# Patient Record
Sex: MALE | Race: BLACK OR AFRICAN AMERICAN | Employment: FULL TIME | URBAN - METROPOLITAN AREA
[De-identification: names, ages, dates, MRNs, and addresses within clinical notes are randomized per-mention and may not be internally consistent; named-entity substitution may affect disease eponyms.]

---

## 2017-01-21 ENCOUNTER — HOSPITAL ENCOUNTER (EMERGENCY)
Age: 42
Discharge: HOME | End: 2017-01-21
Attending: EMERGENCY MEDICINE
Payer: MEDICAID

## 2017-01-21 VITALS
BODY MASS INDEX: 25.76 KG/M2 | HEIGHT: 62 IN | TEMPERATURE: 98.4 F | HEART RATE: 67 BPM | OXYGEN SATURATION: 97 % | WEIGHT: 140 LBS | DIASTOLIC BLOOD PRESSURE: 76 MMHG | RESPIRATION RATE: 18 BRPM | SYSTOLIC BLOOD PRESSURE: 137 MMHG

## 2017-01-21 DIAGNOSIS — R11.15 NON-INTRACTABLE CYCLICAL VOMITING WITH NAUSEA: Primary | ICD-10-CM

## 2017-01-21 DIAGNOSIS — E87.6 HYPOKALEMIA: ICD-10-CM

## 2017-01-21 DIAGNOSIS — R10.84 ABDOMINAL PAIN, GENERALIZED: ICD-10-CM

## 2017-01-21 LAB
ALBUMIN SERPL BCP-MCNC: 4.9 G/DL (ref 3.5–5)
ALBUMIN/GLOB SERPL: 1.1 {RATIO} (ref 1.2–3.5)
ALP SERPL-CCNC: 122 U/L (ref 50–136)
ALT SERPL-CCNC: 27 U/L (ref 12–65)
ANION GAP BLD CALC-SCNC: 16 MMOL/L (ref 7–16)
AST SERPL W P-5'-P-CCNC: 22 U/L (ref 15–37)
BASOPHILS # BLD AUTO: 0 K/UL (ref 0–0.2)
BASOPHILS # BLD: 0 % (ref 0–2)
BILIRUB SERPL-MCNC: 1 MG/DL (ref 0.2–1.1)
BUN SERPL-MCNC: 25 MG/DL (ref 6–23)
CALCIUM SERPL-MCNC: 9.9 MG/DL (ref 8.3–10.4)
CHLORIDE SERPL-SCNC: 105 MMOL/L (ref 98–107)
CO2 SERPL-SCNC: 19 MMOL/L (ref 21–32)
CREAT SERPL-MCNC: 1.61 MG/DL (ref 0.8–1.5)
DIFFERENTIAL METHOD BLD: ABNORMAL
EOSINOPHIL # BLD: 0 K/UL (ref 0–0.8)
EOSINOPHIL NFR BLD: 0 % (ref 0.5–7.8)
ERYTHROCYTE [DISTWIDTH] IN BLOOD BY AUTOMATED COUNT: 13.4 % (ref 11.9–14.6)
GLOBULIN SER CALC-MCNC: 4.3 G/DL (ref 2.3–3.5)
GLUCOSE SERPL-MCNC: 130 MG/DL (ref 65–100)
HCT VFR BLD AUTO: 45 % (ref 41.1–50.3)
HGB BLD-MCNC: 16.1 G/DL (ref 13.6–17.2)
IMM GRANULOCYTES # BLD: 0 K/UL (ref 0–0.5)
IMM GRANULOCYTES NFR BLD AUTO: 0.2 % (ref 0–5)
LIPASE SERPL-CCNC: 91 U/L (ref 73–393)
LYMPHOCYTES # BLD AUTO: 13 % (ref 13–44)
LYMPHOCYTES # BLD: 1.2 K/UL (ref 0.5–4.6)
MAGNESIUM SERPL-MCNC: 2.3 MG/DL (ref 1.8–2.4)
MCH RBC QN AUTO: 30.1 PG (ref 26.1–32.9)
MCHC RBC AUTO-ENTMCNC: 35.8 G/DL (ref 31.4–35)
MCV RBC AUTO: 84 FL (ref 79.6–97.8)
MONOCYTES # BLD: 0.9 K/UL (ref 0.1–1.3)
MONOCYTES NFR BLD AUTO: 10 % (ref 4–12)
NEUTS SEG # BLD: 6.7 K/UL (ref 1.7–8.2)
NEUTS SEG NFR BLD AUTO: 77 % (ref 43–78)
PHOSPHATE SERPL-MCNC: 1.5 MG/DL (ref 2.5–4.5)
PLATELET # BLD AUTO: 344 K/UL (ref 150–450)
PMV BLD AUTO: 9.3 FL (ref 10.8–14.1)
POTASSIUM SERPL-SCNC: 3.1 MMOL/L (ref 3.5–5.1)
PROT SERPL-MCNC: 9.2 G/DL (ref 6.3–8.2)
RBC # BLD AUTO: 5.38 M/UL (ref 4.23–5.67)
SODIUM SERPL-SCNC: 140 MMOL/L (ref 136–145)
WBC # BLD AUTO: 8.8 K/UL (ref 4.3–11.1)

## 2017-01-21 PROCEDURE — 96361 HYDRATE IV INFUSION ADD-ON: CPT | Performed by: EMERGENCY MEDICINE

## 2017-01-21 PROCEDURE — 96375 TX/PRO/DX INJ NEW DRUG ADDON: CPT | Performed by: EMERGENCY MEDICINE

## 2017-01-21 PROCEDURE — 96366 THER/PROPH/DIAG IV INF ADDON: CPT | Performed by: EMERGENCY MEDICINE

## 2017-01-21 PROCEDURE — 96374 THER/PROPH/DIAG INJ IV PUSH: CPT | Performed by: EMERGENCY MEDICINE

## 2017-01-21 PROCEDURE — 74011000250 HC RX REV CODE- 250: Performed by: EMERGENCY MEDICINE

## 2017-01-21 PROCEDURE — 99284 EMERGENCY DEPT VISIT MOD MDM: CPT | Performed by: EMERGENCY MEDICINE

## 2017-01-21 PROCEDURE — 96365 THER/PROPH/DIAG IV INF INIT: CPT | Performed by: EMERGENCY MEDICINE

## 2017-01-21 PROCEDURE — 74011250637 HC RX REV CODE- 250/637: Performed by: EMERGENCY MEDICINE

## 2017-01-21 PROCEDURE — 83690 ASSAY OF LIPASE: CPT | Performed by: EMERGENCY MEDICINE

## 2017-01-21 PROCEDURE — 80053 COMPREHEN METABOLIC PANEL: CPT | Performed by: EMERGENCY MEDICINE

## 2017-01-21 PROCEDURE — C9113 INJ PANTOPRAZOLE SODIUM, VIA: HCPCS | Performed by: EMERGENCY MEDICINE

## 2017-01-21 PROCEDURE — 74011250636 HC RX REV CODE- 250/636: Performed by: EMERGENCY MEDICINE

## 2017-01-21 PROCEDURE — 84100 ASSAY OF PHOSPHORUS: CPT | Performed by: EMERGENCY MEDICINE

## 2017-01-21 PROCEDURE — 81003 URINALYSIS AUTO W/O SCOPE: CPT | Performed by: EMERGENCY MEDICINE

## 2017-01-21 PROCEDURE — 85025 COMPLETE CBC W/AUTO DIFF WBC: CPT | Performed by: EMERGENCY MEDICINE

## 2017-01-21 PROCEDURE — 83735 ASSAY OF MAGNESIUM: CPT | Performed by: EMERGENCY MEDICINE

## 2017-01-21 RX ORDER — ONDANSETRON 4 MG/1
4 TABLET, ORALLY DISINTEGRATING ORAL
Qty: 20 TAB | Refills: 0 | Status: SHIPPED | OUTPATIENT
Start: 2017-01-21 | End: 2018-03-15

## 2017-01-21 RX ORDER — SODIUM,POTASSIUM PHOSPHATES 280-250MG
2 POWDER IN PACKET (EA) ORAL ONCE
Status: COMPLETED | OUTPATIENT
Start: 2017-01-21 | End: 2017-01-21

## 2017-01-21 RX ORDER — DICYCLOMINE HYDROCHLORIDE 20 MG/1
20 TABLET ORAL EVERY 6 HOURS
Qty: 30 TAB | Refills: 0 | Status: SHIPPED | OUTPATIENT
Start: 2017-01-21 | End: 2017-12-06

## 2017-01-21 RX ORDER — ONDANSETRON 2 MG/ML
4 INJECTION INTRAMUSCULAR; INTRAVENOUS
Status: COMPLETED | OUTPATIENT
Start: 2017-01-21 | End: 2017-01-21

## 2017-01-21 RX ORDER — POTASSIUM CHLORIDE 14.9 MG/ML
20 INJECTION INTRAVENOUS
Status: COMPLETED | OUTPATIENT
Start: 2017-01-21 | End: 2017-01-21

## 2017-01-21 RX ORDER — HYDROMORPHONE HYDROCHLORIDE 1 MG/ML
1 INJECTION, SOLUTION INTRAMUSCULAR; INTRAVENOUS; SUBCUTANEOUS
Status: COMPLETED | OUTPATIENT
Start: 2017-01-21 | End: 2017-01-21

## 2017-01-21 RX ORDER — METOCLOPRAMIDE HYDROCHLORIDE 5 MG/ML
10 INJECTION INTRAMUSCULAR; INTRAVENOUS
Status: COMPLETED | OUTPATIENT
Start: 2017-01-21 | End: 2017-01-21

## 2017-01-21 RX ORDER — DIPHENHYDRAMINE HYDROCHLORIDE 50 MG/ML
12.5 INJECTION, SOLUTION INTRAMUSCULAR; INTRAVENOUS
Status: COMPLETED | OUTPATIENT
Start: 2017-01-21 | End: 2017-01-21

## 2017-01-21 RX ADMIN — DIPHENHYDRAMINE HYDROCHLORIDE 12.5 MG: 50 INJECTION, SOLUTION INTRAMUSCULAR; INTRAVENOUS at 11:52

## 2017-01-21 RX ADMIN — SODIUM CHLORIDE 1000 ML: 900 INJECTION, SOLUTION INTRAVENOUS at 11:52

## 2017-01-21 RX ADMIN — SODIUM CHLORIDE 1000 ML: 900 INJECTION, SOLUTION INTRAVENOUS at 12:55

## 2017-01-21 RX ADMIN — METOCLOPRAMIDE 10 MG: 5 INJECTION, SOLUTION INTRAMUSCULAR; INTRAVENOUS at 11:51

## 2017-01-21 RX ADMIN — ONDANSETRON 4 MG: 2 INJECTION INTRAMUSCULAR; INTRAVENOUS at 11:51

## 2017-01-21 RX ADMIN — SODIUM CHLORIDE 40 MG: 9 INJECTION INTRAMUSCULAR; INTRAVENOUS; SUBCUTANEOUS at 11:51

## 2017-01-21 RX ADMIN — POTASSIUM CHLORIDE 20 MEQ: 14.9 INJECTION, SOLUTION INTRAVENOUS at 12:55

## 2017-01-21 RX ADMIN — HYDROMORPHONE HYDROCHLORIDE 1 MG: 1 INJECTION, SOLUTION INTRAMUSCULAR; INTRAVENOUS; SUBCUTANEOUS at 11:52

## 2017-01-21 RX ADMIN — POTASSIUM & SODIUM PHOSPHATES POWDER PACK 280-160-250 MG 2 PACKET: 280-160-250 PACK at 13:32

## 2017-01-21 NOTE — ED NOTES
I have reviewed discharge instructions with the patient. The patient verbalized understanding. Pt instructed on where prescriptions x2 were sent. Pt discharged with spouse.

## 2017-01-21 NOTE — DISCHARGE INSTRUCTIONS
Learning About Cyclic Vomiting Syndrome  What is it? An adult or child who has cyclic vomiting syndrome (CVS) has repeated bouts of severe vomiting and nausea. Between the vomiting episodes, the person's health is normal. The cause of CVS isn't known, but it may be related to migraine headaches. What happens when you have CVS?  CVS causes severe nausea, vomiting, and drooling. You may not be able to walk or talk during an episode. You may look pale. You may have a fever and feel very tired and thirsty. Some people with CVS have belly pain and diarrhea. Bouts of vomiting can last for a few hours or a few days. People with this condition tend to have a typical pattern of attacks. For example, one person may have bouts of CVS 4 times a year and always in the morning. Another person may have 8 bouts a year and always in the evening. Some people with CVS have triggers that set off the vomiting. People have reported an infection, such as a cold, as a trigger. Other triggers include stress, menstrual cycles, and certain foods like chocolate or cheese. Children tend to have more triggers than adults do. How is CVS treated? Treatment is centered around easing the nausea and vomiting. The doctor may prescribe medicine. During very bad bouts of vomiting, your doctor may want you to stay in the hospital for a while. You may get fluids through a vein (IV) to prevent dehydration. Dehydration can be serious. Your body needs fluids to make enough blood. Without a good supply of blood, vital organs such as the heart and brain can't work as well as they should. When should you call for help? Call your doctor now or seek immediate medical care if:  · You have symptoms of dehydration, such as:  ¨ Dry eyes and a dry mouth. ¨ Passing only a little urine. ¨ Feeling thirstier than usual.  Watch closely for changes in your health, and be sure to contact your doctor if:  · You do not get better as expected.   Follow-up care is a key part of your treatment and safety. Be sure to make and go to all appointments, and call your doctor if you are having problems. It's also a good idea to know your test results and keep a list of the medicines you take. Where can you learn more? Go to http://ora-itzel.info/. Enter B614 in the search box to learn more about \"Learning About Cyclic Vomiting Syndrome. \"  Current as of: August 9, 2016  Content Version: 11.1  © 4646-6498 [employer]. Care instructions adapted under license by [employer] (which disclaims liability or warranty for this information). If you have questions about a medical condition or this instruction, always ask your healthcare professional. [address] any warranty or liability for your use of this information.

## 2017-01-21 NOTE — ED PROVIDER NOTES
HPI Comments: 74-year-old male with a history of gastroparesis, cyclical vomiting syndrome versus hyperemesis cannabis syndrome, gastritis, and PUD with prior admission for hematemesis and BETTY, presents with intermittent episodes of vomiting for the past 4 days. He reports bilateral lower abdominal pain, worse on the left. Pain significantly worsened last night after eating short cake. He had a few episodes of vomiting blood, but this has since resolved. Last bowel movement was 4 days ago and was solid. No blood in stools. No fever. Patient is a 46 y.o. male presenting with vomiting and abdominal pain. The history is provided by the patient. Vomiting    Associated symptoms include abdominal pain. Pertinent negatives include no chills, no fever, no diarrhea, no headaches, no cough and no headaches. Abdominal Pain    Associated symptoms include vomiting and constipation. Pertinent negatives include no fever, no diarrhea, no nausea, no headaches, no chest pain and no back pain. Past Medical History:   Diagnosis Date    BETTY (acute kidney injury) ([facility] [address] 8/12/2014    Clostridium difficile colitis 3/20/2011    Gastroparesis 2005     emptying study normal -2006    GERD (gastroesophageal reflux disease)     HIATAL HERNIA SINCE 1999    PUD (peptic ulcer disease) ALL DX IN 2008     ESOPHAGITIS, + H PYLORI       Past Surgical History:   Procedure Laterality Date    Hx wisdom teeth extraction  2/10/11    Egd  4/08     H.  HERNIA, ULCER X2, H PYLORI,    Colonoscopy  4/08     ESOPHAGITIS, COLONIC ULCER X1    Egd  2011     h pylori -neg         Family History:   Problem Relation Age of Onset    Other Mother      L+W    Diabetes Maternal Grandmother     Sickle Cell Anemia Father     Heart Disease Paternal Grandfather     Other Sister      ALL L+W    Other Son      L+W       Social History     Social History    Marital status:      Spouse name: N/A    Number of children: N/A    Years of education: N/A     Occupational History    Not on file. Social History Main Topics    Smoking status: Former Smoker     Packs/day: 0.25     Years: 2.00     Types: Cigarettes    Smokeless tobacco: Never Used    Alcohol use No    Drug use: Yes     Special: Marijuana    Sexual activity: Yes     Partners: Female      Comment: S/O 15 WEEKS PREGNANT 3/17/11     Other Topics Concern    Not on file     Social History Narrative    3/17/11:  PATIENT LIVES WITH GIRLFRIEND, ALTON (CELL: [phone]), HER 3YEAR OLD DAUGHTER AND THEIR 3YEAR OLD SON. ALTON IS CURRENTLY 13 WEEKS PREGNANT. PATIENT'S JOB AT [employer] WAS DOWNSIZED ABOUT A YEAR AGO, AND HE HAS BEEN A STAY HOME DAD SINCE. ALTON WORKS IN HOUSEKEEPING POSITION. ALLERGIES: Amoxicillin; Aspirin; Hydrocodone; Ibuprofen; Pcn [penicillins]; and Phenergan [promethazine]    Review of Systems   Constitutional: Positive for diaphoresis. Negative for chills and fever. HENT: Negative for hearing loss. Eyes: Negative for visual disturbance. Respiratory: Negative for cough and shortness of breath. Cardiovascular: Negative for chest pain and palpitations. Gastrointestinal: Positive for abdominal pain, constipation and vomiting. Negative for blood in stool, diarrhea and nausea. Musculoskeletal: Negative for back pain. Skin: Negative for rash. Neurological: Negative for weakness and headaches. Psychiatric/Behavioral: Negative for confusion. Vitals:    01/21/17 1051 01/21/17 1115 01/21/17 1120 01/21/17 1131   BP: 143/82 151/90 151/90 134/84   Pulse: 93 76 (!) 126 84   Resp: 20 18     Temp: 97.8 °F (36.6 °C) 98.1 °F (36.7 °C)     SpO2: 96% 98% 95% 96%   Weight: 63.5 kg (140 lb)      Height: 5' 2\" (1.575 m)               Physical Exam   Constitutional: He appears well-developed and well-nourished. He appears distressed. HENT:   Head: Normocephalic and atraumatic.    Right Ear: External ear normal.   Left Ear: External ear normal.   Nose: Nose normal.   Mouth/Throat: Oropharynx is clear and moist.   Eyes: Conjunctivae are normal. Pupils are equal, round, and reactive to light. Neck: Normal range of motion. Neck supple. Cardiovascular: Regular rhythm, normal heart sounds and intact distal pulses. Pulmonary/Chest: Effort normal and breath sounds normal. No respiratory distress. He has no wheezes. Abdominal: Soft. Bowel sounds are normal. He exhibits no distension. There is generalized tenderness. There is guarding. There is no rigidity and no rebound. Musculoskeletal: Normal range of motion. He exhibits no edema. Neurological: He is alert. Skin: Skin is warm and dry. Psychiatric: Judgment normal.   Nursing note and vitals reviewed. MDM  Number of Diagnoses or Management Options  Diagnosis management comments: Parts of this document were created using dragon voice recognition software. The chart has been reviewed but errors may still be present. 1:04 PM  Patient feeling much better. We'll continue hydration and replace potassium and phosphorus. Patient mildly acidotic with renal sufficiency. I do not suspect he will require admission. I discussed the results of all labs, procedures, radiographs, and treatments with the patient and available family. Treatment plan is agreed upon and the patient is ready for discharge. Questions about treatment in the ED and differential diagnosis of presenting condition were answered. Patient was given verbal discharge instructions including, but not limited to, importance of returning to the emergency department for any concern of worsening or continued symptoms. Instructions were given to follow up with a primary care provider or specialist within 1-2 days.   Adverse effects of medications, if prescribed, were discussed and patient was advised to refrain from significant physical activity until followed up by primary care physician and to not drive or operate heavy machinery after taking any sedating substances.            Amount and/or Complexity of Data Reviewed  Clinical lab tests: ordered and reviewed (Results for orders placed or performed during the hospital encounter of 01/21/17  -CBC WITH AUTOMATED DIFF       Result                                            Value                         Ref Range                       WBC                                               8.8                           4.3 - 11.1 K/uL                 RBC                                               5.38                          4.23 - 5.67 M/uL                HGB                                               16.1                          13.6 - 17.2 g/dL                HCT                                               45.0                          41.1 - 50.3 %                   MCV                                               84.0                          79.6 - 97.8 FL                  MCH                                               30.1                          26.1 - 32.9 PG                  MCHC                                              35.8 (H)                      31.4 - 35.0 g/dL                RDW                                               13.4                          11.9 - 14.6 %                   PLATELET                                          344                           150 - 450 K/uL                  MPV                                               9.3 (L)                       10.8 - 14.1 FL                  DF                                                AUTOMATED                                                     NEUTROPHILS                                       77                            43 - 78 %                       LYMPHOCYTES                                       13                            13 - 44 %                       MONOCYTES                                         10                            4.0 - 12.0 % EOSINOPHILS                                       0 (L)                         0.5 - 7.8 %                     BASOPHILS                                         0                             0.0 - 2.0 %                     IMMATURE GRANULOCYTES                             0.2                           0.0 - 5.0 %                     ABS. NEUTROPHILS                                  6.7                           1.7 - 8.2 K/UL                  ABS. LYMPHOCYTES                                  1.2                           0.5 - 4.6 K/UL                  ABS. MONOCYTES                                    0.9                           0.1 - 1.3 K/UL                  ABS. EOSINOPHILS                                  0.0                           0.0 - 0.8 K/UL                  ABS. BASOPHILS                                    0.0                           0.0 - 0.2 K/UL                  ABS. IMM.  GRANS.                                  0.0                           0.0 - 0.5 K/UL             -METABOLIC PANEL, COMPREHENSIVE       Result                                            Value                         Ref Range                       Sodium                                            140                           136 - 145 mmol/L                Potassium                                         3.1 (L)                       3.5 - 5.1 mmol/L                Chloride                                          105                           98 - 107 mmol/L                 CO2                                               19 (L)                        21 - 32 mmol/L                  Anion gap                                         16                            7 - 16 mmol/L                   Glucose                                           130 (H)                       65 - 100 mg/dL                  BUN                                               25 (H)                        6 - 23 MG/DL                    Creatinine 1.61 (H)                      0.8 - 1.5 MG/DL                 GFR est AA                                        >60                           >60 ml/min/1.73m2               GFR est non-AA                                    51 (L)                        >60 ml/min/1.73m2               Calcium                                           9.9                           8.3 - 10.4 MG/DL                Bilirubin, total                                  1.0                           0.2 - 1.1 MG/DL                 ALT                                               27                            12 - 65 U/L                     AST                                               22                            15 - 37 U/L                     Alk. phosphatase                                  122                           50 - 136 U/L                    Protein, total                                    9.2 (H)                       6.3 - 8.2 g/dL                  Albumin                                           4.9                           3.5 - 5.0 g/dL                  Globulin                                          4.3 (H)                       2.3 - 3.5 g/dL                  A-G Ratio                                         1.1 (L)                       1.2 - 3.5                  -LIPASE       Result                                            Value                         Ref Range                       Lipase                                            91                            73 - 393 U/L               -MAGNESIUM       Result                                            Value                         Ref Range                       Magnesium                                         2.3                           1.8 - 2.4 mg/dL            -PHOSPHORUS       Result                                            Value                         Ref Range                       Phosphorus 1.5 (L)                       2.5 - 4.5 MG/DL            )  Tests in the medicine section of CPT®: ordered and reviewed      ED Course       Procedures

## 2017-03-28 ENCOUNTER — HOSPITAL ENCOUNTER (OUTPATIENT)
Age: 42
Setting detail: OBSERVATION
Discharge: HOME | End: 2017-03-31
Admitting: INTERNAL MEDICINE
Payer: MEDICAID

## 2017-03-28 DIAGNOSIS — G43.A0 CYCLIC VOMITING SYNDROME, INTRACTABILITY OF VOMITING NOT SPECIFIED, PRESENCE OF NAUSEA NOT SPECIFIED: Primary | ICD-10-CM

## 2017-03-28 LAB
ALBUMIN SERPL BCP-MCNC: 4.1 G/DL (ref 3.5–5)
ALBUMIN SERPL BCP-MCNC: 4.7 G/DL (ref 3.5–5)
ALBUMIN/GLOB SERPL: 1.2 {RATIO} (ref 1.2–3.5)
ALBUMIN/GLOB SERPL: 1.2 {RATIO} (ref 1.2–3.5)
ALP SERPL-CCNC: 111 U/L (ref 50–136)
ALP SERPL-CCNC: 97 U/L (ref 50–136)
ALT SERPL-CCNC: 20 U/L (ref 12–65)
ALT SERPL-CCNC: 21 U/L (ref 12–65)
AMPHET UR QL SCN: NEGATIVE
ANION GAP BLD CALC-SCNC: 14 MMOL/L (ref 7–16)
ANION GAP BLD CALC-SCNC: 20 MMOL/L (ref 7–16)
APPEARANCE UR: CLEAR
AST SERPL W P-5'-P-CCNC: 14 U/L (ref 15–37)
AST SERPL W P-5'-P-CCNC: 16 U/L (ref 15–37)
BACTERIA URNS QL MICRO: 0 /HPF
BARBITURATES UR QL SCN: NEGATIVE
BASOPHILS # BLD AUTO: 0 K/UL (ref 0–0.2)
BASOPHILS # BLD: 0 % (ref 0–2)
BENZODIAZ UR QL: POSITIVE
BILIRUB SERPL-MCNC: 0.6 MG/DL (ref 0.2–1.1)
BILIRUB SERPL-MCNC: 1.1 MG/DL (ref 0.2–1.1)
BILIRUB UR QL: NEGATIVE
BUN SERPL-MCNC: 22 MG/DL (ref 6–23)
BUN SERPL-MCNC: 25 MG/DL (ref 6–23)
CALCIUM SERPL-MCNC: 8.8 MG/DL (ref 8.3–10.4)
CALCIUM SERPL-MCNC: 9.5 MG/DL (ref 8.3–10.4)
CANNABINOIDS UR QL SCN: POSITIVE
CASTS URNS QL MICRO: ABNORMAL /LPF
CHLORIDE SERPL-SCNC: 103 MMOL/L (ref 98–107)
CHLORIDE SERPL-SCNC: 107 MMOL/L (ref 98–107)
CO2 SERPL-SCNC: 16 MMOL/L (ref 21–32)
CO2 SERPL-SCNC: 19 MMOL/L (ref 21–32)
COCAINE UR QL SCN: NEGATIVE
COLOR UR: YELLOW
CREAT SERPL-MCNC: 0.99 MG/DL (ref 0.8–1.5)
CREAT SERPL-MCNC: 1.57 MG/DL (ref 0.8–1.5)
DEPRECATED S PYO AG THROAT QL EIA: NEGATIVE
DIFFERENTIAL METHOD BLD: ABNORMAL
EOSINOPHIL # BLD: 0 K/UL (ref 0–0.8)
EOSINOPHIL NFR BLD: 0 % (ref 0.5–7.8)
EPI CELLS #/AREA URNS HPF: ABNORMAL /HPF
ERYTHROCYTE [DISTWIDTH] IN BLOOD BY AUTOMATED COUNT: 13.1 % (ref 11.9–14.6)
FLUAV AG NPH QL IA: NEGATIVE
FLUBV AG NPH QL IA: NEGATIVE
GLOBULIN SER CALC-MCNC: 3.4 G/DL (ref 2.3–3.5)
GLOBULIN SER CALC-MCNC: 4 G/DL (ref 2.3–3.5)
GLUCOSE SERPL-MCNC: 119 MG/DL (ref 65–100)
GLUCOSE SERPL-MCNC: 125 MG/DL (ref 65–100)
GLUCOSE UR STRIP.AUTO-MCNC: NEGATIVE MG/DL
HCT VFR BLD AUTO: 42.4 % (ref 41.1–50.3)
HGB BLD-MCNC: 16.5 G/DL (ref 13.6–17.2)
HGB UR QL STRIP: ABNORMAL
IMM GRANULOCYTES # BLD: 0 K/UL (ref 0–0.5)
IMM GRANULOCYTES NFR BLD AUTO: 0.3 % (ref 0–5)
KETONES UR QL STRIP.AUTO: >80 MG/DL
LEUKOCYTE ESTERASE UR QL STRIP.AUTO: NEGATIVE
LIPASE SERPL-CCNC: 103 U/L (ref 73–393)
LYMPHOCYTES # BLD AUTO: 27 % (ref 13–44)
LYMPHOCYTES # BLD: 2.7 K/UL (ref 0.5–4.6)
MAGNESIUM SERPL-MCNC: 2.3 MG/DL (ref 1.8–2.4)
MCH RBC QN AUTO: 30 PG (ref 26.1–32.9)
MCHC RBC AUTO-ENTMCNC: 38.9 G/DL (ref 31.4–35)
MCV RBC AUTO: 77.1 FL (ref 79.6–97.8)
METHADONE UR QL: NEGATIVE
MONOCYTES # BLD: 1.1 K/UL (ref 0.1–1.3)
MONOCYTES NFR BLD AUTO: 11 % (ref 4–12)
NEUTS SEG # BLD: 6 K/UL (ref 1.7–8.2)
NEUTS SEG NFR BLD AUTO: 62 % (ref 43–78)
NITRITE UR QL STRIP.AUTO: NEGATIVE
OPIATES UR QL: NEGATIVE
PCP UR QL: NEGATIVE
PH UR STRIP: 6 [PH] (ref 5–9)
PLATELET # BLD AUTO: 384 K/UL (ref 150–450)
PMV BLD AUTO: 9.3 FL (ref 10.8–14.1)
POTASSIUM SERPL-SCNC: 3.4 MMOL/L (ref 3.5–5.1)
POTASSIUM SERPL-SCNC: 3.9 MMOL/L (ref 3.5–5.1)
PROT SERPL-MCNC: 7.5 G/DL (ref 6.3–8.2)
PROT SERPL-MCNC: 8.7 G/DL (ref 6.3–8.2)
PROT UR STRIP-MCNC: ABNORMAL MG/DL
RBC # BLD AUTO: 5.5 M/UL (ref 4.23–5.67)
RBC #/AREA URNS HPF: ABNORMAL /HPF
SODIUM SERPL-SCNC: 139 MMOL/L (ref 136–145)
SODIUM SERPL-SCNC: 140 MMOL/L (ref 136–145)
SP GR UR REFRACTOMETRY: 1.03 (ref 1–1.02)
UROBILINOGEN UR QL STRIP.AUTO: 0.2 EU/DL (ref 0.2–1)
WBC # BLD AUTO: 9.9 K/UL (ref 4.3–11.1)
WBC URNS QL MICRO: ABNORMAL /HPF

## 2017-03-28 PROCEDURE — 96361 HYDRATE IV INFUSION ADD-ON: CPT

## 2017-03-28 PROCEDURE — 99218 HC RM OBSERVATION: CPT

## 2017-03-28 PROCEDURE — 96366 THER/PROPH/DIAG IV INF ADDON: CPT

## 2017-03-28 PROCEDURE — 87804 INFLUENZA ASSAY W/OPTIC: CPT | Performed by: INTERNAL MEDICINE

## 2017-03-28 PROCEDURE — 74011250636 HC RX REV CODE- 250/636: Performed by: INTERNAL MEDICINE

## 2017-03-28 PROCEDURE — 83690 ASSAY OF LIPASE: CPT

## 2017-03-28 PROCEDURE — 99285 EMERGENCY DEPT VISIT HI MDM: CPT

## 2017-03-28 PROCEDURE — 74011250637 HC RX REV CODE- 250/637: Performed by: INTERNAL MEDICINE

## 2017-03-28 PROCEDURE — 83735 ASSAY OF MAGNESIUM: CPT

## 2017-03-28 PROCEDURE — 96372 THER/PROPH/DIAG INJ SC/IM: CPT

## 2017-03-28 PROCEDURE — 96376 TX/PRO/DX INJ SAME DRUG ADON: CPT

## 2017-03-28 PROCEDURE — 87880 STREP A ASSAY W/OPTIC: CPT | Performed by: INTERNAL MEDICINE

## 2017-03-28 PROCEDURE — 80053 COMPREHEN METABOLIC PANEL: CPT

## 2017-03-28 PROCEDURE — 96375 TX/PRO/DX INJ NEW DRUG ADDON: CPT

## 2017-03-28 PROCEDURE — 74011250636 HC RX REV CODE- 250/636: Performed by: NURSE PRACTITIONER

## 2017-03-28 PROCEDURE — 96365 THER/PROPH/DIAG IV INF INIT: CPT

## 2017-03-28 PROCEDURE — 80053 COMPREHEN METABOLIC PANEL: CPT | Performed by: INTERNAL MEDICINE

## 2017-03-28 PROCEDURE — 85025 COMPLETE CBC W/AUTO DIFF WBC: CPT

## 2017-03-28 PROCEDURE — 80307 DRUG TEST PRSMV CHEM ANLYZR: CPT | Performed by: INTERNAL MEDICINE

## 2017-03-28 PROCEDURE — 81001 URINALYSIS AUTO W/SCOPE: CPT | Performed by: INTERNAL MEDICINE

## 2017-03-28 PROCEDURE — 74011250636 HC RX REV CODE- 250/636

## 2017-03-28 PROCEDURE — 87081 CULTURE SCREEN ONLY: CPT | Performed by: EMERGENCY MEDICINE

## 2017-03-28 RX ORDER — HYDROMORPHONE HYDROCHLORIDE 1 MG/ML
2 INJECTION, SOLUTION INTRAMUSCULAR; INTRAVENOUS; SUBCUTANEOUS
Status: DISCONTINUED | OUTPATIENT
Start: 2017-03-28 | End: 2017-03-31 | Stop reason: HOSPADM

## 2017-03-28 RX ORDER — DIPHENHYDRAMINE HYDROCHLORIDE 50 MG/ML
25 INJECTION, SOLUTION INTRAMUSCULAR; INTRAVENOUS
Status: COMPLETED | OUTPATIENT
Start: 2017-03-28 | End: 2017-03-28

## 2017-03-28 RX ORDER — HALOPERIDOL 5 MG/ML
10 INJECTION INTRAMUSCULAR ONCE
Status: COMPLETED | OUTPATIENT
Start: 2017-03-28 | End: 2017-03-28

## 2017-03-28 RX ORDER — HYDROMORPHONE HYDROCHLORIDE 1 MG/ML
1 INJECTION, SOLUTION INTRAMUSCULAR; INTRAVENOUS; SUBCUTANEOUS
Status: DISCONTINUED | OUTPATIENT
Start: 2017-03-28 | End: 2017-03-31 | Stop reason: HOSPADM

## 2017-03-28 RX ORDER — SODIUM CHLORIDE 0.9 % (FLUSH) 0.9 %
5-10 SYRINGE (ML) INJECTION EVERY 8 HOURS
Status: DISCONTINUED | OUTPATIENT
Start: 2017-03-28 | End: 2017-03-31 | Stop reason: HOSPADM

## 2017-03-28 RX ORDER — METOCLOPRAMIDE HYDROCHLORIDE 5 MG/ML
10 INJECTION INTRAMUSCULAR; INTRAVENOUS EVERY 6 HOURS
Status: DISCONTINUED | OUTPATIENT
Start: 2017-03-28 | End: 2017-03-31 | Stop reason: HOSPADM

## 2017-03-28 RX ORDER — DIAZEPAM 10 MG/2ML
5 INJECTION INTRAMUSCULAR
Status: COMPLETED | OUTPATIENT
Start: 2017-03-28 | End: 2017-03-28

## 2017-03-28 RX ORDER — ONDANSETRON 2 MG/ML
4 INJECTION INTRAMUSCULAR; INTRAVENOUS
Status: COMPLETED | OUTPATIENT
Start: 2017-03-28 | End: 2017-03-28

## 2017-03-28 RX ORDER — ONDANSETRON 2 MG/ML
8 INJECTION INTRAMUSCULAR; INTRAVENOUS
Status: DISCONTINUED | OUTPATIENT
Start: 2017-03-28 | End: 2017-03-28

## 2017-03-28 RX ORDER — METOCLOPRAMIDE HYDROCHLORIDE 5 MG/ML
10 INJECTION INTRAMUSCULAR; INTRAVENOUS
Status: COMPLETED | OUTPATIENT
Start: 2017-03-28 | End: 2017-03-28

## 2017-03-28 RX ORDER — METOCLOPRAMIDE 10 MG/1
10 TABLET ORAL
COMMUNITY
End: 2017-12-06

## 2017-03-28 RX ORDER — PANTOPRAZOLE SODIUM 20 MG/1
20 TABLET, DELAYED RELEASE ORAL DAILY
COMMUNITY
End: 2017-03-31

## 2017-03-28 RX ORDER — SODIUM CHLORIDE 0.9 % (FLUSH) 0.9 %
5-10 SYRINGE (ML) INJECTION AS NEEDED
Status: DISCONTINUED | OUTPATIENT
Start: 2017-03-28 | End: 2017-03-31 | Stop reason: HOSPADM

## 2017-03-28 RX ORDER — FACIAL-BODY WIPES
10 EACH TOPICAL
Status: COMPLETED | OUTPATIENT
Start: 2017-03-28 | End: 2017-03-28

## 2017-03-28 RX ORDER — ONDANSETRON 2 MG/ML
4 INJECTION INTRAMUSCULAR; INTRAVENOUS EVERY 4 HOURS
Status: DISCONTINUED | OUTPATIENT
Start: 2017-03-28 | End: 2017-03-31 | Stop reason: HOSPADM

## 2017-03-28 RX ORDER — ENOXAPARIN SODIUM 100 MG/ML
40 INJECTION SUBCUTANEOUS EVERY 24 HOURS
Status: DISCONTINUED | OUTPATIENT
Start: 2017-03-28 | End: 2017-03-31 | Stop reason: HOSPADM

## 2017-03-28 RX ORDER — SODIUM CHLORIDE 9 MG/ML
150 INJECTION, SOLUTION INTRAVENOUS CONTINUOUS
Status: DISCONTINUED | OUTPATIENT
Start: 2017-03-28 | End: 2017-03-29

## 2017-03-28 RX ORDER — POTASSIUM CHLORIDE 14.9 MG/ML
20 INJECTION INTRAVENOUS ONCE
Status: COMPLETED | OUTPATIENT
Start: 2017-03-28 | End: 2017-03-28

## 2017-03-28 RX ADMIN — DIAZEPAM 5 MG: 5 INJECTION, SOLUTION INTRAMUSCULAR; INTRAVENOUS at 04:12

## 2017-03-28 RX ADMIN — ENOXAPARIN SODIUM 40 MG: 40 INJECTION SUBCUTANEOUS at 22:01

## 2017-03-28 RX ADMIN — DIPHENHYDRAMINE HYDROCHLORIDE 25 MG: 50 INJECTION, SOLUTION INTRAMUSCULAR; INTRAVENOUS at 04:52

## 2017-03-28 RX ADMIN — ONDANSETRON HYDROCHLORIDE 4 MG: 2 INJECTION, SOLUTION INTRAMUSCULAR; INTRAVENOUS at 09:16

## 2017-03-28 RX ADMIN — METOCLOPRAMIDE 10 MG: 5 INJECTION, SOLUTION INTRAMUSCULAR; INTRAVENOUS at 18:21

## 2017-03-28 RX ADMIN — METOCLOPRAMIDE 10 MG: 5 INJECTION, SOLUTION INTRAMUSCULAR; INTRAVENOUS at 11:35

## 2017-03-28 RX ADMIN — Medication 10 ML: at 22:57

## 2017-03-28 RX ADMIN — ONDANSETRON 4 MG: 2 INJECTION, SOLUTION INTRAMUSCULAR; INTRAVENOUS at 04:46

## 2017-03-28 RX ADMIN — HALOPERIDOL LACTATE 10 MG: 5 INJECTION, SOLUTION INTRAMUSCULAR at 04:52

## 2017-03-28 RX ADMIN — HYDROMORPHONE HYDROCHLORIDE 1 MG: 1 INJECTION, SOLUTION INTRAMUSCULAR; INTRAVENOUS; SUBCUTANEOUS at 21:59

## 2017-03-28 RX ADMIN — SODIUM CHLORIDE 1000 ML: 900 INJECTION, SOLUTION INTRAVENOUS at 04:12

## 2017-03-28 RX ADMIN — SODIUM CHLORIDE 150 ML/HR: 900 INJECTION, SOLUTION INTRAVENOUS at 17:02

## 2017-03-28 RX ADMIN — SODIUM CHLORIDE 150 ML/HR: 900 INJECTION, SOLUTION INTRAVENOUS at 09:16

## 2017-03-28 RX ADMIN — POTASSIUM CHLORIDE 20 MEQ: 14.9 INJECTION, SOLUTION INTRAVENOUS at 12:14

## 2017-03-28 RX ADMIN — METOCLOPRAMIDE 10 MG: 5 INJECTION, SOLUTION INTRAMUSCULAR; INTRAVENOUS at 04:12

## 2017-03-28 RX ADMIN — ONDANSETRON HYDROCHLORIDE 4 MG: 2 INJECTION, SOLUTION INTRAMUSCULAR; INTRAVENOUS at 16:46

## 2017-03-28 RX ADMIN — DIPHENHYDRAMINE HYDROCHLORIDE 25 MG: 50 INJECTION, SOLUTION INTRAMUSCULAR; INTRAVENOUS at 04:12

## 2017-03-28 RX ADMIN — BISACODYL 10 MG: 10 SUPPOSITORY RECTAL at 09:16

## 2017-03-28 RX ADMIN — ONDANSETRON HYDROCHLORIDE 4 MG: 2 INJECTION, SOLUTION INTRAMUSCULAR; INTRAVENOUS at 12:14

## 2017-03-28 NOTE — PROGRESS NOTES
Complaint of abdominal discomfort most likely from \"wretching\". Refused prn Tylenol. Continue to monitor.

## 2017-03-28 NOTE — IP AVS SNAPSHOT
Summary of Care Report The Summary of Care report has been created to help improve care coordination. Users with access to [employer] or [address] (Web-based application) may access additional patient information including the Discharge Summary. If you are not currently a [address] user and need more information, please call the number listed below in the [address] section and ask to be connected with Medical Records. Facility Information Name Address Phone [address] [address] [address] [phone] Patient Information Patient Name Sex  Pao Reyes ([id number]) Male 1975 Discharge Information Admitting Provider Service Area Unit Vanessa Mora MD / Marlon Hardinmarlon  Med Surg / [phone] Discharge Provider Discharge Date/Time Discharge Disposition Destination (none) 3/31/2017 10:54 AM (Pending) AHR (none) Patient Language Language ENGLISH [13] Hospital Problems as of 3/31/2017  Reviewed: 2016 10:30 AM by Diana Nagy MD  
  
  
  
 Class Noted - Resolved Last Modified POA Active Problems Nausea and vomiting  3/5/2013 - Present 3/28/2017 by Jarrett Joseph NP Yes Entered by PRAVIN [place] Esophageal reflux (Chronic)  2014 - Present 3/28/2017 by Jarrett Joseph NP Yes Entered by Milla Morales BETTY (acute kidney injury) ([facility] [address]  2014 - Present 3/28/2017 by Jarrett Joseph NP Yes Entered by Vania Magaña MD  
  * (Principal)Intractable nausea and vomiting  2016 - Present 3/28/2017 by Mitch Bolivar NP Yes Entered by Tylor Tamayo PA Marijuana use (Chronic)  2016 - Present 3/28/2017 by Jarrett Joseph NP Yes   Entered by Tylor Tamayo, PA  
  
 Non-Hospital Problems as of 3/31/2017  Reviewed: 7/11/2016 10:30 AM by Danisha Verma MD  
  
  
  
 Class Noted - Resolved Last Modified Active Problems Gastroparesis (Chronic)  3/17/2011 - Present 9/17/2016 by Carine Carlos MD  
  Entered by Suzan Slade NP Gastroparesis (Chronic)  8/12/2012 - Present 11/28/2016 by Meera Snyder., PA Entered by PRAVIN Hernandez Epigastric pain  3/5/2013 - Present 8/14/2014 Entered by PRAVIN Santoro Sickle cell trait ([facility] [address] (Chronic)  3/5/2013 - Present 3/5/2013 by PRAVIN Santoro Entered by PRAVIN Santoro Hematemesis  11/28/2016 - Present 11/28/2016 by Meera Snyder., PA Entered by PRAVIN Santoro Hypokalemia  7/30/2013 - Present 8/14/2014 Entered by Robert Cody NP Acute upper GI bleed (Chronic)  4/30/2014 - Present 5/2/2014 Entered by Mone Marshall Anxiety (Chronic)  4/30/2014 - Present 5/2/2014 Entered by Mone Marshall Tobacco abuse (Chronic)  4/30/2014 - Present 5/2/2014 Entered by Mone Marshall H/O hiatal hernia (Chronic)  4/30/2014 - Present 11/28/2016 by Meera Snyder., PA Entered by Mone Marshall  
  PUD (peptic ulcer disease) (Chronic)  4/30/2014 - Present 11/28/2016 by Meera Snyder., PA Entered by Mone Marshall Acute blood loss anemia  5/1/2014 - Present 5/2/2014 Entered by Amanda Fields Marijuana abuse  8/12/2014 - Present 4/12/2016 by Sara Zee MD  
  Entered by Izzy Dorantes MD  
  Microcytosis  8/12/2014 - Present 8/14/2014 Entered by Izzy Dorantes MD  
  Hypomagnesemia  8/14/2014 - Present 8/14/2014   Entered by Izzy Dorantes MD  
  Polysubstance abuse  7/11/2016 - Present 7/11/2016 by Izzy Dorantes MD  
  Entered by Izzy Dorantes MD  
  Tetrahydrocannabinol [facility]) use disorder, moderate, dependence ([facility] 7/11/2016 - Present 7/11/2016 by Ольга Wong MD  
  Entered by Ольга Wong MD  
  Intractable cyclical vomiting with nausea  7/11/2016 - Present 9/17/2016 by Neda Sung MD  
  Entered by Ольга Wong MD  
  Cannabinoid hyperemesis syndrome ([facility] [address]  7/11/2016 - Present 7/11/2016 by Ольга Wong MD  
  Entered by Ольга Wong MD  
  Upper GI bleed  9/17/2016 - Present 9/18/2016 by Gabriel Loera DO Entered by Neda Sung MD  
  Lactic acidosis  11/28/2016 - Present 11/28/2016 by PRAVIN Baltazar Entered by Shannan Figueroa., PA You are allergic to the following Allergen Reactions Amoxicillin Nausea Only Aspirin Other (comments)  
 ulcers Hydrocodone Rash Ibuprofen Other (comments) Hx of ulcers Pcn (Penicillins) Rash Phenergan (Promethazine) Nausea and Vomiting Other (comments) Current Discharge Medication List  
  
START taking these medications Dose & Instructions Dispensing Information Comments  
 polyethylene glycol 17 gram packet Commonly known as:  Roxy Mckeon Dose:  17 g Take 1 Packet by mouth daily. Quantity:  30 Packet Refills:  6 CONTINUE these medications which have CHANGED Dose & Instructions Dispensing Information Comments * dicyclomine 20 mg tablet Commonly known as:  BENTYL What changed:  Another medication with the same name was added. Make sure you understand how and when to take each. Dose:  20 mg Take 1 Tab by mouth every six (6) hours. As needed for abdominal pain Quantity:  30 Tab Refills:  0  
   
 * dicyclomine 20 mg tablet Commonly known as:  BENTYL What changed: You were already taking a medication with the same name, and this prescription was added. Make sure you understand how and when to take each. Dose:  20 mg Take 1 Tab by mouth every six (6) hours as needed. Quantity:  90 Tab Refills:  6  
   
 pantoprazole 40 mg tablet Commonly known as:  PROTONIX What changed:   
- medication strength 
- how much to take Dose:  40 mg Take 1 Tab by mouth daily. Quantity:  30 Tab Refills:  6  
   
 * Notice: This list has 2 medication(s) that are the same as other medications prescribed for you. Read the directions carefully, and ask your doctor or other care provider to review them with you. CONTINUE these medications which have NOT CHANGED Dose & Instructions Dispensing Information Comments * ondansetron 4 mg disintegrating tablet Commonly known as:  ZOFRAN ODT Dose:  4 mg Take 1 Tab by mouth every eight (8) hours as needed for Nausea. Quantity:  20 Tab Refills:  0  
   
 * ondansetron 8 mg disintegrating tablet Commonly known as:  ZOFRAN ODT Dose:  4 mg Take 0.5 Tabs by mouth every eight (8) hours as needed. Quantity:  30 Tab Refills:  1 REGLAN 10 mg tablet Generic drug:  metoclopramide HCl Dose:  10 mg Take 10 mg by mouth Before breakfast, lunch, dinner and at bedtime. Refills:  0  
   
 * Notice: This list has 2 medication(s) that are the same as other medications prescribed for you. Read the directions carefully, and ask your doctor or other care provider to review them with you. Current Immunizations Name Date TDAP Vaccine 4/21/2012 Follow-up Information Follow up With Details Comments Contact Info Phys MD Sienna   Patient can only remember the practice name and not the physician [facility]  office will call patient with appointment date and time. [facility] [id number] 
[phone] Discharge Instructions DISCHARGE SUMMARY from Nurse The following personal items are in your possession at time of discharge: 
 
Dental Appliances: None Home Medications: None Jewelry: None Clothing: At bedside Other Valuables: Cell Phone PATIENT INSTRUCTIONS: 
 
 
F-face looks uneven A-arms unable to move or move unevenly S-speech slurred or non-existent T-time-call 911 as soon as signs and symptoms begin-DO NOT go Back to bed or wait to see if you get better-TIME IS BRAIN. Warning Signs of HEART ATTACK Call 911 if you have these symptoms: 
? Chest discomfort. Most heart attacks involve discomfort in the center of the chest that lasts more than a few minutes, or that goes away and comes back. It can feel like uncomfortable pressure, squeezing, fullness, or pain. ? Discomfort in other areas of the upper body. Symptoms can include pain or discomfort in one or both arms, the back, neck, jaw, or stomach. ? Shortness of breath with or without chest discomfort. ? Other signs may include breaking out in a cold sweat, nausea, or lightheadedness. Don't wait more than five minutes to call [address]! Fast action can save your life. Calling 911 is almost always the fastest way to get lifesaving treatment. Emergency Medical Services staff can begin treatment when they arrive  up to an hour sooner than if someone gets to the hospital by car. The discharge information has been reviewed with the patient. The patient verbalized understanding. Discharge medications reviewed with the patient and appropriate educational materials and side effects teaching were provided. Chart Review Routing History Recipient Method Report Sent By Checo Bautista MD  
Fax: [phone] Phone: [phone]  Fax IP Auto Routed [place] [employer]way[employer] [[id number]] 9/19/2012  7:59 AM 09/19/2012 Wing Rodriguez MD  
Fax: [phone] Phone: [phone] Fax IP Auto Routed Trans Joann Flores MD [[id number]] 10/21/2013  8:56 PM 10/21/2013 Mauricio Gan MD  
Fax: [phone] Phone: [phone] Fax IP Auto Routed Zora Saleh MD [[id number]] 9/7/2016 11:40 AM 09/07/2016 Wong Mckeon MD  
Fax: [phone] Phone: [phone] Fax IP Auto Routed Zora Saleh MD [[id number]] 9/7/2016 11:40 AM 09/07/2016

## 2017-03-28 NOTE — CONSULTS
[facility] Consult Note       Primary GI Physician: None    Referring Physician:  ER    Consult Date:  3/28/2017    Admit Date:  3/28/2017    Chief Complaint:  Cyclical Vomiting Syndrome    Subjective:     History of Present Illness:  Patient is a 38 y.o. male with PMH of chronic N/V who is seen in the ED for nausea and vomiting. 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 He reports last THC use about 3 weeks ago. Has had multiple EGDs for this: hx of esophagitis and Alexandra Lynch tear. Last EGD in Feb (esophagitis). In 2014, he required several clips for MWT. Current episode started on Friday and got worse yesterday. He reports constant nausea now and vomiting bilious material.  He reports an episode of coffee ground emesis this am that was small. He takes oral Reglan at home but has not been taking due to nausea. He ran out of Zofran and cannot take Phenergan. He has not tried Elavil. He has not been able to follow up with [facility] due to not having a mailing address. He last had a BM about 4 days ago. Has chronic constipation. Miralax works at home  But not taking consistently. His wife who is at bedside feels stress may contribute to symptoms. He has had benadryl, Ativan, Haldol, Reglan, and Zofran and continues to vomit. EGD 11/28/17 Dr Keaton Falcon: There is a long linear ulcer in the distal esophagus without active bleeding that is about 2cm long. Additionally there is distal esophagitis that is moderate with mild oozing which stopped the by end of the exam. No varices  STOMACH: Dark liquid green material present with old blood that was suctioned out and no ulcers or lesions noted in the stomach  DUODENUM: Normal  PLAN:  1.continue anti-emetics, ativan, and pain control - once n/v controlled can try clear liquids  2. CBC q 12  3. IV PPI twice daily  4. Highly recommend marijuana cessation.  Patient should be seen as outpatient and consider starting elavil once acute illness resolved for CVS  5. Rest of plan per Dr Dania Staples and inpatient GI team    PMH:  Past Medical History:   Diagnosis Date    BETTY (acute kidney injury) ([facility] [address] 8/12/2014    Clostridium difficile colitis 3/20/2011    Gastroparesis 2005    emptying study normal -2006    GERD (gastroesophageal reflux disease)     HIATAL HERNIA SINCE 1999    PUD (peptic ulcer disease) ALL DX IN 2008    ESOPHAGITIS, + H PYLORI       PSH:  Past Surgical History:   Procedure Laterality Date    COLONOSCOPY  4/08    ESOPHAGITIS, COLONIC ULCER X1    EGD  4/08    H. HERNIA, ULCER X2, H PYLORI,    EGD  2011    h pylori -neg    HX WISDOM TEETH EXTRACTION  2/10/11       Allergies: Allergies   Allergen Reactions    Amoxicillin Nausea Only    Aspirin Other (comments)     ulcers    Hydrocodone Rash    Ibuprofen Other (comments)     Hx of ulcers    Pcn [Penicillins] Rash    Phenergan [Promethazine] Nausea and Vomiting and Other (comments)       Home Medications:  Prior to Admission medications    Medication Sig Start Date End Date Taking? Authorizing Provider   pantoprazole (PROTONIX) 20 mg tablet Take 20 mg by mouth daily. Yes Marcial El MD   metoclopramide HCl (REGLAN) 10 mg tablet Take 10 mg by mouth Before breakfast, lunch, dinner and at bedtime. Yes Marcial El MD   dicyclomine (BENTYL) 20 mg tablet Take 1 Tab by mouth every six (6) hours. As needed for abdominal pain 1/21/17   Eusebio Fay MD   ondansetron (ZOFRAN ODT) 4 mg disintegrating tablet Take 1 Tab by mouth every eight (8) hours as needed for Nausea. 1/21/17   Eusebio Fay MD   ondansetron (ZOFRAN ODT) 8 mg disintegrating tablet Take 0.5 Tabs by mouth every eight (8) hours as needed. 12/2/16   Jose Phoenix MD       Hospital Medications:  No current facility-administered medications for this encounter.       Current Outpatient Prescriptions   Medication Sig    pantoprazole (PROTONIX) 20 mg tablet Take 20 mg by mouth daily.    metoclopramide HCl (REGLAN) 10 mg tablet Take 10 mg by mouth Before breakfast, lunch, dinner and at bedtime.  dicyclomine (BENTYL) 20 mg tablet Take 1 Tab by mouth every six (6) hours. As needed for abdominal pain    ondansetron (ZOFRAN ODT) 4 mg disintegrating tablet Take 1 Tab by mouth every eight (8) hours as needed for Nausea.  ondansetron (ZOFRAN ODT) 8 mg disintegrating tablet Take 0.5 Tabs by mouth every eight (8) hours as needed. Social History:  Social History   Substance Use Topics    Smoking status: Former Smoker     Packs/day: 0.25     Years: 2.00     Types: Cigarettes    Smokeless tobacco: Never Used    Alcohol use No       Pt denies any history of drug use, blood transfusions, or tattoos. Family History:  Family History   Problem Relation Age of Onset    Other Mother      L+W    Diabetes Maternal Grandmother     Sickle Cell Anemia Father     Heart Disease Paternal Grandfather     Other Sister      ALL L+W    Other Son      L+W       Review of Systems:  A detailed 10 system ROS is obtained, with pertinent positives as listed above. All others are negative. Diet:  NPO    Objective:     Physical Exam:  Vitals:  Visit Vitals    /83    Pulse 82    Temp 98.2 °F (36.8 °C)    Resp 18    Ht 5' 2\" (1.575 m)    Wt 63.5 kg (140 lb)    SpO2 94%    BMI 25.61 kg/m2     Gen:  Pt is alert, cooperative, no acute distress  Skin:  Extremities and face reveal no rashes. HEENT: Sclerae anicteric. Extra-occular muscles are intact. No oral ulcers. No abnormal pigmentation of the lips. The neck is supple. Cardiovascular: Regular rate and rhythm. No murmurs, gallops, or rubs. Respiratory:  Comfortable breathing with no accessory muscle use. Clear breath sounds anteriorly with no wheezes, rales, or rhonchi. GI:  Abdomen nondistended, soft, and nontender. Normal active bowel sounds. No enlargement of the liver or spleen. No masses palpable.   Rectal: Deferred  Musculoskeletal:  No pitting edema of the lower legs. Neurological:  Gross memory appears intact. Patient is alert and oriented. Psychiatric:  Mood appears appropriate with judgement intact. Lymphatic:  No cervical or supraclavicular adenopathy. Laboratory:    Recent Labs      03/28/17   0400   WBC  9.9   HGB  16.5   HCT  42.4   PLT  384   MCV  77.1*   NA  139   K  3.4*   CL  103   CO2  16*   BUN  25*   CREA  1.57*   CA  9.5   MG  2.3   GLU  119*   AP  111   SGOT  16   ALT  21   TBILI  1.1   ALB  4.7   TP  8.7*   LPSE  103          Assessment:     Active Problems:    Nausea and vomiting (3/5/2013)      Esophageal reflux (4/30/2014)      BETTY (acute kidney injury) ([facility] [address] (8/12/2014)      Intractable nausea and vomiting (11/28/2016)      Marijuana use (11/28/2016)      38 y.o. male with PMH of cyclical vomiting sydrome seen in the ED for nausea and vomiting. He reports last THC use about 3 weeks ago. Reports hot shower help nausea. Has had multiple EGDs for this: hx of esophagitis and Alexandra Lynch tear. Last EGD in Feb (esophagitis). In 2014, he required several clips for MWT. He reports an episode of coffee ground emesis this am that was small. He last had a BM about 4 days ago. Has chronic constipation. Miralax works at home  But not taking consistently. His wife who is at bedside feels stress may contribute to symptoms. He has had benedryl, Ativan, Haldol, Reglan, and Zofran and continues to vomit. Plan:     1) Doculax suppository to stimulate BM  2) Scheduled Zofran and Reglan - IV until he can take PO  3) Consider Elavil 25 mg at HS when able to take PO  4) Cr elevated - hydration and monitor  5) Patient reports improvement in N/V with urinating - will check UA  6) Will re evalaute if nausea and when controlled and taking PO can be discharged    Antionette Santos NP  Patient is seen and examined in collaboration with Dr. Clemente Hughes. Assessment and plan as per Dr. Manuel Gomez.

## 2017-03-28 NOTE — ED PROVIDER NOTES
Patient is a 38 y.o. male presenting with vomiting. The history is provided by the patient. Vomiting    This is a chronic problem. The current episode started more than 2 days ago. The problem occurs continuously. The problem has not changed since onset. The emesis has an appearance of stomach contents (dry heaves). There has been no fever. Associated symptoms include abdominal pain. Pertinent negatives include no chills, no fever and no sweats. Past medical history comments: gastric reflux. Past Medical History:   Diagnosis Date    BETTY (acute kidney injury) ([facility] [address] 8/12/2014    Clostridium difficile colitis 3/20/2011    Gastroparesis 2005    emptying study normal -2006    GERD (gastroesophageal reflux disease)     HIATAL HERNIA SINCE 1999    PUD (peptic ulcer disease) ALL DX IN 2008    ESOPHAGITIS, + H PYLORI       Past Surgical History:   Procedure Laterality Date    COLONOSCOPY  4/08    ESOPHAGITIS, COLONIC ULCER X1    EGD  4/08    H. HERNIA, ULCER X2, H PYLORI,    EGD  2011    h pylori -neg    HX WISDOM TEETH EXTRACTION  2/10/11         Family History:   Problem Relation Age of Onset    Other Mother      L+W    Diabetes Maternal Grandmother     Sickle Cell Anemia Father     Heart Disease Paternal Grandfather     Other Sister      ALL L+W    Other Son      L+W       Social History     Social History    Marital status:      Spouse name: N/A    Number of children: N/A    Years of education: N/A     Occupational History    Not on file.      Social History Main Topics    Smoking status: Former Smoker     Packs/day: 0.25     Years: 2.00     Types: Cigarettes    Smokeless tobacco: Never Used    Alcohol use No    Drug use: Yes     Special: Marijuana    Sexual activity: Yes     Partners: Female      Comment: S/O 15 WEEKS PREGNANT 3/17/11     Other Topics Concern    Not on file     Social History Narrative    3/17/11:  PATIENT LIVES WITH GIRLFRIENALTON WINCHESTER (CELL: [phone] OR [phone]), HER 3YEAR OLD DAUGHTER AND THEIR 3YEAR OLD SON. ALTON IS CURRENTLY 13 WEEKS PREGNANT. PATIENT'S JOB AT [employer] WAS DOWNSIZED ABOUT A YEAR AGO, AND HE HAS BEEN A STAY HOME DAD SINCE. ALTON WORKS IN HOUSEKEEPING POSITION. ALLERGIES: Amoxicillin; Aspirin; Hydrocodone; Ibuprofen; Pcn [penicillins]; and Phenergan [promethazine]    Review of Systems   Constitutional: Negative. Negative for activity change, chills and fever. HENT: Negative. Eyes: Negative. Respiratory: Negative. Cardiovascular: Negative. Gastrointestinal: Positive for abdominal pain and vomiting. Genitourinary: Negative. Musculoskeletal: Negative. Skin: Negative. Neurological: Negative. Psychiatric/Behavioral: Negative. All other systems reviewed and are negative. Vitals:    03/28/17 0352   BP: (!) 144/101   Pulse: (!) 115   Resp: 18   Temp: 98.2 °F (36.8 °C)   SpO2: 95%   Weight: 63.5 kg (140 lb)   Height: 5' 2\" (1.575 m)            Physical Exam   Constitutional: He is oriented to person, place, and time. He appears well-developed and well-nourished. No distress. HENT:   Head: Normocephalic and atraumatic. Right Ear: External ear normal.   Left Ear: External ear normal.   Nose: Nose normal.   Mouth/Throat: Oropharynx is clear and moist. No oropharyngeal exudate. Eyes: Conjunctivae and EOM are normal. Pupils are equal, round, and reactive to light. Right eye exhibits no discharge. Left eye exhibits no discharge. No scleral icterus. Neck: Normal range of motion. Neck supple. No JVD present. No tracheal deviation present. Cardiovascular: Normal rate, regular rhythm and intact distal pulses. Pulmonary/Chest: Effort normal and breath sounds normal. No stridor. No respiratory distress. He has no wheezes. He exhibits no tenderness. Abdominal: Soft. Bowel sounds are normal. He exhibits no distension and no mass. There is no tenderness.    Musculoskeletal: Normal range of motion. He exhibits no edema or tenderness. Neurological: He is alert and oriented to person, place, and time. No cranial nerve deficit. Skin: Skin is warm and dry. No rash noted. He is not diaphoretic. No erythema. No pallor. Psychiatric: He has a normal mood and affect. His behavior is normal. Thought content normal.   Nursing note and vitals reviewed. MDM  Number of Diagnoses or Management Options  Diagnosis management comments: Patient retching in the department took several doses of different medications to get him under control. Patient still feels nauseated. Patient has been followed by GI for some time has had multiple tests including endoscopies biopsies etc.  He's been diagnosed with cyclic vomiting syndrome and cannabis related vomiting. Wife states that she doesn't think this is accurate because he does not smoke recently patient did not volunteer this information. We'll ask GI to come see him. Amount and/or Complexity of Data Reviewed  Clinical lab tests: ordered and reviewed  Tests in the radiology section of CPT®: ordered and reviewed  Tests in the medicine section of CPT®: ordered and reviewed  Discuss the patient with other providers: yes    Risk of Complications, Morbidity, and/or Mortality  Presenting problems: high  Diagnostic procedures: high  Management options: high      ED Course   ===================================================================  I have received Jemal Keller from Dr. Ghada Antony    We discussed the patient's complaint, presentation, vitals and differential diagnosis. We discussed the patient's current status, and response to treatments  We reviewed critical lab values, allergies, and other factors. Issues or problems that are still being evaluated are: nausea    We rounded at the patient's bedside, the patient and family's questions and concerns were addressed.     EXAM- pt resting, slightly queasy  Much improved    Assessment/Plan- seen by GI  Try po challenge  Hopefully d/c home    Maria Luisa Camacho MD; 3/28/2017 @10:44 AM  ===================================================================            Procedures

## 2017-03-28 NOTE — ED TRIAGE NOTES
Pt arrives to ED for c/o n/v. Pt states vomiting started x4 days ago and has not been able to keep anything down. Pt also states no bowel movement x4 days.

## 2017-03-28 NOTE — PROGRESS NOTES
TRANSFER - IN REPORT:    Verbal report received from Tamera Dinh RN on [address]  being received from ED for routine progression of care      Report consisted of patients Situation, Background, Assessment and   Recommendations(SBAR). Information from the following report(s) SBAR, ED Summary, Intake/Output, MAR and Recent Results was reviewed with the receiving nurse. Opportunity for questions and clarification was provided. Assessment completed upon patients arrival to unit and care assumed.

## 2017-03-28 NOTE — ED NOTES
TRANSFER - OUT REPORT:    Verbal report given to Aleyda PORTILLO(name) on [employer]  being transferred to  60(unit) for routine progression of care       Report consisted of patients Situation, Background, Assessment and   Recommendations(SBAR). Information from the following report(s) ED Summary was reviewed with the receiving nurse. Lines:   Peripheral IV 03/28/17 Right Arm (Active)        Opportunity for questions and clarification was provided.       Patient transported with:   O2 @ 2 liters

## 2017-03-28 NOTE — ED NOTES
Patient resting comfortably. Respirations even and unlabored. No requests at this time. Family at bedside.

## 2017-03-28 NOTE — PROGRESS NOTES
Patient still having intractable nausea and vomiting despite reglan and zofran. Symptoms are likely related to his history of cyclic vomiting syndrome. UDS is positive for THC despite patient's report of no recent marijuana use. Strep negative, flu pending. Will admit to obs given intractable nausea and vomiting. Given hypokalemia and ongoing vomiting, will replace potassium IV. Will monitor labs in the morning including Mg and BMP.      Nai Mack NP  [facility]

## 2017-03-29 ENCOUNTER — APPOINTMENT (OUTPATIENT)
Dept: GENERAL RADIOLOGY | Age: 42
End: 2017-03-29
Attending: INTERNAL MEDICINE
Payer: MEDICAID

## 2017-03-29 LAB
ANION GAP BLD CALC-SCNC: 10 MMOL/L (ref 7–16)
BUN SERPL-MCNC: 17 MG/DL (ref 6–23)
CALCIUM SERPL-MCNC: 8.5 MG/DL (ref 8.3–10.4)
CHLORIDE SERPL-SCNC: 111 MMOL/L (ref 98–107)
CO2 SERPL-SCNC: 22 MMOL/L (ref 21–32)
CREAT SERPL-MCNC: 0.84 MG/DL (ref 0.8–1.5)
ERYTHROCYTE [DISTWIDTH] IN BLOOD BY AUTOMATED COUNT: 13 % (ref 11.9–14.6)
GLUCOSE SERPL-MCNC: 86 MG/DL (ref 65–100)
HCT VFR BLD AUTO: 35.6 % (ref 41.1–50.3)
HCT VFR BLD AUTO: 37.2 % (ref 41.1–50.3)
HGB BLD-MCNC: 13.2 G/DL (ref 13.6–17.2)
HGB BLD-MCNC: 14 G/DL (ref 13.6–17.2)
MAGNESIUM SERPL-MCNC: 2.2 MG/DL (ref 1.8–2.4)
MCH RBC QN AUTO: 29.3 PG (ref 26.1–32.9)
MCHC RBC AUTO-ENTMCNC: 37.1 G/DL (ref 31.4–35)
MCV RBC AUTO: 78.9 FL (ref 79.6–97.8)
PLATELET # BLD AUTO: 294 K/UL (ref 150–450)
PMV BLD AUTO: 9 FL (ref 10.8–14.1)
POTASSIUM SERPL-SCNC: 3.9 MMOL/L (ref 3.5–5.1)
RBC # BLD AUTO: 4.51 M/UL (ref 4.23–5.67)
SODIUM SERPL-SCNC: 143 MMOL/L (ref 136–145)
WBC # BLD AUTO: 12.6 K/UL (ref 4.3–11.1)

## 2017-03-29 PROCEDURE — 85027 COMPLETE CBC AUTOMATED: CPT | Performed by: INTERNAL MEDICINE

## 2017-03-29 PROCEDURE — 85018 HEMOGLOBIN: CPT | Performed by: INTERNAL MEDICINE

## 2017-03-29 PROCEDURE — 99218 HC RM OBSERVATION: CPT

## 2017-03-29 PROCEDURE — 96361 HYDRATE IV INFUSION ADD-ON: CPT

## 2017-03-29 PROCEDURE — 74011250636 HC RX REV CODE- 250/636: Performed by: INTERNAL MEDICINE

## 2017-03-29 PROCEDURE — 80048 BASIC METABOLIC PNL TOTAL CA: CPT | Performed by: INTERNAL MEDICINE

## 2017-03-29 PROCEDURE — 36415 COLL VENOUS BLD VENIPUNCTURE: CPT | Performed by: INTERNAL MEDICINE

## 2017-03-29 PROCEDURE — 96376 TX/PRO/DX INJ SAME DRUG ADON: CPT

## 2017-03-29 PROCEDURE — 74020 XR ABD (AP AND ERECT OR DECUB): CPT

## 2017-03-29 PROCEDURE — 74011250637 HC RX REV CODE- 250/637: Performed by: NURSE PRACTITIONER

## 2017-03-29 PROCEDURE — 83735 ASSAY OF MAGNESIUM: CPT | Performed by: INTERNAL MEDICINE

## 2017-03-29 RX ORDER — PANTOPRAZOLE SODIUM 40 MG/1
40 TABLET, DELAYED RELEASE ORAL
Status: DISCONTINUED | OUTPATIENT
Start: 2017-03-30 | End: 2017-03-31 | Stop reason: HOSPADM

## 2017-03-29 RX ORDER — PANTOPRAZOLE SODIUM 40 MG/1
40 TABLET, DELAYED RELEASE ORAL
Status: DISCONTINUED | OUTPATIENT
Start: 2017-03-29 | End: 2017-03-29

## 2017-03-29 RX ORDER — POLYETHYLENE GLYCOL 3350 17 G/17G
17 POWDER, FOR SOLUTION ORAL DAILY
Status: DISCONTINUED | OUTPATIENT
Start: 2017-03-29 | End: 2017-03-31 | Stop reason: HOSPADM

## 2017-03-29 RX ADMIN — METOCLOPRAMIDE 10 MG: 5 INJECTION, SOLUTION INTRAMUSCULAR; INTRAVENOUS at 23:36

## 2017-03-29 RX ADMIN — POLYETHYLENE GLYCOL 3350 17 G: 17 POWDER, FOR SOLUTION ORAL at 12:00

## 2017-03-29 RX ADMIN — Medication 10 ML: at 21:05

## 2017-03-29 RX ADMIN — ONDANSETRON HYDROCHLORIDE 4 MG: 2 INJECTION, SOLUTION INTRAMUSCULAR; INTRAVENOUS at 21:05

## 2017-03-29 RX ADMIN — METOCLOPRAMIDE 10 MG: 5 INJECTION, SOLUTION INTRAMUSCULAR; INTRAVENOUS at 12:00

## 2017-03-29 RX ADMIN — METOCLOPRAMIDE 10 MG: 5 INJECTION, SOLUTION INTRAMUSCULAR; INTRAVENOUS at 16:42

## 2017-03-29 RX ADMIN — SODIUM CHLORIDE 150 ML/HR: 900 INJECTION, SOLUTION INTRAVENOUS at 00:06

## 2017-03-29 RX ADMIN — METOCLOPRAMIDE 10 MG: 5 INJECTION, SOLUTION INTRAMUSCULAR; INTRAVENOUS at 00:02

## 2017-03-29 RX ADMIN — METOCLOPRAMIDE 10 MG: 5 INJECTION, SOLUTION INTRAMUSCULAR; INTRAVENOUS at 05:20

## 2017-03-29 RX ADMIN — Medication 10 ML: at 05:20

## 2017-03-29 RX ADMIN — ONDANSETRON HYDROCHLORIDE 4 MG: 2 INJECTION, SOLUTION INTRAMUSCULAR; INTRAVENOUS at 00:02

## 2017-03-29 RX ADMIN — HYDROMORPHONE HYDROCHLORIDE 1 MG: 1 INJECTION, SOLUTION INTRAMUSCULAR; INTRAVENOUS; SUBCUTANEOUS at 08:18

## 2017-03-29 RX ADMIN — ONDANSETRON HYDROCHLORIDE 4 MG: 2 INJECTION, SOLUTION INTRAMUSCULAR; INTRAVENOUS at 03:36

## 2017-03-29 RX ADMIN — ONDANSETRON HYDROCHLORIDE 4 MG: 2 INJECTION, SOLUTION INTRAMUSCULAR; INTRAVENOUS at 08:18

## 2017-03-29 RX ADMIN — ONDANSETRON HYDROCHLORIDE 4 MG: 2 INJECTION, SOLUTION INTRAMUSCULAR; INTRAVENOUS at 12:00

## 2017-03-29 RX ADMIN — HYDROMORPHONE HYDROCHLORIDE 1 MG: 1 INJECTION, SOLUTION INTRAMUSCULAR; INTRAVENOUS; SUBCUTANEOUS at 03:30

## 2017-03-29 RX ADMIN — HYDROMORPHONE HYDROCHLORIDE 1 MG: 1 INJECTION, SOLUTION INTRAMUSCULAR; INTRAVENOUS; SUBCUTANEOUS at 22:29

## 2017-03-29 RX ADMIN — ONDANSETRON HYDROCHLORIDE 4 MG: 2 INJECTION, SOLUTION INTRAMUSCULAR; INTRAVENOUS at 15:49

## 2017-03-29 RX ADMIN — HYDROMORPHONE HYDROCHLORIDE 1 MG: 1 INJECTION, SOLUTION INTRAMUSCULAR; INTRAVENOUS; SUBCUTANEOUS at 16:41

## 2017-03-29 RX ADMIN — SODIUM CHLORIDE 150 ML/HR: 900 INJECTION, SOLUTION INTRAVENOUS at 08:08

## 2017-03-29 RX ADMIN — PANTOPRAZOLE SODIUM 40 MG: 40 TABLET, DELAYED RELEASE ORAL at 12:00

## 2017-03-29 NOTE — PROGRESS NOTES
Pt with vomiting episode. vomited 200 cc of light brown emesis. Gave pt ginger ale and instructed to call if needing assistance.  Resting quietly in bed

## 2017-03-29 NOTE — PROGRESS NOTES
Care Management Interventions  PCP Verified by CM: No (Patient does not have a PCP and he has tried to get into the free clinic. Doesn;t have proof of address. only  [address] )  Transition of Care Consult (CM Consult): Discharge Planning  Physical Therapy Consult: No  Occupational Therapy Consult: No  Current Support Network: Lives with Spouse (Lives with his wife and three kids and in his in laws home. )  Confirm Follow Up Transport: Family  Plan discussed with Pt/Family/Caregiver: Yes  Freedom of Choice Offered: Yes  Discharge Location  Discharge Placement: Home     Met with patient and his wife at the bedside. Patient had very little to say and spouse spoke for him. Mr. lEiana Rome is alert and oriented times four. He does not have medical insurance. He does have a new job that demands heavy lifting. He works in a furniture store. He is over income for Medicaid. His three children have medicaid. Spouse is a stay at home wife and she aslo helps her parents. No work excuse required and patient reporting his boss is \"understanding\". Discussed the [facility] for primary care. Patient reports he has tried to get into the clinic but is unable to prove his residence. All bills are in his in laws name. Will discuss with [facility] liaison and will update patient. He tends to be pretty healthy and not go to the MD and then when he cycles through this nausea and vomiting., he uses the ED for primary care. Says he has not gone through the out patient work up recommended because of lack of insurance. Ambulating all around the unit. No DC needs except for primary care. Will refer to [facility] as there is an income. Hiro Miranda

## 2017-03-29 NOTE — PROGRESS NOTES
Patient c/o severe abdominal pain, states he will not be able to tollerate PO tylenol.  Notified MD. New orders received from Dr. Deon Marquez

## 2017-03-29 NOTE — PROGRESS NOTES
Pt states he has had 2 episodes of vomiting with maroon to red colored emesis. NP Sophia Tamez informed. States she will order hgb.

## 2017-03-29 NOTE — PROGRESS NOTES
GI DAILY PROGRESS NOTE    Admit Date:  3/28/2017    Today's Date:  6/63/6128    CC:  Cyclic Vomiting Syndrome    Subjective:     Patient sitting in a chair. Tolerated clear liquids this morning. No nausea/vomiting this morning. States that he is still having lower abdominal pain and dilaudid is helping. He has not had a BM in several days. He takes miralax at home intermittently. Medications:   Current Facility-Administered Medications   Medication Dose Route Frequency    pantoprazole (PROTONIX) tablet 40 mg  40 mg Oral ACB    polyethylene glycol (MIRALAX) packet 17 g  17 g Oral DAILY    ondansetron (ZOFRAN) injection 4 mg  4 mg IntraVENous Q4H    metoclopramide HCl (REGLAN) injection 10 mg  10 mg IntraVENous Q6H    0.9% sodium chloride infusion  150 mL/hr IntraVENous CONTINUOUS    sodium chloride (NS) flush 5-10 mL  5-10 mL IntraVENous Q8H    sodium chloride (NS) flush 5-10 mL  5-10 mL IntraVENous PRN    acetaminophen (TYLENOL) solution 650 mg  650 mg Oral Q4H PRN    enoxaparin (LOVENOX) injection 40 mg  40 mg SubCUTAneous Q24H    HYDROmorphone (PF) (DILAUDID) injection 2 mg  2 mg IntraVENous Q3H PRN    HYDROmorphone (PF) (DILAUDID) injection 1 mg  1 mg IntraVENous Q3H PRN       Review of Systems:  ROS was obtained, with pertinent positives as listed above. No chest pain or SOB. Diet:  clears    Objective:   Vitals:  Visit Vitals    /59    Pulse 79    Temp 98.9 °F (37.2 °C)    Resp 19    Ht 5' 2\" (1.575 m)    Wt 63.5 kg (140 lb)    SpO2 97%    BMI 25.61 kg/m2     Intake/Output:  03/29 0701 - 03/29 1900  In: 240 [P.O.:240]  Out: -      Exam:  General appearance: alert, cooperative, no distress  Lungs: clear to auscultation bilaterally anteriorly  Heart: regular rate and rhythm  Abdomen: soft, non-tender.  Bowel sounds normal. No masses, no organomegaly  Extremities: extremities normal, atraumatic, no cyanosis or edema  Neuro:  alert and oriented    Data Review (Labs):    Recent Labs 03/29/17   0851  03/29/17   0551  03/28/17   1130  03/28/17   0400   WBC  12.6*   --    --   9.9   HGB  13.2*   --    --   16.5   HCT  35.6*   --    --   42.4   PLT  294   --    --   384   MCV  78.9*   --    --   77.1*   NA   --   143  140  139   K   --   3.9  3.9  3.4*   CL   --   111*  107  103   CO2   --   22  19*  16*   BUN   --   17  22  25*   CREA   --   0.84  0.99  1.57*   CA   --   8.5  8.8  9.5   MG   --   2.2   --   2.3   GLU   --   86  125*  119*   AP   --    --   97  111   SGOT   --    --   14*  16   ALT   --    --   20  21   TBILI   --    --   0.6  1.1   ALB   --    --   4.1  4.7   TP   --    --   7.5  8.7*   LPSE   --    --    --   103       Assessment:     Principal Problem:    Intractable nausea and vomiting (11/28/2016)    Active Problems:    Nausea and vomiting (3/5/2013)      Esophageal reflux (4/30/2014)      BETTY (acute kidney injury) ([facility] [address] (8/12/2014)      Marijuana use (11/28/2016)      38 y.o. AA male with PMH of cyclical vomiting sydrome seen in the ED 3/28/17 for nausea and vomiting. He reported last THC use about 3 weeks ago. UDS was positive for THC. Reports hot shower help nausea. Has had multiple EGDs for this: hx of esophagitis and Alexandra Lynch tear. Last EGD in Feb (esophagitis). In 2014, he required several clips for MWT.  3/29/17: Nausea and Pain are currently controlled with Reglan, Zofran, and Dilaudid. No BM after dulcolax yesterday. Drop in HGB may be dilutional with IVF. Has been afebrile. Plan:     1. Add in Protonix daily  2. Miralax daily  3. Continue with Reglan and Zofran  4. Advance diet to full liquids  5. Discharge home soon    Enedelia Peabody, NP    Patient is seen and examined in collaboration with Dr. Hever De La O. Assessment and plan as per Dr. Hever De La O.

## 2017-03-29 NOTE — PROGRESS NOTES
Spoke with GI. informed that pt has spell of coffee ground emesis. hgb was 16.5 yesterday, today hgb 13.2. Informed that wbc 12.6 and currently not on any abx.  Will continue to monitor output

## 2017-03-29 NOTE — PROGRESS NOTES
Pt with LLQ pain and continuation of N/V. Spoke with Dr. Bárbara Isidro with GI. States to order xray ABD.  And monitor pain and output

## 2017-03-30 LAB
ANION GAP BLD CALC-SCNC: 12 MMOL/L (ref 7–16)
BACTERIA SPEC CULT: NORMAL
BUN SERPL-MCNC: 13 MG/DL (ref 6–23)
CALCIUM SERPL-MCNC: 9.3 MG/DL (ref 8.3–10.4)
CHLORIDE SERPL-SCNC: 105 MMOL/L (ref 98–107)
CO2 SERPL-SCNC: 23 MMOL/L (ref 21–32)
CREAT SERPL-MCNC: 0.9 MG/DL (ref 0.8–1.5)
ERYTHROCYTE [DISTWIDTH] IN BLOOD BY AUTOMATED COUNT: 13.1 % (ref 11.9–14.6)
GLUCOSE SERPL-MCNC: 75 MG/DL (ref 65–100)
HCT VFR BLD AUTO: 39.9 % (ref 41.1–50.3)
HGB BLD-MCNC: 14.8 G/DL (ref 13.6–17.2)
MCH RBC QN AUTO: 29.4 PG (ref 26.1–32.9)
MCHC RBC AUTO-ENTMCNC: 37.1 G/DL (ref 31.4–35)
MCV RBC AUTO: 79.3 FL (ref 79.6–97.8)
PLATELET # BLD AUTO: 276 K/UL (ref 150–450)
PMV BLD AUTO: 9.4 FL (ref 10.8–14.1)
POTASSIUM SERPL-SCNC: 4 MMOL/L (ref 3.5–5.1)
RBC # BLD AUTO: 5.03 M/UL (ref 4.23–5.67)
SERVICE CMNT-IMP: NORMAL
SODIUM SERPL-SCNC: 140 MMOL/L (ref 136–145)
WBC # BLD AUTO: 8.1 K/UL (ref 4.3–11.1)

## 2017-03-30 PROCEDURE — 74011250637 HC RX REV CODE- 250/637: Performed by: INTERNAL MEDICINE

## 2017-03-30 PROCEDURE — 96361 HYDRATE IV INFUSION ADD-ON: CPT

## 2017-03-30 PROCEDURE — 99218 HC RM OBSERVATION: CPT

## 2017-03-30 PROCEDURE — 96376 TX/PRO/DX INJ SAME DRUG ADON: CPT

## 2017-03-30 PROCEDURE — 36415 COLL VENOUS BLD VENIPUNCTURE: CPT | Performed by: INTERNAL MEDICINE

## 2017-03-30 PROCEDURE — 74011250636 HC RX REV CODE- 250/636: Performed by: INTERNAL MEDICINE

## 2017-03-30 PROCEDURE — 74011000258 HC RX REV CODE- 258: Performed by: NURSE PRACTITIONER

## 2017-03-30 PROCEDURE — 85027 COMPLETE CBC AUTOMATED: CPT | Performed by: INTERNAL MEDICINE

## 2017-03-30 PROCEDURE — 80048 BASIC METABOLIC PNL TOTAL CA: CPT | Performed by: INTERNAL MEDICINE

## 2017-03-30 PROCEDURE — 74011250637 HC RX REV CODE- 250/637: Performed by: NURSE PRACTITIONER

## 2017-03-30 PROCEDURE — 74011250636 HC RX REV CODE- 250/636: Performed by: NURSE PRACTITIONER

## 2017-03-30 RX ORDER — FACIAL-BODY WIPES
10 EACH TOPICAL DAILY PRN
Status: DISCONTINUED | OUTPATIENT
Start: 2017-03-30 | End: 2017-03-31 | Stop reason: HOSPADM

## 2017-03-30 RX ORDER — DICYCLOMINE HYDROCHLORIDE 20 MG/1
20 TABLET ORAL
Status: DISCONTINUED | OUTPATIENT
Start: 2017-03-30 | End: 2017-03-31 | Stop reason: HOSPADM

## 2017-03-30 RX ORDER — DEXTROSE MONOHYDRATE AND SODIUM CHLORIDE 5; .45 G/100ML; G/100ML
75 INJECTION, SOLUTION INTRAVENOUS CONTINUOUS
Status: DISCONTINUED | OUTPATIENT
Start: 2017-03-30 | End: 2017-03-31

## 2017-03-30 RX ADMIN — HYDROMORPHONE HYDROCHLORIDE 1 MG: 1 INJECTION, SOLUTION INTRAMUSCULAR; INTRAVENOUS; SUBCUTANEOUS at 08:52

## 2017-03-30 RX ADMIN — METOCLOPRAMIDE 10 MG: 5 INJECTION, SOLUTION INTRAMUSCULAR; INTRAVENOUS at 11:18

## 2017-03-30 RX ADMIN — METOCLOPRAMIDE 10 MG: 5 INJECTION, SOLUTION INTRAMUSCULAR; INTRAVENOUS at 22:56

## 2017-03-30 RX ADMIN — PANTOPRAZOLE SODIUM 40 MG: 40 TABLET, DELAYED RELEASE ORAL at 17:08

## 2017-03-30 RX ADMIN — POLYETHYLENE GLYCOL 3350 17 G: 17 POWDER, FOR SOLUTION ORAL at 08:33

## 2017-03-30 RX ADMIN — ONDANSETRON HYDROCHLORIDE 4 MG: 2 INJECTION, SOLUTION INTRAMUSCULAR; INTRAVENOUS at 20:18

## 2017-03-30 RX ADMIN — HYDROMORPHONE HYDROCHLORIDE 1 MG: 1 INJECTION, SOLUTION INTRAMUSCULAR; INTRAVENOUS; SUBCUTANEOUS at 22:56

## 2017-03-30 RX ADMIN — METOCLOPRAMIDE 10 MG: 5 INJECTION, SOLUTION INTRAMUSCULAR; INTRAVENOUS at 17:08

## 2017-03-30 RX ADMIN — ONDANSETRON HYDROCHLORIDE 4 MG: 2 INJECTION, SOLUTION INTRAMUSCULAR; INTRAVENOUS at 12:00

## 2017-03-30 RX ADMIN — Medication 10 ML: at 06:08

## 2017-03-30 RX ADMIN — ONDANSETRON HYDROCHLORIDE 4 MG: 2 INJECTION, SOLUTION INTRAMUSCULAR; INTRAVENOUS at 08:33

## 2017-03-30 RX ADMIN — BISACODYL 10 MG: 10 SUPPOSITORY RECTAL at 17:08

## 2017-03-30 RX ADMIN — PANTOPRAZOLE SODIUM 40 MG: 40 TABLET, DELAYED RELEASE ORAL at 06:08

## 2017-03-30 RX ADMIN — ONDANSETRON HYDROCHLORIDE 4 MG: 2 INJECTION, SOLUTION INTRAMUSCULAR; INTRAVENOUS at 01:13

## 2017-03-30 RX ADMIN — ONDANSETRON HYDROCHLORIDE 4 MG: 2 INJECTION, SOLUTION INTRAMUSCULAR; INTRAVENOUS at 17:08

## 2017-03-30 RX ADMIN — DEXTROSE MONOHYDRATE AND SODIUM CHLORIDE 75 ML/HR: 5; .45 INJECTION, SOLUTION INTRAVENOUS at 10:00

## 2017-03-30 RX ADMIN — ONDANSETRON HYDROCHLORIDE 4 MG: 2 INJECTION, SOLUTION INTRAMUSCULAR; INTRAVENOUS at 06:08

## 2017-03-30 RX ADMIN — METOCLOPRAMIDE 10 MG: 5 INJECTION, SOLUTION INTRAMUSCULAR; INTRAVENOUS at 06:11

## 2017-03-30 RX ADMIN — HYDROMORPHONE HYDROCHLORIDE 1 MG: 1 INJECTION, SOLUTION INTRAMUSCULAR; INTRAVENOUS; SUBCUTANEOUS at 12:00

## 2017-03-30 RX ADMIN — DICYCLOMINE HYDROCHLORIDE 20 MG: 20 TABLET ORAL at 20:18

## 2017-03-30 RX ADMIN — DICYCLOMINE HYDROCHLORIDE 20 MG: 20 TABLET ORAL at 17:08

## 2017-03-30 RX ADMIN — Medication 10 ML: at 20:19

## 2017-03-30 NOTE — PROGRESS NOTES
Patient complained of abdominal pain rated 8/10 at 2229, and was given Dilaudid 1mg IV, it was effective. He had one episode of vomiting around 2145, it was maroon/brown colored no bright red bleeding. Patient had no other episodes of vomiting during the night. He also had no other complaints of pain.

## 2017-03-30 NOTE — PROGRESS NOTES
Pt with 2 episodes of nausea/vomiting today. Pt states this afternoon nausea has gotten a little bit better. Continued scheduled zofran and reglan. Pt has not had bm today dulcolax supp given.  Will continue to monitor output and report to nightshift rn

## 2017-03-30 NOTE — PROGRESS NOTES
GI DAILY PROGRESS NOTE    Admit Date:  3/28/2017    Today's Date:  3/30/2017    CC:  Nausea, vomiting, constipation    Subjective:     Patient sleeping with wife at the bedside. She reports that he has not been able to keep PO intake down. He had been able to keep breakfast down yesterday but did not keep lunch or dinner down. He has not had a BM yet. He awakens for a few minutes and begins dry heaving. Medications:   Current Facility-Administered Medications   Medication Dose Route Frequency    bisacodyl (DULCOLAX) suppository 10 mg  10 mg Rectal DAILY PRN    polyethylene glycol (MIRALAX) packet 17 g  17 g Oral DAILY    pantoprazole (PROTONIX) tablet 40 mg  40 mg Oral ACB&D    ondansetron (ZOFRAN) injection 4 mg  4 mg IntraVENous Q4H    metoclopramide HCl (REGLAN) injection 10 mg  10 mg IntraVENous Q6H    sodium chloride (NS) flush 5-10 mL  5-10 mL IntraVENous Q8H    sodium chloride (NS) flush 5-10 mL  5-10 mL IntraVENous PRN    acetaminophen (TYLENOL) solution 650 mg  650 mg Oral Q4H PRN    enoxaparin (LOVENOX) injection 40 mg  40 mg SubCUTAneous Q24H    HYDROmorphone (PF) (DILAUDID) injection 2 mg  2 mg IntraVENous Q3H PRN    HYDROmorphone (PF) (DILAUDID) injection 1 mg  1 mg IntraVENous Q3H PRN       Review of Systems:  ROS was obtained, with pertinent positives as listed above. No chest pain or SOB. Diet:  Full liquid    Objective:   Vitals:  Visit Vitals    /75    Pulse 64    Temp 98.3 °F (36.8 °C)    Resp 15    Ht 5' 2\" (1.575 m)    Wt 63.5 kg (140 lb)    SpO2 97%    BMI 25.61 kg/m2     Intake/Output:  03/30 0701 - 03/30 1900  In: 120 [P.O.:120]  Out: 200   03/28 1901 - 03/30 0700  In: 9122 [P.O.:600;  I.V.:2849]  Out: -   Exam:  General appearance: Drowsy, cooperative, no distress  Lungs: clear to auscultation bilaterally anteriorly  Heart: regular rate and rhythm  Abdomen: soft, mildly TTP throughout, Bowel sounds normal. No masses, no organomegaly  Extremities: extremities normal, atraumatic, no cyanosis or edema  Neuro: Drowsy, oriented    Data Review (Labs):    Recent Labs      03/30/17   0527  03/29/17   1520  03/29/17   0851  03/29/17   0551  03/28/17   1130  03/28/17   0400   WBC  8.1   --   12.6*   --    --   9.9   HGB  14.8  14.0  13.2*   --    --   16.5   HCT  39.9*  37.2*  35.6*   --    --   42.4   PLT  276   --   294   --    --   384   MCV  79.3*   --   78.9*   --    --   77.1*   NA  140   --    --   143  140  139   K  4.0   --    --   3.9  3.9  3.4*   CL  105   --    --   111*  107  103   CO2  23   --    --   22  19*  16*   BUN  13   --    --   17  22  25*   CREA  0.90   --    --   0.84  0.99  1.57*   CA  9.3   --    --   8.5  8.8  9.5   MG   --    --    --   2.2   --   2.3   GLU  75   --    --   86  125*  119*   AP   --    --    --    --   97  111   SGOT   --    --    --    --   14*  16   ALT   --    --    --    --   20  21   TBILI   --    --    --    --   0.6  1.1   ALB   --    --    --    --   4.1  4.7   TP   --    --    --    --   7.5  8.7*   LPSE   --    --    --    --    --   103       Assessment:     Principal Problem:    Intractable nausea and vomiting (11/28/2016)    Active Problems:    Nausea and vomiting (3/5/2013)      Esophageal reflux (4/30/2014)      BETYT (acute kidney injury) ([facility] [address] (8/12/2014)      Marijuana use (11/28/2016)      38 y.o. AA male with PMH of cyclical vomiting sydrome seen in the ED 3/28/17 for nausea and vomiting. He reported last THC use about 3 weeks ago. UDS was positive for THC. Has had multiple EGDs for this: hx of esophagitis and Alexandra Lynch tear. Last EGD in Feb (esophagitis). In 2014, he required several clips for MWT.  3/29/17: Nausea and Pain are currently controlled with Reglan, Zofran, and Dilaudid. No BM after dulcolax yesterday. Drop in HGB may be dilutional with IVF. Has been afebrile. Did not tolerate regular diet. 3/30/17: Nausea and pain continue. KUB overnight was negative. No BM yet. HGB is stable. Plan:   1. Protonix BID  2. Miralax daily  3. Continue with Reglan and Zofran  4. Full liquid diet  5. Follow labs  6. Resume IV fluids as he has not kept much PO intake down  - Dulcolax supp daily since not taking much PO  - Add Bentyl of lower abdominal pain    Priscilla Glasgow NP    Patient is seen and examined in collaboration with Dr. Caryn Grajeda. Assessment and plan as per Dr. Caryn Grajeda.

## 2017-03-31 VITALS
HEART RATE: 49 BPM | BODY MASS INDEX: 25.76 KG/M2 | SYSTOLIC BLOOD PRESSURE: 108 MMHG | HEIGHT: 62 IN | DIASTOLIC BLOOD PRESSURE: 62 MMHG | WEIGHT: 140 LBS | RESPIRATION RATE: 15 BRPM | TEMPERATURE: 98.5 F | OXYGEN SATURATION: 94 %

## 2017-03-31 PROCEDURE — 96376 TX/PRO/DX INJ SAME DRUG ADON: CPT

## 2017-03-31 PROCEDURE — 74011250637 HC RX REV CODE- 250/637: Performed by: INTERNAL MEDICINE

## 2017-03-31 PROCEDURE — 74011250636 HC RX REV CODE- 250/636: Performed by: INTERNAL MEDICINE

## 2017-03-31 PROCEDURE — 99218 HC RM OBSERVATION: CPT

## 2017-03-31 RX ORDER — ONDANSETRON 8 MG/1
4 TABLET, ORALLY DISINTEGRATING ORAL
Qty: 30 TAB | Refills: 1 | Status: ON HOLD | OUTPATIENT
Start: 2017-03-31 | End: 2018-03-15

## 2017-03-31 RX ORDER — PANTOPRAZOLE SODIUM 40 MG/1
40 TABLET, DELAYED RELEASE ORAL DAILY
Qty: 30 TAB | Refills: 6 | Status: SHIPPED | OUTPATIENT
Start: 2017-03-31 | End: 2018-03-15

## 2017-03-31 RX ORDER — DICYCLOMINE HYDROCHLORIDE 20 MG/1
20 TABLET ORAL
Qty: 90 TAB | Refills: 6 | Status: SHIPPED | OUTPATIENT
Start: 2017-03-31 | End: 2017-12-06

## 2017-03-31 RX ORDER — POLYETHYLENE GLYCOL 3350 17 G/17G
17 POWDER, FOR SOLUTION ORAL DAILY
Qty: 30 PACKET | Refills: 6 | Status: SHIPPED | OUTPATIENT
Start: 2017-03-31 | End: 2017-12-06

## 2017-03-31 RX ADMIN — ONDANSETRON HYDROCHLORIDE 4 MG: 2 INJECTION, SOLUTION INTRAMUSCULAR; INTRAVENOUS at 08:47

## 2017-03-31 RX ADMIN — Medication 10 ML: at 05:37

## 2017-03-31 RX ADMIN — DICYCLOMINE HYDROCHLORIDE 20 MG: 20 TABLET ORAL at 05:37

## 2017-03-31 RX ADMIN — ONDANSETRON HYDROCHLORIDE 4 MG: 2 INJECTION, SOLUTION INTRAMUSCULAR; INTRAVENOUS at 00:59

## 2017-03-31 RX ADMIN — PANTOPRAZOLE SODIUM 40 MG: 40 TABLET, DELAYED RELEASE ORAL at 05:36

## 2017-03-31 RX ADMIN — ONDANSETRON HYDROCHLORIDE 4 MG: 2 INJECTION, SOLUTION INTRAMUSCULAR; INTRAVENOUS at 05:36

## 2017-03-31 RX ADMIN — METOCLOPRAMIDE 10 MG: 5 INJECTION, SOLUTION INTRAMUSCULAR; INTRAVENOUS at 05:36

## 2017-03-31 NOTE — PROGRESS NOTES
GI DAILY PROGRESS NOTE    Admit Date:  3/28/2017    Today's Date:  3/31/2017    CC:  n/v    Subjective:     Patient sitting in bed laughing with wife. Asking to go home. Denies any abdominal pain, nausea, or vomiting this morning. Tolerated breakfast. Been up walking around the unit. Medications:   Current Facility-Administered Medications   Medication Dose Route Frequency    bisacodyl (DULCOLAX) suppository 10 mg  10 mg Rectal DAILY PRN    dextrose 5 % - 0.45% NaCl infusion  75 mL/hr IntraVENous CONTINUOUS    dicyclomine (BENTYL) tablet 20 mg  20 mg Oral AC&HS    polyethylene glycol (MIRALAX) packet 17 g  17 g Oral DAILY    pantoprazole (PROTONIX) tablet 40 mg  40 mg Oral ACB&D    ondansetron (ZOFRAN) injection 4 mg  4 mg IntraVENous Q4H    metoclopramide HCl (REGLAN) injection 10 mg  10 mg IntraVENous Q6H    sodium chloride (NS) flush 5-10 mL  5-10 mL IntraVENous Q8H    sodium chloride (NS) flush 5-10 mL  5-10 mL IntraVENous PRN    acetaminophen (TYLENOL) solution 650 mg  650 mg Oral Q4H PRN    enoxaparin (LOVENOX) injection 40 mg  40 mg SubCUTAneous Q24H    HYDROmorphone (PF) (DILAUDID) injection 2 mg  2 mg IntraVENous Q3H PRN    HYDROmorphone (PF) (DILAUDID) injection 1 mg  1 mg IntraVENous Q3H PRN       Review of Systems:  ROS was obtained, with pertinent positives as listed above. No chest pain or SOB. Diet:  Full Liquid    Objective:   Vitals:  Visit Vitals    /62    Pulse (!) 49    Temp 98.5 °F (36.9 °C)    Resp 15    Ht 5' 2\" (1.575 m)    Wt 63.5 kg (140 lb)    SpO2 94%    BMI 25.61 kg/m2     Intake/Output:  03/31 0701 - 03/31 1900  In: 360 [P.O.:360]  Out: -   03/29 1901 - 03/31 0700  In: 120 [P.O.:120]  Out: 310   Exam:  General appearance: alert, cooperative, no distress  Lungs: clear to auscultation bilaterally anteriorly  Heart: regular rate and rhythm  Abdomen: soft, non-tender.  Bowel sounds normal. No masses, no organomegaly  Extremities: extremities normal, atraumatic, no cyanosis or edema  Neuro:  alert and oriented    Data Review (Labs):    Recent Labs      03/30/17   0527  03/29/17   1520  03/29/17   0851  03/29/17   0551  03/28/17   1130   WBC  8.1   --   12.6*   --    --    HGB  14.8  14.0  13.2*   --    --    HCT  39.9*  37.2*  35.6*   --    --    PLT  276   --   294   --    --    MCV  79.3*   --   78.9*   --    --    NA  140   --    --   143  140   K  4.0   --    --   3.9  3.9   CL  105   --    --   111*  107   CO2  23   --    --   22  19*   BUN  13   --    --   17  22   CREA  0.90   --    --   0.84  0.99   CA  9.3   --    --   8.5  8.8   MG   --    --    --   2.2   --    GLU  75   --    --   86  125*   AP   --    --    --    --   97   SGOT   --    --    --    --   14*   ALT   --    --    --    --   20   TBILI   --    --    --    --   0.6   ALB   --    --    --    --   4.1   TP   --    --    --    --   7.5       Assessment:     Principal Problem:    Intractable nausea and vomiting (11/28/2016)    Active Problems:    Nausea and vomiting (3/5/2013)      Esophageal reflux (4/30/2014)      BETTY (acute kidney injury) ([facility] [address] (8/12/2014)      Marijuana use (11/28/2016)      38 y.o. AA male with PMH of cyclical vomiting sydrome seen in the ED 3/28/17 for nausea and vomiting. He reported last THC use about 3 weeks ago. UDS was positive for THC. Has had multiple EGDs for this: hx of esophagitis and Alexandra Lynch tear. Last EGD in Feb (esophagitis). In 2014, he required several clips for MWT.  3/29/17: Nausea and Pain are currently controlled with Reglan, Zofran, and Dilaudid. No BM after dulcolax yesterday. Drop in HGB may be dilutional with IVF. Has been afebrile. Did not tolerate regular diet. 3/30/17: Nausea and pain continue. KUB overnight was negative. No BM yet. HGB is stable. 3/31/17: Patient feels remarkably better and is asking to be discharged home. Plan:   1. Protonix daily at discharge  2. Miralax daily  3. Continue with Reglan and Zofran  4.  Advance diet as tolerated  5. Bentyl at discharge  6. Follow-up in 4-6 weeks. 7. D/C home today    Monalisa Fields NP    Patient is seen and examined in collaboration with Dr. Kalyn Combs. Assessment and plan as per Dr. Kalyn Combs.

## 2017-03-31 NOTE — DISCHARGE INSTRUCTIONS
DISCHARGE SUMMARY from Nurse    The following personal items are in your possession at time of discharge:    Dental Appliances: None        Home Medications: None  Jewelry: None  Clothing: At bedside  Other Valuables: Cell Phone             PATIENT INSTRUCTIONS:    After general anesthesia or intravenous sedation, for 24 hours or while taking prescription Narcotics:  · Limit your activities  · Do not drive and operate hazardous machinery  · Do not make important personal or business decisions  · Do  not drink alcoholic beverages  · If you have not urinated within 8 hours after discharge, please contact your surgeon on call. Report the following to your surgeon:  · Excessive pain, swelling, redness or odor of or around the surgical area  · Temperature over 100.5  · Nausea and vomiting lasting longer than 4 hours or if unable to take medications  · Any signs of decreased circulation or nerve impairment to extremity: change in color, persistent  numbness, tingling, coldness or increase pain  · Any questions        What to do at Home:  Recommended activity: Activity as tolerated, diet as tolerated    If you experience any of the following symptoms increased nausea/vomiitng, abdominal pain, or fever, please follow up with [facility]. *  Please give a list of your current medications to your Primary Care Provider. *  Please update this list whenever your medications are discontinued, doses are      changed, or new medications (including over-the-counter products) are added. *  Please carry medication information at all times in case of emergency situations. These are general instructions for a healthy lifestyle:    No smoking/ No tobacco products/ Avoid exposure to second hand smoke    Surgeon General's Warning:  Quitting smoking now greatly reduces serious risk to your health.     Obesity, smoking, and sedentary lifestyle greatly increases your risk for illness    A healthy diet, regular physical exercise & weight monitoring are important for maintaining a healthy lifestyle    You may be retaining fluid if you have a history of heart failure or if you experience any of the following symptoms:  Weight gain of 3 pounds or more overnight or 5 pounds in a week, increased swelling in our hands or feet or shortness of breath while lying flat in bed. Please call your doctor as soon as you notice any of these symptoms; do not wait until your next office visit. Recognize signs and symptoms of STROKE:    F-face looks uneven    A-arms unable to move or move unevenly    S-speech slurred or non-existent    T-time-call 911 as soon as signs and symptoms begin-DO NOT go       Back to bed or wait to see if you get better-TIME IS BRAIN. Warning Signs of HEART ATTACK     Call 911 if you have these symptoms:   Chest discomfort. Most heart attacks involve discomfort in the center of the chest that lasts more than a few minutes, or that goes away and comes back. It can feel like uncomfortable pressure, squeezing, fullness, or pain.  Discomfort in other areas of the upper body. Symptoms can include pain or discomfort in one or both arms, the back, neck, jaw, or stomach.  Shortness of breath with or without chest discomfort.  Other signs may include breaking out in a cold sweat, nausea, or lightheadedness. Don't wait more than five minutes to call 911 - MINUTES MATTER! Fast action can save your life. Calling 911 is almost always the fastest way to get lifesaving treatment. Emergency Medical Services staff can begin treatment when they arrive -- up to an hour sooner than if someone gets to the hospital by car. The discharge information has been reviewed with the patient. The patient verbalized understanding. Discharge medications reviewed with the patient and appropriate educational materials and side effects teaching were provided.

## 2017-03-31 NOTE — DISCHARGE SUMMARY
[facility] Discharge Summary      Patient ID:  Hilario Wilkerson  [id number]  38 y.o.  1975    Admit date:  3/28/2017    Discharge date and time:  3/31/2017       Admitting Physician:  Svetlana Morgan MD     Discharge Physician: Dr. Caryn Grajeda     Admission Diagnoses:  Intractable nausea and vomiting    Discharge Diagnoses:  Principal Problem:    Intractable nausea and vomiting (11/28/2016)    Active Problems:    Nausea and vomiting (3/5/2013)      Esophageal reflux (4/30/2014)      BETTY (acute kidney injury) ([facility] [address] (8/12/2014)      Marijuana use (11/28/2016)        Consults:  None    Significant Diagnostic Studies:  Recent Labs      03/30/17   0527  03/29/17   1520  03/29/17   0851  03/29/17   0551  03/28/17   1130   WBC  8.1   --   12.6*   --    --    HGB  14.8  14.0  13.2*   --    --    HCT  39.9*  37.2*  35.6*   --    --    PLT  276   --   294   --    --    MCV  79.3*   --   78.9*   --    --    NA  140   --    --   143  140   K  4.0   --    --   3.9  3.9   CL  105   --    --   111*  107   CO2  23   --    --   22  19*   BUN  13   --    --   17  22   CREA  0.90   --    --   0.84  0.99   CA  9.3   --    --   8.5  8.8   MG   --    --    --   2.2   --    GLU  75   --    --   86  125*   AP   --    --    --    --   97   SGOT   --    --    --    --   14*   ALT   --    --    --    --   20   TBILI   --    --    --    --   0.6   ALB   --    --    --    --   4.1   TP   --    --    --    --   7.5        Urine drug screen 3/28/17: positive for THC and Benzos. KUB supine and upright views 3/29/17     History: LLQ pain, 42 years Male llq pain, nausea and vomiting, x3 days, pt  stated reoccurring ulcers, h/o gastritis and gastroparesis     Comparison: KUB September 17, 2016     Findings: No free air is evident. The bowel gas pattern is nonspecific and  there is no evidence of ileus or obstruction. No suspicious renal or ureteral  calculi are evident.  Visualized osseous structures unremarkable.     IMPRESSION  Impression: No acute pathology identified. Admitting HPI:   Patient is a 38 y.o. AA male with PMH of chronic N/V who is seen in the ED for nausea and vomiting. 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 He reports last THC use about 3 weeks ago. Has had multiple EGDs for this: hx of esophagitis and Alexandra Lynch tear. Last EGD in Feb (esophagitis). In 2014, he required several clips for MWT. Current episode started on Friday and got worse yesterday. He reports constant nausea now and vomiting bilious material. He reports an episode of coffee ground emesis this am that was small. He takes oral Reglan at home but has not been taking due to nausea. He ran out of Zofran and cannot take Phenergan. He has not tried Elavil. He has not been able to follow up with [facility] due to not having a mailing address. He last had a BM about 4 days ago. Has chronic constipation. Miralax works at home But not taking consistently. His wife who is at bedside feels stress may contribute to symptoms. He has had benadryl, Ativan, Haldol, Reglan, and Zofran and continues to vomit. Hospital Course:  Mr. Ben Soler was admitted for further evaluation and treatment of recurrent cyclic nausea and vomiting. Urine drug screen was notable for THC and Benzos. He had reported last THC use was 3 weeks prior to admission. He was treated with scheduled reglan and zofran. He was kept on IVF. He received dilaudid PRN for abdominal pain. Bentyl was also added in. He was constipated during hospitalization and received Miralax and dulcolax suppositories. KUB during admission was negative. He had nausea and vomiting which eventually resolved on 3/30/17. He had a BM on 3/30/17 as well. He felt better and ready for discharge. His vitals were stable during admission. He was hypokalemic on admission and his potassium was replaced IV and remained stable afterwards.        Objective:   Vitals:  Visit Vitals    /62    Pulse (!) 49    Temp 98.5 °F (36.9 °C)    Resp 15    Ht 5' 2\" (1.575 m)    Wt 63.5 kg (140 lb)    SpO2 94%    BMI 25.61 kg/m2     Intake/Output:  03/31 0701 - 03/31 1900  In: 1920 [P.O.:360; I.V.:1560]  Out: -   03/29 1901 - 03/31 0700  In: 120 [P.O.:120]  Out: 310   Exam:  General appearance: alert, cooperative, no distress  Lungs: clear to auscultation bilaterally anteriorly  Heart: regular rate and rhythm  Abdomen: soft, non-tender. Bowel sounds normal. No masses, no organomegaly  Extremities: extremities normal, atraumatic, no cyanosis or edema  Neuro:  alert and oriented    Disposition:  Stable    Diet:  As tolerated    Activity:  As tolerated    Patient Instructions:   Current Discharge Medication List      START taking these medications    Details   polyethylene glycol (MIRALAX) 17 gram packet Take 1 Packet by mouth daily. Qty: 30 Packet, Refills: 6      !! dicyclomine (BENTYL) 20 mg tablet Take 1 Tab by mouth every six (6) hours as needed. Qty: 90 Tab, Refills: 6       !! - Potential duplicate medications found. Please discuss with provider. CONTINUE these medications which have CHANGED    Details   pantoprazole (PROTONIX) 40 mg tablet Take 1 Tab by mouth daily. Qty: 30 Tab, Refills: 6      !! ondansetron (ZOFRAN ODT) 8 mg disintegrating tablet Take 0.5 Tabs by mouth every eight (8) hours as needed. Qty: 30 Tab, Refills: 1       !! - Potential duplicate medications found. Please discuss with provider. CONTINUE these medications which have NOT CHANGED    Details   metoclopramide HCl (REGLAN) 10 mg tablet Take 10 mg by mouth Before breakfast, lunch, dinner and at bedtime. !! dicyclomine (BENTYL) 20 mg tablet Take 1 Tab by mouth every six (6) hours. As needed for abdominal pain  Qty: 30 Tab, Refills: 0      !! ondansetron (ZOFRAN ODT) 4 mg disintegrating tablet Take 1 Tab by mouth every eight (8) hours as needed for Nausea.   Qty: 20 Tab, Refills: 0       !! - Potential duplicate medications found. Please discuss with provider. Follow up: Follow-up Appointments   Procedures    FOLLOW UP VISIT Appointment in: One Month Follow-up in our office in 4-6 weeks. Our office will contact patient to schedule appointment. Feel free to contact our office with any questions/concerns at [phone]. Diet: As tolerated     Follow-up in our office in 4-6 weeks. Our office will contact patient to schedule appointment. Feel free to contact our office with any questions/concerns at [phone]. Diet: As tolerated     Standing Status:   Standing     Number of Occurrences:   1     Order Specific Question:   Appointment in     Answer:   One Month       Total time discharging patient took greater than 30 minutes.     Signed:  Elliott Munoz NP  3/31/2017  11:01 AM

## 2017-03-31 NOTE — PROGRESS NOTES
Patient complained of abdominal pain once last night at 2223, given Dilaudid 1mg IV. It was effective, he said the pain was relieved and he slept well throughout the night. He has had no episodes of nausea or vomiting up to this point. He had a small bowel movement last night also.

## 2017-10-25 ENCOUNTER — HOSPITAL ENCOUNTER (EMERGENCY)
Age: 42
Discharge: HOME | End: 2017-10-25
Attending: EMERGENCY MEDICINE
Payer: MEDICAID

## 2017-10-25 VITALS
OXYGEN SATURATION: 98 % | DIASTOLIC BLOOD PRESSURE: 71 MMHG | HEIGHT: 61 IN | TEMPERATURE: 98.3 F | RESPIRATION RATE: 16 BRPM | SYSTOLIC BLOOD PRESSURE: 117 MMHG | HEART RATE: 80 BPM | WEIGHT: 135 LBS | BODY MASS INDEX: 25.49 KG/M2

## 2017-10-25 DIAGNOSIS — R10.9 ACUTE ABDOMINAL PAIN: Primary | ICD-10-CM

## 2017-10-25 DIAGNOSIS — R11.2 NON-INTRACTABLE VOMITING WITH NAUSEA, UNSPECIFIED VOMITING TYPE: ICD-10-CM

## 2017-10-25 DIAGNOSIS — R19.7 DIARRHEA, UNSPECIFIED TYPE: ICD-10-CM

## 2017-10-25 LAB
ALBUMIN SERPL-MCNC: 4.4 G/DL (ref 3.5–5)
ALBUMIN/GLOB SERPL: 1 {RATIO} (ref 1.2–3.5)
ALP SERPL-CCNC: 108 U/L (ref 50–136)
ALT SERPL-CCNC: 28 U/L (ref 12–65)
ANION GAP SERPL CALC-SCNC: 10 MMOL/L (ref 7–16)
AST SERPL-CCNC: 27 U/L (ref 15–37)
BACTERIA URNS QL MICRO: NORMAL /HPF
BASOPHILS # BLD: 0 K/UL (ref 0–0.2)
BASOPHILS NFR BLD: 0 % (ref 0–2)
BILIRUB SERPL-MCNC: 0.7 MG/DL (ref 0.2–1.1)
BUN SERPL-MCNC: 23 MG/DL (ref 6–23)
CALCIUM SERPL-MCNC: 6.7 MG/DL (ref 8.3–10.4)
CALCIUM SERPL-MCNC: 9.1 MG/DL (ref 8.3–10.4)
CASTS URNS QL MICRO: NORMAL /LPF
CHLORIDE SERPL-SCNC: 108 MMOL/L (ref 98–107)
CO2 SERPL-SCNC: 22 MMOL/L (ref 21–32)
CREAT SERPL-MCNC: 1 MG/DL (ref 0.8–1.5)
CRYSTALS URNS QL MICRO: 0 /LPF
DIFFERENTIAL METHOD BLD: ABNORMAL
EOSINOPHIL # BLD: 0.1 K/UL (ref 0–0.8)
EOSINOPHIL NFR BLD: 1 % (ref 0.5–7.8)
EPI CELLS #/AREA URNS HPF: NORMAL /HPF
ERYTHROCYTE [DISTWIDTH] IN BLOOD BY AUTOMATED COUNT: 13.7 % (ref 11.9–14.6)
GLOBULIN SER CALC-MCNC: 4.3 G/DL (ref 2.3–3.5)
GLUCOSE SERPL-MCNC: 83 MG/DL (ref 65–100)
HCT VFR BLD AUTO: 42.1 % (ref 41.1–50.3)
HGB BLD-MCNC: 16.4 G/DL (ref 13.6–17.2)
IMM GRANULOCYTES # BLD: 0 K/UL (ref 0–0.5)
IMM GRANULOCYTES NFR BLD: 0 % (ref 0–5)
LIPASE SERPL-CCNC: 69 U/L (ref 73–393)
LYMPHOCYTES # BLD: 1.8 K/UL (ref 0.5–4.6)
LYMPHOCYTES NFR BLD: 28 % (ref 13–44)
MCH RBC QN AUTO: 29.8 PG (ref 26.1–32.9)
MCHC RBC AUTO-ENTMCNC: 39 G/DL (ref 31.4–35)
MCV RBC AUTO: 76.5 FL (ref 79.6–97.8)
MONOCYTES # BLD: 0.8 K/UL (ref 0.1–1.3)
MONOCYTES NFR BLD: 12 % (ref 4–12)
MUCOUS THREADS URNS QL MICRO: 0 /LPF
NEUTS SEG # BLD: 3.8 K/UL (ref 1.7–8.2)
NEUTS SEG NFR BLD: 59 % (ref 43–78)
PLATELET # BLD AUTO: 304 K/UL (ref 150–450)
PMV BLD AUTO: 8.8 FL (ref 10.8–14.1)
POTASSIUM SERPL-SCNC: 3.4 MMOL/L (ref 3.5–5.1)
PROT SERPL-MCNC: 8.7 G/DL (ref 6.3–8.2)
RBC # BLD AUTO: 5.5 M/UL (ref 4.23–5.67)
RBC #/AREA URNS HPF: NORMAL /HPF
SODIUM SERPL-SCNC: 140 MMOL/L (ref 136–145)
WBC # BLD AUTO: 6.5 K/UL (ref 4.3–11.1)
WBC URNS QL MICRO: NORMAL /HPF

## 2017-10-25 PROCEDURE — 74011250636 HC RX REV CODE- 250/636: Performed by: EMERGENCY MEDICINE

## 2017-10-25 PROCEDURE — 96375 TX/PRO/DX INJ NEW DRUG ADDON: CPT | Performed by: EMERGENCY MEDICINE

## 2017-10-25 PROCEDURE — 85025 COMPLETE CBC W/AUTO DIFF WBC: CPT | Performed by: EMERGENCY MEDICINE

## 2017-10-25 PROCEDURE — 96361 HYDRATE IV INFUSION ADD-ON: CPT | Performed by: EMERGENCY MEDICINE

## 2017-10-25 PROCEDURE — 83690 ASSAY OF LIPASE: CPT | Performed by: EMERGENCY MEDICINE

## 2017-10-25 PROCEDURE — 81015 MICROSCOPIC EXAM OF URINE: CPT | Performed by: EMERGENCY MEDICINE

## 2017-10-25 PROCEDURE — 99284 EMERGENCY DEPT VISIT MOD MDM: CPT | Performed by: EMERGENCY MEDICINE

## 2017-10-25 PROCEDURE — 80053 COMPREHEN METABOLIC PANEL: CPT | Performed by: EMERGENCY MEDICINE

## 2017-10-25 PROCEDURE — 81003 URINALYSIS AUTO W/O SCOPE: CPT | Performed by: EMERGENCY MEDICINE

## 2017-10-25 PROCEDURE — 82310 ASSAY OF CALCIUM: CPT | Performed by: EMERGENCY MEDICINE

## 2017-10-25 PROCEDURE — 74011000250 HC RX REV CODE- 250: Performed by: EMERGENCY MEDICINE

## 2017-10-25 PROCEDURE — 96374 THER/PROPH/DIAG INJ IV PUSH: CPT | Performed by: EMERGENCY MEDICINE

## 2017-10-25 RX ORDER — ONDANSETRON 2 MG/ML
4 INJECTION INTRAMUSCULAR; INTRAVENOUS
Status: COMPLETED | OUTPATIENT
Start: 2017-10-25 | End: 2017-10-25

## 2017-10-25 RX ORDER — FAMOTIDINE 10 MG/ML
20 INJECTION INTRAVENOUS
Status: COMPLETED | OUTPATIENT
Start: 2017-10-25 | End: 2017-10-25

## 2017-10-25 RX ORDER — METOCLOPRAMIDE HYDROCHLORIDE 5 MG/ML
10 INJECTION INTRAMUSCULAR; INTRAVENOUS
Status: COMPLETED | OUTPATIENT
Start: 2017-10-25 | End: 2017-10-25

## 2017-10-25 RX ADMIN — METOCLOPRAMIDE 10 MG: 5 INJECTION, SOLUTION INTRAMUSCULAR; INTRAVENOUS at 20:09

## 2017-10-25 RX ADMIN — SODIUM CHLORIDE 1000 ML: 900 INJECTION, SOLUTION INTRAVENOUS at 19:39

## 2017-10-25 RX ADMIN — ONDANSETRON 4 MG: 2 INJECTION INTRAMUSCULAR; INTRAVENOUS at 19:40

## 2017-10-25 RX ADMIN — FAMOTIDINE 20 MG: 10 INJECTION, SOLUTION INTRAVENOUS at 19:40

## 2017-10-25 NOTE — LETTER
NOTIFICATION RETURN TO WORK / SCHOOL 
 
10/25/2017 9:27 PM 
 
Mr. Oc Valenzuela [address] [facility] [address] To Whom It May Concern: 
 
Oc Valenzuela is currently under the care of [facility] EMERGENCY DEPT. He will return to work/school on: 10/26/2017 If there are questions or concerns please have the patient contact our office. Sincerely, Eliud Acuna RN

## 2017-10-26 NOTE — ED NOTES
I have reviewed discharge instructions with the patient. The patient verbalized understanding. Patient left ED via Discharge Method: ambulatory to Home with family member. Opportunity for questions and clarification provided. Patient given 0 scripts.

## 2017-10-26 NOTE — DISCHARGE INSTRUCTIONS
Abdominal Pain: Care Instructions  Your Care Instructions    Abdominal pain has many possible causes. Some aren't serious and get better on their own in a few days. Others need more testing and treatment. If your pain continues or gets worse, you need to be rechecked and may need more tests to find out what is wrong. You may need surgery to correct the problem. Don't ignore new symptoms, such as fever, nausea and vomiting, urination problems, pain that gets worse, and dizziness. These may be signs of a more serious problem. Your doctor may have recommended a follow-up visit in the next 8 to 12 hours. If you are not getting better, you may need more tests or treatment. The doctor has checked you carefully, but problems can develop later. If you notice any problems or new symptoms, get medical treatment right away. Follow-up care is a key part of your treatment and safety. Be sure to make and go to all appointments, and call your doctor if you are having problems. It's also a good idea to know your test results and keep a list of the medicines you take. How can you care for yourself at home? · Rest until you feel better. · To prevent dehydration, drink plenty of fluids, enough so that your urine is light yellow or clear like water. Choose water and other caffeine-free clear liquids until you feel better. If you have kidney, heart, or liver disease and have to limit fluids, talk with your doctor before you increase the amount of fluids you drink. · If your stomach is upset, eat mild foods, such as rice, dry toast or crackers, bananas, and applesauce. Try eating several small meals instead of two or three large ones. · Wait until 48 hours after all symptoms have gone away before you have spicy foods, alcohol, and drinks that contain caffeine. · Do not eat foods that are high in fat. · Avoid anti-inflammatory medicines such as aspirin, ibuprofen (Advil, Motrin), and naproxen (Aleve).  These can cause stomach upset. Talk to your doctor if you take daily aspirin for another health problem. When should you call for help? Call 911 anytime you think you may need emergency care. For example, call if:  ? · You passed out (lost consciousness). ? · You pass maroon or very bloody stools. ? · You vomit blood or what looks like coffee grounds. ? · You have new, severe belly pain. ?Call your doctor now or seek immediate medical care if:  ? · Your pain gets worse, especially if it becomes focused in one area of your belly. ? · You have a new or higher fever. ? · Your stools are black and look like tar, or they have streaks of blood. ? · You have unexpected vaginal bleeding. ? · You have symptoms of a urinary tract infection. These may include:  ¨ Pain when you urinate. ¨ Urinating more often than usual.  ¨ Blood in your urine. ? · You are dizzy or lightheaded, or you feel like you may faint. ? Watch closely for changes in your health, and be sure to contact your doctor if:  ? · You are not getting better after 1 day (24 hours). Where can you learn more? Go to http://oraExecNoteitzel.info/. Enter F832 in the search box to learn more about \"Abdominal Pain: Care Instructions. \"  Current as of: March 20, 2017  Content Version: 11.4  © 5067-6770 [employer]. Care instructions adapted under license by [employer] (which disclaims liability or warranty for this information). If you have questions about a medical condition or this instruction, always ask your healthcare professional. [place] [address] any warranty or liability for your use of this information. Diarrhea: Care Instructions  Your Care Instructions    Diarrhea is loose, watery stools (bowel movements). The exact cause is often hard to find. Sometimes diarrhea is your body's way of getting rid of what caused an upset stomach.  Viruses, food poisoning, and many medicines can cause diarrhea. Some people get diarrhea in response to emotional stress, anxiety, or certain foods. Almost everyone has diarrhea now and then. It usually isn't serious, and your stools will return to normal soon. The important thing to do is replace the fluids you have lost, so you can prevent dehydration. The doctor has checked you carefully, but problems can develop later. If you notice any problems or new symptoms, get medical treatment right away. Follow-up care is a key part of your treatment and safety. Be sure to make and go to all appointments, and call your doctor if you are having problems. It's also a good idea to know your test results and keep a list of the medicines you take. How can you care for yourself at home? · Watch for signs of dehydration, which means your body has lost too much water. Dehydration is a serious condition and should be treated right away. Signs of dehydration are:  ¨ Increasing thirst and dry eyes and mouth. ¨ Feeling faint or lightheaded. ¨ Darker urine, and a smaller amount of urine than normal.  · To prevent dehydration, drink plenty of fluids, enough so that your urine is light yellow or clear like water. Choose water and other caffeine-free clear liquids until you feel better. If you have kidney, heart, or liver disease and have to limit fluids, talk with your doctor before you increase the amount of fluids you drink. · Begin eating small amounts of mild foods the next day, if you feel like it. ¨ Try yogurt that has live cultures of Lactobacillus. (Check the label.)  ¨ Avoid spicy foods, fruits, alcohol, and caffeine until 48 hours after all symptoms are gone. ¨ Avoid chewing gum that contains sorbitol. ¨ Avoid dairy products (except for yogurt with Lactobacillus) while you have diarrhea and for 3 days after symptoms are gone. · The doctor may recommend that you take over-the-counter medicine, such as loperamide (Imodium), if you still have diarrhea after 6 hours. Read and follow all instructions on the label. Do not use this medicine if you have bloody diarrhea, a high fever, or other signs of serious illness. Call your doctor if you think you are having a problem with your medicine. When should you call for help? Call 911 anytime you think you may need emergency care. For example, call if:  ? · You passed out (lost consciousness). ? · Your stools are maroon or very bloody. ?Call your doctor now or seek immediate medical care if:  ? · You are dizzy or lightheaded, or you feel like you may faint. ? · Your stools are black and look like tar, or they have streaks of blood. ? · You have new or worse belly pain. ? · You have symptoms of dehydration, such as:  ¨ Dry eyes and a dry mouth. ¨ Passing only a little dark urine. ¨ Feeling thirstier than usual.   ? · You have a new or higher fever. ? Watch closely for changes in your health, and be sure to contact your doctor if:  ? · Your diarrhea is getting worse. ? · You see pus in the diarrhea. ? · You are not getting better after 2 days (48 hours). Where can you learn more? Go to http://ora-itzel.info/. Enter Q734 in the search box to learn more about \"Diarrhea: Care Instructions. \"  Current as of: March 20, 2017  Content Version: 11.4  © 3044-1456 [employer]. Care instructions adapted under license by [employer] (which disclaims liability or warranty for this information). If you have questions about a medical condition or this instruction, always ask your healthcare professional. [place] [address] any warranty or liability for your use of this information. Nausea and Vomiting: Care Instructions  Your Care Instructions    When you are nauseated, you may feel weak and sweaty and notice a lot of saliva in your mouth. Nausea often leads to vomiting.  Most of the time you do not need to worry about nausea and vomiting, but they can be signs of other illnesses. Two common causes of nausea and vomiting are stomach flu and food poisoning. Nausea and vomiting from viral stomach flu will usually start to improve within 24 hours. Nausea and vomiting from food poisoning may last from 12 to 48 hours. The doctor has checked you carefully, but problems can develop later. If you notice any problems or new symptoms, get medical treatment right away. Follow-up care is a key part of your treatment and safety. Be sure to make and go to all appointments, and call your doctor if you are having problems. It's also a good idea to know your test results and keep a list of the medicines you take. How can you care for yourself at home? · To prevent dehydration, drink plenty of fluids, enough so that your urine is light yellow or clear like water. Choose water and other caffeine-free clear liquids until you feel better. If you have kidney, heart, or liver disease and have to limit fluids, talk with your doctor before you increase the amount of fluids you drink. · Rest in bed until you feel better. · When you are able to eat, try clear soups, mild foods, and liquids until all symptoms are gone for 12 to 48 hours. Other good choices include dry toast, crackers, cooked cereal, and gelatin dessert, such as Jell-O. When should you call for help? Call 911 anytime you think you may need emergency care. For example, call if:  ? · You passed out (lost consciousness). ?Call your doctor now or seek immediate medical care if:  ? · You have symptoms of dehydration, such as:  ¨ Dry eyes and a dry mouth. ¨ Passing only a little dark urine. ¨ Feeling thirstier than usual.   ? · You have new or worsening belly pain. ? · You have a new or higher fever. ? · You vomit blood or what looks like coffee grounds. ? Watch closely for changes in your health, and be sure to contact your doctor if:  ? · You have ongoing nausea and vomiting. ? · Your vomiting is getting worse.    ? · Your vomiting lasts longer than 2 days. ? · You are not getting better as expected. Where can you learn more? Go to http://ora-itzel.info/. Enter 25 [id number] in the search box to learn more about \"Nausea and Vomiting: Care Instructions. \"  Current as of: March 20, 2017  Content Version: 11.4  © 1738-1129 [employer]. Care instructions adapted under license by [employer] (which disclaims liability or warranty for this information). If you have questions about a medical condition or this instruction, always ask your healthcare professional. [place] [address] any warranty or liability for your use of this information.

## 2017-10-26 NOTE — ED PROVIDER NOTES
HPI Comments: Patient complains of epigastric abdominal pain with associated nausea vomiting diarrhea starting last night, coincidentally his daughter has the same symptoms again starting last night. Patient is a 38 y.o. male presenting with abdominal pain. The history is provided by the patient and the spouse. Abdominal Pain    This is a new problem. The current episode started yesterday. The problem occurs constantly. The problem has not changed since onset. The pain is associated with an unknown factor. The pain is located in the epigastric region. The quality of the pain is aching. The pain is at a severity of 5/10. Associated symptoms include anorexia, diarrhea, nausea and vomiting. Pertinent negatives include no fever, no belching, no flatus, no hematochezia, no melena, no constipation, no dysuria, no frequency, no hematuria, no headaches, no arthralgias, no myalgias, no trauma, no chest pain, no testicular pain and no back pain. The pain is worsened by eating and palpation. The pain is relieved by nothing. His past medical history is significant for PUD, GERD and irritable bowel syndrome (cyclical vomiting syndrome). His past medical history does not include gallstones, ulcerative colitis, Crohn's disease, cancer, UTI, pancreatitis, diverticulitis, atrial fibrillation, DM, kidney stones or small bowel obstruction. The patient's surgical history non-contributory. Past Medical History:   Diagnosis Date    BETTY [facility]acute kidney [facility]) ([facility] [address] 8/12/2014    Clostridium difficile colitis 3/20/2011    Gastroparesis 2005    emptying study normal -2006    GERD (gastroesophageal reflux disease)     HIATAL HERNIA SINCE 1999    PUD (peptic ulcer disease) ALL DX IN 2008    ESOPHAGITIS, + H PYLORI       Past Surgical History:   Procedure Laterality Date    COLONOSCOPY  4/08    ESOPHAGITIS, COLONIC ULCER X1    EGD  4/08    H.  HERNIA, ULCER X2, H PYLORI,    EGD  2011    h pylori -neg    HX WISDOM TEETH EXTRACTION  2/10/11         Family History:   Problem Relation Age of Onset    Other Mother      L+W    Diabetes Maternal Grandmother     Sickle Cell Anemia Father     Heart Disease Paternal Grandfather     Other Sister      ALL L+W    Other Son      L+W       Social History     Social History    Marital status:      Spouse name: N/A    Number of children: N/A    Years of education: N/A     Occupational History    Not on file. Social History Main Topics    Smoking status: Former Smoker     Packs/day: 0.25     Years: 2.00     Types: Cigarettes    Smokeless tobacco: Never Used    Alcohol use No    Drug use: Yes     Special: Marijuana    Sexual activity: Yes     Partners: Female      Comment: S/O 15 WEEKS PREGNANT 3/17/11     Other Topics Concern    Not on file     Social History Narrative    3/17/11:  PATIENT LIVES WITH GIRLFRIEND, ALTON (CELL: [phone]), HER 3YEAR OLD DAUGHTER AND THEIR 3YEAR OLD SON. ALTON IS CURRENTLY 13 WEEKS PREGNANT. PATIENT'S JOB AT [employer] WAS DOWNSIZED ABOUT A YEAR AGO, AND HE HAS BEEN A STAY HOME DAD SINCE. ALTON WORKS IN HOUSEKEEPING POSITION. ALLERGIES: Amoxicillin; Aspirin; Hydrocodone; Ibuprofen; Pcn [penicillins]; and Phenergan [promethazine]    Review of Systems   Constitutional: Positive for activity change and appetite change. Negative for chills and fever. Cardiovascular: Negative for chest pain, palpitations and leg swelling. Gastrointestinal: Positive for abdominal pain, anorexia, diarrhea, nausea and vomiting. Negative for blood in stool, constipation, flatus, hematochezia and melena. Genitourinary: Negative for dysuria, frequency, hematuria and testicular pain. Musculoskeletal: Negative for arthralgias, back pain and myalgias. Neurological: Negative for headaches. All other systems reviewed and are negative.       Vitals:    10/25/17 1706   BP: 121/83   Pulse: 87   Resp: 16   Temp: 98.6 °F (37 °C)   SpO2: 96%   Weight: 61.2 kg (135 lb)   Height: 5' 1\" (1.549 m)            Physical Exam   Constitutional: He is oriented to person, place, and time. He appears well-developed and well-nourished. He appears distressed. HENT:   Head: Normocephalic and atraumatic. Right Ear: Tympanic membrane and external ear normal.   Left Ear: Tympanic membrane and external ear normal.   Mouth/Throat: Oropharynx is clear and moist.   Eyes: Conjunctivae and EOM are normal. Pupils are equal, round, and reactive to light. Neck: Normal range of motion. Neck supple. No tracheal deviation present. Cardiovascular: Normal rate, regular rhythm, normal heart sounds and intact distal pulses. Exam reveals no gallop and no friction rub. No murmur heard. Pulmonary/Chest: Effort normal and breath sounds normal. No respiratory distress. He has no wheezes. Abdominal: Soft. Bowel sounds are normal. He exhibits no shifting dullness, no distension, no pulsatile liver, no fluid wave, no abdominal bruit, no pulsatile midline mass and no mass. There is no hepatosplenomegaly. There is tenderness in the epigastric area. There is no rigidity, no rebound, no guarding, no CVA tenderness, no tenderness at McBurney's point and negative Zamora's sign. No hernia. Musculoskeletal: Normal range of motion. He exhibits no edema. Lymphadenopathy:     He has no cervical adenopathy. Neurological: He is alert and oriented to person, place, and time. He has normal strength. He displays normal reflexes. No cranial nerve deficit or sensory deficit. Coordination normal.   Skin: Skin is warm and dry. No rash noted. He is not diaphoretic. No erythema. Psychiatric: He has a normal mood and affect. His speech is normal.   Nursing note and vitals reviewed.        MDM  Number of Diagnoses or Management Options     Amount and/or Complexity of Data Reviewed  Clinical lab tests: ordered and reviewed  Decide to obtain previous medical records or to obtain history from someone other than the patient: yes  Obtain history from someone other than the patient: yes  Review and summarize past medical records: yes    Risk of Complications, Morbidity, and/or Mortality  Presenting problems: high  Diagnostic procedures: minimal  Management options: moderate    Patient Progress  Patient progress: improved    ED Course       Procedures    The patient was observed in the ED. Patient feeling significantly improved at time of discharge. Results Reviewed:      Recent Results (from the past 24 hour(s))   LIPASE    Collection Time: 10/25/17  5:11 PM   Result Value Ref Range    Lipase 69 (L) 73 - 393 U/L   CBC WITH AUTOMATED DIFF    Collection Time: 10/25/17  5:11 PM   Result Value Ref Range    WBC 6.5 4.3 - 11.1 K/uL    RBC 5.50 4.23 - 5.67 M/uL    HGB 16.4 13.6 - 17.2 g/dL    HCT 42.1 41.1 - 50.3 %    MCV 76.5 (L) 79.6 - 97.8 FL    MCH 29.8 26.1 - 32.9 PG    MCHC 39.0 (H) 31.4 - 35.0 g/dL    RDW 13.7 11.9 - 14.6 %    PLATELET 425 937 - 510 K/uL    MPV 8.8 (L) 10.8 - 14.1 FL    DF AUTOMATED      NEUTROPHILS 59 43 - 78 %    LYMPHOCYTES 28 13 - 44 %    MONOCYTES 12 4.0 - 12.0 %    EOSINOPHILS 1 0.5 - 7.8 %    BASOPHILS 0 0.0 - 2.0 %    IMMATURE GRANULOCYTES 0 0.0 - 5.0 %    ABS. NEUTROPHILS 3.8 1.7 - 8.2 K/UL    ABS. LYMPHOCYTES 1.8 0.5 - 4.6 K/UL    ABS. MONOCYTES 0.8 0.1 - 1.3 K/UL    ABS. EOSINOPHILS 0.1 0.0 - 0.8 K/UL    ABS. BASOPHILS 0.0 0.0 - 0.2 K/UL    ABS. IMM.  GRANS. 0.0 0.0 - 0.5 K/UL   METABOLIC PANEL, COMPREHENSIVE    Collection Time: 10/25/17  5:11 PM   Result Value Ref Range    Sodium 140 136 - 145 mmol/L    Potassium 3.4 (L) 3.5 - 5.1 mmol/L    Chloride 108 (H) 98 - 107 mmol/L    CO2 22 21 - 32 mmol/L    Anion gap 10 7 - 16 mmol/L    Glucose 83 65 - 100 mg/dL    BUN 23 6 - 23 MG/DL    Creatinine 1.00 0.8 - 1.5 MG/DL    GFR est AA >60 >60 ml/min/1.73m2    GFR est non-AA >60 >60 ml/min/1.73m2    Calcium 6.7 (L) 8.3 - 10.4 MG/DL    Bilirubin, total 0.7 0.2 - 1.1 MG/DL ALT (SGPT) 28 12 - 65 U/L    AST (SGOT) 27 15 - 37 U/L    Alk. phosphatase 108 50 - 136 U/L    Protein, total 8.7 (H) 6.3 - 8.2 g/dL    Albumin 4.4 3.5 - 5.0 g/dL    Globulin 4.3 (H) 2.3 - 3.5 g/dL    A-G Ratio 1.0 (L) 1.2 - 3.5     CALCIUM    Collection Time: 10/25/17  7:39 PM   Result Value Ref Range    Calcium 9.1 8.3 - 10.4 MG/DL   URINE MICROSCOPIC    Collection Time: 10/25/17  7:50 PM   Result Value Ref Range    WBC 3-5 0 /hpf    RBC 3-5 0 /hpf    Epithelial cells 0-3 0 /hpf    Bacteria TRACE 0 /hpf    Casts 5-10 0 /lpf    Crystals, urine 0 0 /LPF    Mucus 0 0 /lpf       I discussed the results of all labs, procedures, radiographs, and treatments with the patient and available family. Treatment plan is agreed upon and the patient is ready for discharge. All voiced understanding of the discharge plan and medication instructions or changes as appropriate. Questions about treatment in the ED were answered. All were encouraged to return should symptoms worsen or new problems develop.

## 2017-12-06 ENCOUNTER — HOSPITAL ENCOUNTER (EMERGENCY)
Age: 42
Discharge: HOME | End: 2017-12-07
Attending: EMERGENCY MEDICINE
Payer: MEDICAID

## 2017-12-06 DIAGNOSIS — R10.9 ACUTE ABDOMINAL PAIN: ICD-10-CM

## 2017-12-06 DIAGNOSIS — R11.2 NON-INTRACTABLE VOMITING WITH NAUSEA, UNSPECIFIED VOMITING TYPE: Primary | ICD-10-CM

## 2017-12-06 DIAGNOSIS — E86.0 DEHYDRATION: ICD-10-CM

## 2017-12-06 LAB
ALBUMIN SERPL-MCNC: 4.7 G/DL (ref 3.5–5)
ALBUMIN/GLOB SERPL: 1.2 {RATIO} (ref 1.2–3.5)
ALP SERPL-CCNC: 115 U/L (ref 50–136)
ALT SERPL-CCNC: 31 U/L (ref 12–65)
ANION GAP SERPL CALC-SCNC: 19 MMOL/L (ref 7–16)
AST SERPL-CCNC: 26 U/L (ref 15–37)
BASOPHILS # BLD: 0 K/UL (ref 0–0.2)
BASOPHILS NFR BLD: 0 % (ref 0–2)
BILIRUB SERPL-MCNC: 1 MG/DL (ref 0.2–1.1)
BUN SERPL-MCNC: 19 MG/DL (ref 6–23)
CALCIUM SERPL-MCNC: 10.1 MG/DL (ref 8.3–10.4)
CHLORIDE SERPL-SCNC: 104 MMOL/L (ref 98–107)
CO2 SERPL-SCNC: 18 MMOL/L (ref 21–32)
CREAT SERPL-MCNC: 1.33 MG/DL (ref 0.8–1.5)
DIFFERENTIAL METHOD BLD: ABNORMAL
EOSINOPHIL # BLD: 0 K/UL (ref 0–0.8)
EOSINOPHIL NFR BLD: 0 % (ref 0.5–7.8)
ERYTHROCYTE [DISTWIDTH] IN BLOOD BY AUTOMATED COUNT: 13.6 % (ref 11.9–14.6)
GLOBULIN SER CALC-MCNC: 4 G/DL (ref 2.3–3.5)
GLUCOSE SERPL-MCNC: 116 MG/DL (ref 65–100)
HCT VFR BLD AUTO: 42.8 % (ref 41.1–50.3)
HGB BLD-MCNC: 16.5 G/DL (ref 13.6–17.2)
IMM GRANULOCYTES # BLD: 0 K/UL (ref 0–0.5)
IMM GRANULOCYTES NFR BLD AUTO: 0 % (ref 0–5)
LIPASE SERPL-CCNC: 90 U/L (ref 73–393)
LYMPHOCYTES # BLD: 2.1 K/UL (ref 0.5–4.6)
LYMPHOCYTES NFR BLD: 28 % (ref 13–44)
MCH RBC QN AUTO: 30.1 PG (ref 26.1–32.9)
MCHC RBC AUTO-ENTMCNC: 38.6 G/DL (ref 31.4–35)
MCV RBC AUTO: 78.1 FL (ref 79.6–97.8)
MONOCYTES # BLD: 0.6 K/UL (ref 0.1–1.3)
MONOCYTES NFR BLD: 9 % (ref 4–12)
NEUTS SEG # BLD: 4.6 K/UL (ref 1.7–8.2)
NEUTS SEG NFR BLD: 63 % (ref 43–78)
PLATELET # BLD AUTO: 362 K/UL (ref 150–450)
PMV BLD AUTO: 9.1 FL (ref 10.8–14.1)
POTASSIUM SERPL-SCNC: 3.5 MMOL/L (ref 3.5–5.1)
PROT SERPL-MCNC: 8.7 G/DL (ref 6.3–8.2)
RBC # BLD AUTO: 5.48 M/UL (ref 4.23–5.67)
SODIUM SERPL-SCNC: 141 MMOL/L (ref 136–145)
WBC # BLD AUTO: 7.3 K/UL (ref 4.3–11.1)

## 2017-12-06 PROCEDURE — 74011250636 HC RX REV CODE- 250/636: Performed by: EMERGENCY MEDICINE

## 2017-12-06 PROCEDURE — 96361 HYDRATE IV INFUSION ADD-ON: CPT | Performed by: EMERGENCY MEDICINE

## 2017-12-06 PROCEDURE — 99284 EMERGENCY DEPT VISIT MOD MDM: CPT | Performed by: EMERGENCY MEDICINE

## 2017-12-06 PROCEDURE — 96374 THER/PROPH/DIAG INJ IV PUSH: CPT | Performed by: EMERGENCY MEDICINE

## 2017-12-06 PROCEDURE — 83690 ASSAY OF LIPASE: CPT | Performed by: EMERGENCY MEDICINE

## 2017-12-06 PROCEDURE — 80053 COMPREHEN METABOLIC PANEL: CPT | Performed by: EMERGENCY MEDICINE

## 2017-12-06 PROCEDURE — 74011000250 HC RX REV CODE- 250: Performed by: EMERGENCY MEDICINE

## 2017-12-06 PROCEDURE — 85025 COMPLETE CBC W/AUTO DIFF WBC: CPT | Performed by: EMERGENCY MEDICINE

## 2017-12-06 PROCEDURE — 96375 TX/PRO/DX INJ NEW DRUG ADDON: CPT | Performed by: EMERGENCY MEDICINE

## 2017-12-06 RX ORDER — FAMOTIDINE 10 MG/ML
20 INJECTION INTRAVENOUS
Status: COMPLETED | OUTPATIENT
Start: 2017-12-06 | End: 2017-12-06

## 2017-12-06 RX ORDER — METOCLOPRAMIDE HYDROCHLORIDE 5 MG/ML
10 INJECTION INTRAMUSCULAR; INTRAVENOUS
Status: COMPLETED | OUTPATIENT
Start: 2017-12-06 | End: 2017-12-06

## 2017-12-06 RX ORDER — HYDROMORPHONE HCL IN 0.9% NACL 50 MG/50ML
0.5 PLASTIC BAG, INJECTION (ML) INJECTION
Status: COMPLETED | OUTPATIENT
Start: 2017-12-06 | End: 2017-12-06

## 2017-12-06 RX ORDER — METOCLOPRAMIDE 5 MG/1
5 TABLET ORAL
Qty: 40 TAB | Refills: 0 | Status: SHIPPED | OUTPATIENT
Start: 2017-12-06 | End: 2017-12-16

## 2017-12-06 RX ADMIN — FAMOTIDINE 20 MG: 10 INJECTION, SOLUTION INTRAVENOUS at 20:19

## 2017-12-06 RX ADMIN — SODIUM CHLORIDE 1000 ML: 900 INJECTION, SOLUTION INTRAVENOUS at 20:20

## 2017-12-06 RX ADMIN — METOCLOPRAMIDE 10 MG: 5 INJECTION, SOLUTION INTRAMUSCULAR; INTRAVENOUS at 20:19

## 2017-12-06 RX ADMIN — SODIUM CHLORIDE 1000 ML: 900 INJECTION, SOLUTION INTRAVENOUS at 21:54

## 2017-12-06 RX ADMIN — Medication 0.5 MG: at 20:19

## 2017-12-07 VITALS
DIASTOLIC BLOOD PRESSURE: 65 MMHG | SYSTOLIC BLOOD PRESSURE: 105 MMHG | OXYGEN SATURATION: 93 % | HEIGHT: 62 IN | BODY MASS INDEX: 25.76 KG/M2 | WEIGHT: 140 LBS | HEART RATE: 105 BPM | RESPIRATION RATE: 16 BRPM | TEMPERATURE: 98.4 F

## 2017-12-07 NOTE — ED NOTES
I have reviewed discharge instructions with the patient. The patient verbalized understanding. Patient left ED via ambulatory/POV  To home with friend. Opportunity for questions and clarification provided. Patient given three scripts. To continue your aftercare when you leave the hospital, you may receive an automated call from our care team to check in on how you are doing. This is a free service and part of our promise to provide the best care and service to meet your aftercare needs.  If you have questions, or wish to unsubscribe from this service please call [phone]. Thank you for Choosing our [facility] [place] [facility].

## 2017-12-07 NOTE — ED PROVIDER NOTES
HPI Comments: Patient with history of gastroparesis complains of nausea and vomiting for the last 2 days. Patient has been fairly noncompliant as per tonics because he read that it was related to kidney failure. Patient is a 38 y.o. male presenting with abdominal pain. The history is provided by the patient and the spouse. Abdominal Pain    This is a new problem. The current episode started more than 2 days ago. The problem occurs constantly. The problem has been gradually worsening. The pain is associated with an unknown factor. The pain is located in the epigastric region. The quality of the pain is cramping and aching. The pain is moderate. Associated symptoms include anorexia, nausea and vomiting. Pertinent negatives include no fever, no belching, no diarrhea, no flatus, no hematochezia, no melena, no constipation, no dysuria, no frequency, no hematuria, no headaches, no arthralgias, no myalgias, no trauma, no chest pain, no testicular pain and no back pain. The pain is worsened by eating, activity and palpation. The pain is relieved by nothing. His past medical history is significant for PUD and GERD. His past medical history does not include gallstones, ulcerative colitis, Crohn's disease, irritable bowel syndrome, cancer, UTI, pancreatitis, diverticulitis, DM, kidney stones or small bowel obstruction. The patient's surgical history non-contributory. Past Medical History:   Diagnosis Date    BETTY [facility]acute kidney injury) ([facility] [address] 8/12/2014    Clostridium difficile colitis 3/20/2011    Gastroparesis 2005    emptying study normal -2006    GERD (gastroesophageal reflux disease)     HIATAL HERNIA SINCE 1999    PUD (peptic ulcer disease) ALL DX IN 2008    ESOPHAGITIS, + H PYLORI       Past Surgical History:   Procedure Laterality Date    COLONOSCOPY  4/08    ESOPHAGITIS, COLONIC ULCER X1    EGD  4/08    H.  HERNIA, ULCER X2, H PYLORI,    EGD  2011    h pylori -neg    HX WISDOM TEETH EXTRACTION 2/10/11         Family History:   Problem Relation Age of Onset    Other Mother      L+W    Diabetes Maternal Grandmother     Sickle Cell Anemia Father     Heart Disease Paternal Grandfather     Other Sister      ALL L+W    Other Son      L+W       Social History     Social History    Marital status:      Spouse name: N/A    Number of children: N/A    Years of education: N/A     Occupational History    Not on file. Social History Main Topics    Smoking status: Former Smoker     Packs/day: 0.25     Years: 2.00     Types: Cigarettes    Smokeless tobacco: Never Used    Alcohol use No    Drug use: Yes     Special: Marijuana    Sexual activity: Yes     Partners: Female      Comment: S/O 15 WEEKS PREGNANT 3/17/11     Other Topics Concern    Not on file     Social History Narrative    3/17/11:  PATIENT LIVES WITH GIRLFRIEND, ALTON (CELL: [phone]), HER 3YEAR OLD DAUGHTER AND THEIR 3YEAR OLD SON. ALTON IS CURRENTLY 13 WEEKS PREGNANT. PATIENT'S JOB AT [employer] WAS DOWNSIZED ABOUT A YEAR AGO, AND HE HAS BEEN A STAY HOME DAD SINCE. ALTON WORKS IN HOUSEKEEPING POSITION. ALLERGIES: Amoxicillin; Aspirin; Hydrocodone; Ibuprofen; Pcn [penicillins]; and Phenergan [promethazine]    Review of Systems   Constitutional: Negative for chills and fever. Cardiovascular: Negative for chest pain. Gastrointestinal: Positive for abdominal pain, anorexia, nausea and vomiting. Negative for blood in stool, constipation, diarrhea, flatus, hematochezia and melena. Genitourinary: Negative for dysuria, frequency, hematuria and testicular pain. Musculoskeletal: Negative for arthralgias, back pain and myalgias. Neurological: Negative for headaches. All other systems reviewed and are negative.       Vitals:    12/06/17 2015 12/06/17 2018 12/06/17 2030 12/06/17 2045   BP:  (!) 133/95 128/89 126/87   Pulse:       Resp:       Temp:       SpO2: 93% 92% 93% 96%   Weight: Height:                Physical Exam   Constitutional: He is oriented to person, place, and time. He appears well-developed and well-nourished. He appears distressed. HENT:   Head: Normocephalic and atraumatic. Right Ear: Tympanic membrane and external ear normal.   Left Ear: Tympanic membrane and external ear normal.   Mouth/Throat: Oropharynx is clear and moist.   Eyes: Conjunctivae and EOM are normal. Pupils are equal, round, and reactive to light. Neck: Normal range of motion. Neck supple. No tracheal deviation present. Cardiovascular: Normal rate, regular rhythm, normal heart sounds and intact distal pulses. Exam reveals no gallop and no friction rub. No murmur heard. Pulmonary/Chest: Effort normal and breath sounds normal. No respiratory distress. He has no wheezes. Abdominal: Soft. Bowel sounds are normal. He exhibits no shifting dullness, no distension, no pulsatile liver, no fluid wave, no abdominal bruit, no pulsatile midline mass and no mass. There is no hepatosplenomegaly. There is tenderness in the epigastric area. There is no rigidity, no rebound, no guarding, no CVA tenderness, no tenderness at McBurney's point and negative Zamora's sign. No hernia. Musculoskeletal: Normal range of motion. He exhibits no edema. Lymphadenopathy:     He has no cervical adenopathy. Neurological: He is alert and oriented to person, place, and time. He displays normal reflexes. No cranial nerve deficit. Skin: Skin is warm and dry. No rash noted. He is not diaphoretic. No erythema. Psychiatric: He has a normal mood and affect. Nursing note and vitals reviewed.        MDM  Number of Diagnoses or Management Options  Acute abdominal pain: new and requires workup  Dehydration: new and requires workup  Non-intractable vomiting with nausea, unspecified vomiting type: new and requires workup     Amount and/or Complexity of Data Reviewed  Clinical lab tests: ordered and reviewed  Decide to obtain previous medical records or to obtain history from someone other than the patient: yes  Review and summarize past medical records: yes    Risk of Complications, Morbidity, and/or Mortality  Presenting problems: high  Diagnostic procedures: minimal  Management options: moderate    Patient Progress  Patient progress: improved    ED Course       Procedures    Results Reviewed:      Recent Results (from the past 24 hour(s))   CBC WITH AUTOMATED DIFF    Collection Time: 12/06/17  7:41 PM   Result Value Ref Range    WBC 7.3 4.3 - 11.1 K/uL    RBC 5.48 4.23 - 5.67 M/uL    HGB 16.5 13.6 - 17.2 g/dL    HCT 42.8 41.1 - 50.3 %    MCV 78.1 (L) 79.6 - 97.8 FL    MCH 30.1 26.1 - 32.9 PG    MCHC 38.6 (H) 31.4 - 35.0 g/dL    RDW 13.6 11.9 - 14.6 %    PLATELET 114 642 - 741 K/uL    MPV 9.1 (L) 10.8 - 14.1 FL    DF AUTOMATED      NEUTROPHILS 63 43 - 78 %    LYMPHOCYTES 28 13 - 44 %    MONOCYTES 9 4.0 - 12.0 %    EOSINOPHILS 0 (L) 0.5 - 7.8 %    BASOPHILS 0 0.0 - 2.0 %    IMMATURE GRANULOCYTES 0 0.0 - 5.0 %    ABS. NEUTROPHILS 4.6 1.7 - 8.2 K/UL    ABS. LYMPHOCYTES 2.1 0.5 - 4.6 K/UL    ABS. MONOCYTES 0.6 0.1 - 1.3 K/UL    ABS. EOSINOPHILS 0.0 0.0 - 0.8 K/UL    ABS. BASOPHILS 0.0 0.0 - 0.2 K/UL    ABS. IMM. GRANS. 0.0 0.0 - 0.5 K/UL   METABOLIC PANEL, COMPREHENSIVE    Collection Time: 12/06/17  7:41 PM   Result Value Ref Range    Sodium 141 136 - 145 mmol/L    Potassium 3.5 3.5 - 5.1 mmol/L    Chloride 104 98 - 107 mmol/L    CO2 18 (L) 21 - 32 mmol/L    Anion gap 19 (H) 7 - 16 mmol/L    Glucose 116 (H) 65 - 100 mg/dL    BUN 19 6 - 23 MG/DL    Creatinine 1.33 0.8 - 1.5 MG/DL    GFR est AA >60 >60 ml/min/1.73m2    GFR est non-AA >60 >60 ml/min/1.73m2    Calcium 10.1 8.3 - 10.4 MG/DL    Bilirubin, total 1.0 0.2 - 1.1 MG/DL    ALT (SGPT) 31 12 - 65 U/L    AST (SGOT) 26 15 - 37 U/L    Alk.  phosphatase 115 50 - 136 U/L    Protein, total 8.7 (H) 6.3 - 8.2 g/dL    Albumin 4.7 3.5 - 5.0 g/dL    Globulin 4.0 (H) 2.3 - 3.5 g/dL    A-G Ratio 1.2 1.2 - 3.5     LIPASE    Collection Time: 12/06/17  7:41 PM   Result Value Ref Range    Lipase 90 73 - 393 U/L       I discussed the results of all labs, procedures, radiographs, and treatments with the patient and available family. Treatment plan is agreed upon and the patient is ready for discharge. All voiced understanding of the discharge plan and medication instructions or changes as appropriate. Questions about treatment in the ED were answered. All were encouraged to return should symptoms worsen or new problems develop.

## 2017-12-07 NOTE — DISCHARGE INSTRUCTIONS
Abdominal Pain: Care Instructions  Your Care Instructions    Abdominal pain has many possible causes. Some aren't serious and get better on their own in a few days. Others need more testing and treatment. If your pain continues or gets worse, you need to be rechecked and may need more tests to find out what is wrong. You may need surgery to correct the problem. Don't ignore new symptoms, such as fever, nausea and vomiting, urination problems, pain that gets worse, and dizziness. These may be signs of a more serious problem. Your doctor may have recommended a follow-up visit in the next 8 to 12 hours. If you are not getting better, you may need more tests or treatment. The doctor has checked you carefully, but problems can develop later. If you notice any problems or new symptoms, get medical treatment right away. Follow-up care is a key part of your treatment and safety. Be sure to make and go to all appointments, and call your doctor if you are having problems. It's also a good idea to know your test results and keep a list of the medicines you take. How can you care for yourself at home? · Rest until you feel better. · To prevent dehydration, drink plenty of fluids, enough so that your urine is light yellow or clear like water. Choose water and other caffeine-free clear liquids until you feel better. If you have kidney, heart, or liver disease and have to limit fluids, talk with your doctor before you increase the amount of fluids you drink. · If your stomach is upset, eat mild foods, such as rice, dry toast or crackers, bananas, and applesauce. Try eating several small meals instead of two or three large ones. · Wait until 48 hours after all symptoms have gone away before you have spicy foods, alcohol, and drinks that contain caffeine. · Do not eat foods that are high in fat. · Avoid anti-inflammatory medicines such as aspirin, ibuprofen (Advil, Motrin), and naproxen (Aleve).  These can cause stomach upset. Talk to your doctor if you take daily aspirin for another health problem. When should you call for help? Call 911 anytime you think you may need emergency care. For example, call if:  ? · You passed out (lost consciousness). ? · You pass maroon or very bloody stools. ? · You vomit blood or what looks like coffee grounds. ? · You have new, severe belly pain. ?Call your doctor now or seek immediate medical care if:  ? · Your pain gets worse, especially if it becomes focused in one area of your belly. ? · You have a new or higher fever. ? · Your stools are black and look like tar, or they have streaks of blood. ? · You have unexpected vaginal bleeding. ? · You have symptoms of a urinary tract infection. These may include:  ¨ Pain when you urinate. ¨ Urinating more often than usual.  ¨ Blood in your urine. ? · You are dizzy or lightheaded, or you feel like you may faint. ? Watch closely for changes in your health, and be sure to contact your doctor if:  ? · You are not getting better after 1 day (24 hours). Where can you learn more? Go to http://ora-itzel.info/. Enter N999 in the search box to learn more about \"Abdominal Pain: Care Instructions. \"  Current as of: March 20, 2017  Content Version: 11.4  © 7655-1343 [employer]. Care instructions adapted under license by [employer] (which disclaims liability or warranty for this information). If you have questions about a medical condition or this instruction, always ask your healthcare professional. [place] [address] any warranty or liability for your use of this information. Dehydration: Care Instructions  Your Care Instructions  Dehydration happens when your body loses too much fluid. This might happen when you do not drink enough water or you lose large amounts of fluids from your body because of diarrhea, vomiting, or sweating.  Severe dehydration can be life-threatening. Water and minerals called electrolytes help put your body fluids back in balance. Learn the early signs of fluid loss, and drink more fluids to prevent dehydration. Follow-up care is a key part of your treatment and safety. Be sure to make and go to all appointments, and call your doctor if you are having problems. It's also a good idea to know your test results and keep a list of the medicines you take. How can you care for yourself at home? · To prevent dehydration, drink plenty of fluids, enough so that your urine is light yellow or clear like water. Choose water and other caffeine-free clear liquids until you feel better. If you have kidney, heart, or liver disease and have to limit fluids, talk with your doctor before you increase the amount of fluids you drink. · If you do not feel like eating or drinking, try taking small sips of water, sports drinks, or other rehydration drinks. · Get plenty of rest.  To prevent dehydration  · Add more fluids to your diet and daily routine, unless your doctor has told you not to. · During hot weather, drink more fluids. Drink even more fluids if you exercise a lot. Stay away from drinks with alcohol or caffeine. · Watch for the symptoms of dehydration. These include:  ¨ A dry, sticky mouth. ¨ Dark yellow urine, and not much of it. ¨ Dry and sunken eyes. ¨ Feeling very tired. · Learn what problems can lead to dehydration. These include:  ¨ Diarrhea, fever, and vomiting. ¨ Any illness with a fever, such as pneumonia or the flu. ¨ Activities that cause heavy sweating, such as endurance races and heavy outdoor work in hot or humid weather. ¨ Alcohol or drug abuse or withdrawal.  ¨ Certain medicines, such as cold and allergy pills (antihistamines), diet pills (diuretics), and laxatives. ¨ Certain diseases, such as diabetes, cancer, and heart or kidney disease. When should you call for help?   Call 911 anytime you think you may need emergency care. For example, call if:  ? · You passed out (lost consciousness). ?Call your doctor now or seek immediate medical care if:  ? · You are confused and cannot think clearly. ? · You are dizzy or lightheaded, or you feel like you may faint. ? · You have signs of needing more fluids. You have sunken eyes and a dry mouth, and you pass only a little dark urine. ? · You cannot keep fluids down. ? Watch closely for changes in your health, and be sure to contact your doctor if:  ? · You are not making tears. ? · Your skin is very dry and sags slowly back into place after you pinch it. ? · Your mouth and eyes are very dry. Where can you learn more? Go to http://ora-itzel.info/. Enter Z351 in the search box to learn more about \"Dehydration: Care Instructions. \"  Current as of: March 20, 2017  Content Version: 11.4  © 6555-2126 [employer]. Care instructions adapted under license by [employer] (which disclaims liability or warranty for this information). If you have questions about a medical condition or this instruction, always ask your healthcare professional. [place] [address] any warranty or liability for your use of this information. Nausea and Vomiting: Care Instructions  Your Care Instructions    When you are nauseated, you may feel weak and sweaty and notice a lot of saliva in your mouth. Nausea often leads to vomiting. Most of the time you do not need to worry about nausea and vomiting, but they can be signs of other illnesses. Two common causes of nausea and vomiting are stomach flu and food poisoning. Nausea and vomiting from viral stomach flu will usually start to improve within 24 hours. Nausea and vomiting from food poisoning may last from 12 to 48 hours. The doctor has checked you carefully, but problems can develop later. If you notice any problems or new symptoms, get medical treatment right away.   Follow-up care is a key part of your treatment and safety. Be sure to make and go to all appointments, and call your doctor if you are having problems. It's also a good idea to know your test results and keep a list of the medicines you take. How can you care for yourself at home? · To prevent dehydration, drink plenty of fluids, enough so that your urine is light yellow or clear like water. Choose water and other caffeine-free clear liquids until you feel better. If you have kidney, heart, or liver disease and have to limit fluids, talk with your doctor before you increase the amount of fluids you drink. · Rest in bed until you feel better. · When you are able to eat, try clear soups, mild foods, and liquids until all symptoms are gone for 12 to 48 hours. Other good choices include dry toast, crackers, cooked cereal, and gelatin dessert, such as Jell-O. When should you call for help? Call 911 anytime you think you may need emergency care. For example, call if:  ? · You passed out (lost consciousness). ?Call your doctor now or seek immediate medical care if:  ? · You have symptoms of dehydration, such as:  ¨ Dry eyes and a dry mouth. ¨ Passing only a little dark urine. ¨ Feeling thirstier than usual.   ? · You have new or worsening belly pain. ? · You have a new or higher fever. ? · You vomit blood or what looks like coffee grounds. ? Watch closely for changes in your health, and be sure to contact your doctor if:  ? · You have ongoing nausea and vomiting. ? · Your vomiting is getting worse. ? · Your vomiting lasts longer than 2 days. ? · You are not getting better as expected. Where can you learn more? Go to http://ora-itzel.info/. Enter 25 [id number] in the search box to learn more about \"Nausea and Vomiting: Care Instructions. \"  Current as of: March 20, 2017  Content Version: 11.4  © 1230-7287 [employer].  Care instructions adapted under license by [employer] (which disclaims liability or warranty for this information). If you have questions about a medical condition or this instruction, always ask your healthcare professional. [address] any warranty or liability for your use of this information.

## 2017-12-07 NOTE — ED TRIAGE NOTES
Skinny Diaz has been throwing up since Tuesday.   He doesn't take his Protonix like he is supposed to and he has a history of gastroparesis \"

## 2018-03-10 ENCOUNTER — HOSPITAL ENCOUNTER (EMERGENCY)
Age: 43
Discharge: HOME | End: 2018-03-10
Attending: EMERGENCY MEDICINE
Payer: MEDICAID

## 2018-03-10 ENCOUNTER — APPOINTMENT (OUTPATIENT)
Dept: GENERAL RADIOLOGY | Age: 43
End: 2018-03-10
Attending: EMERGENCY MEDICINE
Payer: MEDICAID

## 2018-03-10 VITALS
OXYGEN SATURATION: 98 % | HEART RATE: 59 BPM | HEIGHT: 62 IN | WEIGHT: 140 LBS | BODY MASS INDEX: 25.76 KG/M2 | SYSTOLIC BLOOD PRESSURE: 119 MMHG | RESPIRATION RATE: 18 BRPM | DIASTOLIC BLOOD PRESSURE: 74 MMHG | TEMPERATURE: 97 F

## 2018-03-10 DIAGNOSIS — R11.15 INTRACTABLE CYCLICAL VOMITING WITH NAUSEA: Primary | ICD-10-CM

## 2018-03-10 DIAGNOSIS — R39.11 URINARY HESITANCY: ICD-10-CM

## 2018-03-10 LAB
ALBUMIN SERPL-MCNC: 4.5 G/DL (ref 3.5–5)
ALBUMIN/GLOB SERPL: 1.2 {RATIO} (ref 1.2–3.5)
ALP SERPL-CCNC: 110 U/L (ref 50–136)
ALT SERPL-CCNC: 27 U/L (ref 12–65)
ANION GAP SERPL CALC-SCNC: 15 MMOL/L (ref 7–16)
AST SERPL-CCNC: 21 U/L (ref 15–37)
BASOPHILS # BLD: 0 K/UL (ref 0–0.2)
BASOPHILS NFR BLD: 0 % (ref 0–2)
BILIRUB SERPL-MCNC: 0.9 MG/DL (ref 0.2–1.1)
BUN SERPL-MCNC: 20 MG/DL (ref 6–23)
CALCIUM SERPL-MCNC: 9.9 MG/DL (ref 8.3–10.4)
CHLORIDE SERPL-SCNC: 106 MMOL/L (ref 98–107)
CO2 SERPL-SCNC: 19 MMOL/L (ref 21–32)
CREAT SERPL-MCNC: 1.27 MG/DL (ref 0.8–1.5)
DIFFERENTIAL METHOD BLD: ABNORMAL
EOSINOPHIL # BLD: 0 K/UL (ref 0–0.8)
EOSINOPHIL NFR BLD: 1 % (ref 0.5–7.8)
ERYTHROCYTE [DISTWIDTH] IN BLOOD BY AUTOMATED COUNT: 13.2 % (ref 11.9–14.6)
GLOBULIN SER CALC-MCNC: 3.9 G/DL (ref 2.3–3.5)
GLUCOSE SERPL-MCNC: 105 MG/DL (ref 65–100)
HCT VFR BLD AUTO: 42.2 % (ref 41.1–50.3)
HGB BLD-MCNC: 16.2 G/DL (ref 13.6–17.2)
IMM GRANULOCYTES # BLD: 0 K/UL (ref 0–0.5)
IMM GRANULOCYTES NFR BLD AUTO: 0 % (ref 0–5)
LIPASE SERPL-CCNC: 123 U/L (ref 73–393)
LYMPHOCYTES # BLD: 2.6 K/UL (ref 0.5–4.6)
LYMPHOCYTES NFR BLD: 36 % (ref 13–44)
MCH RBC QN AUTO: 29.8 PG (ref 26.1–32.9)
MCHC RBC AUTO-ENTMCNC: 38.4 G/DL (ref 31.4–35)
MCV RBC AUTO: 77.7 FL (ref 79.6–97.8)
MONOCYTES # BLD: 0.6 K/UL (ref 0.1–1.3)
MONOCYTES NFR BLD: 9 % (ref 4–12)
NEUTS SEG # BLD: 3.8 K/UL (ref 1.7–8.2)
NEUTS SEG NFR BLD: 54 % (ref 43–78)
PLATELET # BLD AUTO: 373 K/UL (ref 150–450)
PMV BLD AUTO: 8.9 FL (ref 10.8–14.1)
POTASSIUM SERPL-SCNC: 3.4 MMOL/L (ref 3.5–5.1)
PROT SERPL-MCNC: 8.4 G/DL (ref 6.3–8.2)
RBC # BLD AUTO: 5.43 M/UL (ref 4.23–5.67)
SODIUM SERPL-SCNC: 140 MMOL/L (ref 136–145)
WBC # BLD AUTO: 7.1 K/UL (ref 4.3–11.1)

## 2018-03-10 PROCEDURE — 74022 RADEX COMPL AQT ABD SERIES: CPT

## 2018-03-10 PROCEDURE — 96361 HYDRATE IV INFUSION ADD-ON: CPT | Performed by: EMERGENCY MEDICINE

## 2018-03-10 PROCEDURE — 80053 COMPREHEN METABOLIC PANEL: CPT | Performed by: EMERGENCY MEDICINE

## 2018-03-10 PROCEDURE — 74011250636 HC RX REV CODE- 250/636: Performed by: EMERGENCY MEDICINE

## 2018-03-10 PROCEDURE — 83690 ASSAY OF LIPASE: CPT | Performed by: EMERGENCY MEDICINE

## 2018-03-10 PROCEDURE — 96374 THER/PROPH/DIAG INJ IV PUSH: CPT | Performed by: EMERGENCY MEDICINE

## 2018-03-10 PROCEDURE — 99283 EMERGENCY DEPT VISIT LOW MDM: CPT | Performed by: EMERGENCY MEDICINE

## 2018-03-10 PROCEDURE — 96375 TX/PRO/DX INJ NEW DRUG ADDON: CPT | Performed by: EMERGENCY MEDICINE

## 2018-03-10 PROCEDURE — 74011000250 HC RX REV CODE- 250: Performed by: EMERGENCY MEDICINE

## 2018-03-10 PROCEDURE — 85025 COMPLETE CBC W/AUTO DIFF WBC: CPT | Performed by: EMERGENCY MEDICINE

## 2018-03-10 RX ORDER — FAMOTIDINE 10 MG/ML
20 INJECTION INTRAVENOUS
Status: COMPLETED | OUTPATIENT
Start: 2018-03-10 | End: 2018-03-10

## 2018-03-10 RX ORDER — PROCHLORPERAZINE EDISYLATE 5 MG/ML
10 INJECTION INTRAMUSCULAR; INTRAVENOUS
Status: COMPLETED | OUTPATIENT
Start: 2018-03-10 | End: 2018-03-10

## 2018-03-10 RX ORDER — BENZTROPINE MESYLATE 1 MG/ML
2 INJECTION INTRAMUSCULAR; INTRAVENOUS ONCE
Status: COMPLETED | OUTPATIENT
Start: 2018-03-10 | End: 2018-03-10

## 2018-03-10 RX ORDER — PROCHLORPERAZINE 25 MG
25 SUPPOSITORY, RECTAL RECTAL
Qty: 10 SUPPOSITORY | Refills: 1 | Status: SHIPPED | OUTPATIENT
Start: 2018-03-10 | End: 2018-06-23

## 2018-03-10 RX ORDER — PROCHLORPERAZINE MALEATE 10 MG
10 TABLET ORAL
Qty: 8 TAB | Refills: 1 | Status: SHIPPED | OUTPATIENT
Start: 2018-03-10 | End: 2018-06-23

## 2018-03-10 RX ORDER — LORAZEPAM 2 MG/ML
1 INJECTION INTRAMUSCULAR
Status: COMPLETED | OUTPATIENT
Start: 2018-03-10 | End: 2018-03-10

## 2018-03-10 RX ORDER — TAMSULOSIN HYDROCHLORIDE 0.4 MG/1
0.4 CAPSULE ORAL DAILY
Qty: 15 CAP | Refills: 0 | Status: SHIPPED | OUTPATIENT
Start: 2018-03-10 | End: 2018-03-25

## 2018-03-10 RX ADMIN — SODIUM CHLORIDE 1000 ML: 900 INJECTION, SOLUTION INTRAVENOUS at 19:20

## 2018-03-10 RX ADMIN — BENZTROPINE MESYLATE 2 MG: 1 INJECTION INTRAMUSCULAR; INTRAVENOUS at 19:20

## 2018-03-10 RX ADMIN — SODIUM CHLORIDE 1000 ML: 900 INJECTION, SOLUTION INTRAVENOUS at 18:16

## 2018-03-10 RX ADMIN — PROCHLORPERAZINE EDISYLATE 10 MG: 5 INJECTION INTRAMUSCULAR; INTRAVENOUS at 18:18

## 2018-03-10 RX ADMIN — FAMOTIDINE 20 MG: 10 INJECTION, SOLUTION INTRAVENOUS at 18:45

## 2018-03-10 RX ADMIN — LORAZEPAM 1 MG: 2 INJECTION INTRAMUSCULAR at 18:45

## 2018-03-11 ENCOUNTER — HOSPITAL ENCOUNTER (INPATIENT)
Age: 43
LOS: 3 days | Discharge: HOME | DRG: 054 | End: 2018-03-15
Attending: EMERGENCY MEDICINE | Admitting: HOSPITALIST
Payer: MEDICAID

## 2018-03-11 DIAGNOSIS — R10.13 EPIGASTRIC PAIN: Primary | ICD-10-CM

## 2018-03-11 DIAGNOSIS — R11.15 INTRACTABLE CYCLICAL VOMITING WITH NAUSEA: ICD-10-CM

## 2018-03-11 LAB
ALBUMIN SERPL-MCNC: 4.3 G/DL (ref 3.5–5)
ALBUMIN/GLOB SERPL: 1.1 {RATIO} (ref 1.2–3.5)
ALP SERPL-CCNC: 99 U/L (ref 50–136)
ALT SERPL-CCNC: 25 U/L (ref 12–65)
ANION GAP SERPL CALC-SCNC: 17 MMOL/L (ref 7–16)
AST SERPL-CCNC: 24 U/L (ref 15–37)
BACTERIA URNS QL MICRO: 0 /HPF
BASOPHILS # BLD: 0 K/UL (ref 0–0.2)
BASOPHILS NFR BLD: 0 % (ref 0–2)
BILIRUB SERPL-MCNC: 0.6 MG/DL (ref 0.2–1.1)
BUN SERPL-MCNC: 19 MG/DL (ref 6–23)
CALCIUM SERPL-MCNC: 9.6 MG/DL (ref 8.3–10.4)
CASTS URNS QL MICRO: NORMAL /LPF
CHLORIDE SERPL-SCNC: 106 MMOL/L (ref 98–107)
CO2 SERPL-SCNC: 20 MMOL/L (ref 21–32)
CREAT SERPL-MCNC: 0.95 MG/DL (ref 0.8–1.5)
DIFFERENTIAL METHOD BLD: ABNORMAL
EOSINOPHIL # BLD: 0 K/UL (ref 0–0.8)
EOSINOPHIL NFR BLD: 0 % (ref 0.5–7.8)
EPI CELLS #/AREA URNS HPF: NORMAL /HPF
ERYTHROCYTE [DISTWIDTH] IN BLOOD BY AUTOMATED COUNT: 13.4 % (ref 11.9–14.6)
GLOBULIN SER CALC-MCNC: 3.8 G/DL (ref 2.3–3.5)
GLUCOSE SERPL-MCNC: 104 MG/DL (ref 65–100)
HCT VFR BLD AUTO: 39.3 % (ref 41.1–50.3)
HGB BLD-MCNC: 15.5 G/DL (ref 13.6–17.2)
IMM GRANULOCYTES # BLD: 0 K/UL (ref 0–0.5)
IMM GRANULOCYTES NFR BLD AUTO: 0 % (ref 0–5)
LIPASE SERPL-CCNC: 113 U/L (ref 73–393)
LYMPHOCYTES # BLD: 1.1 K/UL (ref 0.5–4.6)
LYMPHOCYTES NFR BLD: 16 % (ref 13–44)
MAGNESIUM SERPL-MCNC: 2 MG/DL (ref 1.8–2.4)
MCH RBC QN AUTO: 30 PG (ref 26.1–32.9)
MCHC RBC AUTO-ENTMCNC: 38.2 G/DL (ref 31.4–35)
MCV RBC AUTO: 78.6 FL (ref 79.6–97.8)
MONOCYTES # BLD: 0.7 K/UL (ref 0.1–1.3)
MONOCYTES NFR BLD: 10 % (ref 4–12)
NEUTS SEG # BLD: 5 K/UL (ref 1.7–8.2)
NEUTS SEG NFR BLD: 74 % (ref 43–78)
PLATELET # BLD AUTO: 334 K/UL (ref 150–450)
PMV BLD AUTO: 8.5 FL (ref 10.8–14.1)
POTASSIUM SERPL-SCNC: 3.9 MMOL/L (ref 3.5–5.1)
PROT SERPL-MCNC: 8.1 G/DL (ref 6.3–8.2)
RBC # BLD AUTO: 5 M/UL (ref 4.23–5.67)
RBC #/AREA URNS HPF: NORMAL /HPF
SODIUM SERPL-SCNC: 143 MMOL/L (ref 136–145)
T4 FREE SERPL-MCNC: 1.1 NG/DL (ref 0.78–1.46)
TSH SERPL DL<=0.005 MIU/L-ACNC: 0.26 UIU/ML (ref 0.36–3.74)
WBC # BLD AUTO: 6.8 K/UL (ref 4.3–11.1)
WBC URNS QL MICRO: NORMAL /HPF

## 2018-03-11 PROCEDURE — 83735 ASSAY OF MAGNESIUM: CPT | Performed by: HOSPITALIST

## 2018-03-11 PROCEDURE — 84443 ASSAY THYROID STIM HORMONE: CPT | Performed by: HOSPITALIST

## 2018-03-11 PROCEDURE — 81015 MICROSCOPIC EXAM OF URINE: CPT | Performed by: EMERGENCY MEDICINE

## 2018-03-11 PROCEDURE — 80053 COMPREHEN METABOLIC PANEL: CPT | Performed by: EMERGENCY MEDICINE

## 2018-03-11 PROCEDURE — 99218 HC RM OBSERVATION: CPT

## 2018-03-11 PROCEDURE — 96376 TX/PRO/DX INJ SAME DRUG ADON: CPT | Performed by: EMERGENCY MEDICINE

## 2018-03-11 PROCEDURE — 74011250636 HC RX REV CODE- 250/636: Performed by: EMERGENCY MEDICINE

## 2018-03-11 PROCEDURE — 99284 EMERGENCY DEPT VISIT MOD MDM: CPT | Performed by: EMERGENCY MEDICINE

## 2018-03-11 PROCEDURE — 84480 ASSAY TRIIODOTHYRONINE (T3): CPT | Performed by: HOSPITALIST

## 2018-03-11 PROCEDURE — 96375 TX/PRO/DX INJ NEW DRUG ADDON: CPT | Performed by: EMERGENCY MEDICINE

## 2018-03-11 PROCEDURE — 74011000250 HC RX REV CODE- 250: Performed by: EMERGENCY MEDICINE

## 2018-03-11 PROCEDURE — 84439 ASSAY OF FREE THYROXINE: CPT | Performed by: HOSPITALIST

## 2018-03-11 PROCEDURE — 81003 URINALYSIS AUTO W/O SCOPE: CPT | Performed by: EMERGENCY MEDICINE

## 2018-03-11 PROCEDURE — 83690 ASSAY OF LIPASE: CPT | Performed by: EMERGENCY MEDICINE

## 2018-03-11 PROCEDURE — 85025 COMPLETE CBC W/AUTO DIFF WBC: CPT | Performed by: EMERGENCY MEDICINE

## 2018-03-11 PROCEDURE — 74011250637 HC RX REV CODE- 250/637: Performed by: EMERGENCY MEDICINE

## 2018-03-11 PROCEDURE — 74011000250 HC RX REV CODE- 250: Performed by: HOSPITALIST

## 2018-03-11 PROCEDURE — 74011000258 HC RX REV CODE- 258: Performed by: HOSPITALIST

## 2018-03-11 PROCEDURE — 96374 THER/PROPH/DIAG INJ IV PUSH: CPT | Performed by: EMERGENCY MEDICINE

## 2018-03-11 PROCEDURE — 74011250636 HC RX REV CODE- 250/636: Performed by: HOSPITALIST

## 2018-03-11 PROCEDURE — 96372 THER/PROPH/DIAG INJ SC/IM: CPT | Performed by: EMERGENCY MEDICINE

## 2018-03-11 PROCEDURE — 96361 HYDRATE IV INFUSION ADD-ON: CPT | Performed by: EMERGENCY MEDICINE

## 2018-03-11 PROCEDURE — 36415 COLL VENOUS BLD VENIPUNCTURE: CPT | Performed by: HOSPITALIST

## 2018-03-11 RX ORDER — PROCHLORPERAZINE MALEATE 10 MG
10 TABLET ORAL
Status: DISCONTINUED | OUTPATIENT
Start: 2018-03-11 | End: 2018-03-15 | Stop reason: HOSPADM

## 2018-03-11 RX ORDER — PROCHLORPERAZINE 25 MG
25 SUPPOSITORY, RECTAL RECTAL
Status: DISCONTINUED | OUTPATIENT
Start: 2018-03-11 | End: 2018-03-15 | Stop reason: HOSPADM

## 2018-03-11 RX ORDER — HYOSCYAMINE SULFATE 0.12 MG/1
0.25 TABLET SUBLINGUAL
Status: COMPLETED | OUTPATIENT
Start: 2018-03-11 | End: 2018-03-11

## 2018-03-11 RX ORDER — METOCLOPRAMIDE HYDROCHLORIDE 5 MG/ML
10 INJECTION INTRAMUSCULAR; INTRAVENOUS
Status: COMPLETED | OUTPATIENT
Start: 2018-03-11 | End: 2018-03-11

## 2018-03-11 RX ORDER — DIPHENHYDRAMINE HYDROCHLORIDE 50 MG/ML
25 INJECTION, SOLUTION INTRAMUSCULAR; INTRAVENOUS
Status: COMPLETED | OUTPATIENT
Start: 2018-03-11 | End: 2018-03-11

## 2018-03-11 RX ORDER — ONDANSETRON 2 MG/ML
4 INJECTION INTRAMUSCULAR; INTRAVENOUS
Status: DISCONTINUED | OUTPATIENT
Start: 2018-03-11 | End: 2018-03-13

## 2018-03-11 RX ORDER — HALOPERIDOL 5 MG/ML
5 INJECTION INTRAMUSCULAR
Status: COMPLETED | OUTPATIENT
Start: 2018-03-11 | End: 2018-03-11

## 2018-03-11 RX ORDER — TAMSULOSIN HYDROCHLORIDE 0.4 MG/1
0.4 CAPSULE ORAL DAILY
Status: DISCONTINUED | OUTPATIENT
Start: 2018-03-12 | End: 2018-03-15 | Stop reason: HOSPADM

## 2018-03-11 RX ORDER — FAMOTIDINE 10 MG/ML
20 INJECTION INTRAVENOUS
Status: COMPLETED | OUTPATIENT
Start: 2018-03-11 | End: 2018-03-11

## 2018-03-11 RX ORDER — SODIUM CHLORIDE 0.9 % (FLUSH) 0.9 %
5-10 SYRINGE (ML) INJECTION EVERY 8 HOURS
Status: DISCONTINUED | OUTPATIENT
Start: 2018-03-11 | End: 2018-03-15 | Stop reason: HOSPADM

## 2018-03-11 RX ORDER — ONDANSETRON 2 MG/ML
4 INJECTION INTRAMUSCULAR; INTRAVENOUS
Status: COMPLETED | OUTPATIENT
Start: 2018-03-11 | End: 2018-03-11

## 2018-03-11 RX ORDER — DEXTROSE MONOHYDRATE AND SODIUM CHLORIDE 5; .9 G/100ML; G/100ML
250 INJECTION, SOLUTION INTRAVENOUS CONTINUOUS
Status: DISCONTINUED | OUTPATIENT
Start: 2018-03-11 | End: 2018-03-13

## 2018-03-11 RX ORDER — SODIUM CHLORIDE 0.9 % (FLUSH) 0.9 %
5-10 SYRINGE (ML) INJECTION AS NEEDED
Status: DISCONTINUED | OUTPATIENT
Start: 2018-03-11 | End: 2018-03-15 | Stop reason: HOSPADM

## 2018-03-11 RX ORDER — DIPHENHYDRAMINE HYDROCHLORIDE 50 MG/ML
12.5 INJECTION, SOLUTION INTRAMUSCULAR; INTRAVENOUS
Status: DISCONTINUED | OUTPATIENT
Start: 2018-03-11 | End: 2018-03-13

## 2018-03-11 RX ORDER — LORAZEPAM 2 MG/ML
1 INJECTION INTRAMUSCULAR
Status: DISCONTINUED | OUTPATIENT
Start: 2018-03-11 | End: 2018-03-12

## 2018-03-11 RX ORDER — ONDANSETRON 8 MG/1
4 TABLET, ORALLY DISINTEGRATING ORAL
Status: DISCONTINUED | OUTPATIENT
Start: 2018-03-11 | End: 2018-03-13

## 2018-03-11 RX ORDER — ACETAMINOPHEN 325 MG/1
650 TABLET ORAL
Status: DISCONTINUED | OUTPATIENT
Start: 2018-03-11 | End: 2018-03-15 | Stop reason: HOSPADM

## 2018-03-11 RX ADMIN — ONDANSETRON 4 MG: 2 INJECTION INTRAMUSCULAR; INTRAVENOUS at 13:20

## 2018-03-11 RX ADMIN — SODIUM CHLORIDE 1000 ML: 900 INJECTION, SOLUTION INTRAVENOUS at 14:39

## 2018-03-11 RX ADMIN — DIPHENHYDRAMINE HYDROCHLORIDE 25 MG: 50 INJECTION, SOLUTION INTRAMUSCULAR; INTRAVENOUS at 13:26

## 2018-03-11 RX ADMIN — HALOPERIDOL LACTATE 5 MG: 5 INJECTION, SOLUTION INTRAMUSCULAR at 16:45

## 2018-03-11 RX ADMIN — HYOSCYAMINE SULFATE 0.25 MG: 0.12 TABLET ORAL; SUBLINGUAL at 14:53

## 2018-03-11 RX ADMIN — FAMOTIDINE 20 MG: 10 INJECTION, SOLUTION INTRAVENOUS at 16:07

## 2018-03-11 RX ADMIN — Medication 10 ML: at 22:46

## 2018-03-11 RX ADMIN — LORAZEPAM 1 MG: 2 INJECTION INTRAMUSCULAR; INTRAVENOUS at 23:25

## 2018-03-11 RX ADMIN — ONDANSETRON 4 MG: 2 INJECTION INTRAMUSCULAR; INTRAVENOUS at 22:49

## 2018-03-11 RX ADMIN — METOCLOPRAMIDE 10 MG: 5 INJECTION, SOLUTION INTRAMUSCULAR; INTRAVENOUS at 13:26

## 2018-03-11 RX ADMIN — DIPHENHYDRAMINE HYDROCHLORIDE 25 MG: 50 INJECTION, SOLUTION INTRAMUSCULAR; INTRAVENOUS at 16:45

## 2018-03-11 RX ADMIN — FAMOTIDINE 20 MG: 10 INJECTION, SOLUTION INTRAVENOUS at 22:52

## 2018-03-11 RX ADMIN — ONDANSETRON 4 MG: 2 INJECTION INTRAMUSCULAR; INTRAVENOUS at 17:16

## 2018-03-11 RX ADMIN — DEXTROSE MONOHYDRATE AND SODIUM CHLORIDE 125 ML/HR: 5; .9 INJECTION, SOLUTION INTRAVENOUS at 22:45

## 2018-03-11 RX ADMIN — SODIUM CHLORIDE 1000 ML: 900 INJECTION, SOLUTION INTRAVENOUS at 13:14

## 2018-03-11 NOTE — IP AVS SNAPSHOT
Summary of Care Report The Summary of Care report has been created to help improve care coordination. Users with access to [employer] or [address] (Web-based application) may access additional patient information including the Discharge Summary. If you are not currently a [facility] user and need more information, please call the number listed below in the [place] [address] section and ask to be connected with Medical Records. Facility Information Name Address Phone [address] [address] [address] [phone] Patient Information Patient Name Sex ROBBY Ann ([id number]) Male 1975 Discharge Information Admitting Provider Service Area Unit Mahogany Laird MD / [facility]  Med Surg / [phone] Discharge Provider Discharge Date/Time Discharge Disposition Destination (none) 3/15/2018 (Pending) AHR (none) Patient Language Language ENGLISH [13] Hospital Problems as of 3/15/2018  Reviewed: 2016 10:30 AM by Andrey Hull MD  
  
  
  
 Class Noted - Resolved Last Modified POA Active Problems Anxiety (Chronic)  2014 - Present 3/11/2018 by Vivi Saint, MD Yes Entered by Alvino Howe Tobacco abuse (Chronic)  2014 - Present 3/13/2018 by Milla Duffy MD Yes Entered by Alvino Howe H/O hiatal hernia (Chronic)  2014 - Present 3/13/2018 by Milla Duffy MD Yes Entered by Alvino Howe Esophageal reflux (Chronic)  2014 - Present 3/11/2018 by Vivi Saint, MD Yes Entered by Alvino Howe Tetrahydrocannabinol (THC) use disorder, moderate, dependence ([address] (Chronic)  2016 - Present 3/13/2018 by Milla Duffy MD Yes   Entered by Nona Lindquist MD  
 * (Principal)Erosive esophagitis  3/11/2018 - Present 3/13/2018 by Karson Aguirre MD Yes Entered by Shanique Jameson MD  
  Intractable cyclical vomiting  0/91/8116 - Present 3/13/2018 by Karson Aguirre MD Yes Entered by Eugenie Aparicio MD  
  Gastrointestinal hemorrhage with hematemesis  3/13/2018 - Present 3/13/2018 by Karson Aguirre MD Yes Entered by Karson Aguirre MD  
  
Non-Hospital Problems as of 3/15/2018  Reviewed: 7/11/2016 10:30 AM by Sirisha Greer MD  
  
  
  
 Class Noted - Resolved Last Modified Active Problems Gastroparesis (Chronic)  3/17/2011 - Present 9/17/2016 by Ce Lemon Entered by Sly Valentin NP Sickle cell trait ([facility] [address] (Chronic)  3/5/2013 - Present 3/5/2013 by PRAVIN Black Entered by PRAVIN Black  
  PUD (peptic ulcer disease) (Chronic)  4/30/2014 - Present 11/28/2016 by Kendra Scott. Entered by Kennedy Reaves Marijuana abuse (Chronic)  8/12/2014 - Present 3/13/2018 by Karson Aguirre MD  
  Entered by Ruben Cervantes MD  
  Microcytosis (Chronic)  8/12/2014 - Present 3/13/2018 by Karson Aguirre MD  
  Entered by Ruben Cervantes MD  
  Polysubstance abuse (Chronic)  7/11/2016 - Present 3/13/2018 by Karson Aguirre MD  
  Entered by Ruben Cervantes MD  
  
You are allergic to the following Allergen Reactions Amoxicillin Nausea Only Aspirin Other (comments)  
 ulcers Hydrocodone Rash Ibuprofen Other (comments) Hx of ulcers Pcn (Penicillins) Rash Phenergan (Promethazine) Nausea and Vomiting Other (comments) Current Discharge Medication List  
  
START taking these medications Dose & Instructions Dispensing Information Comments  
 metoclopramide HCl 10 mg tablet Commonly known as:  REGLAN Dose:  10 mg Take 1 Tab by mouth two (2) times daily as needed.  Should not be taken in conjunction with prochlorperazine (compazine). Indications: gastroesophageal reflux disease, nausea and vomiting Quantity:  60 Tab Refills:  1 CONTINUE these medications which have CHANGED Dose & Instructions Dispensing Information Comments  
 ondansetron 8 mg disintegrating tablet Commonly known as:  ZOFRAN ODT What changed:  Another medication with the same name was removed. Continue taking this medication, and follow the directions you see here. Dose:  4 mg Take 0.5 Tabs by mouth every eight (8) hours as needed. Quantity:  30 Tab Refills:  5  
   
 pantoprazole 40 mg tablet Commonly known as:  PROTONIX What changed:  additional instructions Dose:  40 mg Take 1 Tab by mouth daily. Take 1 tab twice daily for the first 2 weeks, then once daily  Indications: Erosive Esophagitis Quantity:  30 Tab Refills:  5 CONTINUE these medications which have NOT CHANGED Dose & Instructions Dispensing Information Comments  
 prochlorperazine 10 mg tablet Commonly known as:  COMPAZINE Dose:  10 mg Take 1 Tab by mouth every six (6) hours as needed for Other (as needed for nausea/vomiting/migraine). Quantity:  8 Tab Refills:  1  
   
 prochlorperazine 25 mg suppository Commonly known as:  prochlorperazine Dose:  25 mg Insert 1 Suppository into rectum every eight (8) hours as needed for Nausea. Quantity:  10 Suppository Refills:  1  
   
 tamsulosin 0.4 mg capsule Commonly known as:  FLOMAX Dose:  0.4 mg Take 1 Cap by mouth daily for 15 days. Quantity:  15 Cap Refills:  0 Current Immunizations Name Date TDAP Vaccine 4/21/2012 Surgery Information ID Date/Time Status Primary Surgeon All Procedures Location [id number] 3/13/2018 12:00 PM Unpostej carlos Mares MD ESOPHAGOGASTRODUODENOSCOPY (EGD) ESOPHAGOGASTRODUODENAL (EGD) BIOPSY [facility] ENDOSCOPY Follow-up Information Follow up With Details Comments Contact Info PCP or GI Call As needed [facility]  as needed [facility] [address] 
[phone] [address] [facility]  as needed [address] [facility] [address] 
[phone] Discharge Instructions DISCHARGE SUMMARY from Nurse PATIENT INSTRUCTIONS: 
 
 
F-face looks uneven A-arms unable to move or move unevenly S-speech slurred or non-existent T-time-call 911 as soon as signs and symptoms begin-DO NOT go Back to bed or wait to see if you get better-TIME IS BRAIN. Warning Signs of HEART ATTACK Call 911 if you have these symptoms: 
? Chest discomfort. Most heart attacks involve discomfort in the center of the chest that lasts more than a few minutes, or that goes away and comes back. It can feel like uncomfortable pressure, squeezing, fullness, or pain. ? Discomfort in other areas of the upper body. Symptoms can include pain or discomfort in one or both arms, the back, neck, jaw, or stomach. ? Shortness of breath with or without chest discomfort. ? Other signs may include breaking out in a cold sweat, nausea, or lightheadedness. Don't wait more than five minutes to call [address]! Fast action can save your life. Calling 911 is almost always the fastest way to get lifesaving treatment. Emergency Medical Services staff can begin treatment when they arrive  up to an hour sooner than if someone gets to the hospital by car. The discharge information has been reviewed with the {PATIENT PARENT GUARDIAN:70072}. The {PATIENT PARENT GUARDIAN:98503} verbalized understanding. Discharge medications reviewed with the {Dishcarge meds reviewed NQVN:08385} and appropriate educational materials and side effects teaching were provided. ___________________________________________________________________________________________________________________________________ Chart Review Routing History Recipient Method Report Sent By Washington Bender MD  
Fax: [phone] Phone: [phone] Fax IP Auto Routed Wood-Rodríguez Squibb [[id number]] 9/19/2012  7:59 AM 09/19/2012 Theresa Bell MD  
Fax: [phone] Phone: [phone] Fax IP Auto Routed Trans Nadine Draper MD [[id number]] 10/21/2013  8:56 PM 10/21/2013 Karlo Moore MD  
Fax: [phone] Phone: [phone] Fax IP Auto Routed Abdi Mccauley MD [[id number]] 9/7/2016 11:40 AM 09/07/2016 Aide Curran MD  
Fax: [phone] Phone: [phone] Fax IP Auto Routed Abdi Mccauley MD [[id number]] 9/7/2016 11:40 AM 09/07/2016

## 2018-03-11 NOTE — ED PROVIDER NOTES
HPI Comments: Patient with history of intractable/cyclical nausea vomiting syndrome for years. This flareup has been for about 2 weeks. Patient having vomiting at home today even despite Zofran. Another days the Zofran seems to help well. Was due to see GI sometime in the recent past but there were issues as to whether the referral came from a PCP or not, and at that time they did not take his particular type of Medicaid. Job situation has changed and his type of Medicaid has changed. Patient is a 41 y.o. male presenting with abdominal pain. The history is provided by the patient and the spouse. Abdominal Pain    This is a recurrent problem. Episode onset: Since November 28, for this spell, off and on, to 3 days of time and then 2 or 3 days off. The problem occurs every several days. The problem has not changed since onset. The pain is associated with vomiting. The pain is located in the RLQ, LLQ and suprapubic region. The quality of the pain is aching. The pain is mild. Associated symptoms include nausea, vomiting, constipation and dysuria. Pertinent negatives include no fever, no diarrhea, no headaches, no arthralgias, no chest pain and no back pain. The pain is worsened by eating. The pain is relieved by nothing. Past workup includes CT scan, esophagogastroduodenoscopy. His past medical history is significant for PUD and GERD. His past medical history does not include gallstones. Past medical history comments: Possible diagnoses include gastroparesis, cannabinoids syndrome, ulcers, GERD, hiatal hernia. . The patient's surgical history non-contributory.        Past Medical History:   Diagnosis Date    BETTY (acute kidney injury) ([facility] [address] 8/12/2014    Clostridium difficile colitis 3/20/2011    Gastroparesis 2005    emptying study normal -2006    GERD (gastroesophageal reflux disease)     HIATAL HERNIA SINCE 1999    PUD (peptic ulcer disease) ALL DX IN 2008    ESOPHAGITIS, + H PYLORI       Past Surgical History:   Procedure Laterality Date    COLONOSCOPY  4/08    ESOPHAGITIS, COLONIC ULCER X1    EGD  4/08    H. HERNIA, ULCER X2, H PYLORI,    EGD  2011    h pylori -neg    HX WISDOM TEETH EXTRACTION  2/10/11         Family History:   Problem Relation Age of Onset    Other Mother      L+W    Diabetes Maternal Grandmother     Sickle Cell Anemia Father     Heart Disease Paternal Grandfather     Other Sister      ALL L+W    Other Son      L+W       Social History     Social History    Marital status:      Spouse name: N/A    Number of children: N/A    Years of education: N/A     Occupational History    Not on file. Social History Main Topics    Smoking status: Former Smoker     Packs/day: 0.25     Years: 2.00     Types: Cigarettes    Smokeless tobacco: Never Used    Alcohol use No    Drug use: Yes     Special: Marijuana    Sexual activity: Yes     Partners: Female      Comment: S/O 15 WEEKS PREGNANT 3/17/11     Other Topics Concern    Not on file     Social History Narrative    3/17/11:  PATIENT LIVES WITH GIRLFRIEND, ALTON (CELL: [phone]), HER 3YEAR OLD DAUGHTER AND THEIR 3YEAR OLD SON. ALTON IS CURRENTLY 13 WEEKS PREGNANT. PATIENT'S JOB AT [employer] WAS DOWNSIZED ABOUT A YEAR AGO, AND HE HAS BEEN A STAY HOME DAD SINCE. ALTON WORKS IN HOUSEKEEPING POSITION. ALLERGIES: Amoxicillin; Aspirin; Hydrocodone; Ibuprofen; Pcn [penicillins]; and Phenergan [promethazine]    Review of Systems   Constitutional: Negative for chills and fever. HENT: Negative for rhinorrhea and sore throat. Eyes: Negative for discharge and redness. Respiratory: Negative for cough and shortness of breath. Cardiovascular: Negative for chest pain and palpitations. Gastrointestinal: Positive for abdominal pain, constipation, nausea and vomiting. Negative for diarrhea. Genitourinary: Positive for dysuria.         Hesitancy and nocturia     Musculoskeletal: Negative for arthralgias and back pain. Skin: Negative for rash. Neurological: Negative for dizziness and headaches. All other systems reviewed and are negative. Vitals:    03/10/18 1808   BP: 124/82   Pulse: 69   Resp: 19   Temp: 97 °F (36.1 °C)   SpO2: 95%   Weight: 63.5 kg (140 lb)   Height: 5' 2\" (1.575 m)            Physical Exam   Constitutional: He is oriented to person, place, and time. He appears well-developed and well-nourished. He appears distressed. Retching loudly, can be heard throughout all most the entire emergency department    Continued throwing up even after the IV Compazine and he received in triage. HENT:   Head: Normocephalic and atraumatic. Eyes: Conjunctivae are normal. Pupils are equal, round, and reactive to light. Right eye exhibits no discharge. Left eye exhibits no discharge. No scleral icterus. Neck: Normal range of motion. Neck supple. Cardiovascular: Normal rate, regular rhythm and normal heart sounds. Exam reveals no gallop. No murmur heard. Pulmonary/Chest: Effort normal and breath sounds normal. No respiratory distress. He has no wheezes. He has no rales. Abdominal: Soft. Bowel sounds are decreased. There is tenderness in the right lower quadrant, suprapubic area and left lower quadrant. There is no guarding. Musculoskeletal: Normal range of motion. He exhibits no edema. Neurological: He is alert and oriented to person, place, and time. He exhibits normal muscle tone. cni 2-12 grossly   Skin: Skin is warm and dry. He is not diaphoretic. Psychiatric: He has a normal mood and affect. His behavior is normal.   Nursing note and vitals reviewed. MDM  Number of Diagnoses or Management Options  Intractable cyclical vomiting with nausea:   Urinary hesitancy:   Diagnosis management comments: Medical decision making note:  Suspect cyclical vomiting syndrome  No better after Compazine, 1 mg IV Ativan administered and nausea seems to stopped for now.   We'll check labs, abdominal series, administer a second liter saline bolus. Needs a digital rectal exam for constipation to rule out impaction, and evaluate prostate for his complaint of nocturia/hesitancy. Case discussed with Dr. Musa Son from GI who will see him soon  This concludes the \"medical decision making note\" part of this emergency department visit note.           ED Course       Procedures

## 2018-03-11 NOTE — ED NOTES
I have reviewed discharge instructions with the patient and spouse. The patient and spouse verbalized understanding. Patient left ED via Discharge Method: ambulatory to Home with transport from wife. The patient is ambulatory upon exit and appears in no acute distress. The patient has been provided discharge instructions, follow up information, and prescriptions x2. The patient and wife do not have any questions at this time. Opportunity for questions and clarification provided. Patient given 2 scripts. To continue your aftercare when you leave the hospital, you may receive an automated call from our care team to check in on how you are doing. This is a free service and part of our promise to provide the best care and service to meet your aftercare needs.  If you have questions, or wish to unsubscribe from this service please call [phone]. Thank you for Choosing our [facility] Emergency Department.

## 2018-03-11 NOTE — IP AVS SNAPSHOT
[facility] 
 
 
 [address] 
[phone] Patient: Mira Mckeon MRN: [id number] :[id number] About your hospitalization You were admitted on:  2018 You last received care in the:  [facility] 6 MED SURG You were discharged on:  March 15, 2018 Why you were hospitalized Your primary diagnosis was:  Erosive Esophagitis Your diagnoses also included:  Anxiety, Esophageal Reflux, Intractable Cyclical Vomiting, Tobacco Abuse, H/O Hiatal Hernia, Tetrahydrocannabinol (Thc) Use Disorder, Moderate, Dependence (Hcc), Gastrointestinal Hemorrhage With Hematemesis Follow-up Information Follow up With Details Comments Contact Info PCP or GI Call As needed [facility]  as needed [facility] [id number] [phone] [address] [place] [address]  as needed [address] [facility] [address] 
[phone] Discharge Orders None A check karen indicates which time of day the medication should be taken. My Medications START taking these medications Instructions Each Dose to Equal  
 Morning Noon Evening Bedtime  
 metoclopramide HCl 10 mg tablet Commonly known as:  REGLAN Take 1 Tab by mouth two (2) times daily as needed. Should not be taken in conjunction with prochlorperazine (compazine). Indications: gastroesophageal reflux disease, nausea and vomiting 10 mg CHANGE how you take these medications Instructions Each Dose to Equal  
 Morning Noon Evening Bedtime  
 ondansetron 8 mg disintegrating tablet Commonly known as:  ZOFRAN ODT What changed:  Another medication with the same name was removed. Continue taking this medication, and follow the directions you see here. Take 0.5 Tabs by mouth every eight (8) hours as needed. 4 mg pantoprazole 40 mg tablet Commonly known as:  PROTONIX What changed:  additional instructions Your next dose is:  Tomorrow Morning Take 1 Tab by mouth daily. Take 1 tab twice daily for the first 2 weeks, then once daily  Indications: Erosive Esophagitis 40 mg CONTINUE taking these medications Instructions Each Dose to Equal  
 Morning Noon Evening Bedtime  
 prochlorperazine 10 mg tablet Commonly known as:  COMPAZINE Your next dose is: Take on as needed schedule Take 1 Tab by mouth every six (6) hours as needed for Other (as needed for nausea/vomiting/migraine). 10 mg  
    
   
   
   
  
 prochlorperazine 25 mg suppository Commonly known as:  prochlorperazine Your next dose is: Take on as needed schedule Insert 1 Suppository into rectum every eight (8) hours as needed for Nausea. 25 mg  
    
   
   
   
  
 tamsulosin 0.4 mg capsule Commonly known as:  FLOMAX Your next dose is:  Tomorrow Morning Take 1 Cap by mouth daily for 15 days. 0.4 mg Where to Get Your Medications These medications were sent to [facility], [address], [facility] [address] [id number] Phone:  [phone]  
  ondansetron 8 mg disintegrating tablet  
 pantoprazole 40 mg tablet Information on where to get these meds will be given to you by the nurse or doctor. ! Ask your nurse or doctor about these medications  
  metoclopramide HCl 10 mg tablet Discharge Instructions DISCHARGE SUMMARY from Nurse PATIENT INSTRUCTIONS: 
 
 
Recognize signs and symptoms of STROKE: 
 
 F-face looks uneven A-arms unable to move or move unevenly S-speech slurred or non-existent T-time-call 911 as soon as signs and symptoms begin-DO NOT go Back to bed or wait to see if you get better-TIME IS BRAIN. Warning Signs of HEART ATTACK Call 911 if you have these symptoms: 
? Chest discomfort. Most heart attacks involve discomfort in the center of the chest that lasts more than a few minutes, or that goes away and comes back. It can feel like uncomfortable pressure, squeezing, fullness, or pain. ? Discomfort in other areas of the upper body. Symptoms can include pain or discomfort in one or both arms, the back, neck, jaw, or stomach. ? Shortness of breath with or without chest discomfort. ? Other signs may include breaking out in a cold sweat, nausea, or lightheadedness. Don't wait more than five minutes to call [address]! Fast action can save your life. Calling 911 is almost always the fastest way to get lifesaving treatment. Emergency Medical Services staff can begin treatment when they arrive  up to an hour sooner than if someone gets to the hospital by car. The discharge information has been reviewed with the {PATIENT PARENT GUARDIAN:61469}. The {PATIENT PARENT GUARDIAN:89547} verbalized understanding. Discharge medications reviewed with the {Dishcarge meds reviewed [facility]:77259} and appropriate educational materials and side effects teaching were provided. ___________________________________________________________________________________________________________________________________ MyChart Announcement We are excited to announce that we are making your provider's discharge notes available to you in BIG Launcher[employer]. You will see these notes when they are completed and signed by the physician that discharged you from your recent hospital stay.   If you have any questions or concerns about any information you see in Telepath, please call the Health Information Department where you were seen or reach out to your Primary Care Provider for more information about your plan of care. Introducing [facility] SERVICES! Rahul Suero introduces Telepath patient portal. Now you can access parts of your medical record, email your doctor's office, and request medication refills online. 1. In your internet browser, go to [email or web address] 2. Click on the First Time User? Click Here link in the Sign In box. You will see the New Member Sign Up page. 3. Enter your Telepath Access Code exactly as it appears below. You will not need to use this code after youve completed the sign-up process. If you do not sign up before the expiration date, you must request a new code. · Telepath Access Code: [email or web address] Expires: 6/8/2018  6:49 PM 
 
4. Enter the last four digits of your Social Security Number (xxxx) and Date of Birth (mm/dd/yyyy) as indicated and click Submit. You will be taken to the next sign-up page. 5. Create a Telepath ID. This will be your Telepath login ID and cannot be changed, so think of one that is secure and easy to remember. 6. Create a Telepath password. You can change your password at any time. 7. Enter your Password Reset Question and Answer. This can be used at a later time if you forget your password. 8. Enter your e-mail address. You will receive e-mail notification when new information is available in [address] 9. Click Sign Up. You can now view and download portions of your medical record. 10. Click the Download Summary menu link to download a portable copy of your medical information. If you have questions, please visit the Frequently Asked Questions section of the Telepath website. Remember, Telepath is NOT to be used for urgent needs. For medical emergencies, dial 911. Now available from your iPhone and Android! Providers Seen During Your Hospitalization Provider Specialty Primary office phone Debby Brown MD Emergency Medicine [phone] Chel Escoto MD Emergency Medicine [phone] Pearl Hall MD Emergency Medicine [phone] Vangie Bailey MD Internal Medicine [phone] Your Primary Care Physician (PCP) Primary Care Physician Office Phone Office Fax OTHER, PHYS ** None ** ** None ** You are allergic to the following Allergen Reactions Amoxicillin Nausea Only Aspirin Other (comments)  
 ulcers Hydrocodone Rash Ibuprofen Other (comments) Hx of ulcers Pcn (Penicillins) Rash Phenergan (Promethazine) Nausea and Vomiting Other (comments) Recent Documentation Height Weight BMI Smoking Status 1.575 m 70.4 kg 28.37 kg/m2 Former Smoker Emergency Contacts Name Discharge Info Relation Home Work Mobile Marya Llanos  Spouse [3] [phone] Marshall Jorge  Parent [1] [phone] Patient Belongings The following personal items are in your possession at time of discharge: 
  Dental Appliances: None         Home Medications: None   Jewelry: None  Clothing: Footwear, Pants, Shirt, Undergarments    Other Valuables: Cell Phone Discharge Instructions Attachments/References ESOPHAGITIS (ENGLISH) Patient Handouts Esophagitis: Care Instructions Your Care Instructions Esophagitis (say \"ih-sof-uh-JY-tus\") is irritation of the esophagus, the tube that carries food from your throat to your stomach. Acid reflux is the most common cause of this condition. When you have reflux, stomach acid and juices flow upward. This can cause pain or a burning feeling in your chest. You may have a sore throat. It may be hard to swallow. Other causes of this condition include some medicines and supplements. Allergies or an infection can also cause it. Your doctor will ask about your symptoms and past health. He or she might do tests to find the cause of your symptoms. Treatment depends on what is causing the problem. Treatment might include changing your diet or taking medicine to relieve your symptoms. It might also include changing a medicine that is causing your symptoms. If you have reflux, medicine that reduces the stomach acid helps your body heal. It might take 1 to 3 weeks to heal. 
Follow-up care is a key part of your treatment and safety. Be sure to make and go to all appointments, and call your doctor if you are having problems. It's also a good idea to know your test results and keep a list of the medicines you take. How can you care for yourself at home? · If you have acid reflux, your doctor may recommend that you: 
¨ Eat several small meals instead of two or three large meals. After you eat, wait 2 to 3 hours before you lie down. ¨ Avoid chocolate, mint, alcohol, and spicy foods. ¨ Don't smoke or use smokeless tobacco. Smoking can make this condition worse. If you need help quitting, talk to your doctor about stop-smoking programs and medicines. These can increase your chances of quitting for good. ¨ Raise the head of your bed 6 to 8 inches if you have symptoms at night. ¨ Lose weight if you are overweight. ¨ Take an over-the-counter antacid, such as Maalox, Mylanta, or Tums. Be careful when you take over-the-counter antacid medicines. Many of these medicines have aspirin in them. Read the label to make sure that you are not taking more than the recommended dose. Too much aspirin can be harmful. ¨ Take stronger acid reducers. Examples are famotidine (such as Pepcid), omeprazole (such as Prilosec), and ranitidine (such as Zantac). · If your condition is caused by infection, allergy, or other problems, use the medicine or treatments that your doctor recommends. · Be safe with medicines. Take your medicines exactly as prescribed.  Call your doctor if you think you are having a problem with your medicine. When should you call for help? Call your doctor now or seek immediate medical care if: 
? · You have new or worse belly pain. ? · You are vomiting. ? Watch closely for changes in your health, and be sure to contact your doctor if: 
? · You have new or worse symptoms of reflux. ? · You have trouble or pain swallowing. ? · You are losing weight. ? · You do not get better as expected. Where can you learn more? Go to http://ora-itzel.info/. Enter L442 in the search box to learn more about \"Esophagitis: Care Instructions. \" Current as of: May 12, 2017 Content Version: 11.4 © 2006-2017 Healthwise, [employer]. Care instructions adapted under license by [employer] (which disclaims liability or warranty for this information). If you have questions about a medical condition or this instruction, always ask your healthcare professional. [address] any warranty or liability for your use of this information. Please provide this summary of care documentation to your next provider. Signatures-by signing, you are acknowledging that this After Visit Summary has been reviewed with you and you have received a copy. Patient Signature:  ____________________________________________________________ Date:  ____________________________________________________________  
  
Hodan Mealing Provider Signature:  ____________________________________________________________ Date:  ____________________________________________________________

## 2018-03-11 NOTE — ED TRIAGE NOTES
Pt arrived ambulatory via POV with spouse, pt was here yesterday for N/V since 2/28.  Pt continues to have abdominal cramping and n/v.

## 2018-03-11 NOTE — ED PROVIDER NOTES
HPI Comments: 1:13 PM Patient was seen in triage, a brief history and physical was done. Labs and/or other studies were ordered pending placement of patient in the back. Blaine Pretty MD    61-year-old gentleman presents with concerns about continued nausea and vomiting. He was seen here yesterday  And treated with a couple of different medicines for his recurrent nausea and vomiting. Says he was feeling better when he left but then last night after the ER medicine started to wear off he began to vomit again. He says that he tried the Compazine suppositories and the oral Zofran without any success. Patient says his emesis has been nonbloody nonbilious knee has had no blood in his bowel movements. He notes that up until 4 weeks ago he did smoke marijuana regularly. Denies any other drug use. atient says that he has a history of gastroparesis and that is what has caused these types of episodes in the past.    Elements of this note were created using speech recognition software. As such, errors of speech recognition may be present. Patient is a 41 y.o. male presenting with vomiting. The history is provided by the patient. Vomiting    Associated symptoms include abdominal pain. Pertinent negatives include no chills, no fever, no diarrhea, no headaches, no arthralgias, no myalgias, no cough and no headaches. Past Medical History:   Diagnosis Date    BETTY (acute kidney injury) ([facility] [address] 8/12/2014    Clostridium difficile colitis 3/20/2011    Gastroparesis 2005    emptying study normal -2006    GERD (gastroesophageal reflux disease)     HIATAL HERNIA SINCE 1999    PUD (peptic ulcer disease) ALL DX IN 2008    ESOPHAGITIS, + H PYLORI       Past Surgical History:   Procedure Laterality Date    COLONOSCOPY  4/08    ESOPHAGITIS, COLONIC ULCER X1    EGD  4/08    H.  HERNIA, ULCER X2, H PYLORI,    EGD  2011    h pylori -neg    HX WISDOM TEETH EXTRACTION  2/10/11         Family History:   Problem Relation Age of Onset    Other Mother      L+W    Diabetes Maternal Grandmother     Sickle Cell Anemia Father     Heart Disease Paternal Grandfather     Other Sister      ALL L+W    Other Son      L+W       Social History     Social History    Marital status:      Spouse name: N/A    Number of children: N/A    Years of education: N/A     Occupational History    Not on file. Social History Main Topics    Smoking status: Former Smoker     Packs/day: 0.25     Years: 2.00     Types: Cigarettes    Smokeless tobacco: Never Used    Alcohol use No    Drug use: Yes     Special: Marijuana    Sexual activity: Yes     Partners: Female      Comment: S/O 15 WEEKS PREGNANT 3/17/11     Other Topics Concern    Not on file     Social History Narrative    3/17/11:  PATIENT LIVES WITH GIRLFRIEND, ALTON (CELL: [phone]), HER 3YEAR OLD DAUGHTER AND THEIR 3YEAR OLD SON. ALTON IS CURRENTLY 13 WEEKS PREGNANT. PATIENT'S JOB AT [employer] WAS DOWNSIZED ABOUT A YEAR AGO, AND HE HAS BEEN A STAY HOME DAD SINCE. ALTON WORKS IN HOUSEKEEPING POSITION. ALLERGIES: Amoxicillin; Aspirin; Hydrocodone; Ibuprofen; Pcn [penicillins]; and Phenergan [promethazine]    Review of Systems   Constitutional: Negative for chills, diaphoresis and fever. HENT: Negative for congestion, rhinorrhea and sore throat. Eyes: Negative for redness and visual disturbance. Respiratory: Negative for cough, chest tightness, shortness of breath and wheezing. Cardiovascular: Negative for chest pain and palpitations. Gastrointestinal: Positive for abdominal pain, nausea and vomiting. Negative for blood in stool and diarrhea. Endocrine: Negative for polydipsia and polyuria. Genitourinary: Negative for dysuria and hematuria. Musculoskeletal: Negative for arthralgias, myalgias and neck stiffness. Skin: Negative for rash. Allergic/Immunologic: Negative for environmental allergies and food allergies. Neurological: Negative for dizziness, weakness and headaches. Hematological: Negative for adenopathy. Does not bruise/bleed easily. Psychiatric/Behavioral: Negative for confusion and sleep disturbance. The patient is not nervous/anxious. There were no vitals filed for this visit. Physical Exam   Constitutional: He is oriented to person, place, and time. He appears well-developed and well-nourished. HENT:   Head: Normocephalic and atraumatic. Eyes: Conjunctivae and EOM are normal. Pupils are equal, round, and reactive to light. Neck: Normal range of motion. Cardiovascular: Regular rhythm. Mild tachycardia   Pulmonary/Chest: Effort normal and breath sounds normal. No respiratory distress. He has no wheezes. He has no rales. He exhibits no tenderness. Abdominal: Soft. Bowel sounds are normal. There is no rebound and no guarding. Musculoskeletal: Normal range of motion. He exhibits no edema or tenderness. Lymphadenopathy:     He has no cervical adenopathy. Neurological: He is alert and oriented to person, place, and time. Skin: Skin is warm and dry. Psychiatric: He has a normal mood and affect. Nursing note and vitals reviewed. MDM  Number of Diagnoses or Management Options  Diagnosis management comments: I will repeat his basic blood work to see if there has been any significant change since yesterday. I will treat with some IV Benadryl and Reglan. Some IV Zofran through the triage process. I will also provide IV fluids. Patient's blood work is unremarkable. He said he is feeling a little bit better after the medicines and fluids. 2:32 PM  Patient vomited again and would like to try some additional medicine. ED Course   Cristiana Ray MD  To cover for care. Patient with history of recurrent cyclical vomiting and epigastric pain. Receive multiple different medication without improvement. He received 2 L of fluid. I gave him Haldol and Benadryl and Zofran. Patient was resting heart woke up and started having persistent vomiting again. Unable to tolerate water. At this time he has been in the emergency room for over 5 hours and we have tried multiple different medication without improvement in symptoms. I spoke with the hospitalist.  Recommend admission for intractable nausea and vomiting and abdominal pain. They agree with the plan. I do not suspect any acute intra-abdominal surgical pathology. Do not suspect obstruction.     Procedures

## 2018-03-12 PROBLEM — R11.15 INTRACTABLE CYCLICAL VOMITING: Status: ACTIVE | Noted: 2018-03-12

## 2018-03-12 LAB
ALBUMIN SERPL-MCNC: 3.9 G/DL (ref 3.5–5)
ALBUMIN/GLOB SERPL: 1.3 {RATIO} (ref 1.2–3.5)
ALP SERPL-CCNC: 91 U/L (ref 50–136)
ALT SERPL-CCNC: 24 U/L (ref 12–65)
ANION GAP SERPL CALC-SCNC: 10 MMOL/L (ref 7–16)
AST SERPL-CCNC: 25 U/L (ref 15–37)
BILIRUB SERPL-MCNC: 0.5 MG/DL (ref 0.2–1.1)
BUN SERPL-MCNC: 9 MG/DL (ref 6–23)
CALCIUM SERPL-MCNC: 8.5 MG/DL (ref 8.3–10.4)
CHLORIDE SERPL-SCNC: 108 MMOL/L (ref 98–107)
CO2 SERPL-SCNC: 23 MMOL/L (ref 21–32)
CREAT SERPL-MCNC: 0.83 MG/DL (ref 0.8–1.5)
ERYTHROCYTE [DISTWIDTH] IN BLOOD BY AUTOMATED COUNT: 13.2 % (ref 11.9–14.6)
GLOBULIN SER CALC-MCNC: 3.1 G/DL (ref 2.3–3.5)
GLUCOSE SERPL-MCNC: 116 MG/DL (ref 65–100)
HCT VFR BLD AUTO: 37.6 % (ref 41.1–50.3)
HGB BLD-MCNC: 13.9 G/DL (ref 13.6–17.2)
MCH RBC QN AUTO: 28.9 PG (ref 26.1–32.9)
MCHC RBC AUTO-ENTMCNC: 37 G/DL (ref 31.4–35)
MCV RBC AUTO: 78.2 FL (ref 79.6–97.8)
PLATELET # BLD AUTO: 327 K/UL (ref 150–450)
PMV BLD AUTO: 9 FL (ref 10.8–14.1)
POTASSIUM SERPL-SCNC: 3.6 MMOL/L (ref 3.5–5.1)
PROT SERPL-MCNC: 7 G/DL (ref 6.3–8.2)
RBC # BLD AUTO: 4.81 M/UL (ref 4.23–5.67)
SODIUM SERPL-SCNC: 141 MMOL/L (ref 136–145)
T3 SERPL-MCNC: 1 NG/ML (ref 0.6–1.81)
WBC # BLD AUTO: 9.8 K/UL (ref 4.3–11.1)

## 2018-03-12 PROCEDURE — 74011000250 HC RX REV CODE- 250: Performed by: HOSPITALIST

## 2018-03-12 PROCEDURE — 36415 COLL VENOUS BLD VENIPUNCTURE: CPT | Performed by: HOSPITALIST

## 2018-03-12 PROCEDURE — 85027 COMPLETE CBC AUTOMATED: CPT | Performed by: HOSPITALIST

## 2018-03-12 PROCEDURE — 80053 COMPREHEN METABOLIC PANEL: CPT | Performed by: HOSPITALIST

## 2018-03-12 PROCEDURE — 77030011256 HC DRSG MEPILEX <16IN NO BORD MOLN -A

## 2018-03-12 PROCEDURE — 74011250637 HC RX REV CODE- 250/637: Performed by: HOSPITALIST

## 2018-03-12 PROCEDURE — 99218 HC RM OBSERVATION: CPT

## 2018-03-12 PROCEDURE — C9113 INJ PANTOPRAZOLE SODIUM, VIA: HCPCS | Performed by: HOSPITALIST

## 2018-03-12 PROCEDURE — 74011250636 HC RX REV CODE- 250/636: Performed by: HOSPITALIST

## 2018-03-12 PROCEDURE — 74011000258 HC RX REV CODE- 258: Performed by: HOSPITALIST

## 2018-03-12 PROCEDURE — 77030027688 HC DRSG MEPILEX 16-48IN NO BORD MOLN -A

## 2018-03-12 PROCEDURE — 65270000029 HC RM PRIVATE

## 2018-03-12 RX ORDER — METOCLOPRAMIDE HYDROCHLORIDE 5 MG/ML
10 INJECTION INTRAMUSCULAR; INTRAVENOUS EVERY 6 HOURS
Status: DISCONTINUED | OUTPATIENT
Start: 2018-03-12 | End: 2018-03-15 | Stop reason: HOSPADM

## 2018-03-12 RX ORDER — HYDROMORPHONE HYDROCHLORIDE 2 MG/ML
1 INJECTION, SOLUTION INTRAMUSCULAR; INTRAVENOUS; SUBCUTANEOUS
Status: DISCONTINUED | OUTPATIENT
Start: 2018-03-12 | End: 2018-03-13

## 2018-03-12 RX ORDER — LORAZEPAM 2 MG/ML
2 INJECTION INTRAMUSCULAR
Status: DISCONTINUED | OUTPATIENT
Start: 2018-03-12 | End: 2018-03-13

## 2018-03-12 RX ADMIN — ONDANSETRON 4 MG: 2 INJECTION INTRAMUSCULAR; INTRAVENOUS at 22:03

## 2018-03-12 RX ADMIN — DEXTROSE MONOHYDRATE AND SODIUM CHLORIDE 250 ML/HR: 5; .9 INJECTION, SOLUTION INTRAVENOUS at 20:52

## 2018-03-12 RX ADMIN — FAMOTIDINE 20 MG: 10 INJECTION, SOLUTION INTRAVENOUS at 08:28

## 2018-03-12 RX ADMIN — METOCLOPRAMIDE 10 MG: 5 INJECTION, SOLUTION INTRAMUSCULAR; INTRAVENOUS at 17:45

## 2018-03-12 RX ADMIN — DIPHENHYDRAMINE HYDROCHLORIDE 12.5 MG: 50 INJECTION, SOLUTION INTRAMUSCULAR; INTRAVENOUS at 23:09

## 2018-03-12 RX ADMIN — Medication 30 ML: at 11:30

## 2018-03-12 RX ADMIN — LORAZEPAM 2 MG: 2 INJECTION, SOLUTION INTRAMUSCULAR; INTRAVENOUS at 17:09

## 2018-03-12 RX ADMIN — METOCLOPRAMIDE 10 MG: 5 INJECTION, SOLUTION INTRAMUSCULAR; INTRAVENOUS at 11:30

## 2018-03-12 RX ADMIN — ONDANSETRON 4 MG: 2 INJECTION INTRAMUSCULAR; INTRAVENOUS at 13:35

## 2018-03-12 RX ADMIN — LORAZEPAM 2 MG: 2 INJECTION, SOLUTION INTRAMUSCULAR; INTRAVENOUS at 23:09

## 2018-03-12 RX ADMIN — PANTOPRAZOLE SODIUM 40 MG: 40 INJECTION, POWDER, FOR SOLUTION INTRAVENOUS at 17:08

## 2018-03-12 RX ADMIN — PANTOPRAZOLE SODIUM 40 MG: 40 INJECTION, POWDER, FOR SOLUTION INTRAVENOUS at 10:00

## 2018-03-12 RX ADMIN — SODIUM CHLORIDE, SODIUM LACTATE, POTASSIUM CHLORIDE, AND CALCIUM CHLORIDE 1000 ML: 600; 310; 30; 20 INJECTION, SOLUTION INTRAVENOUS at 08:55

## 2018-03-12 RX ADMIN — TAMSULOSIN HYDROCHLORIDE 0.4 MG: 0.4 CAPSULE ORAL at 08:28

## 2018-03-12 RX ADMIN — DEXTROSE MONOHYDRATE AND SODIUM CHLORIDE 125 ML/HR: 5; .9 INJECTION, SOLUTION INTRAVENOUS at 08:33

## 2018-03-12 RX ADMIN — LORAZEPAM 2 MG: 2 INJECTION, SOLUTION INTRAMUSCULAR; INTRAVENOUS at 09:42

## 2018-03-12 RX ADMIN — Medication 10 ML: at 05:16

## 2018-03-12 RX ADMIN — Medication 10 ML: at 20:54

## 2018-03-12 RX ADMIN — METOCLOPRAMIDE 10 MG: 5 INJECTION, SOLUTION INTRAMUSCULAR; INTRAVENOUS at 08:53

## 2018-03-12 RX ADMIN — LORAZEPAM 2 MG: 2 INJECTION, SOLUTION INTRAMUSCULAR; INTRAVENOUS at 13:35

## 2018-03-12 RX ADMIN — ONDANSETRON 4 MG: 2 INJECTION INTRAMUSCULAR; INTRAVENOUS at 08:28

## 2018-03-12 RX ADMIN — DEXTROSE MONOHYDRATE AND SODIUM CHLORIDE 250 ML/HR: 5; .9 INJECTION, SOLUTION INTRAVENOUS at 13:40

## 2018-03-12 NOTE — PROGRESS NOTES
Problem: Nutrition Deficit  Goal: *Optimize nutritional status  Nutrition  Reason for assessment: Referral received from nursing admission Malnutrition Screening Tool for recently lost 2-13# without trying and eating poorly due to decreased appetite. Assessment:   Diet order(s): Clear liquid, Ensure Enlive tid  Food/Nutrition History:  Pt seen in company of wife who provides all the history. She reports pt typically eats a regular diet which he tolerates without problems, even though he eats fried and fatty foods. He was up to 152# in January. She says the pt caught the stomach bug from the kids and iIn the past 2 weeks he has maybe been peyton to eat a few meals without vomiting, but in the last 4 days he has been unable to keep anything down. He drank tea for lunch today and had more vomiting. Anthropometrics: Height: 5' 2\" (157.5 cm), Weight: 63.5 kg (140 lb), Weight Source: Patient stated, Body mass index is 25.61 kg/(m^2). BMI class of normal weight. Weight hx per EMR ( Based on [facility] functionality, RD cannot know if these weight are actual versus stated):   WT / BMI WEIGHT   3/11/2018 140 lb   3/10/2018 140 lb   12/6/2017 140 lb   10/25/2017 135 lb   3/28/2017 140 lb   1/21/2017 140 lb   This is inconsistent with weight loss. Macronutrient needs:   EER:  4158-7902 kcal /day (25-30 kcal/kg stated BW)   EPR:  54-64 grams protein/day (1-1.2 grams/kg IBW)  Intake/Comparative Standards: Current clear liquid and IVF do not meet kcal or protein needs     Nutrition Diagnosis: Inadequate oral intake r/t inability to consume sufficient oral intake as evidenced by intractable N/V, hx of gastroparesis, current diet limited to nutritionally inadequate clear liquid diet    Intervention:  Order placed for daily weights. Meals and snacks:  Await progression of p.o. diet as GI status allows.   Nutrition supplement therapy: Ensure clear tid with clear liquids, then Ensure Enlive tid once diet is progressed to at least full liquids. PN: If it is expected that patient will be unable to tolerate p.o. diet greater than 70% of full liquid diet or greater within 3-5 days, consider TPN. If TPN is pursued, please order nutrition consult for TPN management. Discharge nutrition plan: Too soon to determine.  Note pt has been instructed on gastroparesis diet during past admissions but pt has had limited comoplaince    Mayelin Schmidt, [address] [place] [address], [address] [place] [address], [address] [place] [address]

## 2018-03-12 NOTE — PROGRESS NOTES
TRANSFER - IN REPORT:    Verbal report received from Octavia Khan on [address]  being received from ED for routine progression of care      Report consisted of patients Situation, Background, Assessment and   Recommendations(SBAR). Information from the following report(s) SBAR was reviewed with the receiving nurse. Opportunity for questions and clarification was provided. Assessment completed upon patients arrival to unit and care assumed.

## 2018-03-12 NOTE — INTERDISCIPLINARY ROUNDS
Interdisciplinary team rounds were held 3/12/2018 with the following team members:Care Management, Nursing, Physical Therapy and Physician. Plan of care discussed. See clinical pathway and/or care plan for interventions and desired outcomes.

## 2018-03-12 NOTE — PHYSICIAN ADVISORY
Letter of Determination: Upgrade from Observation to Inpatient Status    This patient was originally hospitalized as Outpatient Status with Observation Services on 3/11/2018 for intractable vomiting. This patient now meets for Inpatient Admission in accordance with CMS regulation Section 43 .3. Specifically, patient's stay is now over Two Midnights and was medically necessary. The patient's stay was medically necessary based on continued vomiting despite 5 doses of intravenous ondansetron 4 mg in 24 hours, and failure of outpatient management with emergency room visit 2 days prior to hospitalization with same complaint. Consistent with CMS guidelines, patient meets for inpatient status. It is our recommendation that this patient's hospitalization status should be upgraded from OBSERVATION to INPATIENT status.      The final decision regarding the patient's hospitalization status depends on the attending physician's judgement.     Adela Leo MD, FLAVIO,   Physician Devon Rodriguez.

## 2018-03-12 NOTE — PROGRESS NOTES
Hospitalist Progress Note    3/12/2018  Admit Date: 3/11/2018  1:15 PM   NAME: Erika Valencia   :  1975   MRN:  [id number]   Attending: uJn Tran MD  PCP:  Marcial El MD      Admitted for:intracable vomiting    SUBJECTIVE:   Patient states has not smoked MJ in 30 days, has had intermittent vomiting since then. And for the past 4 days has not been able to keep anything down, with worsening pain and vomiting. Actively vomiting during exam, bilious non bloody      Review of Systems negative with exception of pertinent positives noted above  Past medical history unchanged from H&P    PHYSICAL EXAM     Visit Vitals    /76    Pulse 83    Temp 98.4 °F (36.9 °C)    Resp 18    Ht 5' 2\" (1.575 m)    Wt 63.5 kg (140 lb)    SpO2 98%    BMI 25.61 kg/m2      Temp (24hrs), Av.4 °F (36.9 °C), Min:98.2 °F (36.8 °C), Max:98.9 °F (37.2 °C)    Oxygen Therapy  O2 Sat (%): 98 % (18 0731)  O2 Device: Room air (18 1312)  No intake or output data in the 24 hours ending 18 0908     General: moderate distress, uncomfortable mucous membranes pink and moist acyanotic anicteric  Neck:  Supple full range of motion  Lungs:  Air entry equal bilaterally, no crepitations rales rhonchi   Heart:  Regular rate and rhythm,  No murmur, rub, or gallop  Abdomen: Soft, Non distended, Non tender, no rebound guarding Positive bowel sounds  Extremities: No cyanosis, clubbing or edema  Neurologic:  No focal deficits  Musculoskeletal: no   Joint swelling tenderness erythema  Skin:  No erythema, rashes noted          LAB  No results for input(s): GLUCPOC in the last 72 hours.     No lab exists for component: [place]   Recent Labs      18   0452   WBC  9.8   HGB  13.9   HCT  37.6*   PLT  327     Recent Labs      18   0452  18   2302   NA  141   --    K  3.6   --    CL  108*   --    CO2  23   --    GLU  116*   --    BUN  9   --    CREA  0.83   --    MG   --   2.0   CA  8.5   --    ALB 3.9   --    TBILI  0.5   --    ALT  24   --    SGOT  25   --        EKG and imaging reviewed personally by me  No results found. Results for orders placed or performed during the hospital encounter of 04/11/16   EKG, 12 LEAD, INITIAL   Result Value Ref Range    Systolic BP  mmHg    Diastolic BP  mmHg    Ventricular Rate 126 BPM    Atrial Rate 126 BPM    P-R Interval 146 ms    QRS Duration 128 ms    Q-T Interval 320 ms    QTC Calculation (Bezet) 463 ms    Calculated P Axis 59 degrees    Calculated R Axis 49 degrees    Calculated T Axis 14 degrees    Diagnosis       !! AGE AND GENDER SPECIFIC ECG ANALYSIS !! Sinus tachycardia  Left bundle branch block  Abnormal ECG  When compared with ECG of 11-APR-2016 19:05,  No significant change was found  Confirmed by ST DRU GRIMM MD (), HORTENSIA BOLAND ([id number]) on 4/12/2016 9:25:15 AM       XR Results (most recent):    Results from [place] encounter on 03/10/18   XR ABD ACUTE W 1 V CHEST   Narrative ABDOMINAL SERIES    HISTORY: Pain and vomiting. FINDINGS: There is no subdiaphragmatic free intraperitoneal air. Gas is  scattered throughout the bowel. There are no abnormal radiopaque calculi. Impression IMPRESSION: No acute abdominal findings. Active problems  Active Hospital Problems    Diagnosis Date Noted    Cyclical vomiting, intractable 03/11/2018    Anxiety 04/30/2014    Esophageal reflux 04/30/2014    Epigastric pain 03/05/2013       ASSESSMENT  AND PLAN      · Intractable vomiting -  Seen bilious vomitus, actively. has a diagnosis of gastroparesis, will change to reglan, see how he does, prn pain medications, very difficult situation, has had multiple EGD in past, will hold off consulting GI see how he responds to treatment. GI cocktail and protonix bid  · Dehydration  - patient clinically dehydrated.  From vomiting  · Change to inpatient    DVT Prophylaxis: ambulatory  dispo - pending resolution of symptoms    Signed By: Nichelle Varghese MD March 12, 2018

## 2018-03-13 ENCOUNTER — ANESTHESIA (OUTPATIENT)
Dept: ENDOSCOPY | Age: 43
DRG: 054 | End: 2018-03-13
Payer: MEDICAID

## 2018-03-13 ENCOUNTER — ANESTHESIA EVENT (OUTPATIENT)
Dept: ENDOSCOPY | Age: 43
DRG: 054 | End: 2018-03-13
Payer: MEDICAID

## 2018-03-13 PROBLEM — K22.10 EROSIVE ESOPHAGITIS: Status: ACTIVE | Noted: 2018-03-11

## 2018-03-13 PROBLEM — K92.0 GASTROINTESTINAL HEMORRHAGE WITH HEMATEMESIS: Status: ACTIVE | Noted: 2018-03-13

## 2018-03-13 LAB
ABO + RH BLD: NORMAL
BLOOD GROUP ANTIBODIES SERPL: NORMAL
ERYTHROCYTE [DISTWIDTH] IN BLOOD BY AUTOMATED COUNT: 13.3 % (ref 11.9–14.6)
HCT VFR BLD AUTO: 38.8 % (ref 41.1–50.3)
HGB BLD-MCNC: 14.7 G/DL (ref 13.6–17.2)
MCH RBC QN AUTO: 29.9 PG (ref 26.1–32.9)
MCHC RBC AUTO-ENTMCNC: 37.9 G/DL (ref 31.4–35)
MCV RBC AUTO: 78.9 FL (ref 79.6–97.8)
PLATELET # BLD AUTO: 286 K/UL (ref 150–450)
PMV BLD AUTO: 8.7 FL (ref 10.8–14.1)
RBC # BLD AUTO: 4.92 M/UL (ref 4.23–5.67)
SPECIMEN EXP DATE BLD: NORMAL
WBC # BLD AUTO: 7.4 K/UL (ref 4.3–11.1)

## 2018-03-13 PROCEDURE — 74011250636 HC RX REV CODE- 250/636: Performed by: ANESTHESIOLOGY

## 2018-03-13 PROCEDURE — 74011250636 HC RX REV CODE- 250/636

## 2018-03-13 PROCEDURE — 74011250636 HC RX REV CODE- 250/636: Performed by: HOSPITALIST

## 2018-03-13 PROCEDURE — 88312 SPECIAL STAINS GROUP 1: CPT | Performed by: INTERNAL MEDICINE

## 2018-03-13 PROCEDURE — 76040000026: Performed by: INTERNAL MEDICINE

## 2018-03-13 PROCEDURE — 86900 BLOOD TYPING SEROLOGIC ABO: CPT | Performed by: HOSPITALIST

## 2018-03-13 PROCEDURE — 88305 TISSUE EXAM BY PATHOLOGIST: CPT | Performed by: INTERNAL MEDICINE

## 2018-03-13 PROCEDURE — 77010033678 HC OXYGEN DAILY

## 2018-03-13 PROCEDURE — 36415 COLL VENOUS BLD VENIPUNCTURE: CPT | Performed by: HOSPITALIST

## 2018-03-13 PROCEDURE — 74011250636 HC RX REV CODE- 250/636: Performed by: INTERNAL MEDICINE

## 2018-03-13 PROCEDURE — 77030009426 HC FCPS BIOP ENDOSC BSC -B: Performed by: INTERNAL MEDICINE

## 2018-03-13 PROCEDURE — C9113 INJ PANTOPRAZOLE SODIUM, VIA: HCPCS | Performed by: HOSPITALIST

## 2018-03-13 PROCEDURE — 74011000250 HC RX REV CODE- 250: Performed by: HOSPITALIST

## 2018-03-13 PROCEDURE — 85027 COMPLETE CBC AUTOMATED: CPT | Performed by: HOSPITALIST

## 2018-03-13 PROCEDURE — 74011000258 HC RX REV CODE- 258: Performed by: HOSPITALIST

## 2018-03-13 PROCEDURE — 0DB68ZX EXCISION OF STOMACH, VIA NATURAL OR ARTIFICIAL OPENING ENDOSCOPIC, DIAGNOSTIC: ICD-10-PCS | Performed by: INTERNAL MEDICINE

## 2018-03-13 PROCEDURE — 74011000250 HC RX REV CODE- 250

## 2018-03-13 PROCEDURE — 65270000029 HC RM PRIVATE

## 2018-03-13 PROCEDURE — 94760 N-INVAS EAR/PLS OXIMETRY 1: CPT

## 2018-03-13 PROCEDURE — 76060000033 HC ANESTHESIA 1 TO 1.5 HR: Performed by: INTERNAL MEDICINE

## 2018-03-13 RX ORDER — DEXAMETHASONE SODIUM PHOSPHATE 4 MG/ML
INJECTION, SOLUTION INTRA-ARTICULAR; INTRALESIONAL; INTRAMUSCULAR; INTRAVENOUS; SOFT TISSUE AS NEEDED
Status: DISCONTINUED | OUTPATIENT
Start: 2018-03-13 | End: 2018-03-13 | Stop reason: HOSPADM

## 2018-03-13 RX ORDER — ONDANSETRON 2 MG/ML
4 INJECTION INTRAMUSCULAR; INTRAVENOUS EVERY 6 HOURS
Status: DISCONTINUED | OUTPATIENT
Start: 2018-03-13 | End: 2018-03-15 | Stop reason: HOSPADM

## 2018-03-13 RX ORDER — ROCURONIUM BROMIDE 10 MG/ML
INJECTION, SOLUTION INTRAVENOUS AS NEEDED
Status: DISCONTINUED | OUTPATIENT
Start: 2018-03-13 | End: 2018-03-13 | Stop reason: HOSPADM

## 2018-03-13 RX ORDER — SODIUM CHLORIDE 0.9 % (FLUSH) 0.9 %
5-10 SYRINGE (ML) INJECTION AS NEEDED
Status: CANCELLED | OUTPATIENT
Start: 2018-03-13

## 2018-03-13 RX ORDER — ONDANSETRON 2 MG/ML
INJECTION INTRAMUSCULAR; INTRAVENOUS AS NEEDED
Status: DISCONTINUED | OUTPATIENT
Start: 2018-03-13 | End: 2018-03-13 | Stop reason: HOSPADM

## 2018-03-13 RX ORDER — SUCCINYLCHOLINE CHLORIDE 20 MG/ML
INJECTION INTRAMUSCULAR; INTRAVENOUS AS NEEDED
Status: DISCONTINUED | OUTPATIENT
Start: 2018-03-13 | End: 2018-03-13 | Stop reason: HOSPADM

## 2018-03-13 RX ORDER — PROPOFOL 10 MG/ML
INJECTION, EMULSION INTRAVENOUS AS NEEDED
Status: DISCONTINUED | OUTPATIENT
Start: 2018-03-13 | End: 2018-03-13 | Stop reason: HOSPADM

## 2018-03-13 RX ORDER — MORPHINE SULFATE 2 MG/ML
2 INJECTION, SOLUTION INTRAMUSCULAR; INTRAVENOUS
Status: DISCONTINUED | OUTPATIENT
Start: 2018-03-13 | End: 2018-03-13

## 2018-03-13 RX ORDER — SODIUM CHLORIDE, SODIUM LACTATE, POTASSIUM CHLORIDE, CALCIUM CHLORIDE 600; 310; 30; 20 MG/100ML; MG/100ML; MG/100ML; MG/100ML
100 INJECTION, SOLUTION INTRAVENOUS CONTINUOUS
Status: DISCONTINUED | OUTPATIENT
Start: 2018-03-13 | End: 2018-03-15

## 2018-03-13 RX ORDER — SODIUM CHLORIDE, SODIUM LACTATE, POTASSIUM CHLORIDE, CALCIUM CHLORIDE 600; 310; 30; 20 MG/100ML; MG/100ML; MG/100ML; MG/100ML
100 INJECTION, SOLUTION INTRAVENOUS CONTINUOUS
Status: CANCELLED | OUTPATIENT
Start: 2018-03-13

## 2018-03-13 RX ORDER — FENTANYL CITRATE 50 UG/ML
INJECTION, SOLUTION INTRAMUSCULAR; INTRAVENOUS AS NEEDED
Status: DISCONTINUED | OUTPATIENT
Start: 2018-03-13 | End: 2018-03-13 | Stop reason: HOSPADM

## 2018-03-13 RX ORDER — PROCHLORPERAZINE EDISYLATE 5 MG/ML
10 INJECTION INTRAMUSCULAR; INTRAVENOUS
Status: DISCONTINUED | OUTPATIENT
Start: 2018-03-13 | End: 2018-03-15 | Stop reason: HOSPADM

## 2018-03-13 RX ORDER — HYDRALAZINE HYDROCHLORIDE 20 MG/ML
10 INJECTION INTRAMUSCULAR; INTRAVENOUS ONCE
Status: COMPLETED | OUTPATIENT
Start: 2018-03-13 | End: 2018-03-13

## 2018-03-13 RX ORDER — DIPHENHYDRAMINE HYDROCHLORIDE 50 MG/ML
50 INJECTION, SOLUTION INTRAMUSCULAR; INTRAVENOUS
Status: DISCONTINUED | OUTPATIENT
Start: 2018-03-13 | End: 2018-03-15 | Stop reason: HOSPADM

## 2018-03-13 RX ORDER — LIDOCAINE HYDROCHLORIDE 20 MG/ML
INJECTION, SOLUTION EPIDURAL; INFILTRATION; INTRACAUDAL; PERINEURAL AS NEEDED
Status: DISCONTINUED | OUTPATIENT
Start: 2018-03-13 | End: 2018-03-13 | Stop reason: HOSPADM

## 2018-03-13 RX ORDER — MORPHINE SULFATE 2 MG/ML
4 INJECTION, SOLUTION INTRAMUSCULAR; INTRAVENOUS
Status: DISCONTINUED | OUTPATIENT
Start: 2018-03-13 | End: 2018-03-13

## 2018-03-13 RX ORDER — DIPHENHYDRAMINE HYDROCHLORIDE 50 MG/ML
25 INJECTION, SOLUTION INTRAMUSCULAR; INTRAVENOUS ONCE
Status: COMPLETED | OUTPATIENT
Start: 2018-03-13 | End: 2018-03-13

## 2018-03-13 RX ORDER — METOPROLOL TARTRATE 5 MG/5ML
5 INJECTION INTRAVENOUS
Status: DISCONTINUED | OUTPATIENT
Start: 2018-03-13 | End: 2018-03-15 | Stop reason: HOSPADM

## 2018-03-13 RX ORDER — SODIUM CHLORIDE, SODIUM LACTATE, POTASSIUM CHLORIDE, CALCIUM CHLORIDE 600; 310; 30; 20 MG/100ML; MG/100ML; MG/100ML; MG/100ML
100 INJECTION, SOLUTION INTRAVENOUS CONTINUOUS
Status: DISCONTINUED | OUTPATIENT
Start: 2018-03-13 | End: 2018-03-13

## 2018-03-13 RX ADMIN — SODIUM CHLORIDE, SODIUM LACTATE, POTASSIUM CHLORIDE, AND CALCIUM CHLORIDE 100 ML/HR: 600; 310; 30; 20 INJECTION, SOLUTION INTRAVENOUS at 11:53

## 2018-03-13 RX ADMIN — Medication 10 ML: at 14:19

## 2018-03-13 RX ADMIN — DEXAMETHASONE SODIUM PHOSPHATE 4 MG: 4 INJECTION, SOLUTION INTRA-ARTICULAR; INTRALESIONAL; INTRAMUSCULAR; INTRAVENOUS; SOFT TISSUE at 12:35

## 2018-03-13 RX ADMIN — SUCCINYLCHOLINE CHLORIDE 160 MG: 20 INJECTION INTRAMUSCULAR; INTRAVENOUS at 12:29

## 2018-03-13 RX ADMIN — DIPHENHYDRAMINE HYDROCHLORIDE 50 MG: 50 INJECTION, SOLUTION INTRAMUSCULAR; INTRAVENOUS at 22:08

## 2018-03-13 RX ADMIN — METOCLOPRAMIDE 10 MG: 5 INJECTION, SOLUTION INTRAMUSCULAR; INTRAVENOUS at 01:15

## 2018-03-13 RX ADMIN — Medication 10 ML: at 06:54

## 2018-03-13 RX ADMIN — METOCLOPRAMIDE 10 MG: 5 INJECTION, SOLUTION INTRAMUSCULAR; INTRAVENOUS at 11:28

## 2018-03-13 RX ADMIN — Medication 10 ML: at 22:11

## 2018-03-13 RX ADMIN — DIPHENHYDRAMINE HYDROCHLORIDE 25 MG: 50 INJECTION, SOLUTION INTRAMUSCULAR; INTRAVENOUS at 04:13

## 2018-03-13 RX ADMIN — ONDANSETRON 4 MG: 2 INJECTION INTRAMUSCULAR; INTRAVENOUS at 07:54

## 2018-03-13 RX ADMIN — DEXTROSE MONOHYDRATE AND SODIUM CHLORIDE 250 ML/HR: 5; .9 INJECTION, SOLUTION INTRAVENOUS at 01:33

## 2018-03-13 RX ADMIN — LIDOCAINE HYDROCHLORIDE 70 MG: 20 INJECTION, SOLUTION EPIDURAL; INFILTRATION; INTRACAUDAL; PERINEURAL at 12:28

## 2018-03-13 RX ADMIN — ONDANSETRON 4 MG: 2 INJECTION INTRAMUSCULAR; INTRAVENOUS at 12:35

## 2018-03-13 RX ADMIN — FENTANYL CITRATE 50 MCG: 50 INJECTION, SOLUTION INTRAMUSCULAR; INTRAVENOUS at 12:27

## 2018-03-13 RX ADMIN — PROCHLORPERAZINE EDISYLATE 10 MG: 5 INJECTION INTRAMUSCULAR; INTRAVENOUS at 04:13

## 2018-03-13 RX ADMIN — METOCLOPRAMIDE 10 MG: 5 INJECTION, SOLUTION INTRAMUSCULAR; INTRAVENOUS at 23:34

## 2018-03-13 RX ADMIN — PROCHLORPERAZINE EDISYLATE 10 MG: 5 INJECTION INTRAMUSCULAR; INTRAVENOUS at 22:09

## 2018-03-13 RX ADMIN — HYDRALAZINE HYDROCHLORIDE 10 MG: 20 INJECTION INTRAMUSCULAR; INTRAVENOUS at 20:59

## 2018-03-13 RX ADMIN — METOCLOPRAMIDE 10 MG: 5 INJECTION, SOLUTION INTRAMUSCULAR; INTRAVENOUS at 17:05

## 2018-03-13 RX ADMIN — ROCURONIUM BROMIDE 5 MG: 10 INJECTION, SOLUTION INTRAVENOUS at 12:28

## 2018-03-13 RX ADMIN — SODIUM CHLORIDE, SODIUM LACTATE, POTASSIUM CHLORIDE, AND CALCIUM CHLORIDE 100 ML/HR: 600; 310; 30; 20 INJECTION, SOLUTION INTRAVENOUS at 14:28

## 2018-03-13 RX ADMIN — SODIUM CHLORIDE 40 MG: 9 INJECTION, SOLUTION INTRAMUSCULAR; INTRAVENOUS; SUBCUTANEOUS at 03:07

## 2018-03-13 RX ADMIN — ONDANSETRON 4 MG: 2 INJECTION INTRAMUSCULAR; INTRAVENOUS at 23:34

## 2018-03-13 RX ADMIN — METOCLOPRAMIDE 10 MG: 5 INJECTION, SOLUTION INTRAMUSCULAR; INTRAVENOUS at 06:54

## 2018-03-13 RX ADMIN — ONDANSETRON 4 MG: 2 INJECTION INTRAMUSCULAR; INTRAVENOUS at 17:05

## 2018-03-13 RX ADMIN — ONDANSETRON 4 MG: 2 INJECTION INTRAMUSCULAR; INTRAVENOUS at 03:18

## 2018-03-13 RX ADMIN — SODIUM CHLORIDE 40 MG: 9 INJECTION INTRAMUSCULAR; INTRAVENOUS; SUBCUTANEOUS at 14:15

## 2018-03-13 RX ADMIN — PROPOFOL 150 MG: 10 INJECTION, EMULSION INTRAVENOUS at 12:28

## 2018-03-13 RX ADMIN — ONDANSETRON 4 MG: 2 INJECTION INTRAMUSCULAR; INTRAVENOUS at 14:15

## 2018-03-13 NOTE — PROGRESS NOTES
TRANSFER - IN REPORT:    Verbal report received from [facility] (name) on [address]  being received from room 604 (unit) for ordered procedure. Report consisted of patients Situation, Background, Assessment and   Recommendations(SBAR). Information from the following report(s) SBAR was reviewed with the receiving nurse. Opportunity for questions and clarification was provided. Awaiting transport to bring pt to the GI Lab at this time.

## 2018-03-13 NOTE — PROGRESS NOTES
Called by nursing that patient vomiting blood. Blood is described as \"snot-like\" consistency. Patient seen. Actively hiccuping and dry heaving. I suspect that he now may have a Alexandra-Lynch tear. Cannot r/o ulcer, gastritis, esophagitis. Will keep NPO. Start protonix drip. Add additional benadryl and compazine. Stat CBC and type and cross. Consult GI in am for possible EGD.

## 2018-03-13 NOTE — PROGRESS NOTES
TRANSFER - OUT REPORT:    Verbal report given to BANDAR Mccall on [address]  being transferred to GI LAB for ordered procedure       Report consisted of patients Situation, Background, Assessment and   Recommendations(SBAR). Information from the following report(s) SBAR and Recent Results was reviewed with the receiving nurse. Lines:   Peripheral IV 03/13/18 Right Hand (Active)   Site Assessment Clean, dry, & intact 3/13/2018  8:03 AM   Phlebitis Assessment 0 3/13/2018  8:03 AM   Infiltration Assessment 0 3/13/2018  8:03 AM   Dressing Status Clean, dry, & intact 3/13/2018  8:03 AM   Dressing Type Tape;Transparent 3/13/2018  8:03 AM   Hub Color/Line Status Infusing 3/13/2018  8:03 AM        Opportunity for questions and clarification was provided.       Patient transported with:   [employer]

## 2018-03-13 NOTE — PROGRESS NOTES
Hourly round completed throughout the shift. Pt rested well. No complain of pain. Pt denies needs at this time. Call light on reach and will continue monitor. Report to oncoming day shift.

## 2018-03-13 NOTE — PROGRESS NOTES
Hospitalist Progress Note     Admit Date:  3/11/2018  1:15 PM   Name:  Alba Watt   Age:  41 y.o.  :  1975   MRN:  [id number]   PCP:  Sunshine Sherman MD  Treatment Team: Attending Provider: Charity Cliare MD    Subjective:   Pt is a 40 y/o M with cyclic vomiting syndrome who p/w exacerbation of same. Had an episode of hematemesis. GI did EGD and found severe erosive esophagitis. Started on PPI and anti-nausea meds. 3/13 - pt still nauseated, some dry heaving during interview. No pain. Hematemesis/coffee ground emesis resolved. Objective:   Patient Vitals for the past 24 hrs:   Temp Pulse Resp BP SpO2   18 1339 - 85 16 132/89 95 %   18 1335 - 88 16 116/79 92 %   18 1330 - 80 16 123/82 93 %   18 1326 97 °F (36.1 °C) 82 12 115/78 97 %   18 1325 - 76 - 115/78 98 %   18 1146 - 68 12 (!) 150/91 96 %   18 1021 99.1 °F (37.3 °C) 70 20 121/80 95 %   18 0746 99.2 °F (37.3 °C) 93 20 (!) 138/94 95 %   18 0600 98.3 °F (36.8 °C) (!) 101 18 (!) 144/93 95 %   18 2324 98.3 °F (36.8 °C) 100 18 (!) 143/95 97 %   18 98.8 °F (37.1 °C) 83 18 119/76 96 %   18 1454 97.4 °F (36.3 °C) 91 18 (!) 140/96 95 %     Oxygen Therapy  O2 Sat (%): 95 % (18 1339)  Pulse via Oximetry: 85 beats per minute (18 1339)  O2 Device: Nasal cannula (18 1326)    Intake/Output Summary (Last 24 hours) at 18 1431  Last data filed at 18 1236   Gross per 24 hour   Intake             3316 ml   Output              350 ml   Net             2966 ml         General:    Well nourished. Alert. CV:   RRR. No murmur, rub, or gallop. Lungs:   CTAB. No wheezing, rhonchi, or rales. Abdomen:   Soft, nontender, nondistended. Extremities: Warm and dry. No cyanosis or edema. Skin:     No rashes or jaundice. Data Review:  I have reviewed all labs, meds, telemetry events, and studies from the last 24 hours.     Recent Results (from the past 24 hour(s))   TYPE & SCREEN    Collection Time: 03/13/18  4:54 AM   Result Value Ref Range    Crossmatch Expiration 03/16/2018     ABO/Rh(D) [place] POSITIVE     Antibody screen NEG    CBC W/O DIFF    Collection Time: 03/13/18  4:54 AM   Result Value Ref Range    WBC 7.4 4.3 - 11.1 K/uL    RBC 4.92 4.23 - 5.67 M/uL    HGB 14.7 13.6 - 17.2 g/dL    HCT 38.8 (L) 41.1 - 50.3 %    MCV 78.9 (L) 79.6 - 97.8 FL    MCH 29.9 26.1 - 32.9 PG    MCHC 37.9 (H) 31.4 - 35.0 g/dL    RDW 13.3 11.9 - 14.6 %    PLATELET 184 600 - 903 K/uL    MPV 8.7 (L) 10.8 - 14.1 FL        All Micro Results     None          Current Meds:  Current Facility-Administered Medications   Medication Dose Route Frequency    prochlorperazine (COMPAZINE) injection 10 mg  10 mg IntraVENous Q6H PRN    pantoprazole (PROTONIX) 40 mg in sodium chloride 10 mL injection  40 mg IntraVENous BID    lactated Ringers infusion  100 mL/hr IntraVENous CONTINUOUS    ondansetron (ZOFRAN) injection 4 mg  4 mg IntraVENous Q6H    morphine injection 4 mg  4 mg IntraVENous Q4H PRN    morphine injection 2 mg  2 mg IntraVENous Q4H PRN    metoclopramide HCl (REGLAN) injection 10 mg  10 mg IntraVENous Q6H    GI Cocktail  30 mL Oral Q6H    prochlorperazine (COMPAZINE) tablet 10 mg  10 mg Oral Q6H PRN    prochlorperazine (COMPAZINE) suppository 25 mg  25 mg Rectal Q8H PRN    tamsulosin (FLOMAX) capsule 0.4 mg  0.4 mg Oral DAILY    sodium chloride (NS) flush 5-10 mL  5-10 mL IntraVENous Q8H    sodium chloride (NS) flush 5-10 mL  5-10 mL IntraVENous PRN    acetaminophen (TYLENOL) tablet 650 mg  650 mg Oral Q4H PRN       Other Studies (last 24 hours):  No results found. Assessment and Plan:     Hospital Problems as of 3/13/2018  Date Reviewed: 7/11/2016          Codes Class Noted - Resolved POA    Gastrointestinal hemorrhage with hematemesis ICD-10-CM: K92.0  ICD-9-CM: 578.0  3/13/2018 - Present Yes        Intractable cyclical vomiting [facility]-04-: G43. A1  ICD-9-CM: 536.2  3/12/2018 - Present Yes        * (Principal)Erosive esophagitis ICD-10-CM: K22.10  ICD-9-CM: 530.19  3/11/2018 - Present Yes        Tetrahydrocannabinol (THC) use disorder, moderate, dependence (HCC) (Chronic) ICD-10-CM: F12.20  ICD-9-CM: 304.30  7/11/2016 - Present Yes        Anxiety (Chronic) ICD-10-CM: F41.9  ICD-9-CM: 300.00  4/30/2014 - Present Yes        Tobacco abuse (Chronic) ICD-10-CM: Z72.0  ICD-9-CM: 305.1  4/30/2014 - Present Yes        H/O hiatal hernia (Chronic) ICD-10-CM: Z87.19  ICD-9-CM: V12.79  4/30/2014 - Present Yes        Esophageal reflux (Chronic) ICD-10-CM: K21.9  ICD-9-CM: 530.81  4/30/2014 - Present Yes              PLAN:    · Cont PPI, liquids as tolerated, GI cocktail, scheduled antiemetics. · Still vomiting when attempting PO intake. Cont LR IVF  · Avoid IV narcotics and other controlled substances as pt showing evidence of abuse, documented hx polysub abuse in chart.   says he wants them to \"sleep\", not for pain, etc    DC planning/Dispo:  Home when improved  DVT ppx:  SCDs    Signed:  Erika Powell MD

## 2018-03-13 NOTE — PROGRESS NOTES
Pt rounded on hourly this shift. C/o nausea and vomiting, medicated per MAR. All needs met at this time.

## 2018-03-13 NOTE — PROGRESS NOTES
TRANSFER - OUT REPORT:    Verbal report given to [address] on [address]  being transferred to [address] [place] [address] for routine progression of care       Report consisted of patients Situation, Background, Assessment and   Recommendations(SBAR). Information from the following report(s) Procedure Summary was reviewed with the receiving nurse. Lines:   Peripheral IV 03/13/18 Right Hand (Active)   Site Assessment Clean, dry, & intact 3/13/2018 11:47 AM   Phlebitis Assessment 0 3/13/2018 11:47 AM   Infiltration Assessment 0 3/13/2018 11:47 AM   Dressing Status Clean, dry, & intact 3/13/2018 11:47 AM   Dressing Type Transparent;Tape 3/13/2018 11:47 AM   Hub Color/Line Status Blue; Infusing;Flushed 3/13/2018 11:47 AM        Opportunity for questions and clarification was provided.       Patient transported with:

## 2018-03-13 NOTE — CONSULTS
[facility] Consult Note           Referring Physician:     Consult Date: 3/13/2018    Reason for Consult: Hematemesis    History of Present Illness:  Patient is a 41 y.o. male who is seen in consultation for hematemesis. Prior MJ use. Hgb is 14. Had some coffee ground emesis. Long standing n/v.  Had a Alexandra Lynch tear in 2014. Had esophagitis on EGD in 2016. History of gastroparesis. Past Medical History:   Diagnosis Date    BETTY (acute kidney injury) ([facility] [address] 8/12/2014    Clostridium difficile colitis 3/20/2011    Gastroparesis 2005    emptying study normal -2006    GERD (gastroesophageal reflux disease)     HIATAL HERNIA SINCE 1999    PUD (peptic ulcer disease) ALL DX IN 2008    ESOPHAGITIS, + H PYLORI      Past Surgical History:   Procedure Laterality Date    COLONOSCOPY  4/08    ESOPHAGITIS, COLONIC ULCER X1    EGD  4/08    H. HERNIA, ULCER X2, H PYLORI,    EGD  2011    h pylori -neg    HX WISDOM TEETH EXTRACTION  2/10/11      Family History   Problem Relation Age of Onset    Other Mother      L+W    Diabetes Maternal Grandmother     Sickle Cell Anemia Father     Heart Disease Paternal Grandfather     Other Sister      ALL L+W    Other Son      L+W     Social History     Occupational History    Not on file.      Social History Main Topics    Smoking status: Former Smoker     Packs/day: 0.25     Years: 2.00     Types: Cigarettes    Smokeless tobacco: Never Used    Alcohol use No    Drug use: Yes     Special: Marijuana    Sexual activity: Yes     Partners: Female      Comment: S/O [address] 3/17/11       Hospital Medications:  Current Facility-Administered Medications   Medication Dose Route Frequency    prochlorperazine (COMPAZINE) injection 10 mg  10 mg IntraVENous Q6H PRN    pantoprazole (PROTONIX) 40 mg in sodium chloride 10 mL injection  40 mg IntraVENous BID    metoclopramide HCl (REGLAN) injection 10 mg  10 mg IntraVENous Q6H    LORazepam (ATIVAN) injection 2 mg  2 mg IntraVENous Q4H PRN    HYDROmorphone (PF) (DILAUDID) injection 1 mg  1 mg IntraVENous Q4H PRN    GI Cocktail  30 mL Oral Q6H    prochlorperazine (COMPAZINE) tablet 10 mg  10 mg Oral Q6H PRN    prochlorperazine (COMPAZINE) suppository 25 mg  25 mg Rectal Q8H PRN    tamsulosin (FLOMAX) capsule 0.4 mg  0.4 mg Oral DAILY    ondansetron (ZOFRAN ODT) tablet 4 mg  4 mg Oral Q8H PRN    sodium chloride (NS) flush 5-10 mL  5-10 mL IntraVENous Q8H    sodium chloride (NS) flush 5-10 mL  5-10 mL IntraVENous PRN    acetaminophen (TYLENOL) tablet 650 mg  650 mg Oral Q4H PRN    dextrose 5% and 0.9% NaCl infusion  250 mL/hr IntraVENous CONTINUOUS    diphenhydrAMINE (BENADRYL) injection 12.5 mg  12.5 mg IntraVENous Q4H PRN    ondansetron (ZOFRAN) injection 4 mg  4 mg IntraVENous Q4H PRN       Allergies: Allergies   Allergen Reactions    Amoxicillin Nausea Only    Aspirin Other (comments)     ulcers    Hydrocodone Rash    Ibuprofen Other (comments)     Hx of ulcers    Pcn [Penicillins] Rash    Phenergan [Promethazine] Nausea and Vomiting and Other (comments)       Review of Systems:  A comprehensive review of systems was negative except for that written in the History of Present Illness. Objective:     Physical Exam:  Vitals:  Visit Vitals    BP (!) 138/94 (BP 1 Location: Left arm, BP Patient Position: At rest)    Pulse 93    Temp 99.2 °F (37.3 °C)    Resp 20    Ht 5' 2\" (1.575 m)    Wt 67.3 kg (148 lb 4.8 oz)    SpO2 95%    BMI 27.12 kg/m2       General: No acute distress. Skin:  Extremities and face reveal no rashes. No wood erythema. No telangiectasias on the chest wall. HEENT: Sclerae anicteric. No oral ulcers. No abnormal pigmentation of the lips. The neck is supple. Cardiovascular: Regular rate and rhythm. No murmurs, gallops, or rubs. Respiratory:  Comfortable breathing  With no accessory muscle use. Clear breath sounds with no wheezes, rales, or rhonchi.   GI:  Abdomen nondistended, soft, and nontender. Normal active bowel sounds. No enlargement of the liver or spleen. No masses palpable. Musculoskeletal:  No pitting edema of the lower legs. Extremities have good range of motion. Neurological:  Gross memory appears intact. Patient is alert and oriented. Psychiatric:  Mood appears appropriate with judgement intact. Lymphatic:  No cervical or supraclavicular adenopathy. Laboratory:    Recent Labs      03/13/18   0454   WBC  7.4   RBC  4.92   HGB  14.7   HCT  38.8*   PLT  286      Recent Labs      03/12/18   0452   GLU  116*   NA  141   K  3.6   CL  108*   CO2  23   BUN  9   CREA  0.83   CA  8.5     No results for input(s): PTP, INR, APTT in the last 72 hours.     No lab exists for component: INREXT  Recent Labs      03/12/18   0452  03/11/18   1315   SGOT  25  24   AP  91  99   ALB  3.9  4.3   TP  7.0  8.1   LPSE   --   113       Assessment:       A 41 y.o. male with PMH MJ use here with hematemesis in setting on ongoing n/v.      Plan:       EGD today  PPI    Signed By: Joellen Shaw MD     March 13, 2018

## 2018-03-13 NOTE — MED STUDENT NOTES
[facility] Consult Note           Referring Physician: Klaudia Lopez MD     Consult Date: 3/13/2018    Reason for Consult: hematemesis     History of Present Illness:  Patient is a 41 y.o. male who is seen in consultation for hematemesis. Patient has long standing hx of intractable nausea/vomiting and THC use. Patient has been seen in the ED x4 in the past 6 months for intractable nausea/vomiting and abdominal pain. Patient admitted 3/11 for uncontrolled nausea/vomiting despite zofran, compazine, benadryl, haldol. Has not been able to tolerate PO the past 5 days, has had nothing by mouth today. Significant other in room reports 5-6 episodes of emesis since this morning. Since last night, patient has noticed coffee ground emesis with mucus and bright red blood. Patient has a hx of jeremiah-woodward tear secondary to intractable nausea/vomiting with several clips placed in 2014. Other significant hx include gastroparesis, GERD, PUD and esophagitis. Last EGD was 2/4/16 for esophagitis. Also reports constipation with last BM 5 days ago. Denies melena, hematochezia or rectal bleed. KUB done on 3/11 was normal with no signs of obstruction. Past Medical History:   Diagnosis Date    BETTY (acute kidney injury) ([facility] [address] 8/12/2014    Clostridium difficile colitis 3/20/2011    Gastroparesis 2005    emptying study normal -2006    GERD (gastroesophageal reflux disease)     HIATAL HERNIA SINCE 1999    PUD (peptic ulcer disease) ALL DX IN 2008    ESOPHAGITIS, + H PYLORI      Past Surgical History:   Procedure Laterality Date    COLONOSCOPY  4/08    ESOPHAGITIS, COLONIC ULCER X1    EGD  4/08    H.  HERNIA, ULCER X2, H PYLORI,    EGD  2011    h pylori -neg    HX WISDOM TEETH EXTRACTION  2/10/11      Family History   Problem Relation Age of Onset    Other Mother      L+W    Diabetes Maternal Grandmother     Sickle Cell Anemia Father     Heart Disease Paternal Grandfather     Other Sister      ALL L+W  Other Son      L+W     Social History     Occupational History    Not on file. Social History Main Topics    Smoking status: Former Smoker     Packs/day: 0.25     Years: 2.00     Types: Cigarettes    Smokeless tobacco: Never Used    Alcohol use No    Drug use: Yes     Special: Marijuana    Sexual activity: Yes     Partners: Female      Comment: S/O [address] 3/17/11       Hospital Medications:  Current Facility-Administered Medications   Medication Dose Route Frequency    prochlorperazine (COMPAZINE) injection 10 mg  10 mg IntraVENous Q6H PRN    pantoprazole (PROTONIX) 40 mg in sodium chloride 10 mL injection  40 mg IntraVENous BID    metoclopramide HCl (REGLAN) injection 10 mg  10 mg IntraVENous Q6H    LORazepam (ATIVAN) injection 2 mg  2 mg IntraVENous Q4H PRN    HYDROmorphone (PF) (DILAUDID) injection 1 mg  1 mg IntraVENous Q4H PRN    GI Cocktail  30 mL Oral Q6H    prochlorperazine (COMPAZINE) tablet 10 mg  10 mg Oral Q6H PRN    prochlorperazine (COMPAZINE) suppository 25 mg  25 mg Rectal Q8H PRN    tamsulosin (FLOMAX) capsule 0.4 mg  0.4 mg Oral DAILY    ondansetron (ZOFRAN ODT) tablet 4 mg  4 mg Oral Q8H PRN    sodium chloride (NS) flush 5-10 mL  5-10 mL IntraVENous Q8H    sodium chloride (NS) flush 5-10 mL  5-10 mL IntraVENous PRN    acetaminophen (TYLENOL) tablet 650 mg  650 mg Oral Q4H PRN    dextrose 5% and 0.9% NaCl infusion  250 mL/hr IntraVENous CONTINUOUS    diphenhydrAMINE (BENADRYL) injection 12.5 mg  12.5 mg IntraVENous Q4H PRN    ondansetron (ZOFRAN) injection 4 mg  4 mg IntraVENous Q4H PRN       Allergies:   Allergies   Allergen Reactions    Amoxicillin Nausea Only    Aspirin Other (comments)     ulcers    Hydrocodone Rash    Ibuprofen Other (comments)     Hx of ulcers    Pcn [Penicillins] Rash    Phenergan [Promethazine] Nausea and Vomiting and Other (comments)       Review of Systems:  A comprehensive review of systems was negative except for that written in the History of Present Illness. Objective:     Physical Exam:  Vitals:  Visit Vitals    BP (!) 138/94 (BP 1 Location: Left arm, BP Patient Position: At rest)    Pulse 93    Temp 99.2 °F (37.3 °C)    Resp 20    Ht 5' 2\" (1.575 m)    Wt 67.3 kg (148 lb 4.8 oz)    SpO2 95%    BMI 27.12 kg/m2       General: No acute distress. Somnolent   HEENT: Sclerae anicteric. No oral ulcers. No abnormal pigmentation of the lips. The neck is supple. Cardiovascular: Regular rate and rhythm. No murmurs, gallops, or rubs. Respiratory:  Comfortable breathing  With no accessory muscle use. Clear breath sounds with no wheezes, rales, or rhonchi. GI:  Abdomen nondistended, soft. Epigastric/LLQ tenderness. Hypoactive bowel sounds. No enlargement of the liver or spleen. No masses palpable. Musculoskeletal:  No pitting edema of the lower legs. Extremities have good range of motion. Neurological:  Gross memory appears intact. Patient is alert and oriented. Psychiatric:  Mood appears appropriate with judgement intact. Laboratory:    Recent Labs      03/13/18   0454   WBC  7.4   RBC  4.92   HGB  14.7   HCT  38.8*   PLT  286      Recent Labs      03/12/18   0452   GLU  116*   NA  141   K  3.6   CL  108*   CO2  23   BUN  9   CREA  0.83   CA  8.5     No results for input(s): PTP, INR, APTT in the last 72 hours. No lab exists for component: INREXT  Recent Labs      03/12/18   0452  03/11/18   1315   SGOT  25  24   AP  91  99   ALB  3.9  4.3   TP  7.0  8.1   LPSE   --   113       Assessment:       A 41 y.o. male with a hx of jeremiah-woodward tear, intractable nausea/vomiting, and esophagitis who presents with hematemesis. Plan:       Prn meds for nausea and pain as ordered. Diet - NPO  Continue PPI   Continue IVF  Plan for EGD to assess for hematemesis; suspect MWT.      Signed By: Ariadna Roberts     March 13, 2018         *ATTENTION:  This note has been created by a medical student for educational purposes only.  Please do not refer to the content of this note for clinical decision-making, billing, or other purposes. Please see attending physicians note to obtain clinical information on this patient. *

## 2018-03-13 NOTE — PROGRESS NOTES
Pt drowsy upon AM assessment. Pt wife states he pulled out and IV last night and is talking out of his head. Ativan held this AM, zofran given per [facility]

## 2018-03-13 NOTE — INTERDISCIPLINARY ROUNDS
Interdisciplinary team rounds were held 3/13/2018 with the following team members:Care Management, Nursing, Physical Therapy and Physician. Plan of care discussed. See clinical pathway and/or care plan for interventions and desired outcomes. Anticipating discharge to home tomorrow pending EGD results.

## 2018-03-13 NOTE — PROGRESS NOTES
TRANSFER - IN REPORT:    Verbal report received from [place], RN on [address]  being received from GI LAB for routine progression of care      Report consisted of patients Situation, Background, Assessment and   Recommendations(SBAR). Information from the following report(s) SBAR, Kardex, Procedure Summary, MAR and Recent Results was reviewed with the receiving nurse. Opportunity for questions and clarification was provided. Assessment completed upon patients arrival to unit and care assumed.

## 2018-03-13 NOTE — PROGRESS NOTES
Pt vomiting 100 ml brown/black blood and 50 ml mucus with bright red blood. .Dr Lyndsay Andrews was notifty and order received for Protonix 40 mg IV, CBC, Type and screen, GI consult, and  Pt to be NPO. Will continue to monitor.

## 2018-03-13 NOTE — ANESTHESIA PREPROCEDURE EVALUATION
Anesthetic History     PONV          Review of Systems / Medical History  Patient summary reviewed, nursing notes reviewed and pertinent labs reviewed    Pulmonary          Smoker (quit 1 year ago)         Neuro/Psych   Within defined limits           Cardiovascular  Within defined limits                Exercise tolerance: >4 METS     GI/Hepatic/Renal     GERD: well controlled      PUD    Comments: N/V hx of jeremiah woodward tear Endo/Other  Within defined limits           Other Findings              Physical Exam    Airway  Mallampati: II  TM Distance: 4 - 6 cm  Neck ROM: normal range of motion   Mouth opening: Normal     Cardiovascular    Rhythm: regular  Rate: normal    Murmur: Grade 2, Mitral area     Dental  No notable dental hx       Pulmonary  Breath sounds clear to auscultation               Abdominal  GI exam deferred       Other Findings            Anesthetic Plan    ASA: 3, emergent  Anesthesia type: general          Induction: Intravenous and RSI  Anesthetic plan and risks discussed with: Patient

## 2018-03-13 NOTE — PROGRESS NOTES
Benadryl 25 mg IV and compazine 10 mg IV given per doctors orders for N/V. Will continue to monitor.

## 2018-03-13 NOTE — PROCEDURES
Endoscopic Gastroduodenoscopy Procedure Note    Indications: n/v, coffee ground emesis    Anesthesia/Sedation: MAC IV     Pre-Procedure Physical:    Current Facility-Administered Medications   Medication Dose Route Frequency    prochlorperazine (COMPAZINE) injection 10 mg  10 mg IntraVENous Q6H PRN    pantoprazole (PROTONIX) 40 mg in sodium chloride 10 mL injection  40 mg IntraVENous BID    lactated Ringers infusion  100 mL/hr IntraVENous CONTINUOUS    metoclopramide HCl (REGLAN) injection 10 mg  10 mg IntraVENous Q6H    LORazepam (ATIVAN) injection 2 mg  2 mg IntraVENous Q4H PRN    HYDROmorphone (PF) (DILAUDID) injection 1 mg  1 mg IntraVENous Q4H PRN    GI Cocktail  30 mL Oral Q6H    prochlorperazine (COMPAZINE) tablet 10 mg  10 mg Oral Q6H PRN    prochlorperazine (COMPAZINE) suppository 25 mg  25 mg Rectal Q8H PRN    tamsulosin (FLOMAX) capsule 0.4 mg  0.4 mg Oral DAILY    ondansetron (ZOFRAN ODT) tablet 4 mg  4 mg Oral Q8H PRN    sodium chloride (NS) flush 5-10 mL  5-10 mL IntraVENous Q8H    sodium chloride (NS) flush 5-10 mL  5-10 mL IntraVENous PRN    acetaminophen (TYLENOL) tablet 650 mg  650 mg Oral Q4H PRN    dextrose 5% and 0.9% NaCl infusion  250 mL/hr IntraVENous CONTINUOUS    diphenhydrAMINE (BENADRYL) injection 12.5 mg  12.5 mg IntraVENous Q4H PRN    ondansetron (ZOFRAN) injection 4 mg  4 mg IntraVENous Q4H PRN     Facility-Administered Medications Ordered in Other Encounters   Medication Dose Route Frequency    fentaNYL citrate (PF) injection   IntraVENous PRN    lidocaine (PF) (XYLOCAINE) 20 mg/mL (2 %) injection   IntraVENous PRN    rocuronium (ZEMURON) injection    PRN    propofol (DIPRIVAN) 10 mg/mL injection   IntraVENous PRN    succinylcholine (ANECTINE) injection    PRN    dexamethasone (DECADRON) 4 mg/mL injection    PRN    ondansetron (ZOFRAN) injection    PRN      Amoxicillin; Aspirin; Hydrocodone;  Ibuprofen; Pcn [penicillins]; and Phenergan [promethazine]    Patient Vitals for the past 8 hrs:   BP Temp Pulse Resp SpO2 Weight   03/13/18 1146 (!) 150/91 - 68 12 96 % -   03/13/18 1021 121/80 99.1 °F (37.3 °C) 70 20 95 % -   03/13/18 0746 (!) 138/94 99.2 °F (37.3 °C) 93 20 95 % -   03/13/18 0604 - - - - - 67.3 kg (148 lb 4.8 oz)   03/13/18 0600 (!) 144/93 98.3 °F (36.8 °C) (!) 101 18 95 % -       Exam      Airway: clear   Heart: normal S1and S2    Lungs: clear bilateral  Abdomen: soft, nontender, bowel sounds present and normal in all quads   Mental Status: awake, alert and oriented to person, place and time          Procedure Details     Informed consent was obtained for the procedure, including conscious sedation. Risks of pancreatitis, infection, perforation, hemorrhage, adverse drug reaction and aspiration were discussed. The patient was placed in the left lateral decubitus position. Based on the pre-procedure assessment, including review of the patient's medical history, medications, allergies, and review of systems, he had been deemed to be an appropriate candidate for conscious sedation; he was therefore sedated with the medications listed below. He was monitored continuously with ECG tracing, pulse oximetry, blood pressure monitoring, and direct observation. The EGD gastroscope was inserted into the mouth and advanced under direct vision to the second portion of the duodenum. A careful inspection was made as the gastroscope was withdrawn, including a retroflexed view of the proximal stomach; findings and interventions are described below. Appropriate photodocumentation was obtained. Findings:   Esophagus- Severe erosive esophagitis. Stomach- Normal.  Biopsied. Duodenum- Normal.    Therapies: None    Specimens: None    Estimated Blood Loss: 0 cc           Complications:   None; patient tolerated the procedure well. Attending Attestation:  I performed the procedure. Impression:    Severe erosive esophagitis.     Recommendations:  Await path results  Bid PPI  Antiemetics  Avoid marijuana

## 2018-03-13 NOTE — ANESTHESIA POSTPROCEDURE EVALUATION
Post-Anesthesia Evaluation and Assessment    Patient: Ximena Crawford MRN: [id number]  SSN: [id number]    YOB: 1975  Age: 41 y.o. Sex: male       Cardiovascular Function/Vital Signs  Visit Vitals    /82    Pulse 80    Temp 36.1 °C (97 °F)    Resp 12    Ht 5' 2\" (1.575 m)    Wt 67.3 kg (148 lb 4.8 oz)    SpO2 93%    BMI 27.12 kg/m2       Patient is status post general anesthesia for Procedure(s):  ESOPHAGOGASTRODUODENOSCOPY (EGD)  ESOPHAGOGASTRODUODENAL (EGD) BIOPSY. Nausea/Vomiting: None    Postoperative hydration reviewed and adequate. Pain:  Pain Scale 1: Visual (03/13/18 1326)  Pain Intensity 1: 0 (03/13/18 1326)   Managed    Neurological Status:   Neuro  Neurologic State: Confused;Drowsy (03/13/18 0800)  Orientation Level: Oriented to person (03/13/18 0800)   At baseline    Mental Status and Level of Consciousness: Alert and oriented     Pulmonary Status:   O2 Device: Nasal cannula (03/13/18 1326)   Adequate oxygenation and airway patent    Complications related to anesthesia: None    Post-anesthesia assessment completed.  No concerns    Signed By: Kahlil Easton MD     March 13, 2018

## 2018-03-14 PROCEDURE — 74011250636 HC RX REV CODE- 250/636: Performed by: HOSPITALIST

## 2018-03-14 PROCEDURE — 74011250637 HC RX REV CODE- 250/637: Performed by: HOSPITALIST

## 2018-03-14 PROCEDURE — C9113 INJ PANTOPRAZOLE SODIUM, VIA: HCPCS | Performed by: HOSPITALIST

## 2018-03-14 PROCEDURE — 74011000250 HC RX REV CODE- 250: Performed by: HOSPITALIST

## 2018-03-14 PROCEDURE — 65270000029 HC RM PRIVATE

## 2018-03-14 PROCEDURE — 74011250636 HC RX REV CODE- 250/636: Performed by: INTERNAL MEDICINE

## 2018-03-14 RX ADMIN — ONDANSETRON 4 MG: 2 INJECTION INTRAMUSCULAR; INTRAVENOUS at 23:17

## 2018-03-14 RX ADMIN — Medication 10 ML: at 05:49

## 2018-03-14 RX ADMIN — METOCLOPRAMIDE 10 MG: 5 INJECTION, SOLUTION INTRAMUSCULAR; INTRAVENOUS at 17:20

## 2018-03-14 RX ADMIN — Medication 10 ML: at 23:05

## 2018-03-14 RX ADMIN — DIPHENHYDRAMINE HYDROCHLORIDE 50 MG: 50 INJECTION, SOLUTION INTRAMUSCULAR; INTRAVENOUS at 23:21

## 2018-03-14 RX ADMIN — METOCLOPRAMIDE 10 MG: 5 INJECTION, SOLUTION INTRAMUSCULAR; INTRAVENOUS at 05:36

## 2018-03-14 RX ADMIN — SODIUM CHLORIDE 40 MG: 9 INJECTION INTRAMUSCULAR; INTRAVENOUS; SUBCUTANEOUS at 17:20

## 2018-03-14 RX ADMIN — ONDANSETRON 4 MG: 2 INJECTION INTRAMUSCULAR; INTRAVENOUS at 17:20

## 2018-03-14 RX ADMIN — PROCHLORPERAZINE EDISYLATE 10 MG: 5 INJECTION INTRAMUSCULAR; INTRAVENOUS at 09:08

## 2018-03-14 RX ADMIN — Medication 30 ML: at 17:21

## 2018-03-14 RX ADMIN — Medication 30 ML: at 11:08

## 2018-03-14 RX ADMIN — ONDANSETRON 4 MG: 2 INJECTION INTRAMUSCULAR; INTRAVENOUS at 11:08

## 2018-03-14 RX ADMIN — SODIUM CHLORIDE 40 MG: 9 INJECTION INTRAMUSCULAR; INTRAVENOUS; SUBCUTANEOUS at 02:26

## 2018-03-14 RX ADMIN — METOCLOPRAMIDE 10 MG: 5 INJECTION, SOLUTION INTRAMUSCULAR; INTRAVENOUS at 23:17

## 2018-03-14 RX ADMIN — SODIUM CHLORIDE, SODIUM LACTATE, POTASSIUM CHLORIDE, AND CALCIUM CHLORIDE 100 ML/HR: 600; 310; 30; 20 INJECTION, SOLUTION INTRAVENOUS at 23:05

## 2018-03-14 RX ADMIN — ONDANSETRON 4 MG: 2 INJECTION INTRAMUSCULAR; INTRAVENOUS at 05:36

## 2018-03-14 RX ADMIN — METOCLOPRAMIDE 10 MG: 5 INJECTION, SOLUTION INTRAMUSCULAR; INTRAVENOUS at 11:08

## 2018-03-14 RX ADMIN — SODIUM CHLORIDE, SODIUM LACTATE, POTASSIUM CHLORIDE, AND CALCIUM CHLORIDE 100 ML/HR: 600; 310; 30; 20 INJECTION, SOLUTION INTRAVENOUS at 11:17

## 2018-03-14 NOTE — PROGRESS NOTES
Pt able to tolerate regular diet for dinner. Pt states he is feeling much better. Ambulated in halls. All needs met at this time.

## 2018-03-14 NOTE — PROGRESS NOTES
Hospitalist Progress Note     Admit Date:  3/11/2018  1:15 PM   Name:  Sage Walker   Age:  41 y.o.  :  1975   MRN:  [id number]   PCP:  Christoph Lemon MD  Treatment Team: Attending Provider: Erik Ray MD    Subjective:   Pt is a 42 y/o M with cyclic vomiting syndrome who p/w exacerbation of same. Had an episode of hematemesis. GI did EGD and found severe erosive esophagitis. Started on PPI and anti-nausea meds. 3/13 - pt still nauseated, some dry heaving during interview. No pain. Hematemesis/coffee ground emesis resolved. 3/14 - pt feeling better today. Tolerating a little water but no food yet. Encouraged attempts today. No abd pain, vomiting today. Had BM today. Objective:     Patient Vitals for the past 24 hrs:   Temp Pulse Resp BP SpO2   18 0704 98.4 °F (36.9 °C) 83 16 107/57 95 %   18 0512 98.1 °F (36.7 °C) 100 15 115/69 95 %   186 - - - (!) 146/111 -   18 2016 99.4 °F (37.4 °C) 99 15 (!) 149/106 95 %   18 1647 - - - 125/87 -   18 1608 98.2 °F (36.8 °C) 99 15 (!) 169/106 96 %   18 1339 - 85 16 132/89 95 %   18 1335 - 88 16 116/79 92 %   18 1330 - 80 16 123/82 93 %   18 1326 97 °F (36.1 °C) 82 12 115/78 97 %   18 1325 - 76 - 115/78 98 %   18 1146 - 68 12 (!) 150/91 96 %   18 1021 99.1 °F (37.3 °C) 70 20 121/80 95 %     Oxygen Therapy  O2 Sat (%): 95 % (18 0704)  Pulse via Oximetry: 85 beats per minute (18 1339)  O2 Device: Nasal cannula (18 1326)    Intake/Output Summary (Last 24 hours) at 18 0890  Last data filed at 18 1236   Gross per 24 hour   Intake              200 ml   Output                0 ml   Net              200 ml         General:    Well nourished. Alert. CV:   RRR. No murmur, rub, or gallop. Lungs:   CTAB. No wheezing, rhonchi, or rales. Abdomen:   Soft, nontender, nondistended. Extremities: Warm and dry. No cyanosis or edema. Skin:     No rashes or jaundice. Data Review:  I have reviewed all labs, meds, telemetry events, and studies from the last 24 hours. No results found for this or any previous visit (from the past 24 hour(s)). All Micro Results     None          Current Meds:  Current Facility-Administered Medications   Medication Dose Route Frequency    prochlorperazine (COMPAZINE) injection 10 mg  10 mg IntraVENous Q6H PRN    pantoprazole (PROTONIX) 40 mg in sodium chloride 10 mL injection  40 mg IntraVENous BID    lactated Ringers infusion  100 mL/hr IntraVENous CONTINUOUS    ondansetron (ZOFRAN) injection 4 mg  4 mg IntraVENous Q6H    diphenhydrAMINE (BENADRYL) injection 50 mg  50 mg IntraVENous QHS PRN    metoprolol (LOPRESSOR) injection 5 mg  5 mg IntraVENous Q6H PRN    metoclopramide HCl (REGLAN) injection 10 mg  10 mg IntraVENous Q6H    GI Cocktail  30 mL Oral Q6H    prochlorperazine (COMPAZINE) tablet 10 mg  10 mg Oral Q6H PRN    prochlorperazine (COMPAZINE) suppository 25 mg  25 mg Rectal Q8H PRN    tamsulosin (FLOMAX) capsule 0.4 mg  0.4 mg Oral DAILY    sodium chloride (NS) flush 5-10 mL  5-10 mL IntraVENous Q8H    sodium chloride (NS) flush 5-10 mL  5-10 mL IntraVENous PRN    acetaminophen (TYLENOL) tablet 650 mg  650 mg Oral Q4H PRN       Other Studies (last 24 hours):  No results found. Assessment and Plan:     Hospital Problems as of 3/14/2018  Date Reviewed: 7/11/2016          Codes Class Noted - Resolved POA    Gastrointestinal hemorrhage with hematemesis ICD-10-CM: K92.0  ICD-9-CM: 578.0  3/13/2018 - Present Yes        Intractable cyclical vomiting CGZ-[id number]: G43. A1  ICD-9-CM: 536.2  3/12/2018 - Present Yes        * (Principal)Erosive esophagitis ICD-10-CM: K22.10  ICD-9-CM: 530.19  3/11/2018 - Present Yes        Tetrahydrocannabinol (THC) use disorder, moderate, dependence (HCC) (Chronic) ICD-10-CM: F12.20  ICD-9-CM: 304.30  7/11/2016 - Present Yes        Anxiety (Chronic) ICD-10-CM: F41.9  ICD-9-CM: 300.00  4/30/2014 - Present Yes        Tobacco abuse (Chronic) ICD-10-CM: Z72.0  ICD-9-CM: 305.1  4/30/2014 - Present Yes        H/O hiatal hernia (Chronic) ICD-10-CM: Z87.19  ICD-9-CM: V12.79  4/30/2014 - Present Yes        Esophageal reflux (Chronic) ICD-10-CM: K21.9  ICD-9-CM: 530.81  4/30/2014 - Present Yes              PLAN:    · Cont PPI, liquids as tolerated, GI cocktail, scheduled antiemetics. · Still vomiting when attempting PO intake. Cont LR IVF  · Avoid IV narcotics and other controlled substances as pt showed evidence of abuse and documented hx polysub abuse in chart.   said he wanted them to \"sleep\", not for pain, etc    DC planning/Dispo:  Home when improved  DVT ppx:  SCDs    Signed:  Dena Steele MD

## 2018-03-14 NOTE — INTERDISCIPLINARY ROUNDS
Interdisciplinary team rounds were held 3/14/2018 with the following team members:Care Management, Nursing, Physical Therapy and Physician. Plan of care discussed. See clinical pathway and/or care plan for interventions and desired outcomes. Anticipating discharge to home tomorrow.

## 2018-03-15 VITALS
DIASTOLIC BLOOD PRESSURE: 81 MMHG | TEMPERATURE: 98.3 F | HEART RATE: 81 BPM | OXYGEN SATURATION: 98 % | HEIGHT: 62 IN | WEIGHT: 155.1 LBS | SYSTOLIC BLOOD PRESSURE: 112 MMHG | RESPIRATION RATE: 16 BRPM | BODY MASS INDEX: 28.54 KG/M2

## 2018-03-15 PROCEDURE — C9113 INJ PANTOPRAZOLE SODIUM, VIA: HCPCS | Performed by: HOSPITALIST

## 2018-03-15 PROCEDURE — 74011000250 HC RX REV CODE- 250: Performed by: HOSPITALIST

## 2018-03-15 PROCEDURE — 74011250636 HC RX REV CODE- 250/636: Performed by: HOSPITALIST

## 2018-03-15 PROCEDURE — 74011250636 HC RX REV CODE- 250/636: Performed by: INTERNAL MEDICINE

## 2018-03-15 RX ORDER — PANTOPRAZOLE SODIUM 40 MG/1
40 TABLET, DELAYED RELEASE ORAL DAILY
Qty: 30 TAB | Refills: 5 | Status: SHIPPED | OUTPATIENT
Start: 2018-03-15 | End: 2018-12-09

## 2018-03-15 RX ORDER — ONDANSETRON 8 MG/1
4 TABLET, ORALLY DISINTEGRATING ORAL
Qty: 30 TAB | Refills: 5 | Status: SHIPPED | OUTPATIENT
Start: 2018-03-15 | End: 2018-06-23

## 2018-03-15 RX ORDER — METOCLOPRAMIDE 10 MG/1
10 TABLET ORAL
Qty: 60 TAB | Refills: 1 | Status: SHIPPED | OUTPATIENT
Start: 2018-03-15 | End: 2018-06-23

## 2018-03-15 RX ORDER — METOCLOPRAMIDE 10 MG/1
10 TABLET ORAL
Qty: 60 TAB | Refills: 1 | Status: SHIPPED | OUTPATIENT
Start: 2018-03-15 | End: 2018-03-15

## 2018-03-15 RX ADMIN — METOCLOPRAMIDE 10 MG: 5 INJECTION, SOLUTION INTRAMUSCULAR; INTRAVENOUS at 05:14

## 2018-03-15 RX ADMIN — SODIUM CHLORIDE 40 MG: 9 INJECTION INTRAMUSCULAR; INTRAVENOUS; SUBCUTANEOUS at 02:42

## 2018-03-15 RX ADMIN — Medication 10 ML: at 05:15

## 2018-03-15 RX ADMIN — ONDANSETRON 4 MG: 2 INJECTION INTRAMUSCULAR; INTRAVENOUS at 05:14

## 2018-03-15 NOTE — PROGRESS NOTES
Hourly round completed throughout the shift. No complain of pain and denies any needs at this time Bed in lower position and call light/ personal items within reach. Will continue to monitor and give bed side shift report to on coming day shift nurse.

## 2018-03-15 NOTE — DISCHARGE INSTRUCTIONS
DISCHARGE SUMMARY from Nurse    PATIENT INSTRUCTIONS:    After general anesthesia or intravenous sedation, for 24 hours or while taking prescription Narcotics:  · Limit your activities  · Do not drive and operate hazardous machinery  · Do not make important personal or business decisions  · Do  not drink alcoholic beverages  · If you have not urinated within 8 hours after discharge, please contact your surgeon on call. Report the following to your surgeon:  · Excessive pain, swelling, redness or odor of or around the surgical area  · Temperature over 100.5  · Nausea and vomiting lasting longer than 4 hours or if unable to take medications  · Any signs of decreased circulation or nerve impairment to extremity: change in color, persistent  numbness, tingling, coldness or increase pain  · Any questions    What to do at Home:  Recommended activity: Activity as tolerated,     If you experience any of the following symptoms fever, chills, new or unrelieved pain, persistent nausea or vomiting, any other worrisome symptoms, please follow up with PCP. *  Please give a list of your current medications to your Primary Care Provider. *  Please update this list whenever your medications are discontinued, doses are      changed, or new medications (including over-the-counter products) are added. *  Please carry medication information at all times in case of emergency situations. These are general instructions for a healthy lifestyle:    No smoking/ No tobacco products/ Avoid exposure to second hand smoke  Surgeon General's Warning:  Quitting smoking now greatly reduces serious risk to your health.     Obesity, smoking, and sedentary lifestyle greatly increases your risk for illness    A healthy diet, regular physical exercise & weight monitoring are important for maintaining a healthy lifestyle    You may be retaining fluid if you have a history of heart failure or if you experience any of the following symptoms: Weight gain of 3 pounds or more overnight or 5 pounds in a week, increased swelling in our hands or feet or shortness of breath while lying flat in bed. Please call your doctor as soon as you notice any of these symptoms; do not wait until your next office visit. Recognize signs and symptoms of STROKE:    F-face looks uneven    A-arms unable to move or move unevenly    S-speech slurred or non-existent    T-time-call 911 as soon as signs and symptoms begin-DO NOT go       Back to bed or wait to see if you get better-TIME IS BRAIN. Warning Signs of HEART ATTACK     Call 911 if you have these symptoms:   Chest discomfort. Most heart attacks involve discomfort in the center of the chest that lasts more than a few minutes, or that goes away and comes back. It can feel like uncomfortable pressure, squeezing, fullness, or pain.  Discomfort in other areas of the upper body. Symptoms can include pain or discomfort in one or both arms, the back, neck, jaw, or stomach.  Shortness of breath with or without chest discomfort.  Other signs may include breaking out in a cold sweat, nausea, or lightheadedness. Don't wait more than five minutes to call 911 - MINUTES MATTER! Fast action can save your life. Calling 911 is almost always the fastest way to get lifesaving treatment. Emergency Medical Services staff can begin treatment when they arrive -- up to an hour sooner than if someone gets to the hospital by car. The discharge information has been reviewed with the patient. The patient verbalized understanding. Discharge medications reviewed with the patient and appropriate educational materials and side effects teaching were provided.   ___________________________________________________________________________________________________________________________________

## 2018-03-15 NOTE — PROGRESS NOTES
Care Management Interventions  PCP Verified by CM: Yes  Transition of Care Consult (CM Consult): Discharge Planning  Physical Therapy Consult: No  Occupational Therapy Consult: No  Current Support Network: Own Home  Confirm Follow Up Transport: Family  Plan discussed with Pt/Family/Caregiver: Yes  Freedom of Choice Offered: Yes  Discharge Location  Discharge Placement: Home     Met with patient at the bedside. He had his friend in the room with him. Mr. Renata Ron is alert and oriented times four. He is independent with ADL's and is ambulatory. He uses [facility] in  for his primary care and he has access to all of his HIV meds. No needs. Looks and feels well. Evelin Lopez

## 2018-03-15 NOTE — DISCHARGE SUMMARY
Hospitalist Discharge Summary     Admit Date:  3/11/2018  1:15 PM   Name:  Jean Pierre Cast   Age:  41 y.o.  :  1975   MRN:  [id number]   PCP:  Hossein Fuentes MD  Treatment Team: Attending Provider: Jonas Vernon MD    Problem List for this Hospitalization:  Hospital Problems as of 3/15/2018  Date Reviewed: 2016          Codes Class Noted - Resolved POA    Gastrointestinal hemorrhage with hematemesis ICD-10-CM: K92.0  ICD-9-CM: 578.0  3/13/2018 - Present Yes        Intractable cyclical vomiting FAV-36-VG: G43. A1  ICD-9-CM: 536.2  3/12/2018 - Present Yes        * (Principal)Erosive esophagitis ICD-10-CM: K22.10  ICD-9-CM: 530.19  3/11/2018 - Present Yes        Tetrahydrocannabinol (THC) use disorder, moderate, dependence (HCC) (Chronic) ICD-10-CM: F12.20  ICD-9-CM: 304.30  2016 - Present Yes        Anxiety (Chronic) ICD-10-CM: F41.9  ICD-9-CM: 300.00  2014 - Present Yes        Tobacco abuse (Chronic) ICD-10-CM: Z72.0  ICD-9-CM: 305.1  2014 - Present Yes        H/O hiatal hernia (Chronic) ICD-10-CM: Z87.19  ICD-9-CM: V12.79  2014 - Present Yes        Esophageal reflux (Chronic) ICD-10-CM: K21.9  ICD-9-CM: 530.81  2014 - Present Yes                Admission HPI from 3/11/2018:    \" 42 y/o AA male with long-standing hx recurrent cyclic vomiting syndrome presents to the ED with non-specific abdominal pain and intractable nausea and vomiting. This is his second ED visit within the past 2 days. He has been given IV fluids, several different anti-emetics, benadryl and haldol with no relief. A KUB yesterday was unremarkable. Has a low CO2 and elevated anion gap likely related to vomiting. No hematemesis. Has had no fever or chills. Lipase and LFTs are normal. Previous episodes of his cyclic vomiting had been attributed to his use of cannabis but he stated he has not used in several weeks. Will admit as observation for further supportive care.  He has seen GI in the past. \"    Hospital Course:  Pt is a 42 y/o M with cyclic vomiting syndrome who p/w exacerbation of same. Had an episode of hematemesis. GI did EGD and found severe erosive esophagitis, despite being on protonix at home. Started on IV PPI and anti-nausea meds. He had good response to reglan and zofran. Pt and significant other requested prescription for reglan. I have explained to them this medication should not be taken on a daily basis long term, and should not be taken with compazine due to risk of tardive dyskinesia. I have printed out a patient education sheet from [employer] on this and provided it to them; told them to stop the medication if any symptoms and call their PCP. Pt says that whenever he goes to follow up he has to pay a significant amount and they don't make any changes so he would prefer to call if needed. Follow up instructions and discharge meds at bottom of this note. Plan was discussed with pt. All questions answered. Patient was stable at time of discharge. Diagnostic Imaging/Tests:   No results found. Echocardiogram results:  No results found for this visit on 03/11/18. All Micro Results     None          Labs: Results:       BMP, Mg, Phos No results for input(s): NA, K, CL, CO2, AGAP, BUN, CREA, CA, GLU, MG, PHOS in the last 72 hours. CBC Recent Labs      03/13/18   0454   WBC  7.4   RBC  4.92   HGB  14.7   HCT  38.8*   PLT  286      LFT No results for input(s): SGOT, ALT, TBIL, AP, TP, ALB, GLOB, AGRAT, GPT in the last 72 hours.    Cardiac Testing Lab Results   Component Value Date/Time     (H) 07/30/2013 04:00 PM    Troponin-I, Qt. <0.02 (L) 08/09/2014 11:20 PM      Coagulation Tests Lab Results   Component Value Date/Time    Prothrombin time 11.3 09/17/2016 06:57 AM    Prothrombin time 11.4 02/03/2016 09:30 PM    Prothrombin time 11.3 04/29/2014 03:06 PM    INR 1.1 09/17/2016 06:57 AM    INR 1.1 02/03/2016 09:30 PM    INR 1.1 04/29/2014 03:06 PM    aPTT 25.7 04/29/2014 03:06 PM      A1c Lab Results   Component Value Date/Time    Hemoglobin A1c 5.5 08/12/2014 06:00 AM      Lipid Panel No results found for: CHOL, CHOLPOCT, CHOLX, CHLST, CHOLV, 676189, HDL, LDL, LDLC, DLDLP, 280497, VLDLC, VLDL, TGLX, TRIGL, TRIGP, TGLPOCT, CHHD, CHHDX   Thyroid Panel Lab Results   Component Value Date/Time    TSH 0.263 (L) 03/11/2018 11:02 PM    TSH 0.280 (L) 08/12/2014 06:00 AM    T4, Free 1.1 03/11/2018 11:02 PM    T4, Free 1.1 07/31/2013 05:29 AM    T3, total 1.00 03/11/2018 11:02 PM    T3, total 2.11 (H) 07/31/2013 05:29 AM        Most Recent UA Lab Results   Component Value Date/Time    Color YELLOW 03/28/2017 09:50 AM    Appearance CLEAR 03/28/2017 09:50 AM    Specific gravity 1.029 (H) 03/28/2017 09:50 AM    pH (UA) 6.0 03/28/2017 09:50 AM    Protein TRACE (A) 03/28/2017 09:50 AM    Glucose NEGATIVE  03/28/2017 09:50 AM    Ketone >80 (A) 03/28/2017 09:50 AM    Bilirubin NEGATIVE  03/28/2017 09:50 AM    Blood TRACE (A) 03/28/2017 09:50 AM    Urobilinogen 0.2 03/28/2017 09:50 AM    Nitrites NEGATIVE  03/28/2017 09:50 AM    Leukocyte Esterase NEGATIVE  03/28/2017 09:50 AM        Allergies   Allergen Reactions    Amoxicillin Nausea Only    Aspirin Other (comments)     ulcers    Hydrocodone Rash    Ibuprofen Other (comments)     Hx of ulcers    Pcn [Penicillins] Rash    Phenergan [Promethazine] Nausea and Vomiting and Other (comments)     Immunization History   Administered Date(s) Administered    TDAP Vaccine 04/21/2012       All Labs from Last 24 Hrs:  No results found for this or any previous visit (from the past 24 hour(s)).     Discharge Exam:  Patient Vitals for the past 24 hrs:   Temp Pulse Resp BP SpO2   03/15/18 0732 98.3 °F (36.8 °C) 81 16 112/81 98 %   03/15/18 0438 98.1 °F (36.7 °C) 68 16 111/73 95 %   03/14/18 2323 98 °F (36.7 °C) 81 16 127/86 95 %   03/14/18 1956 98.3 °F (36.8 °C) 81 16 129/84 96 %   03/14/18 1110 98.2 °F (36.8 °C) 86 16 149/80 97 %     Oxygen Therapy  O2 Sat (%): 98 % (03/15/18 0732)  Pulse via Oximetry: 85 beats per minute (03/13/18 1339)  O2 Device: Nasal cannula (03/13/18 1326)    Intake/Output Summary (Last 24 hours) at 03/15/18 0810  Last data filed at 03/14/18 1842   Gross per 24 hour   Intake              360 ml   Output                0 ml   Net              360 ml       General:    Well nourished. Alert. No distress. Eyes:   Normal sclera. Extraocular movements intact. ENT:  Normocephalic, atraumatic. Moist mucous membranes  CV:   Regular rate and rhythm. No murmur, rub, or gallop. Lungs:  Clear to auscultation bilaterally. No wheezing, rhonchi, or rales. Abdomen: Soft, nontender, nondistended. Bowel sounds normal.   Extremities: Warm and dry. No cyanosis or edema. Neurologic: CN II-XII grossly intact. Sensation intact. Skin:     No rashes or jaundice. Psych:  Normal mood and affect. Discharge Info:   Current Discharge Medication List      START taking these medications    Details   metoclopramide HCl (REGLAN) 10 mg tablet Take 1 Tab by mouth two (2) times daily as needed. Should not be taken in conjunction with prochlorperazine (compazine). Indications: gastroesophageal reflux disease, nausea and vomiting  Qty: 60 Tab, Refills: 1         CONTINUE these medications which have CHANGED    Details   pantoprazole (PROTONIX) 40 mg tablet Take 1 Tab by mouth daily. Take 1 tab twice daily for the first 2 weeks, then once daily  Indications: Erosive Esophagitis  Qty: 30 Tab, Refills: 5      ondansetron (ZOFRAN ODT) 8 mg disintegrating tablet Take 0.5 Tabs by mouth every eight (8) hours as needed. Qty: 30 Tab, Refills: 5         CONTINUE these medications which have NOT CHANGED    Details   prochlorperazine (COMPAZINE) 10 mg tablet Take 1 Tab by mouth every six (6) hours as needed for Other (as needed for nausea/vomiting/migraine).   Qty: 8 Tab, Refills: 1      tamsulosin (FLOMAX) 0.4 mg capsule Take 1 Cap by mouth daily for 15 days.  Qty: 15 Cap, Refills: 0      prochlorperazine (PROCHLORPERAZINE) 25 mg suppository Insert 1 Suppository into rectum every eight (8) hours as needed for Nausea. Qty: 10 Suppository, Refills: 1               Disposition: home    Activity: Activity as tolerated  Diet: FOOD SVCS COMMENTS  DIET REGULAR    No orders of the defined types were placed in this encounter. Follow-up Information     Follow up With Details Comments Contact Info    PCP or GI Call As needed     [facility]  as needed [address] [place] [address] Dr Mya Gallo [id number]  [address] [place] [address]  as needed [facility]  [phone]          Time spent in patient discharge planning and coordination 35 minutes.     Signed:  Liu Moreno MD

## 2018-06-23 ENCOUNTER — HOSPITAL ENCOUNTER (EMERGENCY)
Age: 43
Discharge: HOME | End: 2018-06-23
Attending: EMERGENCY MEDICINE
Payer: SELF-PAY

## 2018-06-23 VITALS
RESPIRATION RATE: 16 BRPM | HEIGHT: 62 IN | DIASTOLIC BLOOD PRESSURE: 97 MMHG | HEART RATE: 93 BPM | BODY MASS INDEX: 28.52 KG/M2 | SYSTOLIC BLOOD PRESSURE: 156 MMHG | WEIGHT: 155 LBS | TEMPERATURE: 97.5 F | OXYGEN SATURATION: 94 %

## 2018-06-23 DIAGNOSIS — R11.15 NON-INTRACTABLE CYCLICAL VOMITING WITH NAUSEA: Primary | ICD-10-CM

## 2018-06-23 DIAGNOSIS — E86.0 DEHYDRATION: ICD-10-CM

## 2018-06-23 LAB
ALBUMIN SERPL-MCNC: 4.5 G/DL (ref 3.5–5)
ALBUMIN/GLOB SERPL: 1.1 {RATIO} (ref 1.2–3.5)
ALP SERPL-CCNC: 126 U/L (ref 50–136)
ALT SERPL-CCNC: 34 U/L (ref 12–65)
ANION GAP SERPL CALC-SCNC: 16 MMOL/L (ref 7–16)
AST SERPL-CCNC: 37 U/L (ref 15–37)
BILIRUB SERPL-MCNC: 0.9 MG/DL (ref 0.2–1.1)
BUN SERPL-MCNC: 22 MG/DL (ref 6–23)
CALCIUM SERPL-MCNC: 9.8 MG/DL (ref 8.3–10.4)
CHLORIDE SERPL-SCNC: 107 MMOL/L (ref 98–107)
CO2 SERPL-SCNC: 18 MMOL/L (ref 21–32)
CREAT SERPL-MCNC: 1.41 MG/DL (ref 0.8–1.5)
DIFFERENTIAL METHOD BLD: ABNORMAL
ERYTHROCYTE [DISTWIDTH] IN BLOOD BY AUTOMATED COUNT: 13.2 % (ref 11.9–14.6)
GLOBULIN SER CALC-MCNC: 4.2 G/DL (ref 2.3–3.5)
GLUCOSE SERPL-MCNC: 110 MG/DL (ref 65–100)
HCT VFR BLD AUTO: 41.9 % (ref 41.1–50.3)
HGB BLD-MCNC: 15.9 G/DL (ref 13.6–17.2)
LIPASE SERPL-CCNC: 89 U/L (ref 73–393)
LYMPHOCYTES # BLD: 2.2 K/UL (ref 0.5–4.6)
LYMPHOCYTES NFR BLD MANUAL: 29 % (ref 16–44)
MCH RBC QN AUTO: 29.1 PG (ref 26.1–32.9)
MCHC RBC AUTO-ENTMCNC: 37.9 G/DL (ref 31.4–35)
MCV RBC AUTO: 76.7 FL (ref 79.6–97.8)
MONOCYTES # BLD: 0.3 K/UL (ref 0.1–1.3)
MONOCYTES NFR BLD MANUAL: 4 % (ref 3–9)
NEUTS SEG # BLD: 5 K/UL (ref 1.7–8.2)
NEUTS SEG NFR BLD MANUAL: 67 % (ref 47–75)
PLATELET # BLD AUTO: 267 K/UL (ref 150–450)
PLATELET COMMENTS,PCOM: ADEQUATE
PMV BLD AUTO: 9.7 FL (ref 10.8–14.1)
POTASSIUM SERPL-SCNC: 3.7 MMOL/L (ref 3.5–5.1)
PROT SERPL-MCNC: 8.7 G/DL (ref 6.3–8.2)
RBC # BLD AUTO: 5.46 M/UL (ref 4.23–5.67)
RBC MORPH BLD: ABNORMAL
RBC MORPH BLD: ABNORMAL
SODIUM SERPL-SCNC: 141 MMOL/L (ref 136–145)
WBC # BLD AUTO: 7.5 K/UL (ref 4.3–11.1)

## 2018-06-23 PROCEDURE — 96361 HYDRATE IV INFUSION ADD-ON: CPT | Performed by: EMERGENCY MEDICINE

## 2018-06-23 PROCEDURE — 81003 URINALYSIS AUTO W/O SCOPE: CPT | Performed by: EMERGENCY MEDICINE

## 2018-06-23 PROCEDURE — 74011250636 HC RX REV CODE- 250/636: Performed by: EMERGENCY MEDICINE

## 2018-06-23 PROCEDURE — 83690 ASSAY OF LIPASE: CPT | Performed by: EMERGENCY MEDICINE

## 2018-06-23 PROCEDURE — 80053 COMPREHEN METABOLIC PANEL: CPT | Performed by: EMERGENCY MEDICINE

## 2018-06-23 PROCEDURE — 96374 THER/PROPH/DIAG INJ IV PUSH: CPT | Performed by: EMERGENCY MEDICINE

## 2018-06-23 PROCEDURE — 99284 EMERGENCY DEPT VISIT MOD MDM: CPT | Performed by: EMERGENCY MEDICINE

## 2018-06-23 PROCEDURE — 96375 TX/PRO/DX INJ NEW DRUG ADDON: CPT | Performed by: EMERGENCY MEDICINE

## 2018-06-23 PROCEDURE — 85025 COMPLETE CBC W/AUTO DIFF WBC: CPT | Performed by: EMERGENCY MEDICINE

## 2018-06-23 RX ORDER — LORAZEPAM 2 MG/ML
1 INJECTION INTRAMUSCULAR
Status: DISCONTINUED | OUTPATIENT
Start: 2018-06-23 | End: 2018-06-23 | Stop reason: HOSPADM

## 2018-06-23 RX ORDER — HALOPERIDOL 5 MG/ML
5 INJECTION INTRAMUSCULAR
Status: COMPLETED | OUTPATIENT
Start: 2018-06-23 | End: 2018-06-23

## 2018-06-23 RX ORDER — DIPHENHYDRAMINE HYDROCHLORIDE 50 MG/ML
25 INJECTION, SOLUTION INTRAMUSCULAR; INTRAVENOUS
Status: COMPLETED | OUTPATIENT
Start: 2018-06-23 | End: 2018-06-23

## 2018-06-23 RX ORDER — SODIUM CHLORIDE 9 MG/ML
1000 INJECTION, SOLUTION INTRAVENOUS ONCE
Status: COMPLETED | OUTPATIENT
Start: 2018-06-23 | End: 2018-06-23

## 2018-06-23 RX ORDER — METOCLOPRAMIDE 10 MG/1
TABLET ORAL
Qty: 15 TAB | Refills: 0 | Status: SHIPPED | OUTPATIENT
Start: 2018-06-23 | End: 2019-03-01

## 2018-06-23 RX ORDER — METOCLOPRAMIDE HYDROCHLORIDE 5 MG/ML
10 INJECTION INTRAMUSCULAR; INTRAVENOUS
Status: COMPLETED | OUTPATIENT
Start: 2018-06-23 | End: 2018-06-23

## 2018-06-23 RX ADMIN — HALOPERIDOL LACTATE 5 MG: 5 INJECTION, SOLUTION INTRAMUSCULAR at 10:18

## 2018-06-23 RX ADMIN — DIPHENHYDRAMINE HYDROCHLORIDE 25 MG: 50 INJECTION, SOLUTION INTRAMUSCULAR; INTRAVENOUS at 08:53

## 2018-06-23 RX ADMIN — SODIUM CHLORIDE 1000 ML: 900 INJECTION, SOLUTION INTRAVENOUS at 10:18

## 2018-06-23 RX ADMIN — METOCLOPRAMIDE 10 MG: 5 INJECTION, SOLUTION INTRAMUSCULAR; INTRAVENOUS at 08:53

## 2018-06-23 RX ADMIN — SODIUM CHLORIDE 1000 ML: 900 INJECTION, SOLUTION INTRAVENOUS at 08:51

## 2018-06-23 NOTE — ED TRIAGE NOTES
Patient complains of nausea, vomiting, and mid abdominal pain since yesterday around 8 pm. Patient states history of gastroparesis with similar symptoms.

## 2018-06-23 NOTE — ED NOTES
Pt resting on stretcher with lights dimmed for comfort. Pt wife at bedside. Medications have been administered with the exception of ativan. Pt and pt wife states pt becomes restless within 15 minutes of administration of ativan.

## 2018-06-23 NOTE — DISCHARGE INSTRUCTIONS
Oral Rehydration: Care Instructions  Your Care Instructions    Dehydration occurs when your body loses too much water. This can happen if you do not drink enough fluids or lose a lot of fluid due to diarrhea, vomiting, or sweating. Being dehydrated can cause health problems and can even be life-threatening. To replace lost fluids, you need to drink liquid that contains special chemicals called electrolytes. Electrolytes keep your body working well. Plain water does not have electrolytes. You also need to rest to prevent more fluid loss. Replacing water and electrolytes (oral rehydration) completely takes about 36 hours. But you should feel better within a few hours. Follow-up care is a key part of your treatment and safety. Be sure to make and go to all appointments, and call your doctor if you are having problems. It's also a good idea to know your test results and keep a list of the medicines you take. How can you care for yourself at home? · Take frequent sips of a drink such as Gatorade, Powerade, or other rehydration drinks that your doctor suggests. These replace both fluid and important chemicals (electrolytes) you need for balance in your blood. · Drink 2 quarts of cool liquid over 2 to 4 hours. You should have at least 10 glasses of liquid a day to replace lost fluid. If you have kidney, heart, or liver disease and have to limit fluids, talk with your doctor before you increase the amount of fluids you drink. · Make your own drink. Measure everything carefully. The drink may not work well or may even be harmful if the amounts are off. ¨ 1 quart water  ¨ ½ teaspoon salt  ¨ 6 teaspoons sugar  · Do not drink liquid with caffeine, such as coffee and jae. · Do not drink any alcohol. It can make you dehydrated. · Drink plenty of fluids, enough so that your urine is light yellow or clear like water.  If you have kidney, heart, or liver disease and have to limit fluids, talk with your doctor before you increase the amount of fluids you drink. When should you call for help? Call 911 anytime you think you may need emergency care. For example, call if:  ? · You have signs of severe dehydration, such as:  ¨ You are confused or unable to stay awake. ¨ You passed out (lost consciousness). ?Call your doctor now or seek immediate medical care if:  ? · You still have signs of dehydration. You have sunken eyes and a dry mouth, and you pass only a little dark urine. ? · You are dizzy or lightheaded, or you feel like you may faint. ? · You are not able to keep down fluids. ? Watch closely for changes in your health, and be sure to contact your doctor if:  ? · You do not get better as expected. Where can you learn more? Go to http://ora-itzel.info/. Enter I040 in the search box to learn more about \"Oral Rehydration: Care Instructions. \"  Current as of: March 20, 2017  Content Version: 11.4  © 3724-3850 [employer]. Care instructions adapted under license by [employer] (which disclaims liability or warranty for this information). If you have questions about a medical condition or this instruction, always ask your healthcare professional. [address] any warranty or liability for your use of this information. Learning About Cyclic Vomiting Syndrome  What is it? An adult or child who has cyclic vomiting syndrome (CVS) has repeated bouts of severe vomiting and nausea. Between the vomiting episodes, the person's health is normal. The cause of CVS isn't known, but it may be related to migraine headaches. What happens when you have CVS?  CVS causes severe nausea, vomiting, and drooling. You may not be able to walk or talk during an episode. You may look pale. You may have a fever and feel very tired and thirsty. Some people with CVS have belly pain and diarrhea. Bouts of vomiting can last for a few hours or a few days.  People with this condition tend to have a typical pattern of attacks. For example, one person may have bouts of CVS 4 times a year and always in the morning. Another person may have 8 bouts a year and always in the evening. Some people with CVS have triggers that set off the vomiting. People have reported an infection, such as a cold, as a trigger. Other triggers include stress, menstrual cycles, and certain foods like chocolate or cheese. Children tend to have more triggers than adults do. How is CVS treated? Treatment is centered around easing the nausea and vomiting. The doctor may prescribe medicine. During very bad bouts of vomiting, your doctor may want you to stay in the hospital for a while. You may get fluids through a vein (IV) to prevent dehydration. Dehydration can be serious. Your body needs fluids to make enough blood. Without a good supply of blood, vital organs such as the heart and brain can't work as well as they should. When should you call for help? Call your doctor now or seek immediate medical care if:  · You have new or worse belly pain. · You have a fever. · You are vomiting. · You cannot pass stools or gas. · You have symptoms of dehydration, such as:  ¨ Dry eyes and a dry mouth. ¨ Passing only a little urine. ¨ Feeling thirstier than usual.  Watch closely for changes in your health, and be sure to contact your doctor if you have any problems. Follow-up care is a key part of your treatment and safety. Be sure to make and go to all appointments, and call your doctor if you are having problems. It's also a good idea to know your test results and keep a list of the medicines you take. Where can you learn more? Go to http://ora-itzel.info/. Enter U471 in the search box to learn more about \"Learning About Cyclic Vomiting Syndrome. \"  Current as of: May 12, 2017  Content Version: 11.4  © 8556-5697 [employer].  Care instructions adapted under license by [employer] (which disclaims liability or warranty for this information). If you have questions about a medical condition or this instruction, always ask your healthcare professional. [address] any warranty or liability for your use of this information.

## 2018-06-23 NOTE — ED NOTES
Pt wife at bedside. Pt has intermittent abd cramping. Pt is now resting on stretcher with eyes closed and lights dimmed.

## 2018-06-23 NOTE — ED NOTES
Pt denies hx of pancreatitis or DM. Pt states he has a hiatal hernia. Pt denies any prior surgeries on hernia. Pt states he has been experiencing abd cramping with epigastric pain since yesterday about 20:00. Pt states he has hx of Cyclic Vomiting Syndrome. Pt takes Protonix and zofran at home for the issues. Last dose was 8mg of zofran at 0500 today. Pt caregiver states pt has persistent lower back and bilateral flank pain as well. Pt attempted to provide a urine sample, but has been unable at this time.

## 2018-06-23 NOTE — ED NOTES
I have reviewed discharge instructions with the patient. The patient verbalized understanding. Patient left ED via Discharge Method: ambulatory to Home with friend. Opportunity for questions and clarification provided. Patient given 1 scripts. To continue your aftercare when you leave the hospital, you may receive an automated call from our care team to check in on how you are doing. This is a free service and part of our promise to provide the best care and service to meet your aftercare needs.  If you have questions, or wish to unsubscribe from this service please call [phone]. Thank you for Choosing our [facility] Emergency Department.

## 2018-06-23 NOTE — ED PROVIDER NOTES
Patient is a 41 y.o. male presenting with vomiting. The history is provided by the patient. Vomiting    This is a recurrent problem. Episode onset: 8 pm. The problem occurs continuously. The problem has not changed since onset. There has been no fever. Associated symptoms include abdominal pain. Pertinent negatives include no chills, no fever, no diarrhea, no headaches, no myalgias, no cough and no headaches. The patient is not pregnant. Past Medical History:   Diagnosis Date    BETTY (acute kidney injury) ([facility] [address] 8/12/2014    Clostridium difficile colitis 3/20/2011    Gastroparesis 2005    emptying study normal -2006    GERD (gastroesophageal reflux disease)     HIATAL HERNIA SINCE 1999    PUD (peptic ulcer disease) ALL DX IN 2008    ESOPHAGITIS, + H PYLORI       Past Surgical History:   Procedure Laterality Date    COLONOSCOPY  4/08    ESOPHAGITIS, COLONIC ULCER X1    EGD  4/08    H. HERNIA, ULCER X2, H PYLORI,    EGD  2011    h pylori -neg    HX WISDOM TEETH EXTRACTION  2/10/11         Family History:   Problem Relation Age of Onset    Other Mother      L+W    Diabetes Maternal Grandmother     Sickle Cell Anemia Father     Heart Disease Paternal Grandfather     Other Sister      ALL L+W    Other Son      L+W       Social History     Social History    Marital status:      Spouse name: N/A    Number of children: N/A    Years of education: N/A     Occupational History    Not on file. Social History Main Topics    Smoking status: Former Smoker     Packs/day: 0.25     Years: 2.00     Types: Cigarettes    Smokeless tobacco: Never Used    Alcohol use No    Drug use: Yes     Special: Marijuana    Sexual activity: Yes     Partners: Female      Comment: S/O 15 WEEKS PREGNANT 3/17/11     Other Topics Concern    Not on file     Social History Narrative    3/17/11:  PATIENT LIVES WITH GIRLFRIENALTON WINCHESTER (CELL: [phone]), HER 3YEAR OLD DAUGHTER AND THEIR 3YEAR OLD SON. ALTON IS CURRENTLY 13 WEEKS PREGNANT. PATIENT'S JOB AT [employer] WAS DOWNSIZED ABOUT A YEAR AGO, AND HE HAS BEEN A STAY HOME DAD SINCE. ALTON WORKS IN HOUSEKEEPING POSITION. ALLERGIES: Amoxicillin; Aspirin; Hydrocodone; Ibuprofen; Pcn [penicillins]; and Phenergan [promethazine]    Review of Systems   Constitutional: Negative for chills and fever. HENT: Negative for congestion, rhinorrhea and sore throat. Eyes: Negative for photophobia and redness. Respiratory: Negative for cough and shortness of breath. Cardiovascular: Negative for chest pain and leg swelling. Gastrointestinal: Positive for abdominal pain and vomiting. Negative for blood in stool, diarrhea and nausea. Endocrine: Negative for polydipsia and polyuria. Genitourinary: Negative for dysuria. Musculoskeletal: Negative for back pain and myalgias. Neurological: Negative for weakness, numbness and headaches. Vitals:    06/23/18 0804   BP: (!) 149/104   Pulse: 94   Resp: 16   Temp: 97.5 °F (36.4 °C)   SpO2: 97%   Weight: 70.3 kg (155 lb)   Height: 5' 2\" (1.575 m)            Physical Exam   Constitutional: He appears well-developed and well-nourished. dramatic   HENT:   Mouth/Throat: Oropharynx is clear and moist.   Eyes: Conjunctivae are normal.   Cardiovascular: Normal rate, regular rhythm and normal heart sounds. Pulmonary/Chest: Effort normal and breath sounds normal.   Abdominal: Soft. He exhibits no distension. There is no tenderness. There is no rebound and no guarding. Musculoskeletal: Normal range of motion. Neurological: He is alert. Skin: Skin is warm and dry. Psychiatric: His mood appears anxious. Nursing note and vitals reviewed. MDM  Number of Diagnoses or Management Options  Diagnosis management comments: Patient gives a history of gastroparesis and review of old records reveal a history of cyclical vomiting syndrome.   Emesis bag full of water the patient just recently drank.  Currently retching without vomiting. Abdomen did not appear tender on exam exam difficult due to dramatic nature of patient's nausea and continuous movement. 10:07 AM  Patient up walking around the room retching and anxious. Complains of cramping. 11:56 AM  Patient improved.         Amount and/or Complexity of Data Reviewed  Clinical lab tests: ordered and reviewed          ED Course       Procedures

## 2018-06-24 ENCOUNTER — HOSPITAL ENCOUNTER (INPATIENT)
Age: 43
LOS: 1 days | Discharge: HOME | DRG: 103 | End: 2018-06-26
Attending: EMERGENCY MEDICINE | Admitting: INTERNAL MEDICINE
Payer: SELF-PAY

## 2018-06-24 DIAGNOSIS — K22.10 EROSIVE ESOPHAGITIS: ICD-10-CM

## 2018-06-24 DIAGNOSIS — R11.15 INTRACTABLE CYCLICAL VOMITING WITH NAUSEA: Primary | ICD-10-CM

## 2018-06-24 PROBLEM — R11.2 INTRACTABLE NAUSEA AND VOMITING: Status: ACTIVE | Noted: 2018-06-24

## 2018-06-24 PROBLEM — R03.0 ELEVATED BP WITHOUT DIAGNOSIS OF HYPERTENSION: Status: ACTIVE | Noted: 2018-06-24

## 2018-06-24 LAB
ALBUMIN SERPL-MCNC: 4.7 G/DL (ref 3.5–5)
ALBUMIN/GLOB SERPL: 1.1 {RATIO} (ref 1.2–3.5)
ALP SERPL-CCNC: 116 U/L (ref 50–136)
ALT SERPL-CCNC: 34 U/L (ref 12–65)
ANION GAP SERPL CALC-SCNC: 12 MMOL/L (ref 7–16)
AST SERPL-CCNC: 31 U/L (ref 15–37)
BACTERIA URNS QL MICRO: 0 /HPF
BASOPHILS # BLD: 0 K/UL (ref 0–0.2)
BASOPHILS NFR BLD: 0 % (ref 0–2)
BILIRUB SERPL-MCNC: 0.8 MG/DL (ref 0.2–1.1)
BUN SERPL-MCNC: 20 MG/DL (ref 6–23)
CALCIUM SERPL-MCNC: 9.6 MG/DL (ref 8.3–10.4)
CASTS URNS QL MICRO: NORMAL /LPF
CHLORIDE SERPL-SCNC: 105 MMOL/L (ref 98–107)
CO2 SERPL-SCNC: 22 MMOL/L (ref 21–32)
CREAT SERPL-MCNC: 1.06 MG/DL (ref 0.8–1.5)
DIFFERENTIAL METHOD BLD: ABNORMAL
EOSINOPHIL # BLD: 0 K/UL (ref 0–0.8)
EOSINOPHIL NFR BLD: 0 % (ref 0.5–7.8)
EPI CELLS #/AREA URNS HPF: NORMAL /HPF
ERYTHROCYTE [DISTWIDTH] IN BLOOD BY AUTOMATED COUNT: 13.3 % (ref 11.9–14.6)
ERYTHROCYTE [DISTWIDTH] IN BLOOD BY AUTOMATED COUNT: 13.4 % (ref 11.9–14.6)
GLOBULIN SER CALC-MCNC: 4.1 G/DL (ref 2.3–3.5)
GLUCOSE SERPL-MCNC: 105 MG/DL (ref 65–100)
HCT VFR BLD AUTO: 34.6 % (ref 41.1–50.3)
HCT VFR BLD AUTO: 37.1 % (ref 41.1–50.3)
HGB BLD-MCNC: 12.9 G/DL (ref 13.6–17.2)
HGB BLD-MCNC: 14.1 G/DL (ref 13.6–17.2)
HGB BLD-MCNC: 14.8 G/DL (ref 13.6–17.2)
IMM GRANULOCYTES # BLD: 0 K/UL (ref 0–0.5)
IMM GRANULOCYTES NFR BLD AUTO: 0 % (ref 0–5)
LYMPHOCYTES # BLD: 1.6 K/UL (ref 0.5–4.6)
LYMPHOCYTES NFR BLD: 19 % (ref 13–44)
MAGNESIUM SERPL-MCNC: 2.4 MG/DL (ref 1.8–2.4)
MCH RBC QN AUTO: 29.3 PG (ref 26.1–32.9)
MCH RBC QN AUTO: 29.4 PG (ref 26.1–32.9)
MCHC RBC AUTO-ENTMCNC: 37.3 G/DL (ref 31.4–35)
MCHC RBC AUTO-ENTMCNC: 38 G/DL (ref 31.4–35)
MCV RBC AUTO: 77.3 FL (ref 79.6–97.8)
MCV RBC AUTO: 78.6 FL (ref 79.6–97.8)
MONOCYTES # BLD: 0.7 K/UL (ref 0.1–1.3)
MONOCYTES NFR BLD: 8 % (ref 4–12)
NEUTS SEG # BLD: 6 K/UL (ref 1.7–8.2)
NEUTS SEG NFR BLD: 73 % (ref 43–78)
PLATELET # BLD AUTO: 212 K/UL (ref 150–450)
PLATELET # BLD AUTO: 224 K/UL (ref 150–450)
PLATELET COMMENTS,PCOM: ADEQUATE
PMV BLD AUTO: 9.4 FL (ref 10.8–14.1)
PMV BLD AUTO: 9.6 FL (ref 10.8–14.1)
POTASSIUM SERPL-SCNC: 3.6 MMOL/L (ref 3.5–5.1)
PROT SERPL-MCNC: 8.8 G/DL (ref 6.3–8.2)
RBC # BLD AUTO: 4.4 M/UL (ref 4.23–5.67)
RBC # BLD AUTO: 4.8 M/UL (ref 4.23–5.67)
RBC #/AREA URNS HPF: NORMAL /HPF
RBC MORPH BLD: ABNORMAL
RBC MORPH BLD: ABNORMAL
SODIUM SERPL-SCNC: 139 MMOL/L (ref 136–145)
WBC # BLD AUTO: 8.2 K/UL (ref 4.3–11.1)
WBC # BLD AUTO: 8.3 K/UL (ref 4.3–11.1)
WBC URNS QL MICRO: NORMAL /HPF

## 2018-06-24 PROCEDURE — 74011000250 HC RX REV CODE- 250: Performed by: FAMILY MEDICINE

## 2018-06-24 PROCEDURE — C9113 INJ PANTOPRAZOLE SODIUM, VIA: HCPCS | Performed by: FAMILY MEDICINE

## 2018-06-24 PROCEDURE — 81003 URINALYSIS AUTO W/O SCOPE: CPT | Performed by: EMERGENCY MEDICINE

## 2018-06-24 PROCEDURE — 74011250637 HC RX REV CODE- 250/637: Performed by: INTERNAL MEDICINE

## 2018-06-24 PROCEDURE — 85018 HEMOGLOBIN: CPT | Performed by: INTERNAL MEDICINE

## 2018-06-24 PROCEDURE — 81015 MICROSCOPIC EXAM OF URINE: CPT | Performed by: EMERGENCY MEDICINE

## 2018-06-24 PROCEDURE — 80053 COMPREHEN METABOLIC PANEL: CPT | Performed by: EMERGENCY MEDICINE

## 2018-06-24 PROCEDURE — 74011250636 HC RX REV CODE- 250/636: Performed by: FAMILY MEDICINE

## 2018-06-24 PROCEDURE — 96361 HYDRATE IV INFUSION ADD-ON: CPT | Performed by: EMERGENCY MEDICINE

## 2018-06-24 PROCEDURE — 99284 EMERGENCY DEPT VISIT MOD MDM: CPT | Performed by: EMERGENCY MEDICINE

## 2018-06-24 PROCEDURE — 96375 TX/PRO/DX INJ NEW DRUG ADDON: CPT | Performed by: EMERGENCY MEDICINE

## 2018-06-24 PROCEDURE — 85025 COMPLETE CBC W/AUTO DIFF WBC: CPT | Performed by: EMERGENCY MEDICINE

## 2018-06-24 PROCEDURE — 99218 HC RM OBSERVATION: CPT

## 2018-06-24 PROCEDURE — 77030020263 HC SOL INJ SOD CL0.9% LFCR 1000ML

## 2018-06-24 PROCEDURE — 74011250637 HC RX REV CODE- 250/637: Performed by: FAMILY MEDICINE

## 2018-06-24 PROCEDURE — 85027 COMPLETE CBC AUTOMATED: CPT | Performed by: FAMILY MEDICINE

## 2018-06-24 PROCEDURE — 74011250636 HC RX REV CODE- 250/636: Performed by: EMERGENCY MEDICINE

## 2018-06-24 PROCEDURE — 36415 COLL VENOUS BLD VENIPUNCTURE: CPT | Performed by: INTERNAL MEDICINE

## 2018-06-24 PROCEDURE — 83735 ASSAY OF MAGNESIUM: CPT | Performed by: EMERGENCY MEDICINE

## 2018-06-24 PROCEDURE — 96374 THER/PROPH/DIAG INJ IV PUSH: CPT | Performed by: EMERGENCY MEDICINE

## 2018-06-24 RX ORDER — SODIUM CHLORIDE 9 MG/ML
1000 INJECTION, SOLUTION INTRAVENOUS ONCE
Status: COMPLETED | OUTPATIENT
Start: 2018-06-24 | End: 2018-06-24

## 2018-06-24 RX ORDER — DIPHENHYDRAMINE HCL 25 MG
25 CAPSULE ORAL
Status: DISCONTINUED | OUTPATIENT
Start: 2018-06-24 | End: 2018-06-26 | Stop reason: HOSPADM

## 2018-06-24 RX ORDER — DIPHENHYDRAMINE HYDROCHLORIDE 50 MG/ML
25 INJECTION, SOLUTION INTRAMUSCULAR; INTRAVENOUS
Status: COMPLETED | OUTPATIENT
Start: 2018-06-24 | End: 2018-06-24

## 2018-06-24 RX ORDER — ONDANSETRON 2 MG/ML
4 INJECTION INTRAMUSCULAR; INTRAVENOUS
Status: DISCONTINUED | OUTPATIENT
Start: 2018-06-24 | End: 2018-06-26 | Stop reason: HOSPADM

## 2018-06-24 RX ORDER — OXYCODONE AND ACETAMINOPHEN 5; 325 MG/1; MG/1
1 TABLET ORAL
Status: DISCONTINUED | OUTPATIENT
Start: 2018-06-24 | End: 2018-06-26 | Stop reason: HOSPADM

## 2018-06-24 RX ORDER — SUCRALFATE 1 G/10ML
1 SUSPENSION ORAL
Status: DISCONTINUED | OUTPATIENT
Start: 2018-06-24 | End: 2018-06-26 | Stop reason: HOSPADM

## 2018-06-24 RX ORDER — METOCLOPRAMIDE HYDROCHLORIDE 5 MG/ML
10 INJECTION INTRAMUSCULAR; INTRAVENOUS
Status: COMPLETED | OUTPATIENT
Start: 2018-06-24 | End: 2018-06-24

## 2018-06-24 RX ORDER — SODIUM CHLORIDE 0.9 % (FLUSH) 0.9 %
5-10 SYRINGE (ML) INJECTION AS NEEDED
Status: DISCONTINUED | OUTPATIENT
Start: 2018-06-24 | End: 2018-06-26 | Stop reason: HOSPADM

## 2018-06-24 RX ORDER — ACETAMINOPHEN 325 MG/1
650 TABLET ORAL
Status: DISCONTINUED | OUTPATIENT
Start: 2018-06-24 | End: 2018-06-26 | Stop reason: HOSPADM

## 2018-06-24 RX ORDER — SODIUM CHLORIDE 9 MG/ML
125 INJECTION, SOLUTION INTRAVENOUS CONTINUOUS
Status: DISCONTINUED | OUTPATIENT
Start: 2018-06-24 | End: 2018-06-26 | Stop reason: HOSPADM

## 2018-06-24 RX ORDER — HALOPERIDOL 5 MG/ML
5 INJECTION INTRAMUSCULAR
Status: COMPLETED | OUTPATIENT
Start: 2018-06-24 | End: 2018-06-24

## 2018-06-24 RX ORDER — SODIUM CHLORIDE 0.9 % (FLUSH) 0.9 %
5-10 SYRINGE (ML) INJECTION EVERY 8 HOURS
Status: DISCONTINUED | OUTPATIENT
Start: 2018-06-24 | End: 2018-06-26 | Stop reason: HOSPADM

## 2018-06-24 RX ADMIN — DIPHENHYDRAMINE HYDROCHLORIDE 25 MG: 50 INJECTION, SOLUTION INTRAMUSCULAR; INTRAVENOUS at 10:15

## 2018-06-24 RX ADMIN — ONDANSETRON 4 MG: 2 INJECTION INTRAMUSCULAR; INTRAVENOUS at 21:34

## 2018-06-24 RX ADMIN — SODIUM CHLORIDE 125 ML/HR: 900 INJECTION, SOLUTION INTRAVENOUS at 16:10

## 2018-06-24 RX ADMIN — SODIUM CHLORIDE 1000 ML: 900 INJECTION, SOLUTION INTRAVENOUS at 11:20

## 2018-06-24 RX ADMIN — OXYCODONE HYDROCHLORIDE AND ACETAMINOPHEN 1 TABLET: 5; 325 TABLET ORAL at 20:14

## 2018-06-24 RX ADMIN — SODIUM CHLORIDE 125 ML/HR: 900 INJECTION, SOLUTION INTRAVENOUS at 21:39

## 2018-06-24 RX ADMIN — SODIUM CHLORIDE 1000 ML: 900 INJECTION, SOLUTION INTRAVENOUS at 10:15

## 2018-06-24 RX ADMIN — HALOPERIDOL LACTATE 5 MG: 5 INJECTION, SOLUTION INTRAMUSCULAR at 11:19

## 2018-06-24 RX ADMIN — Medication 10 ML: at 17:35

## 2018-06-24 RX ADMIN — METOCLOPRAMIDE 10 MG: 5 INJECTION, SOLUTION INTRAMUSCULAR; INTRAVENOUS at 10:15

## 2018-06-24 RX ADMIN — SODIUM CHLORIDE 40 MG: 9 INJECTION INTRAMUSCULAR; INTRAVENOUS; SUBCUTANEOUS at 17:26

## 2018-06-24 RX ADMIN — ACETAMINOPHEN 650 MG: 325 TABLET ORAL at 23:54

## 2018-06-24 RX ADMIN — DIPHENHYDRAMINE HYDROCHLORIDE 25 MG: 25 CAPSULE ORAL at 23:54

## 2018-06-24 RX ADMIN — SUCRALFATE 1 G: 1 SUSPENSION ORAL at 21:34

## 2018-06-24 NOTE — ED PROVIDER NOTES
HPI Comments: Saw this patient yesterday for the same. Patient is a 41 y.o. male presenting with vomiting. The history is provided by the patient. Vomiting    This is a recurrent problem. The current episode started 2 days ago. The problem occurs continuously. The problem has not changed since onset. The emesis has an appearance of stomach contents. There has been no fever. Associated symptoms include abdominal pain. Pertinent negatives include no chills, no fever, no diarrhea, no headaches, no myalgias, no cough and no headaches. Past Medical History:   Diagnosis Date    BETTY (acute kidney injury) ([facility] [address] 8/12/2014    Clostridium difficile colitis 3/20/2011    Gastroparesis 2005    emptying study normal -2006    GERD (gastroesophageal reflux disease)     HIATAL HERNIA SINCE 1999    PUD (peptic ulcer disease) ALL DX IN 2008    ESOPHAGITIS, + H PYLORI       Past Surgical History:   Procedure Laterality Date    COLONOSCOPY  4/08    ESOPHAGITIS, COLONIC ULCER X1    EGD  4/08    H. HERNIA, ULCER X2, H PYLORI,    EGD  2011    h pylori -neg    HX WISDOM TEETH EXTRACTION  2/10/11         Family History:   Problem Relation Age of Onset    Other Mother      L+W    Diabetes Maternal Grandmother     Sickle Cell Anemia Father     Heart Disease Paternal Grandfather     Other Sister      ALL L+W    Other Son      L+W       Social History     Social History    Marital status:      Spouse name: N/A    Number of children: N/A    Years of education: N/A     Occupational History    Not on file.      Social History Main Topics    Smoking status: Current Every Day Smoker     Packs/day: 0.00     Years: 0.00     Types: Cigarettes    Smokeless tobacco: Never Used    Alcohol use No    Drug use: Yes     Special: Marijuana    Sexual activity: Yes     Partners: Female     Other Topics Concern    Not on file     Social History Narrative    3/17/11:  PATIENT LIVES WITH GIRLFRIENALTON WINCHESTER (CELL: [phone]), HER 3YEAR OLD DAUGHTER AND THEIR 3YEAR OLD SON. ALTON IS CURRENTLY 13 WEEKS PREGNANT. PATIENT'S JOB AT [employer] WAS DOWNSIZED ABOUT A YEAR AGO, AND HE HAS BEEN A STAY HOME DAD SINCE. ALTON WORKS IN HOUSEKEEPING POSITION. ALLERGIES: Amoxicillin; Aspirin; Hydrocodone; Ibuprofen; Pcn [penicillins]; and Phenergan [promethazine]    Review of Systems   Constitutional: Negative for chills and fever. HENT: Negative for congestion, rhinorrhea and sore throat. Eyes: Negative for photophobia and redness. Respiratory: Negative for cough and shortness of breath. Cardiovascular: Negative for chest pain and leg swelling. Gastrointestinal: Positive for abdominal pain and vomiting. Negative for diarrhea and nausea. Endocrine: Negative for polydipsia and polyuria. Musculoskeletal: Negative for back pain and myalgias. Neurological: Negative for weakness, numbness and headaches. Vitals:    06/24/18 0939 06/24/18 1200 06/24/18 1259   BP: (!) 142/97 119/74 (!) 154/93   Pulse: 90 87 87   Resp: 20     Temp: 97.7 °F (36.5 °C)     SpO2: 97% 95% 95%   Weight: 67.1 kg (148 lb)              Physical Exam   Constitutional: He is oriented to person, place, and time. He appears well-developed and well-nourished. HENT:   Mouth/Throat: Oropharynx is clear and moist. No oropharyngeal exudate. Eyes: Conjunctivae are normal. Pupils are equal, round, and reactive to light. Neck: Neck supple. Cardiovascular: Normal rate, regular rhythm and normal heart sounds. Pulmonary/Chest: Effort normal and breath sounds normal.   Abdominal: Soft. Bowel sounds are normal. He exhibits no distension. There is no tenderness. There is no rebound and no guarding. Musculoskeletal: Normal range of motion. He exhibits no edema or tenderness. Lymphadenopathy:     He has no cervical adenopathy. Neurological: He is alert and oriented to person, place, and time. Skin: Skin is warm and dry. Nursing note and vitals reviewed. MDM  Number of Diagnoses or Management Options  Diagnosis management comments: Patient has been treated and he has  Failed by mouth challenge. Admit to hospitalist.  Gastroparesis versus cyclical vomiting syndrome with known distal esophagitis.        Amount and/or Complexity of Data Reviewed  Clinical lab tests: ordered and reviewed (Results for orders placed or performed during the hospital encounter of 06/24/18  -CBC WITH AUTOMATED DIFF       Result                                            Value                         Ref Range                       WBC                                               8.3                           4.3 - 11.1 K/uL                 RBC                                               4.80                          4.23 - 5.67 M/uL                HGB                                               14.1                          13.6 - 17.2 g/dL                HCT                                               37.1 (L)                      41.1 - 50.3 %                   MCV                                               77.3 (L)                      79.6 - 97.8 FL                  MCH                                               29.4                          26.1 - 32.9 PG                  MCHC                                              38.0 (H)                      31.4 - 35.0 g/dL                RDW                                               13.4                          11.9 - 14.6 %                   PLATELET                                          212                           150 - 450 K/uL                  MPV                                               9.4 (L)                       10.8 - 14.1 FL                  NEUTROPHILS                                       73                            43 - 78 %                       LYMPHOCYTES                                       19                            13 - 44 % MONOCYTES                                         8                             4.0 - 12.0 %                    EOSINOPHILS                                       0 (L)                         0.5 - 7.8 %                     BASOPHILS                                         0                             0.0 - 2.0 %                     IMMATURE GRANULOCYTES                             0                             0.0 - 5.0 %                     ABS. NEUTROPHILS                                  6.0                           1.7 - 8.2 K/UL                  ABS. LYMPHOCYTES                                  1.6                           0.5 - 4.6 K/UL                  ABS. MONOCYTES                                    0.7                           0.1 - 1.3 K/UL                  ABS. EOSINOPHILS                                  0.0                           0.0 - 0.8 K/UL                  ABS. BASOPHILS                                    0.0                           0.0 - 0.2 K/UL                  ABS. IMM.  GRANS.                                  0.0                           0.0 - 0.5 K/UL                  RBC COMMENTS                                                                                                SLIGHT   ANISOCYTOSIS + POIKILOCYTOSIS          RBC COMMENTS                                      MICROCYTOSIS                                                  PLATELET COMMENTS                                 ADEQUATE                                                      DF                                                AUTOMATED                                                -METABOLIC PANEL, COMPREHENSIVE       Result                                            Value                         Ref Range                       Sodium                                            139                           136 - 145 mmol/L                Potassium                                         3.6 3.5 - 5.1 mmol/L                Chloride                                          105                           98 - 107 mmol/L                 CO2                                               22                            21 - 32 mmol/L                  Anion gap                                         12                            7 - 16 mmol/L                   Glucose                                           105 (H)                       65 - 100 mg/dL                  BUN                                               20                            6 - 23 MG/DL                    Creatinine                                        1.06                          0.8 - 1.5 MG/DL                 GFR est AA                                        >60                           >60 ml/min/1.73m2               GFR est non-AA                                    >60                           >60 ml/min/1.73m2               Calcium                                           9.6                           8.3 - 10.4 MG/DL                Bilirubin, total                                  0.8                           0.2 - 1.1 MG/DL                 ALT (SGPT)                                        34                            12 - 65 U/L                     AST (SGOT)                                        31                            15 - 37 U/L                     Alk. phosphatase                                  116                           50 - 136 U/L                    Protein, total                                    8.8 (H)                       6.3 - 8.2 g/dL                  Albumin                                           4.7                           3.5 - 5.0 g/dL                  Globulin                                          4.1 (H)                       2.3 - 3.5 g/dL                  A-G Ratio                                         1.1 (L)                       1.2 - 3.5                  -MAGNESIUM Result                                            Value                         Ref Range                       Magnesium                                         2.4                           1.8 - 2.4 mg/dL            -URINE MICROSCOPIC       Result                                            Value                         Ref Range                       WBC                                               0-3                           0 /hpf                          RBC                                               5-10                          0 /hpf                          Epithelial cells                                  0-3                           0 /hpf                          Bacteria                                          0                             0 /hpf                          Casts                                             10-20                         0 /lpf                     )          ED Course       Procedures

## 2018-06-24 NOTE — IP AVS SNAPSHOT
[address] 
[phone] Patient: Gui Hansen MRN: [id number] :1975 A check karen indicates which time of day the medication should be taken. My Medications START taking these medications Instructions Each Dose to Equal  
 Morning Noon Evening Bedtime  
 sucralfate 100 mg/mL suspension Commonly known as:  Illa Hoe Your last dose was: Your next dose is: Take 10 mL by mouth four (4) times daily. 1 g CHANGE how you take these medications Instructions Each Dose to Equal  
 Morning Noon Evening Bedtime * pantoprazole 40 mg tablet Commonly known as:  PROTONIX What changed:  Another medication with the same name was added. Make sure you understand how and when to take each. Your last dose was: Your next dose is: Take 1 Tab by mouth daily. Take 1 tab twice daily for the first 2 weeks, then once daily  Indications: Erosive Esophagitis 40 mg  
    
   
   
   
  
 * pantoprazole 40 mg tablet Commonly known as:  PROTONIX What changed: You were already taking a medication with the same name, and this prescription was added. Make sure you understand how and when to take each. Your last dose was: Your next dose is: Take 1 Tab by mouth two (2) times a day. 40 mg  
    
   
   
   
  
 * Notice: This list has 2 medication(s) that are the same as other medications prescribed for you. Read the directions carefully, and ask your doctor or other care provider to review them with you. CONTINUE taking these medications Instructions Each Dose to Equal  
 Morning Noon Evening Bedtime  
 metoclopramide HCl 10 mg tablet Commonly known as:  REGLAN Your last dose was: Your next dose is: Take one tab every 6 hours as needed for nausea/vomiting ondansetron hcl 8 mg tablet Commonly known as:  Michael Maldonado Your last dose was: Your next dose is: Take 1 Tab by mouth every eight (8) hours as needed for Nausea. 8 mg Where to Get Your Medications These medications were sent to [facility]/pharmacy #[id number]- [facility], [place] - [address]  [address], [address] [place] [address] [facility] [address] Phone:  [phone]  
  ondansetron hcl 8 mg tablet  
 pantoprazole 40 mg tablet  
 sucralfate 100 mg/mL suspension

## 2018-06-24 NOTE — CONSULTS
[facility] Consult Note    Referring Physician:  Dr Che Edmondson Date:  6/24/2018    Admit Date:  6/24/2018    Chief Complaint:  Hematemesis     Subjective:     History of Present Illness:  Patient is a 41 y.o. 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 Ana Kwok for [facility]. He was working outside all day 2 days ago and then become sick. Since then, he's had ongoing N/V with discomfort in his lower chest but no abdominal pain. He has a hx of severe erosive esophagitis and has undergone multiple EGDs, last one being in March. GI was consulted STAT for 250cc of maroon hematemesis. He's currently not vomiting. PMH:  Past Medical History:   Diagnosis Date    BETTY (acute kidney injury) ([facility] [address] 8/12/2014    Clostridium difficile colitis 3/20/2011    Gastroparesis 2005    emptying study normal -2006    GERD (gastroesophageal reflux disease)     HIATAL HERNIA SINCE 1999    PUD (peptic ulcer disease) ALL DX IN 2008    ESOPHAGITIS, + H PYLORI       PSH:  Past Surgical History:   Procedure Laterality Date    COLONOSCOPY  4/08    ESOPHAGITIS, COLONIC ULCER X1    EGD  4/08    H. HERNIA, ULCER X2, H PYLORI,    EGD  2011    h pylori -neg    HX WISDOM TEETH EXTRACTION  2/10/11       Allergies: Allergies   Allergen Reactions    Amoxicillin Nausea Only    Aspirin Other (comments)     ulcers    Hydrocodone Rash    Ibuprofen Other (comments)     Hx of ulcers    Pcn [Penicillins] Rash    Phenergan [Promethazine] Nausea and Vomiting and Other (comments)       Home Medications:  Prior to Admission medications    Medication Sig Start Date End Date Taking? Authorizing Provider   metoclopramide HCl (REGLAN) 10 mg tablet Take one tab every 6 hours as needed for nausea/vomiting 6/23/18   Sukhi Crump MD   pantoprazole (PROTONIX) 40 mg tablet Take 1 Tab by mouth daily.  Take 1 tab twice daily for the first 2 weeks, then once daily  Indications: Erosive Esophagitis 3/15/18   Stephanie Fofana MD       [facility] Medications:  Current Facility-Administered Medications   Medication Dose Route Frequency    sodium chloride (NS) flush 5-10 mL  5-10 mL IntraVENous Q8H    sodium chloride (NS) flush 5-10 mL  5-10 mL IntraVENous PRN    acetaminophen (TYLENOL) tablet 650 mg  650 mg Oral Q4H PRN    ondansetron (ZOFRAN) injection 4 mg  4 mg IntraVENous Q4H PRN    0.9% sodium chloride infusion  125 mL/hr IntraVENous CONTINUOUS    oxyCODONE-acetaminophen (PERCOCET) 5-325 mg per tablet 1 Tab  1 Tab Oral Q4H PRN    pantoprazole (PROTONIX) 40 mg in sodium chloride 0.9% 10 mL injection  40 mg IntraVENous Q12H    diphenhydrAMINE (BENADRYL) capsule 25 mg  25 mg Oral Q6H PRN    sucralfate (CARAFATE) 100 mg/mL oral suspension 1 g  1 g Oral AC&HS       Social History:  Social History   Substance Use Topics    Smoking status: Current Every Day Smoker     Packs/day: 0.00     Years: 0.00     Types: Cigarettes    Smokeless tobacco: Never Used    Alcohol use No         Family History:  Family History   Problem Relation Age of Onset    Other Mother      L+W    Diabetes Maternal Grandmother     Sickle Cell Anemia Father     Heart Disease Paternal Grandfather     Other Sister      ALL L+W    Other Son      L+W       Review of Systems:  A detailed 10 system ROS is obtained, with pertinent positives as listed above. All others are negative. Diet:  NPO    Objective:     Physical Exam:  Vitals:  Visit Vitals    BP (!) 159/91    Pulse 88    Temp 97.9 °F (36.6 °C)    Resp 18    Wt 67.1 kg (148 lb)    SpO2 96%    BMI 27.07 kg/m2     Gen:  Pt is alert, cooperative, no acute distress  Skin:  Extremities and face reveal no rashes. No wood erythema. No telangiectasias on the chest wall. HEENT: Sclerae anicteric. No oral ulcers. No abnormal pigmentation of the lips. The neck is supple.   Cardiovascular: Regular rate and rhythm. No murmurs, gallops, or rubs. Respiratory:  Comfortable breathing with no accessory muscle use. Clear breath sounds anteriorly with no wheezes, rales, or rhonchi. GI:  Abdomen nondistended, soft, and nontender. Normal active bowel sounds. No enlargement of the liver or spleen. No masses palpable. Rectal:  Deferred  Musculoskeletal:  No pitting edema of the lower legs. Extremities have good range of motion. No costovertebral tenderness. Neurological:  Gross memory appears intact. Patient is alert and oriented. Laboratory:    Recent Labs      06/24/18   0949  06/23/18   0812   WBC  8.3  7.5   HGB  14.1  15.9   HCT  37.1*  41.9   PLT  212  267   MCV  77.3*  76.7*   NA  139  141   K  3.6  3.7   CL  105  107   CO2  22  18*   BUN  20  22   CREA  1.06  1.41   CA  9.6  9.8   GLU  105*  110*   AP  116  126   SGOT  31  37   ALT  34  34   TBILI  0.8  0.9   LPSE   --   89       Assessment:       Principal Problem:    Intractable nausea and vomiting (6/24/2018)    Active Problems:    Anxiety (4/30/2014)      Tobacco abuse (4/30/2014)      Marijuana abuse (8/12/2014)      Erosive esophagitis (3/11/2018)      Elevated BP without diagnosis of hypertension (6/24/2018)        Plan:        44yo AAM with hx of erosive esophagitis and cyclic 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 Given his hx and presentation, this is likely from his known esophagitis (which can't be treated endoscopically). He also could have developed a Alexandra Lynch tear. Currently, he's stable. - Check Hgb now  - PPI IV BID  - Add Carafate liquid  - Clears  - Will leave NPO after midnight in case the bleeding continues or his Hgb drops.  If he has no further episodes and his Hgb remains stable, then would manage conservatively  - Will follow    Francisco Pritchett MD

## 2018-06-24 NOTE — PROGRESS NOTES
Chaplains have followed patient since 2014  Non Moravian sudheer  Spouse - zoe  Lives in [place]  Anxiety 2014    Melissa Aguila, staff Mauro herrera [address], [address] [facility]  /   [email or web address]

## 2018-06-24 NOTE — IP AVS SNAPSHOT
[address] 
[phone] Patient: Gisell Choi MRN: [id number] :1975 About your hospitalization You were admitted on:  2018 You last received care in the:  [facility] 2 SURGICAL You were discharged on:  2018 Why you were hospitalized Your primary diagnosis was: Intractable Nausea And Vomiting Your diagnoses also included:  Erosive Esophagitis, Marijuana Abuse, Tobacco Abuse, Elevated Bp Without Diagnosis Of Hypertension, Anxiety, Hematemesis, Acute Gastroenteritis, Uncontrolled Hypertension Follow-up Information Follow up With Details Comments Contact Info Marcial El MD   Patient can only remember the practice name and not the physician Junior Brett MD Schedule an appointment as soon as possible for a visit in 1 week  [address] [place] [address] [facility], [place] [address] [phone] Discharge Orders None A check karen indicates which time of day the medication should be taken. My Medications START taking these medications Instructions Each Dose to Equal  
 Morning Noon Evening Bedtime  
 sucralfate 100 mg/mL suspension Commonly known as:  Matibautista Neale Your last dose was: Your next dose is: Take 10 mL by mouth four (4) times daily. 1 g CHANGE how you take these medications Instructions Each Dose to Equal  
 Morning Noon Evening Bedtime * pantoprazole 40 mg tablet Commonly known as:  PROTONIX What changed:  Another medication with the same name was added. Make sure you understand how and when to take each. Your last dose was: Your next dose is: Take 1 Tab by mouth daily. Take 1 tab twice daily for the first 2 weeks, then once daily  Indications: Erosive Esophagitis  40 mg  
    
   
   
   
  
 * pantoprazole 40 mg tablet Commonly known as:  PROTONIX What changed: You were already taking a medication with the same name, and this prescription was added. Make sure you understand how and when to take each. Your last dose was: Your next dose is: Take 1 Tab by mouth two (2) times a day. 40 mg  
    
   
   
   
  
 * Notice: This list has 2 medication(s) that are the same as other medications prescribed for you. Read the directions carefully, and ask your doctor or other care provider to review them with you. CONTINUE taking these medications Instructions Each Dose to Equal  
 Morning Noon Evening Bedtime  
 metoclopramide HCl 10 mg tablet Commonly known as:  REGLAN Your last dose was: Your next dose is: Take one tab every 6 hours as needed for nausea/vomiting  
     
   
   
   
  
 ondansetron hcl 8 mg tablet Commonly known as:  Fiona Toledo Your last dose was: Your next dose is: Take 1 Tab by mouth every eight (8) hours as needed for Nausea. 8 mg Where to Get Your Medications These medications were sent to [facility] #[id number]- [facility], [address]  [address], [address] [place] [address] [facility] [address] Phone:  [phone]  
  ondansetron hcl 8 mg tablet  
 pantoprazole 40 mg tablet  
 sucralfate 100 mg/mL suspension Discharge Instructions Nausea and Vomiting: Care Instructions Your Care Instructions When you are nauseated, you may feel weak and sweaty and notice a lot of saliva in your mouth. Nausea often leads to vomiting. Most of the time you do not need to worry about nausea and vomiting, but they can be signs of other illnesses. Two common causes of nausea and vomiting are stomach flu and food poisoning.  Nausea and vomiting from viral stomach flu will usually start to improve within 24 hours. Nausea and vomiting from food poisoning may last from 12 to 48 hours. The doctor has checked you carefully, but problems can develop later. If you notice any problems or new symptoms, get medical treatment right away. Follow-up care is a key part of your treatment and safety. Be sure to make and go to all appointments, and call your doctor if you are having problems. It's also a good idea to know your test results and keep a list of the medicines you take. How can you care for yourself at home? · To prevent dehydration, drink plenty of fluids, enough so that your urine is light yellow or clear like water. Choose water and other caffeine-free clear liquids until you feel better. If you have kidney, heart, or liver disease and have to limit fluids, talk with your doctor before you increase the amount of fluids you drink. · Rest in bed until you feel better. · When you are able to eat, try clear soups, mild foods, and liquids until all symptoms are gone for 12 to 48 hours. Other good choices include dry toast, crackers, cooked cereal, and gelatin dessert, such as Jell-O. When should you call for help? Call 911 anytime you think you may need emergency care. For example, call if: 
? · You passed out (lost consciousness). ?Call your doctor now or seek immediate medical care if: 
? · You have symptoms of dehydration, such as: ¨ Dry eyes and a dry mouth. ¨ Passing only a little dark urine. ¨ Feeling thirstier than usual.  
? · You have new or worsening belly pain. ? · You have a new or higher fever. ? · You vomit blood or what looks like coffee grounds. ? Watch closely for changes in your health, and be sure to contact your doctor if: 
? · You have ongoing nausea and vomiting. ? · Your vomiting is getting worse. ? · Your vomiting lasts longer than 2 days. ? · You are not getting better as expected. Where can you learn more? Go to http://ora-itzel.info/. Enter 25 [id number] in the search box to learn more about \"Nausea and Vomiting: Care Instructions. \" Current as of: March 20, 2017 Content Version: 11.4 © 5189-7066 [employer]. Care instructions adapted under license by [employer] (which disclaims liability or warranty for this information). If you have questions about a medical condition or this instruction, always ask your healthcare professional. [facility] [address] any warranty or liability for your use of this information. Learning About Cyclic Vomiting Syndrome What is it? An adult or child who has cyclic vomiting syndrome (CVS) has repeated bouts of severe vomiting and nausea. Between the vomiting episodes, the person's health is normal. The cause of CVS isn't known, but it may be related to migraine headaches. What happens when you have CVS? 
CVS causes severe nausea, vomiting, and drooling. You may not be able to walk or talk during an episode. You may look pale. You may have a fever and feel very tired and thirsty. Some people with CVS have belly pain and diarrhea. Bouts of vomiting can last for a few hours or a few days. People with this condition tend to have a typical pattern of attacks. For example, one person may have bouts of CVS 4 times a year and always in the morning. Another person may have 8 bouts a year and always in the evening. Some people with CVS have triggers that set off the vomiting. People have reported an infection, such as a cold, as a trigger. Other triggers include stress, menstrual cycles, and certain foods like chocolate or cheese. Children tend to have more triggers than adults do. How is CVS treated? Treatment is centered around easing the nausea and vomiting. The doctor may prescribe medicine.  
During very bad bouts of vomiting, your doctor may want you to stay in the hospital for a while. You may get fluids through a vein (IV) to prevent dehydration. Dehydration can be serious. Your body needs fluids to make enough blood. Without a good supply of blood, vital organs such as the heart and brain can't work as well as they should. When should you call for help? Call your doctor now or seek immediate medical care if: 
· You have new or worse belly pain. · You have a fever. · You are vomiting. · You cannot pass stools or gas. · You have symptoms of dehydration, such as: ¨ Dry eyes and a dry mouth. ¨ Passing only a little urine. ¨ Feeling thirstier than usual. 
Watch closely for changes in your health, and be sure to contact your doctor if you have any problems. Follow-up care is a key part of your treatment and safety. Be sure to make and go to all appointments, and call your doctor if you are having problems. It's also a good idea to know your test results and keep a list of the medicines you take. Where can you learn more? Go to http://ora-itzel.info/. Enter W668 in the search box to learn more about \"Learning About Cyclic Vomiting Syndrome. \" Current as of: May 12, 2017 Content Version: 11.4 © 6201-0113 [employer]. Care instructions adapted under license by [employer] (which disclaims liability or warranty for this information). If you have questions about a medical condition or this instruction, always ask your healthcare professional. [place] [address] any warranty or liability for your use of this information. Learning About Cyclic Vomiting Syndrome What is it? An adult or child who has cyclic vomiting syndrome (CVS) has repeated bouts of severe vomiting and nausea. Between the vomiting episodes, the person's health is normal. The cause of CVS isn't known, but it may be related to migraine headaches.  
What happens when you have CVS? 
 CVS causes severe nausea, vomiting, and drooling. You may not be able to walk or talk during an episode. You may look pale. You may have a fever and feel very tired and thirsty. Some people with CVS have belly pain and diarrhea. Bouts of vomiting can last for a few hours or a few days. People with this condition tend to have a typical pattern of attacks. For example, one person may have bouts of CVS 4 times a year and always in the morning. Another person may have 8 bouts a year and always in the evening. Some people with CVS have triggers that set off the vomiting. People have reported an infection, such as a cold, as a trigger. Other triggers include stress, menstrual cycles, and certain foods like chocolate or cheese. Children tend to have more triggers than adults do. How is CVS treated? Treatment is centered around easing the nausea and vomiting. The doctor may prescribe medicine. During very bad bouts of vomiting, your doctor may want you to stay in the hospital for a while. You may get fluids through a vein (IV) to prevent dehydration. Dehydration can be serious. Your body needs fluids to make enough blood. Without a good supply of blood, vital organs such as the heart and brain can't work as well as they should. When should you call for help? Call your doctor now or seek immediate medical care if: 
· You have new or worse belly pain. · You have a fever. · You are vomiting. · You cannot pass stools or gas. · You have symptoms of dehydration, such as: ¨ Dry eyes and a dry mouth. ¨ Passing only a little urine. ¨ Feeling thirstier than usual. 
Watch closely for changes in your health, and be sure to contact your doctor if you have any problems. Follow-up care is a key part of your treatment and safety. Be sure to make and go to all appointments, and call your doctor if you are having problems. It's also a good idea to know your test results and keep a list of the medicines you take. Where can you learn more? Go to http://ora-itzel.info/. Enter P967 in the search box to learn more about \"Learning About Cyclic Vomiting Syndrome. \" Current as of: May 12, 2017 Content Version: 11.4 © 0236-4103 [employer] / [employer]. Care instructions adapted under license by [employer] (which disclaims liability or warranty for this information). If you have questions about a medical condition or this instruction, always ask your healthcare professional. [address] any warranty or liability for your use of this information. DISCHARGE SUMMARY from Nurse PATIENT INSTRUCTIONS: 
 
After general anesthesia or intravenous sedation, for 24 hours or while taking prescription Narcotics: · Limit your activities · Do not drive and operate hazardous machinery · Do not make important personal or business decisions · Do  not drink alcoholic beverages · If you have not urinated within 8 hours after discharge, please contact your surgeon on call. Report the following to your surgeon: 
· Excessive pain, swelling, redness or odor of or around the surgical area · Temperature over 100.5 · Nausea and vomiting lasting longer than 4 hours or if unable to take medications · Any signs of decreased circulation or nerve impairment to extremity: change in color, persistent  numbness, tingling, coldness or increase pain · Any questions What to do at Home: 
 
Recommended activity: Activity as tolerated If you experience any of the following symptoms, please call your doctor: Temperature of 100.5 or greater, persistent nausea and vomiting, increased pain, any signs of abnormal bleeding, or any other questions or concerns *  Please give a list of your current medications to your Primary Care Provider.  
 
*  Please update this list whenever your medications are discontinued, doses are 
 changed, or new medications (including over-the-counter products) are added. *  Please carry medication information at all times in case of emergency situations. These are general instructions for a healthy lifestyle: No smoking/ No tobacco products/ Avoid exposure to second hand smoke Surgeon General's Warning:  Quitting smoking now greatly reduces serious risk to your health. Obesity, smoking, and sedentary lifestyle greatly increases your risk for illness A healthy diet, regular physical exercise & weight monitoring are important for maintaining a healthy lifestyle You may be retaining fluid if you have a history of heart failure or if you experience any of the following symptoms:  Weight gain of 3 pounds or more overnight or 5 pounds in a week, increased swelling in our hands or feet or shortness of breath while lying flat in bed. Please call your doctor as soon as you notice any of these symptoms; do not wait until your next office visit. Recognize signs and symptoms of STROKE: 
 
F-face looks uneven A-arms unable to move or move unevenly S-speech slurred or non-existent T-time-call 911 as soon as signs and symptoms begin-DO NOT go Back to bed or wait to see if you get better-TIME IS BRAIN. Warning Signs of HEART ATTACK Call 911 if you have these symptoms: 
? Chest discomfort. Most heart attacks involve discomfort in the center of the chest that lasts more than a few minutes, or that goes away and comes back. It can feel like uncomfortable pressure, squeezing, fullness, or pain. ? Discomfort in other areas of the upper body. Symptoms can include pain or discomfort in one or both arms, the back, neck, jaw, or stomach. ? Shortness of breath with or without chest discomfort. ? Other signs may include breaking out in a cold sweat, nausea, or lightheadedness. Don't wait more than five minutes to call [address]!  Fast action can save your life. Calling 911 is almost always the fastest way to get lifesaving treatment. Emergency Medical Services staff can begin treatment when they arrive  up to an hour sooner than if someone gets to the hospital by car. The discharge information has been reviewed with the patient and spouse. The patient and spouse verbalized understanding. Discharge medications reviewed with the patient and spouse and appropriate educational materials and side effects teaching were provided. ___________________________________________________________________________________________________________________________________ u.sithart Announcement We are excited to announce that we are making your provider's discharge notes available to you in [employer]. You will see these notes when they are completed and signed by the physician that discharged you from your recent hospital stay. If you have any questions or concerns about any information you see in [employer], please call the Health Information Department where you were seen or reach out to your Primary Care Provider for more information about your plan of care. Introducing [facility]! Ronnieadean Saint introduces [employer] patient portal. Now you can access parts of your medical record, email your doctor's office, and request medication refills online. 1. In your internet browser, go to [email or web address] 2. Click on the First Time User? Click Here link in the Sign In box. You will see the New Member Sign Up page. 3. Enter your [employer] Access Code exactly as it appears below. You will not need to use this code after youve completed the sign-up process. If you do not sign up before the expiration date, you must request a new code. · [employer] Access Code: [email or web address] Expires: 9/21/2018  8:06 AM 
 
4.  Enter the last four digits of your Social Security Number (xxxx) and Date of Birth (mm/dd/yyyy) as indicated and click Submit. You will be taken to the next sign-up page. 5. Create a Shutlt ID. This will be your [employer] login ID and cannot be changed, so think of one that is secure and easy to remember. 6. Create a Shutlt password. You can change your password at any time. 7. Enter your Password Reset Question and Answer. This can be used at a later time if you forget your password. 8. Enter your e-mail address. You will receive e-mail notification when new information is available in [address] 9. Click Sign Up. You can now view and download portions of your medical record. 10. Click the Download Summary menu link to download a portable copy of your medical information. If you have questions, please visit the Frequently Asked Questions section of the [employer] website. Remember, [employer] is NOT to be used for urgent needs. For medical emergencies, dial 911. Now available from your iPhone and Android! Introducing Carter Spencer As a Rojulio césar Manzano patient, I wanted to make you aware of our electronic visit tool called Carter Andrejaidenvalentina. Ro Manzano 24/7 allows you to connect within minutes with a medical provider 24 hours a day, seven days a week via a mobile device or tablet or logging into a secure website from your computer. You can access Carter Spencer from anywhere in the [place]. A virtual visit might be right for you when you have a simple condition and feel like you just dont want to get out of bed, or cant get away from work for an appointment, when your regular Ro Jose Juan provider is not available (evenings, weekends or holidays), or when youre out of town and need minor care. Electronic visits cost only $49 and if the Or Jose Juan 24/7 provider determines a prescription is needed to treat your condition, one can be electronically transmitted to a nearby pharmacy*. Please take a moment to enroll today if you have not already done so. The enrollment process is free and takes just a few minutes. To enroll, please download the [employer] 24/7 neri to your tablet or phone, or visit [email or web address] to enroll on your computer. And, as an [address] [place] [address] patient with a [employer] account, the results of your visits will be scanned into your electronic medical record and your primary care provider will be able to view the scanned results. We urge you to continue to see your regular Pippa Arevalo provider for your ongoing medical care. And while your primary care provider may not be the one available when you seek a Carter Straker Translationsjaidenfin virtual visit, the peace of mind you get from getting a real diagnosis real time can be priceless. For more information on Carter Straker Translationsjaidenfin, view our Frequently Asked Questions (FAQs) at [email or web address]. Sincerely, 
 
Anabela Davis MD 
Chief Medical Officer [facility] Stephenie Olivares *:  certain medications cannot be prescribed via [employer] Unresulted tests-please follow up with your PCP on these results Procedure/Test Authorizing Provider CBC W/O DIFF Marixa Barton MD  
 CBC W/O DIFF Mary Yoon MD  
 CBC W/O DIFF Mary Yoon MD  
 CBC WITH AUTOMATED DIFF Wendy Sierra MD  
 CBC WITH AUTOMATED DIFF Radha Vargas MD  
 DRUG SCREEN, Berto Gibson MD  
 HEMOGLOBIN MD Jackie Schaefer MD  
 METABOLIC PANEL, Gopal Barnes MD  
 METABOLIC PANEL, COMPREHENSIVE Radha Vargas MD  
 URINE MICROSCOPIC Radha Vargas MD  
  
Providers Seen During Your Hospitalization Provider Specialty Primary office phone Radha Vargas MD Emergency Medicine [phone] Wendy Sierra MD Family Practice [phone] Your Primary Care Physician (PCP) Primary Care Physician Office Phone Office Fax OTHER, PHYS ** None ** ** None ** You are allergic to the following Allergen Reactions Amoxicillin Nausea Only Aspirin Other (comments)  
 ulcers Hydrocodone Rash Ibuprofen Other (comments) Hx of ulcers Pcn (Penicillins) Rash Phenergan (Promethazine) Nausea and Vomiting Other (comments) Recent Documentation Weight BMI Smoking Status 67.1 kg 27.07 kg/m2 Current Every Day Smoker Emergency Contacts Name Discharge Info Relation Home Work Mobile Marya Llanos  Spouse [3] [phone] Kaela Gusman  Parent [1] [phone] Patient Belongings The following personal items are in your possession at time of discharge: 
  Dental Appliances: None         Home Medications: None   Jewelry: None  Clothing: None    Other Valuables: Cell Phone Discharge Instructions Attachments/References GASTROENTERITIS (ENGLISH) Patient Handouts Gastroenteritis: Care Instructions Your Care Instructions Gastroenteritis is an illness that may cause nausea, vomiting, and diarrhea. It is sometimes called \"stomach flu. \" It can be caused by bacteria or a virus. You will probably begin to feel better in 1 to 2 days. In the meantime, get plenty of rest and make sure you do not become dehydrated. Dehydration occurs when your body loses too much fluid. Follow-up care is a key part of your treatment and safety. Be sure to make and go to all appointments, and call your doctor if you are having problems. It's also a good idea to know your test results and keep a list of the medicines you take. How can you care for yourself at home? · If your doctor prescribed antibiotics, take them as directed. Do not stop taking them just because you feel better. You need to take the full course of antibiotics.  
· Drink plenty of fluids to prevent dehydration, enough so that your urine is light yellow or clear like water. Choose water and other caffeine-free clear liquids until you feel better. If you have kidney, heart, or liver disease and have to limit fluids, talk with your doctor before you increase your fluid intake. · Drink fluids slowly, in frequent, small amounts, because drinking too much too fast can cause vomiting. · Begin eating mild foods, such as dry toast, yogurt, applesauce, bananas, and rice. Avoid spicy, hot, or high-fat foods, and do not drink alcohol or caffeine for a day or two. Do not drink milk or eat ice cream until you are feeling better. How to prevent gastroenteritis · Keep hot foods hot and cold foods cold. · Do not eat meats, dressings, salads, or other foods that have been kept at room temperature for more than 2 hours. · Use a thermometer to check your refrigerator. It should be between 34°F and 40°F. 
· Defrost meats in the refrigerator or microwave, not on the kitchen counter. · Keep your hands and your kitchen clean. Wash your hands, cutting boards, and countertops with hot soapy water frequently. · Cook meat until it is well done. · Do not eat raw eggs or uncooked sauces made with raw eggs. · Do not take chances. If food looks or tastes spoiled, throw it out. When should you call for help? Call 911 anytime you think you may need emergency care. For example, call if: 
? · You vomit blood or what looks like coffee grounds. ? · You passed out (lost consciousness). ? · You pass maroon or very bloody stools. ?Call your doctor now or seek immediate medical care if: 
? · You have severe belly pain. ? · You have signs of needing more fluids. You have sunken eyes, a dry mouth, and pass only a little dark urine. ? · You feel like you are going to faint. ? · You have increased belly pain that does not go away in 1 to 2 days. ? · You have new or increased nausea, or you are vomiting. ? · You have a new or higher fever. ? · Your stools are black and tarlike or have streaks of blood. ? Watch closely for changes in your health, and be sure to contact your doctor if: 
? · You are dizzy or lightheaded. ? · You urinate less than usual, or your urine is dark yellow or brown. ? · You do not feel better with each day that goes by. Where can you learn more? Go to http://ora-itzel.info/. Enter N142 in the search box to learn more about \"Gastroenteritis: Care Instructions. \" Current as of: March 3, 2017 Content Version: 11.4 © 1185-0964 CSR. Care instructions adapted under license by [employer] (which disclaims liability or warranty for this information). If you have questions about a medical condition or this instruction, always ask your healthcare professional. [address] any warranty or liability for your use of this information. Please provide this summary of care documentation to your next provider. Signatures-by signing, you are acknowledging that this After Visit Summary has been reviewed with you and you have received a copy. Patient Signature:  ____________________________________________________________ Date:  ____________________________________________________________  
  
[facility] Provider Signature:  ____________________________________________________________ Date:  ____________________________________________________________

## 2018-06-24 NOTE — H&P
HOSPITALIST H&P/CONSULT  NAME:  Elba Mora   Age:  41 y.o.  :   1975   MRN:   [id number]  PCP: Tana Montero MD  Consulting MD:  Treatment Team: Attending Provider: Eliel Maldonado MD  HPI:   Patient is a 38AWM with h/o erosive esophagitis (last EGD in 3/2018 by Dr. Any Singh) as well as h/o cyclical vomiting syndrome (with chronic MJ use) who present to the ER twice in the last 2 days for intractable nausea and vomiting. Patient does not give much history, as patient's wife answers all of my questions, even when specifically directed toward the patient. Nausea, vomiting, and abdominal pain started on Friday. He apparently ate well on Friday without difficulty. Pt worked outside [facility] [employer] the heat for 14 hours\" on Friday as well. Wife expresses concern that he has \"heat exhaustion,\" \"picked up a virus,\" or \"ate something wrong. \"  Reportedly has not kept anything down including liquids since Friday. He also denies BM since Friday, with last observed BM being \"bright orange\" in color. Wife reports that the patient has a \"burning feeling\" at the bottom of his chest when he tries to drink or eat anything. Reported returned to the ER today when he had bright red blood in his vomit this morning. Patient has only been taking Protonix once a day, but ran out Soosalu little while ago. \"  Reports difficulty with f/u with MDs as he does not have the [employer]. \"    When asked about marijuana usage, his wife reports that he last used marijuana 3 weeks ago. He was counseled about cannabinoid use associated cyclical vomiting. Pt and wife acknowledge this, but wife blames inability to establish with a PCP to start medications for his \"anxiety\" as cause for his MJ usage. When asked about anxiety specifically, he tells me that \"I don't know, but I need it for sleep. \"  He requests \"something to help me sleep\" and \"something for pain\" on admission.     Complete ROS difficult to obtain 2/2 wife answering, but is as stated in HPI or otherwise negative  Past Medical History:   Diagnosis Date    BETTY (acute kidney injury) ([facility] [address] 2014    Clostridium difficile colitis 3/20/2011    Gastroparesis 2005    emptying study normal -2006    GERD (gastroesophageal reflux disease)     HIATAL HERNIA SINCE     PUD (peptic ulcer disease) ALL DX IN     ESOPHAGITIS, + H PYLORI      Past Surgical History:   Procedure Laterality Date    COLONOSCOPY      ESOPHAGITIS, COLONIC ULCER X1    EGD      H. HERNIA, ULCER X2, H PYLORI,    EGD      h pylori -neg    HX WISDOM TEETH EXTRACTION  2/10/11      Prior to Admission Medications   Prescriptions Last Dose Informant Patient Reported? Taking?   metoclopramide HCl (REGLAN) 10 mg tablet   No No   Sig: Take one tab every 6 hours as needed for nausea/vomiting   pantoprazole (PROTONIX) 40 mg tablet   No No   Sig: Take 1 Tab by mouth daily.  Take 1 tab twice daily for the first 2 weeks, then once daily  Indications: Erosive Esophagitis      Facility-Administered Medications: None     Allergies   Allergen Reactions    Amoxicillin Nausea Only    Aspirin Other (comments)     ulcers    Hydrocodone Rash    Ibuprofen Other (comments)     Hx of ulcers    Pcn [Penicillins] Rash    Phenergan [Promethazine] Nausea and Vomiting and Other (comments)      Social History   Substance Use Topics    Smoking status: Current Every Day Smoker     Packs/day: 0.00     Years: 0.00     Types: Cigarettes    Smokeless tobacco: Never Used    Alcohol use No      Family History   Problem Relation Age of Onset    Other Mother      L+W    Diabetes Maternal Grandmother     Sickle Cell Anemia Father     Heart Disease Paternal Grandfather     Other Sister      ALL L+W    Other Son      L+W      Objective:     Visit Vitals    BP (!) 165/97    Pulse 95    Temp 97.7 °F (36.5 °C)    Resp 20    Wt 67.1 kg (148 lb)    SpO2 95%    BMI 27.07 kg/m2      Temp (24hrs), Av.7 °F (36.5 °C), Min:97.7 °F (36.5 °C), Max:97.7 °F (36.5 °C)    Oxygen Therapy  O2 Sat (%): 95 % (06/24/18 1430)  Pulse via Oximetry: 95 beats per minute (06/24/18 1430)  O2 Device: Room air (06/24/18 0923)  Physical Exam:  General:    Resting comfortably in ER bed. Eyes closed for most of interview/exam.  No acute distress. Appears younger than stated age. Head:   Normocephalic, without obvious abnormality, atraumatic. Nose:  Nares normal. No drainage or sinus tenderness. Lungs:   Clear to auscultation bilaterally. No Wheezing or Rhonchi. No rales. Heart:   Regular rate and rhythm,  no murmur, rub or gallop. Abdomen:   Soft. Non-tender. Not distended. Bowel sounds normal.  No guarding or rebound tenderness. Extremities: No cyanosis. No edema. No clubbing  Skin:     Texture, turgor normal. No rashes or lesions. Not Jaundiced  Neurologic: Oriented x 3, no focal deficits   Psych:  Flat affect, does not meet eye contact. Answers briefly. Does not appear anxious. Data Review:   Recent Results (from the past 24 hour(s))   CBC WITH AUTOMATED DIFF    Collection Time: 06/24/18  9:49 AM   Result Value Ref Range    WBC 8.3 4.3 - 11.1 K/uL    RBC 4.80 4.23 - 5.67 M/uL    HGB 14.1 13.6 - 17.2 g/dL    HCT 37.1 (L) 41.1 - 50.3 %    MCV 77.3 (L) 79.6 - 97.8 FL    MCH 29.4 26.1 - 32.9 PG    MCHC 38.0 (H) 31.4 - 35.0 g/dL    RDW 13.4 11.9 - 14.6 %    PLATELET 286 550 - 356 K/uL    MPV 9.4 (L) 10.8 - 14.1 FL    NEUTROPHILS 73 43 - 78 %    LYMPHOCYTES 19 13 - 44 %    MONOCYTES 8 4.0 - 12.0 %    EOSINOPHILS 0 (L) 0.5 - 7.8 %    BASOPHILS 0 0.0 - 2.0 %    IMMATURE GRANULOCYTES 0 0.0 - 5.0 %    ABS. NEUTROPHILS 6.0 1.7 - 8.2 K/UL    ABS. LYMPHOCYTES 1.6 0.5 - 4.6 K/UL    ABS. MONOCYTES 0.7 0.1 - 1.3 K/UL    ABS. EOSINOPHILS 0.0 0.0 - 0.8 K/UL    ABS. BASOPHILS 0.0 0.0 - 0.2 K/UL    ABS. IMM.  GRANS. 0.0 0.0 - 0.5 K/UL    RBC COMMENTS SLIGHT  ANISOCYTOSIS + POIKILOCYTOSIS        RBC COMMENTS MICROCYTOSIS      PLATELET COMMENTS ADEQUATE      DF AUTOMATED     METABOLIC PANEL, COMPREHENSIVE    Collection Time: 06/24/18  9:49 AM   Result Value Ref Range    Sodium 139 136 - 145 mmol/L    Potassium 3.6 3.5 - 5.1 mmol/L    Chloride 105 98 - 107 mmol/L    CO2 22 21 - 32 mmol/L    Anion gap 12 7 - 16 mmol/L    Glucose 105 (H) 65 - 100 mg/dL    BUN 20 6 - 23 MG/DL    Creatinine 1.06 0.8 - 1.5 MG/DL    GFR est AA >60 >60 ml/min/1.73m2    GFR est non-AA >60 >60 ml/min/1.73m2    Calcium 9.6 8.3 - 10.4 MG/DL    Bilirubin, total 0.8 0.2 - 1.1 MG/DL    ALT (SGPT) 34 12 - 65 U/L    AST (SGOT) 31 15 - 37 U/L    Alk. phosphatase 116 50 - 136 U/L    Protein, total 8.8 (H) 6.3 - 8.2 g/dL    Albumin 4.7 3.5 - 5.0 g/dL    Globulin 4.1 (H) 2.3 - 3.5 g/dL    A-G Ratio 1.1 (L) 1.2 - 3.5     MAGNESIUM    Collection Time: 06/24/18  9:49 AM   Result Value Ref Range    Magnesium 2.4 1.8 - 2.4 mg/dL   URINE MICROSCOPIC    Collection Time: 06/24/18 11:23 AM   Result Value Ref Range    WBC 0-3 0 /hpf    RBC 5-10 0 /hpf    Epithelial cells 0-3 0 /hpf    Bacteria 0 0 /hpf    Casts 10-20 0 /lpf     Imaging /Procedures /Studies     Assessment and Plan:      Active Hospital Problems    Diagnosis Date Noted    Intractable nausea and vomiting 06/24/2018    Elevated BP without diagnosis of hypertension 06/24/2018    Erosive esophagitis 03/11/2018    Marijuana abuse 08/12/2014    Tobacco abuse 04/30/2014    Anxiety 04/30/2014       PLAN  Intractable nausea and vomiting  - Likely related to cyclical vomiting syndrome 2/2 marijuana use  - History of gastroparesis in charge, but normal gastric emptying study noted as well  - Could be viral gastroenteritis, but no BM since Friday  - Recent EGD 3 months ago showed erosive esophagitis, has been taking Protonix daily, but \"ran out\" recently  - Will start IV Protonix BID, per wife report of blood in emesis this morning  - Will start Zofran  - IVFs  - Clear liquid diet  - Trend BMP, but no current evidence of BETTY    Erosive Esophagitis  - Admittedly non-compliant  - Restart Protonix  - EGD in March 2018 showed \"severe erosive esophagitis\"  - Will hold off on GI consult pending observed occurrence of hematemesis or drop in hgb    Elevated BP without diagnosis of HTN  - ?  Related to pain or illicit substance use (UDS pending)  - Denies chest pain, SOB, HA    Marijuana Use  - Counseled again on cessation  - UDS pending, wife reports most recent usage was 3 weeks ago  - Likely source of cyclical vomiting syndrome  - Requests something to help him sleep as this is what he uses at home to help    Cigarette Use  - Cessation counseling given    Anxiety  - Pt does not actually complain of this, wife reports this on his behalf  - When specifically asked, he reports that he \"doesn't know\"  - Recommended to try to establish with a PCP  - May need SW help to find PCP    Insomnia  - Requests something for sleep  - Ambien \"doesn't work\"  - Ativan has \"opposite effect\"  - Try Benadryl prn        Anticipated discharge: tomorrow    Signed By: Piyush Arroyo MD     June 24, 2018

## 2018-06-24 NOTE — ED TRIAGE NOTES
Pt complains of vomiting now with blood in it. Pt seen here yesterday for vomiting and sent home with Reglan.

## 2018-06-25 LAB
ANION GAP SERPL CALC-SCNC: 12 MMOL/L (ref 7–16)
BASOPHILS # BLD: 0 K/UL (ref 0–0.2)
BASOPHILS NFR BLD: 0 % (ref 0–2)
BUN SERPL-MCNC: 13 MG/DL (ref 6–23)
CALCIUM SERPL-MCNC: 8.4 MG/DL (ref 8.3–10.4)
CHLORIDE SERPL-SCNC: 106 MMOL/L (ref 98–107)
CO2 SERPL-SCNC: 22 MMOL/L (ref 21–32)
CREAT SERPL-MCNC: 0.77 MG/DL (ref 0.8–1.5)
DIFFERENTIAL METHOD BLD: ABNORMAL
EOSINOPHIL # BLD: 0 K/UL (ref 0–0.8)
EOSINOPHIL NFR BLD: 0 % (ref 0.5–7.8)
ERYTHROCYTE [DISTWIDTH] IN BLOOD BY AUTOMATED COUNT: 13.2 % (ref 11.9–14.6)
ERYTHROCYTE [DISTWIDTH] IN BLOOD BY AUTOMATED COUNT: 13.2 % (ref 11.9–14.6)
GLUCOSE SERPL-MCNC: 88 MG/DL (ref 65–100)
HCT VFR BLD AUTO: 37.7 % (ref 41.1–50.3)
HCT VFR BLD AUTO: 37.8 % (ref 41.1–50.3)
HGB BLD-MCNC: 14 G/DL (ref 13.6–17.2)
HGB BLD-MCNC: 14.2 G/DL (ref 13.6–17.2)
IMM GRANULOCYTES # BLD: 0 K/UL (ref 0–0.5)
IMM GRANULOCYTES NFR BLD AUTO: 0 % (ref 0–5)
LYMPHOCYTES # BLD: 1.8 K/UL (ref 0.5–4.6)
LYMPHOCYTES NFR BLD: 21 % (ref 13–44)
MCH RBC QN AUTO: 29.4 PG (ref 26.1–32.9)
MCH RBC QN AUTO: 29.6 PG (ref 26.1–32.9)
MCHC RBC AUTO-ENTMCNC: 37.1 G/DL (ref 31.4–35)
MCHC RBC AUTO-ENTMCNC: 37.6 G/DL (ref 31.4–35)
MCV RBC AUTO: 78.9 FL (ref 79.6–97.8)
MCV RBC AUTO: 79.2 FL (ref 79.6–97.8)
MONOCYTES # BLD: 1 K/UL (ref 0.1–1.3)
MONOCYTES NFR BLD: 12 % (ref 4–12)
NEUTS SEG # BLD: 5.7 K/UL (ref 1.7–8.2)
NEUTS SEG NFR BLD: 67 % (ref 43–78)
PLATELET # BLD AUTO: 287 K/UL (ref 150–450)
PLATELET # BLD AUTO: 305 K/UL (ref 150–450)
PMV BLD AUTO: 9 FL (ref 10.8–14.1)
PMV BLD AUTO: 9.2 FL (ref 10.8–14.1)
POTASSIUM SERPL-SCNC: 3.3 MMOL/L (ref 3.5–5.1)
RBC # BLD AUTO: 4.76 M/UL (ref 4.23–5.67)
RBC # BLD AUTO: 4.79 M/UL (ref 4.23–5.67)
SODIUM SERPL-SCNC: 140 MMOL/L (ref 136–145)
WBC # BLD AUTO: 8.3 K/UL (ref 4.3–11.1)
WBC # BLD AUTO: 8.5 K/UL (ref 4.3–11.1)

## 2018-06-25 PROCEDURE — 74011250636 HC RX REV CODE- 250/636: Performed by: FAMILY MEDICINE

## 2018-06-25 PROCEDURE — 80048 BASIC METABOLIC PNL TOTAL CA: CPT | Performed by: FAMILY MEDICINE

## 2018-06-25 PROCEDURE — 74011250637 HC RX REV CODE- 250/637: Performed by: INTERNAL MEDICINE

## 2018-06-25 PROCEDURE — 74011250637 HC RX REV CODE- 250/637: Performed by: FAMILY MEDICINE

## 2018-06-25 PROCEDURE — 99218 HC RM OBSERVATION: CPT

## 2018-06-25 PROCEDURE — 85025 COMPLETE CBC W/AUTO DIFF WBC: CPT | Performed by: FAMILY MEDICINE

## 2018-06-25 PROCEDURE — 74011000250 HC RX REV CODE- 250: Performed by: FAMILY MEDICINE

## 2018-06-25 PROCEDURE — 85027 COMPLETE CBC AUTOMATED: CPT | Performed by: FAMILY MEDICINE

## 2018-06-25 PROCEDURE — 77030020255 HC SOL INJ LR 1000ML BG

## 2018-06-25 PROCEDURE — 77030020263 HC SOL INJ SOD CL0.9% LFCR 1000ML

## 2018-06-25 PROCEDURE — 36415 COLL VENOUS BLD VENIPUNCTURE: CPT | Performed by: FAMILY MEDICINE

## 2018-06-25 PROCEDURE — C9113 INJ PANTOPRAZOLE SODIUM, VIA: HCPCS | Performed by: FAMILY MEDICINE

## 2018-06-25 RX ORDER — ONDANSETRON HYDROCHLORIDE 8 MG/1
8 TABLET, FILM COATED ORAL
Status: ON HOLD | COMMUNITY
End: 2018-06-26

## 2018-06-25 RX ORDER — POTASSIUM CHLORIDE 20 MEQ/1
40 TABLET, EXTENDED RELEASE ORAL
Status: COMPLETED | OUTPATIENT
Start: 2018-06-25 | End: 2018-06-25

## 2018-06-25 RX ADMIN — SUCRALFATE 1 G: 1 SUSPENSION ORAL at 06:21

## 2018-06-25 RX ADMIN — ONDANSETRON 4 MG: 2 INJECTION INTRAMUSCULAR; INTRAVENOUS at 14:13

## 2018-06-25 RX ADMIN — POTASSIUM CHLORIDE 40 MEQ: 1500 TABLET, EXTENDED RELEASE ORAL at 09:49

## 2018-06-25 RX ADMIN — SODIUM CHLORIDE 125 ML/HR: 900 INJECTION, SOLUTION INTRAVENOUS at 06:23

## 2018-06-25 RX ADMIN — SODIUM CHLORIDE 40 MG: 9 INJECTION INTRAMUSCULAR; INTRAVENOUS; SUBCUTANEOUS at 06:21

## 2018-06-25 RX ADMIN — OXYCODONE HYDROCHLORIDE AND ACETAMINOPHEN 1 TABLET: 5; 325 TABLET ORAL at 04:06

## 2018-06-25 RX ADMIN — SUCRALFATE 1 G: 1 SUSPENSION ORAL at 10:53

## 2018-06-25 RX ADMIN — SODIUM CHLORIDE 40 MG: 9 INJECTION INTRAMUSCULAR; INTRAVENOUS; SUBCUTANEOUS at 17:16

## 2018-06-25 RX ADMIN — SODIUM CHLORIDE 125 ML/HR: 900 INJECTION, SOLUTION INTRAVENOUS at 15:15

## 2018-06-25 RX ADMIN — ONDANSETRON 4 MG: 2 INJECTION INTRAMUSCULAR; INTRAVENOUS at 09:49

## 2018-06-25 RX ADMIN — SODIUM CHLORIDE 125 ML/HR: 900 INJECTION, SOLUTION INTRAVENOUS at 23:42

## 2018-06-25 RX ADMIN — SUCRALFATE 1 G: 1 SUSPENSION ORAL at 22:23

## 2018-06-25 RX ADMIN — SUCRALFATE 1 G: 1 SUSPENSION ORAL at 16:33

## 2018-06-25 NOTE — PROGRESS NOTES
END OF SHIFT NOTE:    INTAKE/OUTPUT  06/24 0701 - 06/25 0700  In: -   Out: 400 [Urine:400]  Voiding: YES  Catheter: NO  Drain:              Flatus: Patient does have flatus present. Stool:  0 occurrences. Characteristics:       Emesis: 0 occurrences. Characteristics:        VITAL SIGNS  Patient Vitals for the past 12 hrs:   Temp Pulse Resp BP SpO2   06/24/18 2249 99 °F (37.2 °C) (!) 102 18 129/76 95 %   06/24/18 1918 98.3 °F (36.8 °C) (!) 104 18 (!) 161/102 93 %       Pain Assessment  Pain Intensity 1: 3 (06/25/18 0406)  Pain Location 1: Abdomen          Ambulating  Not oob tjhis shift. Nausea better. Able to sleep intermittently. States \"not really in pain, just discomfort. \"    Shift report given to oncoming nurse at the bedside.     Sonido Tejada RN

## 2018-06-25 NOTE — PROGRESS NOTES
Hospitalist Progress Note     Admit Date:  2018  9:42 AM   Name:  Chelita Wong   Age:  41 y.o.  :  1975   MRN:  [id number]   PCP:  Cherrie Sears MD  Treatment Team: Attending Provider: Mary Bellamy MD; Consulting Provider: Maine Irvin MD; Utilization Review: Jing Castro RN    Subjective:   From H&P \" Patient is a 61JRC with h/o erosive esophagitis (last EGD in 3/2018 by Dr. Santi Gaitan) as well as h/o cyclical vomiting syndrome (with chronic MJ use) who present to the ER twice in the last 2 days for intractable nausea and vomiting. Patient does not give much history, as patient's wife answers all of my questions, even when specifically directed toward the patient. Nausea, vomiting, and abdominal pain started on Friday. He apparently ate well on Friday without difficulty. Pt worked outside [facility] [employer] the heat for 14 hours\" on Friday as well. Wife expresses concern that he has \"heat exhaustion,\" \"picked up a virus,\" or \"ate something wrong. \"  Reportedly has not kept anything down including liquids since Friday. He also denies BM since Friday, with last observed BM being \"bright orange\" in color. Wife reports that the patient has a \"burning feeling\" at the bottom of his chest when he tries to drink or eat anything. Reported returned to the ER today when he had bright red blood in his vomit this morning.   Patient has only been taking Protonix once a day, but ran out \"a little while ago. \"  Reports difficulty with f/u with MDs as he does not have the [employer]. \"   When asked about marijuana usage, his wife reports that he last used marijuana 3 weeks ago. He was counseled about cannabinoid use associated cyclical vomiting. Pt and wife acknowledge this, but wife blames inability to establish with a PCP to start medications for his \"anxiety\" as cause for his MJ usage. When asked about anxiety specifically, he tells me that \"I don't know, but I need it for sleep. \"  He requests \"something to help me sleep\" and \"something for pain\" on admission. \"    Pt seen and examined. He was lying in bed. Reports feeling better in general. Nausea and vomiting improved. No fever, chills, CP, or urinary symptoms. Objective:   Patient Vitals for the past 24 hrs:   Temp Pulse Resp BP SpO2   06/25/18 1523 98.3 °F (36.8 °C) 92 16 126/85 97 %   06/25/18 1128 98.2 °F (36.8 °C) 84 17 (!) 150/94 95 %   06/25/18 0720 98.3 °F (36.8 °C) 73 18 (!) 150/92 95 %   06/25/18 0452 98.6 °F (37 °C) 79 18 158/90 95 %   06/24/18 2249 99 °F (37.2 °C) (!) 102 18 129/76 95 %   06/24/18 1918 98.3 °F (36.8 °C) (!) 104 18 (!) 161/102 93 %     Oxygen Therapy  O2 Sat (%): 97 % (06/25/18 1523)  Pulse via Oximetry: 95 beats per minute (06/24/18 1430)  O2 Device: Room air (06/25/18 0815)    Intake/Output Summary (Last 24 hours) at 06/25/18 1648  Last data filed at 06/25/18 1523   Gross per 24 hour   Intake             2799 ml   Output             1725 ml   Net             1074 ml       Physical Exam:  General:                    Resting comfortably in ER bed. Eyes closed for most of interview/exam.  No acute distress. Appears younger than stated age. Head:                                   Normocephalic, without obvious abnormality, atraumatic. Nose:                                   Nares normal. No drainage or sinus tenderness. Lungs:                       Clear to auscultation bilaterally. No Wheezing or Rhonchi. No rales. Heart:                                  Regular rate and rhythm,  no murmur, rub or gallop. Abdomen:                  Soft. Non-tender. Not distended. Bowel sounds normal.  No guarding or rebound tenderness. Extremities:               No cyanosis. No edema. No clubbing  Skin:                                    Texture, turgor normal. No rashes or lesions.   Not Jaundiced  Neurologic:                Oriented x 3, no focal deficits   Psych:                                 Flat affect, does not meet eye contact. Answers briefly. Does not appear anxious. Data Review:  I have reviewed all labs, meds, telemetry events, and studies from the last 24 hours. Recent Results (from the past 24 hour(s))   HEMOGLOBIN    Collection Time: 06/24/18  6:21 PM   Result Value Ref Range    HGB 14.8 13.6 - 17.2 g/dL   CBC W/O DIFF    Collection Time: 06/24/18  8:33 PM   Result Value Ref Range    WBC 8.2 4.3 - 11.1 K/uL    RBC 4.40 4.23 - 5.67 M/uL    HGB 12.9 (L) 13.6 - 17.2 g/dL    HCT 34.6 (L) 41.1 - 50.3 %    MCV 78.6 (L) 79.6 - 97.8 FL    MCH 29.3 26.1 - 32.9 PG    MCHC 37.3 (H) 31.4 - 35.0 g/dL    RDW 13.3 11.9 - 14.6 %    PLATELET 963 421 - 211 K/uL    MPV 9.6 (L) 10.8 - 73.4 FL   METABOLIC PANEL, BASIC    Collection Time: 06/25/18  5:15 AM   Result Value Ref Range    Sodium 140 136 - 145 mmol/L    Potassium 3.3 (L) 3.5 - 5.1 mmol/L    Chloride 106 98 - 107 mmol/L    CO2 22 21 - 32 mmol/L    Anion gap 12 7 - 16 mmol/L    Glucose 88 65 - 100 mg/dL    BUN 13 6 - 23 MG/DL    Creatinine 0.77 (L) 0.8 - 1.5 MG/DL    GFR est AA >60 >60 ml/min/1.73m2    GFR est non-AA >60 >60 ml/min/1.73m2    Calcium 8.4 8.3 - 10.4 MG/DL   CBC WITH AUTOMATED DIFF    Collection Time: 06/25/18  5:15 AM   Result Value Ref Range    WBC 8.5 4.3 - 11.1 K/uL    RBC 4.76 4.23 - 5.67 M/uL    HGB 14.0 13.6 - 17.2 g/dL    HCT 37.7 (L) 41.1 - 50.3 %    MCV 79.2 (L) 79.6 - 97.8 FL    MCH 29.4 26.1 - 32.9 PG    MCHC 37.1 (H) 31.4 - 35.0 g/dL    RDW 13.2 11.9 - 14.6 %    PLATELET 370 626 - 001 K/uL    MPV 9.2 (L) 10.8 - 14.1 FL    DF AUTOMATED      NEUTROPHILS 67 43 - 78 %    LYMPHOCYTES 21 13 - 44 %    MONOCYTES 12 4.0 - 12.0 %    EOSINOPHILS 0 (L) 0.5 - 7.8 %    BASOPHILS 0 0.0 - 2.0 %    IMMATURE GRANULOCYTES 0 0.0 - 5.0 %    ABS. NEUTROPHILS 5.7 1.7 - 8.2 K/UL    ABS. LYMPHOCYTES 1.8 0.5 - 4.6 K/UL    ABS. MONOCYTES 1.0 0.1 - 1.3 K/UL    ABS. EOSINOPHILS 0.0 0.0 - 0.8 K/UL    ABS. BASOPHILS 0.0 0.0 - 0.2 K/UL    ABS. IMM.  GRANS. 0.0 0.0 - 0.5 K/UL   CBC W/O DIFF    Collection Time: 06/25/18 12:03 PM   Result Value Ref Range    WBC 8.3 4.3 - 11.1 K/uL    RBC 4.79 4.23 - 5.67 M/uL    HGB 14.2 13.6 - 17.2 g/dL    HCT 37.8 (L) 41.1 - 50.3 %    MCV 78.9 (L) 79.6 - 97.8 FL    MCH 29.6 26.1 - 32.9 PG    MCHC 37.6 (H) 31.4 - 35.0 g/dL    RDW 13.2 11.9 - 14.6 %    PLATELET 023 332 - 464 K/uL    MPV 9.0 (L) 10.8 - 14.1 FL        All Micro Results     None          Current Meds:  Current Facility-Administered Medications   Medication Dose Route Frequency    sodium chloride (NS) flush 5-10 mL  5-10 mL IntraVENous Q8H    sodium chloride (NS) flush 5-10 mL  5-10 mL IntraVENous PRN    acetaminophen (TYLENOL) tablet 650 mg  650 mg Oral Q4H PRN    ondansetron (ZOFRAN) injection 4 mg  4 mg IntraVENous Q4H PRN    0.9% sodium chloride infusion  125 mL/hr IntraVENous CONTINUOUS    oxyCODONE-acetaminophen (PERCOCET) 5-325 mg per tablet 1 Tab  1 Tab Oral Q4H PRN    pantoprazole (PROTONIX) 40 mg in sodium chloride 0.9% 10 mL injection  40 mg IntraVENous Q12H    diphenhydrAMINE (BENADRYL) capsule 25 mg  25 mg Oral Q6H PRN    sucralfate (CARAFATE) 100 mg/mL oral suspension 1 g  1 g Oral AC&HS       Other Studies (last 24 hours):  No results found.         Assessment and Plan:     Hospital Problems as of 6/25/2018  Date Reviewed: 7/11/2016          Codes Class Noted - Resolved POA    * (Principal)Intractable nausea and vomiting ICD-10-CM: R11.2  ICD-9-CM: 536.2  6/24/2018 - Present Unknown        Elevated BP without diagnosis of hypertension ICD-10-CM: R03.0  ICD-9-CM: 796.2  6/24/2018 - Present Unknown        Erosive esophagitis ICD-10-CM: K22.10  ICD-9-CM: 530.19  3/11/2018 - Present Yes        Marijuana abuse (Chronic) ICD-10-CM: F12.10  ICD-9-CM: 305.20  8/12/2014 - Present Yes        Anxiety (Chronic) ICD-10-CM: F41.9  ICD-9-CM: 300.00  4/30/2014 - Present Yes        Tobacco abuse (Chronic) ICD-10-CM: Z72.0  ICD-9-CM: 305.1  4/30/2014 - Present Yes            PLAN  Intractable nausea and vomiting  - Likely related to cyclical vomiting syndrome 2/2 marijuana use  - History of gastroparesis in charge, but normal gastric emptying study noted as well  - Could be viral gastroenteritis, but no BM since Friday  - Recent EGD 3 months ago showed erosive esophagitis, has been taking Protonix daily, but \"ran out\" recently  - Will start IV Protonix BID, per wife report of blood in emesis this morning  - Will start Zofran  - IVFs  - Clear liquid diet  - Trend BMP, but no current evidence of BETTY  - GI service following.      Erosive Esophagitis  - Admittedly non-compliant  - Restart Protonix  - EGD in March 2018 showed \"severe erosive esophagitis\"  - GI following.      Elevated BP without diagnosis of HTN  - ? Related to pain or illicit substance use (UDS pending)  - Denies chest pain, SOB, HA     Marijuana Use  - Counseled again on cessation  - UDS pending, wife reports most recent usage was 3 weeks ago  - Likely source of cyclical vomiting syndrome  - Requests something to help him sleep as this is what he uses at home to help     Cigarette Use  - Cessation counseling given     Anxiety  - Pt does not actually complain of this, wife reports this on his behalf  - When specifically asked, he reports that he \"doesn't know\"  - Recommended to try to establish with a PCP  - May need SW help to find PCP     Insomnia  - Requests something for sleep  - Ambien \"doesn't work\"  - Ativan has \"opposite effect\"  - Try Benadryl prn     Signed:   Bree Lopez MD

## 2018-06-25 NOTE — PROGRESS NOTES
GI DAILY PROGRESS NOTE    Admit Date:  6/24/2018    Today's Date:  6/25/2018    CC:  N/V, hematemesis    Subjective:     Patient is awake and appears comfortable. He denies any abdominal pain, nausea, or vomiting this morning. Reports decreased appetite. Last BM was 2 days ago. Denies any recent melena/hematochezia. Had the 1 episode with maroon emesis yesterday. Medications:   Current Facility-Administered Medications   Medication Dose Route Frequency    sodium chloride (NS) flush 5-10 mL  5-10 mL IntraVENous Q8H    sodium chloride (NS) flush 5-10 mL  5-10 mL IntraVENous PRN    acetaminophen (TYLENOL) tablet 650 mg  650 mg Oral Q4H PRN    ondansetron (ZOFRAN) injection 4 mg  4 mg IntraVENous Q4H PRN    0.9% sodium chloride infusion  125 mL/hr IntraVENous CONTINUOUS    oxyCODONE-acetaminophen (PERCOCET) 5-325 mg per tablet 1 Tab  1 Tab Oral Q4H PRN    pantoprazole (PROTONIX) 40 mg in sodium chloride 0.9% 10 mL injection  40 mg IntraVENous Q12H    diphenhydrAMINE (BENADRYL) capsule 25 mg  25 mg Oral Q6H PRN    sucralfate (CARAFATE) 100 mg/mL oral suspension 1 g  1 g Oral AC&HS       Review of Systems:  ROS was obtained, with pertinent positives as listed above. No chest pain or SOB. Diet:  NPO    Objective:   Vitals:  Visit Vitals    BP (!) 150/92    Pulse 73    Temp 98.3 °F (36.8 °C)    Resp 18    Wt 67.1 kg (148 lb)    SpO2 95%    BMI 27.07 kg/m2     Intake/Output:  06/25 0701 - 06/25 1900  In: 191 [I.V.:191]  Out: 275 [Urine:275]  06/23 1901 - 06/25 0700  In: 4057 [I.V.:1720]  Out: 400 [Urine:400]  Exam:  General appearance: alert, cooperative, no distress  Lungs: clear to auscultation bilaterally anteriorly  Heart: regular rate and rhythm  Abdomen: soft, non-tender.  Bowel sounds normal. No masses, no organomegaly  Extremities: extremities normal, atraumatic, no cyanosis or edema  Neuro:  alert and oriented    Data Review (Labs):    Recent Labs      06/25/18   0515  06/24/18   2038 06/24/18   1821  06/24/18   0949  06/23/18   0812   WBC  8.5  8.2   --   8.3  7.5   HGB  14.0  12.9*  14.8  14.1  15.9   HCT  37.7*  34.6*   --   37.1*  41.9   PLT  305  224   --   212  267   MCV  79.2*  78.6*   --   77.3*  76.7*   NA  140   --    --   139  141   K  3.3*   --    --   3.6  3.7   CL  106   --    --   105  107   CO2  22   --    --   22  18*   BUN  13   --    --   20  22   CREA  0.77*   --    --   1.06  1.41   CA  8.4   --    --   9.6  9.8   MG   --    --    --   2.4   --    GLU  88   --    --   105*  110*   AP   --    --    --   116  126   SGOT   --    --    --   31  37   ALT   --    --    --   34  34   TBILI   --    --    --   0.8  0.9   ALB   --    --    --   4.7  4.5   TP   --    --    --   8.8*  8.7*   LPSE   --    --    --    --   89       Assessment:     Principal Problem:    Intractable nausea and vomiting (6/24/2018)    Active Problems:    Anxiety (4/30/2014)      Tobacco abuse (4/30/2014)      Marijuana abuse (8/12/2014)      Erosive esophagitis (3/11/2018)      Elevated BP without diagnosis of hypertension (6/24/2018)             44yo AAM with hx of erosive esophagitis and marijuana induced cyclic 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 Given his hx and presentation, this is likely from his known esophagitis (which can't be treated endoscopically). He has a hx of severe erosive esophagitis and has undergone multiple EGDs, last one being in March. He also could have developed a Alexandra Lynch tear. 6/24/18: HGB 12.9  6/25/18: HGB 14.0  Plan:     1. PPI IV BID  2. Follow HGB  3. Continue with Carafate liquid  4. Clear liquid diet, nothing red. Advance as tolerated. Clydie Shiloh, NP    Patient is seen and examined in collaboration with Dr. Alexa Vigil. Assessment and plan as per Dr. Alexa Vigil. GI--Patient seen and examined. Agree with above. .  Hb stable, no overt bleeding and tolerating clear liquids. No endoscopy planned for now.   Thanks Ethan Owusu Paresh Oscar MD

## 2018-06-26 VITALS
WEIGHT: 148 LBS | SYSTOLIC BLOOD PRESSURE: 134 MMHG | DIASTOLIC BLOOD PRESSURE: 90 MMHG | RESPIRATION RATE: 16 BRPM | TEMPERATURE: 99.7 F | BODY MASS INDEX: 27.07 KG/M2 | OXYGEN SATURATION: 97 % | HEART RATE: 56 BPM

## 2018-06-26 PROBLEM — K92.0 HEMATEMESIS: Status: ACTIVE | Noted: 2018-06-26

## 2018-06-26 PROBLEM — I10 UNCONTROLLED HYPERTENSION: Status: ACTIVE | Noted: 2018-06-26

## 2018-06-26 PROBLEM — K52.9 ACUTE GASTROENTERITIS: Status: ACTIVE | Noted: 2018-06-26

## 2018-06-26 LAB
ERYTHROCYTE [DISTWIDTH] IN BLOOD BY AUTOMATED COUNT: 13.2 % (ref 11.9–14.6)
HCT VFR BLD AUTO: 40.6 % (ref 41.1–50.3)
HGB BLD-MCNC: 15.3 G/DL (ref 13.6–17.2)
MCH RBC QN AUTO: 29.7 PG (ref 26.1–32.9)
MCHC RBC AUTO-ENTMCNC: 37.7 G/DL (ref 31.4–35)
MCV RBC AUTO: 78.8 FL (ref 79.6–97.8)
PLATELET # BLD AUTO: 303 K/UL (ref 150–450)
PMV BLD AUTO: 9.1 FL (ref 10.8–14.1)
RBC # BLD AUTO: 5.15 M/UL (ref 4.23–5.67)
WBC # BLD AUTO: 6.7 K/UL (ref 4.3–11.1)

## 2018-06-26 PROCEDURE — 74011250637 HC RX REV CODE- 250/637: Performed by: INTERNAL MEDICINE

## 2018-06-26 PROCEDURE — C9113 INJ PANTOPRAZOLE SODIUM, VIA: HCPCS | Performed by: FAMILY MEDICINE

## 2018-06-26 PROCEDURE — 77030020263 HC SOL INJ SOD CL0.9% LFCR 1000ML

## 2018-06-26 PROCEDURE — 74011000250 HC RX REV CODE- 250: Performed by: FAMILY MEDICINE

## 2018-06-26 PROCEDURE — 74011250636 HC RX REV CODE- 250/636: Performed by: FAMILY MEDICINE

## 2018-06-26 PROCEDURE — 85027 COMPLETE CBC AUTOMATED: CPT | Performed by: INTERNAL MEDICINE

## 2018-06-26 PROCEDURE — 36415 COLL VENOUS BLD VENIPUNCTURE: CPT | Performed by: INTERNAL MEDICINE

## 2018-06-26 PROCEDURE — 99218 HC RM OBSERVATION: CPT

## 2018-06-26 PROCEDURE — 65270000029 HC RM PRIVATE

## 2018-06-26 RX ORDER — PANTOPRAZOLE SODIUM 40 MG/1
40 TABLET, DELAYED RELEASE ORAL 2 TIMES DAILY
Status: DISCONTINUED | OUTPATIENT
Start: 2018-06-26 | End: 2018-06-26 | Stop reason: HOSPADM

## 2018-06-26 RX ORDER — PANTOPRAZOLE SODIUM 40 MG/1
40 TABLET, DELAYED RELEASE ORAL 2 TIMES DAILY
Qty: 60 TAB | Refills: 1 | Status: SHIPPED | OUTPATIENT
Start: 2018-06-26 | End: 2018-12-09

## 2018-06-26 RX ORDER — ONDANSETRON HYDROCHLORIDE 8 MG/1
8 TABLET, FILM COATED ORAL
Qty: 20 TAB | Refills: 0 | Status: SHIPPED | OUTPATIENT
Start: 2018-06-26 | End: 2018-12-14

## 2018-06-26 RX ORDER — SUCRALFATE 1 G/10ML
1 SUSPENSION ORAL 4 TIMES DAILY
Qty: 1200 ML | Refills: 0 | Status: SHIPPED | OUTPATIENT
Start: 2018-06-26 | End: 2019-03-01

## 2018-06-26 RX ADMIN — SUCRALFATE 1 G: 1 SUSPENSION ORAL at 17:22

## 2018-06-26 RX ADMIN — SUCRALFATE 1 G: 1 SUSPENSION ORAL at 07:53

## 2018-06-26 RX ADMIN — SODIUM CHLORIDE 40 MG: 9 INJECTION INTRAMUSCULAR; INTRAVENOUS; SUBCUTANEOUS at 05:49

## 2018-06-26 RX ADMIN — SUCRALFATE 1 G: 1 SUSPENSION ORAL at 11:32

## 2018-06-26 RX ADMIN — Medication 10 ML: at 05:48

## 2018-06-26 RX ADMIN — ONDANSETRON 4 MG: 2 INJECTION INTRAMUSCULAR; INTRAVENOUS at 03:19

## 2018-06-26 RX ADMIN — PANTOPRAZOLE SODIUM 40 MG: 40 TABLET, DELAYED RELEASE ORAL at 17:22

## 2018-06-26 NOTE — PROGRESS NOTES
Patient with no c/o nausea or vomiting. Requesting to go home today. Dr Lamar Coffman paged to notify of patient's request. MD okay with discharge with patient tolerates GI soft diet at dinner. Patient informed and agreed with plan.

## 2018-06-26 NOTE — DISCHARGE SUMMARY
Hospitalist Discharge Summary     Admit Date:  2018  9:42 AM   Name:  Fred Rogers   Age:  41 y.o.  :  1975   MRN:  [id number]   PCP:  Yashira Swartz MD  Treatment Team: Attending Provider: Jw Herrera MD; Utilization Review: Heather Mathur RN    Problem List for this Hospitalization:  Hospital Problems as of 2018  Date Reviewed: 2016          Codes Class Noted - Resolved POA    Hematemesis ICD-10-CM: K92.0  ICD-9-CM: 578.0  2018 - Present Unknown        Acute gastroenteritis ICD-10-CM: K52.9  ICD-9-CM: 558.9  2018 - Present Unknown        Uncontrolled hypertension ICD-10-CM: I10  ICD-9-CM: 401.9  2018 - Present Unknown        * (Principal)Intractable nausea and vomiting ICD-10-CM: R11.2  ICD-9-CM: 536.2  2018 - Present Unknown        Elevated BP without diagnosis of hypertension ICD-10-CM: R03.0  ICD-9-CM: 796.2  2018 - Present Unknown        Erosive esophagitis ICD-10-CM: K22.10  ICD-9-CM: 530.19  3/11/2018 - Present Yes        Marijuana abuse (Chronic) ICD-10-CM: F12.10  ICD-9-CM: 305.20  2014 - Present Yes        Anxiety (Chronic) ICD-10-CM: F41.9  ICD-9-CM: 300.00  2014 - Present Yes        Tobacco abuse (Chronic) ICD-10-CM: Z72.0  ICD-9-CM: 305.1  2014 - Present Yes                Admission HPI from 2018:    From H&P \" Patient is a 42yoM with h/o erosive esophagitis (last EGD in 3/2018 by Dr. Randolph Mckeon) as well as h/o cyclical vomiting syndrome (with chronic MJ use) who present to the ER twice in the last 2 days for intractable nausea and vomiting.  Patient does not give much history, as patient's wife answers all of my questions, even when specifically directed toward the patient.  Nausea, vomiting, and abdominal pain started on Friday. Aziza Leader apparently ate well on Friday without difficulty.  Pt worked outside \[employer] [facility] [place] [employer] the heat for 14 hours\" on Friday as well.  Wife expresses concern that he has \"heat exhaustion,\" \"picked up a virus,\" or \"ate something wrong. \"  Reportedly has not kept anything down including liquids since Friday. Abigail Bateman also denies BM since Friday, with last observed BM being \"bright orange\" in color.  Wife reports that the patient has a \"burning feeling\" at the bottom of his chest when he tries to drink or eat anything.  Reported returned to the ER today when he had bright red blood in his vomit this morning.   Patient has only been taking Protonix once a day, but ran out \"a little while ago. \"  Reports difficulty with f/u with MDs as he does not have the [employer]. \"   When asked about marijuana usage, his wife reports that he last used marijuana 3 weeks ago. Abigail Bateman was counseled about cannabinoid use associated cyclical vomiting.  Pt and wife acknowledge this, but wife blames inability to establish with a PCP to start medications for his \"anxiety\" as cause for his MJ usage.  When asked about anxiety specifically, he tells me that \"I don't know, but I need it for sleep. \"  He requests \"something to help me sleep\" and \"something for pain\" on admission. \"    Hospital Course:    Intractable nausea and vomiting  - Likely related to cyclical vomiting syndrome 2/2 marijuana use  - History of gastroparesis in charge, but normal gastric emptying study noted as well  - Could be viral gastroenteritis, but no BM since Friday.  - Recent EGD 3 months ago showed erosive esophagitis, has been taking Protonix daily, but \"ran out\" recently. - was started on IV Protonix BID, and supportive management. - Was seen by GI. Pt improved clinically. Asking to go home. Was discharged on PPI and Sucralfate. Tolerated advancing oral diet.       Erosive Esophagitis  - Admittedly non-compliant  - Restart Protonix  - EGD in March 2018 showed \"severe erosive esophagitis\"  - GI following.       Elevated BP without diagnosis of HTN  - ?  Related to pain or illicit substance use (UDS pending)  - Denies chest pain, SOB, HA      Marijuana Use  - Counseled again on cessation  - UDS pending, wife reports most recent usage was 3 weeks ago  - Likely source of cyclical vomiting syndrome  - Requests something to help him sleep as this is what he uses at home to help      Cigarette Use  - Cessation counseling given      Anxiety  - Pt does not actually complain of this, wife reports this on his behalf  - When specifically asked, he reports that he \"doesn't know\"  - Recommended to try to establish with a PCP  - May need SW help to find PCP      Insomnia  - Requests something for sleep  - Ambien \"doesn't work\"  - Ativan has \"opposite effect\"  - Try Benadryl prn    Follow up instructions below. Plan was discussed with patient. All questions answered. Patient was stable at time of discharge and was instructed to call or return if there are any concerns or recurrence of symptoms. Diagnostic Imaging/Tests:   No results found. Echocardiogram results:  No results found for this visit on 06/24/18.       All Micro Results     None          Labs: Results:       BMP, Mg, Phos Recent Labs      06/25/18   0515  06/24/18   0949   NA  140  139   K  3.3*  3.6   CL  106  105   CO2  22  22   AGAP  12  12   BUN  13  20   CREA  0.77*  1.06   CA  8.4  9.6   GLU  88  105*   MG   --   2.4      CBC Recent Labs      06/26/18   0517  06/25/18   1203  06/25/18   0515   06/24/18   0949   WBC  6.7  8.3  8.5   < >  8.3   RBC  5.15  4.79  4.76   < >  4.80   HGB  15.3  14.2  14.0   < >  14.1   HCT  40.6*  37.8*  37.7*   < >  37.1*   PLT  303  287  305   < >  212   GRANS   --    --   67   --   73   LYMPH   --    --   21   --   19   EOS   --    --   0*   --   0*   MONOS   --    --   12   --   8   BASOS   --    --   0   --   0   IG   --    --   0   --   0   ANEU   --    --   5.7   --   6.0   ABL   --    --   1.8   --   1.6   SOCORRO   --    --   0.0   --   0.0   ABM   --    --   1.0   --   0.7   ABB   --    --   0.0   --   0.0   AIG   --    --   0.0   --   0.0    < > = values in this interval not displayed.       LFT Recent Labs      06/24/18   0949   SGOT  31   ALT  34   AP  116   TP  8.8*   ALB  4.7   GLOB  4.1*   AGRAT  1.1*      Cardiac Testing Lab Results   Component Value Date/Time     (H) 07/30/2013 04:00 PM    Troponin-I, Qt. <0.02 (L) 08/09/2014 11:20 PM      Coagulation Tests Lab Results   Component Value Date/Time    Prothrombin time 11.3 09/17/2016 06:57 AM    Prothrombin time 11.4 02/03/2016 09:30 PM    Prothrombin time 11.3 04/29/2014 03:06 PM    INR 1.1 09/17/2016 06:57 AM    INR 1.1 02/03/2016 09:30 PM    INR 1.1 04/29/2014 03:06 PM    aPTT 25.7 04/29/2014 03:06 PM      A1c Lab Results   Component Value Date/Time    Hemoglobin A1c 5.5 08/12/2014 06:00 AM      Lipid Panel No results found for: CHOL, CHOLPOCT, CHOLX, CHLST, CHOLV, 430085, HDL, LDL, LDLC, DLDLP, 108110, VLDLC, VLDL, TGLX, TRIGL, TRIGP, TGLPOCT, CHHD, CHHDX   Thyroid Panel Lab Results   Component Value Date/Time    TSH 0.263 (L) 03/11/2018 11:02 PM    TSH 0.280 (L) 08/12/2014 06:00 AM        Most Recent UA Lab Results   Component Value Date/Time    Color YELLOW 03/28/2017 09:50 AM    Appearance CLEAR 03/28/2017 09:50 AM    Specific gravity 1.029 (H) 03/28/2017 09:50 AM    pH (UA) 6.0 03/28/2017 09:50 AM    Protein TRACE (A) 03/28/2017 09:50 AM    Glucose NEGATIVE  03/28/2017 09:50 AM    Ketone >80 (A) 03/28/2017 09:50 AM    Bilirubin NEGATIVE  03/28/2017 09:50 AM    Blood TRACE (A) 03/28/2017 09:50 AM    Urobilinogen 0.2 03/28/2017 09:50 AM    Nitrites NEGATIVE  03/28/2017 09:50 AM    Leukocyte Esterase NEGATIVE  03/28/2017 09:50 AM        Allergies   Allergen Reactions    Amoxicillin Nausea Only    Aspirin Other (comments)     ulcers    Hydrocodone Rash    Ibuprofen Other (comments)     Hx of ulcers    Pcn [Penicillins] Rash    Phenergan [Promethazine] Nausea and Vomiting and Other (comments)     Immunization History   Administered Date(s) Administered    TDAP Vaccine 04/21/2012       All Labs from Last 24 Hrs:  Recent Results (from the past 24 hour(s))   CBC W/O DIFF    Collection Time: 06/26/18  5:17 AM   Result Value Ref Range    WBC 6.7 4.3 - 11.1 K/uL    RBC 5.15 4.23 - 5.67 M/uL    HGB 15.3 13.6 - 17.2 g/dL    HCT 40.6 (L) 41.1 - 50.3 %    MCV 78.8 (L) 79.6 - 97.8 FL    MCH 29.7 26.1 - 32.9 PG    MCHC 37.7 (H) 31.4 - 35.0 g/dL    RDW 13.2 11.9 - 14.6 %    PLATELET 806 691 - 623 K/uL    MPV 9.1 (L) 10.8 - 14.1 FL       Discharge Exam:  Patient Vitals for the past 24 hrs:   Temp Pulse Resp BP SpO2   06/26/18 1557 99.7 °F (37.6 °C) (!) 56 16 134/90 97 %   06/26/18 1149 99.7 °F (37.6 °C) 84 16 (!) 135/92 96 %   06/26/18 0740 99 °F (37.2 °C) 73 - 127/72 98 %   06/26/18 0319 99.1 °F (37.3 °C) 86 19 (!) 156/107 94 %   06/25/18 2322 99 °F (37.2 °C) 62 18 135/87 95 %   06/25/18 1924 99.1 °F (37.3 °C) 62 19 131/88 95 %     Oxygen Therapy  O2 Sat (%): 97 % (06/26/18 1557)  Pulse via Oximetry: 95 beats per minute (06/24/18 1430)  O2 Device: Room air (06/25/18 1940)    Intake/Output Summary (Last 24 hours) at 06/26/18 1711  Last data filed at 06/26/18 5021   Gross per 24 hour   Intake             1878 ml   Output             1870 ml   Net                8 ml       Physical Exam:  General:                    Resting comfortably in ER bed.  Eyes closed for most of interview/exam.  No acute distress.  Appears younger than stated age. Taylor Tom, without obvious abnormality, atraumatic. Nose:                                   Nares normal. No drainage or sinus tenderness. Lungs:                       Clear to auscultation bilaterally.  No Wheezing or Rhonchi. No rales. Heart:                                  Regular rate and rhythm,  no murmur, rub or gallop. Abdomen:                  Soft.  Non-tender.  Not distended.  Bowel sounds normal.  No guarding or rebound tenderness. Extremities:               No cyanosis.  No edema.  No clubbing  Skin:                                    Texture, turgor normal. No rashes or lesions.  Not Jaundiced  Neurologic:                Oriented x 3, no focal deficits   Psych:                                 Flat affect, does not meet eye contact.  Answers briefly.  Does not appear anxious.       Discharge Info:   Current Discharge Medication List      START taking these medications    Details   sucralfate (CARAFATE) 100 mg/mL suspension Take 10 mL by mouth four (4) times daily. Qty: 1200 mL, Refills: 0      !! pantoprazole (PROTONIX) 40 mg tablet Take 1 Tab by mouth two (2) times a day. Qty: 60 Tab, Refills: 1       !! - Potential duplicate medications found. Please discuss with provider. CONTINUE these medications which have CHANGED    Details   ondansetron hcl (ZOFRAN) 8 mg tablet Take 1 Tab by mouth every eight (8) hours as needed for Nausea. Qty: 20 Tab, Refills: 0         CONTINUE these medications which have NOT CHANGED    Details   metoclopramide HCl (REGLAN) 10 mg tablet Take one tab every 6 hours as needed for nausea/vomiting  Qty: 15 Tab, Refills: 0      !! pantoprazole (PROTONIX) 40 mg tablet Take 1 Tab by mouth daily. Take 1 tab twice daily for the first 2 weeks, then once daily  Indications: Erosive Esophagitis  Qty: 30 Tab, Refills: 5       !! - Potential duplicate medications found. Please discuss with provider. Disposition: home    Activity: Activity as tolerated  Diet: DIET GI SOFT No options chosen    Follow-up Information     Follow up With Details Comments Contact Info    Marcial El MD   Patient can only remember the practice name and not the physician      Coleen Yates MD Schedule an appointment as soon as possible for a visit in 1 week  . [address] Rodriguez [address]  [facility] [address]  [phone]              Time spent in patient discharge planning and coordination 35 minutes. Signed:   Sommer Hung MD

## 2018-06-26 NOTE — DISCHARGE INSTRUCTIONS
Nausea and Vomiting: Care Instructions  Your Care Instructions    When you are nauseated, you may feel weak and sweaty and notice a lot of saliva in your mouth. Nausea often leads to vomiting. Most of the time you do not need to worry about nausea and vomiting, but they can be signs of other illnesses. Two common causes of nausea and vomiting are stomach flu and food poisoning. Nausea and vomiting from viral stomach flu will usually start to improve within 24 hours. Nausea and vomiting from food poisoning may last from 12 to 48 hours. The doctor has checked you carefully, but problems can develop later. If you notice any problems or new symptoms, get medical treatment right away. Follow-up care is a key part of your treatment and safety. Be sure to make and go to all appointments, and call your doctor if you are having problems. It's also a good idea to know your test results and keep a list of the medicines you take. How can you care for yourself at home? · To prevent dehydration, drink plenty of fluids, enough so that your urine is light yellow or clear like water. Choose water and other caffeine-free clear liquids until you feel better. If you have kidney, heart, or liver disease and have to limit fluids, talk with your doctor before you increase the amount of fluids you drink. · Rest in bed until you feel better. · When you are able to eat, try clear soups, mild foods, and liquids until all symptoms are gone for 12 to 48 hours. Other good choices include dry toast, crackers, cooked cereal, and gelatin dessert, such as Jell-O. When should you call for help? Call 911 anytime you think you may need emergency care. For example, call if:  ? · You passed out (lost consciousness). ?Call your doctor now or seek immediate medical care if:  ? · You have symptoms of dehydration, such as:  ¨ Dry eyes and a dry mouth. ¨ Passing only a little dark urine.   ¨ Feeling thirstier than usual.   ? · You have new or worsening belly pain. ? · You have a new or higher fever. ? · You vomit blood or what looks like coffee grounds. ? Watch closely for changes in your health, and be sure to contact your doctor if:  ? · You have ongoing nausea and vomiting. ? · Your vomiting is getting worse. ? · Your vomiting lasts longer than 2 days. ? · You are not getting better as expected. Where can you learn more? Go to http://ora-itzel.info/. Enter 25 [id number] in the search box to learn more about \"Nausea and Vomiting: Care Instructions. \"  Current as of: March 20, 2017  Content Version: 11.4  © 6705-5376 [employer]. Care instructions adapted under license by [employer] (which disclaims liability or warranty for this information). If you have questions about a medical condition or this instruction, always ask your healthcare professional. [place] [address] any warranty or liability for your use of this information. Learning About Cyclic Vomiting Syndrome  What is it? An adult or child who has cyclic vomiting syndrome (CVS) has repeated bouts of severe vomiting and nausea. Between the vomiting episodes, the person's health is normal. The cause of CVS isn't known, but it may be related to migraine headaches. What happens when you have CVS?  CVS causes severe nausea, vomiting, and drooling. You may not be able to walk or talk during an episode. You may look pale. You may have a fever and feel very tired and thirsty. Some people with CVS have belly pain and diarrhea. Bouts of vomiting can last for a few hours or a few days. People with this condition tend to have a typical pattern of attacks. For example, one person may have bouts of CVS 4 times a year and always in the morning. Another person may have 8 bouts a year and always in the evening. Some people with CVS have triggers that set off the vomiting.  People have reported an infection, such as a cold, as a trigger. Other triggers include stress, menstrual cycles, and certain foods like chocolate or cheese. Children tend to have more triggers than adults do. How is CVS treated? Treatment is centered around easing the nausea and vomiting. The doctor may prescribe medicine. During very bad bouts of vomiting, your doctor may want you to stay in the hospital for a while. You may get fluids through a vein (IV) to prevent dehydration. Dehydration can be serious. Your body needs fluids to make enough blood. Without a good supply of blood, vital organs such as the heart and brain can't work as well as they should. When should you call for help? Call your doctor now or seek immediate medical care if:  · You have new or worse belly pain. · You have a fever. · You are vomiting. · You cannot pass stools or gas. · You have symptoms of dehydration, such as:  ¨ Dry eyes and a dry mouth. ¨ Passing only a little urine. ¨ Feeling thirstier than usual.  Watch closely for changes in your health, and be sure to contact your doctor if you have any problems. Follow-up care is a key part of your treatment and safety. Be sure to make and go to all appointments, and call your doctor if you are having problems. It's also a good idea to know your test results and keep a list of the medicines you take. Where can you learn more? Go to http://ora-itzel.info/. Enter M874 in the search box to learn more about \"Learning About Cyclic Vomiting Syndrome. \"  Current as of: May 12, 2017  Content Version: 11.4  © 6113-4147 [employer]. Care instructions adapted under license by [employer] (which disclaims liability or warranty for this information). If you have questions about a medical condition or this instruction, always ask your healthcare professional. [place] [address] any warranty or liability for your use of this information.     Learning About Cyclic Vomiting Syndrome  What is it? An adult or child who has cyclic vomiting syndrome (CVS) has repeated bouts of severe vomiting and nausea. Between the vomiting episodes, the person's health is normal. The cause of CVS isn't known, but it may be related to migraine headaches. What happens when you have CVS?  CVS causes severe nausea, vomiting, and drooling. You may not be able to walk or talk during an episode. You may look pale. You may have a fever and feel very tired and thirsty. Some people with CVS have belly pain and diarrhea. Bouts of vomiting can last for a few hours or a few days. People with this condition tend to have a typical pattern of attacks. For example, one person may have bouts of CVS 4 times a year and always in the morning. Another person may have 8 bouts a year and always in the evening. Some people with CVS have triggers that set off the vomiting. People have reported an infection, such as a cold, as a trigger. Other triggers include stress, menstrual cycles, and certain foods like chocolate or cheese. Children tend to have more triggers than adults do. How is CVS treated? Treatment is centered around easing the nausea and vomiting. The doctor may prescribe medicine. During very bad bouts of vomiting, your doctor may want you to stay in the hospital for a while. You may get fluids through a vein (IV) to prevent dehydration. Dehydration can be serious. Your body needs fluids to make enough blood. Without a good supply of blood, vital organs such as the heart and brain can't work as well as they should. When should you call for help? Call your doctor now or seek immediate medical care if:  · You have new or worse belly pain. · You have a fever. · You are vomiting. · You cannot pass stools or gas. · You have symptoms of dehydration, such as:  ¨ Dry eyes and a dry mouth. ¨ Passing only a little urine.   ¨ Feeling thirstier than usual.  Watch closely for changes in your health, and be sure to contact your doctor if you have any problems. Follow-up care is a key part of your treatment and safety. Be sure to make and go to all appointments, and call your doctor if you are having problems. It's also a good idea to know your test results and keep a list of the medicines you take. Where can you learn more? Go to http://ora-itzel.info/. Enter W064 in the search box to learn more about \"Learning About Cyclic Vomiting Syndrome. \"  Current as of: May 12, 2017  Content Version: 11.4  © 3352-0178 [employer]. Care instructions adapted under license by [employer] (which disclaims liability or warranty for this information). If you have questions about a medical condition or this instruction, always ask your healthcare professional. [address] any warranty or liability for your use of this information. DISCHARGE SUMMARY from Nurse    PATIENT INSTRUCTIONS:    After general anesthesia or intravenous sedation, for 24 hours or while taking prescription Narcotics:  · Limit your activities  · Do not drive and operate hazardous machinery  · Do not make important personal or business decisions  · Do  not drink alcoholic beverages  · If you have not urinated within 8 hours after discharge, please contact your surgeon on call.     Report the following to your surgeon:  · Excessive pain, swelling, redness or odor of or around the surgical area  · Temperature over 100.5  · Nausea and vomiting lasting longer than 4 hours or if unable to take medications  · Any signs of decreased circulation or nerve impairment to extremity: change in color, persistent  numbness, tingling, coldness or increase pain  · Any questions    What to do at Home:    Recommended activity: Activity as tolerated    If you experience any of the following symptoms, please call your doctor: Temperature of 100.5 or greater, persistent nausea and vomiting, increased pain, any signs of abnormal bleeding, or any other questions or concerns    *  Please give a list of your current medications to your Primary Care Provider. *  Please update this list whenever your medications are discontinued, doses are      changed, or new medications (including over-the-counter products) are added. *  Please carry medication information at all times in case of emergency situations. These are general instructions for a healthy lifestyle:    No smoking/ No tobacco products/ Avoid exposure to second hand smoke  Surgeon General's Warning:  Quitting smoking now greatly reduces serious risk to your health. Obesity, smoking, and sedentary lifestyle greatly increases your risk for illness    A healthy diet, regular physical exercise & weight monitoring are important for maintaining a healthy lifestyle    You may be retaining fluid if you have a history of heart failure or if you experience any of the following symptoms:  Weight gain of 3 pounds or more overnight or 5 pounds in a week, increased swelling in our hands or feet or shortness of breath while lying flat in bed. Please call your doctor as soon as you notice any of these symptoms; do not wait until your next office visit. Recognize signs and symptoms of STROKE:    F-face looks uneven    A-arms unable to move or move unevenly    S-speech slurred or non-existent    T-time-call 911 as soon as signs and symptoms begin-DO NOT go       Back to bed or wait to see if you get better-TIME IS BRAIN. Warning Signs of HEART ATTACK     Call 911 if you have these symptoms:   Chest discomfort. Most heart attacks involve discomfort in the center of the chest that lasts more than a few minutes, or that goes away and comes back. It can feel like uncomfortable pressure, squeezing, fullness, or pain.  Discomfort in other areas of the upper body. Symptoms can include pain or discomfort in one or both arms, the back, neck, jaw, or stomach.    Shortness of breath with or without chest discomfort.  Other signs may include breaking out in a cold sweat, nausea, or lightheadedness. Don't wait more than five minutes to call 911 - MINUTES MATTER! Fast action can save your life. Calling 911 is almost always the fastest way to get lifesaving treatment. Emergency Medical Services staff can begin treatment when they arrive -- up to an hour sooner than if someone gets to the hospital by car. The discharge information has been reviewed with the patient and spouse. The patient and spouse verbalized understanding. Discharge medications reviewed with the patient and spouse and appropriate educational materials and side effects teaching were provided.   ___________________________________________________________________________________________________________________________________

## 2018-06-26 NOTE — PROGRESS NOTES
D/C from unit with belongings and RX. Accompanied by family and hospital personnel. No distress at time of D/C.   Ambulated downstairs

## 2018-06-26 NOTE — PROGRESS NOTES
Patient up in chair eating lunch. Patient has tolerated about 25-50% of meals today. No nausea or vomiting experienced. Denies any pain or needs.

## 2018-06-26 NOTE — PROGRESS NOTES
GI DAILY PROGRESS NOTE    Admit Date:  6/24/2018    Today's Date:  3/81/3733    CC:  Cyclic vomiting cyndrome    Subjective:     Patient reports taking in \"a lot\" of liquids this morning. This was followed by nausea which was relieved by taking a warm shower. He reports that this is what he does at home for the nausea \"just take a warm shower\". This helps the nausea when antiemetics do not help the nausea. He denies any vomiting this morning. No BM in several days. Medications:   Current Facility-Administered Medications   Medication Dose Route Frequency    sodium chloride (NS) flush 5-10 mL  5-10 mL IntraVENous Q8H    sodium chloride (NS) flush 5-10 mL  5-10 mL IntraVENous PRN    acetaminophen (TYLENOL) tablet 650 mg  650 mg Oral Q4H PRN    ondansetron (ZOFRAN) injection 4 mg  4 mg IntraVENous Q4H PRN    0.9% sodium chloride infusion  125 mL/hr IntraVENous CONTINUOUS    oxyCODONE-acetaminophen (PERCOCET) 5-325 mg per tablet 1 Tab  1 Tab Oral Q4H PRN    pantoprazole (PROTONIX) 40 mg in sodium chloride 0.9% 10 mL injection  40 mg IntraVENous Q12H    diphenhydrAMINE (BENADRYL) capsule 25 mg  25 mg Oral Q6H PRN    sucralfate (CARAFATE) 100 mg/mL oral suspension 1 g  1 g Oral AC&HS       Review of Systems:  ROS was obtained, with pertinent positives as listed above. No chest pain or SOB. Diet:  Clear liquids    Objective:   Vitals:  Visit Vitals    /72    Pulse 73    Temp 99 °F (37.2 °C)    Resp 19    Wt 67.1 kg (148 lb)    SpO2 98%    BMI 27.07 kg/m2     Intake/Output:  06/26 0701 - 06/26 1900  In: 341 [I.V.:341]  Out: 600 [Urine:600]  06/24 1901 - 06/26 0700  In: 3498 [P.O.:180; I.V.:4276]  Out: 7085 [Urine:2845]  Exam:  General appearance: alert, cooperative, no distress  Lungs: clear to auscultation bilaterally anteriorly  Heart: regular rate and rhythm  Abdomen: soft, non-tender.  Bowel sounds normal. No masses, no organomegaly  Extremities: extremities normal, atraumatic, no cyanosis or edema  Neuro:  alert and oriented    Data Review (Labs):    Recent Labs      06/26/18   0517  06/25/18   1203  06/25/18   0515  06/24/18   2033  06/24/18   1821  06/24/18   0949   WBC  6.7  8.3  8.5  8.2   --   8.3   HGB  15.3  14.2  14.0  12.9*  14.8  14.1   HCT  40.6*  37.8*  37.7*  34.6*   --   37.1*   PLT  303  287  305  224   --   212   MCV  78.8*  78.9*  79.2*  78.6*   --   77.3*   NA   --    --   140   --    --   139   K   --    --   3.3*   --    --   3.6   CL   --    --   106   --    --   105   CO2   --    --   22   --    --   22   BUN   --    --   13   --    --   20   CREA   --    --   0.77*   --    --   1.06   CA   --    --   8.4   --    --   9.6   MG   --    --    --    --    --   2.4   GLU   --    --   88   --    --   105*   AP   --    --    --    --    --   116   SGOT   --    --    --    --    --   31   ALT   --    --    --    --    --   34   TBILI   --    --    --    --    --   0.8   ALB   --    --    --    --    --   4.7   TP   --    --    --    --    --   8.8*       Assessment:     Principal Problem:    Intractable nausea and vomiting (6/24/2018)    Active Problems:    Anxiety (4/30/2014)      Tobacco abuse (4/30/2014)      Marijuana abuse (8/12/2014)      Erosive esophagitis (3/11/2018)      Elevated BP without diagnosis of hypertension (6/24/2018)      42yo AAM with hx of erosive esophagitis and marijuana induced cyclic 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 Given his hx and presentation, this is likely from his known esophagitis (which can't be treated endoscopically). He has a hx of severe erosive esophagitis and has undergone multiple EGDs, last one being in March. He also could have developed a Alexandra Lynch tear.      6/24/18: HGB 12.9  6/25/18: HGB 14.0  6/26/18: HGB 15. Nausea improved with warm shower. Plan:     1. Continue with PPI IV BID and Carafate. 2. Needs to avoid Marijuana as this is the likely cause of his cyclic vomiting syndrome  3. Advance to full liquid today    Zeynep Pete NP    Patient is seen and examined in collaboration with Dr. Ben Landaverde. Assessment and plan as per Dr. Ben Landaverde. GI---Patient seen and examined. Agree with above. Patient feels well. Will sign off. Please call if needed.   Thanks Bryan Alegre MD

## 2018-06-26 NOTE — PROGRESS NOTES
Pt's D/C instructions completed. Verbalized understanding of all instructions including diet, activity, s/sx to alert MD, medications, and f/u appointment. Family at [facility].

## 2018-06-26 NOTE — PHYSICIAN ADVISORY
Letter of Determination: Upgrade from Observation to Inpatient Status    This patient was originally hospitalized as Outpatient Status with Observation Services on 6/24/2018 for nausea and vomiting. This patient now meets for Inpatient Admission based on medical necessity. The patient's stay was medically necessary based on concern for hematemisis, and vital signs significant for blood pressure of 165/97 mmHg. It is our recommendation that this patient's hospitalization status should be upgraded from OBSERVATION to INPATIENT status.      The final decision regarding the patient's hospitalization status depends on the attending physician's judgement.     Rudy Randhawa MD, FLAVIO,   Physician [address] [facility]

## 2018-12-09 ENCOUNTER — HOSPITAL ENCOUNTER (EMERGENCY)
Age: 43
Discharge: HOME | End: 2018-12-09
Attending: EMERGENCY MEDICINE
Payer: SELF-PAY

## 2018-12-09 VITALS
BODY MASS INDEX: 27.6 KG/M2 | RESPIRATION RATE: 18 BRPM | TEMPERATURE: 97.8 F | DIASTOLIC BLOOD PRESSURE: 85 MMHG | HEART RATE: 108 BPM | WEIGHT: 150 LBS | HEIGHT: 62 IN | OXYGEN SATURATION: 95 % | SYSTOLIC BLOOD PRESSURE: 143 MMHG

## 2018-12-09 DIAGNOSIS — K29.90 GASTRITIS AND DUODENITIS: ICD-10-CM

## 2018-12-09 DIAGNOSIS — R10.13 ABDOMINAL PAIN, EPIGASTRIC: Primary | ICD-10-CM

## 2018-12-09 LAB
ALBUMIN SERPL-MCNC: 4.3 G/DL (ref 3.5–5)
ALBUMIN/GLOB SERPL: 1.1 {RATIO} (ref 1.2–3.5)
ALP SERPL-CCNC: 102 U/L (ref 50–136)
ALT SERPL-CCNC: 26 U/L (ref 12–65)
ANION GAP SERPL CALC-SCNC: 15 MMOL/L (ref 7–16)
AST SERPL-CCNC: 16 U/L (ref 15–37)
BASOPHILS # BLD: 0 K/UL (ref 0–0.2)
BASOPHILS NFR BLD: 1 % (ref 0–2)
BILIRUB SERPL-MCNC: 0.6 MG/DL (ref 0.2–1.1)
BUN SERPL-MCNC: 19 MG/DL (ref 6–23)
CALCIUM SERPL-MCNC: 9.2 MG/DL (ref 8.3–10.4)
CHLORIDE SERPL-SCNC: 111 MMOL/L (ref 98–107)
CO2 SERPL-SCNC: 15 MMOL/L (ref 21–32)
CREAT SERPL-MCNC: 1.21 MG/DL (ref 0.8–1.5)
DIFFERENTIAL METHOD BLD: ABNORMAL
EOSINOPHIL # BLD: 0.1 K/UL (ref 0–0.8)
EOSINOPHIL NFR BLD: 1 % (ref 0.5–7.8)
ERYTHROCYTE [DISTWIDTH] IN BLOOD BY AUTOMATED COUNT: 12.6 % (ref 11.9–14.6)
GLOBULIN SER CALC-MCNC: 3.9 G/DL (ref 2.3–3.5)
GLUCOSE SERPL-MCNC: 125 MG/DL (ref 65–100)
HCT VFR BLD AUTO: 41.5 % (ref 41.1–50.3)
HGB BLD-MCNC: 15.3 G/DL (ref 13.6–17.2)
IMM GRANULOCYTES # BLD: 0 K/UL (ref 0–0.5)
IMM GRANULOCYTES NFR BLD AUTO: 0 % (ref 0–5)
LIPASE SERPL-CCNC: 170 U/L (ref 73–393)
LYMPHOCYTES # BLD: 1.7 K/UL (ref 0.5–4.6)
LYMPHOCYTES NFR BLD: 24 % (ref 13–44)
MCH RBC QN AUTO: 28.7 PG (ref 26.1–32.9)
MCHC RBC AUTO-ENTMCNC: 36.9 G/DL (ref 31.4–35)
MCV RBC AUTO: 77.7 FL (ref 79.6–97.8)
MONOCYTES # BLD: 0.6 K/UL (ref 0.1–1.3)
MONOCYTES NFR BLD: 8 % (ref 4–12)
NEUTS SEG # BLD: 5 K/UL (ref 1.7–8.2)
NEUTS SEG NFR BLD: 67 % (ref 43–78)
NRBC # BLD: 0 K/UL (ref 0–0.2)
PLATELET # BLD AUTO: 375 K/UL (ref 150–450)
PMV BLD AUTO: 8.8 FL (ref 9.4–12.3)
POTASSIUM SERPL-SCNC: 3.2 MMOL/L (ref 3.5–5.1)
PROT SERPL-MCNC: 8.2 G/DL (ref 6.3–8.2)
RBC # BLD AUTO: 5.34 M/UL (ref 4.23–5.6)
SODIUM SERPL-SCNC: 141 MMOL/L (ref 136–145)
WBC # BLD AUTO: 7.4 K/UL (ref 4.3–11.1)

## 2018-12-09 PROCEDURE — 74011000250 HC RX REV CODE- 250: Performed by: EMERGENCY MEDICINE

## 2018-12-09 PROCEDURE — 96374 THER/PROPH/DIAG INJ IV PUSH: CPT | Performed by: EMERGENCY MEDICINE

## 2018-12-09 PROCEDURE — 74011250636 HC RX REV CODE- 250/636: Performed by: EMERGENCY MEDICINE

## 2018-12-09 PROCEDURE — 85025 COMPLETE CBC W/AUTO DIFF WBC: CPT

## 2018-12-09 PROCEDURE — 80053 COMPREHEN METABOLIC PANEL: CPT

## 2018-12-09 PROCEDURE — 96361 HYDRATE IV INFUSION ADD-ON: CPT | Performed by: EMERGENCY MEDICINE

## 2018-12-09 PROCEDURE — C9113 INJ PANTOPRAZOLE SODIUM, VIA: HCPCS | Performed by: EMERGENCY MEDICINE

## 2018-12-09 PROCEDURE — 96375 TX/PRO/DX INJ NEW DRUG ADDON: CPT | Performed by: EMERGENCY MEDICINE

## 2018-12-09 PROCEDURE — 83690 ASSAY OF LIPASE: CPT

## 2018-12-09 PROCEDURE — 99282 EMERGENCY DEPT VISIT SF MDM: CPT | Performed by: EMERGENCY MEDICINE

## 2018-12-09 RX ORDER — MORPHINE SULFATE 10 MG/ML
8 INJECTION, SOLUTION INTRAMUSCULAR; INTRAVENOUS
Status: COMPLETED | OUTPATIENT
Start: 2018-12-09 | End: 2018-12-09

## 2018-12-09 RX ORDER — ONDANSETRON 4 MG/1
4 TABLET, ORALLY DISINTEGRATING ORAL
Qty: 20 TAB | Refills: 0 | Status: SHIPPED | OUTPATIENT
Start: 2018-12-09 | End: 2019-03-01

## 2018-12-09 RX ORDER — PANTOPRAZOLE SODIUM 40 MG/1
40 TABLET, DELAYED RELEASE ORAL DAILY
Qty: 20 TAB | Refills: 0 | Status: SHIPPED | OUTPATIENT
Start: 2018-12-09 | End: 2018-12-29

## 2018-12-09 RX ORDER — ONDANSETRON 2 MG/ML
8 INJECTION INTRAMUSCULAR; INTRAVENOUS
Status: COMPLETED | OUTPATIENT
Start: 2018-12-09 | End: 2018-12-09

## 2018-12-09 RX ADMIN — MORPHINE SULFATE 8 MG: 10 INJECTION INTRAVENOUS at 21:22

## 2018-12-09 RX ADMIN — SODIUM CHLORIDE 1000 ML: 900 INJECTION, SOLUTION INTRAVENOUS at 21:23

## 2018-12-09 RX ADMIN — SODIUM CHLORIDE 1000 ML: 900 INJECTION, SOLUTION INTRAVENOUS at 21:22

## 2018-12-09 RX ADMIN — ONDANSETRON 8 MG: 2 INJECTION INTRAMUSCULAR; INTRAVENOUS at 21:22

## 2018-12-09 RX ADMIN — SODIUM CHLORIDE 40 MG: 9 INJECTION, SOLUTION INTRAMUSCULAR; INTRAVENOUS; SUBCUTANEOUS at 21:22

## 2018-12-10 ENCOUNTER — HOSPITAL ENCOUNTER (EMERGENCY)
Age: 43
Discharge: HOME | End: 2018-12-10
Attending: EMERGENCY MEDICINE
Payer: SELF-PAY

## 2018-12-10 VITALS
TEMPERATURE: 98.4 F | SYSTOLIC BLOOD PRESSURE: 136 MMHG | RESPIRATION RATE: 20 BRPM | DIASTOLIC BLOOD PRESSURE: 87 MMHG | HEART RATE: 89 BPM | OXYGEN SATURATION: 99 %

## 2018-12-10 DIAGNOSIS — R11.15 NON-INTRACTABLE CYCLICAL VOMITING WITH NAUSEA: Primary | ICD-10-CM

## 2018-12-10 LAB
BASOPHILS # BLD: 0 K/UL (ref 0–0.2)
BASOPHILS NFR BLD: 1 % (ref 0–2)
DIFFERENTIAL METHOD BLD: ABNORMAL
EOSINOPHIL # BLD: 0.1 K/UL (ref 0–0.8)
EOSINOPHIL NFR BLD: 1 % (ref 0.5–7.8)
ERYTHROCYTE [DISTWIDTH] IN BLOOD BY AUTOMATED COUNT: 13 % (ref 11.9–14.6)
HCT VFR BLD AUTO: 39.8 % (ref 41.1–50.3)
HGB BLD-MCNC: 14.7 G/DL (ref 13.6–17.2)
IMM GRANULOCYTES # BLD: 0 K/UL (ref 0–0.5)
IMM GRANULOCYTES NFR BLD AUTO: 0 % (ref 0–5)
LYMPHOCYTES # BLD: 2.6 K/UL (ref 0.5–4.6)
LYMPHOCYTES NFR BLD: 31 % (ref 13–44)
MCH RBC QN AUTO: 29.6 PG (ref 26.1–32.9)
MCHC RBC AUTO-ENTMCNC: 36.9 G/DL (ref 31.4–35)
MCV RBC AUTO: 80.2 FL (ref 79.6–97.8)
MONOCYTES # BLD: 0.8 K/UL (ref 0.1–1.3)
MONOCYTES NFR BLD: 9 % (ref 4–12)
NEUTS SEG # BLD: 4.9 K/UL (ref 1.7–8.2)
NEUTS SEG NFR BLD: 58 % (ref 43–78)
NRBC # BLD: 0 K/UL (ref 0–0.2)
PLATELET # BLD AUTO: 378 K/UL (ref 150–450)
PMV BLD AUTO: 9 FL (ref 9.4–12.3)
RBC # BLD AUTO: 4.96 M/UL (ref 4.23–5.6)
WBC # BLD AUTO: 8.4 K/UL (ref 4.3–11.1)

## 2018-12-10 PROCEDURE — 85025 COMPLETE CBC W/AUTO DIFF WBC: CPT

## 2018-12-10 PROCEDURE — C9113 INJ PANTOPRAZOLE SODIUM, VIA: HCPCS | Performed by: EMERGENCY MEDICINE

## 2018-12-10 PROCEDURE — 96361 HYDRATE IV INFUSION ADD-ON: CPT | Performed by: EMERGENCY MEDICINE

## 2018-12-10 PROCEDURE — 74011250636 HC RX REV CODE- 250/636: Performed by: EMERGENCY MEDICINE

## 2018-12-10 PROCEDURE — 96372 THER/PROPH/DIAG INJ SC/IM: CPT | Performed by: EMERGENCY MEDICINE

## 2018-12-10 PROCEDURE — 74011250637 HC RX REV CODE- 250/637: Performed by: EMERGENCY MEDICINE

## 2018-12-10 PROCEDURE — 74011000250 HC RX REV CODE- 250: Performed by: EMERGENCY MEDICINE

## 2018-12-10 PROCEDURE — 96374 THER/PROPH/DIAG INJ IV PUSH: CPT | Performed by: EMERGENCY MEDICINE

## 2018-12-10 PROCEDURE — 96375 TX/PRO/DX INJ NEW DRUG ADDON: CPT | Performed by: EMERGENCY MEDICINE

## 2018-12-10 PROCEDURE — 99283 EMERGENCY DEPT VISIT LOW MDM: CPT | Performed by: EMERGENCY MEDICINE

## 2018-12-10 RX ORDER — ONDANSETRON 2 MG/ML
4 INJECTION INTRAMUSCULAR; INTRAVENOUS
Status: COMPLETED | OUTPATIENT
Start: 2018-12-10 | End: 2018-12-10

## 2018-12-10 RX ORDER — SUCRALFATE 1 G/10ML
1 SUSPENSION ORAL
Status: COMPLETED | OUTPATIENT
Start: 2018-12-10 | End: 2018-12-10

## 2018-12-10 RX ORDER — METOCLOPRAMIDE 10 MG/1
10 TABLET ORAL
Qty: 12 TAB | Refills: 0 | Status: SHIPPED | OUTPATIENT
Start: 2018-12-10 | End: 2018-12-14

## 2018-12-10 RX ORDER — HALOPERIDOL 5 MG/ML
5 INJECTION INTRAMUSCULAR
Status: COMPLETED | OUTPATIENT
Start: 2018-12-10 | End: 2018-12-10

## 2018-12-10 RX ORDER — DIPHENHYDRAMINE HCL 25 MG
25 CAPSULE ORAL
Status: COMPLETED | OUTPATIENT
Start: 2018-12-10 | End: 2018-12-10

## 2018-12-10 RX ORDER — SUCRALFATE 1 G/1
1 TABLET ORAL 4 TIMES DAILY
Qty: 60 TAB | Refills: 0 | Status: SHIPPED | OUTPATIENT
Start: 2018-12-10 | End: 2018-12-14

## 2018-12-10 RX ORDER — HALOPERIDOL 5 MG/ML
5 INJECTION INTRAMUSCULAR
Status: DISCONTINUED | OUTPATIENT
Start: 2018-12-10 | End: 2018-12-10

## 2018-12-10 RX ORDER — PANTOPRAZOLE SODIUM 20 MG/1
20 TABLET, DELAYED RELEASE ORAL DAILY
Qty: 30 TAB | Refills: 0 | Status: SHIPPED | OUTPATIENT
Start: 2018-12-10 | End: 2018-12-14

## 2018-12-10 RX ADMIN — DIPHENHYDRAMINE HYDROCHLORIDE 25 MG: 25 CAPSULE ORAL at 13:25

## 2018-12-10 RX ADMIN — ONDANSETRON 4 MG: 2 INJECTION INTRAMUSCULAR; INTRAVENOUS at 12:08

## 2018-12-10 RX ADMIN — SUCRALFATE 1 G: 1 SUSPENSION ORAL at 13:25

## 2018-12-10 RX ADMIN — SODIUM CHLORIDE 40 MG: 9 INJECTION, SOLUTION INTRAMUSCULAR; INTRAVENOUS; SUBCUTANEOUS at 13:26

## 2018-12-10 RX ADMIN — SODIUM CHLORIDE 1000 ML: 900 INJECTION, SOLUTION INTRAVENOUS at 13:06

## 2018-12-10 RX ADMIN — HALOPERIDOL LACTATE 5 MG: 5 INJECTION, SOLUTION INTRAMUSCULAR at 13:25

## 2018-12-10 NOTE — ED PROVIDER NOTES
Patient with history gastroparesis thought likely due to marijuana use. Has recently been out of his Protonix  And started vomiting again Friday getting worse throughout the weekend so came in tonight. Also has epigastric pain. No diarrhea or constipation. No dysuria. The history is provided by the patient. No  was used. Vomiting This is a new problem. The current episode started 2 days ago. The problem occurs continuously. The problem has been gradually worsening. The emesis has an appearance of stomach contents. There has been no fever. Associated symptoms include abdominal pain. Pertinent negatives include no chills, no fever, no diarrhea, no headaches, no myalgias, no cough, no URI and no headaches. Past Medical History:  
Diagnosis Date  BETTY (acute kidney injury) ([facility] [address] 8/12/2014  Clostridium difficile colitis 3/20/2011  Gastroparesis 2005  
 emptying study normal -2006  GERD (gastroesophageal reflux disease) Mitch  PUD (peptic ulcer disease) ALL DX IN 2008 ESOPHAGITIS, + H PYLORI  
 Uncontrolled hypertension 6/26/2018 Past Surgical History:  
Procedure Laterality Date  COLONOSCOPY  4/08 ESOPHAGITIS, COLONIC ULCER X1  
 EGD  4/08 H. HERNIA, ULCER X2, H PYLORI,  
 EGD  2011  
 h pylori -neg  HX WISDOM TEETH EXTRACTION  2/10/11 Family History:  
Problem Relation Age of Onset Ase.Sylvester Other Mother L+W  Diabetes Maternal Grandmother  Sickle Cell Anemia Father  Heart Disease Paternal Grandfather  Other Sister ALL L+W  Other Son   
     L+W Social History Socioeconomic History  Marital status:  Spouse name: Not on file  Number of children: Not on file  Years of education: Not on file  Highest education level: Not on file Social Needs  Financial resource strain: Not on file  Food insecurity - worry: Not on file  Food insecurity - inability: Not on file  Transportation needs - medical: Not on file  Transportation needs - non-medical: Not on file Occupational History  Not on file Tobacco Use  Smoking status: Current Every Day Smoker Packs/day: 0.00 Years: 0.00 Pack years: 0.00 Types: Cigarettes  Smokeless tobacco: Never Used Substance and Sexual Activity  Alcohol use: No  
 Drug use: Yes Types: Marijuana  Sexual activity: Yes  
  Partners: Female Other Topics Concern  Not on file Social History Narrative 3/17/11:  PATIENT LIVES WITH GIRLFRIEND, ALTON (CELL: [phone]), HER 3YEAR OLD DAUGHTER AND THEIR 3YEAR OLD SON. ALTON IS CURRENTLY 13 WEEKS PREGNANT. PATIENT'S JOB AT [employer] WAS DOWNSIZED ABOUT A YEAR AGO, AND HE HAS BEEN A STAY HOME DAD SINCE. ALTON WORKS IN HOUSEKEEPING POSITION. ALLERGIES: Amoxicillin; Aspirin; Hydrocodone; Ibuprofen; Pcn [penicillins]; and Phenergan [promethazine] Review of Systems Constitutional: Negative for chills and fever. HENT: Negative for rhinorrhea and sore throat. Eyes: Negative for pain and redness. Respiratory: Negative for cough, chest tightness, shortness of breath and wheezing. Cardiovascular: Negative for chest pain and leg swelling. Gastrointestinal: Positive for abdominal pain, nausea and vomiting. Negative for diarrhea. Genitourinary: Negative for dysuria and hematuria. Musculoskeletal: Negative for back pain, gait problem, myalgias, neck pain and neck stiffness. Skin: Negative for color change and rash. Neurological: Negative for weakness, numbness and headaches. Vitals:  
 12/09/18 2016 BP: 143/85 Pulse: (!) 108 Resp: 18 Temp: 97.8 °F (36.6 °C) SpO2: 95% Weight: 68 kg (150 lb) Height: 5' 2\" (1.575 m) Physical Exam  
Constitutional: He is oriented to person, place, and time. He appears well-developed and well-nourished. HENT:  
Head: Normocephalic and atraumatic. Neck: Normal range of motion. Neck supple. Cardiovascular: Regular rhythm. Tachycardia present. Pulmonary/Chest: Effort normal and breath sounds normal.  
Abdominal: Soft. Bowel sounds are normal. There is tenderness (epigastric). There is no rebound and no guarding. Musculoskeletal: Normal range of motion. He exhibits no edema. Neurological: He is alert and oriented to person, place, and time. Skin: Skin is warm and dry. MDM Number of Diagnoses or Management Options Diagnosis management comments: Patient feels better here with medication. We will discharge after rehydration and restart Protonix with GI follow-up. Amount and/or Complexity of Data Reviewed Clinical lab tests: ordered and reviewed Tests in the medicine section of CPT®: ordered and reviewed Patient Progress Patient progress: stable Procedures Results Include: 
 
Recent Results (from the past 24 hour(s)) CBC WITH AUTOMATED DIFF Collection Time: 12/09/18  8:19 PM  
Result Value Ref Range WBC 7.4 4.3 - 11.1 K/uL  
 RBC 5.34 4.23 - 5.6 M/uL  
 HGB 15.3 13.6 - 17.2 g/dL HCT 41.5 41.1 - 50.3 % MCV 77.7 (L) 79.6 - 97.8 FL  
 MCH 28.7 26.1 - 32.9 PG  
 MCHC 36.9 (H) 31.4 - 35.0 g/dL  
 RDW 12.6 11.9 - 14.6 % PLATELET 054 943 - 082 K/uL MPV 8.8 (L) 9.4 - 12.3 FL ABSOLUTE NRBC 0.00 0.0 - 0.2 K/uL  
 DF AUTOMATED NEUTROPHILS 67 43 - 78 % LYMPHOCYTES 24 13 - 44 % MONOCYTES 8 4.0 - 12.0 % EOSINOPHILS 1 0.5 - 7.8 % BASOPHILS 1 0.0 - 2.0 % IMMATURE GRANULOCYTES 0 0.0 - 5.0 %  
 ABS. NEUTROPHILS 5.0 1.7 - 8.2 K/UL  
 ABS. LYMPHOCYTES 1.7 0.5 - 4.6 K/UL  
 ABS. MONOCYTES 0.6 0.1 - 1.3 K/UL  
 ABS. EOSINOPHILS 0.1 0.0 - 0.8 K/UL  
 ABS. BASOPHILS 0.0 0.0 - 0.2 K/UL  
 ABS. IMM. GRANS. 0.0 0.0 - 0.5 K/UL METABOLIC PANEL, COMPREHENSIVE Collection Time: 12/09/18  8:19 PM  
Result Value Ref Range  Sodium 141 136 - 145 mmol/L  
 Potassium 3.2 (L) 3.5 - 5.1 mmol/L Chloride 111 (H) 98 - 107 mmol/L  
 CO2 15 (L) 21 - 32 mmol/L Anion gap 15 7 - 16 mmol/L Glucose 125 (H) 65 - 100 mg/dL BUN 19 6 - 23 MG/DL Creatinine 1.21 0.8 - 1.5 MG/DL  
 GFR est AA >60 >60 ml/min/1.73m2 GFR est non-AA >60 >60 ml/min/1.73m2 Calcium 9.2 8.3 - 10.4 MG/DL Bilirubin, total 0.6 0.2 - 1.1 MG/DL  
 ALT (SGPT) 26 12 - 65 U/L  
 AST (SGOT) 16 15 - 37 U/L Alk. phosphatase 102 50 - 136 U/L Protein, total 8.2 6.3 - 8.2 g/dL Albumin 4.3 3.5 - 5.0 g/dL Globulin 3.9 (H) 2.3 - 3.5 g/dL A-G Ratio 1.1 (L) 1.2 - 3.5 LIPASE Collection Time: 12/09/18  8:19 PM  
Result Value Ref Range  Lipase 170 73 - 393 U/L

## 2018-12-10 NOTE — ED TRIAGE NOTES
Pt states he has been having nausea and vomiting since Friday. States he has gastroparesis and hiatal hernia. Denies fever. States he has not been able to keep anything down. States they come in spells. Pt feels dehydrated. States when he urinates he also feels like he has to throw up

## 2018-12-10 NOTE — ED PROVIDER NOTES
81-year-old male with a history of cyclic vomiting secondary to marijuana abuse presenting for resumption of symptoms. He was seen yesterday for same. Denies recent marijuana use this time states he hasn't used since Thanksgiving. He developed vomiting yesterday. He's been vomiting multiple times per hour. Unable to keep anything down. He started his home meds without relief. He was sitting here last night where he was given fluids, Zofran, small dose of morphine his symptoms got better. He was discharged home but about 4 AM they resumed. He does have a history of esophagitis and hiatal hernia. The history is provided by the patient and the spouse. Vomiting This is a recurrent problem. The current episode started yesterday. The problem occurs more than 10 times per day. The problem has not changed since onset. The emesis has an appearance of stomach contents. There has been no fever. The fever has been present for less than 1 day. Associated symptoms include abdominal pain. Pertinent negatives include no chills, no fever, no sweats, no diarrhea, no headaches, no arthralgias, no myalgias, no cough, no URI and no headaches. The patient is not pregnant. Past Medical History:  
Diagnosis Date  BETTY (acute kidney injury) ([facility] [address] 8/12/2014  Clostridium difficile colitis 3/20/2011  Gastroparesis 2005  
 emptying study normal -2006  GERD (gastroesophageal reflux disease) [place]  PUD (peptic ulcer disease) ALL DX IN 2008 ESOPHAGITIS, + H PYLORI  
 Uncontrolled hypertension 6/26/2018 Past Surgical History:  
Procedure Laterality Date  COLONOSCOPY  4/08 ESOPHAGITIS, COLONIC ULCER X1  
 EGD  4/08 H. HERNIA, ULCER X2, H PYLORI,  
 EGD  2011  
 h pylori -neg  HX WISDOM TEETH EXTRACTION  2/10/11 Family History:  
Problem Relation Age of Onset Elio.Schwab Other Mother L+W  Diabetes Maternal Grandmother  Sickle Cell Anemia Father  Heart Disease Paternal Grandfather  Other Sister ALL L+W  Other Son   
     L+W Social History Socioeconomic History  Marital status:  Spouse name: Not on file  Number of children: Not on file  Years of education: Not on file  Highest education level: Not on file Social Needs  Financial resource strain: Not on file  Food insecurity - worry: Not on file  Food insecurity - inability: Not on file  Transportation needs - medical: Not on file  Transportation needs - non-medical: Not on file Occupational History  Not on file Tobacco Use  Smoking status: Current Every Day Smoker Packs/day: 0.00 Years: 0.00 Pack years: 0.00 Types: Cigarettes  Smokeless tobacco: Never Used Substance and Sexual Activity  Alcohol use: No  
 Drug use: Yes Types: Marijuana  Sexual activity: Yes  
  Partners: Female Other Topics Concern  Not on file Social History Narrative 3/17/11:  PATIENT LIVES WITH GIRLFRIEND, ALTON (CELL: [phone]), HER 3YEAR OLD DAUGHTER AND THEIR 3YEAR OLD SON. ALTON IS CURRENTLY 13 WEEKS PREGNANT. PATIENT'S JOB AT [employer] WAS DOWNSIZED ABOUT A YEAR AGO, AND HE HAS BEEN A STAY HOME DAD SINCE. ALTON WORKS IN HOUSEKEEPING POSITION. ALLERGIES: Amoxicillin; Aspirin; Hydrocodone; Ibuprofen; Pcn [penicillins]; and Phenergan [promethazine] Review of Systems Constitutional: Negative for chills and fever. Respiratory: Negative for cough. Gastrointestinal: Positive for abdominal pain and vomiting. Negative for diarrhea. Musculoskeletal: Negative for arthralgias and myalgias. Neurological: Negative for headaches. All other systems reviewed and are negative. There were no vitals filed for this visit. Physical Exam  
Constitutional: He is oriented to person, place, and time. He appears well-developed and well-nourished. He appears distressed. Actively retching HENT:  
Head: Normocephalic and atraumatic. Eyes: Conjunctivae and EOM are normal. Pupils are equal, round, and reactive to light. Neck: Normal range of motion. Neck supple. Cardiovascular: Normal rate, regular rhythm, normal heart sounds and intact distal pulses. Pulmonary/Chest: Effort normal and breath sounds normal.  
Abdominal: Soft. Bowel sounds are normal. He exhibits no distension and no mass. There is tenderness. There is no rebound and no guarding. No hernia. Musculoskeletal: Normal range of motion. He exhibits no deformity. Neurological: He is alert and oriented to person, place, and time. No cranial nerve deficit. Skin: Skin is warm and dry. Psychiatric: He has a normal mood and affect. His behavior is normal.  
Nursing note and vitals reviewed. MDM Number of Diagnoses or Management Options Non-intractable cyclical vomiting with nausea:  
Diagnosis management comments: 44-year-old male presenting for intractable nausea and vomiting. He was given his medication yesterday which eventually got his symptoms under control but seemed to resume this morning. Amount and/or Complexity of Data Reviewed Clinical lab tests: ordered and reviewed (Labs are unremarkable.) Risk of Complications, Morbidity, and/or Mortality Presenting problems: moderate Diagnostic procedures: moderate Management options: moderate General comments: Patient's symptoms well controlled with intramuscular Haldol. No vomiting for over 2 hours. Patient was able to drink a cup of water without throwing it back up. Discharging home with Reglan, sucralfate, pantoprazole. Patient Progress Patient progress: improved ED Course as of Dec 10 1530 Mon Dec 10, 2018 [phone] Patient has now been one hour with vomiting. It appears that the Haldol is working. [JS] ED Course User Index [JS] Doris Gillette MD  
 
 
Procedures

## 2018-12-10 NOTE — DISCHARGE INSTRUCTIONS
Abdominal Pain: Care Instructions  Your Care Instructions    Abdominal pain has many possible causes. Some aren't serious and get better on their own in a few days. Others need more testing and treatment. If your pain continues or gets worse, you need to be rechecked and may need more tests to find out what is wrong. You may need surgery to correct the problem. Don't ignore new symptoms, such as fever, nausea and vomiting, urination problems, pain that gets worse, and dizziness. These may be signs of a more serious problem. Your doctor may have recommended a follow-up visit in the next 8 to 12 hours. If you are not getting better, you may need more tests or treatment. The doctor has checked you carefully, but problems can develop later. If you notice any problems or new symptoms, get medical treatment right away. Follow-up care is a key part of your treatment and safety. Be sure to make and go to all appointments, and call your doctor if you are having problems. It's also a good idea to know your test results and keep a list of the medicines you take. How can you care for yourself at home? · Rest until you feel better. · To prevent dehydration, drink plenty of fluids, enough so that your urine is light yellow or clear like water. Choose water and other caffeine-free clear liquids until you feel better. If you have kidney, heart, or liver disease and have to limit fluids, talk with your doctor before you increase the amount of fluids you drink. · If your stomach is upset, eat mild foods, such as rice, dry toast or crackers, bananas, and applesauce. Try eating several small meals instead of two or three large ones. · Wait until 48 hours after all symptoms have gone away before you have spicy foods, alcohol, and drinks that contain caffeine. · Do not eat foods that are high in fat. · Avoid anti-inflammatory medicines such as aspirin, ibuprofen (Advil, Motrin), and naproxen (Aleve).  These can cause stomach upset. Talk to your doctor if you take daily aspirin for another health problem. When should you call for help? Call 911 anytime you think you may need emergency care. For example, call if:    · You passed out (lost consciousness).     · You pass maroon or very bloody stools.     · You vomit blood or what looks like coffee grounds.     · You have new, severe belly pain.    Call your doctor now or seek immediate medical care if:    · Your pain gets worse, especially if it becomes focused in one area of your belly.     · You have a new or higher fever.     · Your stools are black and look like tar, or they have streaks of blood.     · You have unexpected vaginal bleeding.     · You have symptoms of a urinary tract infection. These may include:  ? Pain when you urinate. ? Urinating more often than usual.  ? Blood in your urine.     · You are dizzy or lightheaded, or you feel like you may faint.    Watch closely for changes in your health, and be sure to contact your doctor if:    · You are not getting better after 1 day (24 hours). Where can you learn more? Go to http://ora-itzel.info/. Enter O717 in the search box to learn more about \"Abdominal Pain: Care Instructions. \"  Current as of: November 20, 2017  Content Version: 11.8  © 7935-4088 [employer]. Care instructions adapted under license by [employer] (which disclaims liability or warranty for this information). If you have questions about a medical condition or this instruction, always ask your healthcare professional. [place] [address] any warranty or liability for your use of this information. Gastritis: Care Instructions  Your Care Instructions    Gastritis is a sore and upset stomach. It happens when something irritates the stomach lining. Many things can cause it. These include an infection such as the flu or something you ate or drank.  Medicines or a sore on the lining of the stomach (ulcer) also can cause it. Your belly may bloat and ache. You may belch, vomit, and feel sick to your stomach. You should be able to relieve the problem by taking medicine. And it may help to change your diet. If gastritis lasts, your doctor may prescribe medicine. Follow-up care is a key part of your treatment and safety. Be sure to make and go to all appointments, and call your doctor if you are having problems. It's also a good idea to know your test results and keep a list of the medicines you take. How can you care for yourself at home? · If your doctor prescribed antibiotics, take them as directed. Do not stop taking them just because you feel better. You need to take the full course of antibiotics. · Be safe with medicines. If your doctor prescribed medicine to decrease stomach acid, take it as directed. Call your doctor if you think you are having a problem with your medicine. · Do not take any other medicine, including over-the-counter pain relievers, without talking to your doctor first.  · If your doctor recommends over-the-counter medicine to reduce stomach acid, such as Pepcid AC, Prilosec, Tagamet HB, or Zantac 75, follow the directions on the label. · Drink plenty of fluids (enough so that your urine is light yellow or clear like water) to prevent dehydration. Choose water and other caffeine-free clear liquids. If you have kidney, heart, or liver disease and have to limit fluids, talk with your doctor before you increase the amount of fluids you drink. · Limit how much alcohol you drink. · Avoid coffee, tea, cola drinks, chocolate, and other foods with caffeine. They increase stomach acid. When should you call for help? Call 911 anytime you think you may need emergency care.  For example, call if:    · You vomit blood or what looks like coffee grounds.     · You pass maroon or very bloody stools.    Call your doctor now or seek immediate medical care if:    · You start breathing fast and have not produced urine in the last 8 hours.     · You cannot keep fluids down.    Watch closely for changes in your health, and be sure to contact your doctor if:    · You do not get better as expected. Where can you learn more? Go to http://ora-itzel.info/. Enter [id number] in the search box to learn more about \"Gastritis: Care Instructions. \"  Current as of: March 28, 2018  Content Version: 11.8  © 4545-2856 [employer]. Care instructions adapted under license by [employer] (which disclaims liability or warranty for this information). If you have questions about a medical condition or this instruction, always ask your healthcare professional. [address] any warranty or liability for your use of this information.

## 2018-12-10 NOTE — ED TRIAGE NOTES
Pt reports nausea and vomiting since Friday. Seen here yesterday for same complaint. Actively vomiting in triage

## 2018-12-10 NOTE — ED NOTES
I have reviewed discharge instructions with the patient. The patient verbalized understanding. Patient left ED via Discharge Method: ambulatory to Home with (family). Opportunity for questions and clarification provided. Patient given 3 scripts. To continue your aftercare when you leave the hospital, you may receive an automated call from our care team to check in on how you are doing. This is a free service and part of our promise to provide the best care and service to meet your aftercare needs.  If you have questions, or wish to unsubscribe from this service please call [phone]. Thank you for Choosing our [facility] Emergency Department.

## 2018-12-11 ENCOUNTER — HOSPITAL ENCOUNTER (INPATIENT)
Age: 43
LOS: 1 days | Discharge: HOME | DRG: 918 | End: 2018-12-14
Attending: EMERGENCY MEDICINE | Admitting: INTERNAL MEDICINE
Payer: SELF-PAY

## 2018-12-11 DIAGNOSIS — R11.2 INTRACTABLE VOMITING WITH NAUSEA, UNSPECIFIED VOMITING TYPE: Primary | ICD-10-CM

## 2018-12-11 DIAGNOSIS — E86.0 DEHYDRATION: ICD-10-CM

## 2018-12-11 LAB
ALBUMIN SERPL-MCNC: 4.2 G/DL (ref 3.5–5)
ALBUMIN/GLOB SERPL: 1.1 {RATIO} (ref 1.2–3.5)
ALP SERPL-CCNC: 97 U/L (ref 50–136)
ALT SERPL-CCNC: 25 U/L (ref 12–65)
AMPHET UR QL SCN: NEGATIVE
ANION GAP SERPL CALC-SCNC: 11 MMOL/L (ref 7–16)
APPEARANCE UR: CLEAR
AST SERPL-CCNC: 27 U/L (ref 15–37)
BACTERIA URNS QL MICRO: 0 /HPF
BARBITURATES UR QL SCN: NEGATIVE
BASOPHILS # BLD: 0 K/UL (ref 0–0.2)
BASOPHILS NFR BLD: 0 % (ref 0–2)
BENZODIAZ UR QL: NEGATIVE
BILIRUB SERPL-MCNC: 0.5 MG/DL (ref 0.2–1.1)
BILIRUB UR QL: NEGATIVE
BUN SERPL-MCNC: 16 MG/DL (ref 6–23)
CALCIUM SERPL-MCNC: 9.3 MG/DL (ref 8.3–10.4)
CANNABINOIDS UR QL SCN: POSITIVE
CASTS URNS QL MICRO: ABNORMAL /LPF
CHLORIDE SERPL-SCNC: 108 MMOL/L (ref 98–107)
CO2 SERPL-SCNC: 21 MMOL/L (ref 21–32)
COCAINE UR QL SCN: NEGATIVE
COLOR UR: YELLOW
CREAT SERPL-MCNC: 0.83 MG/DL (ref 0.8–1.5)
DIFFERENTIAL METHOD BLD: ABNORMAL
EOSINOPHIL # BLD: 0 K/UL (ref 0–0.8)
EOSINOPHIL NFR BLD: 0 % (ref 0.5–7.8)
EPI CELLS #/AREA URNS HPF: ABNORMAL /HPF
ERYTHROCYTE [DISTWIDTH] IN BLOOD BY AUTOMATED COUNT: 13 % (ref 11.9–14.6)
GLOBULIN SER CALC-MCNC: 4 G/DL (ref 2.3–3.5)
GLUCOSE SERPL-MCNC: 119 MG/DL (ref 65–100)
GLUCOSE UR STRIP.AUTO-MCNC: NEGATIVE MG/DL
HCT VFR BLD AUTO: 39.6 % (ref 41.1–50.3)
HGB BLD-MCNC: 14.6 G/DL (ref 13.6–17.2)
HGB UR QL STRIP: ABNORMAL
IMM GRANULOCYTES # BLD: 0.1 K/UL (ref 0–0.5)
IMM GRANULOCYTES NFR BLD AUTO: 1 % (ref 0–5)
KETONES UR QL STRIP.AUTO: >80 MG/DL
LEUKOCYTE ESTERASE UR QL STRIP.AUTO: NEGATIVE
LYMPHOCYTES # BLD: 0.8 K/UL (ref 0.5–4.6)
LYMPHOCYTES NFR BLD: 11 % (ref 13–44)
MCH RBC QN AUTO: 29.3 PG (ref 26.1–32.9)
MCHC RBC AUTO-ENTMCNC: 36.9 G/DL (ref 31.4–35)
MCV RBC AUTO: 79.5 FL (ref 79.6–97.8)
METHADONE UR QL: NEGATIVE
MONOCYTES # BLD: 0.3 K/UL (ref 0.1–1.3)
MONOCYTES NFR BLD: 4 % (ref 4–12)
NEUTS SEG # BLD: 6.4 K/UL (ref 1.7–8.2)
NEUTS SEG NFR BLD: 85 % (ref 43–78)
NITRITE UR QL STRIP.AUTO: NEGATIVE
NRBC # BLD: 0 K/UL (ref 0–0.2)
OPIATES UR QL: POSITIVE
PCP UR QL: NEGATIVE
PH UR STRIP: 5.5 [PH] (ref 5–9)
PLATELET # BLD AUTO: 344 K/UL (ref 150–450)
PMV BLD AUTO: 8.8 FL (ref 9.4–12.3)
POTASSIUM SERPL-SCNC: 3.9 MMOL/L (ref 3.5–5.1)
PROT SERPL-MCNC: 8.2 G/DL (ref 6.3–8.2)
PROT UR STRIP-MCNC: ABNORMAL MG/DL
RBC # BLD AUTO: 4.98 M/UL (ref 4.23–5.6)
RBC #/AREA URNS HPF: ABNORMAL /HPF
SODIUM SERPL-SCNC: 140 MMOL/L (ref 136–145)
SP GR UR REFRACTOMETRY: 1.03 (ref 1–1.02)
UROBILINOGEN UR QL STRIP.AUTO: 0.2 EU/DL (ref 0.2–1)
WBC # BLD AUTO: 7.5 K/UL (ref 4.3–11.1)
WBC URNS QL MICRO: ABNORMAL /HPF

## 2018-12-11 PROCEDURE — 80053 COMPREHEN METABOLIC PANEL: CPT

## 2018-12-11 PROCEDURE — 74011000250 HC RX REV CODE- 250: Performed by: HOSPITALIST

## 2018-12-11 PROCEDURE — 77030027138 HC INCENT SPIROMETER -A

## 2018-12-11 PROCEDURE — C9113 INJ PANTOPRAZOLE SODIUM, VIA: HCPCS | Performed by: HOSPITALIST

## 2018-12-11 PROCEDURE — 74011000258 HC RX REV CODE- 258: Performed by: HOSPITALIST

## 2018-12-11 PROCEDURE — 99284 EMERGENCY DEPT VISIT MOD MDM: CPT | Performed by: EMERGENCY MEDICINE

## 2018-12-11 PROCEDURE — 99218 HC RM OBSERVATION: CPT

## 2018-12-11 PROCEDURE — 85025 COMPLETE CBC W/AUTO DIFF WBC: CPT

## 2018-12-11 PROCEDURE — 74011250637 HC RX REV CODE- 250/637: Performed by: HOSPITALIST

## 2018-12-11 PROCEDURE — 74011250636 HC RX REV CODE- 250/636: Performed by: EMERGENCY MEDICINE

## 2018-12-11 PROCEDURE — 80307 DRUG TEST PRSMV CHEM ANLYZR: CPT

## 2018-12-11 PROCEDURE — 96374 THER/PROPH/DIAG INJ IV PUSH: CPT | Performed by: EMERGENCY MEDICINE

## 2018-12-11 PROCEDURE — 74011250636 HC RX REV CODE- 250/636: Performed by: HOSPITALIST

## 2018-12-11 PROCEDURE — 77030020245 HC SOL INJ 5% D/0.9%NACL

## 2018-12-11 PROCEDURE — 81003 URINALYSIS AUTO W/O SCOPE: CPT | Performed by: EMERGENCY MEDICINE

## 2018-12-11 PROCEDURE — 81001 URINALYSIS AUTO W/SCOPE: CPT

## 2018-12-11 RX ORDER — ENOXAPARIN SODIUM 100 MG/ML
40 INJECTION SUBCUTANEOUS EVERY 24 HOURS
Status: DISCONTINUED | OUTPATIENT
Start: 2018-12-11 | End: 2018-12-14 | Stop reason: HOSPADM

## 2018-12-11 RX ORDER — LABETALOL HYDROCHLORIDE 5 MG/ML
20 INJECTION, SOLUTION INTRAVENOUS
Status: DISCONTINUED | OUTPATIENT
Start: 2018-12-11 | End: 2018-12-11

## 2018-12-11 RX ORDER — ACETAMINOPHEN 325 MG/1
650 TABLET ORAL
Status: DISCONTINUED | OUTPATIENT
Start: 2018-12-11 | End: 2018-12-14 | Stop reason: HOSPADM

## 2018-12-11 RX ORDER — SODIUM CHLORIDE 0.9 % (FLUSH) 0.9 %
5-10 SYRINGE (ML) INJECTION EVERY 8 HOURS
Status: DISCONTINUED | OUTPATIENT
Start: 2018-12-11 | End: 2018-12-14 | Stop reason: HOSPADM

## 2018-12-11 RX ORDER — ONDANSETRON 2 MG/ML
4 INJECTION INTRAMUSCULAR; INTRAVENOUS
Status: DISCONTINUED | OUTPATIENT
Start: 2018-12-11 | End: 2018-12-14 | Stop reason: HOSPADM

## 2018-12-11 RX ORDER — HYDRALAZINE HYDROCHLORIDE 20 MG/ML
10 INJECTION INTRAMUSCULAR; INTRAVENOUS
Status: DISCONTINUED | OUTPATIENT
Start: 2018-12-11 | End: 2018-12-14 | Stop reason: HOSPADM

## 2018-12-11 RX ORDER — MORPHINE SULFATE 2 MG/ML
2 INJECTION, SOLUTION INTRAMUSCULAR; INTRAVENOUS
Status: DISCONTINUED | OUTPATIENT
Start: 2018-12-11 | End: 2018-12-14 | Stop reason: HOSPADM

## 2018-12-11 RX ORDER — BISACODYL 5 MG
5 TABLET, DELAYED RELEASE (ENTERIC COATED) ORAL DAILY PRN
Status: DISCONTINUED | OUTPATIENT
Start: 2018-12-11 | End: 2018-12-14 | Stop reason: HOSPADM

## 2018-12-11 RX ORDER — NALOXONE HYDROCHLORIDE 0.4 MG/ML
0.4 INJECTION, SOLUTION INTRAMUSCULAR; INTRAVENOUS; SUBCUTANEOUS AS NEEDED
Status: DISCONTINUED | OUTPATIENT
Start: 2018-12-11 | End: 2018-12-14 | Stop reason: HOSPADM

## 2018-12-11 RX ORDER — HALOPERIDOL 5 MG/ML
5 INJECTION INTRAMUSCULAR
Status: COMPLETED | OUTPATIENT
Start: 2018-12-11 | End: 2018-12-11

## 2018-12-11 RX ORDER — CLONIDINE HYDROCHLORIDE 0.1 MG/1
0.1 TABLET ORAL
Status: COMPLETED | OUTPATIENT
Start: 2018-12-11 | End: 2018-12-11

## 2018-12-11 RX ORDER — SODIUM CHLORIDE 0.9 % (FLUSH) 0.9 %
5-10 SYRINGE (ML) INJECTION AS NEEDED
Status: DISCONTINUED | OUTPATIENT
Start: 2018-12-11 | End: 2018-12-14 | Stop reason: HOSPADM

## 2018-12-11 RX ORDER — DEXTROSE MONOHYDRATE AND SODIUM CHLORIDE 5; .9 G/100ML; G/100ML
125 INJECTION, SOLUTION INTRAVENOUS CONTINUOUS
Status: DISCONTINUED | OUTPATIENT
Start: 2018-12-11 | End: 2018-12-13

## 2018-12-11 RX ADMIN — MORPHINE SULFATE 2 MG: 2 INJECTION, SOLUTION INTRAMUSCULAR; INTRAVENOUS at 21:28

## 2018-12-11 RX ADMIN — DEXTROSE MONOHYDRATE AND SODIUM CHLORIDE 125 ML/HR: 5; .9 INJECTION, SOLUTION INTRAVENOUS at 15:20

## 2018-12-11 RX ADMIN — HALOPERIDOL LACTATE 5 MG: 5 INJECTION, SOLUTION INTRAMUSCULAR at 09:29

## 2018-12-11 RX ADMIN — ENOXAPARIN SODIUM 40 MG: 40 INJECTION SUBCUTANEOUS at 13:03

## 2018-12-11 RX ADMIN — CLONIDINE HYDROCHLORIDE 0.1 MG: 0.1 TABLET ORAL at 13:51

## 2018-12-11 RX ADMIN — Medication 10 ML: at 13:04

## 2018-12-11 RX ADMIN — DEXTROSE MONOHYDRATE AND SODIUM CHLORIDE 125 ML/HR: 5; .9 INJECTION, SOLUTION INTRAVENOUS at 21:28

## 2018-12-11 RX ADMIN — SODIUM CHLORIDE 40 MG: 9 INJECTION, SOLUTION INTRAMUSCULAR; INTRAVENOUS; SUBCUTANEOUS at 15:20

## 2018-12-11 RX ADMIN — SODIUM CHLORIDE 40 MG: 9 INJECTION, SOLUTION INTRAMUSCULAR; INTRAVENOUS; SUBCUTANEOUS at 20:23

## 2018-12-11 RX ADMIN — SODIUM CHLORIDE 1000 ML: 900 INJECTION, SOLUTION INTRAVENOUS at 09:17

## 2018-12-11 RX ADMIN — HYDRALAZINE HYDROCHLORIDE 10 MG: 20 INJECTION INTRAMUSCULAR; INTRAVENOUS at 19:48

## 2018-12-11 NOTE — ED TRIAGE NOTES
Pt reports abd pain with vomiting for 3 days. Pt has been seen 3 times for same complaint. Dr. Noé Knight to triage. Pt states he has a past hx of this.

## 2018-12-11 NOTE — H&P
H&P      Patient: Ashley Nunez               Sex: male             MRN: [id number]      YOB: 1975      Age:  41 y.o. Chief Complaint:  Deirdre Bynum and Vomiting    HPI     This is a 62-year-old male with a past medical history of marijuana abuse comes into the hospital with nausea and vomiting and abdominal pain which has been going on for last 3 days now. Patient has been coming to the emergency room every day for last 3 days as he does not able to tolerate p.o. intake. Patient does have a history of a heavy marijuana abuse and he claimed he has taken marijuana 2 weeks ago. He denies other drug use. His abd pain is mostly in the episgatric area, with 8/10 intensity,vomting makes it worse. In the ER evaluation patient continued to have persistent nausea and vomiting, slightly hypertensive and labs were unremarkable except a urine sp gravity is 1027. LFTs were normal and chemistry was normal.  Urine toxicology screen was positive for cannabinoids, opiates currently patient is being admitted for symptomatic treatment      Review of Systems  Comprehensive 10 point ROS is done, and pertinent positives & negatives per HPI, rest of them are negative. Past Medical History:   Diagnosis Date    BETTY (acute kidney injury) ([facility] [address] 8/12/2014    Clostridium difficile colitis 3/20/2011    Gastroparesis 2005    emptying study normal -2006    GERD (gastroesophageal reflux disease)     HIATAL HERNIA SINCE 1999    PUD (peptic ulcer disease) ALL DX IN 2008    ESOPHAGITIS, + H PYLORI    Uncontrolled hypertension 6/26/2018       Past Surgical History:   Procedure Laterality Date    COLONOSCOPY  4/08    ESOPHAGITIS, COLONIC ULCER X1    EGD  4/08    H.  HERNIA, ULCER X2, H PYLORI,    EGD  2011    h pylori -neg    HX WISDOM TEETH EXTRACTION  2/10/11       Family History   Problem Relation Age of Onset    Other Mother         L+W    Diabetes Maternal Grandmother     Sickle Cell Anemia Father  Heart Disease Paternal Grandfather     Other Sister         ALL L+W    Other Son         L+W       Social History     Socioeconomic History    Marital status:      Spouse name: Not on file    Number of children: Not on file    Years of education: Not on file    Highest education level: Not on file   Tobacco Use    Smoking status: Current Every Day Smoker     Packs/day: 0.00     Years: 0.00     Pack years: 0.00     Types: Cigarettes    Smokeless tobacco: Never Used   Substance and Sexual Activity    Alcohol use: No    Drug use: Yes     Types: Marijuana    Sexual activity: Yes     Partners: Female   Social History Narrative    3/17/11:  PATIENT LIVES WITH GIRLFRIEND, ALTON (CELL: [phone]), HER 3YEAR OLD DAUGHTER AND THEIR 3YEAR OLD SON. ALTON IS CURRENTLY 13 WEEKS PREGNANT. PATIENT'S JOB AT [employer] WAS DOWNSIZED ABOUT A YEAR AGO, AND HE HAS BEEN A STAY HOME DAD SINCE. ALTON WORKS IN HOUSEKEEPING POSITION. Allergies   Allergen Reactions    Amoxicillin Nausea Only    Aspirin Other (comments)     ulcers    Hydrocodone Rash    Ibuprofen Other (comments)     Hx of ulcers    Pcn [Penicillins] Rash    Phenergan [Promethazine] Nausea and Vomiting and Other (comments)       Prior to Admission medications    Medication Sig Start Date End Date Taking? Authorizing Provider   metoclopramide HCl (REGLAN) 10 mg tablet Take 1 Tab by mouth every six (6) hours as needed for Nausea for up to 10 days. 12/10/18 12/20/18  Gume Pope MD   pantoprazole (PROTONIX) 20 mg tablet Take 1 Tab by mouth daily. 12/10/18   Gume Pope MD   sucralfate (CARAFATE) 1 gram tablet Take 1 Tab by mouth four (4) times daily. 12/10/18   Gume Pope MD   pantoprazole (PROTONIX) 40 mg tablet Take 1 Tab by mouth daily for 20 days.  12/9/18 12/29/18  Dial, Torrence Goldmann III, MD   ondansetron (ZOFRAN ODT) 4 mg disintegrating tablet Take 1 Tab by mouth every eight (8) hours as needed for Nausea. 18   Geovani Navas MD   ondansetron hcl (ZOFRAN) 8 mg tablet Take 1 Tab by mouth every eight (8) hours as needed for Nausea. 18   Jenny Leavitt MD   sucralfate (CARAFATE) 100 mg/mL suspension Take 10 mL by mouth four (4) times daily. 18   Hever Leavitt MD   metoclopramide HCl (REGLAN) 10 mg tablet Take one tab every 6 hours as needed for nausea/vomiting 18   Glenda Sahu MD         Physical Exam     Visit Vitals  BP (!) 176/108   Pulse (!) 107   Temp 98.7 °F (37.1 °C)   Resp 18   SpO2 97%      Temp (24hrs), Av.3 °F (36.8 °C), Min:97.9 °F (36.6 °C), Max:98.7 °F (37.1 °C)    Oxygen Therapy  O2 Sat (%): 97 % (18 1251)  O2 Device: Room air (18 1220)  No intake or output data in the 24 hours ending 18 1357       General:- Conscious, No acute distress   Neck:- Supple, No JVD  Lungs- CTA Bilaterally, No significant wheezing  Heart:- S1 S2 regular  Abdomen:- Soft, Positive bowel sounds, Mild epigastric tenderness,  No guarding/rigidity/rebound tend. Extremities:-No pedal edema  Neurologic: - AOX3, No acute FND,  Skin: - No acute rashes  Musculoskeletal: No Acute findings  Psych: - Appropriate mood    LAB  Recent Results (from the past 24 hour(s))   CBC WITH AUTOMATED DIFF    Collection Time: 18  9:15 AM   Result Value Ref Range    WBC 7.5 4.3 - 11.1 K/uL    RBC 4.98 4.23 - 5.6 M/uL    HGB 14.6 13.6 - 17.2 g/dL    HCT 39.6 (L) 41.1 - 50.3 %    MCV 79.5 (L) 79.6 - 97.8 FL    MCH 29.3 26.1 - 32.9 PG    MCHC 36.9 (H) 31.4 - 35.0 g/dL    RDW 13.0 11.9 - 14.6 %    PLATELET 901 629 - 239 K/uL    MPV 8.8 (L) 9.4 - 12.3 FL    ABSOLUTE NRBC 0.00 0.0 - 0.2 K/uL    DF AUTOMATED      NEUTROPHILS 85 (H) 43 - 78 %    LYMPHOCYTES 11 (L) 13 - 44 %    MONOCYTES 4 4.0 - 12.0 %    EOSINOPHILS 0 (L) 0.5 - 7.8 %    BASOPHILS 0 0.0 - 2.0 %    IMMATURE GRANULOCYTES 1 0.0 - 5.0 %    ABS. NEUTROPHILS 6.4 1.7 - 8.2 K/UL    ABS.  LYMPHOCYTES 0.8 0.5 - 4.6 K/UL ABS. MONOCYTES 0.3 0.1 - 1.3 K/UL    ABS. EOSINOPHILS 0.0 0.0 - 0.8 K/UL    ABS. BASOPHILS 0.0 0.0 - 0.2 K/UL    ABS. IMM. GRANS. 0.1 0.0 - 0.5 K/UL   METABOLIC PANEL, COMPREHENSIVE    Collection Time: 12/11/18  9:15 AM   Result Value Ref Range    Sodium 140 136 - 145 mmol/L    Potassium 3.9 3.5 - 5.1 mmol/L    Chloride 108 (H) 98 - 107 mmol/L    CO2 21 21 - 32 mmol/L    Anion gap 11 7 - 16 mmol/L    Glucose 119 (H) 65 - 100 mg/dL    BUN 16 6 - 23 MG/DL    Creatinine 0.83 0.8 - 1.5 MG/DL    GFR est AA >60 >60 ml/min/1.73m2    GFR est non-AA >60 >60 ml/min/1.73m2    Calcium 9.3 8.3 - 10.4 MG/DL    Bilirubin, total 0.5 0.2 - 1.1 MG/DL    ALT (SGPT) 25 12 - 65 U/L    AST (SGOT) 27 15 - 37 U/L    Alk. phosphatase 97 50 - 136 U/L    Protein, total 8.2 6.3 - 8.2 g/dL    Albumin 4.2 3.5 - 5.0 g/dL    Globulin 4.0 (H) 2.3 - 3.5 g/dL    A-G Ratio 1.1 (L) 1.2 - 3.5     DRUG SCREEN, URINE    Collection Time: 12/11/18  9:38 AM   Result Value Ref Range    PCP(PHENCYCLIDINE) NEGATIVE       BENZODIAZEPINES NEGATIVE       COCAINE NEGATIVE       AMPHETAMINES NEGATIVE       METHADONE NEGATIVE       THC (TH-CANNABINOL) POSITIVE      OPIATES POSITIVE      BARBITURATES NEGATIVE      URINALYSIS W/ RFLX MICROSCOPIC    Collection Time: 12/11/18  9:38 AM   Result Value Ref Range    Color YELLOW      Appearance CLEAR      Specific gravity 1.027 (H) 1.001 - 1.023      pH (UA) 5.5 5.0 - 9.0      Protein TRACE (A) NEG mg/dL    Glucose NEGATIVE  mg/dL    Ketone >80 (A) NEG mg/dL    Bilirubin NEGATIVE  NEG      Blood MODERATE (A) NEG      Urobilinogen 0.2 0.2 - 1.0 EU/dL    Nitrites NEGATIVE  NEG      Leukocyte Esterase NEGATIVE  NEG      WBC 0-3 0 /hpf    RBC 5-10 0 /hpf    Epithelial cells 0-3 0 /hpf    Bacteria 0 0 /hpf    Casts 0-3 0 /lpf       IMAGING:     No results found.     All Micro Results     None              Assessment/Plan     Active Problems:    Intractable nausea and vomiting (6/24/2018)        Intractable nausea and vomiting  - Likely related to cyclical vomiting syndrome 2/2 marijuana use  - History of gastroparesis in chart, but normal gastric emptying study noted as well  - Previous EGD showed erosive esophagitis, Will give IV Protonix BID,   - Will start Zofran  - IVFs  - Clear liquid diet    Elevated BP without diagnosis of HTN  - ?  Related to pain or illicit substance use (UDS pending)  - Denies chest pain, SOB, HA, Ordered Oral clonidine, prn hydralazine     Marijuana Use  - Counseled again on cessation          Yobany Lowe MD  December 11, 2018

## 2018-12-11 NOTE — PROGRESS NOTES
made initial visit. Pt was alert and verbal appearing comfortable with no pain level expressed or observed. Pt's wife was present.  welcomed them to DT and share information about  services.  provided spiritual care through presence, pastoral conversation, and assurance of prayer.

## 2018-12-11 NOTE — ED PROVIDER NOTES
The patient presents with nausea and vomiting and some abdominal pain for the past 3 days. This is his third visit here for the same thing over the past 3 days. His wife states that he generally has not been able to tolerate anything by mouth. He has a history of heavy marijuana use, though he has not had any in the past couple weeks. She states he was here last couple days, was given IV fluids, and Haldol which he did well with. She states once he got home, his symptoms recurred. She has been giving him Reglan without any improvement. Elements of this note were made using speech recognition software. As such, errors of speech recognition may occur. Past Medical History:  
Diagnosis Date  BETTY (acute kidney injury) ([facility] [address] 8/12/2014  Clostridium difficile colitis 3/20/2011  Gastroparesis 2005  
 emptying study normal -2006  GERD (gastroesophageal reflux disease) [place]  PUD (peptic ulcer disease) ALL DX IN 2008 ESOPHAGITIS, + H PYLORI  
 Uncontrolled hypertension 6/26/2018 Past Surgical History:  
Procedure Laterality Date  COLONOSCOPY  4/08 ESOPHAGITIS, COLONIC ULCER X1  
 EGD  4/08 H. HERNIA, ULCER X2, H PYLORI,  
 EGD  2011  
 h pylori -neg  HX WISDOM TEETH EXTRACTION  2/10/11 Family History:  
Problem Relation Age of Onset [employer] Other Mother L+W  Diabetes Maternal Grandmother  Sickle Cell Anemia Father  Heart Disease Paternal Grandfather  Other Sister ALL L+W  Other Son   
     L+W Social History Socioeconomic History  Marital status:  Spouse name: Not on file  Number of children: Not on file  Years of education: Not on file  Highest education level: Not on file Social Needs  Financial resource strain: Not on file  Food insecurity - worry: Not on file  Food insecurity - inability: Not on file  Transportation needs - medical: Not on file  Transportation needs - non-medical: Not on file Occupational History  Not on file Tobacco Use  Smoking status: Current Every Day Smoker Packs/day: 0.00 Years: 0.00 Pack years: 0.00 Types: Cigarettes  Smokeless tobacco: Never Used Substance and Sexual Activity  Alcohol use: No  
 Drug use: Yes Types: Marijuana  Sexual activity: Yes  
  Partners: Female Other Topics Concern  Not on file Social History Narrative 3/17/11:  PATIENT LIVES WITH GIRLFRIEND, ALTON (CELL: [phone]), HER 3YEAR OLD DAUGHTER AND THEIR 3YEAR OLD SON. ALTON IS CURRENTLY 13 WEEKS PREGNANT. PATIENT'S JOB AT [employer] WAS DOWNSIZED ABOUT A YEAR AGO, AND HE HAS BEEN A STAY HOME DAD SINCE. ALTON WORKS IN HOUSEKEEPING POSITION. ALLERGIES: Amoxicillin; Aspirin; Hydrocodone; Ibuprofen; Pcn [penicillins]; and Phenergan [promethazine] Review of Systems Constitutional: Negative for chills and fever. Gastrointestinal: Positive for abdominal pain, nausea and vomiting. All other systems reviewed and are negative. Vitals:  
 12/11/18 9419 BP: (!) 163/102 Pulse: 98 Resp: 18 Temp: 97.9 °F (36.6 °C) SpO2: 97% Physical Exam  
Constitutional: He is oriented to person, place, and time. He appears well-developed and well-nourished. HENT:  
Head: Normocephalic and atraumatic. Eyes: Conjunctivae are normal. Pupils are equal, round, and reactive to light. Neck: Normal range of motion. Neck supple. Abdominal: Soft. Bowel sounds are normal. He exhibits no distension. There is tenderness. There is no guarding. Mild tenderness to palpation upper abdomen as indicated Musculoskeletal: Normal range of motion. He exhibits no edema. Neurological: He is alert and oriented to person, place, and time. Skin: Skin is warm and dry. Nursing note and vitals reviewed. MDM Number of Diagnoses or Management Options Dehydration: new and requires workup Intractable vomiting with nausea, unspecified vomiting type: new and requires workup Diagnosis management comments: 11:55 AM I went in to speak with the patient. He has received his Haldol and about 500 cc of normal saline. He states he was feeling better, but then he sat up and vomited a large amount. Given this is his third visit, I spoke with the hospitalist, who will see the patient for continued observation and antibiotics and IV fluids Amount and/or Complexity of Data Reviewed Clinical lab tests: ordered and reviewed Decide to obtain previous medical records or to obtain history from someone other than the patient: yes Review and summarize past medical records: yes Risk of Complications, Morbidity, and/or Mortality Presenting problems: moderate Diagnostic procedures: moderate Management options: moderate Patient Progress Patient progress: improved Procedures

## 2018-12-12 ENCOUNTER — ANESTHESIA EVENT (OUTPATIENT)
Dept: ENDOSCOPY | Age: 43
DRG: 918 | End: 2018-12-12
Payer: SELF-PAY

## 2018-12-12 LAB
ALBUMIN SERPL-MCNC: 4 G/DL (ref 3.5–5)
ALBUMIN/GLOB SERPL: 1.1 {RATIO} (ref 1.2–3.5)
ALP SERPL-CCNC: 93 U/L (ref 50–136)
ALT SERPL-CCNC: 23 U/L (ref 12–65)
ANION GAP SERPL CALC-SCNC: 10 MMOL/L (ref 7–16)
AST SERPL-CCNC: 18 U/L (ref 15–37)
BASOPHILS # BLD: 0 K/UL (ref 0–0.2)
BASOPHILS NFR BLD: 0 % (ref 0–2)
BILIRUB SERPL-MCNC: 0.5 MG/DL (ref 0.2–1.1)
BUN SERPL-MCNC: 9 MG/DL (ref 6–23)
CALCIUM SERPL-MCNC: 9.1 MG/DL (ref 8.3–10.4)
CHLORIDE SERPL-SCNC: 104 MMOL/L (ref 98–107)
CO2 SERPL-SCNC: 23 MMOL/L (ref 21–32)
CREAT SERPL-MCNC: 0.7 MG/DL (ref 0.8–1.5)
DIFFERENTIAL METHOD BLD: ABNORMAL
EOSINOPHIL # BLD: 0 K/UL (ref 0–0.8)
EOSINOPHIL NFR BLD: 0 % (ref 0.5–7.8)
ERYTHROCYTE [DISTWIDTH] IN BLOOD BY AUTOMATED COUNT: 12.8 % (ref 11.9–14.6)
GLOBULIN SER CALC-MCNC: 3.6 G/DL (ref 2.3–3.5)
GLUCOSE SERPL-MCNC: 146 MG/DL (ref 65–100)
HCT VFR BLD AUTO: 39.6 % (ref 41.1–50.3)
HGB BLD-MCNC: 14.6 G/DL (ref 13.6–17.2)
IMM GRANULOCYTES # BLD: 0.1 K/UL (ref 0–0.5)
IMM GRANULOCYTES NFR BLD AUTO: 1 % (ref 0–5)
LYMPHOCYTES # BLD: 1.4 K/UL (ref 0.5–4.6)
LYMPHOCYTES NFR BLD: 12 % (ref 13–44)
MAGNESIUM SERPL-MCNC: 2.1 MG/DL (ref 1.8–2.4)
MCH RBC QN AUTO: 29.1 PG (ref 26.1–32.9)
MCHC RBC AUTO-ENTMCNC: 36.9 G/DL (ref 31.4–35)
MCV RBC AUTO: 78.9 FL (ref 79.6–97.8)
MONOCYTES # BLD: 1.2 K/UL (ref 0.1–1.3)
MONOCYTES NFR BLD: 11 % (ref 4–12)
NEUTS SEG # BLD: 9 K/UL (ref 1.7–8.2)
NEUTS SEG NFR BLD: 77 % (ref 43–78)
NRBC # BLD: 0 K/UL (ref 0–0.2)
PLATELET # BLD AUTO: 365 K/UL (ref 150–450)
PMV BLD AUTO: 9.4 FL (ref 9.4–12.3)
POTASSIUM SERPL-SCNC: 2.7 MMOL/L (ref 3.5–5.1)
PROT SERPL-MCNC: 7.6 G/DL (ref 6.3–8.2)
RBC # BLD AUTO: 5.02 M/UL (ref 4.23–5.6)
SODIUM SERPL-SCNC: 137 MMOL/L (ref 136–145)
WBC # BLD AUTO: 11.7 K/UL (ref 4.3–11.1)

## 2018-12-12 PROCEDURE — 74011250636 HC RX REV CODE- 250/636: Performed by: NURSE PRACTITIONER

## 2018-12-12 PROCEDURE — 74011250637 HC RX REV CODE- 250/637: Performed by: NURSE PRACTITIONER

## 2018-12-12 PROCEDURE — C9113 INJ PANTOPRAZOLE SODIUM, VIA: HCPCS | Performed by: HOSPITALIST

## 2018-12-12 PROCEDURE — 74011250636 HC RX REV CODE- 250/636: Performed by: HOSPITALIST

## 2018-12-12 PROCEDURE — 85025 COMPLETE CBC W/AUTO DIFF WBC: CPT

## 2018-12-12 PROCEDURE — 77030020245 HC SOL INJ 5% D/0.9%NACL

## 2018-12-12 PROCEDURE — 99218 HC RM OBSERVATION: CPT

## 2018-12-12 PROCEDURE — 36415 COLL VENOUS BLD VENIPUNCTURE: CPT

## 2018-12-12 PROCEDURE — 80053 COMPREHEN METABOLIC PANEL: CPT

## 2018-12-12 PROCEDURE — 83735 ASSAY OF MAGNESIUM: CPT

## 2018-12-12 PROCEDURE — 77030037759

## 2018-12-12 PROCEDURE — 74011000250 HC RX REV CODE- 250: Performed by: NURSE PRACTITIONER

## 2018-12-12 PROCEDURE — 76937 US GUIDE VASCULAR ACCESS: CPT

## 2018-12-12 PROCEDURE — 74011000250 HC RX REV CODE- 250: Performed by: HOSPITALIST

## 2018-12-12 PROCEDURE — 74011000258 HC RX REV CODE- 258: Performed by: HOSPITALIST

## 2018-12-12 PROCEDURE — 74011250637 HC RX REV CODE- 250/637: Performed by: PHYSICIAN ASSISTANT

## 2018-12-12 RX ORDER — POTASSIUM CHLORIDE 20 MEQ/1
20 TABLET, EXTENDED RELEASE ORAL 2 TIMES DAILY
Status: DISCONTINUED | OUTPATIENT
Start: 2018-12-12 | End: 2018-12-14

## 2018-12-12 RX ORDER — PROCHLORPERAZINE EDISYLATE 5 MG/ML
10 INJECTION INTRAMUSCULAR; INTRAVENOUS
Status: DISCONTINUED | OUTPATIENT
Start: 2018-12-12 | End: 2018-12-12 | Stop reason: SDUPTHER

## 2018-12-12 RX ORDER — SODIUM CHLORIDE 0.9 % (FLUSH) 0.9 %
10 SYRINGE (ML) INJECTION AS NEEDED
Status: DISCONTINUED | OUTPATIENT
Start: 2018-12-12 | End: 2018-12-14 | Stop reason: HOSPADM

## 2018-12-12 RX ORDER — POTASSIUM CHLORIDE 14.9 MG/ML
20 INJECTION INTRAVENOUS
Status: COMPLETED | OUTPATIENT
Start: 2018-12-12 | End: 2018-12-12

## 2018-12-12 RX ORDER — SUCRALFATE 1 G/10ML
1 SUSPENSION ORAL
Status: DISCONTINUED | OUTPATIENT
Start: 2018-12-12 | End: 2018-12-14 | Stop reason: HOSPADM

## 2018-12-12 RX ORDER — SODIUM CHLORIDE 0.9 % (FLUSH) 0.9 %
10 SYRINGE (ML) INJECTION EVERY 8 HOURS
Status: DISCONTINUED | OUTPATIENT
Start: 2018-12-12 | End: 2018-12-14 | Stop reason: HOSPADM

## 2018-12-12 RX ADMIN — MORPHINE SULFATE 2 MG: 2 INJECTION, SOLUTION INTRAMUSCULAR; INTRAVENOUS at 22:08

## 2018-12-12 RX ADMIN — DEXTROSE MONOHYDRATE AND SODIUM CHLORIDE 125 ML/HR: 5; .9 INJECTION, SOLUTION INTRAVENOUS at 06:04

## 2018-12-12 RX ADMIN — ONDANSETRON 4 MG: 2 INJECTION INTRAMUSCULAR; INTRAVENOUS at 15:54

## 2018-12-12 RX ADMIN — Medication 10 ML: at 06:05

## 2018-12-12 RX ADMIN — POTASSIUM CHLORIDE 20 MEQ: 20 TABLET, EXTENDED RELEASE ORAL at 07:53

## 2018-12-12 RX ADMIN — ONDANSETRON 4 MG: 2 INJECTION INTRAMUSCULAR; INTRAVENOUS at 11:51

## 2018-12-12 RX ADMIN — SUCRALFATE 1 G: 1 SUSPENSION ORAL at 10:49

## 2018-12-12 RX ADMIN — PROCHLORPERAZINE EDISYLATE 10 MG: 5 INJECTION INTRAMUSCULAR; INTRAVENOUS at 17:07

## 2018-12-12 RX ADMIN — POTASSIUM CHLORIDE 20 MEQ: 14.9 INJECTION, SOLUTION INTRAVENOUS at 07:53

## 2018-12-12 RX ADMIN — PROCHLORPERAZINE EDISYLATE 10 MG: 5 INJECTION INTRAMUSCULAR; INTRAVENOUS at 22:08

## 2018-12-12 RX ADMIN — POTASSIUM CHLORIDE 20 MEQ: 14.9 INJECTION, SOLUTION INTRAVENOUS at 10:49

## 2018-12-12 RX ADMIN — HYDRALAZINE HYDROCHLORIDE 10 MG: 20 INJECTION INTRAMUSCULAR; INTRAVENOUS at 04:02

## 2018-12-12 RX ADMIN — SODIUM CHLORIDE 40 MG: 9 INJECTION, SOLUTION INTRAMUSCULAR; INTRAVENOUS; SUBCUTANEOUS at 10:49

## 2018-12-12 RX ADMIN — SUCRALFATE 1 G: 1 SUSPENSION ORAL at 15:54

## 2018-12-12 RX ADMIN — ENOXAPARIN SODIUM 40 MG: 40 INJECTION SUBCUTANEOUS at 14:56

## 2018-12-12 RX ADMIN — SUCRALFATE 1 G: 1 SUSPENSION ORAL at 20:25

## 2018-12-12 RX ADMIN — SODIUM CHLORIDE 40 MG: 9 INJECTION, SOLUTION INTRAMUSCULAR; INTRAVENOUS; SUBCUTANEOUS at 20:24

## 2018-12-12 RX ADMIN — DEXTROSE MONOHYDRATE AND SODIUM CHLORIDE 125 ML/HR: 5; .9 INJECTION, SOLUTION INTRAVENOUS at 15:54

## 2018-12-12 RX ADMIN — POTASSIUM CHLORIDE 20 MEQ: 20 TABLET, EXTENDED RELEASE ORAL at 17:07

## 2018-12-12 RX ADMIN — ONDANSETRON 4 MG: 2 INJECTION INTRAMUSCULAR; INTRAVENOUS at 07:22

## 2018-12-12 NOTE — PROGRESS NOTES
Problem: Falls - Risk of  Goal: *Absence of Falls  Document Alok Fall Risk and appropriate interventions in the flowsheet.   Outcome: Progressing Towards Goal  Fall Risk Interventions:            Medication Interventions: Patient to call before getting OOB, Teach patient to arise slowly

## 2018-12-12 NOTE — H&P (VIEW-ONLY)
[facility] Consult Note       Consulting GI Physician: Dr. Luu Me    Referring Provider:  Ulices Gurrola NP    Consult Date:  12/12/2018    Admit Date:  12/11/2018    Chief Complaint:  Hx erosive esophagitis in pt admitted with intractable N/V, vomiting old blood    Subjective:     History of Present Illness:  Patient is a 41 y.o. 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 He was doing find until this past Friday- since then he has experienced nausea with vomiting at least 10x daily of mostly brown bilious liquid. He has been seen at least 3 separate times since Friday. Possibly after return home Saturday following an ER visit he did vomit bright red blood though since emesis has consisted of brown, bilious liquid. Since presentation to ER this morning he has experienced some nausea though no recurrent vomiting. He did experience one episode of LUQ aching pain that radiated into his left upper back on Saturday, none since. He is having regular BMs. His last BM was this past Saturday and this was normal without black/tarry stool and no BRBPR. He denies any NSAID use, ASA/blood thinners. He denies ETOH use. He has smoked Alvah Punter regularly in the past though reports no use of marijuana since June 2018 until Thanksgiving- then he ate some edible marijuana at least and did \"other fun stuff that they had at Thanksgiving. \"      On admission blood work revealed stable Hgb of 15.3, unchanged from Hgb values in 6/2018 and since Hgb values did drop slightly though have remained stable at Hgb 14.6.  BUN/Cr were WNL. LFTs, Lipase were WNL. He was noted to have low K 2.7 and this is being replaced. UDS was positive for opiates and THC.   He has been noted to be hypertensive - hospitalist notes suspect possible ? Related to pain or illicit substance use. He has a hx of severe erosive esophagitis and has undergone multiple EGDs, last one being in March- see procedure details below. He was last seen by GI during [facility] admission 6/2018- then seen for intractable N/V and episode of hematemesis- felt likely from his known esophagitis, possible MWT- then he was treated conservatively with PPI, Carafate. PMH:  Past Medical History:   Diagnosis Date    BETTY (acute kidney injury) ([facility] [address] 8/12/2014    Clostridium difficile colitis 3/20/2011    Gastroparesis 2005    emptying study normal -2006    GERD (gastroesophageal reflux disease)     HIATAL HERNIA SINCE 1999    PUD (peptic ulcer disease) ALL DX IN 2008    ESOPHAGITIS, + H PYLORI    Uncontrolled hypertension 6/26/2018       PSH:  Past Surgical History:   Procedure Laterality Date    COLONOSCOPY  4/08    ESOPHAGITIS, COLONIC ULCER X1    EGD  4/08    H. HERNIA, ULCER X2, H PYLORI,    EGD  2011    h pylori -neg    HX WISDOM TEETH EXTRACTION  2/10/11       Allergies: Allergies   Allergen Reactions    Amoxicillin Nausea Only    Aspirin Other (comments)     ulcers    Hydrocodone Rash     Tolerates hydromorphone, morphine, tramadol    Ibuprofen Other (comments)     Hx of ulcers    Pcn [Penicillins] Rash    Phenergan [Promethazine] Nausea and Vomiting and Other (comments)       Home Medications:  Prior to Admission medications    Medication Sig Start Date End Date Taking? Authorizing Provider   metoclopramide HCl (REGLAN) 10 mg tablet Take 1 Tab by mouth every six (6) hours as needed for Nausea for up to 10 days. 12/10/18 12/20/18  Yarely Leon MD   pantoprazole (PROTONIX) 20 mg tablet Take 1 Tab by mouth daily. 12/10/18   Yarely Leon MD   sucralfate (CARAFATE) 1 gram tablet Take 1 Tab by mouth four (4) times daily. 12/10/18   Yarely Leon MD   pantoprazole (PROTONIX) 40 mg tablet Take 1 Tab by mouth daily for 20 days. 12/9/18 12/29/18  Shun Navas III, MD   ondansetron (ZOFRAN ODT) 4 mg disintegrating tablet Take 1 Tab by mouth every eight (8) hours as needed for Nausea. 12/9/18   Richard Navas MD   ondansetron hcl (ZOFRAN) 8 mg tablet Take 1 Tab by mouth every eight (8) hours as needed for Nausea. 6/26/18   Jenny Leavitt MD   sucralfate (CARAFATE) 100 mg/mL suspension Take 10 mL by mouth four (4) times daily.  6/26/18   Dane Leavitt MD   metoclopramide HCl (REGLAN) 10 mg tablet Take one tab every 6 hours as needed for nausea/vomiting 6/23/18   Arlene Salazar MD       Hospital Medications:  Current Facility-Administered Medications   Medication Dose Route Frequency    potassium chloride 20 mEq in 100 ml IVPB  20 mEq IntraVENous Q3H    potassium chloride (K-DUR, KLOR-CON) SR tablet 20 mEq  20 mEq Oral BID    sodium chloride (NS) flush 5-10 mL  5-10 mL IntraVENous Q8H    sodium chloride (NS) flush 5-10 mL  5-10 mL IntraVENous PRN    acetaminophen (TYLENOL) tablet 650 mg  650 mg Oral Q4H PRN    naloxone (NARCAN) injection 0.4 mg  0.4 mg IntraVENous PRN    ondansetron (ZOFRAN) injection 4 mg  4 mg IntraVENous Q4H PRN    bisacodyl (DULCOLAX) tablet 5 mg  5 mg Oral DAILY PRN    enoxaparin (LOVENOX) injection 40 mg  40 mg SubCUTAneous Q24H    dextrose 5% and 0.9% NaCl infusion  125 mL/hr IntraVENous CONTINUOUS    hydrALAZINE (APRESOLINE) 20 mg/mL injection 10 mg  10 mg IntraVENous Q4H PRN    pantoprazole (PROTONIX) 40 mg in sodium chloride 0.9% 10 mL injection  40 mg IntraVENous Q12H    morphine injection 2 mg  2 mg IntraVENous Q4H PRN       Social History:  Social History     Tobacco Use    Smoking status: Current Every Day Smoker     Packs/day: 0.00     Years: 0.00     Pack years: 0.00     Types: Cigarettes    Smokeless tobacco: Never Used   Substance Use Topics    Alcohol use: No     Family History:  Family History   Problem Relation Age of Onset    Other Mother         L+W    Diabetes Maternal Grandmother     Sickle Cell Anemia Father     Heart Disease Paternal Grandfather     Other Sister         ALL L+W    Other Son         L+W       Review of Systems:  A detailed 10 system ROS is obtained, with pertinent positives as listed above. All others are negative. Diet:  Clear liquids    Objective:     Physical Exam:  Vitals:  Visit Vitals  BP (!) 147/94 Comment: notified RN    Pulse (!) 103 Comment: notified RN    Temp 98.4 °F (36.9 °C)   Resp 17   SpO2 95%     Gen:  Pt is alert, cooperative, no acute distress  Skin:  Face reveal no rashes. HEENT: Sclerae anicteric. Cardiovascular: Regular rate and rhythm. No murmurs, gallops, or rubs. Respiratory:  Comfortable breathing with no accessory muscle use. Clear breath sounds anteriorly with no wheezes, rales, or rhonchi. GI:  Abdomen nondistended, soft, and nontender. Normal active bowel sounds. No enlargement of the liver or spleen. No masses palpable. Rectal:  Deferred  Musculoskeletal:  No pitting edema of the lower legs. Neurological:  Gross memory appears intact. Patient is alert and oriented. Psychiatric:  Mood appears appropriate with judgement intact. Lymphatic:  No cervical or supraclavicular adenopathy. Laboratory:    Recent Labs     12/12/18  0454 12/11/18  0915 12/10/18  1208 12/09/18  2019   WBC 11.7* 7.5 8.4 7.4   HGB 14.6 14.6 14.7 15.3   HCT 39.6* 39.6* 39.8* 41.5    344 378 375   MCV 78.9* 79.5* 80.2 77.7*    140  --  141   K 2.7* 3.9  --  3.2*    108*  --  111*   CO2 23 21  --  15*   BUN 9 16  --  19   CREA 0.70* 0.83  --  1.21   CA 9.1 9.3  --  9.2   MG 2.1  --   --   --    * 119*  --  125*   AP 93 97  --  102   SGOT 18 27  --  16   ALT 23 25  --  26   TBILI 0.5 0.5  --  0.6   ALB 4.0 4.2  --  4.3   TP 7.6 8.2  --  8.2   LPSE  --   --   --  170      EGD 3/13/18  Findings:   Esophagus- Severe erosive esophagitis. Stomach- Normal.  Biopsied.   Duodenum- Normal.  Therapies: None  Specimens: None  Estimated Blood Loss: 0 cc  Impression:    Severe erosive esophagitis. Recommendations:  Await path results  Bid PPI  Antiemetics  Avoid marijuana    EGD 11/29/16  EGD 2/4/16  EGD 8/13/14  EGD 4/30/14    GES 4/2014  IMPRESSION:  1. It although the half-emptying time of the stomach is within the normal   range, there is a greater than expected amount of residual food within the   stomach at 4 hours suggesting mildly delayed gastric emptying. Assessment:     Principal Problem:    Intractable nausea and vomiting (6/24/2018)    Active Problems:    Polysubstance abuse ([address] (7/11/2016)      Erosive esophagitis (3/11/2018)      Uncontrolled hypertension (6/26/2018)      41 y.o. 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 No NSAID use, ASA/blood thinners. No ETOH use. He has smoked Chiqui Dev regularly in the past though reports no use of marijuana since June 2018 until Thanksgiving- then he ate some edible marijuana at least and did \"other fun stuff that they had at Thanksgiving. \"  blood work revealed stable Hgb of 15.3, unchanged from Hgb values in 6/2018 and since Hgb values did drop slightly though have remained stable at Hgb 14.6.  BUN/Cr were WNL. LFTs, Lipase were WNL. He was noted to have low K 2.7 and this is being replaced. UDS was positive for opiates and THC. He has a hx of severe erosive esophagitis and has undergone multiple EGDs, last one being in March- see procedure details above. GES 4/2012 was within normal limits. Current presentation likely from known esophagitis, possible MWT, hx marijuana use.    Plan:     - PPI IV BID  - Add Carafate liquid  - Supportive care with IVF, antie-emetics prn  - Avoid marijuana use as this is likely contributing to his cyclic N/V.  - Monitor for electrolyte abnormalities and replace as needed. - Clear liquid diet  - Follow H/H. Monitor for overt GI bleeding  - With complaints of recent hematemesis- will plan on f/u EGD with esophageal bx tomorrow with Dr. Amrit Garland. Note recent elevated BP readings- if ongoing/worsening may need to postpone. Will f/u in AM.  Further recommendations will be based upon pt clinical course and findings on EGD.     Kelly Gaspar PA-C

## 2018-12-12 NOTE — PROGRESS NOTES
Left telephone message for PICC team that patient needed mid-line iv for potassium replacement and ivf.

## 2018-12-12 NOTE — CONSULTS
[facility] Consult Note       Consulting GI Physician: Dr. Khoa King    Referring Provider:  Homer Baker NP    Consult Date:  12/12/2018    Admit Date:  12/11/2018    Chief Complaint:  Hx erosive esophagitis in pt admitted with intractable N/V, vomiting old blood    Subjective:     History of Present Illness:  Patient is a 41 y.o. 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 He was doing find until this past Friday- since then he has experienced nausea with vomiting at least 10x daily of mostly brown bilious liquid. He has been seen at least 3 separate times since Friday. Possibly after return home Saturday following an ER visit he did vomit bright red blood though since emesis has consisted of brown, bilious liquid. Since presentation to ER this morning he has experienced some nausea though no recurrent vomiting. He did experience one episode of LUQ aching pain that radiated into his left upper back on Saturday, none since. He is having regular BMs. His last BM was this past Saturday and this was normal without black/tarry stool and no BRBPR. He denies any NSAID use, ASA/blood thinners. He denies ETOH use. He has smoked [place] regularly in the past though reports no use of marijuana since June 2018 until Thanksgiving- then he ate some edible marijuana at least and did \"other fun stuff that they had at Thanksgiving. \"      On admission blood work revealed stable Hgb of 15.3, unchanged from Hgb values in 6/2018 and since Hgb values did drop slightly though have remained stable at Hgb 14.6.  BUN/Cr were WNL. LFTs, Lipase were WNL. He was noted to have low K 2.7 and this is being replaced. UDS was positive for opiates and THC.   He has been noted to be hypertensive - hospitalist notes suspect possible ? Related to pain or illicit substance use. He has a hx of severe erosive esophagitis and has undergone multiple EGDs, last one being in March- see procedure details below. He was last seen by GI during [facility] admission 6/2018- then seen for intractable N/V and episode of hematemesis- felt likely from his known esophagitis, possible MWT- then he was treated conservatively with PPI, Carafate. PMH:  Past Medical History:   Diagnosis Date    BETTY (acute kidney injury) ([facility] [address] 8/12/2014    Clostridium difficile colitis 3/20/2011    Gastroparesis 2005    emptying study normal -2006    GERD (gastroesophageal reflux disease)     HIATAL HERNIA SINCE 1999    PUD (peptic ulcer disease) ALL DX IN 2008    ESOPHAGITIS, + H PYLORI    Uncontrolled hypertension 6/26/2018       PSH:  Past Surgical History:   Procedure Laterality Date    COLONOSCOPY  4/08    ESOPHAGITIS, COLONIC ULCER X1    EGD  4/08    H. HERNIA, ULCER X2, H PYLORI,    EGD  2011    h pylori -neg    HX WISDOM TEETH EXTRACTION  2/10/11       Allergies: Allergies   Allergen Reactions    Amoxicillin Nausea Only    Aspirin Other (comments)     ulcers    Hydrocodone Rash     Tolerates hydromorphone, morphine, tramadol    Ibuprofen Other (comments)     Hx of ulcers    Pcn [Penicillins] Rash    Phenergan [Promethazine] Nausea and Vomiting and Other (comments)       Home Medications:  Prior to Admission medications    Medication Sig Start Date End Date Taking? Authorizing Provider   metoclopramide HCl (REGLAN) 10 mg tablet Take 1 Tab by mouth every six (6) hours as needed for Nausea for up to 10 days. 12/10/18 12/20/18  Colt Zamora MD   pantoprazole (PROTONIX) 20 mg tablet Take 1 Tab by mouth daily. 12/10/18   Colt Zamora MD   sucralfate (CARAFATE) 1 gram tablet Take 1 Tab by mouth four (4) times daily. 12/10/18   Colt Zamora MD   pantoprazole (PROTONIX) 40 mg tablet Take 1 Tab by mouth daily for 20 days. 12/9/18 12/29/18  Paula Navas III, MD   ondansetron (ZOFRAN ODT) 4 mg disintegrating tablet Take 1 Tab by mouth every eight (8) hours as needed for Nausea. 12/9/18   Ivis Navas MD   ondansetron hcl (ZOFRAN) 8 mg tablet Take 1 Tab by mouth every eight (8) hours as needed for Nausea. 6/26/18   Jenny Leavitt MD   sucralfate (CARAFATE) 100 mg/mL suspension Take 10 mL by mouth four (4) times daily.  6/26/18   Arturo Leavitt MD   metoclopramide HCl (REGLAN) 10 mg tablet Take one tab every 6 hours as needed for nausea/vomiting 6/23/18   Lisette Jamil MD       Hospital Medications:  Current Facility-Administered Medications   Medication Dose Route Frequency    potassium chloride 20 mEq in 100 ml IVPB  20 mEq IntraVENous Q3H    potassium chloride (K-DUR, KLOR-CON) SR tablet 20 mEq  20 mEq Oral BID    sodium chloride (NS) flush 5-10 mL  5-10 mL IntraVENous Q8H    sodium chloride (NS) flush 5-10 mL  5-10 mL IntraVENous PRN    acetaminophen (TYLENOL) tablet 650 mg  650 mg Oral Q4H PRN    naloxone (NARCAN) injection 0.4 mg  0.4 mg IntraVENous PRN    ondansetron (ZOFRAN) injection 4 mg  4 mg IntraVENous Q4H PRN    bisacodyl (DULCOLAX) tablet 5 mg  5 mg Oral DAILY PRN    enoxaparin (LOVENOX) injection 40 mg  40 mg SubCUTAneous Q24H    dextrose 5% and 0.9% NaCl infusion  125 mL/hr IntraVENous CONTINUOUS    hydrALAZINE (APRESOLINE) 20 mg/mL injection 10 mg  10 mg IntraVENous Q4H PRN    pantoprazole (PROTONIX) 40 mg in sodium chloride 0.9% 10 mL injection  40 mg IntraVENous Q12H    morphine injection 2 mg  2 mg IntraVENous Q4H PRN       Social History:  Social History     Tobacco Use    Smoking status: Current Every Day Smoker     Packs/day: 0.00     Years: 0.00     Pack years: 0.00     Types: Cigarettes    Smokeless tobacco: Never Used   Substance Use Topics    Alcohol use: No     Family History:  Family History   Problem Relation Age of Onset    Other Mother         L+W    Diabetes Maternal Grandmother     Sickle Cell Anemia Father     Heart Disease Paternal Grandfather     Other Sister         ALL L+W    Other Son         L+W       Review of Systems:  A detailed 10 system ROS is obtained, with pertinent positives as listed above. All others are negative. Diet:  Clear liquids    Objective:     Physical Exam:  Vitals:  Visit Vitals  BP (!) 147/94 Comment: notified RN    Pulse (!) 103 Comment: notified RN    Temp 98.4 °F (36.9 °C)   Resp 17   SpO2 95%     Gen:  Pt is alert, cooperative, no acute distress  Skin:  Face reveal no rashes. HEENT: Sclerae anicteric. Cardiovascular: Regular rate and rhythm. No murmurs, gallops, or rubs. Respiratory:  Comfortable breathing with no accessory muscle use. Clear breath sounds anteriorly with no wheezes, rales, or rhonchi. GI:  Abdomen nondistended, soft, and nontender. Normal active bowel sounds. No enlargement of the liver or spleen. No masses palpable. Rectal:  Deferred  Musculoskeletal:  No pitting edema of the lower legs. Neurological:  Gross memory appears intact. Patient is alert and oriented. Psychiatric:  Mood appears appropriate with judgement intact. Lymphatic:  No cervical or supraclavicular adenopathy. Laboratory:    Recent Labs     12/12/18  0454 12/11/18  0915 12/10/18  1208 12/09/18  2019   WBC 11.7* 7.5 8.4 7.4   HGB 14.6 14.6 14.7 15.3   HCT 39.6* 39.6* 39.8* 41.5    344 378 375   MCV 78.9* 79.5* 80.2 77.7*    140  --  141   K 2.7* 3.9  --  3.2*    108*  --  111*   CO2 23 21  --  15*   BUN 9 16  --  19   CREA 0.70* 0.83  --  1.21   CA 9.1 9.3  --  9.2   MG 2.1  --   --   --    * 119*  --  125*   AP 93 97  --  102   SGOT 18 27  --  16   ALT 23 25  --  26   TBILI 0.5 0.5  --  0.6   ALB 4.0 4.2  --  4.3   TP 7.6 8.2  --  8.2   LPSE  --   --   --  170      EGD 3/13/18  Findings:   Esophagus- Severe erosive esophagitis. Stomach- Normal.  Biopsied.   Duodenum- Normal.  Therapies: None  Specimens: None  Estimated Blood Loss: 0 cc  Impression:    Severe erosive esophagitis. Recommendations:  Await path results  Bid PPI  Antiemetics  Avoid marijuana    EGD 11/29/16  EGD 2/4/16  EGD 8/13/14  EGD 4/30/14    GES 4/2014  IMPRESSION:  1. It although the half-emptying time of the stomach is within the normal   range, there is a greater than expected amount of residual food within the   stomach at 4 hours suggesting mildly delayed gastric emptying. Assessment:     Principal Problem:    Intractable nausea and vomiting (6/24/2018)    Active Problems:    Polysubstance abuse ([address] (7/11/2016)      Erosive esophagitis (3/11/2018)      Uncontrolled hypertension (6/26/2018)      41 y.o. 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 No NSAID use, ASA/blood thinners. No ETOH use. He has smoked [place] regularly in the past though reports no use of marijuana since June 2018 until Thanksgiving- then he ate some edible marijuana at least and did \"other fun stuff that they had at Thanksgiving. \"  blood work revealed stable Hgb of 15.3, unchanged from Hgb values in 6/2018 and since Hgb values did drop slightly though have remained stable at Hgb 14.6.  BUN/Cr were WNL. LFTs, Lipase were WNL. He was noted to have low K 2.7 and this is being replaced. UDS was positive for opiates and THC. He has a hx of severe erosive esophagitis and has undergone multiple EGDs, last one being in March- see procedure details above. GES 4/2012 was within normal limits. Current presentation likely from known esophagitis, possible MWT, hx marijuana use.    Plan:     - PPI IV BID  - Add Carafate liquid  - Supportive care with IVF, antie-emetics prn  - Avoid marijuana use as this is likely contributing to his cyclic N/V.  - Monitor for electrolyte abnormalities and replace as needed. - Clear liquid diet  - Follow H/H. Monitor for overt GI bleeding  - With complaints of recent hematemesis- will plan on f/u EGD with esophageal bx tomorrow with Dr. Juan C Cast. Note recent elevated BP readings- if ongoing/worsening may need to postpone. Will f/u in AM.  Further recommendations will be based upon pt clinical course and findings on EGD.     Antonina Campos PA-C

## 2018-12-12 NOTE — PROGRESS NOTES
MANUEL Brown NP informed that potassium level 2.7, has vomited 200ml of old blood, new orders received. s/p Heimlich maneuver after choking on a clam, c/o hoarseness of voice and some soreness

## 2018-12-12 NOTE — PROGRESS NOTES
Hospitalist Progress Note     Admit Date:  2018  9:09 AM   Name:  Morena Dillon   Age:  41 y.o.  :  1975   MRN:  [id number]   PCP:  Bhumi Rivers MD  Treatment Team: Attending Provider: Carolynn Serra MD; Utilization Review: Silvestre Mendez RN; Consulting Provider: Sheldon Jewell MD; Care Manager: Juan Manuel Perez RN    Subjective:   CC: N&V with abdominal pain    PT is a 40 yo male with PMH of marijuana abuse. PT presented to the ED with 3 day history of abd pain with N&V. Pt has hx of heavy pot use and was seen in the ED for 3 consecutive days for treatment. Pt has not been able to toelrate PO intake. Pt reports last marijuana use was 2 wks ago. Pt denies any other substance abuse. .  On exam today pt resting in bed, denies any recent emesis. Pt difficult IV stick, PICC team inserted a midline cath. Objective:     Patient Vitals for the past 24 hrs:   Temp Pulse Resp BP SpO2   18 0654 98.4 °F (36.9 °C) (!) 103 17 (!) 147/94 95 %   18 0402  (!) 120  (!) 165/109    18 0300 98.5 °F (36.9 °C) (!) 120 16 (!) 161/105 94 %   18 2300 98.5 °F (36.9 °C) (!) 106 16 154/88 98 %   18 1930 99 °F (37.2 °C) (!) 109 16 (!) 165/100 95 %   18 1649    (!) 150/94    18 1251 98.7 °F (37.1 °C) (!) 107 18 (!) 176/108 97 %   18 1220  90  (!) 159/100 98 %     Oxygen Therapy  O2 Sat (%): 95 % (18 0654)  O2 Device: Room air (18 1245)    Intake/Output Summary (Last 24 hours) at 2018 1115  Last data filed at 2018 0608  Gross per 24 hour   Intake 1713 ml   Output 1125 ml   Net 588 ml         Physical Examination:  General:    Well nourished. Awake and alert. Head:  Normocephalic, atraumatic  CV:   RRR. No  Murmurs, clicks, or gallops  Lungs:   Unlabored, no cyanosis  Abdomen:   Soft, nondistended, epigastric tenderness. Extremities: Warm and dry. No cyanosis or edema. Skin:     No rashes or jaundice. Neuro:  No gross focal deficits  Psych:  Flat affect    Data Review:  I have reviewed all labs, meds, telemetry events, and studies from the last 24 hours. Recent Results (from the past 24 hour(s))   METABOLIC PANEL, COMPREHENSIVE    Collection Time: 12/12/18  4:54 AM   Result Value Ref Range    Sodium 137 136 - 145 mmol/L    Potassium 2.7 (LL) 3.5 - 5.1 mmol/L    Chloride 104 98 - 107 mmol/L    CO2 23 21 - 32 mmol/L    Anion gap 10 7 - 16 mmol/L    Glucose 146 (H) 65 - 100 mg/dL    BUN 9 6 - 23 MG/DL    Creatinine 0.70 (L) 0.8 - 1.5 MG/DL    GFR est AA >60 >60 ml/min/1.73m2    GFR est non-AA >60 >60 ml/min/1.73m2    Calcium 9.1 8.3 - 10.4 MG/DL    Bilirubin, total 0.5 0.2 - 1.1 MG/DL    ALT (SGPT) 23 12 - 65 U/L    AST (SGOT) 18 15 - 37 U/L    Alk. phosphatase 93 50 - 136 U/L    Protein, total 7.6 6.3 - 8.2 g/dL    Albumin 4.0 3.5 - 5.0 g/dL    Globulin 3.6 (H) 2.3 - 3.5 g/dL    A-G Ratio 1.1 (L) 1.2 - 3.5     CBC WITH AUTOMATED DIFF    Collection Time: 12/12/18  4:54 AM   Result Value Ref Range    WBC 11.7 (H) 4.3 - 11.1 K/uL    RBC 5.02 4.23 - 5.6 M/uL    HGB 14.6 13.6 - 17.2 g/dL    HCT 39.6 (L) 41.1 - 50.3 %    MCV 78.9 (L) 79.6 - 97.8 FL    MCH 29.1 26.1 - 32.9 PG    MCHC 36.9 (H) 31.4 - 35.0 g/dL    RDW 12.8 11.9 - 14.6 %    PLATELET 509 852 - 704 K/uL    MPV 9.4 9.4 - 12.3 FL    ABSOLUTE NRBC 0.00 0.0 - 0.2 K/uL    DF AUTOMATED      NEUTROPHILS 77 43 - 78 %    LYMPHOCYTES 12 (L) 13 - 44 %    MONOCYTES 11 4.0 - 12.0 %    EOSINOPHILS 0 (L) 0.5 - 7.8 %    BASOPHILS 0 0.0 - 2.0 %    IMMATURE GRANULOCYTES 1 0.0 - 5.0 %    ABS. NEUTROPHILS 9.0 (H) 1.7 - 8.2 K/UL    ABS. LYMPHOCYTES 1.4 0.5 - 4.6 K/UL    ABS. MONOCYTES 1.2 0.1 - 1.3 K/UL    ABS. EOSINOPHILS 0.0 0.0 - 0.8 K/UL    ABS. BASOPHILS 0.0 0.0 - 0.2 K/UL    ABS. IMM.  GRANS. 0.1 0.0 - 0.5 K/UL   MAGNESIUM    Collection Time: 12/12/18  4:54 AM   Result Value Ref Range    Magnesium 2.1 1.8 - 2.4 mg/dL        All Micro Results     None          Current Meds:  Current Facility-Administered Medications   Medication Dose Route Frequency    potassium chloride 20 mEq in 100 ml IVPB  20 mEq IntraVENous Q3H    potassium chloride (K-DUR, KLOR-CON) SR tablet 20 mEq  20 mEq Oral BID    sucralfate (CARAFATE) 100 mg/mL oral suspension 1 g  1 g Oral AC&HS    sodium chloride (NS) flush 5-10 mL  5-10 mL IntraVENous Q8H    sodium chloride (NS) flush 5-10 mL  5-10 mL IntraVENous PRN    acetaminophen (TYLENOL) tablet 650 mg  650 mg Oral Q4H PRN    naloxone (NARCAN) injection 0.4 mg  0.4 mg IntraVENous PRN    ondansetron (ZOFRAN) injection 4 mg  4 mg IntraVENous Q4H PRN    bisacodyl (DULCOLAX) tablet 5 mg  5 mg Oral DAILY PRN    enoxaparin (LOVENOX) injection 40 mg  40 mg SubCUTAneous Q24H    dextrose 5% and 0.9% NaCl infusion  125 mL/hr IntraVENous CONTINUOUS    hydrALAZINE (APRESOLINE) 20 mg/mL injection 10 mg  10 mg IntraVENous Q4H PRN    pantoprazole (PROTONIX) 40 mg in sodium chloride 0.9% 10 mL injection  40 mg IntraVENous Q12H    morphine injection 2 mg  2 mg IntraVENous Q4H PRN       Diet:  DIET CLEAR LIQUID    Other Studies (last 24 hours):  No results found. Assessment and Plan:     Hospital Problems as of 12/12/2018 Date Reviewed: 7/11/2016          Codes Class Noted - Resolved POA    Uncontrolled hypertension ICD-10-CM: I10  ICD-9-CM: 401.9  6/26/2018 - Present Yes        * (Principal) Intractable nausea and vomiting ICD-10-CM: R11.2  ICD-9-CM: 536.2  6/24/2018 - Present Yes        Erosive esophagitis ICD-10-CM: K22.10  ICD-9-CM: 530.19  3/11/2018 - Present Yes        Polysubstance abuse ([address] (Chronic) ICD-10-CM: F19.10  ICD-9-CM: 305.90  7/11/2016 - Present Yes              A/P:    1. Intractable nausea and vomiting   - Likely related to cyclical vomiting syndrome 2/2 marijuana use  - History of gastroparesis in chart, but normal gastric emptying study noted as well  - Previous EGD showed erosive esophagitis, Will give IV Protonix BID,   - Will start Zofran  - IVFs  - Clear liquid diet     2.Elevated BP without diagnosis of HTN  - Possibly related to pain or illicit substance use (UDS + for opioids and cannabis)  - Denies chest pain, SOB, HA, Ordered Oral clonidine, prn hydralazine     3. Marijuana Use  - Counseled again on cessation    4. Erosive esophagitis  -Pt with bloody emesis and history of erosive esophagitis  -GI consult  -Manage conservatively for now    DC planning/Dispo:  home  DVT ppx:  N/A    Code status: Full, no document on file  Medical decision maker: None designated, spouse and mother are emergency contacts. Case reviewed with supervising physician - CONCHITA Don MD    Signed:  MAULIK Hernandez

## 2018-12-12 NOTE — PROGRESS NOTES
Uneventful shift. Hourly rounds completed throughout shift. Patient denies needs at this time. Continue to monitor BP. Patient alert and oriented. Will continue to monitor and give bedside report to oncoming day shift nurse.

## 2018-12-12 NOTE — PROGRESS NOTES
Spoke to Mr. Malhotra in room 209 about Case Management and discharge planning. He lives in [place], [place] with his wife and in-laws. Mr. Brian Marshall is independent with ADLs, and works for a [employer]. However, he does not have health insurance (is \"self pay\"). Not recently, but he says he has been a client at [facility] in [place], [place] in the past.      Probably no additional discharge needs, but consider purchasing health insurance if budget allows. Care Management Interventions  Plan discussed with Pt/Family/Caregiver:  Yes

## 2018-12-12 NOTE — PROGRESS NOTES
MIDLINE Placement Note    PRE-PROCEDURE VERIFICATION  PROCEDURE DETAIL  Time out completed with Rachna Garcia rn and everyone in agreement with procedure. A single lumen Midline was started for vascular access.  The following documentation is in addition to the Midline properties in the lines/airways flowsheet :  Lot #: [id number]  Xylocaine used: yes   Mid-Arm Circumference: 28 (cm)  Internal Catheter Length: 28 (cm)  Internal Catheter Total Length: 8 (cm)  Vein Selection for Midline:right brachial        Line is okay to use:

## 2018-12-13 ENCOUNTER — ANESTHESIA (OUTPATIENT)
Dept: ENDOSCOPY | Age: 43
DRG: 918 | End: 2018-12-13
Payer: SELF-PAY

## 2018-12-13 PROBLEM — R11.2 NAUSEA AND VOMITING: Status: ACTIVE | Noted: 2018-12-13

## 2018-12-13 LAB
ANION GAP SERPL CALC-SCNC: 10 MMOL/L (ref 7–16)
ATRIAL RATE: 141 BPM
BASOPHILS # BLD: 0 K/UL (ref 0–0.2)
BASOPHILS NFR BLD: 0 % (ref 0–2)
BUN SERPL-MCNC: 6 MG/DL (ref 6–23)
CALCIUM SERPL-MCNC: 8.9 MG/DL (ref 8.3–10.4)
CALCULATED P AXIS, ECG09: 66 DEGREES
CALCULATED R AXIS, ECG10: 64 DEGREES
CALCULATED T AXIS, ECG11: 42 DEGREES
CHLORIDE SERPL-SCNC: 105 MMOL/L (ref 98–107)
CO2 SERPL-SCNC: 25 MMOL/L (ref 21–32)
CREAT SERPL-MCNC: 0.81 MG/DL (ref 0.8–1.5)
DIAGNOSIS, 93000: NORMAL
DIFFERENTIAL METHOD BLD: ABNORMAL
EOSINOPHIL # BLD: 0 K/UL (ref 0–0.8)
EOSINOPHIL NFR BLD: 0 % (ref 0.5–7.8)
ERYTHROCYTE [DISTWIDTH] IN BLOOD BY AUTOMATED COUNT: 13 % (ref 11.9–14.6)
GLUCOSE SERPL-MCNC: 121 MG/DL (ref 65–100)
HCT VFR BLD AUTO: 41 % (ref 41.1–50.3)
HGB BLD-MCNC: 14.8 G/DL (ref 13.6–17.2)
IMM GRANULOCYTES # BLD: 0 K/UL (ref 0–0.5)
IMM GRANULOCYTES NFR BLD AUTO: 0 % (ref 0–5)
LYMPHOCYTES # BLD: 1.5 K/UL (ref 0.5–4.6)
LYMPHOCYTES NFR BLD: 19 % (ref 13–44)
MAGNESIUM SERPL-MCNC: 2.1 MG/DL (ref 1.8–2.4)
MCH RBC QN AUTO: 29.2 PG (ref 26.1–32.9)
MCHC RBC AUTO-ENTMCNC: 36.1 G/DL (ref 31.4–35)
MCV RBC AUTO: 80.9 FL (ref 79.6–97.8)
MONOCYTES # BLD: 1.1 K/UL (ref 0.1–1.3)
MONOCYTES NFR BLD: 13 % (ref 4–12)
NEUTS SEG # BLD: 5.6 K/UL (ref 1.7–8.2)
NEUTS SEG NFR BLD: 68 % (ref 43–78)
NRBC # BLD: 0 K/UL (ref 0–0.2)
P-R INTERVAL, ECG05: 138 MS
PLATELET # BLD AUTO: 347 K/UL (ref 150–450)
PMV BLD AUTO: 9.1 FL (ref 9.4–12.3)
POTASSIUM SERPL-SCNC: 3.1 MMOL/L (ref 3.5–5.1)
Q-T INTERVAL, ECG07: 300 MS
QRS DURATION, ECG06: 64 MS
QTC CALCULATION (BEZET), ECG08: 459 MS
RBC # BLD AUTO: 5.07 M/UL (ref 4.23–5.6)
SODIUM SERPL-SCNC: 140 MMOL/L (ref 136–145)
VENTRICULAR RATE, ECG03: 141 BPM
WBC # BLD AUTO: 8.2 K/UL (ref 4.3–11.1)

## 2018-12-13 PROCEDURE — 76060000031 HC ANESTHESIA FIRST 0.5 HR: Performed by: INTERNAL MEDICINE

## 2018-12-13 PROCEDURE — 74011250637 HC RX REV CODE- 250/637: Performed by: NURSE PRACTITIONER

## 2018-12-13 PROCEDURE — 77030009426 HC FCPS BIOP ENDOSC BSC -B: Performed by: INTERNAL MEDICINE

## 2018-12-13 PROCEDURE — 74011250636 HC RX REV CODE- 250/636: Performed by: INTERNAL MEDICINE

## 2018-12-13 PROCEDURE — 88305 TISSUE EXAM BY PATHOLOGIST: CPT

## 2018-12-13 PROCEDURE — 74011250636 HC RX REV CODE- 250/636: Performed by: NURSE PRACTITIONER

## 2018-12-13 PROCEDURE — 83735 ASSAY OF MAGNESIUM: CPT

## 2018-12-13 PROCEDURE — 74011250636 HC RX REV CODE- 250/636

## 2018-12-13 PROCEDURE — 74011000250 HC RX REV CODE- 250: Performed by: HOSPITALIST

## 2018-12-13 PROCEDURE — 74011250637 HC RX REV CODE- 250/637: Performed by: PHYSICIAN ASSISTANT

## 2018-12-13 PROCEDURE — 74011250636 HC RX REV CODE- 250/636: Performed by: HOSPITALIST

## 2018-12-13 PROCEDURE — 74011000250 HC RX REV CODE- 250: Performed by: INTERNAL MEDICINE

## 2018-12-13 PROCEDURE — 65660000000 HC RM CCU STEPDOWN

## 2018-12-13 PROCEDURE — 80048 BASIC METABOLIC PNL TOTAL CA: CPT

## 2018-12-13 PROCEDURE — 74011000250 HC RX REV CODE- 250: Performed by: NURSE PRACTITIONER

## 2018-12-13 PROCEDURE — 36415 COLL VENOUS BLD VENIPUNCTURE: CPT

## 2018-12-13 PROCEDURE — 93005 ELECTROCARDIOGRAM TRACING: CPT | Performed by: NURSE PRACTITIONER

## 2018-12-13 PROCEDURE — 77030020255 HC SOL INJ LR 1000ML BG

## 2018-12-13 PROCEDURE — 76040000025: Performed by: INTERNAL MEDICINE

## 2018-12-13 PROCEDURE — 99218 HC RM OBSERVATION: CPT

## 2018-12-13 PROCEDURE — C9113 INJ PANTOPRAZOLE SODIUM, VIA: HCPCS | Performed by: HOSPITALIST

## 2018-12-13 PROCEDURE — 77030020245 HC SOL INJ 5% D/0.9%NACL

## 2018-12-13 PROCEDURE — 85025 COMPLETE CBC W/AUTO DIFF WBC: CPT

## 2018-12-13 PROCEDURE — 0DB58ZX EXCISION OF ESOPHAGUS, VIA NATURAL OR ARTIFICIAL OPENING ENDOSCOPIC, DIAGNOSTIC: ICD-10-PCS | Performed by: INTERNAL MEDICINE

## 2018-12-13 RX ORDER — LORAZEPAM 1 MG/1
1 TABLET ORAL
Status: DISCONTINUED | OUTPATIENT
Start: 2018-12-13 | End: 2018-12-14 | Stop reason: HOSPADM

## 2018-12-13 RX ORDER — METOPROLOL TARTRATE 5 MG/5ML
2.5 INJECTION INTRAVENOUS ONCE
Status: COMPLETED | OUTPATIENT
Start: 2018-12-13 | End: 2018-12-13

## 2018-12-13 RX ORDER — CARVEDILOL 6.25 MG/1
6.25 TABLET ORAL 2 TIMES DAILY WITH MEALS
Status: DISCONTINUED | OUTPATIENT
Start: 2018-12-13 | End: 2018-12-14 | Stop reason: HOSPADM

## 2018-12-13 RX ORDER — SODIUM CHLORIDE, SODIUM LACTATE, POTASSIUM CHLORIDE, CALCIUM CHLORIDE 600; 310; 30; 20 MG/100ML; MG/100ML; MG/100ML; MG/100ML
1000 INJECTION, SOLUTION INTRAVENOUS CONTINUOUS
Status: DISCONTINUED | OUTPATIENT
Start: 2018-12-13 | End: 2018-12-13 | Stop reason: HOSPADM

## 2018-12-13 RX ORDER — PROPOFOL 10 MG/ML
INJECTION, EMULSION INTRAVENOUS AS NEEDED
Status: DISCONTINUED | OUTPATIENT
Start: 2018-12-13 | End: 2018-12-13 | Stop reason: HOSPADM

## 2018-12-13 RX ADMIN — PROPOFOL 40 MG: 10 INJECTION, EMULSION INTRAVENOUS at 11:01

## 2018-12-13 RX ADMIN — ONDANSETRON 4 MG: 2 INJECTION INTRAMUSCULAR; INTRAVENOUS at 11:57

## 2018-12-13 RX ADMIN — HYDRALAZINE HYDROCHLORIDE 10 MG: 20 INJECTION INTRAMUSCULAR; INTRAVENOUS at 00:51

## 2018-12-13 RX ADMIN — PROCHLORPERAZINE EDISYLATE 10 MG: 5 INJECTION INTRAMUSCULAR; INTRAVENOUS at 13:48

## 2018-12-13 RX ADMIN — PROPOFOL 100 MG: 10 INJECTION, EMULSION INTRAVENOUS at 10:59

## 2018-12-13 RX ADMIN — SODIUM CHLORIDE, SODIUM LACTATE, POTASSIUM CHLORIDE, AND CALCIUM CHLORIDE 1000 ML: 600; 310; 30; 20 INJECTION, SOLUTION INTRAVENOUS at 19:58

## 2018-12-13 RX ADMIN — LORAZEPAM 1 MG: 1 TABLET ORAL at 16:02

## 2018-12-13 RX ADMIN — SODIUM CHLORIDE, SODIUM LACTATE, POTASSIUM CHLORIDE, AND CALCIUM CHLORIDE 1000 ML: 600; 310; 30; 20 INJECTION, SOLUTION INTRAVENOUS at 10:09

## 2018-12-13 RX ADMIN — LORAZEPAM 1 MG: 1 TABLET ORAL at 21:49

## 2018-12-13 RX ADMIN — PROPOFOL 30 MG: 10 INJECTION, EMULSION INTRAVENOUS at 11:03

## 2018-12-13 RX ADMIN — Medication 10 ML: at 12:02

## 2018-12-13 RX ADMIN — Medication 10 ML: at 12:03

## 2018-12-13 RX ADMIN — SUCRALFATE 1 G: 1 SUSPENSION ORAL at 12:55

## 2018-12-13 RX ADMIN — SODIUM CHLORIDE 40 MG: 9 INJECTION, SOLUTION INTRAMUSCULAR; INTRAVENOUS; SUBCUTANEOUS at 21:49

## 2018-12-13 RX ADMIN — Medication 10 ML: at 21:54

## 2018-12-13 RX ADMIN — Medication 10 ML: at 05:41

## 2018-12-13 RX ADMIN — SUCRALFATE 1 G: 1 SUSPENSION ORAL at 21:49

## 2018-12-13 RX ADMIN — SUCRALFATE 1 G: 1 SUSPENSION ORAL at 05:41

## 2018-12-13 RX ADMIN — HYDRALAZINE HYDROCHLORIDE 10 MG: 20 INJECTION INTRAMUSCULAR; INTRAVENOUS at 13:02

## 2018-12-13 RX ADMIN — PROPOFOL 30 MG: 10 INJECTION, EMULSION INTRAVENOUS at 11:05

## 2018-12-13 RX ADMIN — METOPROLOL TARTRATE 2.5 MG: 5 INJECTION, SOLUTION INTRAVENOUS at 19:54

## 2018-12-13 NOTE — ANESTHESIA POSTPROCEDURE EVALUATION
Procedure(s):  ESOPHAGOGASTRODUODENOSCOPY (EGD)/ 27/ 209  ESOPHAGOGASTRODUODENAL (EGD) BIOPSY.     Anesthesia Post Evaluation      Multimodal analgesia: multimodal analgesia not used between 6 hours prior to anesthesia start to PACU discharge  Patient location during evaluation: bedside  Patient participation: complete - patient participated  Level of consciousness: awake and alert  Pain score: 3  Pain management: adequate  Airway patency: patent  Anesthetic complications: no  Cardiovascular status: acceptable and hemodynamically stable  Respiratory status: acceptable  Hydration status: acceptable        Visit Vitals  /74   Pulse 91   Temp 36.9 °C (98.4 °F)   Resp 18   SpO2 97%

## 2018-12-13 NOTE — PROGRESS NOTES
TRANSFER - IN REPORT:    Verbal report received from Laurent(name) on [employer]  being received from [employer]) for ordered procedure      Report consisted of patients Situation, Background, Assessment and   Recommendations(SBAR). Information from the following report(s) SBAR was reviewed with the receiving nurse. Opportunity for questions and clarification was provided. Assessment completed upon patients arrival to unit and care assumed.

## 2018-12-13 NOTE — PROGRESS NOTES
TRANSFER - OUT REPORT:    Verbal report given to Karyn(name) on [employer]  being transferred to GI lab(unit) for ordered procedure       Report consisted of patients Situation, Background, Assessment and   Recommendations(SBAR). Information from the following report(s) SBAR was reviewed with the receiving nurse. Lines:       Opportunity for questions and clarification was provided.       Patient transported with:   [employer]

## 2018-12-13 NOTE — PHYSICIAN ADVISORY
Letter of Determination: Upgrade from Observation to Inpatient Status    This patient was originally hospitalized as Outpatient Status with Observation Services on 12/11/2018 for intractable nausea and vomiting. This patient now meets for Inpatient Admission based on medical necessity. The patient's stay was medically necessary based on laboratory studies significant for potassium of 2.7 mmol/L, and hematemesis during observation care. It is our recommendation that this patient's hospitalization status should be upgraded from OBSERVATION to INPATIENT status.      The final decision regarding the patient's hospitalization status depends on the attending physician's judgement.     Hayley Henry MD, FLAVIO,   Physician [place].

## 2018-12-13 NOTE — PROGRESS NOTES
END OF SHIFT NOTE:    INTAKE/OUTPUT  12/12 0701 - 12/13 0700  In: 7614 [I.V.:3836]  Out: 400 [Urine:400]  Voiding: YES  Catheter: NO  Drain:              Flatus: Patient does have flatus present. Stool:  0 occurrences. Characteristics:  Stool Assessment  Stool Appearance: (have not observed)    Emesis: 4 occurrences. Characteristics:        VITAL SIGNS  Patient Vitals for the past 12 hrs:   Temp Pulse Resp BP SpO2   12/13/18 1527  (!) 126      12/13/18 1526  (!) 132  138/89    12/13/18 1513 98.6 °F (37 °C) (!) 132 18 138/89 97 %   12/13/18 1400  (!) 126  138/89    12/13/18 1357    148/69    12/13/18 1302    (!) 160/104    12/13/18 1148  (!) 103 18 (!) 146/104 98 %   12/13/18 1138  100 18 (!) 163/102 98 %   12/13/18 1127  97 18 (!) 140/94 96 %   12/13/18 1123  98 18 (!) 142/105 99 %   12/13/18 1117  96 18 (!) 161/113 98 %   12/13/18 1116 98.4 °F (36.9 °C) 91 18 117/74 97 %   12/13/18 1115 98.4 °F (36.9 °C) 91 18 117/74 97 %   12/13/18 1001  79 18 127/84 97 %   12/13/18 0719 99.3 °F (37.4 °C) (!) 107 18 125/87 97 %       Pain Assessment  Pain Intensity 1: 0 (12/13/18 1148)        Patient Stated Pain Goal: 0    Ambulating  Yes    Shift report given to oncoming nurse at the bedside.     Tariq Buchanan, RN

## 2018-12-13 NOTE — INTERVAL H&P NOTE
H&P Update:  Morena Dillon was seen and examined. Patient identified by surgeon; surgical site was confirmed by patient and surgeon.     Signed By: Rosalinda Verma MD     December 13, 2018 10:14 AM

## 2018-12-13 NOTE — ANESTHESIA PREPROCEDURE EVALUATION
Anesthetic History   No history of anesthetic complications            Review of Systems / Medical History  Patient summary reviewed and pertinent labs reviewed    Pulmonary  Within defined limits                 Neuro/Psych   Within defined limits           Cardiovascular    Hypertension: well controlled              Exercise tolerance: >4 METS  Comments: EKG--ST    hgb 14   GI/Hepatic/Renal     GERD: well controlled      PUD     Endo/Other             Other Findings   Comments: Has not had bx of esophagus since 2013, needs bx for dx and r/o malignancy     Anesthetic History     PONV          Review of Systems / Medical History  Patient summary reviewed, nursing notes reviewed and pertinent labs reviewed    Pulmonary          Smoker (quit 1 year ago)         Neuro/Psych   Within defined limits           Cardiovascular  Within defined limits                Exercise tolerance: >4 METS     GI/Hepatic/Renal     GERD: well controlled      PUD    Comments: N/V hx of jeremiah woodward tear Endo/Other  Within defined limits           Other Findings              Physical Exam    Airway  Mallampati: II  TM Distance: 4 - 6 cm  Neck ROM: normal range of motion   Mouth opening: Normal     Cardiovascular    Rhythm: regular  Rate: normal    Murmur: Grade 2, Mitral area     Dental  No notable dental hx       Pulmonary  Breath sounds clear to auscultation               Abdominal  GI exam deferred       Other Findings            Anesthetic Plan    ASA: 3, emergent  Anesthesia type: general          Induction: Intravenous and RSI  Anesthetic plan and risks discussed with: Patient                Physical Exam    Airway  Mallampati: II  TM Distance: 4 - 6 cm  Neck ROM: normal range of motion   Mouth opening: Normal     Cardiovascular  Regular rate and rhythm,  S1 and S2 normal,  no murmur, click, rub, or gallop             Dental         Pulmonary  Breath sounds clear to auscultation               Abdominal         Other Findings Anesthetic Plan    ASA: 2  Anesthesia type: total IV anesthesia          Induction: Intravenous  Anesthetic plan and risks discussed with: Patient and Spouse

## 2018-12-13 NOTE — PROGRESS NOTES
Pt /89  with monitor and 126 manually. Pt states he feels anxious, abdominal pressure and nauseous T/C to NP [facility]. Orders received.  Will continue to monitor

## 2018-12-13 NOTE — ROUTINE PROCESS
TRANSFER - OUT REPORT:    Verbal report given to Ina E [address]   on [facility]  being transferred to  for routine progression of care       Report consisted of patients Situation, Background, Assessment and   Recommendations(SBAR). Information from the following report(s) Procedure Summary and MAR was reviewed with the receiving nurse. Lines:       Opportunity for questions and clarification was provided.       Patient transported with:

## 2018-12-13 NOTE — PROGRESS NOTES
TRANSFER - IN REPORT:    Verbal report received from Keila(name) on [facility]  being received from [facility](unit) for routine progression of care      Report consisted of patients Situation, Background, Assessment and   Recommendations(SBAR). Information from the following report(s) SBAR and Kardex was reviewed with the receiving nurse. Opportunity for questions and clarification was provided. Assessment completed upon patients arrival to unit and care assumed.

## 2018-12-13 NOTE — OP NOTES
Endoscopic Gastroduodenoscopy Procedure Note    Indications: Hematemesis    Anesthesia/Sedation: MAC IV          Procedure Details     Informed consent was obtained for the procedure, including conscious sedation. Risks of infection, perforation, hemorrhage, adverse drug reaction and aspiration were discussed. The patient was placed in the left lateral decubitus position. He was monitored continuously with ECG tracing, pulse oximetry, blood pressure monitoring, and direct observation. The [id number] gastroscope was inserted into the mouth and advanced under direct vision to the second portion of the duodenum. A careful inspection was made as the gastroscope was withdrawn, including a retroflexed view of the proximal stomach; findings and interventions are described below. Appropriate photodocumentation was obtained. Findings:       ESOPHAGUS: Mild, LA Grade A reflux esophagitis present. The mucosa appeared hyperplastic, biopsies were taken. STOMACH: mild retained debris, otherwise normal. DUODENUM: The bulb and second portions are normal.      Specimens: esophageal     Estimated Blood Loss: Less than 75OD           Complications:   None; patient tolerated the procedure well. Attending Attestation:  I performed the procedure. Impression:    1. Mild esophagitis in the setting of nausea and vomiting from cannabis hyperemesis syndrome     Recommendations:  1. Follow up inpatient team   2. PPI, antiemetics, supportive care.    3. GI will sign off for now, please call with any further questions or concerns

## 2018-12-13 NOTE — PROGRESS NOTES
Hospitalist Progress Note     Admit Date:  2018  9:09 AM   Name:  Constance Chavarria   Age:  41 y.o.  :  1975   MRN:  [id number]   PCP:  Christina Chacon MD  Treatment Team: Attending Provider: Rosalio Aguilera MD; Utilization Review: Pelon Frederick RN; Consulting Provider: Anne Moran MD; Care Manager: Rolando Leblanc RN    Subjective:   CC: N&V with abdominal pain    Aaron is a 42 yo male with PMH of marijuana abuse who presented to the ED with complaints of N/V and abd pain x 3 days. Patient has hx of marijuana use and was seen in the ED for 3 consecutive days for treatment. UDS positive for opiates and THC. GI consulted. Patient had EGD today. Found mild LA grade A reflux esophagitis. The mucosa appeared hyperplastic with bx taken. GI signed off. Patient with K+ 3.1. Patient denied n/v/d this am. Mild epigastric tenderness. Will restart CLD.          Objective:     Patient Vitals for the past 24 hrs:   Temp Pulse Resp BP SpO2   18 1302    (!) 160/104    18 1148  (!) 103 18 (!) 146/104 98 %   18 1138  100 18 (!) 163/102 98 %   18 1127  97 18 (!) 140/94 96 %   18 1123  98 18 (!) 142/105 99 %   18 1117  96 18 (!) 161/113 98 %   18 1116 98.4 °F (36.9 °C) 91 18 117/74 97 %   18 1115 98.4 °F (36.9 °C) 91 18 117/74 97 %   18 1001  79 18 127/84 97 %   18 0719 99.3 °F (37.4 °C) (!) 107 18 125/87 97 %   18 0412 98.7 °F (37.1 °C) 89 18 124/77 98 %   18 2348 98.7 °F (37.1 °C) (!) 101 18 (!) 148/101 91 %   18 1922 98.3 °F (36.8 °C) (!) 111 18 (!) 154/104 95 %   18 1521 98.1 °F (36.7 °C) 96 18 (!) 159/93 94 %     Oxygen Therapy  O2 Sat (%): 98 % (18 1148)  Pulse via Oximetry: 103 beats per minute (18 1148)  O2 Device: Room air (18 1148)  O2 Flow Rate (L/min): 3 l/min (18 1117)    Intake/Output Summary (Last 24 hours) at 2018 1305  Last data filed at 2018 1108  Gross per 24 hour   Intake 4086 ml   Output 400 ml   Net 3686 ml         Physical Examination:  General:    Well nourished. Awake and alert. Head:  Normocephalic, atraumatic  CV:   RRR. No  Murmurs, clicks, or gallops  Lungs:   Unlabored, no cyanosis  Abdomen:   Soft, nondistended, epigastric tenderness. Extremities: Warm and dry. No cyanosis or edema. Skin:     No rashes or jaundice. Neuro:  No gross focal deficits  Psych:  Flat affect    Data Review:  I have reviewed all labs, meds, telemetry events, and studies from the last 24 hours. Recent Results (from the past 24 hour(s))   CBC WITH AUTOMATED DIFF    Collection Time: 12/13/18  4:08 AM   Result Value Ref Range    WBC 8.2 4.3 - 11.1 K/uL    RBC 5.07 4.23 - 5.6 M/uL    HGB 14.8 13.6 - 17.2 g/dL    HCT 41.0 (L) 41.1 - 50.3 %    MCV 80.9 79.6 - 97.8 FL    MCH 29.2 26.1 - 32.9 PG    MCHC 36.1 (H) 31.4 - 35.0 g/dL    RDW 13.0 11.9 - 14.6 %    PLATELET 983 206 - 248 K/uL    MPV 9.1 (L) 9.4 - 12.3 FL    ABSOLUTE NRBC 0.00 0.0 - 0.2 K/uL    DF AUTOMATED      NEUTROPHILS 68 43 - 78 %    LYMPHOCYTES 19 13 - 44 %    MONOCYTES 13 (H) 4.0 - 12.0 %    EOSINOPHILS 0 (L) 0.5 - 7.8 %    BASOPHILS 0 0.0 - 2.0 %    IMMATURE GRANULOCYTES 0 0.0 - 5.0 %    ABS. NEUTROPHILS 5.6 1.7 - 8.2 K/UL    ABS. LYMPHOCYTES 1.5 0.5 - 4.6 K/UL    ABS. MONOCYTES 1.1 0.1 - 1.3 K/UL    ABS. EOSINOPHILS 0.0 0.0 - 0.8 K/UL    ABS. BASOPHILS 0.0 0.0 - 0.2 K/UL    ABS. IMM.  GRANS. 0.0 0.0 - 0.5 K/UL   MAGNESIUM    Collection Time: 12/13/18  4:08 AM   Result Value Ref Range    Magnesium 2.1 1.8 - 2.4 mg/dL   METABOLIC PANEL, BASIC    Collection Time: 12/13/18  4:08 AM   Result Value Ref Range    Sodium 140 136 - 145 mmol/L    Potassium 3.1 (L) 3.5 - 5.1 mmol/L    Chloride 105 98 - 107 mmol/L    CO2 25 21 - 32 mmol/L    Anion gap 10 7 - 16 mmol/L    Glucose 121 (H) 65 - 100 mg/dL    BUN 6 6 - 23 MG/DL    Creatinine 0.81 0.8 - 1.5 MG/DL    GFR est AA >60 >60 ml/min/1.73m2    GFR est non-AA >60 >60 ml/min/1.73m2    Calcium 8.9 8.3 - 10.4 MG/DL        All Micro Results     None          Current Meds:  Current Facility-Administered Medications   Medication Dose Route Frequency    potassium chloride (K-DUR, KLOR-CON) SR tablet 20 mEq  20 mEq Oral BID    sucralfate (CARAFATE) 100 mg/mL oral suspension 1 g  1 g Oral AC&HS    sodium chloride (NS) flush 10 mL  10 mL InterCATHeter Q8H    sodium chloride (NS) flush 10 mL  10 mL InterCATHeter PRN    prochlorperazine (COMPAZINE) with saline injection 10 mg  10 mg IntraVENous Q4H PRN    sodium chloride (NS) flush 5-10 mL  5-10 mL IntraVENous Q8H    sodium chloride (NS) flush 5-10 mL  5-10 mL IntraVENous PRN    acetaminophen (TYLENOL) tablet 650 mg  650 mg Oral Q4H PRN    naloxone (NARCAN) injection 0.4 mg  0.4 mg IntraVENous PRN    ondansetron (ZOFRAN) injection 4 mg  4 mg IntraVENous Q4H PRN    bisacodyl (DULCOLAX) tablet 5 mg  5 mg Oral DAILY PRN    enoxaparin (LOVENOX) injection 40 mg  40 mg SubCUTAneous Q24H    dextrose 5% and 0.9% NaCl infusion  125 mL/hr IntraVENous CONTINUOUS    hydrALAZINE (APRESOLINE) 20 mg/mL injection 10 mg  10 mg IntraVENous Q4H PRN    pantoprazole (PROTONIX) 40 mg in sodium chloride 0.9% 10 mL injection  40 mg IntraVENous Q12H    morphine injection 2 mg  2 mg IntraVENous Q4H PRN       Diet:  DIET CLEAR LIQUID    Other Studies (last 24 hours):  No results found.     Assessment and Plan:     Hospital Problems as of 12/13/2018 Date Reviewed: 7/11/2016          Codes Class Noted - Resolved POA    Nausea and vomiting ICD-10-CM: R11.2  ICD-9-CM: 787.01  12/13/2018 - Present Unknown        Uncontrolled hypertension ICD-10-CM: I10  ICD-9-CM: 401.9  6/26/2018 - Present Yes        * (Principal) Intractable nausea and vomiting ICD-10-CM: R11.2  ICD-9-CM: 536.2  6/24/2018 - Present Yes        Erosive esophagitis ICD-10-CM: K22.10  ICD-9-CM: 530.19  3/11/2018 - Present Yes        Polysubstance abuse ([facility] [address] (Chronic) ICD-10-CM: F19.10  ICD-9-CM: 305.90  7/11/2016 - Present Yes              A/P:    Intractable nausea and vomiting - Improved  - GI consulted  - EGD today with mild esophagitis from cannabis hyperemesis syndrome - bx sent  - Continue PPI and Zofran  - Clear liquid diet     Elevated BP without diagnosis of HTN  - Possibly related to pain or illicit substance use (UDS + for opioids and cannabis)  - Start coreg, prn hydralazine  -DC IVF    Marijuana Use  - Counseled again on cessation    DC planning/Dispo:  home  DVT ppx:  N/A    Code status: Full, no document on file  Medical decision maker: None designated, spouse and mother are emergency contacts.     Case reviewed with supervising physician - Dr. Maurilio Cedillo    Signed:  MAULIK Lindquist

## 2018-12-14 VITALS
SYSTOLIC BLOOD PRESSURE: 121 MMHG | OXYGEN SATURATION: 96 % | DIASTOLIC BLOOD PRESSURE: 80 MMHG | TEMPERATURE: 98.4 F | RESPIRATION RATE: 19 BRPM | HEART RATE: 76 BPM

## 2018-12-14 LAB
ANION GAP SERPL CALC-SCNC: 9 MMOL/L (ref 7–16)
BASOPHILS # BLD: 0 K/UL (ref 0–0.2)
BASOPHILS NFR BLD: 1 % (ref 0–2)
BUN SERPL-MCNC: 11 MG/DL (ref 6–23)
CALCIUM SERPL-MCNC: 9 MG/DL (ref 8.3–10.4)
CHLORIDE SERPL-SCNC: 105 MMOL/L (ref 98–107)
CO2 SERPL-SCNC: 26 MMOL/L (ref 21–32)
CREAT SERPL-MCNC: 0.84 MG/DL (ref 0.8–1.5)
DIFFERENTIAL METHOD BLD: ABNORMAL
EOSINOPHIL # BLD: 0.1 K/UL (ref 0–0.8)
EOSINOPHIL NFR BLD: 2 % (ref 0.5–7.8)
ERYTHROCYTE [DISTWIDTH] IN BLOOD BY AUTOMATED COUNT: 12.9 % (ref 11.9–14.6)
GLUCOSE SERPL-MCNC: 92 MG/DL (ref 65–100)
HCT VFR BLD AUTO: 39 % (ref 41.1–50.3)
HGB BLD-MCNC: 14.3 G/DL (ref 13.6–17.2)
IMM GRANULOCYTES # BLD: 0 K/UL (ref 0–0.5)
IMM GRANULOCYTES NFR BLD AUTO: 0 % (ref 0–5)
LYMPHOCYTES # BLD: 2.6 K/UL (ref 0.5–4.6)
LYMPHOCYTES NFR BLD: 36 % (ref 13–44)
MAGNESIUM SERPL-MCNC: 1.9 MG/DL (ref 1.8–2.4)
MCH RBC QN AUTO: 29.2 PG (ref 26.1–32.9)
MCHC RBC AUTO-ENTMCNC: 36.7 G/DL (ref 31.4–35)
MCV RBC AUTO: 79.8 FL (ref 79.6–97.8)
MONOCYTES # BLD: 0.9 K/UL (ref 0.1–1.3)
MONOCYTES NFR BLD: 12 % (ref 4–12)
NEUTS SEG # BLD: 3.6 K/UL (ref 1.7–8.2)
NEUTS SEG NFR BLD: 50 % (ref 43–78)
NRBC # BLD: 0 K/UL (ref 0–0.2)
PLATELET # BLD AUTO: 351 K/UL (ref 150–450)
PMV BLD AUTO: 9.1 FL (ref 9.4–12.3)
POTASSIUM SERPL-SCNC: 3.1 MMOL/L (ref 3.5–5.1)
RBC # BLD AUTO: 4.89 M/UL (ref 4.23–5.6)
SODIUM SERPL-SCNC: 140 MMOL/L (ref 136–145)
WBC # BLD AUTO: 7.2 K/UL (ref 4.3–11.1)

## 2018-12-14 PROCEDURE — 80048 BASIC METABOLIC PNL TOTAL CA: CPT

## 2018-12-14 PROCEDURE — 74011250636 HC RX REV CODE- 250/636: Performed by: NURSE PRACTITIONER

## 2018-12-14 PROCEDURE — 74011250637 HC RX REV CODE- 250/637: Performed by: PHYSICIAN ASSISTANT

## 2018-12-14 PROCEDURE — C9113 INJ PANTOPRAZOLE SODIUM, VIA: HCPCS | Performed by: HOSPITALIST

## 2018-12-14 PROCEDURE — 74011250637 HC RX REV CODE- 250/637: Performed by: NURSE PRACTITIONER

## 2018-12-14 PROCEDURE — 83735 ASSAY OF MAGNESIUM: CPT

## 2018-12-14 PROCEDURE — 74011000250 HC RX REV CODE- 250: Performed by: HOSPITALIST

## 2018-12-14 PROCEDURE — 36415 COLL VENOUS BLD VENIPUNCTURE: CPT

## 2018-12-14 PROCEDURE — 85025 COMPLETE CBC W/AUTO DIFF WBC: CPT

## 2018-12-14 PROCEDURE — 74011250636 HC RX REV CODE- 250/636: Performed by: HOSPITALIST

## 2018-12-14 RX ORDER — CARVEDILOL 6.25 MG/1
6.25 TABLET ORAL 2 TIMES DAILY WITH MEALS
Qty: 30 TAB | Refills: 0 | Status: SHIPPED | OUTPATIENT
Start: 2018-12-14 | End: 2018-12-29

## 2018-12-14 RX ORDER — POTASSIUM CHLORIDE 20 MEQ/1
40 TABLET, EXTENDED RELEASE ORAL
Status: COMPLETED | OUTPATIENT
Start: 2018-12-14 | End: 2018-12-14

## 2018-12-14 RX ORDER — POTASSIUM CHLORIDE 20 MEQ/1
20 TABLET, EXTENDED RELEASE ORAL DAILY
Qty: 7 TAB | Refills: 0 | Status: SHIPPED | OUTPATIENT
Start: 2018-12-14 | End: 2019-03-01

## 2018-12-14 RX ORDER — POTASSIUM CHLORIDE 14.9 MG/ML
20 INJECTION INTRAVENOUS
Status: COMPLETED | OUTPATIENT
Start: 2018-12-14 | End: 2018-12-14

## 2018-12-14 RX ORDER — PANTOPRAZOLE SODIUM 40 MG/1
40 TABLET, DELAYED RELEASE ORAL
Status: DISCONTINUED | OUTPATIENT
Start: 2018-12-15 | End: 2018-12-14 | Stop reason: HOSPADM

## 2018-12-14 RX ADMIN — POTASSIUM CHLORIDE 40 MEQ: 20 TABLET, EXTENDED RELEASE ORAL at 09:01

## 2018-12-14 RX ADMIN — SODIUM CHLORIDE 40 MG: 9 INJECTION, SOLUTION INTRAMUSCULAR; INTRAVENOUS; SUBCUTANEOUS at 09:01

## 2018-12-14 RX ADMIN — POTASSIUM CHLORIDE 20 MEQ: 200 INJECTION, SOLUTION INTRAVENOUS at 09:03

## 2018-12-14 RX ADMIN — CARVEDILOL 6.25 MG: 6.25 TABLET, FILM COATED ORAL at 09:02

## 2018-12-14 RX ADMIN — POTASSIUM CHLORIDE 20 MEQ: 200 INJECTION, SOLUTION INTRAVENOUS at 11:05

## 2018-12-14 RX ADMIN — SUCRALFATE 1 G: 1 SUSPENSION ORAL at 07:30

## 2018-12-14 NOTE — DISCHARGE SUMMARY
Hospitalist Discharge Summary     Admit Date:  2018  9:09 AM   Name:  Anali Peraza   Age:  41 y.o.  :  1975   MRN:  [id number]   PCP:  Fany Daigle MD  Treatment Team: Attending Provider: Jeri Reina MD; Utilization Review: Jazlyn Sanchez RN; Consulting Provider: Henrietta Mayen MD; Care Manager: David Cuevas RN    Problem List for this Hospitalization:  Hospital Problems as of 2018 Date Reviewed: 2016          Codes Class Noted - Resolved POA    Nausea and vomiting ICD-10-CM: R11.2  ICD-9-CM: 787.01  2018 - Present Unknown        Uncontrolled hypertension ICD-10-CM: I10  ICD-9-CM: 401.9  2018 - Present Yes        * (Principal) Intractable nausea and vomiting ICD-10-CM: R11.2  ICD-9-CM: 536.2  2018 - Present Yes        Erosive esophagitis ICD-10-CM: K22.10  ICD-9-CM: 530.19  3/11/2018 - Present Yes        Polysubstance abuse ([facility] [address] (Chronic) ICD-10-CM: F19.10  ICD-9-CM: 305.90  2016 - Present Yes                Admission HPI from 2018:    \"Review H&P for details of admission  \"    Hospital Course:     Jennifer Zuniga is a 40 yo male with PMH of marijuana abuse who presented to the ED with complaints of N/V and abd pain x 3 days. Patient has hx of marijuana use and was seen in the ED for 3 consecutive days for treatment. UDS positive for opiates and THC. GI consulted. Patient had EGD. Found mild LA grade A reflux esophagitis. The mucosa appeared hyperplastic with bx taken. Patient to follow up as OP for bx results. Patient with tachycardia and elevated BP during admission. Patient started on Coreg. Patient to follow up with primary care provider for BP recheck and evaluate for continued therapy, and for BMP for hypokalemia during admission. Follow up instructions and discharge meds at bottom of this note. Plan was discussed with patient. All questions answered. Patient was stable at time of discharge.     Diagnostic Imaging/Tests:   No results found. Echocardiogram results:  No results found for this visit on 12/11/18.       All Micro Results     None          Labs: Results:       BMP, Mg, Phos Recent Labs     12/14/18  0420 12/13/18  0408 12/12/18  0454    140 137   K 3.1* 3.1* 2.7*    105 104   CO2 26 25 23   AGAP 9 10 10   BUN 11 6 9   CREA 0.84 0.81 0.70*   CA 9.0 8.9 9.1   GLU 92 121* 146*   MG 1.9 2.1 2.1      CBC Recent Labs     12/14/18  0420 12/13/18  0408 12/12/18  0454   WBC 7.2 8.2 11.7*   RBC 4.89 5.07 5.02   HGB 14.3 14.8 14.6   HCT 39.0* 41.0* 39.6*    347 365   GRANS 50 68 77   LYMPH 36 19 12*   EOS 2 0* 0*   MONOS 12 13* 11   BASOS 1 0 0   IG 0 0 1   ANEU 3.6 5.6 9.0*   ABL 2.6 1.5 1.4   SOCORRO 0.1 0.0 0.0   ABM 0.9 1.1 1.2   ABB 0.0 0.0 0.0   AIG 0.0 0.0 0.1      LFT Recent Labs     12/12/18  0454   SGOT 18   ALT 23   AP 93   TP 7.6   ALB 4.0   GLOB 3.6*   AGRAT 1.1*      Cardiac Testing Lab Results   Component Value Date/Time     (H) 07/30/2013 04:00 PM    Troponin-I, Qt. <0.02 (L) 08/09/2014 11:20 PM      Coagulation Tests Lab Results   Component Value Date/Time    Prothrombin time 11.3 09/17/2016 06:57 AM    Prothrombin time 11.4 02/03/2016 09:30 PM    Prothrombin time 11.3 04/29/2014 03:06 PM    INR 1.1 09/17/2016 06:57 AM    INR 1.1 02/03/2016 09:30 PM    INR 1.1 04/29/2014 03:06 PM    aPTT 25.7 04/29/2014 03:06 PM      A1c Lab Results   Component Value Date/Time    Hemoglobin A1c 5.5 08/12/2014 06:00 AM      Lipid Panel No results found for: CHOL, CHOLPOCT, CHOLX, CHLST, CHOLV, 602577, HDL, LDL, LDLC, DLDLP, 031880, VLDLC, VLDL, TGLX, TRIGL, TRIGP, TGLPOCT, CHHD, CHHDX   Thyroid Panel Lab Results   Component Value Date/Time    TSH 0.263 (L) 03/11/2018 11:02 PM    TSH 0.280 (L) 08/12/2014 06:00 AM    T4, Free 1.1 03/11/2018 11:02 PM    T4, Free 1.1 07/31/2013 05:29 AM    T3, total 1.00 03/11/2018 11:02 PM    T3, total 2.11 (H) 07/31/2013 05:29 AM        Most Recent UA Lab Results   Component Value Date/Time    Color YELLOW 12/11/2018 09:38 AM    Appearance CLEAR 12/11/2018 09:38 AM    Specific gravity 1.027 (H) 12/11/2018 09:38 AM    pH (UA) 5.5 12/11/2018 09:38 AM    Protein TRACE (A) 12/11/2018 09:38 AM    Glucose NEGATIVE  12/11/2018 09:38 AM    Ketone >80 (A) 12/11/2018 09:38 AM    Bilirubin NEGATIVE  12/11/2018 09:38 AM    Blood MODERATE (A) 12/11/2018 09:38 AM    Urobilinogen 0.2 12/11/2018 09:38 AM    Nitrites NEGATIVE  12/11/2018 09:38 AM    Leukocyte Esterase NEGATIVE  12/11/2018 09:38 AM        Allergies   Allergen Reactions    Amoxicillin Nausea Only    Aspirin Other (comments)     ulcers    Hydrocodone Rash     Tolerates hydromorphone, morphine, tramadol    Ibuprofen Other (comments)     Hx of ulcers    Pcn [Penicillins] Rash    Phenergan [Promethazine] Nausea and Vomiting and Other (comments)     Immunization History   Administered Date(s) Administered    TDAP Vaccine 04/21/2012       All Labs from Last 24 Hrs:  Recent Results (from the past 24 hour(s))   EKG, 12 LEAD, INITIAL    Collection Time: 12/13/18  3:52 PM   Result Value Ref Range    Ventricular Rate 141 BPM    Atrial Rate 141 BPM    P-R Interval 138 ms    QRS Duration 64 ms    Q-T Interval 300 ms    QTC Calculation (Bezet) 459 ms    Calculated P Axis 66 degrees    Calculated R Axis 64 degrees    Calculated T Axis 42 degrees    Diagnosis       Sinus tachycardia  Minimal voltage criteria for LVH, may be normal variant  Nonspecific ST abnormality  Abnormal ECG  When compared with ECG of 11-APR-2016 22:33,  Left bundle branch block is no longer Present  Confirmed by Sandra English ([id number]) on 12/13/2018 4:10:15 PM     MAGNESIUM    Collection Time: 12/14/18  4:20 AM   Result Value Ref Range    Magnesium 1.9 1.8 - 2.4 mg/dL   METABOLIC PANEL, BASIC    Collection Time: 12/14/18  4:20 AM   Result Value Ref Range    Sodium 140 136 - 145 mmol/L    Potassium 3.1 (L) 3.5 - 5.1 mmol/L    Chloride 105 98 - 107 mmol/L    CO2 26 21 - 32 mmol/L Anion gap 9 7 - 16 mmol/L    Glucose 92 65 - 100 mg/dL    BUN 11 6 - 23 MG/DL    Creatinine 0.84 0.8 - 1.5 MG/DL    GFR est AA >60 >60 ml/min/1.73m2    GFR est non-AA >60 >60 ml/min/1.73m2    Calcium 9.0 8.3 - 10.4 MG/DL   CBC WITH AUTOMATED DIFF    Collection Time: 12/14/18  4:20 AM   Result Value Ref Range    WBC 7.2 4.3 - 11.1 K/uL    RBC 4.89 4.23 - 5.6 M/uL    HGB 14.3 13.6 - 17.2 g/dL    HCT 39.0 (L) 41.1 - 50.3 %    MCV 79.8 79.6 - 97.8 FL    MCH 29.2 26.1 - 32.9 PG    MCHC 36.7 (H) 31.4 - 35.0 g/dL    RDW 12.9 11.9 - 14.6 %    PLATELET 544 392 - 303 K/uL    MPV 9.1 (L) 9.4 - 12.3 FL    ABSOLUTE NRBC 0.00 0.0 - 0.2 K/uL    DF AUTOMATED      NEUTROPHILS 50 43 - 78 %    LYMPHOCYTES 36 13 - 44 %    MONOCYTES 12 4.0 - 12.0 %    EOSINOPHILS 2 0.5 - 7.8 %    BASOPHILS 1 0.0 - 2.0 %    IMMATURE GRANULOCYTES 0 0.0 - 5.0 %    ABS. NEUTROPHILS 3.6 1.7 - 8.2 K/UL    ABS. LYMPHOCYTES 2.6 0.5 - 4.6 K/UL    ABS. MONOCYTES 0.9 0.1 - 1.3 K/UL    ABS. EOSINOPHILS 0.1 0.0 - 0.8 K/UL    ABS. BASOPHILS 0.0 0.0 - 0.2 K/UL    ABS. IMM.  GRANS. 0.0 0.0 - 0.5 K/UL       Discharge Exam:  Patient Vitals for the past 24 hrs:   Temp Pulse Resp BP SpO2   12/14/18 1202 98.4 °F (36.9 °C) 76 19 121/80 96 %   12/14/18 0642 98.6 °F (37 °C) (!) 106 18 126/82 96 %   12/14/18 0515  (!) 105      12/14/18 0435  82      12/14/18 0306  92      12/14/18 0259 98.9 °F (37.2 °C) (!) 128 18 118/76 95 %   12/13/18 2349 98.5 °F (36.9 °C) (!) 132 18 119/77 94 %   12/13/18 1954  (!) 137      12/13/18 1904 99.4 °F (37.4 °C) (!) 146 18 (!) 147/94 95 %   12/13/18 1527  (!) 126      12/13/18 1526  (!) 132  138/89    12/13/18 1513 98.6 °F (37 °C) (!) 132 18 138/89 97 %   12/13/18 1400  (!) 126  138/89    12/13/18 1357    148/69      Oxygen Therapy  O2 Sat (%): 96 % (12/14/18 1202)  Pulse via Oximetry: 103 beats per minute (12/13/18 1148)  O2 Device: Room air (12/13/18 2000)  O2 Flow Rate (L/min): 3 l/min (12/13/18 1117)    Intake/Output Summary (Last 24 hours) at 12/14/2018 1323  Last data filed at 12/13/2018 1904  Gross per 24 hour   Intake    Output 600 ml   Net -600 ml       General:    Well nourished. Alert. No distress. Eyes:   Normal sclera. Extraocular movements intact. ENT:  Normocephalic, atraumatic. Moist mucous membranes  CV:   Regular rate and rhythm. No murmur, rub, or gallop. Lungs:  Clear to auscultation bilaterally. No wheezing, rhonchi, or rales. Abdomen: Soft, nontender, nondistended. Bowel sounds normal.   Extremities: Warm and dry. No cyanosis or edema. Neurologic: CN II-XII grossly intact. Sensation intact. Skin:     No rashes or jaundice. Psych:  Normal mood and affect. Discharge Info:   Current Discharge Medication List      START taking these medications    Details   carvedilol (COREG) 6.25 mg tablet Take 1 Tab by mouth two (2) times daily (with meals) for 15 days. Qty: 30 Tab, Refills: 0         CONTINUE these medications which have NOT CHANGED    Details   pantoprazole (PROTONIX) 40 mg tablet Take 1 Tab by mouth daily for 20 days. Qty: 20 Tab, Refills: 0      ondansetron (ZOFRAN ODT) 4 mg disintegrating tablet Take 1 Tab by mouth every eight (8) hours as needed for Nausea. Qty: 20 Tab, Refills: 0      sucralfate (CARAFATE) 100 mg/mL suspension Take 10 mL by mouth four (4) times daily.   Qty: 1200 mL, Refills: 0      metoclopramide HCl (REGLAN) 10 mg tablet Take one tab every 6 hours as needed for nausea/vomiting  Qty: 15 Tab, Refills: 0         STOP taking these medications       sucralfate (CARAFATE) 1 gram tablet Comments:   Reason for Stopping:         ondansetron hcl (ZOFRAN) 8 mg tablet Comments:   Reason for Stopping:                 Disposition: home    Activity: Activity as tolerated  Diet: DIET GI SOFT No options chosen    Follow-up Appointments   Procedures    FOLLOW UP VISIT Appointment in: 3 - 5 Days     Standing Status:   Standing     Number of Occurrences:   1 Order Specific Question:   Appointment in     Answer:   3 - 5 Days         Follow-up Information     Follow up With Specialties Details Why Thomas Fitzgerald MD Gastroenterology In 2 weeks post hospitalization f/u [address] [place] [address] Dr. Lieutenant Miguel Telles 106  [phone]      Other, MD Marcial  In 3 days post hospitalization follow up Patient can only remember the practice name and not the physician            Time spent in patient discharge planning and coordination 35 minutes.   Discharge plan discussed with Dr. Fabi Quesada,    Signed:  Antonio Mariee NP

## 2018-12-14 NOTE — PROGRESS NOTES
Patient's  when VS were taken. Notified hospitalist. No new orders received. Will continue to monitor.

## 2018-12-14 NOTE — PROGRESS NOTES
Problem: Falls - Risk of  Goal: *Absence of Falls  Document Alok Fall Risk and appropriate interventions in the flowsheet.   Outcome: Progressing Towards Goal  Fall Risk Interventions:            Medication Interventions: Patient to call before getting OOB

## 2018-12-14 NOTE — PROGRESS NOTES
T/C from monitor room, monitor shows ST sustained in the 150's. T/C to MD Joshi [id number] Shala Garcia.  Orders received. Call back to monitor, showing .   Will continue to monitor

## 2018-12-14 NOTE — DISCHARGE INSTRUCTIONS
DISCHARGE SUMMARY from Nurse    PATIENT INSTRUCTIONS:    After general anesthesia or intravenous sedation, for 24 hours or while taking prescription Narcotics:  · Limit your activities  · Do not drive and operate hazardous machinery  · Do not make important personal or business decisions  · Do  not drink alcoholic beverages  · If you have not urinated within 8 hours after discharge, please contact your surgeon on call. Report the following to your surgeon:  · Excessive pain, swelling, redness or odor of or around the surgical area  · Temperature over 100.5  · Nausea and vomiting lasting longer than 4 hours or if unable to take medications  · Any signs of decreased circulation or nerve impairment to extremity: change in color, persistent  numbness, tingling, coldness or increase pain  · Any questions    What to do at Home:  Recommended activity: Activity as tolerated, ***    If you experience any of the following symptoms increased nausea and vomiting, increased or \"pounding\" heart rate, please follow up your MD or go to the emergency department. *  Please give a list of your current medications to your Primary Care Provider. *  Please update this list whenever your medications are discontinued, doses are      changed, or new medications (including over-the-counter products) are added. *  Please carry medication information at all times in case of emergency situations. These are general instructions for a healthy lifestyle:    No smoking/ No tobacco products/ Avoid exposure to second hand smoke  Surgeon General's Warning:  Quitting smoking now greatly reduces serious risk to your health.     Obesity, smoking, and sedentary lifestyle greatly increases your risk for illness    A healthy diet, regular physical exercise & weight monitoring are important for maintaining a healthy lifestyle    You may be retaining fluid if you have a history of heart failure or if you experience any of the following symptoms:  Weight gain of 3 pounds or more overnight or 5 pounds in a week, increased swelling in our hands or feet or shortness of breath while lying flat in bed. Please call your doctor as soon as you notice any of these symptoms; do not wait until your next office visit. Recognize signs and symptoms of STROKE:    F-face looks uneven    A-arms unable to move or move unevenly    S-speech slurred or non-existent    T-time-call 911 as soon as signs and symptoms begin-DO NOT go       Back to bed or wait to see if you get better-TIME IS BRAIN. Warning Signs of HEART ATTACK     Call 911 if you have these symptoms:   Chest discomfort. Most heart attacks involve discomfort in the center of the chest that lasts more than a few minutes, or that goes away and comes back. It can feel like uncomfortable pressure, squeezing, fullness, or pain.  Discomfort in other areas of the upper body. Symptoms can include pain or discomfort in one or both arms, the back, neck, jaw, or stomach.  Shortness of breath with or without chest discomfort.  Other signs may include breaking out in a cold sweat, nausea, or lightheadedness. Don't wait more than five minutes to call 911 - MINUTES MATTER! Fast action can save your life. Calling 911 is almost always the fastest way to get lifesaving treatment. Emergency Medical Services staff can begin treatment when they arrive -- up to an hour sooner than if someone gets to the hospital by car. The discharge information has been reviewed with the patient and spouse. The patient and spouse verbalized understanding. Discharge medications reviewed with the patient and spouse and appropriate educational materials and side effects teaching were provided. ___________________________________________________________________________________________________________________________________Follow up with primary care provider and GI and discuss the events of this admission.     Take medication as prescribed    Stay hydrated. Stop smoking, taking recreational drugs, alcohol use.

## 2018-12-14 NOTE — PROGRESS NOTES
All discharge instructions discussed with patient and wife, verbalized understanding of all. D/C from unit with belongings and RX. Accompanied by family and hospital personnel. No distress at time of D/C. Transported via w/c.

## 2018-12-14 NOTE — PROGRESS NOTES
Received call from telemetry. Patient's HR went up to 140. Checked on patient and he had just came back from bathroom. Rechecked HR with monitor and HR was 105.

## 2019-03-01 PROBLEM — R11.2 NAUSEA AND VOMITING: Status: RESOLVED | Noted: 2018-12-13 | Resolved: 2019-03-01

## 2019-03-01 PROBLEM — R03.0 ELEVATED BP WITHOUT DIAGNOSIS OF HYPERTENSION: Status: RESOLVED | Noted: 2018-06-24 | Resolved: 2019-03-01

## 2019-03-01 PROBLEM — I10 UNCONTROLLED HYPERTENSION: Status: RESOLVED | Noted: 2018-06-26 | Resolved: 2019-03-01

## 2019-04-01 ENCOUNTER — HOSPITAL ENCOUNTER (EMERGENCY)
Age: 44
Discharge: HOME | End: 2019-04-02
Attending: EMERGENCY MEDICINE
Payer: COMMERCIAL

## 2019-04-01 DIAGNOSIS — K31.84 GASTROPARESIS: Chronic | ICD-10-CM

## 2019-04-01 DIAGNOSIS — R11.2 NON-INTRACTABLE VOMITING WITH NAUSEA, UNSPECIFIED VOMITING TYPE: Primary | ICD-10-CM

## 2019-04-01 LAB
ALBUMIN SERPL-MCNC: 4.3 G/DL (ref 3.5–5)
ALBUMIN/GLOB SERPL: 1.1 {RATIO} (ref 1.2–3.5)
ALP SERPL-CCNC: 127 U/L (ref 50–136)
ALT SERPL-CCNC: 28 U/L (ref 12–65)
ANION GAP SERPL CALC-SCNC: 14 MMOL/L (ref 7–16)
AST SERPL-CCNC: 27 U/L (ref 15–37)
BASOPHILS # BLD: 0 K/UL (ref 0–0.2)
BASOPHILS NFR BLD: 0 % (ref 0–2)
BILIRUB SERPL-MCNC: 0.6 MG/DL (ref 0.2–1.1)
BUN SERPL-MCNC: 16 MG/DL (ref 6–23)
CALCIUM SERPL-MCNC: 9.6 MG/DL (ref 8.3–10.4)
CHLORIDE SERPL-SCNC: 109 MMOL/L (ref 98–107)
CO2 SERPL-SCNC: 17 MMOL/L (ref 21–32)
CREAT SERPL-MCNC: 1.05 MG/DL (ref 0.8–1.5)
DIFFERENTIAL METHOD BLD: ABNORMAL
EOSINOPHIL # BLD: 0 K/UL (ref 0–0.8)
EOSINOPHIL NFR BLD: 0 % (ref 0.5–7.8)
ERYTHROCYTE [DISTWIDTH] IN BLOOD BY AUTOMATED COUNT: 12.7 % (ref 11.9–14.6)
GLOBULIN SER CALC-MCNC: 4 G/DL (ref 2.3–3.5)
GLUCOSE SERPL-MCNC: 99 MG/DL (ref 65–100)
HCT VFR BLD AUTO: 42.2 % (ref 41.1–50.3)
HGB BLD-MCNC: 15.6 G/DL (ref 13.6–17.2)
IMM GRANULOCYTES # BLD AUTO: 0.1 K/UL (ref 0–0.5)
IMM GRANULOCYTES NFR BLD AUTO: 0 % (ref 0–5)
LIPASE SERPL-CCNC: 86 U/L (ref 73–393)
LYMPHOCYTES # BLD: 2.4 K/UL (ref 0.5–4.6)
LYMPHOCYTES NFR BLD: 21 % (ref 13–44)
MCH RBC QN AUTO: 29.1 PG (ref 26.1–32.9)
MCHC RBC AUTO-ENTMCNC: 37 G/DL (ref 31.4–35)
MCV RBC AUTO: 78.7 FL (ref 79.6–97.8)
MONOCYTES # BLD: 0.5 K/UL (ref 0.1–1.3)
MONOCYTES NFR BLD: 5 % (ref 4–12)
NEUTS SEG # BLD: 8.3 K/UL (ref 1.7–8.2)
NEUTS SEG NFR BLD: 73 % (ref 43–78)
NRBC # BLD: 0 K/UL (ref 0–0.2)
PLATELET # BLD AUTO: 396 K/UL (ref 150–450)
PMV BLD AUTO: 9.2 FL (ref 9.4–12.3)
POTASSIUM SERPL-SCNC: 3.9 MMOL/L (ref 3.5–5.1)
PROT SERPL-MCNC: 8.3 G/DL (ref 6.3–8.2)
RBC # BLD AUTO: 5.36 M/UL (ref 4.23–5.6)
SODIUM SERPL-SCNC: 140 MMOL/L (ref 136–145)
WBC # BLD AUTO: 11.4 K/UL (ref 4.3–11.1)

## 2019-04-01 PROCEDURE — 99284 EMERGENCY DEPT VISIT MOD MDM: CPT | Performed by: EMERGENCY MEDICINE

## 2019-04-01 PROCEDURE — 85025 COMPLETE CBC W/AUTO DIFF WBC: CPT

## 2019-04-01 PROCEDURE — 96375 TX/PRO/DX INJ NEW DRUG ADDON: CPT | Performed by: EMERGENCY MEDICINE

## 2019-04-01 PROCEDURE — 80053 COMPREHEN METABOLIC PANEL: CPT

## 2019-04-01 PROCEDURE — 96374 THER/PROPH/DIAG INJ IV PUSH: CPT | Performed by: EMERGENCY MEDICINE

## 2019-04-01 PROCEDURE — 83690 ASSAY OF LIPASE: CPT

## 2019-04-01 PROCEDURE — 74011250636 HC RX REV CODE- 250/636: Performed by: EMERGENCY MEDICINE

## 2019-04-01 RX ORDER — FAMOTIDINE 10 MG/ML
20 INJECTION INTRAVENOUS
Status: COMPLETED | OUTPATIENT
Start: 2019-04-01 | End: 2019-04-01

## 2019-04-01 RX ORDER — HALOPERIDOL 5 MG/ML
INJECTION INTRAMUSCULAR
Status: DISCONTINUED
Start: 2019-04-01 | End: 2019-04-02 | Stop reason: HOSPADM

## 2019-04-01 RX ORDER — ONDANSETRON 4 MG/1
4 TABLET, ORALLY DISINTEGRATING ORAL
Qty: 10 TAB | Refills: 0 | Status: ON HOLD | OUTPATIENT
Start: 2019-04-01 | End: 2019-07-21 | Stop reason: SDUPTHER

## 2019-04-01 RX ORDER — HYDROMORPHONE HYDROCHLORIDE 1 MG/ML
0.5 INJECTION, SOLUTION INTRAMUSCULAR; INTRAVENOUS; SUBCUTANEOUS
Status: COMPLETED | OUTPATIENT
Start: 2019-04-01 | End: 2019-04-01

## 2019-04-01 RX ORDER — HALOPERIDOL 5 MG/ML
5 INJECTION INTRAMUSCULAR ONCE
Status: COMPLETED | OUTPATIENT
Start: 2019-04-01 | End: 2019-04-01

## 2019-04-01 RX ORDER — ONDANSETRON 2 MG/ML
4 INJECTION INTRAMUSCULAR; INTRAVENOUS
Status: COMPLETED | OUTPATIENT
Start: 2019-04-01 | End: 2019-04-01

## 2019-04-01 RX ORDER — FAMOTIDINE 10 MG/ML
INJECTION INTRAVENOUS
Status: DISCONTINUED
Start: 2019-04-01 | End: 2019-04-02 | Stop reason: HOSPADM

## 2019-04-01 RX ORDER — HYOSCYAMINE SULFATE 0.12 MG/1
0.12 TABLET SUBLINGUAL
Qty: 15 TAB | Refills: 0 | Status: SHIPPED | OUTPATIENT
Start: 2019-04-01 | End: 2019-07-21

## 2019-04-01 RX ORDER — HALOPERIDOL 5 MG/1
5 TABLET ORAL
Qty: 10 TAB | Refills: 0 | Status: SHIPPED | OUTPATIENT
Start: 2019-04-01 | End: 2019-07-21

## 2019-04-01 RX ADMIN — SODIUM CHLORIDE 1000 ML: 900 INJECTION, SOLUTION INTRAVENOUS at 21:35

## 2019-04-01 RX ADMIN — FAMOTIDINE 20 MG: 10 INJECTION, SOLUTION INTRAVENOUS at 20:36

## 2019-04-01 RX ADMIN — HYDROMORPHONE HYDROCHLORIDE 0.5 MG: 1 INJECTION, SOLUTION INTRAMUSCULAR; INTRAVENOUS; SUBCUTANEOUS at 21:34

## 2019-04-01 RX ADMIN — ONDANSETRON 4 MG: 2 INJECTION INTRAMUSCULAR; INTRAVENOUS at 20:06

## 2019-04-01 RX ADMIN — HALOPERIDOL LACTATE 5 MG: 5 INJECTION INTRAMUSCULAR at 20:36

## 2019-04-02 ENCOUNTER — HOSPITAL ENCOUNTER (EMERGENCY)
Age: 44
Discharge: HOME | End: 2019-04-03
Attending: EMERGENCY MEDICINE
Payer: COMMERCIAL

## 2019-04-02 VITALS
DIASTOLIC BLOOD PRESSURE: 111 MMHG | BODY MASS INDEX: 27.6 KG/M2 | WEIGHT: 150 LBS | HEART RATE: 79 BPM | HEIGHT: 62 IN | OXYGEN SATURATION: 95 % | SYSTOLIC BLOOD PRESSURE: 141 MMHG | TEMPERATURE: 97.9 F | RESPIRATION RATE: 18 BRPM

## 2019-04-02 DIAGNOSIS — K22.6 MALLORY-WEISS SYNDROME: ICD-10-CM

## 2019-04-02 DIAGNOSIS — R11.2 NON-INTRACTABLE VOMITING WITH NAUSEA, UNSPECIFIED VOMITING TYPE: Primary | ICD-10-CM

## 2019-04-02 DIAGNOSIS — E86.0 DEHYDRATION: ICD-10-CM

## 2019-04-02 LAB
ALBUMIN SERPL-MCNC: 4.6 G/DL (ref 3.5–5)
ALBUMIN/GLOB SERPL: 1 {RATIO} (ref 1.2–3.5)
ALP SERPL-CCNC: 135 U/L (ref 50–136)
ALT SERPL-CCNC: 29 U/L (ref 12–65)
ANION GAP SERPL CALC-SCNC: 10 MMOL/L (ref 7–16)
AST SERPL-CCNC: 19 U/L (ref 15–37)
BASOPHILS # BLD: 0 K/UL (ref 0–0.2)
BASOPHILS NFR BLD: 0 % (ref 0–2)
BILIRUB SERPL-MCNC: 0.6 MG/DL (ref 0.2–1.1)
BUN SERPL-MCNC: 20 MG/DL (ref 6–23)
CALCIUM SERPL-MCNC: 9.7 MG/DL (ref 8.3–10.4)
CHLORIDE SERPL-SCNC: 105 MMOL/L (ref 98–107)
CO2 SERPL-SCNC: 24 MMOL/L (ref 21–32)
CREAT SERPL-MCNC: 1.01 MG/DL (ref 0.8–1.5)
DIFFERENTIAL METHOD BLD: ABNORMAL
EOSINOPHIL # BLD: 0 K/UL (ref 0–0.8)
EOSINOPHIL NFR BLD: 0 % (ref 0.5–7.8)
ERYTHROCYTE [DISTWIDTH] IN BLOOD BY AUTOMATED COUNT: 12.9 % (ref 11.9–14.6)
GLOBULIN SER CALC-MCNC: 4.5 G/DL (ref 2.3–3.5)
GLUCOSE SERPL-MCNC: 116 MG/DL (ref 65–100)
HCT VFR BLD AUTO: 45.1 % (ref 41.1–50.3)
HGB BLD-MCNC: 16.4 G/DL (ref 13.6–17.2)
IMM GRANULOCYTES # BLD AUTO: 0.1 K/UL (ref 0–0.5)
IMM GRANULOCYTES NFR BLD AUTO: 0 % (ref 0–5)
LIPASE SERPL-CCNC: 306 U/L (ref 73–393)
LYMPHOCYTES # BLD: 1.6 K/UL (ref 0.5–4.6)
LYMPHOCYTES NFR BLD: 12 % (ref 13–44)
MCH RBC QN AUTO: 28.6 PG (ref 26.1–32.9)
MCHC RBC AUTO-ENTMCNC: 36.4 G/DL (ref 31.4–35)
MCV RBC AUTO: 78.6 FL (ref 79.6–97.8)
MONOCYTES # BLD: 1.2 K/UL (ref 0.1–1.3)
MONOCYTES NFR BLD: 9 % (ref 4–12)
NEUTS SEG # BLD: 10 K/UL (ref 1.7–8.2)
NEUTS SEG NFR BLD: 78 % (ref 43–78)
NRBC # BLD: 0 K/UL (ref 0–0.2)
PLATELET # BLD AUTO: 401 K/UL (ref 150–450)
PMV BLD AUTO: 9.2 FL (ref 9.4–12.3)
POTASSIUM SERPL-SCNC: 3.2 MMOL/L (ref 3.5–5.1)
PROT SERPL-MCNC: 9.1 G/DL (ref 6.3–8.2)
RBC # BLD AUTO: 5.74 M/UL (ref 4.23–5.6)
SODIUM SERPL-SCNC: 139 MMOL/L (ref 136–145)
TROPONIN I BLD-MCNC: 0.01 NG/ML (ref 0.02–0.05)
WBC # BLD AUTO: 12.7 K/UL (ref 4.3–11.1)

## 2019-04-02 PROCEDURE — 96375 TX/PRO/DX INJ NEW DRUG ADDON: CPT | Performed by: EMERGENCY MEDICINE

## 2019-04-02 PROCEDURE — 74011000250 HC RX REV CODE- 250: Performed by: EMERGENCY MEDICINE

## 2019-04-02 PROCEDURE — 84484 ASSAY OF TROPONIN QUANT: CPT

## 2019-04-02 PROCEDURE — 96361 HYDRATE IV INFUSION ADD-ON: CPT | Performed by: EMERGENCY MEDICINE

## 2019-04-02 PROCEDURE — 74011250637 HC RX REV CODE- 250/637: Performed by: EMERGENCY MEDICINE

## 2019-04-02 PROCEDURE — 80053 COMPREHEN METABOLIC PANEL: CPT

## 2019-04-02 PROCEDURE — 96374 THER/PROPH/DIAG INJ IV PUSH: CPT | Performed by: EMERGENCY MEDICINE

## 2019-04-02 PROCEDURE — C9113 INJ PANTOPRAZOLE SODIUM, VIA: HCPCS | Performed by: EMERGENCY MEDICINE

## 2019-04-02 PROCEDURE — 74011250636 HC RX REV CODE- 250/636: Performed by: EMERGENCY MEDICINE

## 2019-04-02 PROCEDURE — 99285 EMERGENCY DEPT VISIT HI MDM: CPT | Performed by: EMERGENCY MEDICINE

## 2019-04-02 PROCEDURE — 93005 ELECTROCARDIOGRAM TRACING: CPT | Performed by: EMERGENCY MEDICINE

## 2019-04-02 PROCEDURE — 85025 COMPLETE CBC W/AUTO DIFF WBC: CPT

## 2019-04-02 PROCEDURE — 83690 ASSAY OF LIPASE: CPT

## 2019-04-02 RX ORDER — SUCRALFATE 1 G/1
1 TABLET ORAL ONCE
Status: COMPLETED | OUTPATIENT
Start: 2019-04-02 | End: 2019-04-02

## 2019-04-02 RX ORDER — HALOPERIDOL 5 MG/ML
5 INJECTION INTRAMUSCULAR ONCE
Status: COMPLETED | OUTPATIENT
Start: 2019-04-02 | End: 2019-04-02

## 2019-04-02 RX ORDER — SUCRALFATE 1 G/1
1 TABLET ORAL
Status: DISCONTINUED | OUTPATIENT
Start: 2019-04-03 | End: 2019-04-02

## 2019-04-02 RX ADMIN — SODIUM CHLORIDE 40 MG: 9 INJECTION, SOLUTION INTRAMUSCULAR; INTRAVENOUS; SUBCUTANEOUS at 23:13

## 2019-04-02 RX ADMIN — SODIUM CHLORIDE 1000 ML: 900 INJECTION, SOLUTION INTRAVENOUS at 23:10

## 2019-04-02 RX ADMIN — SUCRALFATE 1 G: 1 TABLET ORAL at 23:13

## 2019-04-02 RX ADMIN — HALOPERIDOL LACTATE 5 MG: 5 INJECTION INTRAMUSCULAR at 23:10

## 2019-04-02 NOTE — ED NOTES
I have reviewed discharge instructions with the patient. The patient and spouse verbalized understanding. Patient left ED via Discharge Method: ambulatory to Home with self with spouse. Opportunity for questions and clarification provided. Patient given 3 scripts. To continue your aftercare when you leave the hospital, you may receive an automated call from our care team to check in on how you are doing. This is a free service and part of our promise to provide the best care and service to meet your aftercare needs.  If you have questions, or wish to unsubscribe from this service please call [phone]. Thank you for Choosing our [facility] Emergency Department.

## 2019-04-02 NOTE — ED PROVIDER NOTES
The history is provided by the patient and a significant other. Vomiting This is a recurrent problem. The current episode started 6 to 12 hours ago. The problem occurs more than 10 times per day. The problem has not changed since onset. The emesis has an appearance of stomach contents and clear. There has been no fever. Associated symptoms include abdominal pain. Pertinent negatives include no chills, no fever, no sweats, no diarrhea, no headaches, no arthralgias, no myalgias, no cough, no URI and no headaches. Risk factors: vaping CBD oil; patient was told that he could no longer have THC secondary to hyperemesis Cannabis. His past medical history is significant for irritable bowel syndrome. His pertinent negatives include no inflammatory bowel disease, no short gut syndrome, no bowel resection, no recent abdominal surgery, no malabsorption, no gastric bypass and no DM. Past Medical History:  
Diagnosis Date  BETTY (acute kidney injury) ([facility] [address] 8/12/2014  Clostridium difficile colitis 3/20/2011  Gastroparesis 2005  
 emptying study normal -2006  [facility]es[facility]  PUD (peptic ulcer disease) ALL DX IN 2008 ESOPHAGITIS, + H PYLORI  
 Uncontrolled hypertension 6/26/2018 Past Surgical History:  
Procedure Laterality Date  COLONOSCOPY  4/08 ESOPHAGITIS, COLONIC ULCER X1  
 EGD  4/08 H. HERNIA, ULCER X2, H PYLORI,  
 EGD  2011  
 h pylori -neg  HX WISDOM TEETH EXTRACTION  2/10/11 Family History:  
Problem Relation Age of Onset Jennifer Cosme Other Mother L+W  Diabetes Maternal Grandmother  Sickle Cell Anemia Father  Heart Disease Paternal Grandfather  Other Sister ALL L+W  Other Son   
     L+W Social History Socioeconomic History  Marital status:  Spouse name: Not on file  Number of children: Not on file  Years of education: Not on file  Highest education level: Not on file Occupational History  Not on file Social Needs  Financial resource strain: Not on file  Food insecurity:  
  Worry: Not on file Inability: Not on file  Transportation needs:  
  Medical: Not on file Non-medical: Not on file Tobacco Use  Smoking status: Current Every Day Smoker Packs/day: 0.25 Years: 2.00 Pack years: 0.50 Types: Cigarettes  Smokeless tobacco: Never Used Substance and Sexual Activity  Alcohol use: No  
 Drug use: Yes Types: Marijuana  Sexual activity: Yes  
  Partners: Female Lifestyle  Physical activity:  
  Days per week: Not on file Minutes per session: Not on file  Stress: Not on file Relationships  Social connections:  
  Talks on phone: Not on file Gets together: Not on file Attends [employer] service: Not on file Active member of club or organization: Not on file Attends meetings of clubs or organizations: Not on file Relationship status: Not on file  Intimate partner violence:  
  Fear of current or ex partner: Not on file Emotionally abused: Not on file Physically abused: Not on file Forced sexual activity: Not on file Other Topics Concern  Not on file Social History Narrative 3/17/11:  PATIENT LIVES WITH GIRLFRIEND, ALTON (CELL: [phone]), HER 3YEAR OLD DAUGHTER AND THEIR 3YEAR OLD SON. ALTON IS CURRENTLY 13 WEEKS PREGNANT. PATIENT'S JOB AT [employer] WAS DOWNSIZED ABOUT A YEAR AGO, AND HE HAS BEEN A STAY HOME DAD SINCE. ALTON WORKS IN HOUSEKEEPING POSITION. ALLERGIES: Amoxicillin; Aspirin; Hydrocodone; Ibuprofen; Pcn [penicillins]; and Phenergan [promethazine] Review of Systems Constitutional: Negative for chills and fever. Respiratory: Negative for cough. Gastrointestinal: Positive for abdominal pain, nausea and vomiting. Negative for blood in stool, constipation and diarrhea. Musculoskeletal: Negative for arthralgias and myalgias. Neurological: Negative for headaches. All other systems reviewed and are negative. Vitals:  
 04/01/19 1813 04/01/19 2045 04/01/19 2052 BP: (!) 128/96 (!) 146/93 Pulse: 91    
Resp: 16 24 Temp: 98.4 °F (36.9 °C) SpO2: 97% 95% 95% Weight: 68 kg (150 lb) Height: 5' 2\" (1.575 m) Physical Exam  
Constitutional: He is oriented to person, place, and time. He appears well-developed and well-nourished. He appears distressed. HENT:  
Head: Normocephalic and atraumatic. Right Ear: External ear normal.  
Left Ear: External ear normal.  
Mouth/Throat: Oropharynx is clear and moist.  
Eyes: Pupils are equal, round, and reactive to light. Conjunctivae and EOM are normal.  
Neck: Normal range of motion. Neck supple. Cardiovascular: Normal rate, regular rhythm, normal heart sounds and intact distal pulses. Pulmonary/Chest: Effort normal and breath sounds normal.  
Abdominal: Soft. Bowel sounds are normal. He exhibits no shifting dullness, no distension, no pulsatile liver, no fluid wave, no abdominal bruit, no ascites, no pulsatile midline mass and no mass. There is no hepatosplenomegaly. There is tenderness in the right upper quadrant and epigastric area. There is no rigidity, no rebound, no guarding, no CVA tenderness, no tenderness at McBurney's point and negative Zamora's sign. No hernia. Musculoskeletal: Normal range of motion. He exhibits no edema. Neurological: He is alert and oriented to person, place, and time. Skin: Skin is warm and dry. Capillary refill takes less than 2 seconds. He is not diaphoretic. Psychiatric: He has a normal mood and affect. His speech is normal. He is agitated. Cognition and memory are normal.  
Nursing note and vitals reviewed. MDM Number of Diagnoses or Management Options Gastroparesis: new and requires workup Non-intractable vomiting with nausea, unspecified vomiting type: new and requires workup Amount and/or Complexity of Data Reviewed Clinical lab tests: ordered and reviewed Review and summarize past medical records: yes Risk of Complications, Morbidity, and/or Mortality Presenting problems: high Diagnostic procedures: minimal 
Management options: moderate Patient Progress Patient progress: improved Procedures The patient was observed in the ED for several hours. Significant other was concerned about taking the patient 45 minutes home, as in the past.  He's had episodes of having to turn around happily they're due to current vomiting. Results Reviewed: 
 
 
Recent Results (from the past 24 hour(s)) CBC WITH AUTOMATED DIFF Collection Time: 04/01/19  6:14 PM  
Result Value Ref Range WBC 11.4 (H) 4.3 - 11.1 K/uL  
 RBC 5.36 4.23 - 5.6 M/uL  
 HGB 15.6 13.6 - 17.2 g/dL HCT 42.2 41.1 - 50.3 % MCV 78.7 (L) 79.6 - 97.8 FL  
 MCH 29.1 26.1 - 32.9 PG  
 MCHC 37.0 (H) 31.4 - 35.0 g/dL  
 RDW 12.7 11.9 - 14.6 % PLATELET 994 021 - 904 K/uL MPV 9.2 (L) 9.4 - 12.3 FL ABSOLUTE NRBC 0.00 0.0 - 0.2 K/uL  
 DF AUTOMATED NEUTROPHILS 73 43 - 78 % LYMPHOCYTES 21 13 - 44 % MONOCYTES 5 4.0 - 12.0 % EOSINOPHILS 0 (L) 0.5 - 7.8 % BASOPHILS 0 0.0 - 2.0 % IMMATURE GRANULOCYTES 0 0.0 - 5.0 %  
 ABS. NEUTROPHILS 8.3 (H) 1.7 - 8.2 K/UL  
 ABS. LYMPHOCYTES 2.4 0.5 - 4.6 K/UL  
 ABS. MONOCYTES 0.5 0.1 - 1.3 K/UL  
 ABS. EOSINOPHILS 0.0 0.0 - 0.8 K/UL  
 ABS. BASOPHILS 0.0 0.0 - 0.2 K/UL  
 ABS. IMM. GRANS. 0.1 0.0 - 0.5 K/UL METABOLIC PANEL, COMPREHENSIVE Collection Time: 04/01/19  6:14 PM  
Result Value Ref Range Sodium 140 136 - 145 mmol/L Potassium 3.9 3.5 - 5.1 mmol/L Chloride 109 (H) 98 - 107 mmol/L  
 CO2 17 (L) 21 - 32 mmol/L Anion gap 14 7 - 16 mmol/L Glucose 99 65 - 100 mg/dL BUN 16 6 - 23 MG/DL  Creatinine 1.05 0.8 - 1.5 MG/DL  
 GFR est AA >60 >60 ml/min/1.73m2 GFR est non-AA >60 >60 ml/min/1.73m2 Calcium 9.6 8.3 - 10.4 MG/DL Bilirubin, total 0.6 0.2 - 1.1 MG/DL  
 ALT (SGPT) 28 12 - 65 U/L  
 AST (SGOT) 27 15 - 37 U/L Alk. phosphatase 127 50 - 136 U/L Protein, total 8.3 (H) 6.3 - 8.2 g/dL Albumin 4.3 3.5 - 5.0 g/dL Globulin 4.0 (H) 2.3 - 3.5 g/dL A-G Ratio 1.1 (L) 1.2 - 3.5 LIPASE Collection Time: 04/01/19  6:14 PM  
Result Value Ref Range Lipase 86 73 - 393 U/L I discussed the results of all labs, procedures, radiographs, and treatments with the patient and available family. Treatment plan is agreed upon and the patient is ready for discharge. All voiced understanding of the discharge plan and medication instructions or changes as appropriate. Questions about treatment in the ED were answered. All were encouraged to return should symptoms worsen or new problems develop.

## 2019-04-02 NOTE — ED NOTES
Patient ambulatory to bathroom and back without assistance. Patient appears to be sleepy but denies any further episodes of emesis. Patient sitting on edge of bed after returning from bathroom and continues to be in no acute distress.

## 2019-04-02 NOTE — DISCHARGE INSTRUCTIONS
Patient Education     Stop all CBD use; it is on regulated and often contaminated with marijuana/THC     Nausea and Vomiting: Care Instructions  Your Care Instructions    When you are nauseated, you may feel weak and sweaty and notice a lot of saliva in your mouth. Nausea often leads to vomiting. Most of the time you do not need to worry about nausea and vomiting, but they can be signs of other illnesses. Two common causes of nausea and vomiting are stomach flu and food poisoning. Nausea and vomiting from viral stomach flu will usually start to improve within 24 hours. Nausea and vomiting from food poisoning may last from 12 to 48 hours. The doctor has checked you carefully, but problems can develop later. If you notice any problems or new symptoms, get medical treatment right away. Follow-up care is a key part of your treatment and safety. Be sure to make and go to all appointments, and call your doctor if you are having problems. It's also a good idea to know your test results and keep a list of the medicines you take. How can you care for yourself at home? · To prevent dehydration, drink plenty of fluids, enough so that your urine is light yellow or clear like water. Choose water and other caffeine-free clear liquids until you feel better. If you have kidney, heart, or liver disease and have to limit fluids, talk with your doctor before you increase the amount of fluids you drink. · Rest in bed until you feel better. · When you are able to eat, try clear soups, mild foods, and liquids until all symptoms are gone for 12 to 48 hours. Other good choices include dry toast, crackers, cooked cereal, and gelatin dessert, such as Jell-O. When should you call for help? Call 911 anytime you think you may need emergency care.  For example, call if:    · You passed out (lost consciousness).    Call your doctor now or seek immediate medical care if:    · You have symptoms of dehydration, such as:  ? Dry eyes and a dry mouth.  ? Passing only a little dark urine. ? Feeling thirstier than usual.     · You have new or worsening belly pain.     · You have a new or higher fever.     · You vomit blood or what looks like coffee grounds.    Watch closely for changes in your health, and be sure to contact your doctor if:    · You have ongoing nausea and vomiting.     · Your vomiting is getting worse.     · Your vomiting lasts longer than 2 days.     · You are not getting better as expected. Where can you learn more? Go to http://ora-tizel.info/. Enter 25 [id number] in the search box to learn more about \"Nausea and Vomiting: Care Instructions. \"  Current as of: September 23, 2018  Content Version: 11.9  © 3896-1242 [email or web address]. Care instructions adapted under license by [employer] (which disclaims liability or warranty for this information). If you have questions about a medical condition or this instruction, always ask your healthcare professional. [place] [address] any warranty or liability for your use of this information.

## 2019-04-03 VITALS
WEIGHT: 145 LBS | HEIGHT: 62 IN | TEMPERATURE: 98.5 F | RESPIRATION RATE: 19 BRPM | OXYGEN SATURATION: 97 % | DIASTOLIC BLOOD PRESSURE: 108 MMHG | BODY MASS INDEX: 26.68 KG/M2 | HEART RATE: 120 BPM | SYSTOLIC BLOOD PRESSURE: 165 MMHG

## 2019-04-03 LAB
ATRIAL RATE: 133 BPM
CALCULATED P AXIS, ECG09: 74 DEGREES
CALCULATED R AXIS, ECG10: 78 DEGREES
CALCULATED T AXIS, ECG11: 64 DEGREES
DIAGNOSIS, 93000: NORMAL
P-R INTERVAL, ECG05: 158 MS
Q-T INTERVAL, ECG07: 286 MS
QRS DURATION, ECG06: 66 MS
QTC CALCULATION (BEZET), ECG08: 425 MS
VENTRICULAR RATE, ECG03: 133 BPM

## 2019-04-03 PROCEDURE — 74011250636 HC RX REV CODE- 250/636: Performed by: EMERGENCY MEDICINE

## 2019-04-03 PROCEDURE — 96361 HYDRATE IV INFUSION ADD-ON: CPT | Performed by: EMERGENCY MEDICINE

## 2019-04-03 RX ORDER — PANTOPRAZOLE SODIUM 20 MG/1
20 TABLET, DELAYED RELEASE ORAL DAILY
Qty: 30 TAB | Refills: 0 | Status: SHIPPED | OUTPATIENT
Start: 2019-04-03 | End: 2019-07-16

## 2019-04-03 RX ADMIN — SODIUM CHLORIDE 1000 ML: 900 INJECTION, SOLUTION INTRAVENOUS at 01:07

## 2019-04-03 NOTE — ED NOTES
MD aware of patient heart rate. States to order 2 liters of fluid for patient. States no second troponin needed

## 2019-04-03 NOTE — ED PROVIDER NOTES
43-year-old male seen multiple times here in the emergency departmentfor persistent nausea and vomiting. Presenting once again for the same symptoms. He was seen last night for the same. Most of the story is given by his significant other at the bedside. She reports that he was seen in the emergency department, symptoms pretty much resolved and was able to go home. He spent most the day resting but had no episodes of vomiting until about 6 PM.  He got up to about the room and immediately felt nauseated and to the bathroom. He threw up multiple times throughout probably the Gatorade that he had drank an hour before. He then began retching but had nothing in his stomach so is mostly dry heaving. He did vomit a Small clot of blood. He's had no change in the color of his stools. he's had no fevers or chest pain other than when he is throwing up. This is very similar to prior episodes. The patient reports to me that he is not been smoking marijuana for 3 weeks but he has switched over to CBD with a pain. The history is provided by the patient and the spouse. Vomiting Blood This is a recurrent problem. The current episode started 3 to 5 hours ago. The problem occurs 2 to 4 times per day. The problem has been gradually improving. The emesis has an appearance of stomach contents and bright red blood. There has been no fever. Associated symptoms include abdominal pain and cough. Pertinent negatives include no chills, no fever, no sweats, no diarrhea, no headaches, no arthralgias, no myalgias, no URI and no headaches. The patient is not pregnant. Risk factors: chronic marijuana abuse. Past Medical History:  
Diagnosis Date  BETTY (acute kidney injury) ([facility] [address] 8/12/2014  Clostridium difficile colitis 3/20/2011  Gastroparesis 2005  
 emptying study normal -2006  GERD (gastroesophageal reflux disease) Mitch  PUD (peptic ulcer disease) ALL DX IN 2008 ESOPHAGITIS, + H PYLORI  
 Uncontrolled hypertension 6/26/2018 Past Surgical History:  
Procedure Laterality Date  COLONOSCOPY  4/08 ESOPHAGITIS, COLONIC ULCER X1  
 EGD  4/08 H. HERNIA, ULCER X2, H PYLORI,  
 EGD  2011  
 h pylori -neg  HX WISDOM TEETH EXTRACTION  2/10/11 Family History:  
Problem Relation Age of Onset [place] Other Mother L+W  Diabetes Maternal Grandmother  Sickle Cell Anemia Father  Heart Disease Paternal Grandfather  Other Sister ALL L+W  Other Son   
     L+W Social History Socioeconomic History  Marital status:  Spouse name: Not on file  Number of children: Not on file  Years of education: Not on file  Highest education level: Not on file Occupational History  Not on file Social Needs  Financial resource strain: Not on file  Food insecurity:  
  Worry: Not on file Inability: Not on file  Transportation needs:  
  Medical: Not on file Non-medical: Not on file Tobacco Use  Smoking status: Current Every Day Smoker Packs/day: 0.25 Years: 2.00 Pack years: 0.50 Types: Cigarettes  Smokeless tobacco: Never Used Substance and Sexual Activity  Alcohol use: No  
 Drug use: Yes Types: Marijuana  Sexual activity: Yes  
  Partners: Female Lifestyle  Physical activity:  
  Days per week: Not on file Minutes per session: Not on file  Stress: Not on file Relationships  Social connections:  
  Talks on phone: Not on file Gets together: Not on file Attends [employer] service: Not on file Active member of club or organization: Not on file Attends meetings of clubs or organizations: Not on file Relationship status: Not on file  Intimate partner violence:  
  Fear of current or ex partner: Not on file Emotionally abused: Not on file Physically abused: Not on file Forced sexual activity: Not on file Other Topics Concern  Not on file Social History Narrative 3/17/11:  PATIENT LIVES WITH GIRLFRIEND, ALTON (CELL: [phone]), HER 3YEAR OLD DAUGHTER AND THEIR 3YEAR OLD SON. ALTON IS CURRENTLY 13 WEEKS PREGNANT. PATIENT'S JOB AT [email or web address] WAS DOWNSIZED ABOUT A YEAR AGO, AND HE HAS BEEN A STAY HOME DAD SINCE. ALTON WORKS IN HOUSEKEEPING POSITION. ALLERGIES: Amoxicillin; Aspirin; Hydrocodone; Ibuprofen; Pcn [penicillins]; and Phenergan [promethazine] Review of Systems Constitutional: Negative for chills and fever. Respiratory: Positive for cough. Gastrointestinal: Positive for abdominal pain, nausea and vomiting. Negative for diarrhea. Musculoskeletal: Negative for arthralgias and myalgias. Neurological: Negative for headaches. All other systems reviewed and are negative. Vitals:  
 04/02/19 2121 BP: 123/77 Pulse: (!) 143 Resp: 18 Temp: 99 °F (37.2 °C) SpO2: 94% Weight: 65.8 kg (145 lb) Height: 5' 2\" (1.575 m) Physical Exam  
Constitutional: He is oriented to person, place, and time. He appears well-developed and well-nourished. HENT:  
Head: Normocephalic and atraumatic. Voice is hoarse Eyes: Pupils are equal, round, and reactive to light. Conjunctivae and EOM are normal.  
Neck: Normal range of motion. Neck supple. Cardiovascular: Regular rhythm, normal heart sounds and intact distal pulses. Tachycardic in the 120s Pulmonary/Chest: Effort normal and breath sounds normal.  
Abdominal: Soft. Bowel sounds are normal.  
Musculoskeletal: Normal range of motion. He exhibits no deformity. Neurological: He is alert and oriented to person, place, and time. No cranial nerve deficit. Skin: Skin is warm and dry. Psychiatric: He has a normal mood and affect. His behavior is normal.  
Nursing note and vitals reviewed. MDM Number of Diagnoses or Management Options Dehydration:  
Alexandra-Lynch syndrome: Non-intractable vomiting with nausea, unspecified vomiting type:  
Diagnosis management comments: 42-year-old male presenting for likely cyclic vomiting syndrome secondary to cannabis abuse versus hiatal hernia. Patient was recently admitted 3 months ago for the same. This is his third visit in several days. We will repeat labs, fluids for the patient's tachycardia likely secondary to dehydration, Haldol, and other antiemetics as necessary. Give the patient Protonix as he likely has some Alexandra-Lynch tears or gastritis. He had an endoscopy last month which was reassuring. Amount and/or Complexity of Data Reviewed Clinical lab tests: ordered and reviewed (Mild to moderate hemoconcentration otherwise unremarkable labs.) Tests in the radiology section of CPT®: ordered and reviewed Tests in the medicine section of CPT®: ordered and reviewed Decide to obtain previous medical records or to obtain history from someone other than the patient: yes (Medical record reveals multiple ER visits for same) Obtain history from someone other than the patient: yes (ignificant amount of the history was given by the wife without prompting) Risk of Complications, Morbidity, and/or Mortality Presenting problems: moderate Diagnostic procedures: moderate Management options: moderate General comments: I personally reviewed the patient's vital signs, laboratory tests, and/or radiological findings. I discussed these findings with the patient and their significance. I answered all questions and gave the patient clear return precautions. The patient was discharged from the emergency department in stable condition Patient Progress Patient progress: improved ED Course as of Apr 03 0228 Wed Apr 03, 2019  
0045 Patient's heart rate will drop into the 90s while he sleeping and it will immediately elevate into the 140s when he wakes up.   He is likely very dehydrated so I will give the patient 2 more liters of fluid and reevaluate. Family member at the bedside is quite concerned about this that he otherwise appears clinically fine and has had no vomiting for many hours. [JS]  
0127 Patient passes by mouth challenge. [JS] ED Course User Index [JS] Matt Bosworth, MD  
 
 
Procedures

## 2019-04-03 NOTE — ED NOTES
I have reviewed discharge instructions with the patient. The patient verbalized understanding. Patient left ED via Discharge Method: ambulatory to Home with girlfriend. Opportunity for questions and clarification provided. Patient given 1 scripts. To continue your aftercare when you leave the hospital, you may receive an automated call from our care team to check in on how you are doing. This is a free service and part of our promise to provide the best care and service to meet your aftercare needs.  If you have questions, or wish to unsubscribe from this service please call [phone]. Thank you for Choosing our [facility] Emergency Department.

## 2019-04-03 NOTE — ED TRIAGE NOTES
Pt c/o vomitting x3 days. Per family member, pt vomitting bright red blood with clots today. pts family member stated Isamar Abo and haldol that was prescribed cannot hold it down.

## 2019-04-03 NOTE — DISCHARGE INSTRUCTIONS
Patient's lab work is reassuring. He's been given 3 L of fluid. He is clinically much improved. At this time I have no clear indication for admission to the hospital.  Please resume using her home medications to prevent resumption of symptoms. I'm going to add a medication for acid reducing as I think this will help with some of her gastric irritation.

## 2019-07-16 ENCOUNTER — HOSPITAL ENCOUNTER (EMERGENCY)
Age: 44
Discharge: HOME | DRG: 369 | End: 2019-07-16
Attending: EMERGENCY MEDICINE
Payer: COMMERCIAL

## 2019-07-16 VITALS
DIASTOLIC BLOOD PRESSURE: 98 MMHG | OXYGEN SATURATION: 96 % | SYSTOLIC BLOOD PRESSURE: 148 MMHG | BODY MASS INDEX: 26.43 KG/M2 | HEIGHT: 61 IN | WEIGHT: 140 LBS | TEMPERATURE: 97.9 F | RESPIRATION RATE: 18 BRPM | HEART RATE: 89 BPM

## 2019-07-16 DIAGNOSIS — K92.0 HEMATEMESIS WITH NAUSEA: Primary | ICD-10-CM

## 2019-07-16 LAB
ALBUMIN SERPL-MCNC: 4.8 G/DL (ref 3.5–5)
ALBUMIN/GLOB SERPL: 1.2 {RATIO} (ref 1.2–3.5)
ALP SERPL-CCNC: 126 U/L (ref 50–136)
ALT SERPL-CCNC: 28 U/L (ref 12–65)
AMORPH CRY URNS QL MICRO: ABNORMAL
ANION GAP SERPL CALC-SCNC: 13 MMOL/L (ref 7–16)
AST SERPL-CCNC: 24 U/L (ref 15–37)
BACTERIA URNS QL MICRO: 0 /HPF
BASOPHILS # BLD: 0.1 K/UL (ref 0–0.2)
BASOPHILS NFR BLD: 1 % (ref 0–2)
BILIRUB SERPL-MCNC: 1.2 MG/DL (ref 0.2–1.1)
BUN SERPL-MCNC: 23 MG/DL (ref 6–23)
CALCIUM SERPL-MCNC: 10.3 MG/DL (ref 8.3–10.4)
CASTS URNS QL MICRO: 0 /LPF
CHLORIDE SERPL-SCNC: 108 MMOL/L (ref 98–107)
CO2 SERPL-SCNC: 17 MMOL/L (ref 21–32)
CREAT SERPL-MCNC: 1.34 MG/DL (ref 0.8–1.5)
CRYSTALS URNS QL MICRO: 0 /LPF
DIFFERENTIAL METHOD BLD: ABNORMAL
EOSINOPHIL # BLD: 0.1 K/UL (ref 0–0.8)
EOSINOPHIL NFR BLD: 1 % (ref 0.5–7.8)
EPI CELLS #/AREA URNS HPF: 0 /HPF
ERYTHROCYTE [DISTWIDTH] IN BLOOD BY AUTOMATED COUNT: 12.6 % (ref 11.9–14.6)
GLOBULIN SER CALC-MCNC: 3.9 G/DL (ref 2.3–3.5)
GLUCOSE SERPL-MCNC: 92 MG/DL (ref 65–100)
HCT VFR BLD AUTO: 44.5 % (ref 41.1–50.3)
HGB BLD-MCNC: 16.8 G/DL (ref 13.6–17.2)
IMM GRANULOCYTES # BLD AUTO: 0 K/UL (ref 0–0.5)
IMM GRANULOCYTES NFR BLD AUTO: 0 % (ref 0–5)
INR PPP: 1.2
LIPASE SERPL-CCNC: 114 U/L (ref 73–393)
LYMPHOCYTES # BLD: 3 K/UL (ref 0.5–4.6)
LYMPHOCYTES NFR BLD: 40 % (ref 13–44)
MCH RBC QN AUTO: 29.2 PG (ref 26.1–32.9)
MCHC RBC AUTO-ENTMCNC: 37.8 G/DL (ref 31.4–35)
MCV RBC AUTO: 77.4 FL (ref 79.6–97.8)
MONOCYTES # BLD: 1 K/UL (ref 0.1–1.3)
MONOCYTES NFR BLD: 13 % (ref 4–12)
MUCOUS THREADS URNS QL MICRO: ABNORMAL /LPF
NEUTS SEG # BLD: 3.4 K/UL (ref 1.7–8.2)
NEUTS SEG NFR BLD: 46 % (ref 43–78)
NRBC # BLD: 0 K/UL (ref 0–0.2)
OTHER OBSERVATIONS,UCOM: ABNORMAL
PLATELET # BLD AUTO: 423 K/UL (ref 150–450)
PMV BLD AUTO: 9 FL (ref 9.4–12.3)
POTASSIUM SERPL-SCNC: 3.2 MMOL/L (ref 3.5–5.1)
PROT SERPL-MCNC: 8.7 G/DL (ref 6.3–8.2)
PROTHROMBIN TIME: 14.7 SEC (ref 11.7–14.5)
RBC # BLD AUTO: 5.75 M/UL (ref 4.23–5.6)
RBC #/AREA URNS HPF: 0 /HPF
SODIUM SERPL-SCNC: 138 MMOL/L (ref 136–145)
WBC # BLD AUTO: 7.5 K/UL (ref 4.3–11.1)
WBC URNS QL MICRO: ABNORMAL /HPF

## 2019-07-16 PROCEDURE — 83690 ASSAY OF LIPASE: CPT

## 2019-07-16 PROCEDURE — 81003 URINALYSIS AUTO W/O SCOPE: CPT | Performed by: EMERGENCY MEDICINE

## 2019-07-16 PROCEDURE — 85025 COMPLETE CBC W/AUTO DIFF WBC: CPT

## 2019-07-16 PROCEDURE — 99283 EMERGENCY DEPT VISIT LOW MDM: CPT | Performed by: EMERGENCY MEDICINE

## 2019-07-16 PROCEDURE — 96374 THER/PROPH/DIAG INJ IV PUSH: CPT | Performed by: EMERGENCY MEDICINE

## 2019-07-16 PROCEDURE — 85610 PROTHROMBIN TIME: CPT

## 2019-07-16 PROCEDURE — 81015 MICROSCOPIC EXAM OF URINE: CPT

## 2019-07-16 PROCEDURE — 80053 COMPREHEN METABOLIC PANEL: CPT

## 2019-07-16 PROCEDURE — 96375 TX/PRO/DX INJ NEW DRUG ADDON: CPT | Performed by: EMERGENCY MEDICINE

## 2019-07-16 PROCEDURE — 74011250636 HC RX REV CODE- 250/636: Performed by: EMERGENCY MEDICINE

## 2019-07-16 RX ORDER — FAMOTIDINE 10 MG/ML
20 INJECTION INTRAVENOUS
Status: COMPLETED | OUTPATIENT
Start: 2019-07-16 | End: 2019-07-16

## 2019-07-16 RX ORDER — HALOPERIDOL 5 MG/ML
7.5 INJECTION INTRAMUSCULAR
Status: COMPLETED | OUTPATIENT
Start: 2019-07-16 | End: 2019-07-16

## 2019-07-16 RX ORDER — ONDANSETRON 8 MG/1
8 TABLET, ORALLY DISINTEGRATING ORAL
Qty: 12 TAB | Refills: 1 | Status: SHIPPED | OUTPATIENT
Start: 2019-07-16 | End: 2019-07-21

## 2019-07-16 RX ORDER — METOCLOPRAMIDE 10 MG/1
10 TABLET ORAL
Qty: 12 TAB | Refills: 0 | Status: ON HOLD | OUTPATIENT
Start: 2019-07-16 | End: 2019-07-21 | Stop reason: SDUPTHER

## 2019-07-16 RX ORDER — ONDANSETRON 2 MG/ML
8 INJECTION INTRAMUSCULAR; INTRAVENOUS
Status: COMPLETED | OUTPATIENT
Start: 2019-07-16 | End: 2019-07-16

## 2019-07-16 RX ORDER — DIPHENHYDRAMINE HYDROCHLORIDE 50 MG/ML
25 INJECTION, SOLUTION INTRAMUSCULAR; INTRAVENOUS
Status: COMPLETED | OUTPATIENT
Start: 2019-07-16 | End: 2019-07-16

## 2019-07-16 RX ORDER — PANTOPRAZOLE SODIUM 20 MG/1
20 TABLET, DELAYED RELEASE ORAL DAILY
Qty: 30 TAB | Refills: 1 | Status: SHIPPED | OUTPATIENT
Start: 2019-07-16 | End: 2019-07-21

## 2019-07-16 RX ADMIN — HALOPERIDOL LACTATE 7.5 MG: 5 INJECTION INTRAMUSCULAR at 20:26

## 2019-07-16 RX ADMIN — SODIUM CHLORIDE 1000 ML: 900 INJECTION, SOLUTION INTRAVENOUS at 19:33

## 2019-07-16 RX ADMIN — FAMOTIDINE 20 MG: 10 INJECTION, SOLUTION INTRAVENOUS at 19:32

## 2019-07-16 RX ADMIN — ONDANSETRON 8 MG: 2 INJECTION INTRAMUSCULAR; INTRAVENOUS at 19:33

## 2019-07-16 RX ADMIN — DIPHENHYDRAMINE HYDROCHLORIDE 25 MG: 50 INJECTION, SOLUTION INTRAMUSCULAR; INTRAVENOUS at 20:26

## 2019-07-16 NOTE — ED TRIAGE NOTES
Patient reports vomiting and nausea. States was around someone with the stomach bug on Saturday but has a history of stomach issues.

## 2019-07-16 NOTE — ED PROVIDER NOTES
Chief complaint : Vomiting    HISTORY OF PRESENT ILLNESS :  Location : Gastric,    Quality : Uncontrollable, Now starting to turn bloody    Quantity : Over 20    Timing : Last night    Severity : Severe    Alleviating / exacerbating factors : Patient rode in a car 2 nights ago with someone who would have stomach good  Patient also Partakes of a, yet to be determined, amount of marijuana    Associated Symptoms : Vomiting but no diarrhea    vomiting vociferously, unable to participate in history at the initial 2 attempts      7:59 PM  Chart review reflects indeed history of cyclical vomiting patient has had 17 admissions to the [facility] system since 2011 for the same  ER visits :[facility]           Past Medical History:   Diagnosis Date    BETTY (acute kidney injury) ([facility] [address] 8/12/2014    Clostridium difficile colitis 3/20/2011    Gastroparesis 2005    emptying study normal -2006    GERD (gastroesophageal reflux disease)     HIATAL HERNIA SINCE 1999    PUD (peptic ulcer disease) ALL DX IN 2008    ESOPHAGITIS, + H PYLORI    Uncontrolled hypertension 6/26/2018       Past Surgical History:   Procedure Laterality Date    COLONOSCOPY  4/08    ESOPHAGITIS, COLONIC ULCER X1    EGD  4/08    H.  HERNIA, ULCER X2, H PYLORI,    EGD  2011    h pylori -neg    HX WISDOM TEETH EXTRACTION  2/10/11         Family History:   Problem Relation Age of Onset    Other Mother         L+W    Diabetes Maternal Grandmother     Sickle Cell Anemia Father     Heart Disease Paternal Grandfather     Other Sister         ALL L+W    Other Son         L+W       Social History     Socioeconomic History    Marital status:      Spouse name: Not on file    Number of children: Not on file    Years of education: Not on file    Highest education level: Not on file   Occupational History    Not on file   Social Needs    Financial resource strain: Not on file    Food insecurity:     Worry: Not on file     Inability: Not on file   [facility] Transportation needs:     Medical: Not on file     Non-medical: Not on file   Tobacco Use    Smoking status: Current Every Day Smoker     Packs/day: 0.25     Years: 2.00     Pack years: 0.50     Types: Cigarettes    Smokeless tobacco: Never Used   Substance and Sexual Activity    Alcohol use: No    Drug use: Yes     Types: Marijuana    Sexual activity: Yes     Partners: Female   Lifestyle    Physical activity:     Days per week: Not on file     Minutes per session: Not on file    Stress: Not on file   Relationships    Social connections:     Talks on phone: Not on file     Gets together: Not on file     Attends Restorationist service: Not on file     Active member of club or organization: Not on file     Attends meetings of clubs or organizations: Not on file     Relationship status: Not on file    Intimate partner violence:     Fear of current or ex partner: Not on file     Emotionally abused: Not on file     Physically abused: Not on file     Forced sexual activity: Not on file   Other Topics Concern    Not on file   Social History Narrative    3/17/11:  PATIENT LIVES WITH GIRLFRIEND, Ervin [address] (CELL: [phone]), HER 3YEAR OLD DAUGHTER AND THEIR 3YEAR OLD SON. ALTON IS CURRENTLY 13 WEEKS PREGNANT. PATIENT'S JOB AT [employer] WAS DOWNSIZED ABOUT A YEAR AGO, AND HE HAS BEEN A STAY HOME DAD SINCE. ALTON WORKS IN HOUSEKEEPING POSITION. ALLERGIES: Amoxicillin; Aspirin; Hydrocodone; Ibuprofen; Pcn [penicillins]; and Phenergan [promethazine]    Review of Systems   Unable to perform ROS: Acuity of condition   Gastrointestinal: Positive for nausea and vomiting. All other systems reviewed and are negative. Vitals:    07/16/19 1628   BP: 113/78   Pulse: 98   Resp: 18   Temp: 97.9 °F (36.6 °C)   SpO2: 95%   Weight: 63.5 kg (140 lb)   Height: 5' 1\" (1.549 m)            Physical Exam   Constitutional: He is oriented to person, place, and time.  He appears well-developed and well-nourished. He appears distressed. vomiting vociferously, audible throughout the entire ER  On initial view, in the bathroom very small amount of dark maroon blood is present. Later, will still vomiting, awaiting his initial Zofran, patient vomiting more and appears to be more pure blood, albeit still maroon in color   HENT:   Head: Normocephalic and atraumatic. Eyes: Pupils are equal, round, and reactive to light. Conjunctivae are normal. Right eye exhibits no discharge. Left eye exhibits no discharge. No scleral icterus. Neck: Normal range of motion. Neck supple. Cardiovascular: Normal rate, regular rhythm and normal heart sounds. Exam reveals no gallop. No murmur heard. Pulmonary/Chest: Effort normal and breath sounds normal. No respiratory distress. He has no wheezes. He has no rales. Abdominal: Soft. Bowel sounds are normal. There is no tenderness. There is no guarding. Musculoskeletal: Normal range of motion. He exhibits no edema. Neurological: He is alert and oriented to person, place, and time. He exhibits normal muscle tone. cni 2-12 grossly   Skin: Skin is warm and dry. He is not diaphoretic. Psychiatric: He has a normal mood and affect. His behavior is normal.   Curiously, patient had quieted down and quit healing while lying in bed awaiting the Zofran, But before the Zofran     When I then approached and attempted a second time to engage him in the history, the vociferous vomiting returned      Nursing note and vitals reviewed. MDM  Number of Diagnoses or Management Options  Diagnosis management comments: Medical decision making note:  Nausea and vomiting. Patient with fairly forceful retching  Now turning bloody, suspect Alexandra-Lynch tear  Check labs, we will administer some Haldol and Benadryl as a second antiemetic in light of his marijuana use, in case this represents marijuana hyperemesis syndrome/cyclical vomiting.   Administer Pepcid and reevaluate  This concludes the \"medical decision making note\" part of this emergency department visit note.            Procedures

## 2019-07-17 ENCOUNTER — APPOINTMENT (OUTPATIENT)
Dept: GENERAL RADIOLOGY | Age: 44
DRG: 369 | End: 2019-07-17
Attending: EMERGENCY MEDICINE
Payer: COMMERCIAL

## 2019-07-17 ENCOUNTER — HOSPITAL ENCOUNTER (INPATIENT)
Age: 44
LOS: 2 days | Discharge: HOME | DRG: 369 | End: 2019-07-21
Attending: EMERGENCY MEDICINE | Admitting: INTERNAL MEDICINE
Payer: COMMERCIAL

## 2019-07-17 DIAGNOSIS — K92.0 HEMATEMESIS WITH NAUSEA: Primary | ICD-10-CM

## 2019-07-17 DIAGNOSIS — R11.2 INTRACTABLE VOMITING WITH NAUSEA, UNSPECIFIED VOMITING TYPE: ICD-10-CM

## 2019-07-17 PROBLEM — K52.9 ACUTE GASTROENTERITIS: Status: RESOLVED | Noted: 2018-06-26 | Resolved: 2019-07-17

## 2019-07-17 LAB
ALBUMIN SERPL-MCNC: 4.6 G/DL (ref 3.5–5)
ALBUMIN/GLOB SERPL: 1.1 {RATIO} (ref 1.2–3.5)
ALP SERPL-CCNC: 117 U/L (ref 50–136)
ALT SERPL-CCNC: 25 U/L (ref 12–65)
AMPHET UR QL SCN: NEGATIVE
ANION GAP SERPL CALC-SCNC: 11 MMOL/L (ref 7–16)
AST SERPL-CCNC: 19 U/L (ref 15–37)
ATRIAL RATE: 110 BPM
BARBITURATES UR QL SCN: NEGATIVE
BASOPHILS # BLD: 0 K/UL (ref 0–0.2)
BASOPHILS # BLD: 0 K/UL (ref 0–0.2)
BASOPHILS NFR BLD: 0 % (ref 0–2)
BASOPHILS NFR BLD: 0 % (ref 0–2)
BENZODIAZ UR QL: NEGATIVE
BILIRUB SERPL-MCNC: 0.6 MG/DL (ref 0.2–1.1)
BUN SERPL-MCNC: 25 MG/DL (ref 6–23)
CALCIUM SERPL-MCNC: 9.6 MG/DL (ref 8.3–10.4)
CALCULATED P AXIS, ECG09: 79 DEGREES
CALCULATED R AXIS, ECG10: 82 DEGREES
CALCULATED T AXIS, ECG11: 71 DEGREES
CANNABINOIDS UR QL SCN: POSITIVE
CHLORIDE SERPL-SCNC: 107 MMOL/L (ref 98–107)
CO2 SERPL-SCNC: 23 MMOL/L (ref 21–32)
COCAINE UR QL SCN: NEGATIVE
CREAT SERPL-MCNC: 1.1 MG/DL (ref 0.8–1.5)
DIAGNOSIS, 93000: NORMAL
DIFFERENTIAL METHOD BLD: ABNORMAL
DIFFERENTIAL METHOD BLD: ABNORMAL
EOSINOPHIL # BLD: 0 K/UL (ref 0–0.8)
EOSINOPHIL # BLD: 0 K/UL (ref 0–0.8)
EOSINOPHIL NFR BLD: 0 % (ref 0.5–7.8)
EOSINOPHIL NFR BLD: 0 % (ref 0.5–7.8)
ERYTHROCYTE [DISTWIDTH] IN BLOOD BY AUTOMATED COUNT: 12.8 % (ref 11.9–14.6)
ERYTHROCYTE [DISTWIDTH] IN BLOOD BY AUTOMATED COUNT: 12.9 % (ref 11.9–14.6)
GLOBULIN SER CALC-MCNC: 4.1 G/DL (ref 2.3–3.5)
GLUCOSE SERPL-MCNC: 125 MG/DL (ref 65–100)
HCT VFR BLD AUTO: 40.3 % (ref 41.1–50.3)
HCT VFR BLD AUTO: 42.4 % (ref 41.1–50.3)
HGB BLD-MCNC: 14.6 G/DL (ref 13.6–17.2)
HGB BLD-MCNC: 15.8 G/DL (ref 13.6–17.2)
IMM GRANULOCYTES # BLD AUTO: 0 K/UL (ref 0–0.5)
IMM GRANULOCYTES # BLD AUTO: 0.1 K/UL (ref 0–0.5)
IMM GRANULOCYTES NFR BLD AUTO: 0 % (ref 0–5)
IMM GRANULOCYTES NFR BLD AUTO: 0 % (ref 0–5)
LIPASE SERPL-CCNC: 137 U/L (ref 73–393)
LYMPHOCYTES # BLD: 1.1 K/UL (ref 0.5–4.6)
LYMPHOCYTES # BLD: 1.6 K/UL (ref 0.5–4.6)
LYMPHOCYTES NFR BLD: 10 % (ref 13–44)
LYMPHOCYTES NFR BLD: 12 % (ref 13–44)
MCH RBC QN AUTO: 29.5 PG (ref 26.1–32.9)
MCH RBC QN AUTO: 29.6 PG (ref 26.1–32.9)
MCHC RBC AUTO-ENTMCNC: 36.2 G/DL (ref 31.4–35)
MCHC RBC AUTO-ENTMCNC: 37.3 G/DL (ref 31.4–35)
MCV RBC AUTO: 79.1 FL (ref 79.6–97.8)
MCV RBC AUTO: 81.7 FL (ref 79.6–97.8)
METHADONE UR QL: NEGATIVE
MONOCYTES # BLD: 1.1 K/UL (ref 0.1–1.3)
MONOCYTES # BLD: 1.4 K/UL (ref 0.1–1.3)
MONOCYTES NFR BLD: 10 % (ref 4–12)
MONOCYTES NFR BLD: 11 % (ref 4–12)
NEUTS SEG # BLD: 10.4 K/UL (ref 1.7–8.2)
NEUTS SEG # BLD: 8.6 K/UL (ref 1.7–8.2)
NEUTS SEG NFR BLD: 77 % (ref 43–78)
NEUTS SEG NFR BLD: 79 % (ref 43–78)
NRBC # BLD: 0 K/UL (ref 0–0.2)
NRBC # BLD: 0 K/UL (ref 0–0.2)
OPIATES UR QL: NEGATIVE
P-R INTERVAL, ECG05: 162 MS
PCP UR QL: NEGATIVE
PLATELET # BLD AUTO: 351 K/UL (ref 150–450)
PLATELET # BLD AUTO: 422 K/UL (ref 150–450)
PMV BLD AUTO: 9 FL (ref 9.4–12.3)
PMV BLD AUTO: 9.2 FL (ref 9.4–12.3)
POTASSIUM SERPL-SCNC: 3.5 MMOL/L (ref 3.5–5.1)
PROT SERPL-MCNC: 8.7 G/DL (ref 6.3–8.2)
Q-T INTERVAL, ECG07: 324 MS
QRS DURATION, ECG06: 66 MS
QTC CALCULATION (BEZET), ECG08: 438 MS
RBC # BLD AUTO: 4.93 M/UL (ref 4.23–5.6)
RBC # BLD AUTO: 5.36 M/UL (ref 4.23–5.6)
SODIUM SERPL-SCNC: 141 MMOL/L (ref 136–145)
VENTRICULAR RATE, ECG03: 110 BPM
WBC # BLD AUTO: 10.9 K/UL (ref 4.3–11.1)
WBC # BLD AUTO: 13.4 K/UL (ref 4.3–11.1)

## 2019-07-17 PROCEDURE — 74011000250 HC RX REV CODE- 250: Performed by: EMERGENCY MEDICINE

## 2019-07-17 PROCEDURE — 74022 RADEX COMPL AQT ABD SERIES: CPT

## 2019-07-17 PROCEDURE — 74011250636 HC RX REV CODE- 250/636: Performed by: EMERGENCY MEDICINE

## 2019-07-17 PROCEDURE — 99218 HC RM OBSERVATION: CPT

## 2019-07-17 PROCEDURE — C9113 INJ PANTOPRAZOLE SODIUM, VIA: HCPCS | Performed by: EMERGENCY MEDICINE

## 2019-07-17 PROCEDURE — 80307 DRUG TEST PRSMV CHEM ANLYZR: CPT

## 2019-07-17 PROCEDURE — 99285 EMERGENCY DEPT VISIT HI MDM: CPT | Performed by: EMERGENCY MEDICINE

## 2019-07-17 PROCEDURE — 80053 COMPREHEN METABOLIC PANEL: CPT

## 2019-07-17 PROCEDURE — 83690 ASSAY OF LIPASE: CPT

## 2019-07-17 PROCEDURE — 74011250636 HC RX REV CODE- 250/636: Performed by: FAMILY MEDICINE

## 2019-07-17 PROCEDURE — C9113 INJ PANTOPRAZOLE SODIUM, VIA: HCPCS | Performed by: FAMILY MEDICINE

## 2019-07-17 PROCEDURE — 36415 COLL VENOUS BLD VENIPUNCTURE: CPT

## 2019-07-17 PROCEDURE — 85025 COMPLETE CBC W/AUTO DIFF WBC: CPT

## 2019-07-17 PROCEDURE — 74011000250 HC RX REV CODE- 250: Performed by: FAMILY MEDICINE

## 2019-07-17 PROCEDURE — 96375 TX/PRO/DX INJ NEW DRUG ADDON: CPT | Performed by: EMERGENCY MEDICINE

## 2019-07-17 PROCEDURE — 96374 THER/PROPH/DIAG INJ IV PUSH: CPT | Performed by: EMERGENCY MEDICINE

## 2019-07-17 PROCEDURE — 93005 ELECTROCARDIOGRAM TRACING: CPT | Performed by: EMERGENCY MEDICINE

## 2019-07-17 PROCEDURE — 96361 HYDRATE IV INFUSION ADD-ON: CPT | Performed by: EMERGENCY MEDICINE

## 2019-07-17 RX ORDER — SODIUM CHLORIDE 9 MG/ML
1000 INJECTION, SOLUTION INTRAVENOUS CONTINUOUS
Status: DISCONTINUED | OUTPATIENT
Start: 2019-07-17 | End: 2019-07-17

## 2019-07-17 RX ORDER — HALOPERIDOL 5 MG/ML
5 INJECTION INTRAMUSCULAR ONCE
Status: COMPLETED | OUTPATIENT
Start: 2019-07-17 | End: 2019-07-17

## 2019-07-17 RX ORDER — METOCLOPRAMIDE 10 MG/1
10 TABLET ORAL
Status: DISCONTINUED | OUTPATIENT
Start: 2019-07-17 | End: 2019-07-21 | Stop reason: HOSPADM

## 2019-07-17 RX ORDER — SODIUM CHLORIDE 0.9 % (FLUSH) 0.9 %
5-40 SYRINGE (ML) INJECTION AS NEEDED
Status: DISCONTINUED | OUTPATIENT
Start: 2019-07-17 | End: 2019-07-21 | Stop reason: HOSPADM

## 2019-07-17 RX ORDER — ONDANSETRON 2 MG/ML
4 INJECTION INTRAMUSCULAR; INTRAVENOUS
Status: COMPLETED | OUTPATIENT
Start: 2019-07-17 | End: 2019-07-17

## 2019-07-17 RX ORDER — ONDANSETRON 2 MG/ML
4 INJECTION INTRAMUSCULAR; INTRAVENOUS
Status: DISCONTINUED | OUTPATIENT
Start: 2019-07-17 | End: 2019-07-21 | Stop reason: HOSPADM

## 2019-07-17 RX ORDER — PANTOPRAZOLE SODIUM 40 MG/1
40 TABLET, DELAYED RELEASE ORAL DAILY
Status: DISCONTINUED | OUTPATIENT
Start: 2019-07-18 | End: 2019-07-17 | Stop reason: SDUPTHER

## 2019-07-17 RX ORDER — SODIUM CHLORIDE 0.9 % (FLUSH) 0.9 %
5-40 SYRINGE (ML) INJECTION EVERY 8 HOURS
Status: DISCONTINUED | OUTPATIENT
Start: 2019-07-17 | End: 2019-07-21 | Stop reason: HOSPADM

## 2019-07-17 RX ORDER — SODIUM CHLORIDE 9 MG/ML
1000 INJECTION, SOLUTION INTRAVENOUS ONCE
Status: COMPLETED | OUTPATIENT
Start: 2019-07-17 | End: 2019-07-17

## 2019-07-17 RX ORDER — SODIUM CHLORIDE 9 MG/ML
125 INJECTION, SOLUTION INTRAVENOUS CONTINUOUS
Status: DISCONTINUED | OUTPATIENT
Start: 2019-07-18 | End: 2019-07-21 | Stop reason: HOSPADM

## 2019-07-17 RX ADMIN — PANTOPRAZOLE SODIUM 40 MG: 40 INJECTION, POWDER, FOR SOLUTION INTRAVENOUS at 22:34

## 2019-07-17 RX ADMIN — SODIUM CHLORIDE 1000 ML: 900 INJECTION, SOLUTION INTRAVENOUS at 16:51

## 2019-07-17 RX ADMIN — SODIUM CHLORIDE 40 MG: 9 INJECTION, SOLUTION INTRAMUSCULAR; INTRAVENOUS; SUBCUTANEOUS at 19:13

## 2019-07-17 RX ADMIN — Medication 10 ML: at 22:35

## 2019-07-17 RX ADMIN — ONDANSETRON 4 MG: 2 INJECTION INTRAMUSCULAR; INTRAVENOUS at 18:07

## 2019-07-17 RX ADMIN — HALOPERIDOL LACTATE 5 MG: 5 INJECTION INTRAMUSCULAR at 19:37

## 2019-07-17 RX ADMIN — SODIUM CHLORIDE 1000 ML: 900 INJECTION, SOLUTION INTRAVENOUS at 17:49

## 2019-07-17 NOTE — ED NOTES
I have reviewed discharge instructions with the patient and spouse. The patient and spouse verbalized understanding. Patient left ED via Discharge Method: ambulatory to Home with family. Opportunity for questions and clarification provided. Patient given 2 scripts.

## 2019-07-17 NOTE — DISCHARGE INSTRUCTIONS
Patient Education        Upper Gastrointestinal Bleeding: Care Instructions  Your Care Instructions    The digestive or gastrointestinal tract goes from the mouth to the anus. It is often called the GI tract. Bleeding in the upper GI tract can happen anywhere from the esophagus to the first part of the small intestine. Sometimes it's caused by an ulcer in your stomach. Or it may be caused by blood vessels in your esophagus. Your esophagus is the tube that carries food from your throat to your stomach. Light bleeding may not cause any symptoms at first. But if you continue to bleed for a while, you may feel very weak or tired. Sudden, heavy bleeding means you need to see a doctor right away. This kind of bleeding can be very dangerous. But it can usually be cured or controlled. The doctor may do some tests to find the cause of your bleeding. Follow-up care is a key part of your treatment and safety. Be sure to make and go to all appointments, and call your doctor if you are having problems. It's also a good idea to know your test results and keep a list of the medicines you take. How can you care for yourself at home? · Be safe with medicines. Take your medicines exactly as prescribed. Call your doctor if you think you are having a problem with your medicine. You will get more details on the specific medicines your doctor prescribes. · Do not take aspirin or other anti-inflammatory medicines, such as naproxen (Aleve) or ibuprofen (Advil, Motrin), without talking to your doctor first. Ask your doctor if it is okay to use acetaminophen (Tylenol). · Do not drink alcohol. · The bleeding may make you lose iron. So it's important to eat foods that have a lot of iron. These include red meat, shellfish, poultry, and eggs. They also include beans, raisins, whole-grain breads, and leafy green vegetables. If you want help planning meals, you can meet with a dietitian. When should you call for help?   Call 911 anytime you think you may need emergency care. For example, call if:    · You have sudden, severe belly pain.     · You vomit blood or what looks like coffee grounds.     · You passed out (lost consciousness).     · Your stools are maroon or very bloody.    Call your doctor now or seek immediate medical care if:    · You are dizzy or lightheaded, or you feel like you may faint.     · Your stools are black and look like tar.     · You have belly pain.     · You vomit or have nausea.     · You have trouble swallowing, or it hurts when you swallow.    Watch closely for changes in your health, and be sure to contact your doctor if you do not get better as expected. Where can you learn more? Go to http://ora-itzel.info/. Enter I184 in the search box to learn more about \"Upper Gastrointestinal Bleeding: Care Instructions. \"  Current as of: September 23, 2018  Content Version: 11.9  © 4628-3549 [employer]. Care instructions adapted under license by [employer] (which disclaims liability or warranty for this information). If you have questions about a medical condition or this instruction, always ask your healthcare professional. [address] any warranty or liability for your use of this information.

## 2019-07-17 NOTE — MED STUDENT NOTES
*ATTENTION:  This note has been created by a medical student for educational purposes only. Please do not refer to the content of this note for clinical decision-making, billing, or other purposes. Please see attending physicians note to obtain clinical information on this patient. *            History and Physical    Patient: Chin Kirk MRN: [id number]  SSN: [id number]    YOB: 1975  Age: 45 y.o. Sex: male      Subjective:      Chin Kirk is a 45 y.o. male who presents with a chief complaint of intractable vomiting that now is discolored by bright red blood. Past medical history is especially significant for a year long history of intractable nausea and vomiting, routine recreational marijuana use, and hx of esophagitis with hiatal hernia and hx of H. pylori infection. This episode of increased nausea began Sunday and patient attributes this to a co-worker with stomach flu. Denies current alcohol use and says last smoked marijuana two weeks ago. For the last three weeks he has been unable to refill his protonix and has been using OTC zantac instead with an increase in heartburn symptoms during this period. ED course: Labs show Hgb 15.8, X-ray of Abd show no acute thoracic, abdominal, or pelvic changes. EKG Tachy but not other changes from previous studies. History provided by the patient and by his wife at bedside due to patient somnolence s/p haldol administration. Past Medical History:   Diagnosis Date    BETTY (acute kidney injury) ([facility] [address] 8/12/2014    Clostridium difficile colitis 3/20/2011    Gastroparesis 2005    emptying study normal -2006    GERD (gastroesophageal reflux disease)     HIATAL HERNIA SINCE 1999    PUD (peptic ulcer disease) ALL DX IN 2008    ESOPHAGITIS, + H PYLORI    Uncontrolled hypertension 6/26/2018     Past Surgical History:   Procedure Laterality Date    COLONOSCOPY  4/08    ESOPHAGITIS, COLONIC ULCER X1    EGD  4/08    H. HERNIA, ULCER X2, H PYLORI,    EGD  2011    h pylori -neg    HX WISDOM TEETH EXTRACTION  2/10/11      Family History   Problem Relation Age of Onset    Other Mother         L+W    Diabetes Maternal Grandmother     Sickle Cell Anemia Father     Heart Disease Paternal Grandfather     Other Sister         ALL L+W    Other Son         L+W     Social History     Tobacco Use    Smoking status: Current Every Day Smoker     Packs/day: 0.25     Years: 2.00     Pack years: 0.50     Types: Cigarettes    Smokeless tobacco: Never Used   Substance Use Topics    Alcohol use: No      Prior to Admission medications    Medication Sig Start Date End Date Taking? Authorizing Provider   pantoprazole (PROTONIX) 20 mg tablet Take 1 Tab by mouth daily. 7/16/19   Markus Simpson MD   ondansetron (ZOFRAN ODT) 8 mg disintegrating tablet Take 1 Tab by mouth every eight (8) hours as needed for Nausea. 7/16/19   Markus Simpson MD   metoclopramide HCl (REGLAN) 10 mg tablet Take 1 Tab by mouth every six (6) hours as needed for Nausea for up to 10 days. 7/16/19 7/26/19  Markus Simpson MD   ondansetron (ZOFRAN ODT) 4 mg disintegrating tablet Take 1 Tab by mouth every eight (8) hours as needed for Nausea. 4/1/19   Miim Lockwood MD   hyoscyamine SL (LEVSIN/SL) 0.125 mg SL tablet 1 Tab by SubLINGual route every four (4) hours as needed for Cramping. 4/1/19   Mimi Lockwood MD   haloperidol (HALDOL) 5 mg tablet Take 1 Tab by mouth every eight (8) hours as needed for Nausea. 4/1/19   Mimi Lockwood MD   pantoprazole (PROTONIX) 40 mg tablet Take 1 Tab by mouth daily.  3/1/19   Anjum Loera MD        Allergies   Allergen Reactions    Amoxicillin Nausea Only    Aspirin Other (comments)     ulcers    Hydrocodone Rash     Tolerates hydromorphone, morphine, tramadol    Ibuprofen Other (comments)     Hx of ulcers    Pcn [Penicillins] Rash    Phenergan [Promethazine] Nausea and Vomiting and Other (comments)       Review of Systems:  Constitutional: negative for fevers, chills, sweats and weight loss  Respiratory: negative for cough, sputum or hemoptysis  Cardiovascular: negative for chest pain, lower extremity edema  Gastrointestinal: positive for reflux symptoms, nausea and vomiting, negative for melena and diarrhea  Genitourinary:negative for frequency and dysuria  Musculoskeletal:negative for myalgias and muscle weakness  Neurological: negative for headaches and dizziness    Objective:     Vitals:    07/17/19 1625 07/17/19 1757 07/17/19 1812 07/17/19 1859   BP: (!) 88/62 (!) 163/100 (!) 171/101 140/86   Pulse: (!) 124  (!) 104 (!) 107   Resp: 18  18    Temp: 98 °F (36.7 °C)      SpO2: 94%  95% 96%   Weight: 63.5 kg (140 lb)      Height: 5' 1\" (1.549 m)           Physical Exam:  GENERAL: fatigued, cooperative, no distress  LUNG: clear to auscultation bilaterally  HEART: S1, S2 normal, tachycardia  ABDOMEN: soft,minimally tender in the epigastric area. Bowel sounds normal.  EXTREMITIES:  extremities normal, atraumatic, no cyanosis or edema  SKIN: no rash or abnormalities  NEUROLOGIC: AOx3.      Assessment:     Hospital Problems  Date Reviewed: 3/1/2019          Codes Class Noted POA    * (Principal) Hematemesis ICD-10-CM: K92.0  ICD-9-CM: 578.0  6/26/2018 Yes        Polysubstance abuse ([address] (Chronic) ICD-10-CM: F19.10  ICD-9-CM: 305.90  7/11/2016 Yes        Tetrahydrocannabinol (THC) use disorder, moderate, dependence (HCC) (Chronic) ICD-10-CM: F12.20  ICD-9-CM: 304.30  7/11/2016 Yes        Anxiety (Chronic) ICD-10-CM: F41.9  ICD-9-CM: 300.00  4/30/2014 Yes        H/O hiatal hernia (Chronic) ICD-10-CM: Z87.19  ICD-9-CM: V12.79  4/30/2014 Yes        Esophageal reflux (Chronic) ICD-10-CM: K21.9  ICD-9-CM: 530.81  4/30/2014 Yes        PUD (peptic ulcer disease) (Chronic) ICD-10-CM: K27.9  ICD-9-CM: 533.90  4/30/2014 Yes        Sickle cell trait (HCC) (Chronic) ICD-10-CM: D57.3  ICD-9-CM: 282.5  3/5/2013 Yes        Gastroparesis (Chronic) ICD-10-CM: K31.84  ICD-9-CM: 536.3  3/17/2011 Yes              Plan:     Intractable vomiting w/ hematemesis  - Consult GI  - IV protonix  - NPO, consider EGD in AM.    Polysubstance abuse  -  provided that current drug use may contribute to continued nausea.      DVT ppx  - Heparin    Signed By: Lucila SANCHES, VCOM-CC    July 17, 2019

## 2019-07-17 NOTE — ED TRIAGE NOTES
Pt walks into triage with no assistance. Pt has been vomiting since monday. Pt vomiting bright red blood. Pt feels lethargic. No bowel movement in three days. Epigastric pain. Pt was seen here last night. zofran has not been staying down, continues to vomit.

## 2019-07-17 NOTE — ED NOTES
PT WAS GIVEN LEANA MIST  FOR PO CHALLENGE PT DRUNK ABOUT 4-6 OZS  AND STOPPED .  PT SAID HE STARTED FEELING QUEASY

## 2019-07-17 NOTE — ED PROVIDER NOTES
[facility] Emergency Department  Arrival Date/Time: No admission date for patient encounter. Eric Zaidi  MRN: [id number]    YOB: 1975   45 y.o. male    [facility] EMERGENCY DEPT Room/bed info not found  Seen on 7/17/2019 @ 4:23 PM      TRIAGE Provider NOTE: c/o nausea, vomiting, BRB in vomit. No BM for 3 days. C/o epigastric pain. Started vomiting Monday. Seen yesterday. Can't keep down meds. Denies ETOH. Has had this before. No meds because clinic shut down. No meds currently but was on protonix. States started after getting a stomach bug from coworker. Leanna Lyman MD; 7/17/2019 @4:23 PM============================     Jemal Rubio See is a 45 y.o. male seen on 7/17/2019 at 4:23 PM in the [facility] EMERGENCY DEPT     HPI: 43-year-old male presents with complaint of nausea, vomiting over the past several days. Patient complains of mild cramping epigastric pain. Patient was evaluated in the ER on yesterday. Patient reportedly unable to keep medications down. Patient denies alcohol use. Patient reports bright red blood in emesis. The history is provided by the patient. No  was used. Vomiting    This is a recurrent problem. The current episode started more than 2 days ago. The problem occurs 2 to 4 times per day. The problem has not changed since onset. The emesis has an appearance of stomach contents. There has been no fever. Pertinent negatives include no chills, no fever, no sweats, no abdominal pain, no diarrhea, no headaches, no arthralgias, no myalgias, no cough, no URI and no headaches. Review of Systems: Review of Systems   Constitutional: Negative for chills and fever. HENT: Negative for nosebleeds, sore throat and trouble swallowing. Respiratory: Negative for cough and shortness of breath. Cardiovascular: Negative for chest pain. Gastrointestinal: Positive for vomiting.  Negative for abdominal pain, diarrhea and rectal pain. Genitourinary: Negative for dysuria and flank pain. Musculoskeletal: Negative for arthralgias, myalgias, neck pain and neck stiffness. Skin: Negative for rash and wound. Neurological: Negative for dizziness, weakness and headaches. Psychiatric/Behavioral: Negative for confusion. PAST MEDICAL HISTORY:  Primary Care Doctor: Caleb Holland MD None  Past Medical History:   Diagnosis Date    BETTY (acute kidney injury) ([facility] [address] 8/12/2014    Clostridium difficile colitis 3/20/2011    Gastroparesis 2005    emptying study normal -2006    GERD (gastroesophageal reflux disease)     HIATAL HERNIA SINCE 1999    PUD (peptic ulcer disease) ALL DX IN 2008    ESOPHAGITIS, + H PYLORI    Uncontrolled hypertension 6/26/2018     Past Surgical History:   Procedure Laterality Date    COLONOSCOPY  4/08    ESOPHAGITIS, COLONIC ULCER X1    EGD  4/08    H. HERNIA, ULCER X2, H PYLORI,    EGD  2011    h pylori -neg    HX WISDOM TEETH EXTRACTION  2/10/11     Social History     Socioeconomic History    Marital status:      Spouse name: Not on file    Number of children: Not on file    Years of education: Not on file    Highest education level: Not on file   Tobacco Use    Smoking status: Current Every Day Smoker     Packs/day: 0.25     Years: 2.00     Pack years: 0.50     Types: Cigarettes    Smokeless tobacco: Never Used   Substance and Sexual Activity    Alcohol use: No    Drug use: Yes     Types: Marijuana    Sexual activity: Yes     Partners: Female   Social History Narrative    3/17/11:  PATIENT LIVES WITH GIRLFRIEND, ALTON (CELL: [phone]), HER 3YEAR OLD DAUGHTER AND THEIR 3YEAR OLD SON. ALTON IS CURRENTLY 13 WEEKS PREGNANT. PATIENT'S JOB AT [email or web address] WAS DOWNSIZED ABOUT A YEAR AGO, AND HE HAS BEEN A STAY HOME DAD SINCE. ALTON WORKS IN HOUSEKEEPING POSITION.        Cannot display prior to admission medications because the patient has not been admitted in this contact. Allergies   Allergen Reactions    Amoxicillin Nausea Only    Aspirin Other (comments)     ulcers    Hydrocodone Rash     Tolerates hydromorphone, morphine, tramadol    Ibuprofen Other (comments)     Hx of ulcers    Pcn [Penicillins] Rash    Phenergan [Promethazine] Nausea and Vomiting and Other (comments)         Physical Exam:  Nursing documentation reviewed. There were no vitals filed for this visit. Vital signs were reviewed. Physical Exam   Constitutional: He is oriented to person, place, and time. He appears well-developed. Patient well-appearing and in no acute distress. HENT:   Head: Normocephalic. Mouth/Throat: Oropharynx is clear and moist.   MMM. No tonsillar erythema or exudate noted. Uvula midline. Eyes: Pupils are equal, round, and reactive to light. Conjunctivae and EOM are normal.   Neck: Neck supple. No JVD present. Cardiovascular: Regular rhythm, normal heart sounds and intact distal pulses. Tachycardia present. Radial pulses 2+ and equal bilaterally. Pulmonary/Chest: Effort normal.   CTAB. No wheezes, rhonchi, or rales. No chest wall TTP. No crepitus. Abdominal: Soft. Bowel sounds are normal. He exhibits no distension. There is no tenderness. Soft, NTND. No CVAT. No rebound or guarding. Musculoskeletal: Normal range of motion. He exhibits no edema. No LE edema or calf TTP. Neurological: He is alert and oriented to person, place, and time. No cranial nerve deficit or sensory deficit. Strength 5/5 throughout. Normal sensory exam.   Skin: Skin is warm and dry. No rash. Psychiatric: He has a normal mood and affect. Nursing note and vitals reviewed. Medical Decision Making  MDM  Number of Diagnoses or Management Options  Hematemesis with nausea: new, needed workup  Intractable vomiting with nausea, unspecified vomiting type: new, needed workup  Diagnosis management comments: Patient given IV fluid hydration, Zofran.   Patient with persistent vomiting. Patient given haldol 5mg iv. Labs with no acute abnormalities present. Patient unable to tolerate po. Hospitalist consulted for admission. Amount and/or Complexity of Data Reviewed  Clinical lab tests: ordered and reviewed  Tests in the radiology section of CPT®: ordered and reviewed  Tests in the medicine section of CPT®: ordered and reviewed  Review and summarize past medical records: yes    Risk of Complications, Morbidity, and/or Mortality  Presenting problems: moderate  Diagnostic procedures: moderate  Management options: moderate    Patient Progress  Patient progress: stable        Procedures     ED Evaluation:  LABS:   Recent Results (from the past 24 hour(s))   CBC WITH AUTOMATED DIFF    Collection Time: 07/16/19  4:31 PM   Result Value Ref Range    WBC 7.5 4.3 - 11.1 K/uL    RBC 5.75 (H) 4.23 - 5.6 M/uL    HGB 16.8 13.6 - 17.2 g/dL    HCT 44.5 41.1 - 50.3 %    MCV 77.4 (L) 79.6 - 97.8 FL    MCH 29.2 26.1 - 32.9 PG    MCHC 37.8 (H) 31.4 - 35.0 g/dL    RDW 12.6 11.9 - 14.6 %    PLATELET 684 970 - 926 K/uL    MPV 9.0 (L) 9.4 - 12.3 FL    ABSOLUTE NRBC 0.00 0.0 - 0.2 K/uL    DF AUTOMATED      NEUTROPHILS 46 43 - 78 %    LYMPHOCYTES 40 13 - 44 %    MONOCYTES 13 (H) 4.0 - 12.0 %    EOSINOPHILS 1 0.5 - 7.8 %    BASOPHILS 1 0.0 - 2.0 %    IMMATURE GRANULOCYTES 0 0.0 - 5.0 %    ABS. NEUTROPHILS 3.4 1.7 - 8.2 K/UL    ABS. LYMPHOCYTES 3.0 0.5 - 4.6 K/UL    ABS. MONOCYTES 1.0 0.1 - 1.3 K/UL    ABS. EOSINOPHILS 0.1 0.0 - 0.8 K/UL    ABS. BASOPHILS 0.1 0.0 - 0.2 K/UL    ABS. IMM.  GRANS. 0.0 0.0 - 0.5 K/UL   METABOLIC PANEL, COMPREHENSIVE    Collection Time: 07/16/19  4:31 PM   Result Value Ref Range    Sodium 138 136 - 145 mmol/L    Potassium 3.2 (L) 3.5 - 5.1 mmol/L    Chloride 108 (H) 98 - 107 mmol/L    CO2 17 (L) 21 - 32 mmol/L    Anion gap 13 7 - 16 mmol/L    Glucose 92 65 - 100 mg/dL    BUN 23 6 - 23 MG/DL    Creatinine 1.34 0.8 - 1.5 MG/DL    GFR est AA >60 >60 ml/min/1.73m2    GFR est non-AA >60 >60 ml/min/1.73m2    Calcium 10.3 8.3 - 10.4 MG/DL    Bilirubin, total 1.2 (H) 0.2 - 1.1 MG/DL    ALT (SGPT) 28 12 - 65 U/L    AST (SGOT) 24 15 - 37 U/L    Alk. phosphatase 126 50 - 136 U/L    Protein, total 8.7 (H) 6.3 - 8.2 g/dL    Albumin 4.8 3.5 - 5.0 g/dL    Globulin 3.9 (H) 2.3 - 3.5 g/dL    A-G Ratio 1.2 1.2 - 3.5     LIPASE    Collection Time: 07/16/19  4:31 PM   Result Value Ref Range    Lipase 114 73 - 393 U/L   URINE MICROSCOPIC    Collection Time: 07/16/19  7:49 PM   Result Value Ref Range    WBC 0-3 0 /hpf    RBC 0 0 /hpf    Epithelial cells 0 0 /hpf    Bacteria 0 0 /hpf    Casts 0 0 /lpf    Crystals, urine 0 0 /LPF    Amorphous Crystals 1+ (H) 0    Mucus 3+ (H) 0 /lpf    Other observations RESULTS VERIFIED MANUALLY     PROTHROMBIN TIME + INR    Collection Time: 07/16/19  8:54 PM   Result Value Ref Range    Prothrombin time 14.7 (H) 11.7 - 14.5 sec    INR 1.2          RADIOLOGY:   No orders to display     _____________________________________________________________________      Ryan Lozano MD; 7/17/2019 @7:39 PM Voice dictation software was used during the making of this note. This software is not perfect and grammatical and other typographical errors may be present.   This note has not been proofread for errors.  ===================================================================

## 2019-07-18 ENCOUNTER — ANESTHESIA (OUTPATIENT)
Dept: ENDOSCOPY | Age: 44
DRG: 369 | End: 2019-07-18
Payer: COMMERCIAL

## 2019-07-18 ENCOUNTER — ANESTHESIA EVENT (OUTPATIENT)
Dept: ENDOSCOPY | Age: 44
DRG: 369 | End: 2019-07-18
Payer: COMMERCIAL

## 2019-07-18 LAB
ANION GAP SERPL CALC-SCNC: 7 MMOL/L (ref 7–16)
BASOPHILS # BLD: 0 K/UL (ref 0–0.2)
BASOPHILS # BLD: 0 K/UL (ref 0–0.2)
BASOPHILS NFR BLD: 0 % (ref 0–2)
BASOPHILS NFR BLD: 0 % (ref 0–2)
BUN SERPL-MCNC: 17 MG/DL (ref 6–23)
CALCIUM SERPL-MCNC: 8.8 MG/DL (ref 8.3–10.4)
CHLORIDE SERPL-SCNC: 109 MMOL/L (ref 98–107)
CO2 SERPL-SCNC: 24 MMOL/L (ref 21–32)
CREAT SERPL-MCNC: 0.93 MG/DL (ref 0.8–1.5)
DIFFERENTIAL METHOD BLD: ABNORMAL
DIFFERENTIAL METHOD BLD: ABNORMAL
EOSINOPHIL # BLD: 0 K/UL (ref 0–0.8)
EOSINOPHIL # BLD: 0.2 K/UL (ref 0–0.8)
EOSINOPHIL NFR BLD: 0 % (ref 0.5–7.8)
EOSINOPHIL NFR BLD: 2 % (ref 0.5–7.8)
ERYTHROCYTE [DISTWIDTH] IN BLOOD BY AUTOMATED COUNT: 12.7 % (ref 11.9–14.6)
ERYTHROCYTE [DISTWIDTH] IN BLOOD BY AUTOMATED COUNT: 12.8 % (ref 11.9–14.6)
GLUCOSE SERPL-MCNC: 90 MG/DL (ref 65–100)
HCT VFR BLD AUTO: 38.7 % (ref 41.1–50.3)
HCT VFR BLD AUTO: 38.7 % (ref 41.1–50.3)
HGB BLD-MCNC: 14 G/DL (ref 13.6–17.2)
HGB BLD-MCNC: 14.2 G/DL (ref 13.6–17.2)
IMM GRANULOCYTES # BLD AUTO: 0.1 K/UL (ref 0–0.5)
IMM GRANULOCYTES # BLD AUTO: 0.1 K/UL (ref 0–0.5)
IMM GRANULOCYTES NFR BLD AUTO: 0 % (ref 0–5)
IMM GRANULOCYTES NFR BLD AUTO: 1 % (ref 0–5)
LYMPHOCYTES # BLD: 1.5 K/UL (ref 0.5–4.6)
LYMPHOCYTES # BLD: 1.6 K/UL (ref 0.5–4.6)
LYMPHOCYTES NFR BLD: 12 % (ref 13–44)
LYMPHOCYTES NFR BLD: 13 % (ref 13–44)
MCH RBC QN AUTO: 29.5 PG (ref 26.1–32.9)
MCH RBC QN AUTO: 29.6 PG (ref 26.1–32.9)
MCHC RBC AUTO-ENTMCNC: 36.2 G/DL (ref 31.4–35)
MCHC RBC AUTO-ENTMCNC: 36.7 G/DL (ref 31.4–35)
MCV RBC AUTO: 80.6 FL (ref 79.6–97.8)
MCV RBC AUTO: 81.6 FL (ref 79.6–97.8)
MONOCYTES # BLD: 0.9 K/UL (ref 0.1–1.3)
MONOCYTES # BLD: 1.4 K/UL (ref 0.1–1.3)
MONOCYTES NFR BLD: 10 % (ref 4–12)
MONOCYTES NFR BLD: 8 % (ref 4–12)
NEUTS SEG # BLD: 10.3 K/UL (ref 1.7–8.2)
NEUTS SEG # BLD: 9 K/UL (ref 1.7–8.2)
NEUTS SEG NFR BLD: 76 % (ref 43–78)
NEUTS SEG NFR BLD: 79 % (ref 43–78)
NRBC # BLD: 0 K/UL (ref 0–0.2)
NRBC # BLD: 0 K/UL (ref 0–0.2)
PLATELET # BLD AUTO: 336 K/UL (ref 150–450)
PLATELET # BLD AUTO: 340 K/UL (ref 150–450)
PMV BLD AUTO: 9.1 FL (ref 9.4–12.3)
PMV BLD AUTO: 9.1 FL (ref 9.4–12.3)
POTASSIUM SERPL-SCNC: 3.7 MMOL/L (ref 3.5–5.1)
RBC # BLD AUTO: 4.74 M/UL (ref 4.23–5.6)
RBC # BLD AUTO: 4.8 M/UL (ref 4.23–5.6)
SODIUM SERPL-SCNC: 140 MMOL/L (ref 136–145)
WBC # BLD AUTO: 11.4 K/UL (ref 4.3–11.1)
WBC # BLD AUTO: 13.5 K/UL (ref 4.3–11.1)

## 2019-07-18 PROCEDURE — 76060000031 HC ANESTHESIA FIRST 0.5 HR: Performed by: INTERNAL MEDICINE

## 2019-07-18 PROCEDURE — 74011250636 HC RX REV CODE- 250/636: Performed by: FAMILY MEDICINE

## 2019-07-18 PROCEDURE — 77030020263 HC SOL INJ SOD CL0.9% LFCR 1000ML

## 2019-07-18 PROCEDURE — 99218 HC RM OBSERVATION: CPT

## 2019-07-18 PROCEDURE — 85025 COMPLETE CBC W/AUTO DIFF WBC: CPT

## 2019-07-18 PROCEDURE — 74011250636 HC RX REV CODE- 250/636

## 2019-07-18 PROCEDURE — 74011250636 HC RX REV CODE- 250/636: Performed by: INTERNAL MEDICINE

## 2019-07-18 PROCEDURE — 74011250637 HC RX REV CODE- 250/637: Performed by: FAMILY MEDICINE

## 2019-07-18 PROCEDURE — 0DJ08ZZ INSPECTION OF UPPER INTESTINAL TRACT, VIA NATURAL OR ARTIFICIAL OPENING ENDOSCOPIC: ICD-10-PCS | Performed by: INTERNAL MEDICINE

## 2019-07-18 PROCEDURE — C9113 INJ PANTOPRAZOLE SODIUM, VIA: HCPCS | Performed by: FAMILY MEDICINE

## 2019-07-18 PROCEDURE — 74011000250 HC RX REV CODE- 250: Performed by: FAMILY MEDICINE

## 2019-07-18 PROCEDURE — 80048 BASIC METABOLIC PNL TOTAL CA: CPT

## 2019-07-18 PROCEDURE — 76040000025: Performed by: INTERNAL MEDICINE

## 2019-07-18 PROCEDURE — 36415 COLL VENOUS BLD VENIPUNCTURE: CPT

## 2019-07-18 PROCEDURE — 74011250637 HC RX REV CODE- 250/637: Performed by: INTERNAL MEDICINE

## 2019-07-18 RX ORDER — LIDOCAINE HYDROCHLORIDE 20 MG/ML
INJECTION, SOLUTION EPIDURAL; INFILTRATION; INTRACAUDAL; PERINEURAL AS NEEDED
Status: DISCONTINUED | OUTPATIENT
Start: 2019-07-18 | End: 2019-07-18 | Stop reason: HOSPADM

## 2019-07-18 RX ORDER — PROPOFOL 10 MG/ML
INJECTION, EMULSION INTRAVENOUS AS NEEDED
Status: DISCONTINUED | OUTPATIENT
Start: 2019-07-18 | End: 2019-07-18 | Stop reason: HOSPADM

## 2019-07-18 RX ORDER — METOPROLOL TARTRATE 5 MG/5ML
2.5 INJECTION INTRAVENOUS
Status: DISCONTINUED | OUTPATIENT
Start: 2019-07-18 | End: 2019-07-21 | Stop reason: HOSPADM

## 2019-07-18 RX ORDER — MAG HYDROX/ALUMINUM HYD/SIMETH 200-200-20
30 SUSPENSION, ORAL (FINAL DOSE FORM) ORAL
Status: DISCONTINUED | OUTPATIENT
Start: 2019-07-18 | End: 2019-07-19

## 2019-07-18 RX ORDER — SODIUM CHLORIDE, SODIUM LACTATE, POTASSIUM CHLORIDE, CALCIUM CHLORIDE 600; 310; 30; 20 MG/100ML; MG/100ML; MG/100ML; MG/100ML
1000 INJECTION, SOLUTION INTRAVENOUS CONTINUOUS
Status: DISCONTINUED | OUTPATIENT
Start: 2019-07-18 | End: 2019-07-18 | Stop reason: HOSPADM

## 2019-07-18 RX ORDER — DIPHENHYDRAMINE HYDROCHLORIDE 50 MG/ML
12.5 INJECTION, SOLUTION INTRAMUSCULAR; INTRAVENOUS
Status: COMPLETED | OUTPATIENT
Start: 2019-07-18 | End: 2019-07-19

## 2019-07-18 RX ORDER — METOCLOPRAMIDE HYDROCHLORIDE 5 MG/ML
10 INJECTION INTRAMUSCULAR; INTRAVENOUS
Status: DISCONTINUED | OUTPATIENT
Start: 2019-07-18 | End: 2019-07-21 | Stop reason: HOSPADM

## 2019-07-18 RX ADMIN — Medication 10 ML: at 06:07

## 2019-07-18 RX ADMIN — PROPOFOL 60 MG: 10 INJECTION, EMULSION INTRAVENOUS at 12:16

## 2019-07-18 RX ADMIN — PROPOFOL 70 MG: 10 INJECTION, EMULSION INTRAVENOUS at 12:14

## 2019-07-18 RX ADMIN — Medication 10 ML: at 20:56

## 2019-07-18 RX ADMIN — Medication 1 AMPULE: at 20:55

## 2019-07-18 RX ADMIN — LIDOCAINE HYDROCHLORIDE 30 MG: 20 INJECTION, SOLUTION EPIDURAL; INFILTRATION; INTRACAUDAL; PERINEURAL at 12:14

## 2019-07-18 RX ADMIN — SODIUM CHLORIDE, SODIUM LACTATE, POTASSIUM CHLORIDE, AND CALCIUM CHLORIDE 1000 ML: 600; 310; 30; 20 INJECTION, SOLUTION INTRAVENOUS at 11:17

## 2019-07-18 RX ADMIN — ONDANSETRON 4 MG: 2 INJECTION INTRAMUSCULAR; INTRAVENOUS at 19:16

## 2019-07-18 RX ADMIN — PANTOPRAZOLE SODIUM 40 MG: 40 INJECTION, POWDER, FOR SOLUTION INTRAVENOUS at 09:08

## 2019-07-18 RX ADMIN — Medication 10 ML: at 13:21

## 2019-07-18 RX ADMIN — SODIUM CHLORIDE 150 ML/HR: 900 INJECTION, SOLUTION INTRAVENOUS at 06:07

## 2019-07-18 RX ADMIN — ONDANSETRON 4 MG: 2 INJECTION INTRAMUSCULAR; INTRAVENOUS at 13:21

## 2019-07-18 RX ADMIN — METOCLOPRAMIDE 10 MG: 5 INJECTION, SOLUTION INTRAMUSCULAR; INTRAVENOUS at 21:59

## 2019-07-18 RX ADMIN — METOCLOPRAMIDE HYDROCHLORIDE 10 MG: 10 TABLET ORAL at 06:18

## 2019-07-18 RX ADMIN — SODIUM CHLORIDE 150 ML/HR: 900 INJECTION, SOLUTION INTRAVENOUS at 20:56

## 2019-07-18 RX ADMIN — ALUMINUM HYDROXIDE, MAGNESIUM HYDROXIDE, AND SIMETHICONE 30 ML: 200; 200; 20 SUSPENSION ORAL at 16:31

## 2019-07-18 RX ADMIN — SODIUM CHLORIDE 150 ML/HR: 900 INJECTION, SOLUTION INTRAVENOUS at 00:26

## 2019-07-18 RX ADMIN — PROPOFOL 60 MG: 10 INJECTION, EMULSION INTRAVENOUS at 12:17

## 2019-07-18 RX ADMIN — ALUMINUM HYDROXIDE, MAGNESIUM HYDROXIDE, AND SIMETHICONE 30 ML: 200; 200; 20 SUSPENSION ORAL at 20:55

## 2019-07-18 RX ADMIN — PANTOPRAZOLE SODIUM 40 MG: 40 INJECTION, POWDER, FOR SOLUTION INTRAVENOUS at 20:53

## 2019-07-18 NOTE — PROGRESS NOTES
Hourly rounds completed during shift. Pt has cont to vomit mult times this shift. Nausea addressed. EGD completed. Pt tolerating cl liquid diet. All needs met, bed locked in low position and call light within reach. Will give report to oncoming RN.

## 2019-07-18 NOTE — ROUTINE PROCESS
Patient transported back to ROOM 602 via stretcher on room air. VSS. No c/o pain and tolerating PO fluids. Report called to yazmin Thomas RN.

## 2019-07-18 NOTE — PROGRESS NOTES
Per staff, patient needs a new pcp. Arpit Tavera Referral sent to [employer]. Awaiting response.

## 2019-07-18 NOTE — PROGRESS NOTES
Hourly rounds completed. All needs met. Pt NPO. Pt vomited once during the shift. Pt only vomited 100 ml of brown emesis. Pt is lying in bed in a low, locked position wit the side rails up, call light within reach. Wife is at the bedside. Will give report to the oncoming day shift nurse.

## 2019-07-18 NOTE — ANESTHESIA POSTPROCEDURE EVALUATION
Procedure(s):  ESOPHAGOGASTRODUODENOSCOPY (EGD). total IV anesthesia    Anesthesia Post Evaluation      Multimodal analgesia: multimodal analgesia not used between 6 hours prior to anesthesia start to PACU discharge  Patient location during evaluation: PACU  Patient participation: complete - patient participated  Level of consciousness: awake and alert  Pain management: adequate  Airway patency: patent  Anesthetic complications: no  Cardiovascular status: acceptable  Respiratory status: acceptable  Hydration status: acceptable  Post anesthesia nausea and vomiting:  none      Vitals Value Taken Time   /67 7/18/2019 12:45 PM   Temp 36.7 °C (98 °F) 7/18/2019 12:26 PM   Pulse 82 7/18/2019 12:45 PM   Resp 16 7/18/2019 12:26 PM   SpO2 94 % 7/18/2019 12:45 PM   Vitals shown include unvalidated device data.

## 2019-07-18 NOTE — CONSULTS
[facility] Consult Note       Consulting GI Physician: Dr. Glenard Libman    Referring Provider:  Dr. Paty Feng Date:  7/18/2019    Admit Date:  7/17/2019    Chief Complaint:  UGIB    Subjective:     History of Present Illness:  Patient is a 45 y.o. male with PMH of HTN, GERD with hx hiatal hernia, esophagitis, gastroparesis, Marijuana dependency, who is seen in consultation at the request of Dr. Aidan Cosme for UGI bleed. He was admitted 7/17 after presented with c/o N/V, hematemesis. Nausea began Sunday morning after riding in a [place] with coworker that had a stomach bug. Nausea persisted into Monday along with a general malaise and after eating lunch he vomited up food particles. N/V persisted into Wednesday, during which time he reports vomiting at least 3x daily. Yesterday 7/17 he vomited large volume of red blood and this again occurred this morning per pt. Nursing note this morning documents vomiting of 100cc brown emesis overnight. He has had some reflux. He is on Pantoprazole 40mg daily though had run out of this medication over the past few weeks. Chart notes document that instead he was using OTC Zantac. Now he says he was taking OTC Prilosec. He denies regular NSAID use. He has not smoked cigarettes/marijuana for 3wks. He denies ETOH. He denies dysphagia, odynophagia, abdominal pain,loss of appetite or unexplained weight loss. He reports regular bowel patterns without melena or hematochezia. He has a hx of severe erosive esophagitis and has undergone multiple EGDs, last one being in 12/31/18-- see procedure details below. He reports feeling well since then until now.     PMH:  Past Medical History:   Diagnosis Date    BETTY (acute kidney injury) ([facility] [address] 8/12/2014    Clostridium difficile colitis 3/20/2011    Gastroparesis 2005    emptying study normal -2006    GERD (gastroesophageal reflux disease)     HIATAL HERNIA SINCE 1999    PUD (peptic ulcer disease) ALL DX IN 2008    ESOPHAGITIS, + H PYLORI    Uncontrolled hypertension 6/26/2018       PSH:  Past Surgical History:   Procedure Laterality Date    COLONOSCOPY  4/08    ESOPHAGITIS, COLONIC ULCER X1    EGD  4/08    H. HERNIA, ULCER X2, H PYLORI,    EGD  2011    h pylori -neg    HX WISDOM TEETH EXTRACTION  2/10/11       Allergies: Allergies   Allergen Reactions    Amoxicillin Nausea Only    Aspirin Other (comments)     ulcers    Hydrocodone Rash     Tolerates hydromorphone, morphine, tramadol    Ibuprofen Other (comments)     Hx of ulcers    Pcn [Penicillins] Rash    Phenergan [Promethazine] Nausea and Vomiting and Other (comments)       Home Medications:  Prior to Admission medications    Medication Sig Start Date End Date Taking? Authorizing Provider   pantoprazole (PROTONIX) 20 mg tablet Take 1 Tab by mouth daily. 7/16/19  Yes Nai Sharp MD   ondansetron (ZOFRAN ODT) 8 mg disintegrating tablet Take 1 Tab by mouth every eight (8) hours as needed for Nausea. 7/16/19   Nai Sharp MD   metoclopramide HCl (REGLAN) 10 mg tablet Take 1 Tab by mouth every six (6) hours as needed for Nausea for up to 10 days. 7/16/19 7/26/19  Nai Sharp MD   ondansetron (ZOFRAN ODT) 4 mg disintegrating tablet Take 1 Tab by mouth every eight (8) hours as needed for Nausea. 4/1/19   Buffy Gagnon MD   hyoscyamine SL (LEVSIN/SL) 0.125 mg SL tablet 1 Tab by SubLINGual route every four (4) hours as needed for Cramping. 4/1/19   Buffy Gagnon MD   haloperidol (HALDOL) 5 mg tablet Take 1 Tab by mouth every eight (8) hours as needed for Nausea. 4/1/19   Buffy Gagnon MD   pantoprazole (PROTONIX) 40 mg tablet Take 1 Tab by mouth daily.  3/1/19   Roberto King MD       Hospital Medications:  Current Facility-Administered Medications   Medication Dose Route Frequency    metoclopramide HCl (REGLAN) tablet 10 mg  10 mg Oral Q6H PRN    sodium chloride (NS) flush 5-40 mL  5-40 mL IntraVENous Q8H    sodium chloride (NS) flush 5-40 mL  5-40 mL IntraVENous PRN    ondansetron (ZOFRAN) injection 4 mg  4 mg IntraVENous Q4H PRN    pantoprazole (PROTONIX) 40 mg in sodium chloride 0.9% 10 mL injection  40 mg IntraVENous Q12H    0.9% sodium chloride infusion  150 mL/hr IntraVENous CONTINUOUS       Social History:  Social History     Tobacco Use    Smoking status: Current Every Day Smoker     Packs/day: 0.25     Years: 2.00     Pack years: 0.50     Types: Cigarettes    Smokeless tobacco: Never Used   Substance Use Topics    Alcohol use: No         Family History:  Family History   Problem Relation Age of Onset    Other Mother         L+W    Diabetes Maternal Grandmother     Sickle Cell Anemia Father     Heart Disease Paternal Grandfather     Other Sister         ALL L+W    Other Son         L+W       Review of Systems:  A detailed 10 system ROS is obtained, with pertinent positives as listed above. All others are negative. Diet:  NPO    Objective:     Physical Exam:  Vitals:  Visit Vitals  BP (!) 152/93 (BP 1 Location: Right arm, BP Patient Position: Sitting)   Pulse (!) 106   Temp 98.8 °F (37.1 °C)   Resp 18   Ht 5' 1\" (1.549 m)   Wt 63.5 kg (140 lb)   SpO2 91%   BMI 26.45 kg/m²     Gen:  Pt is alert, cooperative, no acute distress  Skin:  Face reveal no rashes. HEENT: Sclerae anicteric. Cardiovascular: Mildly tachycardic  Respiratory:  Comfortable breathing with no accessory muscle use. Clear breath sounds anteriorly with no wheezes, rales, or rhonchi. GI:  Abdomen nondistended, soft, and Nontender. Normal active bowel sounds. Rectal:  Deferred  Musculoskeletal:  No pitting edema of the lower legs. Neurological:  Gross memory appears intact. Patient is alert and oriented. Psychiatric:  Mood appears appropriate with judgement intact.     Laboratory:    Recent Labs     07/18/19  0224 07/17/19  2255 07/17/19  1632 07/16/19  2054 07/16/19  1631   WBC 13.5* 13.4* 10.9  --  7.5   HGB 14.2 14.6 15.8  --  16.8   HCT 38.7* 40.3* 42.4  --  44.5    351 422  --  423   MCV 80.6 81.7 79.1*  --  77.4*     --  141  --  138   K 3.7  --  3.5  --  3.2*   *  --  107  --  108*   CO2 24  --  23  --  17*   BUN 17  --  25*  --  23   CREA 0.93  --  1.10  --  1.34   CA 8.8  --  9.6  --  10.3   GLU 90  --  125*  --  92   AP  --   --  117  --  126   SGOT  --   --  19  --  24   ALT  --   --  25  --  28   TBILI  --   --  0.6  --  1.2*   ALB  --   --  4.6  --  4.8   TP  --   --  8.7*  --  8.7*   LPSE  --   --  137  --  114   PTP  --   --   --  14.7*  --    INR  --   --   --  1.2  --       EGD 12/31/18 for hematemesis: mild reflux esophagitis, mild retained debris in stomach. DIAGNOSIS   ESOPHAGEAL BIOPSIES: GLANDULAR EPITHELIUM WITHOUT SIGNIFICANT GOBLET CELL TYPE INTESTINAL METAPLASIA OR DYSPLASIA IN ASSOCIATION WITH MINIMALLY HYPERPLASTIC SQUAMOUS EPITHELIUM FOCALLY HAVING CHANGES OF REPAIR. EGD 3/13/18  Findings: Esophagus- Severe erosive esophagitis. Stomach, Duodenum- Normal.Therapies: NoneSpecimens: NoneEstimated Blood Loss: 0 cc  Impression:   Severe erosive esophagitis. DIAGNOSIS GASTRIC BIOPSIES: FRAGMENTS OF GASTRIC MUCOSA HAVING CHANGES OF REPAIR.      EGD 11/29/16  EGD 2/4/16  EGD 8/13/14  EGD 4/30/14     GES 4/2014  IMPRESSION:  1. It although the half-emptying time of the stomach is within the normal   range, there is a greater than expected amount of residual food within the   stomach at 4 hours suggesting mildly delayed gastric emptying.   Assessment:     Principal Problem:    Hematemesis (6/26/2018)    Active Problems:    Gastroparesis (3/17/2011)      Sickle cell trait ([facility] [address] (3/5/2013)      Anxiety (4/30/2014)      H/O hiatal hernia (4/30/2014)      Esophageal reflux (4/30/2014)      PUD (peptic ulcer disease) (4/30/2014)      Polysubstance abuse ([facility] [address] (7/11/2016)      Tetrahydrocannabinol (THC) use disorder, moderate, dependence ([facility] (7/11/2016)      45 y.o. male with PMH of HTN, GERD with hx hiatal hernia, esophagitis, gastroparesis, Marijuana dependency, who is seen in consultation at the request of Dr. Elston Paget for UGI bleed. He was admitted 7/17 after presented with c/o N/V, hematemesis. Nausea began Sunday morning after riding in a [employer] [place] with coworker that had a stomach bug. Nausea persisted into Monday along with a general malaise and after eating lunch he vomited up food particles. N/V persisted into Wednesday, during which time he reports vomiting at least 3x daily. Yesterday 7/17 he vomited large volume of red blood and this again occurred this morning per pt. Nursing note this morning documents vomiting of 100cc brown emesis overnight. He has had some reflux. He is on Pantoprazole 40mg daily though had run out of this medication over the past few weeks. Chart notes document that instead he was using OTC Zantac. Now he says he was taking OTC Prilosec. He denies regular NSAID use. He has not smoked cigarettes/marijuana for 3wks. He denies ETOH. He denies dysphagia, odynophagia, abdominal pain,loss of appetite or unexplained weight loss. He reports regular bowel patterns without melena or hematochezia. He has a hx of severe erosive esophagitis and has undergone multiple EGDs, last one being in 12/31/18-- see procedure details below. He reports feeling well since then until now. In ER WBC is up at 13. Hgb is normal at 14 with normal BUN/Cr, LFTs, Lipase, INR. Plan:     -Continue IV PPI b.i.d for now. Will need to continue PPI therapy upon dishcarge.   -Avoid NSAIDs and other gastric irritants  -Avoid marijuana use  -Supportive care with IVF hydration, anti-emetics p.r.n.  -Keep NPO for EGD today to evaluate for evidence of Alexandra woodward tear, esophagitis, gastritis, PUD, AVMs, etc.    Further recommendations will be based upon findings on EGD.      Barbi Coburn PA-C  [facility]

## 2019-07-18 NOTE — INTERVAL H&P NOTE
H&P Update:  Dayan Ray was seen and examined. History and physical has been reviewed. The patient has been examined.  There have been no significant clinical changes since the completion of the originally dated History and Physical.

## 2019-07-18 NOTE — H&P
HOSPITALIST INITIAL HISTORY AND PHYSICAL    NAME:  Jett Emery   Age:  45 y.o.  :   1975   MRN:   [id number]  PCP: Blaise Hogan MD  Consulting MD:      CHIEF COMPLAINT: nausea, vomiting, hematemesis    HISTORY OF PRESENT ILLNESS:   Jett Emery is a 45 y.o. male with a past medical history of marijuana dependence, gastroparesis, HTN, GERD who presents to the ER with complaint of worsening nausea and vomiting for hte past 3 days. He reports being exposed to a coworker with \"stomach flu\" recently. Also admits to running out of his protonix for the past 2 weeks and trying to use OTC zantac instead. However, he continues to vomit with streaks of bright red blood over the past 24 hours or so. He denies any fevers, chills. Denies any BM over past 3 days, \"not even a fart. \"     REVIEW OF SYSTEMS: Comprehensive ROS performed. Denies fevers, chills, diarrhea constipation, otherwise negative. Past Medical History:   Diagnosis Date    BETTY (acute kidney injury) ([facility] [address] 2014    Clostridium difficile colitis 3/20/2011    Gastroparesis 2005    emptying study normal -2006    GERD (gastroesophageal reflux disease)     HIATAL HERNIA SINCE     PUD (peptic ulcer disease) ALL DX IN     ESOPHAGITIS, + H PYLORI    Uncontrolled hypertension 2018        Past Surgical History:   Procedure Laterality Date    COLONOSCOPY      ESOPHAGITIS, COLONIC ULCER X1    EGD      H. HERNIA, ULCER X2, H PYLORI,    EGD      h pylori -neg    HX WISDOM TEETH EXTRACTION  2/10/11       Prior to Admission Medications   Prescriptions Last Dose Informant Patient Reported? Taking?   haloperidol (HALDOL) 5 mg tablet   No No   Sig: Take 1 Tab by mouth every eight (8) hours as needed for Nausea.    hyoscyamine SL (LEVSIN/SL) 0.125 mg SL tablet   No No   Si Tab by SubLINGual route every four (4) hours as needed for Cramping.   metoclopramide HCl (REGLAN) 10 mg tablet   No No   Sig: Take 1 Tab by mouth every six (6) hours as needed for Nausea for up to 10 days. ondansetron (ZOFRAN ODT) 4 mg disintegrating tablet   No No   Sig: Take 1 Tab by mouth every eight (8) hours as needed for Nausea. ondansetron (ZOFRAN ODT) 8 mg disintegrating tablet   No No   Sig: Take 1 Tab by mouth every eight (8) hours as needed for Nausea. pantoprazole (PROTONIX) 20 mg tablet   No No   Sig: Take 1 Tab by mouth daily. pantoprazole (PROTONIX) 40 mg tablet   No No   Sig: Take 1 Tab by mouth daily. Facility-Administered Medications: None       Allergies   Allergen Reactions    Amoxicillin Nausea Only    Aspirin Other (comments)     ulcers    Hydrocodone Rash     Tolerates hydromorphone, morphine, tramadol    Ibuprofen Other (comments)     Hx of ulcers    Pcn [Penicillins] Rash    Phenergan [Promethazine] Nausea and Vomiting and Other (comments)       FAMILY HISTORY: Reviewed. Negative except   Family History   Problem Relation Age of Onset    Other Mother         L+W    Diabetes Maternal Grandmother     Sickle Cell Anemia Father     Heart Disease Paternal Grandfather     Other Sister         ALL L+W    Other Son         L+W       Social History     Tobacco Use    Smoking status: Current Every Day Smoker     Packs/day: 0.25     Years: 2.00     Pack years: 0.50     Types: Cigarettes    Smokeless tobacco: Never Used   Substance Use Topics    Alcohol use: No         Objective:     Visit Vitals  BP (!) 149/91   Pulse (!) 107   Temp 98 °F (36.7 °C)   Resp 18   Ht 5' 1\" (1.549 m)   Wt 63.5 kg (140 lb)   SpO2 96%   BMI 26.45 kg/m²      Temp (24hrs), Av °F (36.7 °C), Min:98 °F (36.7 °C), Max:98 °F (36.7 °C)    Oxygen Therapy  O2 Sat (%): 96 % (19)  Pulse via Oximetry: 131 beats per minute (19)  O2 Device: Room air (19)  Physical Exam:  General:    The patient is a middle aged male in no acute distress. Head:   Normocephalic/atraumatic.    Eyes:  No palpebral pallor or scleral icterus. ENT:  External auricular and nasal exam within normal limits. Mucous membranes are moist.  Neck:  Supple, non-tender, no JVD. Lungs:   Clear to auscultation bilaterally without wheezes or crackles. No respiratory distress or accessory muscle use. Heart:   Regular rate and rhythm, without murmurs, rubs, or gallops. Abdomen:   Soft, non-tender, non-distended with normoactive bowel sounds. Genitourinary: No tenderness over the bladder or bilateral CVAs. Extremities: Without clubbing, cyanosis, or edema. Skin:     Normal color, texture, and turgor. No rashes, lesions, or jaundice. Pulses: Radial and dorsalis pedis pulses present 2+ bilaterally. Capillary refill <2s. Neurologic: CN II-XII grossly intact and symmetrical.     Moving all four extremities well with no focal deficits.   Psychiatric: Minimally interactive, only replies \"yes or no\", but answers appropriately    Data Review:   Recent Results (from the past 24 hour(s))   PROTHROMBIN TIME + INR    Collection Time: 07/16/19  8:54 PM   Result Value Ref Range    Prothrombin time 14.7 (H) 11.7 - 14.5 sec    INR 1.2     EKG, 12 LEAD, INITIAL    Collection Time: 07/17/19  4:31 PM   Result Value Ref Range    Ventricular Rate 110 BPM    Atrial Rate 110 BPM    P-R Interval 162 ms    QRS Duration 66 ms    Q-T Interval 324 ms    QTC Calculation (Bezet) 438 ms    Calculated P Axis 79 degrees    Calculated R Axis 82 degrees    Calculated T Axis 71 degrees    Diagnosis       !!! Poor data quality, interpretation may be adversely affected  Sinus tachycardia  Right atrial enlargement  Minimal voltage criteria for LVH, may be normal variant  Borderline ECG  When compared with ECG of 02-APR-2019 21:21,  No significant change was found     CBC WITH AUTOMATED DIFF    Collection Time: 07/17/19  4:32 PM   Result Value Ref Range    WBC 10.9 4.3 - 11.1 K/uL    RBC 5.36 4.23 - 5.6 M/uL    HGB 15.8 13.6 - 17.2 g/dL    HCT 42.4 41.1 - 50.3 %    MCV 79.1 (L) 79.6 - 97.8 FL    MCH 29.5 26.1 - 32.9 PG    MCHC 37.3 (H) 31.4 - 35.0 g/dL    RDW 12.8 11.9 - 14.6 %    PLATELET 243 349 - 502 K/uL    MPV 9.2 (L) 9.4 - 12.3 FL    ABSOLUTE NRBC 0.00 0.0 - 0.2 K/uL    DF AUTOMATED      NEUTROPHILS 79 (H) 43 - 78 %    LYMPHOCYTES 10 (L) 13 - 44 %    MONOCYTES 10 4.0 - 12.0 %    EOSINOPHILS 0 (L) 0.5 - 7.8 %    BASOPHILS 0 0.0 - 2.0 %    IMMATURE GRANULOCYTES 0 0.0 - 5.0 %    ABS. NEUTROPHILS 8.6 (H) 1.7 - 8.2 K/UL    ABS. LYMPHOCYTES 1.1 0.5 - 4.6 K/UL    ABS. MONOCYTES 1.1 0.1 - 1.3 K/UL    ABS. EOSINOPHILS 0.0 0.0 - 0.8 K/UL    ABS. BASOPHILS 0.0 0.0 - 0.2 K/UL    ABS. IMM. GRANS. 0.0 0.0 - 0.5 K/UL   METABOLIC PANEL, COMPREHENSIVE    Collection Time: 07/17/19  4:32 PM   Result Value Ref Range    Sodium 141 136 - 145 mmol/L    Potassium 3.5 3.5 - 5.1 mmol/L    Chloride 107 98 - 107 mmol/L    CO2 23 21 - 32 mmol/L    Anion gap 11 7 - 16 mmol/L    Glucose 125 (H) 65 - 100 mg/dL    BUN 25 (H) 6 - 23 MG/DL    Creatinine 1.10 0.8 - 1.5 MG/DL    GFR est AA >60 >60 ml/min/1.73m2    GFR est non-AA >60 >60 ml/min/1.73m2    Calcium 9.6 8.3 - 10.4 MG/DL    Bilirubin, total 0.6 0.2 - 1.1 MG/DL    ALT (SGPT) 25 12 - 65 U/L    AST (SGOT) 19 15 - 37 U/L    Alk. phosphatase 117 50 - 136 U/L    Protein, total 8.7 (H) 6.3 - 8.2 g/dL    Albumin 4.6 3.5 - 5.0 g/dL    Globulin 4.1 (H) 2.3 - 3.5 g/dL    A-G Ratio 1.1 (L) 1.2 - 3.5     LIPASE    Collection Time: 07/17/19  4:32 PM   Result Value Ref Range    Lipase 137 73 - 393 U/L   DRUG SCREEN, URINE    Collection Time: 07/17/19  7:48 PM   Result Value Ref Range    PCP(PHENCYCLIDINE) NEGATIVE       BENZODIAZEPINES NEGATIVE       COCAINE NEGATIVE       AMPHETAMINES NEGATIVE       METHADONE NEGATIVE       THC (TH-CANNABINOL) POSITIVE      OPIATES NEGATIVE       BARBITURATES NEGATIVE          Imaging Cephus Rain /Studies:  Xr Abd Acute W 1 V Chest    Result Date: 7/17/2019  IMPRESSION: No acute thoracic, abdominal, or pelvic abnormality. Assessment and Plan:     Principal Problem:    Hematemesis (6/26/2018)    Likely 2/2 known esophagitis related to probably cannabinoid hyperemesis. IV phenergan/Zofran. NPO. IVF. IV Protonix. Consult GI re: repeat EGD (Last EGD was 3/2019)    Active Problems:    Gastroparesis (3/17/2011)    Continue home meds      H/O hiatal hernia (4/30/2014)    Stable. Continue home meds. Esophageal reflux (4/30/2014)    Stable. Continue home meds. PUD (peptic ulcer disease) (4/30/2014)    Stable. Continue home meds. Sickle cell trait ([address] (3/5/2013)    Stable      Anxiety (4/30/2014)    Stable. Continue home meds. Polysubstance abuse ([address] (7/11/2016)    UDS pending      Tetrahydrocannabinol (THC) use disorder, moderate, dependence ([address] (7/11/2016)    UDS pending        DVT Prophylaxis: SCDs      Code Status: FULL CODE      Disposition: Observe on med/surg for evaluation and treatment as per above.       Anticipated discharge: < 2 midnights     Signed By: Xiomara Kumar MD     July 17, 2019

## 2019-07-18 NOTE — PROGRESS NOTES
Progress Note    Patient: Francoise Light MRN: [id number]  SSN: [id number]    YOB: 1975  Age: 45 y.o. Sex: male      Admit Date: 7/17/2019    LOS: 0 days     Subjective:     Patient with a previous history of cyclic vomiting syndrome related to THC. Admitted with N/V and upper GI bleed. Had EGD today. Alexandra - Lynch tear reported. Clinically stable. Continue IV fluids and IV PPIs. Started on diet. Objective:     Vitals:    07/18/19 1226 07/18/19 1245 07/18/19 1255 07/18/19 1305   BP: 90/55 107/67 105/69 122/84   Pulse: (!) 104 82 89 100   Resp: 16 15 14 15   Temp: 98 °F (36.7 °C)      SpO2: 95% 94% 95% 95%   Weight:       Height:            Intake and Output:  Current Shift: 07/18 0701 - 07/18 1900  In: 450 [I.V.:450]  Out: 0   Last three shifts: 07/16 1901 - 07/18 0700  In: -   Out: 100     Physical Exam:   GENERAL: alert, appears stated age  EYE: conjunctivae/corneas clear. LYMPHATIC: Cervical, supraclavicular, and axillary nodes normal.   THROAT & NECK: normal and no erythema or exudates noted. LUNG: clear to auscultation bilaterally  HEART: regular rate and rhythm, S1, S2 normal, no murmur, click, rub or gallop  ABDOMEN: epigastric tenderness   EXTREMITIES:  extremities normal, atraumatic, no cyanosis or edema  SKIN: Normal.  NEUROLOGIC: No focal deficits  PSYCHIATRIC: non focal    Lab/Data Review: All lab results for the last 24 hours reviewed.          Assessment:     Principal Problem:    Hematemesis (6/26/2018)    Active Problems:    Gastroparesis (3/17/2011)      Sickle cell trait ([address] (3/5/2013)      Anxiety (4/30/2014)      H/O hiatal hernia (4/30/2014)      Esophageal reflux (4/30/2014)      PUD (peptic ulcer disease) (4/30/2014)      Polysubstance abuse ([address] (7/11/2016)      Tetrahydrocannabinol (THC) use disorder, moderate, dependence ([address] (7/11/2016)        Plan:     -IV fluids   -IV protonix   -IV zofran and reglan prn   -EGD showed a Alexandra Lynch tear Signed By: Dinora Logan MD     July 18, 2019

## 2019-07-18 NOTE — PROGRESS NOTES
Nutrition  Reason for assessment: Referral received from nursing admission Malnutrition Screening Tool   Recently Lost Weight Without Trying: Yes  If Yes, How Much Weight Loss: 14 - 23 lbs  Eating Poorly Due to Decreased Appetite: Yes  Assessment:   Diet: DIET CLEAR LIQUID    Food/Nutrition Patient History: Pt admitted with n/v, s/p EGD revealing gastritis and Alexandra Lynch tear. + THC upon admission. Pt states that his last meal that he \"kept down\" was on Saturday. He denies any weight loss prior to this past week. H/O gastroparesis. Pt has been seen during previous admissions and has been instructed on a gastroparesis diet guidelines. Anthropometrics:Height: 5' 1\" (154.9 cm),  Weight: 63.5 kg (140 lb), Weight Source: Estimated, Body mass index is 26.45 kg/m². BMI class of overweight for age <65 years. WT / BMI 7/17/2019 7/16/2019 4/2/2019 4/1/2019 3/1/2019 12/9/2018   WEIGHT 140 lb 140 lb 145 lb 150 lb 149 lb 150 lb     WT / BMI 6/24/2018 6/23/2018   WEIGHT 148 lb 155 lb   Per weights listed in EMR, potential for a 15 pound, 9.6% weight loss, clinically insignificant, within one year. Macronutrient needs:  EER:  1928-3999 kcal /day (23-28 kcal/kg listed BW)  EPR:  51-64 grams protein/day (0.8-1 grams/kg listed BW)  Intake/Comparative Standards: CLQ diet is inadequate in kcal/PRO. Nutrition Diagnosis: Inadequate oral intake r/t altered GI function as evidenced by pt with a Alexandra Lynch tear and on a nutritionally inadequate CLQ diet. Intervention:  Meals and snacks: Continue current diet. Nutrition Supplement Therapy: add ensure clear with CLQ. Discharge nutrition plan: Too soon to determine.   Coordination of Nutrition Care: Dr. Brooks Givens, [address], [address], [address] [place] [address], [facility] [address]

## 2019-07-18 NOTE — PROGRESS NOTES
TRANSFER - OUT REPORT:    Verbal report given to [facility] RN on [address]  being transferred to [facility](unit) for routine progression of care       Report consisted of patients Situation, Background, Assessment and   Recommendations(SBAR). Information from the following report(s) SBAR, Procedure Summary, Intake/Output, MAR and Recent Results was reviewed with the receiving nurse. Lines:   Peripheral IV 07/17/19 Left Hand (Active)   Site Assessment Clean, dry, & intact 7/18/2019 11:01 AM   Phlebitis Assessment 0 7/18/2019 11:01 AM   Infiltration Assessment 0 7/18/2019 11:01 AM   Dressing Status Clean, dry, & intact 7/18/2019 11:01 AM   Dressing Type Transparent;Tape 7/18/2019 11:01 AM   Hub Color/Line Status Pink;Flushed;Patent; Infusing 7/18/2019 11:01 AM   Action Taken Blood drawn 7/17/2019  4:27 PM   Alcohol Cap Used No 7/18/2019  4:16 AM        Opportunity for questions and clarification was provided.       Patient transported with:   [employer]

## 2019-07-18 NOTE — PROCEDURES
DATE OF PROCEDURE: 7/18/19    REQUESTING PHYSICIAN: Dr Bryce Mckeon    PROCEDURE: Esophagogastroduodenoscopy. ENDOSCOPIST: Jenae Fields M.D. PREOPERATIVE DIAGNOSIS: N/V, Hematemesis     POSTOPERATIVE DIAGNOSIS: Esophagitis, Alexandra-Lynch tear    INSTRUMENTS: GIF H190    SEDATION: MAC    DESCRIPTION: After informed consent was obtained, the patient was taken to the endoscopy suite and placed in the left lateral decubitus position. Intravenous sedation was administered as above and posterior pharynx was anesthetized with local anesthetic spray. After adequate sedation had been achieved the endoscope was inserted over the tongue and through the posterior pharynx under direct visualization down the esophagus to the stomach and into the second portion of the duodenum. The endoscopic was withdrawn from that point, performing a careful survey of the mucosa. Retroflexion was performed in the gastric fundus. FINDINGS:  Esophagus: Normal appearing proximal mucosa. There was LA grade C esophagitis at the distal esophagus with a mild nonbleeding Alexandra-Lynch tear. Stomach: Normal appearing gastric mucosa. Duodenum: Normal duodenal mucosa. Estimated blood loss:  0 cc-minimal    IMPRESSION:   Esophagitis  Alexandra-Lnych tear    PLAN:  - PPI BID (will need to go home on this)  - Start clears, advancing diet as tolerated   - Antiemetics to avoid retching  - GI will sign off.  Please call with questions    TALON Garcia MD

## 2019-07-18 NOTE — H&P (VIEW-ONLY)
[facility] Consult Note       Consulting GI Physician: Dr. Delfino Martinez    Referring Provider:  Dr. Flakito Mcdonald Date:  7/18/2019    Admit Date:  7/17/2019    Chief Complaint:  UGIB    Subjective:     History of Present Illness:  Patient is a 45 y.o. male with PMH of HTN, GERD with hx hiatal hernia, esophagitis, gastroparesis, Marijuana dependency, who is seen in consultation at the request of Dr. Chuck Thurman for UGI bleed. He was admitted 7/17 after presented with c/o N/V, hematemesis. Nausea began Sunday morning after riding in a [employer] with coworker that had a stomach bug. Nausea persisted into Monday along with a general malaise and after eating lunch he vomited up food particles. N/V persisted into Wednesday, during which time he reports vomiting at least 3x daily. Yesterday 7/17 he vomited large volume of red blood and this again occurred this morning per pt. Nursing note this morning documents vomiting of 100cc brown emesis overnight. He has had some reflux. He is on Pantoprazole 40mg daily though had run out of this medication over the past few weeks. Chart notes document that instead he was using OTC Zantac. Now he says he was taking OTC Prilosec. He denies regular NSAID use. He has not smoked cigarettes/marijuana for 3wks. He denies ETOH. He denies dysphagia, odynophagia, abdominal pain,loss of appetite or unexplained weight loss. He reports regular bowel patterns without melena or hematochezia. He has a hx of severe erosive esophagitis and has undergone multiple EGDs, last one being in 12/31/18-- see procedure details below. He reports feeling well since then until now.     PMH:  Past Medical History:   Diagnosis Date    BETTY (acute kidney injury) ([facility] [address] 8/12/2014    Clostridium difficile colitis 3/20/2011    Gastroparesis 2005    emptying study normal -2006    GERD (gastroesophageal reflux disease)     HIATAL HERNIA SINCE 1999    PUD (peptic ulcer disease) ALL DX IN 2008    ESOPHAGITIS, + H PYLORI    Uncontrolled hypertension 6/26/2018       PSH:  Past Surgical History:   Procedure Laterality Date    COLONOSCOPY  4/08    ESOPHAGITIS, COLONIC ULCER X1    EGD  4/08    H. HERNIA, ULCER X2, H PYLORI,    EGD  2011    h pylori -neg    HX WISDOM TEETH EXTRACTION  2/10/11       Allergies: Allergies   Allergen Reactions    Amoxicillin Nausea Only    Aspirin Other (comments)     ulcers    Hydrocodone Rash     Tolerates hydromorphone, morphine, tramadol    Ibuprofen Other (comments)     Hx of ulcers    Pcn [Penicillins] Rash    Phenergan [Promethazine] Nausea and Vomiting and Other (comments)       Home Medications:  Prior to Admission medications    Medication Sig Start Date End Date Taking? Authorizing Provider   pantoprazole (PROTONIX) 20 mg tablet Take 1 Tab by mouth daily. 7/16/19  Yes Foreign Sierra MD   ondansetron (ZOFRAN ODT) 8 mg disintegrating tablet Take 1 Tab by mouth every eight (8) hours as needed for Nausea. 7/16/19   Foreign Sierra MD   metoclopramide HCl (REGLAN) 10 mg tablet Take 1 Tab by mouth every six (6) hours as needed for Nausea for up to 10 days. 7/16/19 7/26/19  Foreign Sierra MD   ondansetron (ZOFRAN ODT) 4 mg disintegrating tablet Take 1 Tab by mouth every eight (8) hours as needed for Nausea. 4/1/19   Bea Wallace MD   hyoscyamine SL (LEVSIN/SL) 0.125 mg SL tablet 1 Tab by SubLINGual route every four (4) hours as needed for Cramping. 4/1/19   Bea Wallace MD   haloperidol (HALDOL) 5 mg tablet Take 1 Tab by mouth every eight (8) hours as needed for Nausea. 4/1/19   Bea Wallace MD   pantoprazole (PROTONIX) 40 mg tablet Take 1 Tab by mouth daily.  3/1/19   Ira Sexton MD       Hospital Medications:  Current Facility-Administered Medications   Medication Dose Route Frequency    metoclopramide HCl (REGLAN) tablet 10 mg  10 mg Oral Q6H PRN    sodium chloride (NS) flush 5-40 mL  5-40 mL IntraVENous Q8H    sodium chloride (NS) flush 5-40 mL  5-40 mL IntraVENous PRN    ondansetron (ZOFRAN) injection 4 mg  4 mg IntraVENous Q4H PRN    pantoprazole (PROTONIX) 40 mg in sodium chloride 0.9% 10 mL injection  40 mg IntraVENous Q12H    0.9% sodium chloride infusion  150 mL/hr IntraVENous CONTINUOUS       Social History:  Social History     Tobacco Use    Smoking status: Current Every Day Smoker     Packs/day: 0.25     Years: 2.00     Pack years: 0.50     Types: Cigarettes    Smokeless tobacco: Never Used   Substance Use Topics    Alcohol use: No         Family History:  Family History   Problem Relation Age of Onset    Other Mother         L+W    Diabetes Maternal Grandmother     Sickle Cell Anemia Father     Heart Disease Paternal Grandfather     Other Sister         ALL L+W    Other Son         L+W       Review of Systems:  A detailed 10 system ROS is obtained, with pertinent positives as listed above. All others are negative. Diet:  NPO    Objective:     Physical Exam:  Vitals:  Visit Vitals  BP (!) 152/93 (BP 1 Location: Right arm, BP Patient Position: Sitting)   Pulse (!) 106   Temp 98.8 °F (37.1 °C)   Resp 18   Ht 5' 1\" (1.549 m)   Wt 63.5 kg (140 lb)   SpO2 91%   BMI 26.45 kg/m²     Gen:  Pt is alert, cooperative, no acute distress  Skin:  Face reveal no rashes. HEENT: Sclerae anicteric. Cardiovascular: Mildly tachycardic  Respiratory:  Comfortable breathing with no accessory muscle use. Clear breath sounds anteriorly with no wheezes, rales, or rhonchi. GI:  Abdomen nondistended, soft, and Nontender. Normal active bowel sounds. Rectal:  Deferred  Musculoskeletal:  No pitting edema of the lower legs. Neurological:  Gross memory appears intact. Patient is alert and oriented. Psychiatric:  Mood appears appropriate with judgement intact.     Laboratory:    Recent Labs     07/18/19  0224 07/17/19  2255 07/17/19  1632 07/16/19  2054 07/16/19  1631   WBC 13.5* 13.4* 10.9  --  7.5   HGB 14.2 14.6 15.8  --  16.8   HCT 38.7* 40.3* 42.4  --  44.5    351 422  --  423   MCV 80.6 81.7 79.1*  --  77.4*     --  141  --  138   K 3.7  --  3.5  --  3.2*   *  --  107  --  108*   CO2 24  --  23  --  17*   BUN 17  --  25*  --  23   CREA 0.93  --  1.10  --  1.34   CA 8.8  --  9.6  --  10.3   GLU 90  --  125*  --  92   AP  --   --  117  --  126   SGOT  --   --  19  --  24   ALT  --   --  25  --  28   TBILI  --   --  0.6  --  1.2*   ALB  --   --  4.6  --  4.8   TP  --   --  8.7*  --  8.7*   LPSE  --   --  137  --  114   PTP  --   --   --  14.7*  --    INR  --   --   --  1.2  --       EGD 12/31/18 for hematemesis: mild reflux esophagitis, mild retained debris in stomach. DIAGNOSIS   ESOPHAGEAL BIOPSIES: GLANDULAR EPITHELIUM WITHOUT SIGNIFICANT GOBLET CELL TYPE INTESTINAL METAPLASIA OR DYSPLASIA IN ASSOCIATION WITH MINIMALLY HYPERPLASTIC SQUAMOUS EPITHELIUM FOCALLY HAVING CHANGES OF REPAIR. EGD 3/13/18  Findings: Esophagus- Severe erosive esophagitis. Stomach, Duodenum- Normal.Therapies: NoneSpecimens: NoneEstimated Blood Loss: 0 cc  Impression:   Severe erosive esophagitis. DIAGNOSIS GASTRIC BIOPSIES: FRAGMENTS OF GASTRIC MUCOSA HAVING CHANGES OF REPAIR.      EGD 11/29/16  EGD 2/4/16  EGD 8/13/14  EGD 4/30/14     GES 4/2014  IMPRESSION:  1. It although the half-emptying time of the stomach is within the normal   range, there is a greater than expected amount of residual food within the   stomach at 4 hours suggesting mildly delayed gastric emptying.   Assessment:     Principal Problem:    Hematemesis (6/26/2018)    Active Problems:    Gastroparesis (3/17/2011)      Sickle cell trait ([facility] [address] (3/5/2013)      Anxiety (4/30/2014)      H/O hiatal hernia (4/30/2014)      Esophageal reflux (4/30/2014)      PUD (peptic ulcer disease) (4/30/2014)      Polysubstance abuse ([facility] [address] (7/11/2016)      Tetrahydrocannabinol (THC) use disorder, moderate, dependence ([facility] (7/11/2016)      45 y.o. male with PMH of HTN, GERD with hx hiatal hernia, esophagitis, gastroparesis, Marijuana dependency, who is seen in consultation at the request of Dr. Ailyn Glover for UGI bleed. He was admitted 7/17 after presented with c/o N/V, hematemesis. Nausea began Sunday morning after riding in a [employer] with coworker that had a stomach bug. Nausea persisted into Monday along with a general malaise and after eating lunch he vomited up food particles. N/V persisted into Wednesday, during which time he reports vomiting at least 3x daily. Yesterday 7/17 he vomited large volume of red blood and this again occurred this morning per pt. Nursing note this morning documents vomiting of 100cc brown emesis overnight. He has had some reflux. He is on Pantoprazole 40mg daily though had run out of this medication over the past few weeks. Chart notes document that instead he was using OTC Zantac. Now he says he was taking OTC Prilosec. He denies regular NSAID use. He has not smoked cigarettes/marijuana for 3wks. He denies ETOH. He denies dysphagia, odynophagia, abdominal pain,loss of appetite or unexplained weight loss. He reports regular bowel patterns without melena or hematochezia. He has a hx of severe erosive esophagitis and has undergone multiple EGDs, last one being in 12/31/18-- see procedure details below. He reports feeling well since then until now. In ER WBC is up at 13. Hgb is normal at 14 with normal BUN/Cr, LFTs, Lipase, INR. Plan:     -Continue IV PPI b.i.d for now. Will need to continue PPI therapy upon dishcarge.   -Avoid NSAIDs and other gastric irritants  -Avoid marijuana use  -Supportive care with IVF hydration, anti-emetics p.r.n.  -Keep NPO for EGD today to evaluate for evidence of Alexandra woodward tear, esophagitis, gastritis, PUD, AVMs, etc.    Further recommendations will be based upon findings on EGD.      Nabor Lopez PA-C  [facility]

## 2019-07-18 NOTE — ED NOTES
TRANSFER - OUT REPORT:    Verbal report given to [employer] (name) on [employer]  being transferred to [facility](unit) for routine progression of care       Report consisted of patients Situation, Background, Assessment and   Recommendations(SBAR). Information from the following report(s) SBAR was reviewed with the receiving nurse. Lines:   Peripheral IV 07/17/19 Left Hand (Active)   Site Assessment Clean, dry, & intact 7/17/2019  4:27 PM   Phlebitis Assessment 0 7/17/2019  4:27 PM   Infiltration Assessment 0 7/17/2019  4:27 PM   Dressing Status Clean, dry, & intact 7/17/2019  4:27 PM   Hub Color/Line Status Pink 7/17/2019  4:27 PM   Action Taken Blood drawn 7/17/2019  4:27 PM   Alcohol Cap Used No 7/17/2019  4:27 PM        Opportunity for questions and clarification was provided.       Patient transported with:   [place]

## 2019-07-18 NOTE — PROGRESS NOTES
Problem: Nausea/Vomiting (Adult)  Goal: *Absence of nausea/vomiting  Outcome: Not Progressing Towards Goal  Variance Other (add note)  Note:   Pt vomited once during the shift.

## 2019-07-18 NOTE — PROGRESS NOTES
07/17/19 2042   Dual Skin Pressure Injury Assessment   Dual Skin Pressure Injury Assessment WDL   Second Care Provider (Based on [address] [place] [address]) helen rangel    Skin Integumentary   Skin Integumentary (WDL) WDL   Completed dual skin assessment with helen rangel. No skin breakdown note.

## 2019-07-18 NOTE — ANESTHESIA PREPROCEDURE EVALUATION
Anesthetic History     PONV          Review of Systems / Medical History  Patient summary reviewed and pertinent labs reviewed    Pulmonary  Within defined limits                 Neuro/Psych   Within defined limits           Cardiovascular    Hypertension: well controlled              Exercise tolerance: >4 METS  Comments: EKG--ST    hgb 14   GI/Hepatic/Renal     GERD: well controlled      Hiatal hernia and PUD    Comments: PUD, H.pylori Endo/Other             Other Findings          Anesthetic History     PONV          Review of Systems / Medical History  Patient summary reviewed, nursing notes reviewed and pertinent labs reviewed    Pulmonary          Smoker (quit 1 year ago)         Neuro/Psych   Within defined limits           Cardiovascular  Within defined limits                Exercise tolerance: >4 METS     GI/Hepatic/Renal     GERD: well controlled      PUD    Comments: N/V hx of jeremiah woodward tear Endo/Other  Within defined limits           Other Findings              Physical Exam    Airway  Mallampati: II  TM Distance: 4 - 6 cm  Neck ROM: normal range of motion   Mouth opening: Normal     Cardiovascular    Rhythm: regular  Rate: normal    Murmur: Grade 2, Mitral area     Dental  No notable dental hx       Pulmonary  Breath sounds clear to auscultation               Abdominal  GI exam deferred       Other Findings            Anesthetic Plan    ASA: 3, emergent  Anesthesia type: general          Induction: Intravenous and RSI  Anesthetic plan and risks discussed with: Patient                Physical Exam    Airway  Mallampati: II  TM Distance: 4 - 6 cm  Neck ROM: normal range of motion   Mouth opening: Normal     Cardiovascular  Regular rate and rhythm,  S1 and S2 normal,  no murmur, click, rub, or gallop             Dental         Pulmonary  Breath sounds clear to auscultation               Abdominal         Other Findings            Anesthetic Plan    ASA: 2  Anesthesia type: total IV anesthesia          Induction: Intravenous  Anesthetic plan and risks discussed with: Patient and Spouse

## 2019-07-19 LAB
ANION GAP SERPL CALC-SCNC: 12 MMOL/L (ref 7–16)
BUN SERPL-MCNC: 12 MG/DL (ref 6–23)
CALCIUM SERPL-MCNC: 9 MG/DL (ref 8.3–10.4)
CHLORIDE SERPL-SCNC: 107 MMOL/L (ref 98–107)
CO2 SERPL-SCNC: 21 MMOL/L (ref 21–32)
CREAT SERPL-MCNC: 0.76 MG/DL (ref 0.8–1.5)
ERYTHROCYTE [DISTWIDTH] IN BLOOD BY AUTOMATED COUNT: 12.6 % (ref 11.9–14.6)
GLUCOSE SERPL-MCNC: 102 MG/DL (ref 65–100)
HCT VFR BLD AUTO: 41.7 % (ref 41.1–50.3)
HGB BLD-MCNC: 15.2 G/DL (ref 13.6–17.2)
MCH RBC QN AUTO: 29.5 PG (ref 26.1–32.9)
MCHC RBC AUTO-ENTMCNC: 36.5 G/DL (ref 31.4–35)
MCV RBC AUTO: 80.8 FL (ref 79.6–97.8)
NRBC # BLD: 0 K/UL (ref 0–0.2)
PLATELET # BLD AUTO: 348 K/UL (ref 150–450)
PMV BLD AUTO: 9 FL (ref 9.4–12.3)
POTASSIUM SERPL-SCNC: 3.3 MMOL/L (ref 3.5–5.1)
RBC # BLD AUTO: 5.16 M/UL (ref 4.23–5.6)
SODIUM SERPL-SCNC: 140 MMOL/L (ref 136–145)
WBC # BLD AUTO: 11.1 K/UL (ref 4.3–11.1)

## 2019-07-19 PROCEDURE — 36415 COLL VENOUS BLD VENIPUNCTURE: CPT

## 2019-07-19 PROCEDURE — 65270000029 HC RM PRIVATE

## 2019-07-19 PROCEDURE — 77030020263 HC SOL INJ SOD CL0.9% LFCR 1000ML

## 2019-07-19 PROCEDURE — 74011250636 HC RX REV CODE- 250/636: Performed by: FAMILY MEDICINE

## 2019-07-19 PROCEDURE — 85027 COMPLETE CBC AUTOMATED: CPT

## 2019-07-19 PROCEDURE — 74011250636 HC RX REV CODE- 250/636: Performed by: INTERNAL MEDICINE

## 2019-07-19 PROCEDURE — 99218 HC RM OBSERVATION: CPT

## 2019-07-19 PROCEDURE — 74011000250 HC RX REV CODE- 250: Performed by: FAMILY MEDICINE

## 2019-07-19 PROCEDURE — 80048 BASIC METABOLIC PNL TOTAL CA: CPT

## 2019-07-19 PROCEDURE — 74011250637 HC RX REV CODE- 250/637: Performed by: INTERNAL MEDICINE

## 2019-07-19 PROCEDURE — C9113 INJ PANTOPRAZOLE SODIUM, VIA: HCPCS | Performed by: FAMILY MEDICINE

## 2019-07-19 RX ORDER — POTASSIUM CHLORIDE 14.9 MG/ML
20 INJECTION INTRAVENOUS ONCE
Status: COMPLETED | OUTPATIENT
Start: 2019-07-19 | End: 2019-07-19

## 2019-07-19 RX ORDER — LORAZEPAM 2 MG/ML
.5-1 INJECTION INTRAMUSCULAR
Status: DISCONTINUED | OUTPATIENT
Start: 2019-07-19 | End: 2019-07-21 | Stop reason: HOSPADM

## 2019-07-19 RX ORDER — MAG HYDROX/ALUMINUM HYD/SIMETH 200-200-20
30 SUSPENSION, ORAL (FINAL DOSE FORM) ORAL
Status: DISCONTINUED | OUTPATIENT
Start: 2019-07-19 | End: 2019-07-21 | Stop reason: HOSPADM

## 2019-07-19 RX ORDER — POTASSIUM CHLORIDE 1.5 G/1.77G
20 POWDER, FOR SOLUTION ORAL DAILY
Status: DISCONTINUED | OUTPATIENT
Start: 2019-07-19 | End: 2019-07-19

## 2019-07-19 RX ORDER — LORAZEPAM 2 MG/ML
1 INJECTION INTRAMUSCULAR
Status: DISCONTINUED | OUTPATIENT
Start: 2019-07-19 | End: 2019-07-19

## 2019-07-19 RX ORDER — LORAZEPAM 2 MG/ML
1 INJECTION INTRAMUSCULAR ONCE
Status: COMPLETED | OUTPATIENT
Start: 2019-07-19 | End: 2019-07-19

## 2019-07-19 RX ORDER — ALPRAZOLAM 0.5 MG/1
0.25 TABLET ORAL
Status: DISCONTINUED | OUTPATIENT
Start: 2019-07-19 | End: 2019-07-21 | Stop reason: HOSPADM

## 2019-07-19 RX ADMIN — PANTOPRAZOLE SODIUM 40 MG: 40 INJECTION, POWDER, FOR SOLUTION INTRAVENOUS at 09:59

## 2019-07-19 RX ADMIN — LORAZEPAM 1 MG: 2 INJECTION INTRAMUSCULAR; INTRAVENOUS at 12:37

## 2019-07-19 RX ADMIN — SODIUM CHLORIDE 125 ML/HR: 900 INJECTION, SOLUTION INTRAVENOUS at 19:39

## 2019-07-19 RX ADMIN — Medication 1 AMPULE: at 09:59

## 2019-07-19 RX ADMIN — LORAZEPAM 0.5 MG: 2 INJECTION INTRAMUSCULAR; INTRAVENOUS at 20:35

## 2019-07-19 RX ADMIN — POTASSIUM CHLORIDE 20 MEQ: 200 INJECTION, SOLUTION INTRAVENOUS at 12:45

## 2019-07-19 RX ADMIN — ONDANSETRON 4 MG: 2 INJECTION INTRAMUSCULAR; INTRAVENOUS at 19:39

## 2019-07-19 RX ADMIN — METOPROLOL TARTRATE 2.5 MG: 5 INJECTION INTRAVENOUS at 06:02

## 2019-07-19 RX ADMIN — Medication 10 ML: at 19:41

## 2019-07-19 RX ADMIN — SODIUM CHLORIDE 150 ML/HR: 900 INJECTION, SOLUTION INTRAVENOUS at 01:00

## 2019-07-19 RX ADMIN — Medication 10 ML: at 05:55

## 2019-07-19 RX ADMIN — METOPROLOL TARTRATE 2.5 MG: 5 INJECTION INTRAVENOUS at 15:14

## 2019-07-19 RX ADMIN — Medication 1 AMPULE: at 19:39

## 2019-07-19 RX ADMIN — DIPHENHYDRAMINE HYDROCHLORIDE 12.5 MG: 50 INJECTION INTRAMUSCULAR; INTRAVENOUS at 00:36

## 2019-07-19 RX ADMIN — Medication 10 ML: at 15:22

## 2019-07-19 NOTE — PROGRESS NOTES
Progress Note    Patient: Angela De La Rosa MRN: [id number]  SSN: [id number]    YOB: 1975  Age: 45 y.o. Sex: male      Admit Date: 7/17/2019    LOS: 0 days     Subjective:     Patient with a previous history of cyclic vomiting syndrome related to THC. Admitted with N/V and upper GI bleed. Had EGD on 7/18. Alexandra - Lynch tear reported. Clinically stable. Continue IV fluids and IV PPIs. Still very symptomatic. IV ativan prn ordered today. Reactive hypertension due to symptoms    Objective:     Vitals:    07/19/19 1338 07/19/19 1508 07/19/19 1514 07/19/19 1542   BP: (!) 162/113 (!) 162/100 (!) 162/100 130/84   Pulse: (!) 125 (!) 114 (!) 114 84   Resp: 18      Temp: 98.6 °F (37 °C)      SpO2: 90%      Weight:       Height:            Intake and Output:  Current Shift: 07/19 0701 - 07/19 1900  In: 240 [P.O.:240]  Out: -   Last three shifts: 07/17 1901 - 07/19 0700  In: 450 [I.V.:450]  Out: 303 [Urine:3]    Physical Exam:   GENERAL: alert, appears stated age  EYE: conjunctivae/corneas clear. LYMPHATIC: Cervical, supraclavicular, and axillary nodes normal.   THROAT & NECK: normal and no erythema or exudates noted. LUNG: clear to auscultation bilaterally  HEART: regular rate and rhythm, S1, S2 normal, no murmur, click, rub or gallop  ABDOMEN: epigastric tenderness   EXTREMITIES:  extremities normal, atraumatic, no cyanosis or edema  SKIN: Normal.  NEUROLOGIC: No focal deficits  PSYCHIATRIC: non focal    Lab/Data Review: All lab results for the last 24 hours reviewed.          Assessment:     Principal Problem:    Hematemesis (6/26/2018)    Active Problems:    Gastroparesis (3/17/2011)      Sickle cell trait ([facility] [address] (3/5/2013)      Anxiety (4/30/2014)      H/O hiatal hernia (4/30/2014)      Esophageal reflux (4/30/2014)      PUD (peptic ulcer disease) (4/30/2014)      Polysubstance abuse ([facility] (7/11/2016)      Tetrahydrocannabinol (THC) use disorder, moderate, dependence ([facility] (7/11/2016)        Plan:     -IV fluids   -IV protonix   -IV zofran and reglan prn   -IV ativan prn for refractory symptoms   -EGD showed a Alexandra Lynch tear     Signed By: Yas Ridley MD     July 19, 2019

## 2019-07-19 NOTE — PHYSICIAN ADVISORY
Letter of Determination: Upgrade from Observation to Inpatient Status    This patient was originally hospitalized as Outpatient Status with Observation Services on 7/16/2019 for hematemesis. This patient now meets for Inpatient Admission based on medical necessity. The patient's stay was medically necessary based on endoscopy demonstrating jeremiah woodward tear, and continued status of nothing by mouth. It is our recommendation that this patient's hospitalization status should be upgraded from OBSERVATION to INPATIENT status.      The final decision regarding the patient's hospitalization status depends on the attending physician's judgement.     Darvin Cochran MD, FLAVIO,   Physician Devon Rodriguez.

## 2019-07-19 NOTE — PROGRESS NOTES
07/18/19 2308   Vital Signs   Temp 98.6 °F (37 °C)   Temp Source Oral   Pulse (Heart Rate) (!) 110  (wilder notified)   Heart Rate Source Monitor   Resp Rate 16   O2 Sat (%) 93 %   Level of Consciousness Alert   BP (!) 154/101  (wilder notified)   MAP (Calculated) 119   BP 1 Method Automatic   BP 1 Location Left arm   BP Patient Position At rest   MEWS Score 2     MD notified and orders placed.

## 2019-07-19 NOTE — PROGRESS NOTES
Problem: Nausea/Vomiting (Adult)  Goal: *Absence of nausea/vomiting  Outcome: Not Progressing Towards Goal  Note:   Patient having multiple episodes of Nausea and vomiting. MD paged and notified of issue. Orders were adjusted. Please see MAR.

## 2019-07-19 NOTE — PROGRESS NOTES
Interdisciplinary Rounds completed 07/19/19. Nursing, Case Management, Physician and PT present. Plan of care reviewed and updated. Probably stay in the hospital throughout weekend.

## 2019-07-20 LAB
ANION GAP SERPL CALC-SCNC: 11 MMOL/L (ref 7–16)
BUN SERPL-MCNC: 8 MG/DL (ref 6–23)
CALCIUM SERPL-MCNC: 8.7 MG/DL (ref 8.3–10.4)
CHLORIDE SERPL-SCNC: 104 MMOL/L (ref 98–107)
CO2 SERPL-SCNC: 22 MMOL/L (ref 21–32)
CREAT SERPL-MCNC: 0.73 MG/DL (ref 0.8–1.5)
ERYTHROCYTE [DISTWIDTH] IN BLOOD BY AUTOMATED COUNT: 12.5 % (ref 11.9–14.6)
GLUCOSE SERPL-MCNC: 95 MG/DL (ref 65–100)
HCT VFR BLD AUTO: 41.2 % (ref 41.1–50.3)
HGB BLD-MCNC: 15 G/DL (ref 13.6–17.2)
MCH RBC QN AUTO: 29 PG (ref 26.1–32.9)
MCHC RBC AUTO-ENTMCNC: 36.4 G/DL (ref 31.4–35)
MCV RBC AUTO: 79.7 FL (ref 79.6–97.8)
NRBC # BLD: 0 K/UL (ref 0–0.2)
PLATELET # BLD AUTO: 344 K/UL (ref 150–450)
PMV BLD AUTO: 9.2 FL (ref 9.4–12.3)
POTASSIUM SERPL-SCNC: 3.1 MMOL/L (ref 3.5–5.1)
RBC # BLD AUTO: 5.17 M/UL (ref 4.23–5.6)
SODIUM SERPL-SCNC: 137 MMOL/L (ref 136–145)
WBC # BLD AUTO: 8.8 K/UL (ref 4.3–11.1)

## 2019-07-20 PROCEDURE — 74011250636 HC RX REV CODE- 250/636: Performed by: FAMILY MEDICINE

## 2019-07-20 PROCEDURE — 36415 COLL VENOUS BLD VENIPUNCTURE: CPT

## 2019-07-20 PROCEDURE — 65270000029 HC RM PRIVATE

## 2019-07-20 PROCEDURE — 74011000250 HC RX REV CODE- 250: Performed by: INTERNAL MEDICINE

## 2019-07-20 PROCEDURE — 85027 COMPLETE CBC AUTOMATED: CPT

## 2019-07-20 PROCEDURE — 74011250636 HC RX REV CODE- 250/636: Performed by: INTERNAL MEDICINE

## 2019-07-20 PROCEDURE — C9113 INJ PANTOPRAZOLE SODIUM, VIA: HCPCS | Performed by: INTERNAL MEDICINE

## 2019-07-20 PROCEDURE — 80048 BASIC METABOLIC PNL TOTAL CA: CPT

## 2019-07-20 PROCEDURE — 74011250637 HC RX REV CODE- 250/637: Performed by: INTERNAL MEDICINE

## 2019-07-20 PROCEDURE — 77030020263 HC SOL INJ SOD CL0.9% LFCR 1000ML

## 2019-07-20 RX ORDER — POTASSIUM CHLORIDE 14.9 MG/ML
20 INJECTION INTRAVENOUS ONCE
Status: COMPLETED | OUTPATIENT
Start: 2019-07-20 | End: 2019-07-20

## 2019-07-20 RX ADMIN — POTASSIUM CHLORIDE 20 MEQ: 200 INJECTION, SOLUTION INTRAVENOUS at 08:58

## 2019-07-20 RX ADMIN — Medication 10 ML: at 06:00

## 2019-07-20 RX ADMIN — Medication 10 ML: at 21:26

## 2019-07-20 RX ADMIN — Medication 1 AMPULE: at 21:26

## 2019-07-20 RX ADMIN — SODIUM CHLORIDE 125 ML/HR: 900 INJECTION, SOLUTION INTRAVENOUS at 06:01

## 2019-07-20 RX ADMIN — Medication 1 AMPULE: at 08:53

## 2019-07-20 RX ADMIN — PANTOPRAZOLE SODIUM 40 MG: 40 INJECTION, POWDER, FOR SOLUTION INTRAVENOUS at 08:53

## 2019-07-20 RX ADMIN — SODIUM CHLORIDE 125 ML/HR: 900 INJECTION, SOLUTION INTRAVENOUS at 14:52

## 2019-07-20 RX ADMIN — ONDANSETRON 4 MG: 2 INJECTION INTRAMUSCULAR; INTRAVENOUS at 02:30

## 2019-07-20 RX ADMIN — LORAZEPAM 0.5 MG: 2 INJECTION INTRAMUSCULAR; INTRAVENOUS at 02:31

## 2019-07-20 RX ADMIN — Medication 10 ML: at 14:30

## 2019-07-20 RX ADMIN — SODIUM CHLORIDE 125 ML/HR: 900 INJECTION, SOLUTION INTRAVENOUS at 21:28

## 2019-07-20 RX ADMIN — ALPRAZOLAM 0.25 MG: 0.5 TABLET ORAL at 00:04

## 2019-07-20 NOTE — PROGRESS NOTES
Hourly rounds completed throughout this shift. Pt's B/P still elevated. PRN I.V metoprolol given x 1 this shift per MAR. Pt resting in bed; denies needs at this time. Will continue to monitor and report to oncoming night shift nurse.

## 2019-07-20 NOTE — PROGRESS NOTES
Progress Note    Patient: Nadeen Simon MRN: [id number]  SSN: [id number]    YOB: 1975  Age: 45 y.o. Sex: male      Admit Date: 7/17/2019    LOS: 1 day     Subjective:     Patient with a previous history of cyclic vomiting syndrome related to THC. Admitted with N/V and upper GI bleed. Had EGD on 7/18. Alexandra - Lynch tear reported. Clinically stable. Continue IV fluids and IV PPIs. Feels better today. Requested a GI soft diet. Objective:     Vitals:    07/20/19 0640 07/20/19 0806 07/20/19 1135 07/20/19 1535   BP: (!) 144/106 (!) 137/91 123/89 125/84   Pulse: 88 (!) 113 76 69   Resp: 16 18 18 17   Temp:  98.8 °F (37.1 °C) 98.2 °F (36.8 °C) 98.3 °F (36.8 °C)   SpO2: 93% 92% 96% 96%   Weight:       Height:            Intake and Output:  Current Shift: No intake/output data recorded. Last three shifts: 07/18 1901 - 07/20 0700  In: 240 [P.O.:240]  Out: 3 [Urine:3]    Physical Exam:   GENERAL: alert, appears stated age  EYE: conjunctivae/corneas clear. LYMPHATIC: Cervical, supraclavicular, and axillary nodes normal.   THROAT & NECK: normal and no erythema or exudates noted. LUNG: clear to auscultation bilaterally  HEART: regular rate and rhythm, S1, S2 normal, no murmur, click, rub or gallop  ABDOMEN: NO epigastric tenderness   EXTREMITIES:  extremities normal, atraumatic, no cyanosis or edema  SKIN: Normal.  NEUROLOGIC: No focal deficits  PSYCHIATRIC: non focal    Lab/Data Review: All lab results for the last 24 hours reviewed.          Assessment:     Principal Problem:    Hematemesis (6/26/2018)    Active Problems:    Gastroparesis (3/17/2011)      Sickle cell trait ([facility] [address] (3/5/2013)      Anxiety (4/30/2014)      H/O hiatal hernia (4/30/2014)      Esophageal reflux (4/30/2014)      PUD (peptic ulcer disease) (4/30/2014)      Polysubstance abuse ([facility] (7/11/2016)      Tetrahydrocannabinol (THC) use disorder, moderate, dependence ([facility] (7/11/2016)        Plan:     -IV fluids   -IV protonix   -IV zofran and reglan prn   -IV ativan prn for refractory symptoms   -EGD showed a Alexandra Lynch tear   -feeling better today  -GI soft diet     Signed By: Joi Shelley MD     July 20, 2019

## 2019-07-20 NOTE — PROGRESS NOTES
Problem: Nausea/Vomiting (Adult)  Goal: *Absence of nausea/vomiting  Outcome: Progressing Towards Goal     Problem: Falls - Risk of  Goal: *Absence of Falls  Description  Document Tino Yoo Fall Risk and appropriate interventions in the flowsheet.   Outcome: Progressing Towards Goal  Note:   Fall Risk Interventions:            Medication Interventions: Patient to call before getting OOB         History of Falls Interventions: Door open when patient unattended         Problem: Patient Education: Go to Patient Education Activity  Goal: Patient/Family Education  Outcome: Progressing Towards Goal

## 2019-07-20 NOTE — PROGRESS NOTES
Problem: Nausea/Vomiting (Adult)  Goal: *Absence of nausea/vomiting  Outcome: Progressing Towards Goal     Problem: Falls - Risk of  Goal: *Absence of Falls  Description  Document Travis Boeck Fall Risk and appropriate interventions in the flowsheet.   Outcome: Progressing Towards Goal  Note:   Fall Risk Interventions:            Medication Interventions: Patient to call before getting OOB         History of Falls Interventions: Door open when patient unattended

## 2019-07-21 VITALS
OXYGEN SATURATION: 97 % | HEART RATE: 72 BPM | RESPIRATION RATE: 18 BRPM | DIASTOLIC BLOOD PRESSURE: 77 MMHG | TEMPERATURE: 98.5 F | BODY MASS INDEX: 26.43 KG/M2 | HEIGHT: 61 IN | WEIGHT: 140 LBS | SYSTOLIC BLOOD PRESSURE: 126 MMHG

## 2019-07-21 LAB
ANION GAP SERPL CALC-SCNC: 9 MMOL/L (ref 7–16)
BUN SERPL-MCNC: 11 MG/DL (ref 6–23)
CALCIUM SERPL-MCNC: 8.5 MG/DL (ref 8.3–10.4)
CHLORIDE SERPL-SCNC: 105 MMOL/L (ref 98–107)
CO2 SERPL-SCNC: 26 MMOL/L (ref 21–32)
CREAT SERPL-MCNC: 0.79 MG/DL (ref 0.8–1.5)
ERYTHROCYTE [DISTWIDTH] IN BLOOD BY AUTOMATED COUNT: 12.3 % (ref 11.9–14.6)
GLUCOSE SERPL-MCNC: 98 MG/DL (ref 65–100)
HCT VFR BLD AUTO: 36 % (ref 41.1–50.3)
HGB BLD-MCNC: 13.2 G/DL (ref 13.6–17.2)
MCH RBC QN AUTO: 29.5 PG (ref 26.1–32.9)
MCHC RBC AUTO-ENTMCNC: 36.7 G/DL (ref 31.4–35)
MCV RBC AUTO: 80.4 FL (ref 79.6–97.8)
NRBC # BLD: 0 K/UL (ref 0–0.2)
PLATELET # BLD AUTO: 291 K/UL (ref 150–450)
PMV BLD AUTO: 9.1 FL (ref 9.4–12.3)
POTASSIUM SERPL-SCNC: 3 MMOL/L (ref 3.5–5.1)
RBC # BLD AUTO: 4.48 M/UL (ref 4.23–5.6)
SODIUM SERPL-SCNC: 140 MMOL/L (ref 136–145)
WBC # BLD AUTO: 6.7 K/UL (ref 4.3–11.1)

## 2019-07-21 PROCEDURE — 74011250637 HC RX REV CODE- 250/637: Performed by: INTERNAL MEDICINE

## 2019-07-21 PROCEDURE — 36415 COLL VENOUS BLD VENIPUNCTURE: CPT

## 2019-07-21 PROCEDURE — 85027 COMPLETE CBC AUTOMATED: CPT

## 2019-07-21 PROCEDURE — C9113 INJ PANTOPRAZOLE SODIUM, VIA: HCPCS | Performed by: INTERNAL MEDICINE

## 2019-07-21 PROCEDURE — 74011250636 HC RX REV CODE- 250/636: Performed by: INTERNAL MEDICINE

## 2019-07-21 PROCEDURE — 74011000250 HC RX REV CODE- 250: Performed by: INTERNAL MEDICINE

## 2019-07-21 PROCEDURE — 80048 BASIC METABOLIC PNL TOTAL CA: CPT

## 2019-07-21 RX ORDER — POTASSIUM CHLORIDE 1.5 G/1.77G
20 POWDER, FOR SOLUTION ORAL 2 TIMES DAILY WITH MEALS
Qty: 14 PACKET | Refills: 0 | Status: SHIPPED | OUTPATIENT
Start: 2019-07-21 | End: 2019-07-28

## 2019-07-21 RX ORDER — POTASSIUM CHLORIDE 1.5 G/1.77G
20 POWDER, FOR SOLUTION ORAL 2 TIMES DAILY WITH MEALS
Status: DISCONTINUED | OUTPATIENT
Start: 2019-07-21 | End: 2019-07-21 | Stop reason: HOSPADM

## 2019-07-21 RX ORDER — PANTOPRAZOLE SODIUM 40 MG/1
40 TABLET, DELAYED RELEASE ORAL DAILY
Qty: 30 TAB | Refills: 3 | Status: SHIPPED | OUTPATIENT
Start: 2019-07-21 | End: 2020-08-03

## 2019-07-21 RX ORDER — METOCLOPRAMIDE 10 MG/1
10 TABLET ORAL
Qty: 30 TAB | Refills: 0 | Status: SHIPPED | OUTPATIENT
Start: 2019-07-21 | End: 2019-07-31

## 2019-07-21 RX ORDER — ONDANSETRON 4 MG/1
4 TABLET, ORALLY DISINTEGRATING ORAL
Qty: 30 TAB | Refills: 1 | OUTPATIENT
Start: 2019-07-21 | End: 2020-01-29

## 2019-07-21 RX ADMIN — SODIUM CHLORIDE 125 ML/HR: 900 INJECTION, SOLUTION INTRAVENOUS at 07:39

## 2019-07-21 RX ADMIN — LORAZEPAM 0.5 MG: 2 INJECTION INTRAMUSCULAR; INTRAVENOUS at 00:13

## 2019-07-21 RX ADMIN — PANTOPRAZOLE SODIUM 40 MG: 40 INJECTION, POWDER, FOR SOLUTION INTRAVENOUS at 07:37

## 2019-07-21 RX ADMIN — Medication 10 ML: at 06:16

## 2019-07-21 RX ADMIN — Medication 1 AMPULE: at 07:37

## 2019-07-21 RX ADMIN — POTASSIUM CHLORIDE 20 MEQ: 1.5 POWDER, FOR SOLUTION ORAL at 12:35

## 2019-07-21 NOTE — DISCHARGE SUMMARY
Discharge Summary     Patient: Farrukh Rice MRN: [id number]  SSN: [id number]    YOB: 1975  Age: 45 y.o.   Sex: male       Admit Date: 7/17/2019    Discharge Date: 7/21/2019      Admission Diagnoses: Hematemesis [K92.0]  Hematemesis [K92.0]    Discharge Diagnoses:   Problem List as of 7/21/2019 Date Reviewed: 3/1/2019          Codes Class Noted - Resolved    RESOLVED: Nausea and vomiting ICD-10-CM: R11.2  ICD-9-CM: 787.01  3/5/2013 - 3/13/2018        * (Principal) Hematemesis ICD-10-CM: K92.0  ICD-9-CM: 578.0  6/26/2018 - Present        Erosive esophagitis ICD-10-CM: K22.10  ICD-9-CM: 530.19  3/11/2018 - Present        Polysubstance abuse ([facility] [address] (Chronic) ICD-10-CM: F19.10  ICD-9-CM: 305.90  7/11/2016 - Present        Tetrahydrocannabinol (THC) use disorder, moderate, dependence (HCC) (Chronic) ICD-10-CM: F12.20  ICD-9-CM: 304.30  7/11/2016 - Present        Microcytosis (Chronic) ICD-10-CM: R71.8  ICD-9-CM: 790.09  8/12/2014 - Present        Anxiety (Chronic) ICD-10-CM: F41.9  ICD-9-CM: 300.00  4/30/2014 - Present        Tobacco abuse (Chronic) ICD-10-CM: Z72.0  ICD-9-CM: 305.1  4/30/2014 - Present        H/O hiatal hernia (Chronic) ICD-10-CM: Z87.19  ICD-9-CM: V12.79  4/30/2014 - Present        Esophageal reflux (Chronic) ICD-10-CM: K21.9  ICD-9-CM: 530.81  4/30/2014 - Present        PUD (peptic ulcer disease) (Chronic) ICD-10-CM: K27.9  ICD-9-CM: 533.90  4/30/2014 - Present        Sickle cell trait (HCC) (Chronic) ICD-10-CM: D57.3  ICD-9-CM: 282.5  3/5/2013 - Present        Gastroparesis (Chronic) ICD-10-CM: K31.84  ICD-9-CM: 536.3  3/17/2011 - Present        RESOLVED: Nausea and vomiting ICD-10-CM: R11.2  ICD-9-CM: 787.01  12/13/2018 - 3/1/2019        RESOLVED: Acute gastroenteritis ICD-10-CM: K52.9  ICD-9-CM: 558.9  6/26/2018 - 7/17/2019        RESOLVED: Uncontrolled hypertension ICD-10-CM: I10  ICD-9-CM: 401.9  6/26/2018 - 3/1/2019        RESOLVED: Intractable nausea and vomiting ICD-10-CM: R11.2  ICD-9-CM: 536.2  6/24/2018 - 7/17/2019        RESOLVED: Elevated BP without diagnosis of hypertension ICD-10-CM: R03.0  ICD-9-CM: 796.2  6/24/2018 - 3/1/2019        RESOLVED: Gastrointestinal hemorrhage with hematemesis ICD-10-CM: K92.0  ICD-9-CM: 578.0  3/13/2018 - 7/17/2019        RESOLVED: Hematemesis ICD-10-CM: K92.0  ICD-9-CM: 578.0  11/28/2016 - 3/13/2018        RESOLVED: Intractable nausea and vomiting ICD-10-CM: R11.2  ICD-9-CM: 536.2  11/28/2016 - 3/13/2018        RESOLVED: Lactic acidosis ICD-10-CM: T20.4  ICD-9-CM: 276.2  11/28/2016 - 3/13/2018        RESOLVED: Upper GI bleed ICD-10-CM: I90.5  ICD-9-CM: 578.9  9/17/2016 - 3/13/2018        RESOLVED: Intractable cyclical vomiting with nausea ICD-10-CM: G43. A1  ICD-9-CM: 536.2  7/11/2016 - 3/13/2018        RESOLVED: Cannabinoid hyperemesis syndrome ([facility] ICD-10-CM: C81.558  ICD-9-CM: 536.2, 305.20  7/11/2016 - 3/13/2018        RESOLVED: Hypomagnesemia ICD-10-CM: E83.42  ICD-9-CM: 275.2  8/14/2014 - 3/13/2018        RESOLVED: Marijuana abuse (Chronic) ICD-10-CM: F12.10  ICD-9-CM: 305.20  8/12/2014 - 7/17/2019        RESOLVED: BETTY (acute kidney injury) ([facility] ICD-10-CM: N17.9  ICD-9-CM: 584.9  8/12/2014 - 3/13/2018        RESOLVED: Acute blood loss anemia ICD-10-CM: D62  ICD-9-CM: 285.1  5/1/2014 - 3/13/2018        RESOLVED: Acute upper GI bleed (Chronic) ICD-10-CM: K92.2  ICD-9-CM: 578.9  4/30/2014 - 3/13/2018        RESOLVED: Hypokalemia ICD-10-CM: E87.6  ICD-9-CM: 276.8  7/30/2013 - 3/13/2018        RESOLVED: N&V (nausea and vomiting) ICD-10-CM: R11.2  ICD-9-CM: 787.01  8/12/2012 - 8/15/2012        RESOLVED: Abdominal pain ICD-10-CM: R10.9  ICD-9-CM: 789.00  8/12/2012 - 8/15/2012        RESOLVED: Gastroparesis (Chronic) ICD-10-CM: K31.84  ICD-9-CM: 536.3  8/12/2012 - 3/13/2018        RESOLVED: Nausea ICD-10-CM: R11.0  ICD-9-CM: 787.02  4/12/2012 - 8/15/2012        RESOLVED: Clostridium difficile colitis ICD-10-CM: V52.39  ICD-9-CM: 008. 39 3/20/2011 - 8/15/2012        RESOLVED: Epigastric pain ICD-10-CM: R10.13  ICD-9-CM: 789.06  3/18/2011 - 8/15/2012        RESOLVED: Intractable vomiting ICD-10-CM: R11.10  ICD-9-CM: 536.2  3/17/2011 - 8/15/2012               Discharge Condition: Good    Hospital Course: This is a patient with a previous history of cyclic vomiting syndrome related to [facility] use. Admitted on 7/17  with N/V and hematemesis. Admitted to the medical floor and kept on IV fluids, IV PPIs and NPO. Seen by GI. Had EGD on 7/18. Alexandra - Lynch tear reported with NO evidence of active bleeding. He remained very symptomatic with persistent nausea, hiccups, HTN and tachycardia for 48 hours after the EGD but received symptomatic treatment and eventually improved. His diet was advanced from clear liquids to GI soft diet which was tolerated. He had hypokalemia during his admission which was replaced with IV and oral potassium. He was discharged with plan to follow up with GI and a PCP. PE:    GENERAL: alert, appears stated age  EYE: conjunctivae/corneas clear. LYMPHATIC: Cervical, supraclavicular, and axillary nodes normal.   THROAT & NECK: normal and no erythema or exudates noted. LUNG: clear to auscultation bilaterally  HEART: regular rate and rhythm, S1, S2 normal, no murmur, click, rub or gallop  ABDOMEN: NO epigastric tenderness   EXTREMITIES:  extremities normal, atraumatic, no cyanosis or edema  SKIN: Normal.  NEUROLOGIC: No focal deficits  PSYCHIATRIC: non focal    Consults: None    Significant Diagnostic Studies: see hospital course     Disposition: home    Discharge Medications:   Discharge Medication List as of 7/21/2019 12:41 PM      START taking these medications    Details   potassium chloride (KLOR-CON) 20 mEq pack Take 1 Packet by mouth two (2) times daily (with meals) for 7 days. , Print, Disp-14 Packet, R-0         CONTINUE these medications which have CHANGED    Details   ondansetron (ZOFRAN ODT) 4 mg disintegrating tablet Take 1 Tab by mouth every eight (8) hours as needed for Nausea. , Print, Disp-30 Tab, R-1      pantoprazole (PROTONIX) 40 mg tablet Take 1 Tab by mouth daily. , Print, Disp-30 Tab, R-3      metoclopramide HCl (REGLAN) 10 mg tablet Take 1 Tab by mouth three (3) times daily as needed for Nausea (USE IT FOR NAUSEA / VOMITING NOT RESPONDING TO Kris Limb) for up to 10 days. , Print, Disp-30 Tab, R-0         STOP taking these medications       hyoscyamine SL (LEVSIN/SL) 0.125 mg SL tablet Comments:   Reason for Stopping:         haloperidol (HALDOL) 5 mg tablet Comments:   Reason for Stopping:                   Activity: Activity as tolerated  Diet: Low fat, Low cholesterol  Wound Care: None needed    Follow-up Appointments   Procedures    FOLLOW UP VISIT Appointment in: Two Weeks gi     gi     Standing Status:   Standing     Number of Occurrences:   1     Order Specific Question:   Appointment in     Answer: Two Weeks    FOLLOW UP VISIT Appointment in: One Week PCP     PCP     Standing Status:   Standing     Number of Occurrences:   1     Standing Expiration Date:   7/22/2019     Order Specific Question:   Appointment in     Answer:    One Week       Signed By: Lawson Rothman MD     July 21, 2019

## 2019-07-21 NOTE — PROGRESS NOTES
Another dark firm bowel movement this morning. Patient denies pain or nausea/vomiting. States he hopes to discharge home today. Tolerating diet well.

## 2019-07-21 NOTE — PROGRESS NOTES
Hourly rounds performed this shift, needs met at this time. Bed in low/locked position and call light within reach. Will continue to monitor and give bedside report to oncoming nurse. Patient had two BM this shift and no episodes of nausea or vomiting and not complaints of pain.

## 2019-07-21 NOTE — DISCHARGE INSTRUCTIONS
DISCHARGE SUMMARY from Nurse    PATIENT INSTRUCTIONS:    After general anesthesia or intravenous sedation, for 24 hours or while taking prescription Narcotics:  · Limit your activities  · Do not drive and operate hazardous machinery  · Do not make important personal or business decisions  · Do  not drink alcoholic beverages  · If you have not urinated within 8 hours after discharge, please contact your surgeon on call. Report the following to your surgeon:  · Excessive pain, swelling, redness or odor of or around the surgical area  · Temperature over 100.5  · Nausea and vomiting lasting longer than 4 hours or if unable to take medications  · Any signs of decreased circulation or nerve impairment to extremity: change in color, persistent  numbness, tingling, coldness or increase pain  · Any questions    What to do at Home:  Recommended activity: Activity as tolerated, per MD instructions    If you experience any of the following symptoms fever > 100.5, nausea, vomiting, abdominal pain to MD rectal bleeding, chest pain and/or shortness of breath to ER please follow up with MD.    *  Please give a list of your current medications to your Primary Care Provider. *  Please update this list whenever your medications are discontinued, doses are      changed, or new medications (including over-the-counter products) are added. *  Please carry medication information at all times in case of emergency situations. These are general instructions for a healthy lifestyle:    No smoking/ No tobacco products/ Avoid exposure to second hand smoke  Surgeon General's Warning:  Quitting smoking now greatly reduces serious risk to your health.     Obesity, smoking, and sedentary lifestyle greatly increases your risk for illness    A healthy diet, regular physical exercise & weight monitoring are important for maintaining a healthy lifestyle    You may be retaining fluid if you have a history of heart failure or if you experience any of the following symptoms:  Weight gain of 3 pounds or more overnight or 5 pounds in a week, increased swelling in our hands or feet or shortness of breath while lying flat in bed. Please call your doctor as soon as you notice any of these symptoms; do not wait until your next office visit. The discharge information has been reviewed with the patient. The patient verbalized understanding. Discharge medications reviewed with the patient and appropriate educational materials and side effects teaching were provided. ___________________________________________________________________________________________________________________________________        Patient Education        Upper Gastrointestinal Bleeding: Care Instructions  Your Care Instructions    The digestive or gastrointestinal tract goes from the mouth to the anus. It is often called the GI tract. Bleeding in the upper GI tract can happen anywhere from the esophagus to the first part of the small intestine. Sometimes it's caused by an ulcer in your stomach. Or it may be caused by blood vessels in your esophagus. Your esophagus is the tube that carries food from your throat to your stomach. Light bleeding may not cause any symptoms at first. But if you continue to bleed for a while, you may feel very weak or tired. Sudden, heavy bleeding means you need to see a doctor right away. This kind of bleeding can be very dangerous. But it can usually be cured or controlled. The doctor may do some tests to find the cause of your bleeding. Follow-up care is a key part of your treatment and safety. Be sure to make and go to all appointments, and call your doctor if you are having problems. It's also a good idea to know your test results and keep a list of the medicines you take. How can you care for yourself at home? · Be safe with medicines. Take your medicines exactly as prescribed.  Call your doctor if you think you are having a problem with your medicine. You will get more details on the specific medicines your doctor prescribes. · Do not take aspirin or other anti-inflammatory medicines, such as naproxen (Aleve) or ibuprofen (Advil, Motrin), without talking to your doctor first. Ask your doctor if it is okay to use acetaminophen (Tylenol). · Do not drink alcohol. · The bleeding may make you lose iron. So it's important to eat foods that have a lot of iron. These include red meat, shellfish, poultry, and eggs. They also include beans, raisins, whole-grain breads, and leafy green vegetables. If you want help planning meals, you can meet with a dietitian. When should you call for help? Call 911 anytime you think you may need emergency care. For example, call if:    · You have sudden, severe belly pain.     · You vomit blood or what looks like coffee grounds.     · You passed out (lost consciousness).     · Your stools are maroon or very bloody.    Call your doctor now or seek immediate medical care if:    · You are dizzy or lightheaded, or you feel like you may faint.     · Your stools are black and look like tar.     · You have belly pain.     · You vomit or have nausea.     · You have trouble swallowing, or it hurts when you swallow.    Watch closely for changes in your health, and be sure to contact your doctor if you do not get better as expected. Where can you learn more? Go to http://ora-itzel.info/. Enter B892 in the search box to learn more about \"Upper Gastrointestinal Bleeding: Care Instructions. \"  Current as of: September 23, 2018  Content Version: 12.1  © 5875-5931 [employer]. Care instructions adapted under license by [employer] (which disclaims liability or warranty for this information).  If you have questions about a medical condition or this instruction, always ask your healthcare professional. [address] any warranty or liability for your use of this information.

## 2019-07-21 NOTE — PROGRESS NOTES
Pt was discharged home today. Referral requested for [facility] PCP for follow up care. Emailed request.  Pt will be contacted by phone next week in follow up about an appt. Care Management Interventions  PCP Verified by CM: Yes  Mode of Transport at Discharge:  Other (see comment)  Transition of Care Consult (CM Consult): Discharge Planning  Discharge Durable Medical Equipment: No  Physical Therapy Consult: No  Occupational Therapy Consult: No  Current Support Network: Own Home  Confirm Follow Up Transport: Family  Plan discussed with Pt/Family/Caregiver: Yes  Freedom of Choice Offered: Yes  Discharge Location  Discharge Placement: Home

## 2019-10-11 ENCOUNTER — HOSPITAL ENCOUNTER (EMERGENCY)
Age: 44
Discharge: HOME | End: 2019-10-11
Attending: EMERGENCY MEDICINE
Payer: COMMERCIAL

## 2019-10-11 VITALS
RESPIRATION RATE: 25 BRPM | BODY MASS INDEX: 26.68 KG/M2 | HEIGHT: 62 IN | OXYGEN SATURATION: 94 % | WEIGHT: 145 LBS | DIASTOLIC BLOOD PRESSURE: 99 MMHG | TEMPERATURE: 98.2 F | SYSTOLIC BLOOD PRESSURE: 167 MMHG | HEART RATE: 114 BPM

## 2019-10-11 DIAGNOSIS — R11.15 CYCLICAL VOMITING WITH NAUSEA: Primary | ICD-10-CM

## 2019-10-11 LAB
ALBUMIN SERPL-MCNC: 4.9 G/DL (ref 3.5–5)
ALBUMIN/GLOB SERPL: 1.3 {RATIO} (ref 1.2–3.5)
ALP SERPL-CCNC: 121 U/L (ref 50–136)
ALT SERPL-CCNC: 30 U/L (ref 12–65)
AMPHET UR QL SCN: NEGATIVE
ANION GAP SERPL CALC-SCNC: 13 MMOL/L (ref 7–16)
AST SERPL-CCNC: 25 U/L (ref 15–37)
ATRIAL RATE: 116 BPM
BACTERIA URNS QL MICRO: 0 /HPF
BARBITURATES UR QL SCN: NEGATIVE
BASOPHILS # BLD: 0 K/UL (ref 0–0.2)
BASOPHILS NFR BLD: 0 % (ref 0–2)
BENZODIAZ UR QL: NEGATIVE
BILIRUB SERPL-MCNC: 0.7 MG/DL (ref 0.2–1.1)
BUN SERPL-MCNC: 19 MG/DL (ref 6–23)
CALCIUM SERPL-MCNC: 10.3 MG/DL (ref 8.3–10.4)
CALCULATED P AXIS, ECG09: 77 DEGREES
CALCULATED R AXIS, ECG10: 76 DEGREES
CALCULATED T AXIS, ECG11: 50 DEGREES
CANNABINOIDS UR QL SCN: POSITIVE
CASTS URNS QL MICRO: 0 /LPF
CHLORIDE SERPL-SCNC: 110 MMOL/L (ref 98–107)
CO2 SERPL-SCNC: 17 MMOL/L (ref 21–32)
COCAINE UR QL SCN: NEGATIVE
CREAT SERPL-MCNC: 1.28 MG/DL (ref 0.8–1.5)
CRYSTALS URNS QL MICRO: 0 /LPF
DIAGNOSIS, 93000: NORMAL
DIFFERENTIAL METHOD BLD: ABNORMAL
EOSINOPHIL # BLD: 0 K/UL (ref 0–0.8)
EOSINOPHIL NFR BLD: 0 % (ref 0.5–7.8)
EPI CELLS #/AREA URNS HPF: 0 /HPF
ERYTHROCYTE [DISTWIDTH] IN BLOOD BY AUTOMATED COUNT: 12.9 % (ref 11.9–14.6)
GLOBULIN SER CALC-MCNC: 3.8 G/DL (ref 2.3–3.5)
GLUCOSE SERPL-MCNC: 151 MG/DL (ref 65–100)
HCT VFR BLD AUTO: 42 % (ref 41.1–50.3)
HGB BLD-MCNC: 15.6 G/DL (ref 13.6–17.2)
IMM GRANULOCYTES # BLD AUTO: 0.1 K/UL (ref 0–0.5)
IMM GRANULOCYTES NFR BLD AUTO: 1 % (ref 0–5)
LIPASE SERPL-CCNC: 97 U/L (ref 73–393)
LYMPHOCYTES # BLD: 1.1 K/UL (ref 0.5–4.6)
LYMPHOCYTES NFR BLD: 7 % (ref 13–44)
MAGNESIUM SERPL-MCNC: 1.7 MG/DL (ref 1.8–2.4)
MCH RBC QN AUTO: 29.5 PG (ref 26.1–32.9)
MCHC RBC AUTO-ENTMCNC: 37.1 G/DL (ref 31.4–35)
MCV RBC AUTO: 79.4 FL (ref 79.6–97.8)
METHADONE UR QL: NEGATIVE
MONOCYTES # BLD: 1.2 K/UL (ref 0.1–1.3)
MONOCYTES NFR BLD: 8 % (ref 4–12)
MUCOUS THREADS URNS QL MICRO: 0 /LPF
NEUTS SEG # BLD: 12.5 K/UL (ref 1.7–8.2)
NEUTS SEG NFR BLD: 84 % (ref 43–78)
NRBC # BLD: 0 K/UL (ref 0–0.2)
OPIATES UR QL: NEGATIVE
OTHER OBSERVATIONS,UCOM: NORMAL
P-R INTERVAL, ECG05: 164 MS
PCP UR QL: NEGATIVE
PLATELET # BLD AUTO: 380 K/UL (ref 150–450)
PMV BLD AUTO: 9.2 FL (ref 9.4–12.3)
POTASSIUM SERPL-SCNC: 3.1 MMOL/L (ref 3.5–5.1)
PROT SERPL-MCNC: 8.7 G/DL (ref 6.3–8.2)
Q-T INTERVAL, ECG07: 332 MS
QRS DURATION, ECG06: 70 MS
QTC CALCULATION (BEZET), ECG08: 461 MS
RBC # BLD AUTO: 5.29 M/UL (ref 4.23–5.6)
RBC #/AREA URNS HPF: NORMAL /HPF
SODIUM SERPL-SCNC: 140 MMOL/L (ref 136–145)
VENTRICULAR RATE, ECG03: 116 BPM
WBC # BLD AUTO: 14.8 K/UL (ref 4.3–11.1)
WBC URNS QL MICRO: NORMAL /HPF

## 2019-10-11 PROCEDURE — 81015 MICROSCOPIC EXAM OF URINE: CPT

## 2019-10-11 PROCEDURE — 74011000250 HC RX REV CODE- 250: Performed by: EMERGENCY MEDICINE

## 2019-10-11 PROCEDURE — 83690 ASSAY OF LIPASE: CPT

## 2019-10-11 PROCEDURE — 80307 DRUG TEST PRSMV CHEM ANLYZR: CPT

## 2019-10-11 PROCEDURE — 85025 COMPLETE CBC W/AUTO DIFF WBC: CPT

## 2019-10-11 PROCEDURE — 83735 ASSAY OF MAGNESIUM: CPT

## 2019-10-11 PROCEDURE — 96375 TX/PRO/DX INJ NEW DRUG ADDON: CPT | Performed by: EMERGENCY MEDICINE

## 2019-10-11 PROCEDURE — 93005 ELECTROCARDIOGRAM TRACING: CPT | Performed by: EMERGENCY MEDICINE

## 2019-10-11 PROCEDURE — 96365 THER/PROPH/DIAG IV INF INIT: CPT | Performed by: EMERGENCY MEDICINE

## 2019-10-11 PROCEDURE — 80053 COMPREHEN METABOLIC PANEL: CPT

## 2019-10-11 PROCEDURE — 74011250637 HC RX REV CODE- 250/637: Performed by: EMERGENCY MEDICINE

## 2019-10-11 PROCEDURE — 96361 HYDRATE IV INFUSION ADD-ON: CPT | Performed by: EMERGENCY MEDICINE

## 2019-10-11 PROCEDURE — 74011250636 HC RX REV CODE- 250/636: Performed by: EMERGENCY MEDICINE

## 2019-10-11 PROCEDURE — 81003 URINALYSIS AUTO W/O SCOPE: CPT | Performed by: EMERGENCY MEDICINE

## 2019-10-11 PROCEDURE — 99285 EMERGENCY DEPT VISIT HI MDM: CPT | Performed by: EMERGENCY MEDICINE

## 2019-10-11 RX ORDER — DIPHENHYDRAMINE HYDROCHLORIDE 50 MG/ML
50 INJECTION, SOLUTION INTRAMUSCULAR; INTRAVENOUS
Status: COMPLETED | OUTPATIENT
Start: 2019-10-11 | End: 2019-10-11

## 2019-10-11 RX ORDER — MAGNESIUM SULFATE HEPTAHYDRATE 40 MG/ML
2 INJECTION, SOLUTION INTRAVENOUS
Status: COMPLETED | OUTPATIENT
Start: 2019-10-11 | End: 2019-10-11

## 2019-10-11 RX ORDER — METOCLOPRAMIDE HYDROCHLORIDE 5 MG/ML
10 INJECTION INTRAMUSCULAR; INTRAVENOUS
Status: COMPLETED | OUTPATIENT
Start: 2019-10-11 | End: 2019-10-11

## 2019-10-11 RX ORDER — POTASSIUM CHLORIDE 20 MEQ/1
20 TABLET, EXTENDED RELEASE ORAL DAILY
Qty: 5 TAB | Refills: 0 | Status: ON HOLD | OUTPATIENT
Start: 2019-10-11 | End: 2020-08-03 | Stop reason: SDUPTHER

## 2019-10-11 RX ORDER — POTASSIUM CHLORIDE 20 MEQ/1
20 TABLET, EXTENDED RELEASE ORAL
Status: COMPLETED | OUTPATIENT
Start: 2019-10-11 | End: 2019-10-11

## 2019-10-11 RX ADMIN — POTASSIUM CHLORIDE 20 MEQ: 20 TABLET, EXTENDED RELEASE ORAL at 09:59

## 2019-10-11 RX ADMIN — MAGNESIUM SULFATE HEPTAHYDRATE 2 G: 40 INJECTION, SOLUTION INTRAVENOUS at 09:59

## 2019-10-11 RX ADMIN — FAMOTIDINE 20 MG: 10 INJECTION INTRAVENOUS at 09:58

## 2019-10-11 RX ADMIN — SODIUM CHLORIDE 1000 ML: 900 INJECTION, SOLUTION INTRAVENOUS at 09:59

## 2019-10-11 RX ADMIN — DIPHENHYDRAMINE HYDROCHLORIDE 50 MG: 50 INJECTION, SOLUTION INTRAMUSCULAR; INTRAVENOUS at 07:35

## 2019-10-11 RX ADMIN — SODIUM CHLORIDE 1000 ML: 900 INJECTION, SOLUTION INTRAVENOUS at 07:35

## 2019-10-11 RX ADMIN — METOCLOPRAMIDE 10 MG: 5 INJECTION, SOLUTION INTRAMUSCULAR; INTRAVENOUS at 07:35

## 2019-10-11 NOTE — ED NOTES
Pt given instructions to provide urine specimen at this time and states will try to provide sample as soon as possible.

## 2019-10-11 NOTE — ED PROVIDER NOTES
[address] Marcela Feldman is a 45 y.o. male seen on 10/11/2019 at 7:39 AM in the [facility] EMERGENCY DEPT in room ERA/ A. Chief Complaint   Patient presents with    Abdominal Pain    Vomiting     HPI: 33-year-old male with a past medical history significant for cyclical vomiting syndrome presenting to the emergency department with nausea and vomiting. His symptoms began to flareup yesterday morning. They have been persistent through the night. He had innumerable episodes of vomiting. He also is complaining of pins-and-needles feeling in bilateral hands. His wife who is present with him in the emergency department states that he is been hyperventilating. He has been taking hot showers at home to try to relieve his symptoms. He has Zofran at home which she is used without any benefit. He has also tried to take Reglan but vomited it back up. There is been no blood in his vomitus. He denies any significant change in his bowel habits. He also notes some abdominal cramping, but this seems to improve after he vomits. Historian: patient    REVIEW OF SYSTEMS     Review of Systems   Constitutional: Negative for fatigue and fever. HENT: Negative. Eyes: Negative. Respiratory: Negative for cough, chest tightness, shortness of breath and wheezing. Cardiovascular: Negative for chest pain. Gastrointestinal: Positive for nausea and vomiting. Negative for abdominal distention, abdominal pain and diarrhea. Genitourinary: Negative for dysuria, flank pain, frequency, genital sores and urgency. Musculoskeletal: Negative. Skin: Negative for rash. Neurological: Negative for dizziness, syncope and headaches. All other systems reviewed and are negative.       PAST MEDICAL HISTORY     Past Medical History:   Diagnosis Date    BETTY (acute kidney injury) ([facility] [address] 8/12/2014    Clostridium difficile colitis 3/20/2011    Gastroparesis 2005    emptying study normal -2006    GERD (gastroesophageal reflux disease)     HIATAL HERNIA SINCE 1999    PUD (peptic ulcer disease) ALL DX IN 2008    ESOPHAGITIS, + H PYLORI    Uncontrolled hypertension 6/26/2018     Past Surgical History:   Procedure Laterality Date    COLONOSCOPY  4/08    ESOPHAGITIS, COLONIC ULCER X1    EGD  4/08    H. HERNIA, ULCER X2, H PYLORI,    EGD  2011    h pylori -neg    HX WISDOM TEETH EXTRACTION  2/10/11     Social History     Socioeconomic History    Marital status:      Spouse name: Not on file    Number of children: Not on file    Years of education: Not on file    Highest education level: Not on file   Tobacco Use    Smoking status: Current Every Day Smoker     Packs/day: 0.25     Years: 2.00     Pack years: 0.50     Types: Cigarettes    Smokeless tobacco: Never Used   Substance and Sexual Activity    Alcohol use: No    Drug use: Yes     Types: Marijuana    Sexual activity: Yes     Partners: Female   Social History Narrative    3/17/11:  PATIENT LIVES WITH GIRLFRIEND, ALTON (CELL: [phone]), HER 3YEAR OLD DAUGHTER AND THEIR 3YEAR OLD SON. ALTON IS CURRENTLY 13 WEEKS PREGNANT. PATIENT'S JOB AT [employer] WAS DOWNSIZED ABOUT A YEAR AGO, AND HE HAS BEEN A STAY HOME DAD SINCE. ALTON WORKS IN HOUSEKEEPING POSITION. Prior to Admission Medications   Prescriptions Last Dose Informant Patient Reported? Taking?   ondansetron (ZOFRAN ODT) 4 mg disintegrating tablet   No No   Sig: Take 1 Tab by mouth every eight (8) hours as needed for Nausea. pantoprazole (PROTONIX) 40 mg tablet   No No   Sig: Take 1 Tab by mouth daily.       Facility-Administered Medications: None     Allergies   Allergen Reactions    Amoxicillin Nausea Only    Aspirin Other (comments)     ulcers    Hydrocodone Rash     Tolerates hydromorphone, morphine, tramadol    Ibuprofen Other (comments)     Hx of ulcers    Pcn [Penicillins] Rash    Phenergan [Promethazine] Nausea and Vomiting and Other (comments)        PHYSICAL EXAM       Vitals:    10/11/19 0703   BP: (!) 165/119   Pulse: (!) 125   Resp: 22   Temp: 98.2 °F (36.8 °C)   SpO2: 97%    Vital signs were reviewed. Physical Exam   Constitutional: He is oriented to person, place, and time. He appears well-developed and well-nourished. He appears ill. He appears distressed. HENT:   Head: Normocephalic and atraumatic. Eyes: Pupils are equal, round, and reactive to light. EOM are normal.   Neck: Normal range of motion. Neck supple. Cardiovascular: Normal rate, regular rhythm, normal heart sounds and intact distal pulses. Exam reveals no gallop and no friction rub. No murmur heard. Pulmonary/Chest: Effort normal and breath sounds normal. No respiratory distress. He has no wheezes. Abdominal: Soft. Bowel sounds are normal. He exhibits no distension and no mass. There is no tenderness. There is no rebound and no guarding. Musculoskeletal: Normal range of motion. He exhibits no edema, tenderness or deformity. Neurological: He is alert and oriented to person, place, and time. No sensory deficit. Coordination normal.   No focal deficits   Skin: Skin is warm. No rash noted. He is not diaphoretic. No erythema. Psychiatric: He has a normal mood and affect. His behavior is normal. Thought content normal.   Vitals reviewed. MEDICAL DECISION MAKING     MDM  Number of Diagnoses or Management Options     Amount and/or Complexity of Data Reviewed  Clinical lab tests: ordered and reviewed  Review and summarize past medical records: yes    Risk of Complications, Morbidity, and/or Mortality  Presenting problems: high  Diagnostic procedures: high  Management options: high      Procedures    ED Course:    11:03 AM  Show mild hypomagnesemia and mild hypokalemia. Magnesium was repleted in the emergency department. Patient has received 20 of oral potassium without any problems with nausea and vomiting.   Will give additional oral repletion. His symptoms have been improved after Benadryl and Reglan. HR improved. He is resting comfortably in bed. He has a mild leukocytosis, the significance of which is unclear though more likely related to multiple episodes of vomiting. I suspect that marijuana use is likely contributing to his cyclical vomiting syndrome and this could represent cannabis hyperemesis syndrome. Disposition: Discharge home  Diagnosis: Vomiting with nausea  ____________________________________________________________________  A portion of this note was generated using voice recognition dictation software. While the note has been reviewed for accuracy, please note certain words and phrases may not be transcribed as intended and some grammatical and/or typographical errors may be present.

## 2019-10-11 NOTE — ED TRIAGE NOTES
Pt ambulatory to triage with c/o n/v and abd pain x 1 day. Pt has a hx of [facility]. Pt wife states pt took zofran about midnight. Pt took protonix but wife states vomited after. Pt tachypneic. Pt instructed on slow, deep breathing but will not cooperate.      Attempted IV and blood draw without success in triage

## 2019-10-11 NOTE — ED NOTES
I have reviewed discharge instructions with the patient. The patient verbalized understanding. Patient left ED via Discharge Method: ambulatory to Home with girlfriend. Opportunity for questions and clarification provided. Patient given 0 scripts. To continue your aftercare when you leave the hospital, you may receive an automated call from our care team to check in on how you are doing. This is a free service and part of our promise to provide the best care and service to meet your aftercare needs.  If you have questions, or wish to unsubscribe from this service please call [phone]. Thank you for Choosing our [facility] Emergency Department.

## 2019-10-12 ENCOUNTER — HOSPITAL ENCOUNTER (EMERGENCY)
Age: 44
Discharge: HOME | End: 2019-10-12
Payer: COMMERCIAL

## 2019-10-12 VITALS
HEIGHT: 62 IN | RESPIRATION RATE: 23 BRPM | BODY MASS INDEX: 26.68 KG/M2 | DIASTOLIC BLOOD PRESSURE: 99 MMHG | SYSTOLIC BLOOD PRESSURE: 157 MMHG | HEART RATE: 100 BPM | TEMPERATURE: 98 F | WEIGHT: 145 LBS | OXYGEN SATURATION: 94 %

## 2019-10-12 DIAGNOSIS — F12.188 CANNABIS HYPEREMESIS SYNDROME CONCURRENT WITH AND DUE TO CANNABIS ABUSE (HCC): Primary | ICD-10-CM

## 2019-10-12 LAB
ALBUMIN SERPL-MCNC: 3.4 G/DL (ref 3.5–5)
ALBUMIN/GLOB SERPL: 1 {RATIO} (ref 1.2–3.5)
ALP SERPL-CCNC: 87 U/L (ref 50–136)
ALT SERPL-CCNC: 27 U/L (ref 12–65)
AMPHET UR QL SCN: NEGATIVE
ANION GAP SERPL CALC-SCNC: 12 MMOL/L (ref 7–16)
AST SERPL-CCNC: 53 U/L (ref 15–37)
ATRIAL RATE: 114 BPM
BARBITURATES UR QL SCN: NEGATIVE
BASOPHILS # BLD: 0 K/UL (ref 0–0.2)
BASOPHILS NFR BLD: 0 % (ref 0–2)
BENZODIAZ UR QL: NEGATIVE
BILIRUB SERPL-MCNC: 0.9 MG/DL (ref 0.2–1.1)
BUN SERPL-MCNC: 20 MG/DL (ref 6–23)
CALCIUM SERPL-MCNC: 7.7 MG/DL (ref 8.3–10.4)
CALCULATED P AXIS, ECG09: 66 DEGREES
CALCULATED R AXIS, ECG10: 73 DEGREES
CALCULATED T AXIS, ECG11: 50 DEGREES
CANNABINOIDS UR QL SCN: POSITIVE
CHLORIDE SERPL-SCNC: 117 MMOL/L (ref 98–107)
CO2 SERPL-SCNC: 13 MMOL/L (ref 21–32)
COCAINE UR QL SCN: NEGATIVE
CREAT SERPL-MCNC: 1.05 MG/DL (ref 0.8–1.5)
DIAGNOSIS, 93000: NORMAL
DIFFERENTIAL METHOD BLD: ABNORMAL
EOSINOPHIL # BLD: 0 K/UL (ref 0–0.8)
EOSINOPHIL NFR BLD: 0 % (ref 0.5–7.8)
ERYTHROCYTE [DISTWIDTH] IN BLOOD BY AUTOMATED COUNT: 13.2 % (ref 11.9–14.6)
GLOBULIN SER CALC-MCNC: 3.5 G/DL (ref 2.3–3.5)
GLUCOSE SERPL-MCNC: 105 MG/DL (ref 65–100)
HCT VFR BLD AUTO: 43.2 % (ref 41.1–50.3)
HGB BLD-MCNC: 16 G/DL (ref 13.6–17.2)
IMM GRANULOCYTES # BLD AUTO: 0.1 K/UL (ref 0–0.5)
IMM GRANULOCYTES NFR BLD AUTO: 1 % (ref 0–5)
LYMPHOCYTES # BLD: 2.3 K/UL (ref 0.5–4.6)
LYMPHOCYTES NFR BLD: 14 % (ref 13–44)
MCH RBC QN AUTO: 29.5 PG (ref 26.1–32.9)
MCHC RBC AUTO-ENTMCNC: 37 G/DL (ref 31.4–35)
MCV RBC AUTO: 79.6 FL (ref 79.6–97.8)
METHADONE UR QL: NEGATIVE
MONOCYTES # BLD: 1.9 K/UL (ref 0.1–1.3)
MONOCYTES NFR BLD: 12 % (ref 4–12)
NEUTS SEG # BLD: 12.3 K/UL (ref 1.7–8.2)
NEUTS SEG NFR BLD: 74 % (ref 43–78)
NRBC # BLD: 0 K/UL (ref 0–0.2)
OPIATES UR QL: NEGATIVE
P-R INTERVAL, ECG05: 150 MS
PCP UR QL: NEGATIVE
PLATELET # BLD AUTO: 394 K/UL (ref 150–450)
PMV BLD AUTO: 9.3 FL (ref 9.4–12.3)
POTASSIUM SERPL-SCNC: 3.3 MMOL/L (ref 3.5–5.1)
PROT SERPL-MCNC: 6.9 G/DL (ref 6.3–8.2)
Q-T INTERVAL, ECG07: 312 MS
QRS DURATION, ECG06: 66 MS
QTC CALCULATION (BEZET), ECG08: 430 MS
RBC # BLD AUTO: 5.43 M/UL (ref 4.23–5.6)
SODIUM SERPL-SCNC: 142 MMOL/L (ref 136–145)
VENTRICULAR RATE, ECG03: 114 BPM
WBC # BLD AUTO: 16.7 K/UL (ref 4.3–11.1)

## 2019-10-12 PROCEDURE — 74011250636 HC RX REV CODE- 250/636

## 2019-10-12 PROCEDURE — 96375 TX/PRO/DX INJ NEW DRUG ADDON: CPT

## 2019-10-12 PROCEDURE — 96361 HYDRATE IV INFUSION ADD-ON: CPT

## 2019-10-12 PROCEDURE — 93005 ELECTROCARDIOGRAM TRACING: CPT

## 2019-10-12 PROCEDURE — 96374 THER/PROPH/DIAG INJ IV PUSH: CPT

## 2019-10-12 PROCEDURE — 81003 URINALYSIS AUTO W/O SCOPE: CPT

## 2019-10-12 PROCEDURE — 96372 THER/PROPH/DIAG INJ SC/IM: CPT

## 2019-10-12 PROCEDURE — 80307 DRUG TEST PRSMV CHEM ANLYZR: CPT

## 2019-10-12 PROCEDURE — 80053 COMPREHEN METABOLIC PANEL: CPT

## 2019-10-12 PROCEDURE — 99284 EMERGENCY DEPT VISIT MOD MDM: CPT

## 2019-10-12 PROCEDURE — 85025 COMPLETE CBC W/AUTO DIFF WBC: CPT

## 2019-10-12 RX ORDER — SODIUM CHLORIDE 9 MG/ML
1000 INJECTION, SOLUTION INTRAVENOUS ONCE
Status: COMPLETED | OUTPATIENT
Start: 2019-10-12 | End: 2019-10-12

## 2019-10-12 RX ORDER — ONDANSETRON 2 MG/ML
4 INJECTION INTRAMUSCULAR; INTRAVENOUS
Status: COMPLETED | OUTPATIENT
Start: 2019-10-12 | End: 2019-10-12

## 2019-10-12 RX ORDER — ONDANSETRON HYDROCHLORIDE 8 MG/1
8 TABLET, FILM COATED ORAL
Qty: 12 TAB | Refills: 0 | OUTPATIENT
Start: 2019-10-12 | End: 2020-07-31

## 2019-10-12 RX ORDER — CAPSAICIN 0.1 %
CREAM (GRAM) TOPICAL 3 TIMES DAILY
Qty: 50 G | Refills: 0 | Status: SHIPPED | OUTPATIENT
Start: 2019-10-12 | End: 2021-04-13

## 2019-10-12 RX ORDER — HALOPERIDOL 5 MG/ML
10 INJECTION INTRAMUSCULAR
Status: COMPLETED | OUTPATIENT
Start: 2019-10-12 | End: 2019-10-12

## 2019-10-12 RX ORDER — DIPHENHYDRAMINE HYDROCHLORIDE 50 MG/ML
25 INJECTION, SOLUTION INTRAMUSCULAR; INTRAVENOUS
Status: COMPLETED | OUTPATIENT
Start: 2019-10-12 | End: 2019-10-12

## 2019-10-12 RX ORDER — CAPSAICIN 0.1 %
CREAM (GRAM) TOPICAL 3 TIMES DAILY
Qty: 50 G | Refills: 0 | Status: SHIPPED | OUTPATIENT
Start: 2019-10-12 | End: 2019-10-12

## 2019-10-12 RX ADMIN — SODIUM CHLORIDE 1000 ML: 900 INJECTION, SOLUTION INTRAVENOUS at 07:36

## 2019-10-12 RX ADMIN — ONDANSETRON 4 MG: 2 INJECTION INTRAMUSCULAR; INTRAVENOUS at 07:37

## 2019-10-12 RX ADMIN — HALOPERIDOL LACTATE 10 MG: 5 INJECTION, SOLUTION INTRAMUSCULAR at 07:36

## 2019-10-12 RX ADMIN — SODIUM CHLORIDE 1000 ML: 900 INJECTION, SOLUTION INTRAVENOUS at 12:41

## 2019-10-12 RX ADMIN — DIPHENHYDRAMINE HYDROCHLORIDE 25 MG: 50 INJECTION, SOLUTION INTRAMUSCULAR; INTRAVENOUS at 07:36

## 2019-10-12 NOTE — ED PROVIDER NOTES
81-year-old male with nausea vomiting. 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 Patient continues to smoke cannabis. Patient tried to take his Reglan and Zofran at home without success. No fevers or chills no diarrhea. The history is provided by the patient. Vomiting    This is a new problem. The problem has not changed since onset. The emesis has an appearance of stomach contents. There has been no fever. Associated symptoms include abdominal pain. Pertinent negatives include no chills. Risk factors: Cannabis. His pertinent negatives include no DM. Past Medical History:   Diagnosis Date    BETTY (acute kidney injury) ([facility] [address] 8/12/2014    Clostridium difficile colitis 3/20/2011    Gastroparesis 2005    emptying study normal -2006    GERD (gastroesophageal reflux disease)     HIATAL HERNIA SINCE 1999    PUD (peptic ulcer disease) ALL DX IN 2008    ESOPHAGITIS, + H PYLORI    Uncontrolled hypertension 6/26/2018       Past Surgical History:   Procedure Laterality Date    COLONOSCOPY  4/08    ESOPHAGITIS, COLONIC ULCER X1    EGD  4/08    H.  HERNIA, ULCER X2, H PYLORI,    EGD  2011    h pylori -neg    HX WISDOM TEETH EXTRACTION  2/10/11         Family History:   Problem Relation Age of Onset    Other Mother         L+W    Diabetes Maternal Grandmother     Sickle Cell Anemia Father     Heart Disease Paternal Grandfather     Other Sister         ALL L+W    Other Son         L+W       Social History     Socioeconomic History    Marital status:      Spouse name: Not on file    Number of children: Not on file    Years of education: Not on file    Highest education level: Not on file   Occupational History    Not on file   Social Needs    Financial resource strain: Not on file    Food insecurity:     Worry: Not on file     Inability: Not on file    Transportation needs:     Medical: Not on file     Non-medical: Not on file   Tobacco Use    Smoking status: Current Every Day Smoker     Packs/day: 0.25     Years: 2.00     Pack years: 0.50     Types: Cigarettes    Smokeless tobacco: Never Used   Substance and Sexual Activity    Alcohol use: No    Drug use: Yes     Types: Marijuana    Sexual activity: Yes     Partners: Female   Lifestyle    Physical activity:     Days per week: Not on file     Minutes per session: Not on file    Stress: Not on file   Relationships    Social connections:     Talks on phone: Not on file     Gets together: Not on file     Attends Bahai service: Not on file     Active member of club or organization: Not on file     Attends meetings of clubs or organizations: Not on file     Relationship status: Not on file    Intimate partner violence:     Fear of current or ex partner: Not on file     Emotionally abused: Not on file     Physically abused: Not on file     Forced sexual activity: Not on file   Other Topics Concern    Not on file   Social History Narrative    3/17/11:  PATIENT LIVES WITH GIRLFRIEND, Ervin Shah (CELL: [phone]), HER 3YEAR OLD DAUGHTER AND THEIR 3YEAR OLD SON. ALTON IS CURRENTLY 13 WEEKS PREGNANT. PATIENT'S JOB AT [employer] WAS DOWNSIZED ABOUT A YEAR AGO, AND HE HAS BEEN A STAY HOME DAD SINCE. ALTON WORKS IN HOUSEKEEPING POSITION. ALLERGIES: Amoxicillin; Aspirin; Hydrocodone; Ibuprofen; Pcn [penicillins]; and Phenergan [promethazine]    Review of Systems   Constitutional: Negative. Negative for activity change and chills. HENT: Negative. Eyes: Negative. Respiratory: Negative. Cardiovascular: Negative. Gastrointestinal: Positive for abdominal pain and vomiting. Genitourinary: Negative. Musculoskeletal: Negative. Skin: Negative. Neurological: Negative. Psychiatric/Behavioral: Negative. All other systems reviewed and are negative.       Vitals:    10/12/19 0624   BP: 154/67   Pulse: (!) 125   Resp: 24   Temp: 97.9 °F (36.6 °C)   SpO2: 96% Weight: 65.8 kg (145 lb)   Height: 5' 2\" (1.575 m)            Physical Exam   Constitutional: He is oriented to person, place, and time. He appears well-developed and well-nourished. No distress. HENT:   Head: Normocephalic and atraumatic. Right Ear: External ear normal.   Left Ear: External ear normal.   Nose: Nose normal.   Mouth/Throat: Oropharynx is clear and moist. No oropharyngeal exudate. Eyes: Pupils are equal, round, and reactive to light. Conjunctivae and EOM are normal. Right eye exhibits no discharge. Left eye exhibits no discharge. No scleral icterus. Neck: Normal range of motion. Neck supple. No JVD present. No tracheal deviation present. Cardiovascular: Normal rate, regular rhythm and intact distal pulses. Pulmonary/Chest: Effort normal and breath sounds normal. No stridor. No respiratory distress. He has no wheezes. He exhibits no tenderness. Abdominal: Soft. Bowel sounds are normal. He exhibits no distension and no mass. There is no tenderness. Musculoskeletal: Normal range of motion. He exhibits no edema or tenderness. Neurological: He is alert and oriented to person, place, and time. No cranial nerve deficit. Skin: Skin is warm and dry. No rash noted. He is not diaphoretic. No erythema. No pallor. Psychiatric: He has a normal mood and affect. His behavior is normal. Thought content normal.   Nursing note and vitals reviewed. MDM  Number of Diagnoses or Management Options  Diagnosis management comments: Patient denies chest pain nausea under control still slightly nauseated but not vomiting. Patient sleeping off and on.   Heart rate was 140 now is 100       Amount and/or Complexity of Data Reviewed  Clinical lab tests: ordered and reviewed  Tests in the radiology section of CPT®: ordered and reviewed  Tests in the medicine section of CPT®: ordered and reviewed    Risk of Complications, Morbidity, and/or Mortality  Presenting problems: high  Diagnostic procedures: high  Management options: high    Patient Progress  Patient progress: stable         Procedures

## 2019-10-12 NOTE — ED TRIAGE NOTES
Pt with hx of cyclic n/v. Here yesterday for the same. Originally began on Thursday. Vomiting in triage. Patient unable to sit still in triage.

## 2019-10-12 NOTE — DISCHARGE INSTRUCTIONS
Patient Education        Marijuana Use: Care Instructions  Overview  During your exam, traces of marijuana were found in your body. The two most active chemicals in marijuana are THC and CBD. THC affects how you think, act, and feel. It can make you feel very happy or \"high. \" CBD can help you feel relaxed without the \"high. \" Marijuana products usually contain both THC and CBD. THC usually can be found in urine for a few days after marijuana is used. If you regularly use a lot of marijuana, THC may be found for weeks after use has stopped. There are many types, or strains, of marijuana. Each strain has specific THC-to-CBD ratios. Because of this, some strains have different kinds of effects than others. For example, if a strain of marijuana has a higher ratio of THC to CBD, it's more likely to affect your judgment, coordination, and decision making. In the [place], it's against federal law to possess, sell, give away, or grow marijuana for any purpose. But many states allow people with certain health problems to buy or grow it for their own use. And some states allow people to use it for recreational reasons. These laws vary from state to state. You can call your state department of health or health services to learn more about the laws in your state. If you live in a state where marijuana is legal, know your employer's policies about use. A positive drug test might cause you to lose your job. Or it might keep you from getting hired. If you use marijuana, take steps to lower your risk. Follow-up care is a key part of your treatment and safety. Be sure to make and go to all appointments, and call your doctor if you are having problems. It's also a good idea to know your test results and keep a list of the medicines you take. How can you care for yourself at home? · To have the lowest risk, don't use marijuana. But if you do use it, limit your use. · Know what you're using.  Choose products that have low levels of THC. The type (or strain), strength, and effects of marijuana can vary greatly. And understand how soon you may feel the effects of the product you use and how long those effects may last. The product label may have this information. · Don't drive or operate machinery after using marijuana. Using marijuana may affect your judgment, coordination, and decision making. · Don't smoke marijuana. The smoke can damage your lungs. If you do smoke it, don't breathe in deeply and don't hold your breath. · Don't use marijuana with alcohol, tobacco, or illegal drugs. · Reduce the risk of medicine interactions. Marijuana can be dangerous if you take it with blood thinners or with medicines that make you sleepy, control your mood, or lower your blood pressure. Talk to your doctor about other medicines you use before you try marijuana. · Keep others safe. Store marijuana in a safe and secure place. This is even more important with edible marijuana, which can be easily mistaken for treats or snacks. Make sure that children, friends, family, and pets can't get to it. And protect others from secondhand smoke. When should you call for help? Call your doctor now or seek immediate medical care if:    · You have new or worse symptoms of cannabis hyperemesis syndrome (CHS), such as:  ? Vomiting that doesn't stop. ? Not being able to keep down fluids. ? Belly pain. ? Symptoms that go away briefly when you take a hot bath or shower. This is one of the signs of CHS.     · You have symptoms of dehydration, such as:  ? Dry eyes and a dry mouth. ? Passing only a little dark urine. ? Feeling thirstier than usual.    Watch closely for changes in your health, and contact your doctor if:    · You think you have a problem with marijuana use. Where can you learn more? Go to http://ora-itzel.info/. Enter Q821 in the search box to learn more about \"Marijuana Use: Care Instructions. \"  Current as of: February 5, 2019  Content Version: 12.2  © 2273-4635 [employer]. Care instructions adapted under license by [employer] (which disclaims liability or warranty for this information). If you have questions about a medical condition or this instruction, always ask your healthcare professional. [address] any warranty or liability for your use of this information.

## 2019-10-12 NOTE — ED NOTES
I have reviewed discharge instructions with the patient. The patient verbalized understanding. Patient left ED via Discharge Method: ambulatory to Home with spouse. Opportunity for questions and clarification provided. Patient given 2 scripts. To continue your aftercare when you leave the hospital, you may receive an automated call from our care team to check in on how you are doing. This is a free service and part of our promise to provide the best care and service to meet your aftercare needs.  If you have questions, or wish to unsubscribe from this service please call [phone]. Thank you for Choosing our [facility].  ;

## 2020-01-27 ENCOUNTER — HOSPITAL ENCOUNTER (EMERGENCY)
Age: 45
Discharge: HOME | End: 2020-01-28
Attending: EMERGENCY MEDICINE
Payer: COMMERCIAL

## 2020-01-27 ENCOUNTER — APPOINTMENT (OUTPATIENT)
Dept: CT IMAGING | Age: 45
End: 2020-01-27
Attending: EMERGENCY MEDICINE
Payer: COMMERCIAL

## 2020-01-27 DIAGNOSIS — R11.15 CYCLICAL VOMITING: Primary | ICD-10-CM

## 2020-01-27 DIAGNOSIS — F12.91 HISTORY OF MARIJUANA USE: ICD-10-CM

## 2020-01-27 LAB
ALBUMIN SERPL-MCNC: 4.4 G/DL (ref 3.5–5)
ALBUMIN/GLOB SERPL: 1.1 {RATIO} (ref 1.2–3.5)
ALP SERPL-CCNC: 128 U/L (ref 50–136)
ALT SERPL-CCNC: 27 U/L (ref 12–65)
ANION GAP SERPL CALC-SCNC: 8 MMOL/L (ref 7–16)
AST SERPL-CCNC: 22 U/L (ref 15–37)
ATRIAL RATE: 73 BPM
BASOPHILS # BLD: 0.1 K/UL (ref 0–0.2)
BASOPHILS NFR BLD: 1 % (ref 0–2)
BILIRUB SERPL-MCNC: 0.4 MG/DL (ref 0.2–1.1)
BUN SERPL-MCNC: 16 MG/DL (ref 6–23)
CALCIUM SERPL-MCNC: 10.1 MG/DL (ref 8.3–10.4)
CALCULATED P AXIS, ECG09: 48 DEGREES
CALCULATED R AXIS, ECG10: 77 DEGREES
CALCULATED T AXIS, ECG11: 47 DEGREES
CHLORIDE SERPL-SCNC: 110 MMOL/L (ref 98–107)
CO2 SERPL-SCNC: 22 MMOL/L (ref 21–32)
CREAT SERPL-MCNC: 1.18 MG/DL (ref 0.8–1.5)
DIAGNOSIS, 93000: NORMAL
DIFFERENTIAL METHOD BLD: ABNORMAL
EOSINOPHIL # BLD: 0.1 K/UL (ref 0–0.8)
EOSINOPHIL NFR BLD: 1 % (ref 0.5–7.8)
ERYTHROCYTE [DISTWIDTH] IN BLOOD BY AUTOMATED COUNT: 12.4 % (ref 11.9–14.6)
GLOBULIN SER CALC-MCNC: 4 G/DL (ref 2.3–3.5)
GLUCOSE BLD STRIP.AUTO-MCNC: 109 MG/DL (ref 65–100)
GLUCOSE SERPL-MCNC: 102 MG/DL (ref 65–100)
HCT VFR BLD AUTO: 42.2 % (ref 41.1–50.3)
HGB BLD-MCNC: 15.8 G/DL (ref 13.6–17.2)
IMM GRANULOCYTES # BLD AUTO: 0 K/UL (ref 0–0.5)
IMM GRANULOCYTES NFR BLD AUTO: 0 % (ref 0–5)
LIPASE SERPL-CCNC: 84 U/L (ref 73–393)
LYMPHOCYTES # BLD: 3 K/UL (ref 0.5–4.6)
LYMPHOCYTES NFR BLD: 37 % (ref 13–44)
MCH RBC QN AUTO: 29.6 PG (ref 26.1–32.9)
MCHC RBC AUTO-ENTMCNC: 37.4 G/DL (ref 31.4–35)
MCV RBC AUTO: 79.2 FL (ref 79.6–97.8)
MONOCYTES # BLD: 0.8 K/UL (ref 0.1–1.3)
MONOCYTES NFR BLD: 10 % (ref 4–12)
NEUTS SEG # BLD: 4.1 K/UL (ref 1.7–8.2)
NEUTS SEG NFR BLD: 51 % (ref 43–78)
NRBC # BLD: 0 K/UL (ref 0–0.2)
P-R INTERVAL, ECG05: 158 MS
PLATELET # BLD AUTO: 447 K/UL (ref 150–450)
PMV BLD AUTO: 8.8 FL (ref 9.4–12.3)
POTASSIUM SERPL-SCNC: 3.8 MMOL/L (ref 3.5–5.1)
PROT SERPL-MCNC: 8.4 G/DL (ref 6.3–8.2)
Q-T INTERVAL, ECG07: 376 MS
QRS DURATION, ECG06: 68 MS
QTC CALCULATION (BEZET), ECG08: 414 MS
RBC # BLD AUTO: 5.33 M/UL (ref 4.23–5.6)
SODIUM SERPL-SCNC: 140 MMOL/L (ref 136–145)
VENTRICULAR RATE, ECG03: 73 BPM
WBC # BLD AUTO: 7.9 K/UL (ref 4.3–11.1)

## 2020-01-27 PROCEDURE — 96374 THER/PROPH/DIAG INJ IV PUSH: CPT

## 2020-01-27 PROCEDURE — 70450 CT HEAD/BRAIN W/O DYE: CPT

## 2020-01-27 PROCEDURE — 74011250636 HC RX REV CODE- 250/636

## 2020-01-27 PROCEDURE — 74011250636 HC RX REV CODE- 250/636: Performed by: EMERGENCY MEDICINE

## 2020-01-27 PROCEDURE — 93005 ELECTROCARDIOGRAM TRACING: CPT | Performed by: EMERGENCY MEDICINE

## 2020-01-27 PROCEDURE — 85025 COMPLETE CBC W/AUTO DIFF WBC: CPT

## 2020-01-27 PROCEDURE — 99284 EMERGENCY DEPT VISIT MOD MDM: CPT

## 2020-01-27 PROCEDURE — 83690 ASSAY OF LIPASE: CPT

## 2020-01-27 PROCEDURE — 82962 GLUCOSE BLOOD TEST: CPT

## 2020-01-27 PROCEDURE — 96375 TX/PRO/DX INJ NEW DRUG ADDON: CPT

## 2020-01-27 PROCEDURE — 96361 HYDRATE IV INFUSION ADD-ON: CPT

## 2020-01-27 PROCEDURE — 80053 COMPREHEN METABOLIC PANEL: CPT

## 2020-01-27 RX ORDER — HALOPERIDOL 5 MG/ML
5 INJECTION INTRAMUSCULAR ONCE
Status: COMPLETED | OUTPATIENT
Start: 2020-01-27 | End: 2020-01-27

## 2020-01-27 RX ORDER — METOCLOPRAMIDE 10 MG/1
10 TABLET ORAL
Qty: 12 TAB | Refills: 0 | Status: SHIPPED | OUTPATIENT
Start: 2020-01-27 | End: 2020-02-06

## 2020-01-27 RX ORDER — DIPHENHYDRAMINE HYDROCHLORIDE 50 MG/ML
12.5 INJECTION, SOLUTION INTRAMUSCULAR; INTRAVENOUS
Status: COMPLETED | OUTPATIENT
Start: 2020-01-27 | End: 2020-01-27

## 2020-01-27 RX ORDER — ONDANSETRON 2 MG/ML
INJECTION INTRAMUSCULAR; INTRAVENOUS
Status: COMPLETED
Start: 2020-01-27 | End: 2020-01-27

## 2020-01-27 RX ORDER — ONDANSETRON 2 MG/ML
4 INJECTION INTRAMUSCULAR; INTRAVENOUS
Status: COMPLETED | OUTPATIENT
Start: 2020-01-27 | End: 2020-01-27

## 2020-01-27 RX ADMIN — ONDANSETRON 4 MG: 2 INJECTION INTRAMUSCULAR; INTRAVENOUS at 19:28

## 2020-01-27 RX ADMIN — DIPHENHYDRAMINE HYDROCHLORIDE 12.5 MG: 50 INJECTION, SOLUTION INTRAMUSCULAR; INTRAVENOUS at 21:23

## 2020-01-27 RX ADMIN — SODIUM CHLORIDE 1000 ML: 900 INJECTION, SOLUTION INTRAVENOUS at 19:28

## 2020-01-27 RX ADMIN — HALOPERIDOL LACTATE 5 MG: 5 INJECTION INTRAMUSCULAR at 21:19

## 2020-01-28 VITALS
TEMPERATURE: 98.5 F | HEIGHT: 62 IN | RESPIRATION RATE: 18 BRPM | SYSTOLIC BLOOD PRESSURE: 128 MMHG | BODY MASS INDEX: 26.68 KG/M2 | DIASTOLIC BLOOD PRESSURE: 75 MMHG | HEART RATE: 81 BPM | OXYGEN SATURATION: 100 % | WEIGHT: 145 LBS

## 2020-01-28 NOTE — ED PROVIDER NOTES
Patient presents to the ER complaining of nausea and vomiting. States symptoms started earlier today. Reports epigastric pain with multiple episodes of nonbilious nonbloody vomitus. Reports his stool has been intermittently dark. Denies any fevers or chills. Wife reports that the vomiting patient fell and hit his head, reports he was shaking which she was concerned for seizure but denies any postictal type symptoms. Patient does report some mild headache but is mostly complained about his upper abdomen and vomiting. Denies any fevers or chills. Reports he has been compliant with his Protonix    The history is provided by the patient. Vomiting    This is a recurrent problem. The current episode started 6 to 12 hours ago. The problem occurs 2 to 4 times per day. The emesis has an appearance of stomach contents. Associated symptoms include abdominal pain. Pertinent negatives include no fever. Seizure    Associated symptoms include vomiting. Pertinent negatives include no confusion, no speech difficulty and no visual disturbance. He reports vomiting. He reports no confusion, no visual disturbance, no speech difficulty. Past Medical History:   Diagnosis Date    BETTY (acute kidney injury) ([facility] [address] 8/12/2014    Clostridium difficile colitis 3/20/2011    Gastroparesis 2005    emptying study normal -2006    GERD (gastroesophageal reflux disease)     HIATAL HERNIA SINCE 1999    PUD (peptic ulcer disease) ALL DX IN 2008    ESOPHAGITIS, + H PYLORI    Uncontrolled hypertension 6/26/2018       Past Surgical History:   Procedure Laterality Date    COLONOSCOPY  4/08    ESOPHAGITIS, COLONIC ULCER X1    EGD  4/08    H.  HERNIA, ULCER X2, H PYLORI,    EGD  2011    h pylori -neg    HX WISDOM TEETH EXTRACTION  2/10/11         Family History:   Problem Relation Age of Onset    Other Mother         L+W    Diabetes Maternal Grandmother     Sickle Cell Anemia Father     Heart Disease Paternal Lily Dose Other Sister         ALL L+W    Other Son         L+W       Social History     Socioeconomic History    Marital status:      Spouse name: Not on file    Number of children: Not on file    Years of education: Not on file    Highest education level: Not on file   Occupational History    Not on file   Social Needs    Financial resource strain: Not on file    Food insecurity:     Worry: Not on file     Inability: Not on file    Transportation needs:     Medical: Not on file     Non-medical: Not on file   Tobacco Use    Smoking status: Current Every Day Smoker     Packs/day: 0.25     Years: 2.00     Pack years: 0.50     Types: Cigarettes    Smokeless tobacco: Never Used   Substance and Sexual Activity    Alcohol use: No    Drug use: Yes     Types: Marijuana    Sexual activity: Yes     Partners: Female   Lifestyle    Physical activity:     Days per week: Not on file     Minutes per session: Not on file    Stress: Not on file   Relationships    Social connections:     Talks on phone: Not on file     Gets together: Not on file     Attends Mormonism service: Not on file     Active member of club or organization: Not on file     Attends meetings of clubs or organizations: Not on file     Relationship status: Not on file    Intimate partner violence:     Fear of current or ex partner: Not on file     Emotionally abused: Not on file     Physically abused: Not on file     Forced sexual activity: Not on file   Other Topics Concern    Not on file   Social History Narrative    3/17/11:  PATIENT LIVES WITH GIRLFRIEND, [address] (CELL: [phone]), HER 3YEAR OLD DAUGHTER AND THEIR 3YEAR OLD SON. ALTON IS CURRENTLY 13 WEEKS PREGNANT. PATIENT'S JOB AT [employer] WAS DOWNSIZED ABOUT A YEAR AGO, AND HE HAS BEEN A STAY HOME DAD SINCE. ALTON WORKS IN HOUSEKEEPING POSITION. ALLERGIES: Amoxicillin; Aspirin; Hydrocodone;  Ibuprofen; Pcn [penicillins]; and Phenergan [promethazine]    Review of Systems   Constitutional: Negative for fatigue and fever. HENT: Negative for congestion. Eyes: Negative for redness and visual disturbance. Respiratory: Negative for chest tightness and shortness of breath. Cardiovascular: Negative for palpitations and leg swelling. Gastrointestinal: Positive for abdominal pain and vomiting. Genitourinary: Negative for frequency and urgency. Musculoskeletal: Negative for back pain. Skin: Negative for pallor and rash. Neurological: Negative for speech difficulty, light-headedness and numbness. Hematological: Negative for adenopathy. Does not bruise/bleed easily. Psychiatric/Behavioral: Negative for behavioral problems and confusion. All other systems reviewed and are negative. Vitals:    01/27/20 1917   BP: 119/88   Pulse: (!) 116   Resp: 22   Temp: 97.5 °F (36.4 °C)   SpO2: 95%   Weight: 65.8 kg (145 lb)   Height: 5' 2\" (1.575 m)            Physical Exam  Vitals signs and nursing note reviewed. Constitutional:       Appearance: Normal appearance. HENT:      Head: Normocephalic and atraumatic. Neck:      Musculoskeletal: Normal range of motion and neck supple. Cardiovascular:      Rate and Rhythm: Normal rate and regular rhythm. Pulses: Normal pulses. Heart sounds: Normal heart sounds. Pulmonary:      Effort: Pulmonary effort is normal. No respiratory distress. Breath sounds: Normal breath sounds. No stridor. Abdominal:      General: Abdomen is flat. Bowel sounds are normal.      Palpations: Abdomen is soft. Genitourinary:     Rectum: Guaiac result negative. Musculoskeletal: Normal range of motion. General: No swelling or tenderness. Skin:     General: Skin is warm and dry. Capillary Refill: Capillary refill takes less than 2 seconds. Coloration: Skin is not jaundiced. Neurological:      General: No focal deficit present. Mental Status: He is alert.  Mental status is at baseline. MDM  Number of Diagnoses or Management Options  Diagnosis management comments: Fairly well-appearing here, will check labs, perform rectal exam.  History of cannabis abuse    9:21 PM  Rectal exam is negative for blood. Normal chemistry panel, normal lipase    We will continue to monitor symptoms    11:19 PM  CT scan of head was obtained, no acute abnormalities. Symptomatically patient is improved. Discussed with patient and family results of testing. He has not had any repeat vomiting. Do not believe he had true seizure activity. Discussed with patient in detail how her symptoms are likely related to his marijuana usage. Encouraged him to follow-up with PCP, avoidance of cannabis       Amount and/or Complexity of Data Reviewed  Clinical lab tests: ordered and reviewed    Risk of Complications, Morbidity, and/or Mortality  Presenting problems: high  Diagnostic procedures: moderate  Management options: moderate           Procedures               Results Include:    Recent Results (from the past 24 hour(s))   EKG, 12 LEAD, INITIAL    Collection Time: 01/27/20  7:20 PM   Result Value Ref Range    Ventricular Rate 73 BPM    Atrial Rate 73 BPM    P-R Interval 158 ms    QRS Duration 68 ms    Q-T Interval 376 ms    QTC Calculation (Bezet) 414 ms    Calculated P Axis 48 degrees    Calculated R Axis 77 degrees    Calculated T Axis 47 degrees    Diagnosis       Normal sinus rhythm with sinus arrhythmia  Normal ECG  When compared with ECG of 12-OCT-2019 11:03,  Vent.  rate has decreased BY  41 BPM  Confirmed by Liza Hodgson ([id number]) on 1/27/2020 10:49:10 PM     CBC WITH AUTOMATED DIFF    Collection Time: 01/27/20  7:27 PM   Result Value Ref Range    WBC 7.9 4.3 - 11.1 K/uL    RBC 5.33 4.23 - 5.6 M/uL    HGB 15.8 13.6 - 17.2 g/dL    HCT 42.2 41.1 - 50.3 %    MCV 79.2 (L) 79.6 - 97.8 FL    MCH 29.6 26.1 - 32.9 PG    MCHC 37.4 (H) 31.4 - 35.0 g/dL    RDW 12.4 11.9 - 14.6 %    PLATELET 958 994 - 687 K/uL    MPV 8.8 (L) 9.4 - 12.3 FL    ABSOLUTE NRBC 0.00 0.0 - 0.2 K/uL    DF AUTOMATED      NEUTROPHILS 51 43 - 78 %    LYMPHOCYTES 37 13 - 44 %    MONOCYTES 10 4.0 - 12.0 %    EOSINOPHILS 1 0.5 - 7.8 %    BASOPHILS 1 0.0 - 2.0 %    IMMATURE GRANULOCYTES 0 0.0 - 5.0 %    ABS. NEUTROPHILS 4.1 1.7 - 8.2 K/UL    ABS. LYMPHOCYTES 3.0 0.5 - 4.6 K/UL    ABS. MONOCYTES 0.8 0.1 - 1.3 K/UL    ABS. EOSINOPHILS 0.1 0.0 - 0.8 K/UL    ABS. BASOPHILS 0.1 0.0 - 0.2 K/UL    ABS. IMM. GRANS. 0.0 0.0 - 0.5 K/UL   METABOLIC PANEL, COMPREHENSIVE    Collection Time: 01/27/20  7:27 PM   Result Value Ref Range    Sodium 140 136 - 145 mmol/L    Potassium 3.8 3.5 - 5.1 mmol/L    Chloride 110 (H) 98 - 107 mmol/L    CO2 22 21 - 32 mmol/L    Anion gap 8 7 - 16 mmol/L    Glucose 102 (H) 65 - 100 mg/dL    BUN 16 6 - 23 MG/DL    Creatinine 1.18 0.8 - 1.5 MG/DL    GFR est AA >60 >60 ml/min/1.73m2    GFR est non-AA >60 >60 ml/min/1.73m2    Calcium 10.1 8.3 - 10.4 MG/DL    Bilirubin, total 0.4 0.2 - 1.1 MG/DL    ALT (SGPT) 27 12 - 65 U/L    AST (SGOT) 22 15 - 37 U/L    Alk. phosphatase 128 50 - 136 U/L    Protein, total 8.4 (H) 6.3 - 8.2 g/dL    Albumin 4.4 3.5 - 5.0 g/dL    Globulin 4.0 (H) 2.3 - 3.5 g/dL    A-G Ratio 1.1 (L) 1.2 - 3.5     LIPASE    Collection Time: 01/27/20  7:27 PM   Result Value Ref Range    Lipase 84 73 - 393 U/L   GLUCOSE, POC    Collection Time: 01/27/20  7:28 PM   Result Value Ref Range    Glucose (POC) 109 (H) 65 - 100 mg/dL     Voice dictation software was used during the making of this note. This software is not perfect and grammatical and other typographical errors may be present. This note has been proofread, but may still contain errors.   Ryan Summers MD; 1/27/2020 @11:19 PM   ===================================================================

## 2020-01-28 NOTE — DISCHARGE INSTRUCTIONS
Take medications as prescribed  As we discussed, your symptoms are likely related to your marijuana use  Avoid using marijuana  Follow-up with a primary care physician  Drink plenty of fluids  Return to the ER for any new or worsening symptoms    Vomiting: After Your Visit to the Emergency Room  Your Care Instructions  You were seen in the emergency room because you have been vomiting. The treatment you need depends on the reason why you are vomiting and whether you have other symptoms. Most of the time, vomiting gets better on its own in a few days. But sometimes it can be a sign of a more serious problem. If you are not getting better, you may need more tests or treatment. Two common causes of vomiting are stomach flu caused by a virus and food poisoning. Vomiting from the stomach flu will usually start to get better within 24 hours. Vomiting from food poisoning may last from 12 to 48 hours. Even though you have been released from the emergency room, you still need to watch for any problems. The doctor carefully checked you. But sometimes problems can develop later. If you have new symptoms, or if your symptoms do not get better, return to the emergency room or call your doctor right away. A visit to the emergency room is only one step in your treatment. Even if you feel better, you still need to do what your doctor recommends, such as going to all suggested follow-up appointments and taking medicines exactly as directed. This will help you recover and help prevent future problems. How can you care for yourself at home? · To prevent dehydration, drink plenty of fluids, enough so that your urine is light yellow or clear like water. Choose water and other caffeine-free clear liquids until you feel better. · Rest until you feel better. · When you are able to eat, try clear soups, mild foods, and liquids until all symptoms are gone for 12 to 48 hours.  Other good choices include dry toast, crackers, cooked cereal, and gelatin dessert, such as Jell-O. When should you call for help? Call 911 if:  · You passed out (lost consciousness). Return to the emergency room now if:  · You have signs of needing more fluids. You have sunken eyes and a dry mouth, and you pass only a little dark urine. · You have vomiting with:  ¨ A very painful headache, sleepiness, or a stiff neck. ¨ Chest pain and a fever. · You have a fever and constant pain in your belly or back. · You have a fever with shaking chills. · You vomit blood or what looks like coffee grounds. · The vomiting and fever last longer than 2 days. · You vomit more than 10 times in 1 day or every time you take a drink for more than 12 hours. Call your doctor today if:  · You continue to vomit off and on for more than 1 week. Where can you learn more? Go to Care Team Connect.be  Enter Z424 in the search box to learn more about \"Vomiting: After Your Visit to the Emergency Room. \"   © 6115-4755 [employer]. Care instructions adapted under license by [address] [place] [address] (which disclaims liability or warranty for this information). This care instruction is for use with your licensed healthcare professional. If you have questions about a medical condition or this instruction, always ask your healthcare professional. [place] [address] any warranty or liability for your use of this information.   Content Version: 9.3.21915; Last Revised: February 19, 2011

## 2020-01-28 NOTE — ED NOTES
I have reviewed discharge instructions with the patient. The patient verbalized understanding. Patient left ED via Discharge Method: ambulatory to Home with friend/family  Opportunity for questions and clarification provided. Patient given 1 scripts. To continue your aftercare when you leave the hospital, you may receive an automated call from our care team to check in on how you are doing. This is a free service and part of our promise to provide the best care and service to meet your aftercare needs.  If you have questions, or wish to unsubscribe from this service please call [phone]. Thank you for Choosing our [facility] Emergency Department.

## 2020-01-29 ENCOUNTER — HOSPITAL ENCOUNTER (EMERGENCY)
Age: 45
Discharge: HOME | End: 2020-01-29
Attending: EMERGENCY MEDICINE
Payer: COMMERCIAL

## 2020-01-29 VITALS
OXYGEN SATURATION: 98 % | HEIGHT: 62 IN | RESPIRATION RATE: 16 BRPM | WEIGHT: 145 LBS | BODY MASS INDEX: 26.68 KG/M2 | HEART RATE: 100 BPM | TEMPERATURE: 97.8 F | DIASTOLIC BLOOD PRESSURE: 99 MMHG | SYSTOLIC BLOOD PRESSURE: 170 MMHG

## 2020-01-29 DIAGNOSIS — R11.2 NON-INTRACTABLE VOMITING WITH NAUSEA, UNSPECIFIED VOMITING TYPE: Primary | ICD-10-CM

## 2020-01-29 LAB
ALBUMIN SERPL-MCNC: 4.6 G/DL (ref 3.5–5)
ALBUMIN/GLOB SERPL: 1 {RATIO} (ref 1.2–3.5)
ALP SERPL-CCNC: 128 U/L (ref 50–136)
ALT SERPL-CCNC: 29 U/L (ref 12–65)
ANION GAP SERPL CALC-SCNC: 11 MMOL/L (ref 7–16)
AST SERPL-CCNC: 19 U/L (ref 15–37)
ATRIAL RATE: 126 BPM
BACTERIA URNS QL MICRO: NORMAL /HPF
BASOPHILS # BLD: 0 K/UL (ref 0–0.2)
BASOPHILS NFR BLD: 0 % (ref 0–2)
BILIRUB SERPL-MCNC: 0.4 MG/DL (ref 0.2–1.1)
BUN SERPL-MCNC: 23 MG/DL (ref 6–23)
CALCIUM SERPL-MCNC: 10.9 MG/DL (ref 8.3–10.4)
CALCULATED P AXIS, ECG09: 69 DEGREES
CALCULATED R AXIS, ECG10: 79 DEGREES
CALCULATED T AXIS, ECG11: 54 DEGREES
CASTS URNS QL MICRO: NORMAL /LPF
CHLORIDE SERPL-SCNC: 107 MMOL/L (ref 98–107)
CO2 SERPL-SCNC: 22 MMOL/L (ref 21–32)
CREAT SERPL-MCNC: 1.33 MG/DL (ref 0.8–1.5)
CRYSTALS URNS QL MICRO: 0 /LPF
DIAGNOSIS, 93000: NORMAL
DIFFERENTIAL METHOD BLD: ABNORMAL
EOSINOPHIL # BLD: 0 K/UL (ref 0–0.8)
EOSINOPHIL NFR BLD: 0 % (ref 0.5–7.8)
EPI CELLS #/AREA URNS HPF: NORMAL /HPF
ERYTHROCYTE [DISTWIDTH] IN BLOOD BY AUTOMATED COUNT: 12.7 % (ref 11.9–14.6)
FLUAV AG NPH QL IA: NEGATIVE
FLUBV AG NPH QL IA: NEGATIVE
GLOBULIN SER CALC-MCNC: 4.5 G/DL (ref 2.3–3.5)
GLUCOSE SERPL-MCNC: 138 MG/DL (ref 65–100)
HCT VFR BLD AUTO: 43.7 % (ref 41.1–50.3)
HGB BLD-MCNC: 16.3 G/DL (ref 13.6–17.2)
IMM GRANULOCYTES # BLD AUTO: 0 K/UL (ref 0–0.5)
IMM GRANULOCYTES NFR BLD AUTO: 0 % (ref 0–5)
LIPASE SERPL-CCNC: 85 U/L (ref 73–393)
LYMPHOCYTES # BLD: 1.2 K/UL (ref 0.5–4.6)
LYMPHOCYTES NFR BLD: 12 % (ref 13–44)
MCH RBC QN AUTO: 29.6 PG (ref 26.1–32.9)
MCHC RBC AUTO-ENTMCNC: 37.3 G/DL (ref 31.4–35)
MCV RBC AUTO: 79.5 FL (ref 79.6–97.8)
MONOCYTES # BLD: 0.6 K/UL (ref 0.1–1.3)
MONOCYTES NFR BLD: 7 % (ref 4–12)
MUCOUS THREADS URNS QL MICRO: 0 /LPF
NEUTS SEG # BLD: 7.9 K/UL (ref 1.7–8.2)
NEUTS SEG NFR BLD: 81 % (ref 43–78)
NRBC # BLD: 0 K/UL (ref 0–0.2)
OTHER OBSERVATIONS,UCOM: NORMAL
P-R INTERVAL, ECG05: 152 MS
PLATELET # BLD AUTO: 459 K/UL (ref 150–450)
PMV BLD AUTO: 8.9 FL (ref 9.4–12.3)
POTASSIUM SERPL-SCNC: 4 MMOL/L (ref 3.5–5.1)
PROT SERPL-MCNC: 9.1 G/DL (ref 6.3–8.2)
Q-T INTERVAL, ECG07: 302 MS
QRS DURATION, ECG06: 70 MS
QTC CALCULATION (BEZET), ECG08: 437 MS
RBC # BLD AUTO: 5.5 M/UL (ref 4.23–5.6)
RBC #/AREA URNS HPF: NORMAL /HPF
SODIUM SERPL-SCNC: 140 MMOL/L (ref 136–145)
SPECIMEN SOURCE: NORMAL
VENTRICULAR RATE, ECG03: 126 BPM
WBC # BLD AUTO: 9.7 K/UL (ref 4.3–11.1)
WBC URNS QL MICRO: NORMAL /HPF

## 2020-01-29 PROCEDURE — 99284 EMERGENCY DEPT VISIT MOD MDM: CPT

## 2020-01-29 PROCEDURE — 96375 TX/PRO/DX INJ NEW DRUG ADDON: CPT

## 2020-01-29 PROCEDURE — 87804 INFLUENZA ASSAY W/OPTIC: CPT

## 2020-01-29 PROCEDURE — 74011250636 HC RX REV CODE- 250/636: Performed by: EMERGENCY MEDICINE

## 2020-01-29 PROCEDURE — 80053 COMPREHEN METABOLIC PANEL: CPT

## 2020-01-29 PROCEDURE — 85025 COMPLETE CBC W/AUTO DIFF WBC: CPT

## 2020-01-29 PROCEDURE — 96374 THER/PROPH/DIAG INJ IV PUSH: CPT

## 2020-01-29 PROCEDURE — 83690 ASSAY OF LIPASE: CPT

## 2020-01-29 PROCEDURE — 81015 MICROSCOPIC EXAM OF URINE: CPT

## 2020-01-29 PROCEDURE — 93005 ELECTROCARDIOGRAM TRACING: CPT | Performed by: EMERGENCY MEDICINE

## 2020-01-29 PROCEDURE — 96361 HYDRATE IV INFUSION ADD-ON: CPT

## 2020-01-29 RX ORDER — HALOPERIDOL 5 MG/ML
5 INJECTION INTRAMUSCULAR ONCE
Status: COMPLETED | OUTPATIENT
Start: 2020-01-29 | End: 2020-01-29

## 2020-01-29 RX ORDER — ONDANSETRON 2 MG/ML
4 INJECTION INTRAMUSCULAR; INTRAVENOUS
Status: COMPLETED | OUTPATIENT
Start: 2020-01-29 | End: 2020-01-29

## 2020-01-29 RX ORDER — ONDANSETRON 4 MG/1
4 TABLET, ORALLY DISINTEGRATING ORAL
Qty: 20 TAB | Refills: 0 | OUTPATIENT
Start: 2020-01-29 | End: 2020-07-31

## 2020-01-29 RX ADMIN — ONDANSETRON 4 MG: 2 INJECTION INTRAMUSCULAR; INTRAVENOUS at 09:34

## 2020-01-29 RX ADMIN — HALOPERIDOL LACTATE 5 MG: 5 INJECTION, SOLUTION INTRAMUSCULAR at 09:41

## 2020-01-29 RX ADMIN — SODIUM CHLORIDE 1000 ML: 900 INJECTION, SOLUTION INTRAVENOUS at 09:34

## 2020-01-29 NOTE — ED NOTES
I have reviewed discharge instructions with the patient. The patient verbalized understanding. Patient left ED via Discharge Method: ambulatory to Home with wife. Opportunity for questions and clarification provided. Patient given 1 scripts. To continue your aftercare when you leave the hospital, you may receive an automated call from our care team to check in on how you are doing. This is a free service and part of our promise to provide the best care and service to meet your aftercare needs.  If you have questions, or wish to unsubscribe from this service please call [phone]. Thank you for Choosing our [facility] Emergency Department.

## 2020-01-29 NOTE — LETTER
NOTIFICATION RETURN TO WORK / SCHOOL 
 
1/29/2020 11:41 AM 
 
Mr. Keli Pineda [address] [facility] [address] To Whom It May Concern: 
 
Keli Pineda is currently under the care of [facility] EMERGENCY DEPT. Excused from work 1/29/20.   
 
 
 
 
 
Sincerely,

## 2020-01-29 NOTE — ED TRIAGE NOTES
Patient with c/o n/v beginning Monday. Seen yesterday for same issue- given meds and released. Patient states symptoms have worsened, noticed blood in vomit. States bilat hand numbness/cramping. States upper right abd pain. Denies urinary or bowel symptoms.

## 2020-01-29 NOTE — DISCHARGE INSTRUCTIONS
Patient Education        Nausea and Vomiting: Care Instructions  Your Care Instructions    When you are nauseated, you may feel weak and sweaty and notice a lot of saliva in your mouth. Nausea often leads to vomiting. Most of the time you do not need to worry about nausea and vomiting, but they can be signs of other illnesses. Two common causes of nausea and vomiting are stomach flu and food poisoning. Nausea and vomiting from viral stomach flu will usually start to improve within 24 hours. Nausea and vomiting from food poisoning may last from 12 to 48 hours. The doctor has checked you carefully, but problems can develop later. If you notice any problems or new symptoms, get medical treatment right away. Follow-up care is a key part of your treatment and safety. Be sure to make and go to all appointments, and call your doctor if you are having problems. It's also a good idea to know your test results and keep a list of the medicines you take. How can you care for yourself at home? · To prevent dehydration, drink plenty of fluids, enough so that your urine is light yellow or clear like water. Choose water and other caffeine-free clear liquids until you feel better. If you have kidney, heart, or liver disease and have to limit fluids, talk with your doctor before you increase the amount of fluids you drink. · Rest in bed until you feel better. · When you are able to eat, try clear soups, mild foods, and liquids until all symptoms are gone for 12 to 48 hours. Other good choices include dry toast, crackers, cooked cereal, and gelatin dessert, such as Jell-O. When should you call for help? Call 911 anytime you think you may need emergency care. For example, call if:    · You passed out (lost consciousness).    Call your doctor now or seek immediate medical care if:    · You have symptoms of dehydration, such as:  ? Dry eyes and a dry mouth. ? Passing only a little dark urine. ?  Feeling thirstier than usual.   · You have new or worsening belly pain.     · You have a new or higher fever.     · You vomit blood or what looks like coffee grounds.    Watch closely for changes in your health, and be sure to contact your doctor if:    · You have ongoing nausea and vomiting.     · Your vomiting is getting worse.     · Your vomiting lasts longer than 2 days.     · You are not getting better as expected. Where can you learn more? Go to http://ora-itzel.info/. Enter 25 [id number] in the search box to learn more about \"Nausea and Vomiting: Care Instructions. \"  Current as of: June 26, 2019  Content Version: 12.2  © 0869-9490 [employer]. Care instructions adapted under license by [employer] (which disclaims liability or warranty for this information). If you have questions about a medical condition or this instruction, always ask your healthcare professional. [address] any warranty or liability for your use of this information.

## 2020-01-29 NOTE — ED PROVIDER NOTES
Patient with history of cyclical vomiting syndrome possibly due to cannabis use. Also with GERD. Presents with nausea and vomiting since yesterday. Continued today so came in. Mild epigastric pain. No diarrhea. No dysuria. The history is provided by the patient. No  was used. Vomiting    This is a new problem. The current episode started yesterday. The problem occurs 5 to 10 times per day. The problem has not changed since onset. The emesis has an appearance of stomach contents. There has been no fever. Associated symptoms include abdominal pain (mild epigastric). Pertinent negatives include no chills, no fever, no diarrhea, no headaches, no myalgias, no cough, no URI and no headaches. Past Medical History:   Diagnosis Date    [facility]    Clostridium difficile colitis 3/20/2011    Gastroparesis 2005    emptying study normal -2006    GERD (gastroesophageal reflux disease)     HIATAL HERNIA SINCE 1999    PUD (peptic ulcer disease) ALL DX IN 2008    ESOPHAGITIS, + H PYLORI    Uncontrolled hypertension 6/26/2018       Past Surgical History:   Procedure Laterality Date    COLONOSCOPY  4/08    ESOPHAGITIS, COLONIC ULCER X1    EGD  4/08    H.  HERNIA, ULCER X2, H PYLORI,    EGD  2011    h pylori -neg    HX WISDOM TEETH EXTRACTION  2/10/11         Family History:   Problem Relation Age of Onset    Other Mother         L+W    Diabetes Maternal Grandmother     Sickle Cell Anemia Father     Heart Disease Paternal Grandfather     Other Sister         ALL L+W    Other Son         L+W       Social History     Socioeconomic History    Marital status:      Spouse name: Not on file    Number of children: Not on file    Years of education: Not on file    Highest education level: Not on file   Occupational History    Not on file   Social Needs    Financial resource strain: Not on file    Food insecurity:     Worry: Not on file Inability: Not on file    Transportation needs:     Medical: Not on file     Non-medical: Not on file   Tobacco Use    Smoking status: Current Every Day Smoker     Packs/day: 0.25     Years: 2.00     Pack years: 0.50     Types: Cigarettes    Smokeless tobacco: Never Used   Substance and Sexual Activity    Alcohol use: No    Drug use: Yes     Types: Marijuana    Sexual activity: Yes     Partners: Female   Lifestyle    Physical activity:     Days per week: Not on file     Minutes per session: Not on file    Stress: Not on file   Relationships    Social connections:     Talks on phone: Not on file     Gets together: Not on file     Attends [employer] service: Not on file     Active member of club or organization: Not on file     Attends meetings of clubs or organizations: Not on file     Relationship status: Not on file    Intimate partner violence:     Fear of current or ex partner: Not on file     Emotionally abused: Not on file     Physically abused: Not on file     Forced sexual activity: Not on file   Other Topics Concern    Not on file   Social History Narrative    3/17/11:  PATIENT LIVES WITH GIRLFRIEND, Ervin Shah (CELL: [phone]), HER 3YEAR OLD DAUGHTER AND THEIR 3YEAR OLD SON. ALTON IS CURRENTLY 13 WEEKS PREGNANT. PATIENT'S JOB AT [employer] WAS DOWNSIZED ABOUT A YEAR AGO, AND HE HAS BEEN A STAY HOME DAD SINCE. ALTON WORKS IN HOUSEKEEPING POSITION. ALLERGIES: Amoxicillin; Aspirin; Hydrocodone; Ibuprofen; Pcn [penicillins]; and Phenergan [promethazine]    Review of Systems   Constitutional: Negative for chills and fever. HENT: Negative for rhinorrhea and sore throat. Eyes: Negative for pain and redness. Respiratory: Negative for cough, chest tightness, shortness of breath and wheezing. Cardiovascular: Negative for chest pain and leg swelling. Gastrointestinal: Positive for abdominal pain (mild epigastric), nausea and vomiting. Negative for diarrhea. Genitourinary: Negative for dysuria and hematuria. Musculoskeletal: Negative for back pain, gait problem, myalgias, neck pain and neck stiffness. Skin: Negative for color change and rash. Neurological: Negative for weakness, numbness and headaches. Vitals:    01/29/20 0809   BP: (!) 155/113   Pulse: (!) 114   Resp: 18   Temp: 97.8 °F (36.6 °C)   SpO2: 96%   Weight: 65.8 kg (145 lb)   Height: 5' 2\" (1.575 m)            Physical Exam  Constitutional:       Appearance: Normal appearance. He is well-developed. HENT:      Head: Normocephalic and atraumatic. Neck:      Musculoskeletal: Normal range of motion and neck supple. Cardiovascular:      Rate and Rhythm: Regular rhythm. Tachycardia present. Pulmonary:      Effort: Pulmonary effort is normal.      Breath sounds: Normal breath sounds. Abdominal:      General: Bowel sounds are normal.      Palpations: Abdomen is soft. Tenderness: There is no abdominal tenderness. Musculoskeletal: Normal range of motion. General: No swelling. Skin:     General: Skin is warm and dry. Neurological:      Mental Status: He is alert and oriented to person, place, and time. MDM  Number of Diagnoses or Management Options  Diagnosis management comments: Patient feeling better with no vomiting here. Will discharge with nausea medication at home.        Amount and/or Complexity of Data Reviewed  Clinical lab tests: ordered and reviewed  Tests in the medicine section of CPT®: ordered and reviewed    Patient Progress  Patient progress: stable         Procedures        Results Include:    Recent Results (from the past 24 hour(s))   CBC WITH AUTOMATED DIFF    Collection Time: 01/29/20  8:12 AM   Result Value Ref Range    WBC 9.7 4.3 - 11.1 K/uL    RBC 5.50 4.23 - 5.6 M/uL    HGB 16.3 13.6 - 17.2 g/dL    HCT 43.7 41.1 - 50.3 %    MCV 79.5 (L) 79.6 - 97.8 FL    MCH 29.6 26.1 - 32.9 PG    MCHC 37.3 (H) 31.4 - 35.0 g/dL    RDW 12.7 11.9 - 14.6 % PLATELET 404 (H) 714 - 450 K/uL    MPV 8.9 (L) 9.4 - 12.3 FL    ABSOLUTE NRBC 0.00 0.0 - 0.2 K/uL    DF AUTOMATED      NEUTROPHILS 81 (H) 43 - 78 %    LYMPHOCYTES 12 (L) 13 - 44 %    MONOCYTES 7 4.0 - 12.0 %    EOSINOPHILS 0 (L) 0.5 - 7.8 %    BASOPHILS 0 0.0 - 2.0 %    IMMATURE GRANULOCYTES 0 0.0 - 5.0 %    ABS. NEUTROPHILS 7.9 1.7 - 8.2 K/UL    ABS. LYMPHOCYTES 1.2 0.5 - 4.6 K/UL    ABS. MONOCYTES 0.6 0.1 - 1.3 K/UL    ABS. EOSINOPHILS 0.0 0.0 - 0.8 K/UL    ABS. BASOPHILS 0.0 0.0 - 0.2 K/UL    ABS. IMM. GRANS. 0.0 0.0 - 0.5 K/UL   METABOLIC PANEL, COMPREHENSIVE    Collection Time: 01/29/20  8:12 AM   Result Value Ref Range    Sodium 140 136 - 145 mmol/L    Potassium 4.0 3.5 - 5.1 mmol/L    Chloride 107 98 - 107 mmol/L    CO2 22 21 - 32 mmol/L    Anion gap 11 7 - 16 mmol/L    Glucose 138 (H) 65 - 100 mg/dL    BUN 23 6 - 23 MG/DL    Creatinine 1.33 0.8 - 1.5 MG/DL    GFR est AA >60 >60 ml/min/1.73m2    GFR est non-AA >60 >60 ml/min/1.73m2    Calcium 10.9 (H) 8.3 - 10.4 MG/DL    Bilirubin, total 0.4 0.2 - 1.1 MG/DL    ALT (SGPT) 29 12 - 65 U/L    AST (SGOT) 19 15 - 37 U/L    Alk. phosphatase 128 50 - 136 U/L    Protein, total 9.1 (H) 6.3 - 8.2 g/dL    Albumin 4.6 3.5 - 5.0 g/dL    Globulin 4.5 (H) 2.3 - 3.5 g/dL    A-G Ratio 1.0 (L) 1.2 - 3.5     LIPASE    Collection Time: 01/29/20  8:12 AM   Result Value Ref Range    Lipase 85 73 - 393 U/L   EKG, 12 LEAD, INITIAL    Collection Time: 01/29/20  8:12 AM   Result Value Ref Range    Ventricular Rate 126 BPM    Atrial Rate 126 BPM    P-R Interval 152 ms    QRS Duration 70 ms    Q-T Interval 302 ms    QTC Calculation (Bezet) 437 ms    Calculated P Axis 69 degrees    Calculated R Axis 79 degrees    Calculated T Axis 54 degrees    Diagnosis       Sinus tachycardia  Biatrial enlargement  Left ventricular hypertrophy  Abnormal ECG  When compared with ECG of 27-JAN-2020 19:20,  Vent.  rate has increased BY  53 BPM     URINE MICROSCOPIC    Collection Time: 01/29/20  9:04 AM Result Value Ref Range    WBC 0-3 0 /hpf    RBC 5-10 0 /hpf    Epithelial cells 0-3 0 /hpf    Bacteria TRACE 0 /hpf    Casts HYALINE 0 /lpf    Crystals, urine 0 0 /LPF    Mucus 0 0 /lpf    Other observations RESULTS VERIFIED MANUALLY     INFLUENZA A & B AG (RAPID TEST)    Collection Time: 01/29/20  9:52 AM   Result Value Ref Range    Influenza A Ag NEGATIVE  NEG      Influenza B Ag NEGATIVE  NEG      Source NASOPHARYNGEAL

## 2020-07-30 ENCOUNTER — HOSPITAL ENCOUNTER (EMERGENCY)
Age: 45
Discharge: HOME | End: 2020-07-30
Attending: EMERGENCY MEDICINE
Payer: COMMERCIAL

## 2020-07-30 VITALS
DIASTOLIC BLOOD PRESSURE: 86 MMHG | OXYGEN SATURATION: 95 % | HEART RATE: 90 BPM | RESPIRATION RATE: 26 BRPM | HEIGHT: 61 IN | WEIGHT: 145 LBS | TEMPERATURE: 98.7 F | SYSTOLIC BLOOD PRESSURE: 129 MMHG | BODY MASS INDEX: 27.38 KG/M2

## 2020-07-30 DIAGNOSIS — R11.2 NON-INTRACTABLE VOMITING WITH NAUSEA, UNSPECIFIED VOMITING TYPE: Primary | ICD-10-CM

## 2020-07-30 LAB
ALBUMIN SERPL-MCNC: 4.6 G/DL (ref 3.5–5)
ALBUMIN/GLOB SERPL: 1 {RATIO} (ref 1.2–3.5)
ALP SERPL-CCNC: 136 U/L (ref 50–136)
ALT SERPL-CCNC: 44 U/L (ref 12–65)
ANION GAP SERPL CALC-SCNC: 14 MMOL/L (ref 7–16)
AST SERPL-CCNC: 51 U/L (ref 15–37)
BASOPHILS # BLD: 0 K/UL (ref 0–0.2)
BASOPHILS NFR BLD: 0 % (ref 0–2)
BILIRUB SERPL-MCNC: 0.9 MG/DL (ref 0.2–1.1)
BUN SERPL-MCNC: 26 MG/DL (ref 6–23)
CALCIUM SERPL-MCNC: 9.9 MG/DL (ref 8.3–10.4)
CHLORIDE SERPL-SCNC: 106 MMOL/L (ref 98–107)
CO2 SERPL-SCNC: 15 MMOL/L (ref 21–32)
CREAT SERPL-MCNC: 1.3 MG/DL (ref 0.8–1.5)
DIFFERENTIAL METHOD BLD: ABNORMAL
EOSINOPHIL # BLD: 0 K/UL (ref 0–0.8)
EOSINOPHIL NFR BLD: 0 % (ref 0.5–7.8)
ERYTHROCYTE [DISTWIDTH] IN BLOOD BY AUTOMATED COUNT: 13.2 % (ref 11.9–14.6)
GLOBULIN SER CALC-MCNC: 4.6 G/DL (ref 2.3–3.5)
GLUCOSE SERPL-MCNC: 111 MG/DL (ref 65–100)
HCT VFR BLD AUTO: 39 % (ref 41.1–50.3)
HGB BLD-MCNC: 14.9 G/DL (ref 13.6–17.2)
IMM GRANULOCYTES # BLD AUTO: 0 K/UL (ref 0–0.5)
IMM GRANULOCYTES NFR BLD AUTO: 0 % (ref 0–5)
LIPASE SERPL-CCNC: 46 U/L (ref 73–393)
LYMPHOCYTES # BLD: 2.5 K/UL (ref 0.5–4.6)
LYMPHOCYTES NFR BLD: 24 % (ref 13–44)
MCH RBC QN AUTO: 30.2 PG (ref 26.1–32.9)
MCHC RBC AUTO-ENTMCNC: 38.2 G/DL (ref 31.4–35)
MCV RBC AUTO: 78.9 FL (ref 79.6–97.8)
MONOCYTES # BLD: 0.8 K/UL (ref 0.1–1.3)
MONOCYTES NFR BLD: 8 % (ref 4–12)
NEUTS SEG # BLD: 7.1 K/UL (ref 1.7–8.2)
NEUTS SEG NFR BLD: 68 % (ref 43–78)
NRBC # BLD: 0 K/UL (ref 0–0.2)
PLATELET # BLD AUTO: 329 K/UL (ref 150–450)
PMV BLD AUTO: 9.7 FL (ref 9.4–12.3)
POTASSIUM SERPL-SCNC: 4.2 MMOL/L (ref 3.5–5.1)
PROT SERPL-MCNC: 9.2 G/DL (ref 6.3–8.2)
RBC # BLD AUTO: 4.94 M/UL (ref 4.23–5.6)
SODIUM SERPL-SCNC: 135 MMOL/L (ref 136–145)
WBC # BLD AUTO: 10.4 K/UL (ref 4.3–11.1)

## 2020-07-30 PROCEDURE — 96375 TX/PRO/DX INJ NEW DRUG ADDON: CPT

## 2020-07-30 PROCEDURE — 96361 HYDRATE IV INFUSION ADD-ON: CPT

## 2020-07-30 PROCEDURE — 80053 COMPREHEN METABOLIC PANEL: CPT

## 2020-07-30 PROCEDURE — 99283 EMERGENCY DEPT VISIT LOW MDM: CPT

## 2020-07-30 PROCEDURE — 83690 ASSAY OF LIPASE: CPT

## 2020-07-30 PROCEDURE — 74011250636 HC RX REV CODE- 250/636: Performed by: EMERGENCY MEDICINE

## 2020-07-30 PROCEDURE — 85025 COMPLETE CBC W/AUTO DIFF WBC: CPT

## 2020-07-30 PROCEDURE — 96374 THER/PROPH/DIAG INJ IV PUSH: CPT

## 2020-07-30 RX ORDER — FAMOTIDINE 10 MG/ML
20 INJECTION INTRAVENOUS
Status: COMPLETED | OUTPATIENT
Start: 2020-07-30 | End: 2020-07-30

## 2020-07-30 RX ORDER — DIPHENHYDRAMINE HYDROCHLORIDE 50 MG/ML
25 INJECTION, SOLUTION INTRAMUSCULAR; INTRAVENOUS
Status: COMPLETED | OUTPATIENT
Start: 2020-07-30 | End: 2020-07-30

## 2020-07-30 RX ORDER — HALOPERIDOL 5 MG/ML
2.5 INJECTION INTRAMUSCULAR ONCE
Status: COMPLETED | OUTPATIENT
Start: 2020-07-30 | End: 2020-07-30

## 2020-07-30 RX ADMIN — HALOPERIDOL LACTATE 2.5 MG: 5 INJECTION, SOLUTION INTRAMUSCULAR at 08:10

## 2020-07-30 RX ADMIN — FAMOTIDINE 20 MG: 10 INJECTION INTRAVENOUS at 08:10

## 2020-07-30 RX ADMIN — SODIUM CHLORIDE 1000 ML: 9 INJECTION, SOLUTION INTRAVENOUS at 08:09

## 2020-07-30 RX ADMIN — DIPHENHYDRAMINE HYDROCHLORIDE 25 MG: 50 INJECTION, SOLUTION INTRAMUSCULAR; INTRAVENOUS at 08:10

## 2020-07-30 NOTE — DISCHARGE INSTRUCTIONS
Use your nausea medication as needed.   Follow-up with your doctor or return to the ER for any other acute concerns

## 2020-07-30 NOTE — ED NOTES
I have reviewed discharge instructions with the patient. The patient verbalized understanding. Patient left ED via Discharge Method: ambulatory to Home with self. Opportunity for questions and clarification provided. Patient given 0 scripts. To continue your aftercare when you leave the hospital, you may receive an automated call from our care team to check in on how you are doing. This is a free service and part of our promise to provide the best care and service to meet your aftercare needs.  If you have questions, or wish to unsubscribe from this service please call [phone]. Thank you for Choosing our [facility] Emergency Department.

## 2020-07-30 NOTE — ED TRIAGE NOTES
Pt arrives to triage with mask in place. Pt complaint of nausea and vomiting since yesterday. Pt states he ate [employer] and has not felt right since. Pt denies ShOB and fever.

## 2020-07-30 NOTE — ED PROVIDER NOTES
Patient is a 82-year-old male who comes to the emergency department today complaining of nausea and vomiting since yesterday. He reports he has a history of gastroparesis and recurrent nausea and vomiting. However review of the records documents some hyperemesis related to cannabis use. Patient states he has not smoked marijuana in 3 months. States yesterday he woke up with some nausea. Then he ate lunch while at work and started feeling worse. Has had continued vomiting since yesterday afternoon. The history is provided by the patient. Vomiting    This is a new problem. The current episode started yesterday. The problem occurs continuously. The emesis has an appearance of stomach contents. There has been no fever. Pertinent negatives include no chills, no fever, no abdominal pain, no diarrhea and no cough. His pertinent negatives include no recent abdominal surgery. Past Medical History:   Diagnosis Date    BETTY [facility]) ([facility] [address] 8/12/2014    Clostridium difficile colitis 3/20/2011    Gastroparesis 2005    emptying study normal -2006    GERD (gastroesophageal reflux disease)     HIATAL HERNIA SINCE 1999    PUD (peptic ulcer disease) ALL DX IN 2008    ESOPHAGITIS, + H PYLORI    Uncontrolled hypertension 6/26/2018       Past Surgical History:   Procedure Laterality Date    COLONOSCOPY  4/08    ESOPHAGITIS, COLONIC ULCER X1    EGD  4/08    H.  HERNIA, ULCER X2, H PYLORI,    EGD  2011    h pylori -neg    HX WISDOM TEETH EXTRACTION  2/10/11         Family History:   Problem Relation Age of Onset    Other Mother         L+W    Diabetes Maternal Grandmother     Sickle Cell Anemia Father     Heart Disease Paternal Grandfather     Other Sister         ALL L+W    Other Son         L+W       Social History     Socioeconomic History    Marital status:      Spouse name: Not on file    Number of children: Not on file    Years of education: Not on file    Highest education level: Not on file   Occupational History    Not on file   Social Needs    Financial resource strain: Not on file    Food insecurity     Worry: Not on file     Inability: Not on file    Transportation needs     Medical: Not on file     Non-medical: Not on file   Tobacco Use    Smoking status: Current Every Day Smoker     Packs/day: 0.25     Years: 2.00     Pack years: 0.50     Types: Cigarettes    Smokeless tobacco: Never Used   Substance and Sexual Activity    Alcohol use: No    Drug use: Yes     Types: Marijuana    Sexual activity: Yes     Partners: Female   Lifestyle    Physical activity     Days per week: Not on file     Minutes per session: Not on file    Stress: Not on file   Relationships    Social connections     Talks on phone: Not on file     Gets together: Not on file     Attends [employer] service: Not on file     Active member of club or organization: Not on file     Attends meetings of clubs or organizations: Not on file     Relationship status: Not on file    Intimate partner violence     Fear of current or ex partner: Not on file     Emotionally abused: Not on file     Physically abused: Not on file     Forced sexual activity: Not on file   Other Topics Concern    Not on file   Social History Narrative    3/17/11:  PATIENT LIVES WITH GIRLFRIEND, Ervin Briceño  (CELL: [phone]), HER 3YEAR OLD DAUGHTER AND THEIR 3YEAR OLD SON. ALTON IS CURRENTLY 13 WEEKS PREGNANT. PATIENT'S JOB AT [employer] WAS DOWNSIZED ABOUT A YEAR AGO, AND HE HAS BEEN A STAY HOME DAD SINCE. ALTON WORKS IN HOUSEKEEPING POSITION. ALLERGIES: Amoxicillin; Aspirin; Hydrocodone; Ibuprofen; Pcn [penicillins]; and Phenergan [promethazine]    Review of Systems   Constitutional: Negative for chills, fatigue and fever. HENT: Negative for congestion, rhinorrhea and sore throat. Eyes: Negative for pain, discharge and visual disturbance.    Respiratory: Negative for cough and shortness of breath. Cardiovascular: Negative for chest pain and palpitations. Gastrointestinal: Positive for nausea and vomiting. Negative for abdominal pain and diarrhea. Endocrine: Negative for polydipsia and polyuria. Genitourinary: Negative for dysuria, frequency and urgency. Musculoskeletal: Negative for back pain and neck pain. Skin: Negative for rash. Neurological: Negative for seizures, syncope and weakness. Hematological: Negative. Vitals:    07/30/20 0747 07/30/20 0800   BP: (!) 151/91    Pulse: (!) 136 (!) 127   Resp: 26    Temp: 98.7 °F (37.1 °C)    SpO2: 95% 97%   Weight: 65.8 kg (145 lb)    Height: 5' 1\" (1.549 m)             Physical Exam  Vitals signs and nursing note reviewed. Constitutional:       Appearance: Normal appearance. He is well-developed. He is ill-appearing. Comments: Actively retching   HENT:      Head: Normocephalic and atraumatic. Nose: Nose normal.   Eyes:      Extraocular Movements: Extraocular movements intact. Conjunctiva/sclera: Conjunctivae normal.      Pupils: Pupils are equal, round, and reactive to light. Neck:      Musculoskeletal: Normal range of motion and neck supple. Cardiovascular:      Rate and Rhythm: Regular rhythm. Tachycardia present. Heart sounds: Normal heart sounds. Pulmonary:      Effort: Pulmonary effort is normal.      Breath sounds: Normal breath sounds. Abdominal:      Palpations: Abdomen is soft. Tenderness: There is no abdominal tenderness. There is no guarding or rebound. Musculoskeletal: Normal range of motion. General: No tenderness. Lymphadenopathy:      Cervical: No cervical adenopathy. Skin:     General: Skin is warm and dry. Capillary Refill: Capillary refill takes less than 2 seconds. Findings: No rash. Neurological:      General: No focal deficit present. Mental Status: He is alert and oriented to person, place, and time. GCS: GCS eye subscore is 4.  GCS verbal subscore is 5. GCS motor subscore is 6. Cranial Nerves: No cranial nerve deficit. Sensory: No sensory deficit. Motor: Motor function is intact. MDM  Number of Diagnoses or Management Options  Diagnosis management comments: I wore appropriate PPE throughout this patient's ED visit. Brandee Limon MD, 8:15 AM    9:57 AM  Patient feels much better after round of IV medications and a liter of IV fluids. No further vomiting. Ready for discharge to home. States that he has some Zofran at home to use. Voice dictation software was used during the making of this note. This software is not perfect and grammatical and other typographical errors may be present. This note has been proofread, but may still contain errors.   Brandee Limon MD; 7/30/2020 @9:59 AM   ===================================================================             Amount and/or Complexity of Data Reviewed  Clinical lab tests: ordered and reviewed  Review and summarize past medical records: yes  Independent visualization of images, tracings, or specimens: yes    Risk of Complications, Morbidity, and/or Mortality  Presenting problems: moderate  Diagnostic procedures: low  Management options: low    Patient Progress  Patient progress: improved         Procedures

## 2020-07-31 ENCOUNTER — HOSPITAL ENCOUNTER (EMERGENCY)
Age: 45
Discharge: HOME | End: 2020-07-31
Attending: EMERGENCY MEDICINE
Payer: COMMERCIAL

## 2020-07-31 VITALS
OXYGEN SATURATION: 100 % | HEART RATE: 116 BPM | SYSTOLIC BLOOD PRESSURE: 174 MMHG | DIASTOLIC BLOOD PRESSURE: 111 MMHG | HEIGHT: 61 IN | TEMPERATURE: 98 F | RESPIRATION RATE: 24 BRPM | BODY MASS INDEX: 27.38 KG/M2 | WEIGHT: 145 LBS

## 2020-07-31 DIAGNOSIS — R10.13 ACUTE EPIGASTRIC PAIN: ICD-10-CM

## 2020-07-31 DIAGNOSIS — R11.2 NAUSEA AND VOMITING, INTRACTABILITY OF VOMITING NOT SPECIFIED, UNSPECIFIED VOMITING TYPE: Primary | ICD-10-CM

## 2020-07-31 LAB
ALBUMIN SERPL-MCNC: 4.3 G/DL (ref 3.5–5)
ALBUMIN/GLOB SERPL: 1 {RATIO} (ref 1.2–3.5)
ALP SERPL-CCNC: 119 U/L (ref 50–136)
ALT SERPL-CCNC: 54 U/L (ref 12–65)
AMPHET UR QL SCN: NEGATIVE
ANION GAP SERPL CALC-SCNC: 17 MMOL/L (ref 7–16)
AST SERPL-CCNC: 116 U/L (ref 15–37)
BARBITURATES UR QL SCN: NEGATIVE
BASOPHILS # BLD: 0 K/UL (ref 0–0.2)
BASOPHILS NFR BLD: 0 % (ref 0–2)
BENZODIAZ UR QL: NEGATIVE
BILIRUB SERPL-MCNC: 0.8 MG/DL (ref 0.2–1.1)
BUN SERPL-MCNC: 28 MG/DL (ref 6–23)
CALCIUM SERPL-MCNC: 9.4 MG/DL (ref 8.3–10.4)
CANNABINOIDS UR QL SCN: POSITIVE
CHLORIDE SERPL-SCNC: 104 MMOL/L (ref 98–107)
CO2 SERPL-SCNC: 16 MMOL/L (ref 21–32)
COCAINE UR QL SCN: NEGATIVE
CREAT SERPL-MCNC: 1.28 MG/DL (ref 0.8–1.5)
DIFFERENTIAL METHOD BLD: ABNORMAL
EOSINOPHIL # BLD: 0 K/UL (ref 0–0.8)
EOSINOPHIL NFR BLD: 0 % (ref 0.5–7.8)
ERYTHROCYTE [DISTWIDTH] IN BLOOD BY AUTOMATED COUNT: 13 % (ref 11.9–14.6)
GLOBULIN SER CALC-MCNC: 4.3 G/DL (ref 2.3–3.5)
GLUCOSE SERPL-MCNC: 124 MG/DL (ref 65–100)
HCT VFR BLD AUTO: 40.6 % (ref 41.1–50.3)
HGB BLD-MCNC: 14.7 G/DL (ref 13.6–17.2)
IMM GRANULOCYTES # BLD AUTO: 0 K/UL (ref 0–0.5)
IMM GRANULOCYTES NFR BLD AUTO: 0 % (ref 0–5)
LIPASE SERPL-CCNC: 40 U/L (ref 73–393)
LYMPHOCYTES # BLD: 1.1 K/UL (ref 0.5–4.6)
LYMPHOCYTES NFR BLD: 15 % (ref 13–44)
MCH RBC QN AUTO: 29 PG (ref 26.1–32.9)
MCHC RBC AUTO-ENTMCNC: 36.2 G/DL (ref 31.4–35)
MCV RBC AUTO: 80.1 FL (ref 79.6–97.8)
METHADONE UR QL: NEGATIVE
MONOCYTES # BLD: 0.4 K/UL (ref 0.1–1.3)
MONOCYTES NFR BLD: 6 % (ref 4–12)
NEUTS SEG # BLD: 5.9 K/UL (ref 1.7–8.2)
NEUTS SEG NFR BLD: 79 % (ref 43–78)
NRBC # BLD: 0 K/UL (ref 0–0.2)
OPIATES UR QL: NEGATIVE
PCP UR QL: NEGATIVE
PLATELET # BLD AUTO: 335 K/UL (ref 150–450)
PMV BLD AUTO: 9.2 FL (ref 9.4–12.3)
POTASSIUM SERPL-SCNC: 4.6 MMOL/L (ref 3.5–5.1)
PROT SERPL-MCNC: 8.6 G/DL (ref 6.3–8.2)
RBC # BLD AUTO: 5.07 M/UL (ref 4.23–5.6)
SODIUM SERPL-SCNC: 137 MMOL/L (ref 136–145)
WBC # BLD AUTO: 7.4 K/UL (ref 4.3–11.1)

## 2020-07-31 PROCEDURE — 83690 ASSAY OF LIPASE: CPT

## 2020-07-31 PROCEDURE — 80053 COMPREHEN METABOLIC PANEL: CPT

## 2020-07-31 PROCEDURE — 96375 TX/PRO/DX INJ NEW DRUG ADDON: CPT

## 2020-07-31 PROCEDURE — 96361 HYDRATE IV INFUSION ADD-ON: CPT

## 2020-07-31 PROCEDURE — 96374 THER/PROPH/DIAG INJ IV PUSH: CPT

## 2020-07-31 PROCEDURE — 85025 COMPLETE CBC W/AUTO DIFF WBC: CPT

## 2020-07-31 PROCEDURE — 80307 DRUG TEST PRSMV CHEM ANLYZR: CPT

## 2020-07-31 PROCEDURE — 74011250636 HC RX REV CODE- 250/636: Performed by: EMERGENCY MEDICINE

## 2020-07-31 PROCEDURE — 74011000250 HC RX REV CODE- 250: Performed by: EMERGENCY MEDICINE

## 2020-07-31 PROCEDURE — 99283 EMERGENCY DEPT VISIT LOW MDM: CPT

## 2020-07-31 RX ORDER — DIPHENHYDRAMINE HYDROCHLORIDE 50 MG/ML
25 INJECTION, SOLUTION INTRAMUSCULAR; INTRAVENOUS
Status: COMPLETED | OUTPATIENT
Start: 2020-07-31 | End: 2020-07-31

## 2020-07-31 RX ORDER — LORAZEPAM 2 MG/ML
0.5 INJECTION INTRAMUSCULAR
Status: COMPLETED | OUTPATIENT
Start: 2020-07-31 | End: 2020-07-31

## 2020-07-31 RX ORDER — SODIUM CHLORIDE, SODIUM LACTATE, POTASSIUM CHLORIDE, CALCIUM CHLORIDE 600; 310; 30; 20 MG/100ML; MG/100ML; MG/100ML; MG/100ML
1000 INJECTION, SOLUTION INTRAVENOUS ONCE
Status: COMPLETED | OUTPATIENT
Start: 2020-07-31 | End: 2020-07-31

## 2020-07-31 RX ORDER — ONDANSETRON 2 MG/ML
4 INJECTION INTRAMUSCULAR; INTRAVENOUS
Status: COMPLETED | OUTPATIENT
Start: 2020-07-31 | End: 2020-07-31

## 2020-07-31 RX ORDER — ONDANSETRON 4 MG/1
4 TABLET, ORALLY DISINTEGRATING ORAL
Qty: 16 TAB | Refills: 0 | Status: SHIPPED | OUTPATIENT
Start: 2020-07-31 | End: 2020-08-03

## 2020-07-31 RX ORDER — HALOPERIDOL 5 MG/ML
3 INJECTION INTRAMUSCULAR ONCE
Status: COMPLETED | OUTPATIENT
Start: 2020-07-31 | End: 2020-07-31

## 2020-07-31 RX ORDER — HYOSCYAMINE SULFATE 0.12 MG/1
0.25 TABLET SUBLINGUAL
Qty: 16 TAB | Refills: 0 | Status: SHIPPED | OUTPATIENT
Start: 2020-07-31 | End: 2021-04-13

## 2020-07-31 RX ORDER — HALOPERIDOL 5 MG/ML
5 INJECTION INTRAMUSCULAR ONCE
Status: DISCONTINUED | OUTPATIENT
Start: 2020-07-31 | End: 2020-07-31

## 2020-07-31 RX ADMIN — SODIUM CHLORIDE, SODIUM LACTATE, POTASSIUM CHLORIDE, AND CALCIUM CHLORIDE 1000 ML: 600; 310; 30; 20 INJECTION, SOLUTION INTRAVENOUS at 08:13

## 2020-07-31 RX ADMIN — HALOPERIDOL LACTATE 3 MG: 5 INJECTION, SOLUTION INTRAMUSCULAR at 08:14

## 2020-07-31 RX ADMIN — DIPHENHYDRAMINE HYDROCHLORIDE 25 MG: 50 INJECTION, SOLUTION INTRAMUSCULAR; INTRAVENOUS at 08:14

## 2020-07-31 RX ADMIN — FAMOTIDINE 20 MG: 10 INJECTION INTRAVENOUS at 08:14

## 2020-07-31 RX ADMIN — ONDANSETRON 4 MG: 2 INJECTION INTRAMUSCULAR; INTRAVENOUS at 08:14

## 2020-07-31 RX ADMIN — LORAZEPAM 0.5 MG: 2 INJECTION INTRAMUSCULAR; INTRAVENOUS at 08:14

## 2020-07-31 NOTE — DISCHARGE INSTRUCTIONS
Clear liquids if nauseated. Continue acid medication. Nausea medicine if needed. Recheck with your primary care doctor. Also may contact gastroenterologist if you have persistent symptoms. Patient Education        Nausea and Vomiting: Care Instructions  Your Care Instructions     When you are nauseated, you may feel weak and sweaty and notice a lot of saliva in your mouth. Nausea often leads to vomiting. Most of the time you do not need to worry about nausea and vomiting, but they can be signs of other illnesses. Two common causes of nausea and vomiting are stomach flu and food poisoning. Nausea and vomiting from viral stomach flu will usually start to improve within 24 hours. Nausea and vomiting from food poisoning may last from 12 to 48 hours. The doctor has checked you carefully, but problems can develop later. If you notice any problems or new symptoms, get medical treatment right away. Follow-up care is a key part of your treatment and safety. Be sure to make and go to all appointments, and call your doctor if you are having problems. It's also a good idea to know your test results and keep a list of the medicines you take. How can you care for yourself at home? · To prevent dehydration, drink plenty of fluids, enough so that your urine is light yellow or clear like water. Choose water and other caffeine-free clear liquids until you feel better. If you have kidney, heart, or liver disease and have to limit fluids, talk with your doctor before you increase the amount of fluids you drink. · Rest in bed until you feel better. · When you are able to eat, try clear soups, mild foods, and liquids until all symptoms are gone for 12 to 48 hours. Other good choices include dry toast, crackers, cooked cereal, and gelatin dessert, such as Jell-O. When should you call for help? [id number] anytime you think you may need emergency care. For example, call if:  · You passed out (lost consciousness).   Call your doctor now or seek immediate medical care if:  · You have symptoms of dehydration, such as:  ? Dry eyes and a dry mouth. ? Passing only a little dark urine. ? Feeling thirstier than usual.  · You have new or worsening belly pain. · You have a new or higher fever. · You vomit blood or what looks like coffee grounds. Watch closely for changes in your health, and be sure to contact your doctor if:  · You have ongoing nausea and vomiting. · Your vomiting is getting worse. · Your vomiting lasts longer than 2 days. · You are not getting better as expected. Where can you learn more? Go to http://www.ramirez.com/  Enter H591 in the search box to learn more about \"Nausea and Vomiting: Care Instructions. \"  Current as of: June 26, 2019               Content Version: 12.5  © 6141-3126 [employer], [employer]. Care instructions adapted under license by "[employer] (which disclaims liability or warranty for this information). If you have questions about a medical condition or this instruction, always ask your healthcare professional. [place] [address] any warranty or liability for your use of this information.

## 2020-07-31 NOTE — ED NOTES
I have reviewed discharge instructions with the patient. The patient verbalized understanding. Patient left ED via Discharge Method: ambulatory to Home with self. Opportunity for questions and clarification provided. Patient given 2 scripts. To continue your aftercare when you leave the hospital, you may receive an automated call from our care team to check in on how you are doing. This is a free service and part of our promise to provide the best care and service to meet your aftercare needs.  If you have questions, or wish to unsubscribe from this service please call [phone]. Thank you for Choosing our [address] [place] [address] Emergency Department.

## 2020-07-31 NOTE — ED PROVIDER NOTES
82-year-old male history of hyperemesis, cannabinoid hyperemesis, cyclic vomiting and gastritis, peptic ulcer disease presents with 48-hour history of vomiting. Some epigastric discomfort. Pickups. States he is hyperventilating as well. Denies to me any recent use of marijuana. No fever or hematemesis. No diarrhea. States he is taking his Protonix. No surgery in the past.  Records reveal the patient was evaluated about 24 hours ago with normal lab work and hydrated. The history is provided by the patient. Vomiting    This is a new problem. The current episode started 2 days ago. The problem occurs more than 10 times per day. The problem has not changed since onset. There has been no fever. Associated symptoms include abdominal pain. Pertinent negatives include no chills, no fever, no diarrhea, no myalgias and no cough. His pertinent negatives include no gastric bypass and no DM. Past Medical History:   Diagnosis Date    BETTY (acute kidney injury) ([facility] [address] 8/12/2014    Clostridium difficile colitis 3/20/2011    Gastroparesis 2005    emptying study normal -2006    GERD (gastroesophageal reflux disease)     HIATAL HERNIA SINCE 1999    PUD (peptic ulcer disease) ALL DX IN 2008    ESOPHAGITIS, + H PYLORI    Uncontrolled hypertension 6/26/2018       Past Surgical History:   Procedure Laterality Date    COLONOSCOPY  4/08    ESOPHAGITIS, COLONIC ULCER X1    EGD  4/08    H.  HERNIA, ULCER X2, H PYLORI,    EGD  2011    h pylori -neg    HX WISDOM TEETH EXTRACTION  2/10/11         Family History:   Problem Relation Age of Onset    Other Mother         L+W    Diabetes Maternal Grandmother     Sickle Cell Anemia Father     Heart Disease Paternal Grandfather     Other Sister         ALL L+W    Other Son         L+W       Social History     Socioeconomic History    Marital status:      Spouse name: Not on file    Number of children: Not on file    Years of education: Not on file    Highest education level: Not on file   Occupational History    Not on file   Social Needs    Financial resource strain: Not on file    Food insecurity     Worry: Not on file     Inability: Not on file    Transportation needs     Medical: Not on file     Non-medical: Not on file   Tobacco Use    Smoking status: Current Every Day Smoker     Packs/day: 0.25     Years: 2.00     Pack years: 0.50     Types: Cigarettes    Smokeless tobacco: Never Used   Substance and Sexual Activity    Alcohol use: No    Drug use: Yes     Types: Marijuana    Sexual activity: Yes     Partners: Female   Lifestyle    Physical activity     Days per week: Not on file     Minutes per session: Not on file    Stress: Not on file   Relationships    Social connections     Talks on phone: Not on file     Gets together: Not on file     Attends Mosque service: Not on file     Active member of club or organization: Not on file     Attends meetings of clubs or organizations: Not on file     Relationship status: Not on file    Intimate partner violence     Fear of current or ex partner: Not on file     Emotionally abused: Not on file     Physically abused: Not on file     Forced sexual activity: Not on file   Other Topics Concern    Not on file   Social History Narrative    3/17/11:  PATIENT LIVES WITH GIRLFRIEND, Ervin Shah (CELL: [phone]), HER 3YEAR OLD DAUGHTER AND THEIR 3YEAR OLD SON. ALTON IS CURRENTLY 13 WEEKS PREGNANT. PATIENT'S JOB AT [employer] WAS DOWNSIZED ABOUT A YEAR AGO, AND HE HAS BEEN A STAY HOME DAD SINCE. ALTON WORKS IN HOUSEKEEPING POSITION. ALLERGIES: Amoxicillin; Aspirin; Hydrocodone; Ibuprofen; Pcn [penicillins]; and Phenergan [promethazine]    Review of Systems   Constitutional: Negative for chills and fever. Respiratory: Negative for cough and shortness of breath. Cardiovascular: Negative for chest pain and palpitations.    Gastrointestinal: Positive for abdominal pain and vomiting. Negative for diarrhea. Genitourinary: Negative for dysuria and flank pain. Musculoskeletal: Negative for back pain and myalgias. Skin: Negative for color change and rash. All other systems reviewed and are negative. Vitals:    07/31/20 0745   Pulse: (!) 118   Resp: 24   Temp: 98 °F (36.7 °C)   SpO2: 94%   Weight: 65.8 kg (145 lb)   Height: 5' 1\" (1.549 m)            Physical Exam  Vitals signs and nursing note reviewed. Constitutional:       General: He is not in acute distress. Appearance: He is well-developed. HENT:      Head: Normocephalic and atraumatic. Right Ear: External ear normal.      Left Ear: External ear normal.      Mouth/Throat:      Pharynx: No oropharyngeal exudate. Eyes:      Conjunctiva/sclera: Conjunctivae normal.      Pupils: Pupils are equal, round, and reactive to light. Neck:      Musculoskeletal: Normal range of motion and neck supple. Cardiovascular:      Rate and Rhythm: Normal rate and regular rhythm. Heart sounds: No murmur. Pulmonary:      Effort: No respiratory distress. Breath sounds: Normal breath sounds. Abdominal:      General: Bowel sounds are normal.      Palpations: Abdomen is soft. There is no mass. Tenderness: There is no abdominal tenderness. There is no guarding or rebound. Hernia: No hernia is present. Skin:     General: Skin is warm and dry. Neurological:      Mental Status: He is alert and oriented to person, place, and time. Gait: Gait normal.      Comments: Nl speech   Psychiatric:         Speech: Speech normal.          MDM  Number of Diagnoses or Management Options  Diagnosis management comments: Abdomen not tender. Patient hyperventilating somewhat. Will recheck potassium. Hydrate. Nausea control.        Amount and/or Complexity of Data Reviewed  Clinical lab tests: ordered and reviewed    Risk of Complications, Morbidity, and/or Mortality  Presenting problems: moderate  Diagnostic procedures: minimal  Management options: low    Patient Progress  Patient progress: stable         Procedures      Results Include:    Recent Results (from the past 24 hour(s))   CBC WITH AUTOMATED DIFF    Collection Time: 07/31/20  8:29 AM   Result Value Ref Range    WBC 7.4 4.3 - 11.1 K/uL    RBC 5.07 4.23 - 5.6 M/uL    HGB 14.7 13.6 - 17.2 g/dL    HCT 40.6 (L) 41.1 - 50.3 %    MCV 80.1 79.6 - 97.8 FL    MCH 29.0 26.1 - 32.9 PG    MCHC 36.2 (H) 31.4 - 35.0 g/dL    RDW 13.0 11.9 - 14.6 %    PLATELET 025 681 - 311 K/uL    MPV 9.2 (L) 9.4 - 12.3 FL    ABSOLUTE NRBC 0.00 0.0 - 0.2 K/uL    NEUTROPHILS 79 (H) 43 - 78 %    LYMPHOCYTES 15 13 - 44 %    MONOCYTES 6 4.0 - 12.0 %    EOSINOPHILS 0 (L) 0.5 - 7.8 %    BASOPHILS 0 0.0 - 2.0 %    IMMATURE GRANULOCYTES 0 0.0 - 5.0 %    ABS. NEUTROPHILS 5.9 1.7 - 8.2 K/UL    ABS. LYMPHOCYTES 1.1 0.5 - 4.6 K/UL    ABS. MONOCYTES 0.4 0.1 - 1.3 K/UL    ABS. EOSINOPHILS 0.0 0.0 - 0.8 K/UL    ABS. BASOPHILS 0.0 0.0 - 0.2 K/UL    ABS. IMM. GRANS. 0.0 0.0 - 0.5 K/UL    DF AUTOMATED     METABOLIC PANEL, COMPREHENSIVE    Collection Time: 07/31/20  8:29 AM   Result Value Ref Range    Sodium 137 136 - 145 mmol/L    Potassium 4.6 3.5 - 5.1 mmol/L    Chloride 104 98 - 107 mmol/L    CO2 16 (L) 21 - 32 mmol/L    Anion gap 17 (H) 7 - 16 mmol/L    Glucose 124 (H) 65 - 100 mg/dL    BUN 28 (H) 6 - 23 MG/DL    Creatinine 1.28 0.8 - 1.5 MG/DL    GFR est AA >60 >60 ml/min/1.73m2    GFR est non-AA >60 >60 ml/min/1.73m2    Calcium 9.4 8.3 - 10.4 MG/DL    Bilirubin, total 0.8 0.2 - 1.1 MG/DL    ALT (SGPT) 54 12 - 65 U/L    AST (SGOT) 116 (H) 15 - 37 U/L    Alk. phosphatase 119 50 - 136 U/L    Protein, total 8.6 (H) 6.3 - 8.2 g/dL    Albumin 4.3 3.5 - 5.0 g/dL    Globulin 4.3 (H) 2.3 - 3.5 g/dL    A-G Ratio 1.0 (L) 1.2 - 3.5     LIPASE    Collection Time: 07/31/20  8:29 AM   Result Value Ref Range    Lipase 40 (L) 73 - 393 U/L          9:38 AM  Sleeping at this point. Has done well with Zofran at home. States still taking Protonix

## 2020-07-31 NOTE — ED TRIAGE NOTES
Pt returns to ED with complaint of nausea vomiting abdominal pain. States he was seen and evaluated here yesterday. States he did not receive prescription for medication on that visit.

## 2020-08-01 ENCOUNTER — HOSPITAL ENCOUNTER (OUTPATIENT)
Age: 45
Setting detail: OBSERVATION
Discharge: HOME | End: 2020-08-03
Attending: EMERGENCY MEDICINE | Admitting: FAMILY MEDICINE
Payer: COMMERCIAL

## 2020-08-01 ENCOUNTER — APPOINTMENT (OUTPATIENT)
Dept: GENERAL RADIOLOGY | Age: 45
End: 2020-08-01
Attending: EMERGENCY MEDICINE
Payer: COMMERCIAL

## 2020-08-01 DIAGNOSIS — R11.10 HYPEREMESIS: ICD-10-CM

## 2020-08-01 DIAGNOSIS — K92.0 HEMATEMESIS WITH NAUSEA: Primary | ICD-10-CM

## 2020-08-01 PROBLEM — R11.2 INTRACTABLE NAUSEA AND VOMITING: Status: ACTIVE | Noted: 2020-08-01

## 2020-08-01 LAB
ALBUMIN SERPL-MCNC: 4.6 G/DL (ref 3.5–5)
ALBUMIN/GLOB SERPL: 1.1 {RATIO} (ref 1.2–3.5)
ALP SERPL-CCNC: 124 U/L (ref 50–136)
ALT SERPL-CCNC: 66 U/L (ref 12–65)
ANION GAP SERPL CALC-SCNC: 12 MMOL/L (ref 7–16)
APPEARANCE UR: ABNORMAL
AST SERPL-CCNC: 143 U/L (ref 15–37)
ATRIAL RATE: 101 BPM
BACTERIA URNS QL MICRO: 0 /HPF
BASOPHILS # BLD: 0 K/UL (ref 0–0.2)
BASOPHILS NFR BLD: 0 % (ref 0–2)
BILIRUB SERPL-MCNC: 0.8 MG/DL (ref 0.2–1.1)
BILIRUB UR QL: NEGATIVE
BUN SERPL-MCNC: 25 MG/DL (ref 6–23)
CALCIUM SERPL-MCNC: 9.5 MG/DL (ref 8.3–10.4)
CALCULATED P AXIS, ECG09: 50 DEGREES
CALCULATED R AXIS, ECG10: 58 DEGREES
CALCULATED T AXIS, ECG11: 34 DEGREES
CASTS URNS QL MICRO: ABNORMAL /LPF
CHLORIDE SERPL-SCNC: 104 MMOL/L (ref 98–107)
CO2 SERPL-SCNC: 21 MMOL/L (ref 21–32)
COLOR UR: YELLOW
CREAT SERPL-MCNC: 1.07 MG/DL (ref 0.8–1.5)
DIAGNOSIS, 93000: NORMAL
DIFFERENTIAL METHOD BLD: ABNORMAL
EOSINOPHIL # BLD: 0 K/UL (ref 0–0.8)
EOSINOPHIL NFR BLD: 0 % (ref 0.5–7.8)
EPI CELLS #/AREA URNS HPF: ABNORMAL /HPF
ERYTHROCYTE [DISTWIDTH] IN BLOOD BY AUTOMATED COUNT: 12.9 % (ref 11.9–14.6)
GLOBULIN SER CALC-MCNC: 4.2 G/DL (ref 2.3–3.5)
GLUCOSE SERPL-MCNC: 116 MG/DL (ref 65–100)
GLUCOSE UR STRIP.AUTO-MCNC: NEGATIVE MG/DL
HCT VFR BLD AUTO: 43.4 % (ref 41.1–50.3)
HGB BLD-MCNC: 15.3 G/DL (ref 13.6–17.2)
HGB BLD-MCNC: 15.8 G/DL (ref 13.6–17.2)
HGB BLD-MCNC: 16.1 G/DL (ref 13.6–17.2)
HGB UR QL STRIP: ABNORMAL
IMM GRANULOCYTES # BLD AUTO: 0 K/UL (ref 0–0.5)
IMM GRANULOCYTES NFR BLD AUTO: 0 % (ref 0–5)
KETONES UR QL STRIP.AUTO: >80 MG/DL
LEUKOCYTE ESTERASE UR QL STRIP.AUTO: NEGATIVE
LIPASE SERPL-CCNC: 120 U/L (ref 73–393)
LYMPHOCYTES # BLD: 1.5 K/UL (ref 0.5–4.6)
LYMPHOCYTES NFR BLD: 14 % (ref 13–44)
MAGNESIUM SERPL-MCNC: 2.5 MG/DL (ref 1.8–2.4)
MCH RBC QN AUTO: 29.4 PG (ref 26.1–32.9)
MCHC RBC AUTO-ENTMCNC: 37.1 G/DL (ref 31.4–35)
MCV RBC AUTO: 79.2 FL (ref 79.6–97.8)
MONOCYTES # BLD: 1.1 K/UL (ref 0.1–1.3)
MONOCYTES NFR BLD: 10 % (ref 4–12)
NEUTS SEG # BLD: 7.8 K/UL (ref 1.7–8.2)
NEUTS SEG NFR BLD: 75 % (ref 43–78)
NITRITE UR QL STRIP.AUTO: NEGATIVE
NRBC # BLD: 0 K/UL (ref 0–0.2)
P-R INTERVAL, ECG05: 146 MS
PH UR STRIP: 8.5 [PH] (ref 5–9)
PLATELET # BLD AUTO: 360 K/UL (ref 150–450)
PMV BLD AUTO: 8.9 FL (ref 9.4–12.3)
POTASSIUM SERPL-SCNC: 3.3 MMOL/L (ref 3.5–5.1)
PROT SERPL-MCNC: 8.8 G/DL (ref 6.3–8.2)
PROT UR STRIP-MCNC: 30 MG/DL
Q-T INTERVAL, ECG07: 340 MS
QRS DURATION, ECG06: 66 MS
QTC CALCULATION (BEZET), ECG08: 440 MS
RBC # BLD AUTO: 5.48 M/UL (ref 4.23–5.6)
RBC #/AREA URNS HPF: ABNORMAL /HPF
SODIUM SERPL-SCNC: 137 MMOL/L (ref 136–145)
SP GR UR REFRACTOMETRY: 1.02 (ref 1–1.02)
UROBILINOGEN UR QL STRIP.AUTO: 0.2 EU/DL (ref 0.2–1)
VENTRICULAR RATE, ECG03: 101 BPM
WBC # BLD AUTO: 10.4 K/UL (ref 4.3–11.1)
WBC URNS QL MICRO: 0 /HPF

## 2020-08-01 PROCEDURE — 74011636637 HC RX REV CODE- 636/637: Performed by: FAMILY MEDICINE

## 2020-08-01 PROCEDURE — 96372 THER/PROPH/DIAG INJ SC/IM: CPT

## 2020-08-01 PROCEDURE — 80053 COMPREHEN METABOLIC PANEL: CPT

## 2020-08-01 PROCEDURE — 96365 THER/PROPH/DIAG IV INF INIT: CPT

## 2020-08-01 PROCEDURE — 99284 EMERGENCY DEPT VISIT MOD MDM: CPT

## 2020-08-01 PROCEDURE — 96375 TX/PRO/DX INJ NEW DRUG ADDON: CPT

## 2020-08-01 PROCEDURE — 81003 URINALYSIS AUTO W/O SCOPE: CPT

## 2020-08-01 PROCEDURE — 85025 COMPLETE CBC W/AUTO DIFF WBC: CPT

## 2020-08-01 PROCEDURE — 74011250636 HC RX REV CODE- 250/636: Performed by: EMERGENCY MEDICINE

## 2020-08-01 PROCEDURE — 85018 HEMOGLOBIN: CPT

## 2020-08-01 PROCEDURE — C9113 INJ PANTOPRAZOLE SODIUM, VIA: HCPCS | Performed by: EMERGENCY MEDICINE

## 2020-08-01 PROCEDURE — C9113 INJ PANTOPRAZOLE SODIUM, VIA: HCPCS | Performed by: FAMILY MEDICINE

## 2020-08-01 PROCEDURE — 36415 COLL VENOUS BLD VENIPUNCTURE: CPT

## 2020-08-01 PROCEDURE — 83735 ASSAY OF MAGNESIUM: CPT

## 2020-08-01 PROCEDURE — 96376 TX/PRO/DX INJ SAME DRUG ADON: CPT

## 2020-08-01 PROCEDURE — 96374 THER/PROPH/DIAG INJ IV PUSH: CPT

## 2020-08-01 PROCEDURE — 96366 THER/PROPH/DIAG IV INF ADDON: CPT

## 2020-08-01 PROCEDURE — 96361 HYDRATE IV INFUSION ADD-ON: CPT

## 2020-08-01 PROCEDURE — 74011250636 HC RX REV CODE- 250/636: Performed by: FAMILY MEDICINE

## 2020-08-01 PROCEDURE — 83690 ASSAY OF LIPASE: CPT

## 2020-08-01 PROCEDURE — 99218 HC RM OBSERVATION: CPT

## 2020-08-01 PROCEDURE — 74011000250 HC RX REV CODE- 250: Performed by: FAMILY MEDICINE

## 2020-08-01 PROCEDURE — 81001 URINALYSIS AUTO W/SCOPE: CPT

## 2020-08-01 PROCEDURE — 74022 RADEX COMPL AQT ABD SERIES: CPT

## 2020-08-01 RX ORDER — DIPHENHYDRAMINE HYDROCHLORIDE 50 MG/ML
25 INJECTION, SOLUTION INTRAMUSCULAR; INTRAVENOUS
Status: COMPLETED | OUTPATIENT
Start: 2020-08-01 | End: 2020-08-01

## 2020-08-01 RX ORDER — ACETAMINOPHEN 325 MG/1
650 TABLET ORAL
Status: DISCONTINUED | OUTPATIENT
Start: 2020-08-01 | End: 2020-08-03 | Stop reason: HOSPADM

## 2020-08-01 RX ORDER — SODIUM CHLORIDE 0.9 % (FLUSH) 0.9 %
5-40 SYRINGE (ML) INJECTION AS NEEDED
Status: DISCONTINUED | OUTPATIENT
Start: 2020-08-01 | End: 2020-08-03 | Stop reason: HOSPADM

## 2020-08-01 RX ORDER — POTASSIUM CHLORIDE 14.9 MG/ML
20 INJECTION INTRAVENOUS ONCE
Status: COMPLETED | OUTPATIENT
Start: 2020-08-01 | End: 2020-08-02

## 2020-08-01 RX ORDER — ONDANSETRON 2 MG/ML
4 INJECTION INTRAMUSCULAR; INTRAVENOUS
Status: COMPLETED | OUTPATIENT
Start: 2020-08-01 | End: 2020-08-01

## 2020-08-01 RX ORDER — HALOPERIDOL 5 MG/ML
3 INJECTION INTRAMUSCULAR ONCE
Status: COMPLETED | OUTPATIENT
Start: 2020-08-01 | End: 2020-08-01

## 2020-08-01 RX ORDER — DEXAMETHASONE SODIUM PHOSPHATE 4 MG/ML
4 INJECTION, SOLUTION INTRA-ARTICULAR; INTRALESIONAL; INTRAMUSCULAR; INTRAVENOUS; SOFT TISSUE ONCE
Status: COMPLETED | OUTPATIENT
Start: 2020-08-01 | End: 2020-08-01

## 2020-08-01 RX ORDER — ACETAMINOPHEN 650 MG/1
650 SUPPOSITORY RECTAL
Status: DISCONTINUED | OUTPATIENT
Start: 2020-08-01 | End: 2020-08-03 | Stop reason: HOSPADM

## 2020-08-01 RX ORDER — PANTOPRAZOLE SODIUM 40 MG/10ML
40 INJECTION, POWDER, LYOPHILIZED, FOR SOLUTION INTRAVENOUS ONCE
Status: COMPLETED | OUTPATIENT
Start: 2020-08-01 | End: 2020-08-01

## 2020-08-01 RX ORDER — SODIUM CHLORIDE 0.9 % (FLUSH) 0.9 %
5-40 SYRINGE (ML) INJECTION EVERY 8 HOURS
Status: DISCONTINUED | OUTPATIENT
Start: 2020-08-01 | End: 2020-08-03 | Stop reason: HOSPADM

## 2020-08-01 RX ORDER — POLYETHYLENE GLYCOL 3350 17 G/17G
17 POWDER, FOR SOLUTION ORAL DAILY PRN
Status: DISCONTINUED | OUTPATIENT
Start: 2020-08-01 | End: 2020-08-03 | Stop reason: HOSPADM

## 2020-08-01 RX ORDER — METOCLOPRAMIDE HYDROCHLORIDE 5 MG/ML
5 INJECTION INTRAMUSCULAR; INTRAVENOUS
Status: DISCONTINUED | OUTPATIENT
Start: 2020-08-01 | End: 2020-08-03

## 2020-08-01 RX ORDER — SODIUM CHLORIDE, SODIUM LACTATE, POTASSIUM CHLORIDE, CALCIUM CHLORIDE 600; 310; 30; 20 MG/100ML; MG/100ML; MG/100ML; MG/100ML
1000 INJECTION, SOLUTION INTRAVENOUS ONCE
Status: COMPLETED | OUTPATIENT
Start: 2020-08-01 | End: 2020-08-02

## 2020-08-01 RX ORDER — SODIUM CHLORIDE, SODIUM LACTATE, POTASSIUM CHLORIDE, CALCIUM CHLORIDE 600; 310; 30; 20 MG/100ML; MG/100ML; MG/100ML; MG/100ML
100 INJECTION, SOLUTION INTRAVENOUS CONTINUOUS
Status: DISPENSED | OUTPATIENT
Start: 2020-08-01 | End: 2020-08-02

## 2020-08-01 RX ORDER — SUMATRIPTAN 6 MG/.5ML
6 INJECTION, SOLUTION SUBCUTANEOUS ONCE
Status: COMPLETED | OUTPATIENT
Start: 2020-08-01 | End: 2020-08-01

## 2020-08-01 RX ORDER — ENOXAPARIN SODIUM 100 MG/ML
40 INJECTION SUBCUTANEOUS DAILY
Status: DISCONTINUED | OUTPATIENT
Start: 2020-08-01 | End: 2020-08-01

## 2020-08-01 RX ADMIN — DIPHENHYDRAMINE HYDROCHLORIDE 25 MG: 50 INJECTION, SOLUTION INTRAMUSCULAR; INTRAVENOUS at 06:58

## 2020-08-01 RX ADMIN — SODIUM CHLORIDE, SODIUM LACTATE, POTASSIUM CHLORIDE, AND CALCIUM CHLORIDE 150 ML/HR: 600; 310; 30; 20 INJECTION, SOLUTION INTRAVENOUS at 14:42

## 2020-08-01 RX ADMIN — Medication 10 ML: at 14:00

## 2020-08-01 RX ADMIN — ONDANSETRON 4 MG: 2 INJECTION INTRAMUSCULAR; INTRAVENOUS at 06:56

## 2020-08-01 RX ADMIN — METOCLOPRAMIDE HYDROCHLORIDE 5 MG: 5 INJECTION INTRAMUSCULAR; INTRAVENOUS at 14:29

## 2020-08-01 RX ADMIN — SUMATRIPTAN 6 MG: 6 INJECTION SUBCUTANEOUS at 14:39

## 2020-08-01 RX ADMIN — HALOPERIDOL LACTATE 3 MG: 5 INJECTION, SOLUTION INTRAMUSCULAR at 06:56

## 2020-08-01 RX ADMIN — POTASSIUM CHLORIDE 20 MEQ: 200 INJECTION, SOLUTION INTRAVENOUS at 17:18

## 2020-08-01 RX ADMIN — PANTOPRAZOLE SODIUM 40 MG: 40 INJECTION, POWDER, FOR SOLUTION INTRAVENOUS at 10:14

## 2020-08-01 RX ADMIN — Medication 10 ML: at 22:19

## 2020-08-01 RX ADMIN — DEXAMETHASONE SODIUM PHOSPHATE 4 MG: 4 INJECTION, SOLUTION INTRAMUSCULAR; INTRAVENOUS at 14:29

## 2020-08-01 RX ADMIN — SODIUM CHLORIDE 40 MG: 9 INJECTION, SOLUTION INTRAMUSCULAR; INTRAVENOUS; SUBCUTANEOUS at 21:59

## 2020-08-01 RX ADMIN — SODIUM CHLORIDE, SODIUM LACTATE, POTASSIUM CHLORIDE, AND CALCIUM CHLORIDE 1000 ML: 600; 310; 30; 20 INJECTION, SOLUTION INTRAVENOUS at 06:56

## 2020-08-01 NOTE — ED TRIAGE NOTES
Patient states he has been having nausea/vomiting x 3 days, hx of gastroparesis and gastritis. Reports vomit is coffee ground emesis, no diarrhea. States pain is burning and tingling throughout entire abdomen. Has tried taking zofran without relief.

## 2020-08-01 NOTE — H&P
History of Present Illness:   72-year-old male presenting to [facility] emergency department on 8/1/2020 with a complaint of repeat episodes of nausea and vomiting. Patient was seen on 7/31/2020 for the same and sent home. Patient had a positive urine drug screen for cannabis. Patient admits to discontinue cannabis use approximately 3 days ago. Patient also has a history of cannabis induced gastroparesis proven by nuclear medicine gastric emptying study several years ago. Patient had previously been on erythromycin and metoclopramide. Patient discontinued these for unknown reason. When specifically asked the patient stated that he had no adverse effects to these medications. Patient does have home ondansetron but this is been of little benefit for him during this episode. Patient reports 30 episodes of nausea and vomiting last 24 hours. Patient does note that he has had some blood-streaked emesis. Patient has a history of Alexandra-Lynch tear x2 in the past proven by EGD. Patient denies headache, fever, chills, abdominal pain, diarrhea, melena, hematochezia. In the emergency department the patient was noted to have labile blood pressures ranging between 133/88 and 170/117. AST and ALT mildly elevated and consistent with alcoholic hepatitis. Patient was treated with ondansetron, haloperidol, diphenhydramine x2. Patient given 1 L LR bolus in the emergency department. Comprehensive review of systems negative unless stated above.     Past Medical History:  Cannabis induced gastroparesis  Cannabis abuse  HTN  Esophagitis, GERD  History of C. difficile colitis    Past Surgical History:  EGD x2  Colonoscopy x1  Gervais tooth extraction    Family History:  Father: Sickle cell anemia    Social History:  Works at [employer],   Current everyday smoker, denies heavy alcohol use, condone botanical cannabis use    Physical Exam:  General: Young black male, sitting up in bed, fatigued appearing, no acute distress  HEENT: NCAT, dry mucous membranes  Skin: No rash noted  Cardio: RRR, normal S1/S2, no rubs, no gallops, no murmurs  Pulm: Non labored respirations on room air, LCAB, no wheezing, no rales, no rhonchi  GI: Soft, Nt, Nd, hypoactive bowel sounds, no masses noted  Extremity: Atraumatic, no deformities, no edema  Neuro: Alert, oriented, moving all extremities, no focal deficits noted  Psych: Pleasant, cooperative, normal range of affect    I have personally reviewed all current data for this patient. Assessment and Plan:    #Cyclic vomiting syndrome, cannabis hyperemesis?,  Cannabis induced gastroparesis: Recurrent history of episodes nearly identical to this. All surrounding cannabis use. Prior nuclear medicine gastric emptying study consistent with gastroparesis that is likely cannabis induced. Patient continues to smoke cannabis and positive urine drug screen. Patient came down to smoking cannabis 4 days ago.  -Admit to observation  -LR IVF  -Sumatriptan subcutaneous x1  -Scheduled metoclopramide IV, transition to oral once patient is able to take oral better  -Dexamethasone IV x1  -LR IVF  -Daily labs    #Coffee-ground emesis, Alexandra-Lynch tear?:  Coffee-ground emesis. History Alexandra-Lynch tear. Repeated episodes of vomiting.  -Pantoprazole IV  -Gastric occult blood assay  -Trend hemoglobin  -Treat vomiting as above    #Alcoholic hepatitis?:  AST and ALT elevation consistent with alcoholic hepatitis.   No hyperbilirubinemia.  -Trend LFTs    #HTN: Not on home antihypertensive    Full Code    Inpatient for above    SCDs for VTE prevention    Please call [phone] for questions or concerns

## 2020-08-01 NOTE — ED NOTES
Bedside report recived from [facility], [address] [place] [address]. Care assumed by this RN and Marvin Farmer RN at this time.

## 2020-08-01 NOTE — PROGRESS NOTES
TRANSFER - IN REPORT:    Verbal report received from BANDAR Abarca on [address]  being received from ED for routine progression of care      Report consisted of patients Situation, Background, Assessment and   Recommendations(SBAR). Information from the following report(s) SBAR was reviewed with the receiving nurse. Opportunity for questions and clarification was provided. Assessment completed upon patients arrival to unit and care assumed.

## 2020-08-01 NOTE — ED NOTES
TRANSFER - OUT REPORT:    Verbal report given to [facility] RN on [address]  being transferred to 6th floor [facility] for routine progression of care       Report consisted of patients Situation, Background, Assessment and   Recommendations(SBAR). Information from the following report(s) SBAR was reviewed with the receiving nurse. Lines:   Peripheral IV 08/01/20 Right Antecubital (Active)       Peripheral IV 08/01/20 Left Forearm (Active)        Opportunity for questions and clarification was provided.       Patient transported with:   [employer]

## 2020-08-01 NOTE — ED PROVIDER NOTES
62-year-old male has had persistent vomiting since a some 24 hours ago. Prior to that he had some vomiting and diarrhea for about a day. Complains of crampy epigastric pain. No fever. States there has been some coffee grounds in his emesis. No diarrhea or bleeding or melena. Patient told me yesterday he had not smoked marijuana in months now he now retracts his story and states that he smokes it up to about a week ago. No history of abdominal surgery. The history is provided by the patient. Vomiting    This is a new problem. The current episode started more than 2 days ago. The problem occurs 5 to 10 times per day. The problem has not changed since onset. The emesis has an appearance of stomach contents and coffee grounds. There has been no fever. Associated symptoms include abdominal pain. Pertinent negatives include no chills, no fever, no diarrhea and no cough. His pertinent negatives include no gastric bypass and no DM. Past Medical History:   Diagnosis Date    BETTY (acute kidney injury) ([facility] [address] 8/12/2014    Clostridium difficile colitis 3/20/2011    Gastroparesis 2005    emptying study normal -2006    GERD (gastroesophageal reflux disease)     HIATAL HERNIA SINCE 1999    PUD (peptic ulcer disease) ALL DX IN 2008    ESOPHAGITIS, + H PYLORI    Uncontrolled hypertension 6/26/2018       Past Surgical History:   Procedure Laterality Date    COLONOSCOPY  4/08    ESOPHAGITIS, COLONIC ULCER X1    EGD  4/08    H.  HERNIA, ULCER X2, H PYLORI,    EGD  2011    h pylori -neg    HX WISDOM TEETH EXTRACTION  2/10/11         Family History:   Problem Relation Age of Onset    Other Mother         L+W    Diabetes Maternal Grandmother     Sickle Cell Anemia Father     Heart Disease Paternal Grandfather     Other Sister         ALL L+W    Other Son         L+W       Social History     Socioeconomic History    Marital status:      Spouse name: Not on file    Number of children: Not on file    Years of education: Not on file    Highest education level: Not on file   Occupational History    Not on file   Social Needs    Financial resource strain: Not on file    Food insecurity     Worry: Not on file     Inability: Not on file    Transportation needs     Medical: Not on file     Non-medical: Not on file   Tobacco Use    Smoking status: Current Every Day Smoker     Packs/day: 0.25     Years: 2.00     Pack years: 0.50     Types: Cigarettes    Smokeless tobacco: Never Used   Substance and Sexual Activity    Alcohol use: No    Drug use: Yes     Types: Marijuana    Sexual activity: Yes     Partners: Female   Lifestyle    Physical activity     Days per week: Not on file     Minutes per session: Not on file    Stress: Not on file   Relationships    Social connections     Talks on phone: Not on file     Gets together: Not on file     Attends Jew service: Not on file     Active member of club or organization: Not on file     Attends meetings of clubs or organizations: Not on file     Relationship status: Not on file    Intimate partner violence     Fear of current or ex partner: Not on file     Emotionally abused: Not on file     Physically abused: Not on file     Forced sexual activity: Not on file   Other Topics Concern    Not on file   Social History Narrative    3/17/11:  PATIENT LIVES WITH GIRLFRIEND, Ervin Shah (CELL: [phone]), HER 3YEAR OLD DAUGHTER AND THEIR 3YEAR OLD SON. ALTON IS CURRENTLY 13 WEEKS PREGNANT. PATIENT'S JOB AT [employer] WAS DOWNSIZED ABOUT A YEAR AGO, AND HE HAS BEEN A STAY HOME DAD SINCE. ALTON WORKS IN HOUSEKEEPING POSITION. ALLERGIES: Amoxicillin; Aspirin; Hydrocodone; Ibuprofen; Pcn [penicillins]; and Phenergan [promethazine]    Review of Systems   Constitutional: Negative for chills and fever. Respiratory: Negative for cough and shortness of breath. Cardiovascular: Negative for chest pain.    Gastrointestinal: Positive for abdominal pain and vomiting. Negative for blood in stool, constipation, diarrhea and nausea. Vitals:    08/01/20 0636 08/01/20 0700 08/01/20 0752   BP: (!) 155/102 138/89    Pulse: (!) 116 (!) 121 90   Resp: 20     Temp: 98.6 °F (37 °C)     SpO2: 94% 95% 94%   Weight: 68 kg (150 lb)     Height: 5' 1\" (1.549 m)              Physical Exam  Vitals signs and nursing note reviewed. Constitutional:       General: He is not in acute distress. Appearance: He is well-developed. HENT:      Head: Normocephalic and atraumatic. Right Ear: External ear normal.      Left Ear: External ear normal.      Mouth/Throat:      Pharynx: No oropharyngeal exudate. Eyes:      Conjunctiva/sclera: Conjunctivae normal.      Pupils: Pupils are equal, round, and reactive to light. Neck:      Musculoskeletal: Normal range of motion and neck supple. Cardiovascular:      Rate and Rhythm: Normal rate and regular rhythm. Heart sounds: No murmur. Pulmonary:      Effort: No respiratory distress. Breath sounds: Normal breath sounds. Abdominal:      General: Bowel sounds are normal.      Palpations: Abdomen is soft. There is no mass. Tenderness: There is abdominal tenderness in the epigastric area. There is no guarding or rebound. Hernia: No hernia is present. Skin:     General: Skin is warm and dry. Neurological:      Mental Status: He is alert and oriented to person, place, and time. Gait: Gait normal.      Comments: Nl speech   Psychiatric:         Speech: Speech normal.          MDM  Number of Diagnoses or Management Options  Diagnosis management comments: Persistent vomiting. Coffee-ground. Recheck of blood work. Check abdominal series. IV fluids hydration nausea control.        Amount and/or Complexity of Data Reviewed  Clinical lab tests: ordered and reviewed  Tests in the radiology section of CPT®: reviewed and ordered  Independent visualization of images, tracings, or specimens: yes    Risk of Complications, Morbidity, and/or Mortality  Presenting problems: moderate  Diagnostic procedures: minimal  Management options: low    Patient Progress  Patient progress: stable         Procedures    Results Include:    Recent Results (from the past 24 hour(s))   METABOLIC PANEL, COMPREHENSIVE    Collection Time: 08/01/20  6:41 AM   Result Value Ref Range    Sodium 137 136 - 145 mmol/L    Potassium 3.3 (L) 3.5 - 5.1 mmol/L    Chloride 104 98 - 107 mmol/L    CO2 21 21 - 32 mmol/L    Anion gap 12 7 - 16 mmol/L    Glucose 116 (H) 65 - 100 mg/dL    BUN 25 (H) 6 - 23 MG/DL    Creatinine 1.07 0.8 - 1.5 MG/DL    GFR est AA >60 >60 ml/min/1.73m2    GFR est non-AA >60 >60 ml/min/1.73m2    Calcium 9.5 8.3 - 10.4 MG/DL    Bilirubin, total 0.8 0.2 - 1.1 MG/DL    ALT (SGPT) 66 (H) 12 - 65 U/L    AST (SGOT) 143 (H) 15 - 37 U/L    Alk. phosphatase 124 50 - 136 U/L    Protein, total 8.8 (H) 6.3 - 8.2 g/dL    Albumin 4.6 3.5 - 5.0 g/dL    Globulin 4.2 (H) 2.3 - 3.5 g/dL    A-G Ratio 1.1 (L) 1.2 - 3.5     CBC WITH AUTOMATED DIFF    Collection Time: 08/01/20  6:41 AM   Result Value Ref Range    WBC 10.4 4.3 - 11.1 K/uL    RBC 5.48 4.23 - 5.6 M/uL    HGB 16.1 13.6 - 17.2 g/dL    HCT 43.4 41.1 - 50.3 %    MCV 79.2 (L) 79.6 - 97.8 FL    MCH 29.4 26.1 - 32.9 PG    MCHC 37.1 (H) 31.4 - 35.0 g/dL    RDW 12.9 11.9 - 14.6 %    PLATELET 436 989 - 595 K/uL    MPV 8.9 (L) 9.4 - 12.3 FL    ABSOLUTE NRBC 0.00 0.0 - 0.2 K/uL    DF AUTOMATED      NEUTROPHILS 75 43 - 78 %    LYMPHOCYTES 14 13 - 44 %    MONOCYTES 10 4.0 - 12.0 %    EOSINOPHILS 0 (L) 0.5 - 7.8 %    BASOPHILS 0 0.0 - 2.0 %    IMMATURE GRANULOCYTES 0 0.0 - 5.0 %    ABS. NEUTROPHILS 7.8 1.7 - 8.2 K/UL    ABS. LYMPHOCYTES 1.5 0.5 - 4.6 K/UL    ABS. MONOCYTES 1.1 0.1 - 1.3 K/UL    ABS. EOSINOPHILS 0.0 0.0 - 0.8 K/UL    ABS. BASOPHILS 0.0 0.0 - 0.2 K/UL    ABS. IMM.  GRANS. 0.0 0.0 - 0.5 K/UL   LIPASE    Collection Time: 08/01/20  6:41 AM   Result Value Ref Range    Lipase 120 73 - 393 U/L     Xr Abd Acute W 1 V Chest    Result Date: 8/1/2020  EXAM: Abdomen series with chest radiograph. INDICATION: Nausea and vomiting for 3 days with history of gastritis. COMPARISON: None. FINDINGS: Chest: No pneumothorax or pleural effusion. The heart, mediastinum, and pulmonary vasculature are within normal limits. No free air under the diaphragm. Abdomen: Nonobstructive bowel gas pattern. No free intraperitoneal air. No renal calculi are seen. IMPRESSION: 1. No acute finding in the chest or abdomen. Hemoglobin stable but patient has persistent vomiting emergency department with some coffee grounds and dark blood. Previous history of Alexandra-Lynch tear. Will discuss with hospitalist for observation admission.

## 2020-08-02 PROBLEM — F12.90 CANNABINOID HYPEREMESIS SYNDROME: Status: ACTIVE | Noted: 2020-08-02

## 2020-08-02 PROBLEM — E87.5 HYPERKALEMIA: Status: ACTIVE | Noted: 2020-08-02

## 2020-08-02 PROBLEM — R11.2 CANNABINOID HYPEREMESIS SYNDROME: Status: ACTIVE | Noted: 2020-08-02

## 2020-08-02 LAB
ALBUMIN SERPL-MCNC: 4 G/DL (ref 3.5–5)
ALBUMIN/GLOB SERPL: 0.9 {RATIO} (ref 1.2–3.5)
ALP SERPL-CCNC: 113 U/L (ref 50–136)
ALT SERPL-CCNC: 68 U/L (ref 12–65)
ANION GAP SERPL CALC-SCNC: 10 MMOL/L (ref 7–16)
ANION GAP SERPL CALC-SCNC: 9 MMOL/L (ref 7–16)
AST SERPL-CCNC: 134 U/L (ref 15–37)
BILIRUB SERPL-MCNC: 0.9 MG/DL (ref 0.2–1.1)
BUN SERPL-MCNC: 21 MG/DL (ref 6–23)
BUN SERPL-MCNC: 21 MG/DL (ref 6–23)
CALCIUM SERPL-MCNC: 9.1 MG/DL (ref 8.3–10.4)
CALCIUM SERPL-MCNC: 9.2 MG/DL (ref 8.3–10.4)
CHLORIDE SERPL-SCNC: 104 MMOL/L (ref 98–107)
CHLORIDE SERPL-SCNC: 104 MMOL/L (ref 98–107)
CO2 SERPL-SCNC: 22 MMOL/L (ref 21–32)
CO2 SERPL-SCNC: 24 MMOL/L (ref 21–32)
CREAT SERPL-MCNC: 0.8 MG/DL (ref 0.8–1.5)
CREAT SERPL-MCNC: 0.89 MG/DL (ref 0.8–1.5)
ERYTHROCYTE [DISTWIDTH] IN BLOOD BY AUTOMATED COUNT: 13.4 % (ref 11.9–14.6)
GLOBULIN SER CALC-MCNC: 4.6 G/DL (ref 2.3–3.5)
GLUCOSE SERPL-MCNC: 103 MG/DL (ref 65–100)
GLUCOSE SERPL-MCNC: 98 MG/DL (ref 65–100)
HCT VFR BLD AUTO: 44.4 % (ref 41.1–50.3)
HGB BLD-MCNC: 15.8 G/DL (ref 13.6–17.2)
MCH RBC QN AUTO: 28.9 PG (ref 26.1–32.9)
MCHC RBC AUTO-ENTMCNC: 35.6 G/DL (ref 31.4–35)
MCV RBC AUTO: 81.3 FL (ref 79.6–97.8)
NRBC # BLD: 0 K/UL (ref 0–0.2)
PLATELET # BLD AUTO: 327 K/UL (ref 150–450)
PMV BLD AUTO: 9.1 FL (ref 9.4–12.3)
POTASSIUM SERPL-SCNC: 3.3 MMOL/L (ref 3.5–5.1)
POTASSIUM SERPL-SCNC: 5.7 MMOL/L (ref 3.5–5.1)
PROT SERPL-MCNC: 8.6 G/DL (ref 6.3–8.2)
RBC # BLD AUTO: 5.46 M/UL (ref 4.23–5.6)
SODIUM SERPL-SCNC: 135 MMOL/L (ref 136–145)
SODIUM SERPL-SCNC: 138 MMOL/L (ref 136–145)
WBC # BLD AUTO: 11.5 K/UL (ref 4.3–11.1)

## 2020-08-02 PROCEDURE — 74011250636 HC RX REV CODE- 250/636: Performed by: FAMILY MEDICINE

## 2020-08-02 PROCEDURE — 96376 TX/PRO/DX INJ SAME DRUG ADON: CPT

## 2020-08-02 PROCEDURE — 96361 HYDRATE IV INFUSION ADD-ON: CPT

## 2020-08-02 PROCEDURE — 99218 HC RM OBSERVATION: CPT

## 2020-08-02 PROCEDURE — 85027 COMPLETE CBC AUTOMATED: CPT

## 2020-08-02 PROCEDURE — C9113 INJ PANTOPRAZOLE SODIUM, VIA: HCPCS | Performed by: FAMILY MEDICINE

## 2020-08-02 PROCEDURE — 93005 ELECTROCARDIOGRAM TRACING: CPT | Performed by: FAMILY MEDICINE

## 2020-08-02 PROCEDURE — 74011000250 HC RX REV CODE- 250: Performed by: FAMILY MEDICINE

## 2020-08-02 PROCEDURE — 80053 COMPREHEN METABOLIC PANEL: CPT

## 2020-08-02 PROCEDURE — 36415 COLL VENOUS BLD VENIPUNCTURE: CPT

## 2020-08-02 RX ORDER — LIDOCAINE HYDROCHLORIDE 20 MG/ML
15 SOLUTION OROPHARYNGEAL
Status: DISCONTINUED | OUTPATIENT
Start: 2020-08-02 | End: 2020-08-03 | Stop reason: HOSPADM

## 2020-08-02 RX ORDER — SODIUM CHLORIDE, SODIUM LACTATE, POTASSIUM CHLORIDE, CALCIUM CHLORIDE 600; 310; 30; 20 MG/100ML; MG/100ML; MG/100ML; MG/100ML
100 INJECTION, SOLUTION INTRAVENOUS CONTINUOUS
Status: DISCONTINUED | OUTPATIENT
Start: 2020-08-02 | End: 2020-08-03 | Stop reason: HOSPADM

## 2020-08-02 RX ORDER — HYDRALAZINE HYDROCHLORIDE 20 MG/ML
20 INJECTION INTRAMUSCULAR; INTRAVENOUS
Status: DISCONTINUED | OUTPATIENT
Start: 2020-08-02 | End: 2020-08-03 | Stop reason: HOSPADM

## 2020-08-02 RX ADMIN — Medication 10 ML: at 13:23

## 2020-08-02 RX ADMIN — METOCLOPRAMIDE HYDROCHLORIDE 5 MG: 5 INJECTION INTRAMUSCULAR; INTRAVENOUS at 05:16

## 2020-08-02 RX ADMIN — Medication 10 ML: at 21:29

## 2020-08-02 RX ADMIN — SODIUM CHLORIDE, SODIUM LACTATE, POTASSIUM CHLORIDE, AND CALCIUM CHLORIDE 100 ML/HR: 600; 310; 30; 20 INJECTION, SOLUTION INTRAVENOUS at 10:29

## 2020-08-02 RX ADMIN — Medication 10 ML: at 05:19

## 2020-08-02 RX ADMIN — LIDOCAINE HYDROCHLORIDE 15 ML: 20 SOLUTION ORAL; TOPICAL at 12:24

## 2020-08-02 RX ADMIN — METOCLOPRAMIDE HYDROCHLORIDE 5 MG: 5 INJECTION INTRAMUSCULAR; INTRAVENOUS at 10:30

## 2020-08-02 RX ADMIN — SODIUM CHLORIDE 40 MG: 9 INJECTION, SOLUTION INTRAMUSCULAR; INTRAVENOUS; SUBCUTANEOUS at 21:28

## 2020-08-02 RX ADMIN — SODIUM CHLORIDE 40 MG: 9 INJECTION, SOLUTION INTRAMUSCULAR; INTRAVENOUS; SUBCUTANEOUS at 10:15

## 2020-08-02 RX ADMIN — METOCLOPRAMIDE HYDROCHLORIDE 5 MG: 5 INJECTION INTRAMUSCULAR; INTRAVENOUS at 16:30

## 2020-08-02 RX ADMIN — SODIUM CHLORIDE, SODIUM LACTATE, POTASSIUM CHLORIDE, AND CALCIUM CHLORIDE 100 ML/HR: 600; 310; 30; 20 INJECTION, SOLUTION INTRAVENOUS at 17:51

## 2020-08-02 NOTE — PROGRESS NOTES
Hourly rounding completed on this shift. No new complaints at this time. All needs met. Continue with nausea and vomiting throughout the day. Unable to keep liquids down. Pt is currently resting in bed. Will continue to monitor and give report to oncoming nurse.

## 2020-08-02 NOTE — PROGRESS NOTES
Hourly rounds completed this shift. Patient resting in bed, complain of N\V all night, MD made aware, no orders recieved. All needs met at this time. Will continue to monitor and give report to oncoming RN.

## 2020-08-03 VITALS
SYSTOLIC BLOOD PRESSURE: 129 MMHG | DIASTOLIC BLOOD PRESSURE: 90 MMHG | BODY MASS INDEX: 28.32 KG/M2 | HEART RATE: 63 BPM | OXYGEN SATURATION: 97 % | RESPIRATION RATE: 16 BRPM | WEIGHT: 150 LBS | TEMPERATURE: 98.1 F | HEIGHT: 61 IN

## 2020-08-03 LAB
ALBUMIN SERPL-MCNC: 3.4 G/DL (ref 3.5–5)
ALBUMIN/GLOB SERPL: 0.9 {RATIO} (ref 1.2–3.5)
ALP SERPL-CCNC: 99 U/L (ref 50–136)
ALT SERPL-CCNC: 55 U/L (ref 12–65)
ANION GAP SERPL CALC-SCNC: 7 MMOL/L (ref 7–16)
AST SERPL-CCNC: 63 U/L (ref 15–37)
ATRIAL RATE: 77 BPM
BILIRUB SERPL-MCNC: 0.8 MG/DL (ref 0.2–1.1)
BUN SERPL-MCNC: 19 MG/DL (ref 6–23)
CALCIUM SERPL-MCNC: 8.9 MG/DL (ref 8.3–10.4)
CALCULATED P AXIS, ECG09: 47 DEGREES
CALCULATED R AXIS, ECG10: 49 DEGREES
CALCULATED T AXIS, ECG11: 55 DEGREES
CHLORIDE SERPL-SCNC: 106 MMOL/L (ref 98–107)
CO2 SERPL-SCNC: 25 MMOL/L (ref 21–32)
CREAT SERPL-MCNC: 0.9 MG/DL (ref 0.8–1.5)
DIAGNOSIS, 93000: NORMAL
ERYTHROCYTE [DISTWIDTH] IN BLOOD BY AUTOMATED COUNT: 12.8 % (ref 11.9–14.6)
GLOBULIN SER CALC-MCNC: 3.8 G/DL (ref 2.3–3.5)
GLUCOSE SERPL-MCNC: 113 MG/DL (ref 65–100)
HCT VFR BLD AUTO: 41.5 % (ref 41.1–50.3)
HGB BLD-MCNC: 15.1 G/DL (ref 13.6–17.2)
MCH RBC QN AUTO: 29.2 PG (ref 26.1–32.9)
MCHC RBC AUTO-ENTMCNC: 36.4 G/DL (ref 31.4–35)
MCV RBC AUTO: 80.1 FL (ref 79.6–97.8)
NRBC # BLD: 0 K/UL (ref 0–0.2)
P-R INTERVAL, ECG05: 146 MS
PLATELET # BLD AUTO: 331 K/UL (ref 150–450)
PMV BLD AUTO: 9.4 FL (ref 9.4–12.3)
POTASSIUM SERPL-SCNC: 3.3 MMOL/L (ref 3.5–5.1)
PROT SERPL-MCNC: 7.2 G/DL (ref 6.3–8.2)
Q-T INTERVAL, ECG07: 374 MS
QRS DURATION, ECG06: 66 MS
QTC CALCULATION (BEZET), ECG08: 423 MS
RBC # BLD AUTO: 5.18 M/UL (ref 4.23–5.6)
SODIUM SERPL-SCNC: 138 MMOL/L (ref 136–145)
VENTRICULAR RATE, ECG03: 77 BPM
WBC # BLD AUTO: 8.8 K/UL (ref 4.3–11.1)

## 2020-08-03 PROCEDURE — 96376 TX/PRO/DX INJ SAME DRUG ADON: CPT

## 2020-08-03 PROCEDURE — 74011250636 HC RX REV CODE- 250/636: Performed by: FAMILY MEDICINE

## 2020-08-03 PROCEDURE — 74011000250 HC RX REV CODE- 250: Performed by: FAMILY MEDICINE

## 2020-08-03 PROCEDURE — 80053 COMPREHEN METABOLIC PANEL: CPT

## 2020-08-03 PROCEDURE — 36415 COLL VENOUS BLD VENIPUNCTURE: CPT

## 2020-08-03 PROCEDURE — 74011250637 HC RX REV CODE- 250/637: Performed by: FAMILY MEDICINE

## 2020-08-03 PROCEDURE — 99218 HC RM OBSERVATION: CPT

## 2020-08-03 PROCEDURE — C9113 INJ PANTOPRAZOLE SODIUM, VIA: HCPCS | Performed by: FAMILY MEDICINE

## 2020-08-03 PROCEDURE — 85027 COMPLETE CBC AUTOMATED: CPT

## 2020-08-03 RX ORDER — METOCLOPRAMIDE 5 MG/1
5 TABLET ORAL
Qty: 90 TAB | Refills: 0 | Status: SHIPPED | OUTPATIENT
Start: 2020-08-03 | End: 2020-09-02

## 2020-08-03 RX ORDER — POTASSIUM CHLORIDE 20 MEQ/1
20 TABLET, EXTENDED RELEASE ORAL DAILY
Qty: 3 TAB | Refills: 0 | Status: SHIPPED | OUTPATIENT
Start: 2020-08-03 | End: 2020-08-06

## 2020-08-03 RX ORDER — PANTOPRAZOLE SODIUM 40 MG/1
40 TABLET, DELAYED RELEASE ORAL 2 TIMES DAILY
Status: DISCONTINUED | OUTPATIENT
Start: 2020-08-03 | End: 2020-08-03 | Stop reason: HOSPADM

## 2020-08-03 RX ORDER — METOCLOPRAMIDE 10 MG/1
5 TABLET ORAL
Status: DISCONTINUED | OUTPATIENT
Start: 2020-08-03 | End: 2020-08-03 | Stop reason: HOSPADM

## 2020-08-03 RX ORDER — PANTOPRAZOLE SODIUM 40 MG/1
40 TABLET, DELAYED RELEASE ORAL 2 TIMES DAILY
Qty: 180 TAB | Refills: 0 | Status: SHIPPED | OUTPATIENT
Start: 2020-08-03 | End: 2020-11-01

## 2020-08-03 RX ADMIN — SODIUM CHLORIDE, SODIUM LACTATE, POTASSIUM CHLORIDE, AND CALCIUM CHLORIDE 100 ML/HR: 600; 310; 30; 20 INJECTION, SOLUTION INTRAVENOUS at 02:48

## 2020-08-03 RX ADMIN — POTASSIUM BICARBONATE 40 MEQ: 782 TABLET, EFFERVESCENT ORAL at 10:16

## 2020-08-03 RX ADMIN — SODIUM CHLORIDE 40 MG: 9 INJECTION, SOLUTION INTRAMUSCULAR; INTRAVENOUS; SUBCUTANEOUS at 09:21

## 2020-08-03 RX ADMIN — METOCLOPRAMIDE HYDROCHLORIDE 5 MG: 5 INJECTION INTRAMUSCULAR; INTRAVENOUS at 05:13

## 2020-08-03 RX ADMIN — Medication 10 ML: at 05:13

## 2020-08-03 NOTE — DISCHARGE INSTRUCTIONS
Patient Education        Learning About Cannabis Use Disorder  What is cannabis use disorder? Cannabis is a drug that comes from the cannabis plant. Different forms of cannabis include marijuana, hashish, and hash oil. Some people who use cannabis develop cannabis use disorder. Cannabis use disorder means that a person uses cannabis even though it causes harm to themselves or others. The disorder can range from mild to severe. The more signs of this disorder you have, the more severe it may be. When it's severe, it's sometimes called addiction. People who have it may find it hard to control their use of cannabis. What are the signs? You may have cannabis use disorder if two or more of the following are true. The more signs of this disorder you have, the more severe it may be. · You use larger amounts of cannabis than you ever meant to. Or you've been using it for a longer period of time than you ever meant to. · You can't cut down or control your use. Or you constantly wish you could cut down. · You spend a lot of time getting or using cannabis or recovering from the effects. · You have strong cravings for cannabis. · You can no longer do your main jobs at work, school, or home. · You keep using even though your cannabis use is hurting your relationships. · You have stopped doing important activities because of your cannabis use. · You use cannabis in situations where doing so is dangerous. · You keep using cannabis even though you know it's causing physical or psychological health problems. · You need more and more cannabis to get the same effect, or you get less effect from the same amount over time. This is called tolerance. · You have uncomfortable symptoms when you stop using cannabis or use less. This is called withdrawal.  How is cannabis use disorder treated? Treatment may include group therapy, one or more types of counseling, and drug education.  Sometimes medicines are used to help manage symptoms. Treatment focuses on more than drug use. It helps you cope with the anger, frustration, sadness, and disappointment that often happen when a person tries to stop using drugs. Treatment also looks at other parts of your life. For example, how are your relationships with friends and family? What's going on at school and work? Do you have health problems? What is your living situation? Treatment helps you find and manage problems. It helps you take control of your life so you don't have to depend on drugs. A drug problem affects the whole family. Family counseling often is part of treatment. Follow-up care is a key part of your treatment and safety. Be sure to make and go to all appointments, and call your doctor if you are having problems. It's also a good idea to know your test results and keep a list of the medicines you take. Where can you learn more? Go to http://ora-itzel.info/  Enter K316 in the search box to learn more about \"Learning About Cannabis Use Disorder. \"  Current as of: August 22, 2019               Content Version: 12.5  © 5449-5010 [employer]. Care instructions adapted under license by "[employer] (which disclaims liability or warranty for this information). If you have questions about a medical condition or this instruction, always ask your healthcare professional. [address] any warranty or liability for your use of this information.

## 2020-08-03 NOTE — PROGRESS NOTES
Chart screened by  for discharge planning. No needs identified at this time. PT/OT has not been consulted. Patient will likely return home when medically stable. Please consult or notify  if any new issues arise. CM continues to follow the patient during this hospitalization.      Care Management Interventions  Transition of Care Consult (CM Consult): Discharge Planning  Discharge Durable Medical Equipment: No  Physical Therapy Consult: No  Occupational Therapy Consult: No  Speech Therapy Consult: No  Current Support Network: Own Home  Confirm Follow Up Transport: Self  Discharge Location  Discharge Placement: Home

## 2020-08-03 NOTE — PROGRESS NOTES
Reviewed notes prior to visit for spiritual concerns  Staff in room  Will continue to follow as needed during this admission        Sneha Mata, staff

## 2020-08-03 NOTE — PROGRESS NOTES
All hourly rounds completed with bed in low/locked position and call light within reach. Patient has rested throughout shift, no N/V or retching, he did not require lidocaine for throat pain during this shift. He states this morning that he \"feels much better\" and he is ready to go home. All needs met, VSS and no questions or concerns at this time. I will continue to monitor patient and update oncoming RN.

## 2020-08-03 NOTE — PROGRESS NOTES
Problem: Falls - Risk of  Goal: *Absence of Falls  Description: Document Marc Kwok Fall Risk and appropriate interventions in the flowsheet.   Outcome: Progressing Towards Goal  Note: Fall Risk Interventions:            Medication Interventions: Evaluate medications/consider consulting pharmacy                   Problem: Patient Education: Go to Patient Education Activity  Goal: Patient/Family Education  Outcome: Progressing Towards Goal

## 2020-08-03 NOTE — DISCHARGE SUMMARY
Discharge Summary     Patient: Marcie Zabala MRN: [id number]  SSN: [id number]    YOB: 1975  Age: 39 y.o.   Sex: male       Admit Date: 8/1/2020    Discharge Date: 8/3/2020      Admission Diagnoses: Intractable nausea and vomiting [R11.2]    Discharge Diagnoses:   Problem List as of 8/3/2020 Date Reviewed: 3/1/2019          Codes Class Noted - Resolved    RESOLVED: Nausea and vomiting ICD-10-CM: R11.2  ICD-9-CM: 787.01  3/5/2013 - 3/13/2018        Hyperkalemia ICD-10-CM: E87.5  ICD-9-CM: 276.7  8/2/2020 - Present        Cannabinoid hyperemesis syndrome ICD-10-CM: R11.2, F12.90  ICD-9-CM: 536.2, 305.20  8/2/2020 - Present        * (Principal) Intractable nausea and vomiting ICD-10-CM: R11.2  ICD-9-CM: 536.2  8/1/2020 - Present        Hematemesis ICD-10-CM: K92.0  ICD-9-CM: 578.0  6/26/2018 - Present        Erosive esophagitis ICD-10-CM: K22.10  ICD-9-CM: 530.19  3/11/2018 - Present        Polysubstance abuse ([facility] [address] (Chronic) ICD-10-CM: F19.10  ICD-9-CM: 305.90  7/11/2016 - Present        Tetrahydrocannabinol (THC) use disorder, moderate, dependence (HCC) (Chronic) ICD-10-CM: F12.20  ICD-9-CM: 304.30  7/11/2016 - Present        Microcytosis (Chronic) ICD-10-CM: R71.8  ICD-9-CM: 790.09  8/12/2014 - Present        Anxiety (Chronic) ICD-10-CM: F41.9  ICD-9-CM: 300.00  4/30/2014 - Present        Tobacco abuse (Chronic) ICD-10-CM: Z72.0  ICD-9-CM: 305.1  4/30/2014 - Present        H/O hiatal hernia (Chronic) ICD-10-CM: Z87.19  ICD-9-CM: V12.79  4/30/2014 - Present        Esophageal reflux (Chronic) ICD-10-CM: K21.9  ICD-9-CM: 530.81  4/30/2014 - Present        PUD (peptic ulcer disease) (Chronic) ICD-10-CM: K27.9  ICD-9-CM: 533.90  4/30/2014 - Present        Hypokalemia ICD-10-CM: E87.6  ICD-9-CM: 276.8  7/30/2013 - Present        Sickle cell trait ([facility] [address] (Chronic) ICD-10-CM: D57.3  ICD-9-CM: 282.5  3/5/2013 - Present        Nondiabetic gastroparesis ICD-10-CM: K31.84  ICD-9-CM: 536.3  3/17/2011 - Present        RESOLVED: Nausea and vomiting ICD-10-CM: R11.2  ICD-9-CM: 787.01  12/13/2018 - 3/1/2019        RESOLVED: Acute gastroenteritis ICD-10-CM: K52.9  ICD-9-CM: 558.9  6/26/2018 - 7/17/2019        RESOLVED: Uncontrolled hypertension ICD-10-CM: I10  ICD-9-CM: 401.9  6/26/2018 - 3/1/2019        RESOLVED: Intractable nausea and vomiting ICD-10-CM: R11.2  ICD-9-CM: 536.2  6/24/2018 - 7/17/2019        RESOLVED: Elevated BP without diagnosis of hypertension ICD-10-CM: R03.0  ICD-9-CM: 796.2  6/24/2018 - 3/1/2019        RESOLVED: Gastrointestinal hemorrhage with hematemesis ICD-10-CM: K92.0  ICD-9-CM: 578.0  3/13/2018 - 7/17/2019        RESOLVED: Hematemesis ICD-10-CM: K92.0  ICD-9-CM: 578.0  11/28/2016 - 3/13/2018        RESOLVED: Intractable nausea and vomiting ICD-10-CM: R11.2  ICD-9-CM: 536.2  11/28/2016 - 3/13/2018        RESOLVED: Lactic acidosis ICD-10-CM: J83.3  ICD-9-CM: 276.2  11/28/2016 - 3/13/2018        RESOLVED: Upper GI bleed ICD-10-CM: K92.2  ICD-9-CM: 578.9  9/17/2016 - 3/13/2018        RESOLVED: Intractable cyclical vomiting with nausea ICD-10-CM: R11.15  ICD-9-CM: 536.2  7/11/2016 - 3/13/2018        RESOLVED: Cannabinoid hyperemesis syndrome ICD-10-CM: R11.2, F12.90  ICD-9-CM: 536.2, 305.20  7/11/2016 - 3/13/2018        RESOLVED: Hypomagnesemia ICD-10-CM: E83.42  ICD-9-CM: 275.2  8/14/2014 - 3/13/2018        RESOLVED: Marijuana abuse (Chronic) ICD-10-CM: F12.10  ICD-9-CM: 305.20  8/12/2014 - 7/17/2019        RESOLVED: BETTY (acute kidney injury) ([facility] ICD-10-CM: N17.9  ICD-9-CM: 584.9  8/12/2014 - 3/13/2018        RESOLVED: Acute blood loss anemia ICD-10-CM: D62  ICD-9-CM: 285.1  5/1/2014 - 3/13/2018        RESOLVED: Acute upper GI bleed (Chronic) ICD-10-CM: K92.2  ICD-9-CM: 578.9  4/30/2014 - 3/13/2018        RESOLVED: N&V (nausea and vomiting) ICD-10-CM: R11.2  ICD-9-CM: 787.01  8/12/2012 - 8/15/2012        RESOLVED: Abdominal pain ICD-10-CM: R10.9  ICD-9-CM: 789.00  8/12/2012 - 8/15/2012 RESOLVED: Gastroparesis (Chronic) ICD-10-CM: K31.84  ICD-9-CM: 536.3  8/12/2012 - 3/13/2018        RESOLVED: Nausea ICD-10-CM: R11.0  ICD-9-CM: 787.02  4/12/2012 - 8/15/2012        RESOLVED: Clostridium difficile colitis ICD-10-CM: A04.72  ICD-9-CM: 008.45  3/20/2011 - 8/15/2012        RESOLVED: Epigastric pain ICD-10-CM: R10.13  ICD-9-CM: 789.06  3/18/2011 - 8/15/2012        RESOLVED: Intractable vomiting ICD-10-CM: R11.10  ICD-9-CM: 536.2  3/17/2011 - 8/15/2012               Discharge Condition: Stable    Hospital Course:   81-year-old male presenting to [facility] emergency department on 8/1/2020 with a complaint of repeat episodes of nausea and vomiting. Patient was seen on 7/31/2020 for the same and sent home. Patient had a positive urine drug screen for cannabis. Patient admits to discontinue cannabis use approximately 3 days ago. Patient also has a history of cannabis induced gastroparesis proven by nuclear medicine gastric emptying study several years ago. Patient had previously been on erythromycin and metoclopramide. Patient discontinued these for unknown reason. When specifically asked the patient stated that he had no adverse effects to these medications. Patient does have home ondansetron but this is been of little benefit for him during this episode. Patient reports 30 episodes of nausea and vomiting last 24 hours. Patient does note that he has had some blood-streaked emesis. Patient has a history of Alexandra-Lynch tear x2 in the past proven by EGD. Patient denies headache, fever, chills, abdominal pain, diarrhea, melena, hematochezia. In the emergency department the patient was noted to have labile blood pressures ranging between 133/88 and 170/117. AST and ALT mildly elevated and consistent with alcoholic hepatitis. Patient was treated with ondansetron, haloperidol, diphenhydramine x2. Patient given 1 L LR bolus in the emergency department.   Patient treated with dexamethasone x1, subcutaneous sumatriptan x1, scheduled IV metoclopramide, continuous infusion of LR IVF. Patient with improved bowel sounds. Only 6 episodes of vomiting since yesterday. This likely represents cannabis induced gastroparesis. Continue metoclopramide. Advance to full liquid diet. Add viscous lidocaine for esophageal pain from retching. Hemoglobin stable from what was likely a Alexandra-Lynch tear. On the day discharge the patient felt much improved and had not vomited since I last evaluated him. Patient had normal bowel sounds on evaluation. This is all likely related to cannabis induced gastroparesis. The patient was transitioned from IV metoclopramide to oral metoclopramide. I have counseled the patient to follow-up with primary care physician within 5 days and I will defer to primary care physician as to when to discontinue this medication. Please note the patient should take metoclopramide for as short a time as possible. Patient will also be discharged home on pantoprazole 40 mg oral twice daily to continue to allow for Alexandra-Lynch tear healing. The patient had a minimally low potassium throughout his hospital stay therefore I am discharging the patient with 3 days worth of oral potassium supplementation. Discharge instructions were discussed at length with patient and he voiced understanding and agreement, including following up with PCP within 5 days. Return precautions were given to patient voiced understanding and agreement. I am grateful that the patient voiced good insight into his need for discontinuation of cannabis use.     Physical Exam:   General: Young black male, sitting up in bed, fatigued appearing, no acute distress  HEENT: NCAT, dry mucous membranes  Skin: No rash noted  Cardio: RRR, normal S1/S2, no rubs, no gallops, no murmurs  Pulm: Non labored respirations on room air, LCAB, no wheezing, no rales, no rhonchi  GI: Soft, Nt, Nd, normal bowel sounds, no masses noted  Extremity: Atraumatic, no deformities, no edema  Neuro: Alert, oriented, moving all extremities, no focal deficits noted  Psych: Pleasant, cooperative, normal range of affect    Consults: None    Significant Diagnostic Studies:     EXAM: Abdomen series with chest radiograph. INDICATION: Nausea and vomiting for 3 days with history of gastritis. COMPARISON: None.     FINDINGS:  Chest: No pneumothorax or pleural effusion. The heart, mediastinum, and  pulmonary vasculature are within normal limits. No free air under the diaphragm.     Abdomen: Nonobstructive bowel gas pattern. No free intraperitoneal air. No renal  calculi are seen.     IMPRESSION  IMPRESSION:  1. No acute finding in the chest or abdomen. Disposition: Home    Discharge Medications:   Current Discharge Medication List      START taking these medications    Details   metoclopramide HCl (REGLAN) 5 mg tablet Take 1 Tab by mouth Before breakfast, lunch, and dinner for 30 days. Qty: 90 Tab, Refills: 0         CONTINUE these medications which have CHANGED    Details   pantoprazole (PROTONIX) 40 mg tablet Take 1 Tab by mouth two (2) times a day for 90 days. Qty: 180 Tab, Refills: 0      potassium chloride (Klor-Con M20) 20 mEq tablet Take 1 Tab by mouth daily for 3 days. Qty: 3 Tab, Refills: 0         CONTINUE these medications which have NOT CHANGED    Details   hyoscyamine SL (Levsin/SL) 0.125 mg SL tablet 2 Tabs by SubLINGual route every six (6) hours as needed for Cramping. Qty: 16 Tab, Refills: 0      capsaicin (CAPZASIN-HP) 0.1 % topical cream Apply  to affected area three (3) times daily.   Qty: 50 g, Refills: 0         STOP taking these medications       ondansetron (ZOFRAN ODT) 4 mg disintegrating tablet Comments:   Reason for Stopping:               Activity: Activity as tolerated  Diet: Full liquid diet, advance to bland soft mechanical, then advance to regular diet  Wound Care: None needed    Follow-up Appointments   Procedures    FOLLOW UP VISIT Appointment in: One Week PCP     PCP     Standing Status:   Standing     Number of Occurrences:   1     Order Specific Question:   Appointment in     Answer:    One Week       Signed By: Klever Reaves MD     August 3, 2020

## 2020-08-03 NOTE — PROGRESS NOTES
Chart screened by  for discharge planning. No needs identified at this time. Patient to discharge home this day. Please consult or notify  if any new issues arise. CM remains available. Care Management Interventions  Mode of Transport at Discharge:  Other (see comment)  Transition of Care Consult (CM Consult): Discharge Planning  Discharge Durable Medical Equipment: No  Physical Therapy Consult: No  Occupational Therapy Consult: No  Speech Therapy Consult: No  Current Support Network: Own Home  Confirm Follow Up Transport: Self  Discharge Location  Discharge Placement: Home

## 2020-08-05 ENCOUNTER — PATIENT OUTREACH (OUTPATIENT)
Dept: CASE MANAGEMENT | Age: 45
End: 2020-08-05

## 2020-08-06 NOTE — PROGRESS NOTES
COVID Care Transitions    Contacted the patient for ER follow-up and COVID education. Left [facility] compliant voice mail requesting return call. Received text from patient requesting talk by text. Advised the education was to long to text and to call in if he wished to continue to advise if he wished to decline. Received no response. Will consider this a decline and episode will be resolved.

## 2021-04-09 ENCOUNTER — HOSPITAL ENCOUNTER (INPATIENT)
Age: 46
LOS: 3 days | Discharge: HOME | DRG: 682 | End: 2021-04-13
Attending: EMERGENCY MEDICINE | Admitting: FAMILY MEDICINE
Payer: COMMERCIAL

## 2021-04-09 ENCOUNTER — APPOINTMENT (OUTPATIENT)
Dept: GENERAL RADIOLOGY | Age: 46
DRG: 682 | End: 2021-04-09
Attending: EMERGENCY MEDICINE
Payer: COMMERCIAL

## 2021-04-09 DIAGNOSIS — R11.2 CANNABINOID HYPEREMESIS SYNDROME: ICD-10-CM

## 2021-04-09 DIAGNOSIS — M62.82 NON-TRAUMATIC RHABDOMYOLYSIS: ICD-10-CM

## 2021-04-09 DIAGNOSIS — M62.838 SPASM OF ABDOMINAL MUSCLES: ICD-10-CM

## 2021-04-09 DIAGNOSIS — R11.2 INTRACTABLE NAUSEA AND VOMITING: Primary | ICD-10-CM

## 2021-04-09 DIAGNOSIS — F12.90 CANNABINOID HYPEREMESIS SYNDROME: ICD-10-CM

## 2021-04-09 DIAGNOSIS — N17.9 ACUTE KIDNEY INJURY (HCC): ICD-10-CM

## 2021-04-09 LAB
ALBUMIN SERPL-MCNC: 4.6 G/DL (ref 3.5–5)
ALBUMIN/GLOB SERPL: 1.1 {RATIO} (ref 1.2–3.5)
ALP SERPL-CCNC: 127 U/L (ref 50–136)
ALT SERPL-CCNC: 40 U/L (ref 12–65)
AMPHET UR QL SCN: NEGATIVE
ANION GAP SERPL CALC-SCNC: 16 MMOL/L (ref 7–16)
AST SERPL-CCNC: 32 U/L (ref 15–37)
BARBITURATES UR QL SCN: NEGATIVE
BENZODIAZ UR QL: NEGATIVE
BILIRUB SERPL-MCNC: 1 MG/DL (ref 0.2–1.1)
BUN SERPL-MCNC: 26 MG/DL (ref 6–23)
CALCIUM SERPL-MCNC: 10.3 MG/DL (ref 8.3–10.4)
CANNABINOIDS UR QL SCN: POSITIVE
CHLORIDE SERPL-SCNC: 107 MMOL/L (ref 98–107)
CK SERPL-CCNC: 833 U/L (ref 21–215)
CO2 SERPL-SCNC: 16 MMOL/L (ref 21–32)
COCAINE UR QL SCN: NEGATIVE
CREAT SERPL-MCNC: 1.68 MG/DL (ref 0.8–1.5)
ERYTHROCYTE [DISTWIDTH] IN BLOOD BY AUTOMATED COUNT: 12.6 % (ref 11.9–14.6)
ETHANOL SERPL-MCNC: <3 MG/DL
GLOBULIN SER CALC-MCNC: 4.3 G/DL (ref 2.3–3.5)
GLUCOSE SERPL-MCNC: 143 MG/DL (ref 65–100)
HCT VFR BLD AUTO: 42.2 % (ref 41.1–50.3)
HGB BLD-MCNC: 16.1 G/DL (ref 13.6–17.2)
LIPASE SERPL-CCNC: 83 U/L (ref 73–393)
MCH RBC QN AUTO: 29.3 PG (ref 26.1–32.9)
MCHC RBC AUTO-ENTMCNC: 38.2 G/DL (ref 31.4–35)
MCV RBC AUTO: 76.9 FL (ref 79.6–97.8)
METHADONE UR QL: NEGATIVE
NRBC # BLD: 0 K/UL (ref 0–0.2)
OPIATES UR QL: NEGATIVE
PCP UR QL: NEGATIVE
PLATELET # BLD AUTO: 324 K/UL (ref 150–450)
PMV BLD AUTO: 9.4 FL (ref 9.4–12.3)
POTASSIUM SERPL-SCNC: 3.6 MMOL/L (ref 3.5–5.1)
PROT SERPL-MCNC: 8.9 G/DL (ref 6.3–8.2)
RBC # BLD AUTO: 5.49 M/UL (ref 4.23–5.6)
SODIUM SERPL-SCNC: 139 MMOL/L (ref 136–145)
WBC # BLD AUTO: 7.3 K/UL (ref 4.3–11.1)

## 2021-04-09 PROCEDURE — 83690 ASSAY OF LIPASE: CPT

## 2021-04-09 PROCEDURE — 82077 ASSAY SPEC XCP UR&BREATH IA: CPT

## 2021-04-09 PROCEDURE — 81003 URINALYSIS AUTO W/O SCOPE: CPT

## 2021-04-09 PROCEDURE — 99285 EMERGENCY DEPT VISIT HI MDM: CPT

## 2021-04-09 PROCEDURE — 83735 ASSAY OF MAGNESIUM: CPT

## 2021-04-09 PROCEDURE — 80053 COMPREHEN METABOLIC PANEL: CPT

## 2021-04-09 PROCEDURE — 74022 RADEX COMPL AQT ABD SERIES: CPT

## 2021-04-09 PROCEDURE — 84100 ASSAY OF PHOSPHORUS: CPT

## 2021-04-09 PROCEDURE — 82550 ASSAY OF CK (CPK): CPT

## 2021-04-09 PROCEDURE — 85027 COMPLETE CBC AUTOMATED: CPT

## 2021-04-09 PROCEDURE — 93005 ELECTROCARDIOGRAM TRACING: CPT | Performed by: EMERGENCY MEDICINE

## 2021-04-09 PROCEDURE — 96374 THER/PROPH/DIAG INJ IV PUSH: CPT

## 2021-04-09 PROCEDURE — 96361 HYDRATE IV INFUSION ADD-ON: CPT

## 2021-04-09 PROCEDURE — 80307 DRUG TEST PRSMV CHEM ANLYZR: CPT

## 2021-04-09 PROCEDURE — 74011250636 HC RX REV CODE- 250/636: Performed by: EMERGENCY MEDICINE

## 2021-04-09 RX ORDER — DROPERIDOL 2.5 MG/ML
1.25 INJECTION, SOLUTION INTRAMUSCULAR; INTRAVENOUS ONCE
Status: COMPLETED | OUTPATIENT
Start: 2021-04-09 | End: 2021-04-09

## 2021-04-09 RX ORDER — SODIUM CHLORIDE, SODIUM LACTATE, POTASSIUM CHLORIDE, CALCIUM CHLORIDE 600; 310; 30; 20 MG/100ML; MG/100ML; MG/100ML; MG/100ML
1000 INJECTION, SOLUTION INTRAVENOUS CONTINUOUS
Status: DISCONTINUED | OUTPATIENT
Start: 2021-04-09 | End: 2021-04-11

## 2021-04-09 RX ADMIN — DROPERIDOL 1.25 MG: 2.5 INJECTION, SOLUTION INTRAMUSCULAR; INTRAVENOUS at 23:05

## 2021-04-09 RX ADMIN — SODIUM CHLORIDE 1000 ML: 900 INJECTION, SOLUTION INTRAVENOUS at 22:32

## 2021-04-10 PROBLEM — N17.9 AKI (ACUTE KIDNEY INJURY) (HCC): Status: ACTIVE | Noted: 2021-04-10

## 2021-04-10 PROBLEM — E87.5 HYPERKALEMIA: Status: RESOLVED | Noted: 2020-08-02 | Resolved: 2021-04-10

## 2021-04-10 PROBLEM — M62.82 RHABDOMYOLYSIS: Status: ACTIVE | Noted: 2021-04-10

## 2021-04-10 LAB
MAGNESIUM SERPL-MCNC: 2.6 MG/DL (ref 1.8–2.4)
PHOSPHATE SERPL-MCNC: 1.6 MG/DL (ref 2.5–4.5)

## 2021-04-10 PROCEDURE — 74011250636 HC RX REV CODE- 250/636: Performed by: INTERNAL MEDICINE

## 2021-04-10 PROCEDURE — 96361 HYDRATE IV INFUSION ADD-ON: CPT

## 2021-04-10 PROCEDURE — 65270000029 HC RM PRIVATE

## 2021-04-10 PROCEDURE — 74011250637 HC RX REV CODE- 250/637: Performed by: FAMILY MEDICINE

## 2021-04-10 PROCEDURE — 74011250636 HC RX REV CODE- 250/636: Performed by: FAMILY MEDICINE

## 2021-04-10 PROCEDURE — 74011250636 HC RX REV CODE- 250/636: Performed by: EMERGENCY MEDICINE

## 2021-04-10 PROCEDURE — 74011000250 HC RX REV CODE- 250: Performed by: INTERNAL MEDICINE

## 2021-04-10 RX ORDER — DIPHENHYDRAMINE HCL 25 MG
25 CAPSULE ORAL ONCE
Status: COMPLETED | OUTPATIENT
Start: 2021-04-10 | End: 2021-04-10

## 2021-04-10 RX ORDER — ACETAMINOPHEN 325 MG/1
650 TABLET ORAL
Status: DISCONTINUED | OUTPATIENT
Start: 2021-04-10 | End: 2021-04-13 | Stop reason: HOSPADM

## 2021-04-10 RX ORDER — POLYETHYLENE GLYCOL 3350 17 G/17G
17 POWDER, FOR SOLUTION ORAL DAILY PRN
Status: DISCONTINUED | OUTPATIENT
Start: 2021-04-10 | End: 2021-04-13 | Stop reason: HOSPADM

## 2021-04-10 RX ORDER — SODIUM CHLORIDE 9 MG/ML
75 INJECTION, SOLUTION INTRAVENOUS CONTINUOUS
Status: DISCONTINUED | OUTPATIENT
Start: 2021-04-10 | End: 2021-04-13 | Stop reason: HOSPADM

## 2021-04-10 RX ORDER — METOCLOPRAMIDE HYDROCHLORIDE 5 MG/ML
5 INJECTION INTRAMUSCULAR; INTRAVENOUS EVERY 6 HOURS
Status: DISCONTINUED | OUTPATIENT
Start: 2021-04-10 | End: 2021-04-12

## 2021-04-10 RX ORDER — ONDANSETRON 2 MG/ML
4 INJECTION INTRAMUSCULAR; INTRAVENOUS
Status: DISCONTINUED | OUTPATIENT
Start: 2021-04-10 | End: 2021-04-11

## 2021-04-10 RX ORDER — SODIUM CHLORIDE 0.9 % (FLUSH) 0.9 %
5-40 SYRINGE (ML) INJECTION EVERY 8 HOURS
Status: DISCONTINUED | OUTPATIENT
Start: 2021-04-10 | End: 2021-04-13 | Stop reason: HOSPADM

## 2021-04-10 RX ORDER — ACETAMINOPHEN 650 MG/1
650 SUPPOSITORY RECTAL
Status: DISCONTINUED | OUTPATIENT
Start: 2021-04-10 | End: 2021-04-13 | Stop reason: HOSPADM

## 2021-04-10 RX ORDER — PROMETHAZINE HYDROCHLORIDE 25 MG/1
12.5 TABLET ORAL
Status: DISCONTINUED | OUTPATIENT
Start: 2021-04-10 | End: 2021-04-10

## 2021-04-10 RX ORDER — SODIUM CHLORIDE 0.9 % (FLUSH) 0.9 %
5-40 SYRINGE (ML) INJECTION AS NEEDED
Status: DISCONTINUED | OUTPATIENT
Start: 2021-04-10 | End: 2021-04-13 | Stop reason: HOSPADM

## 2021-04-10 RX ORDER — HEPARIN SODIUM 5000 [USP'U]/ML
5000 INJECTION, SOLUTION INTRAVENOUS; SUBCUTANEOUS EVERY 8 HOURS
Status: DISCONTINUED | OUTPATIENT
Start: 2021-04-10 | End: 2021-04-13 | Stop reason: HOSPADM

## 2021-04-10 RX ADMIN — DIPHENHYDRAMINE HYDROCHLORIDE 25 MG: 25 CAPSULE ORAL at 23:07

## 2021-04-10 RX ADMIN — METOCLOPRAMIDE HYDROCHLORIDE 5 MG: 5 INJECTION INTRAMUSCULAR; INTRAVENOUS at 18:29

## 2021-04-10 RX ADMIN — ONDANSETRON 4 MG: 2 INJECTION INTRAMUSCULAR; INTRAVENOUS at 12:56

## 2021-04-10 RX ADMIN — SODIUM CHLORIDE, SODIUM LACTATE, POTASSIUM CHLORIDE, AND CALCIUM CHLORIDE 1000 ML/HR: 600; 310; 30; 20 INJECTION, SOLUTION INTRAVENOUS at 00:10

## 2021-04-10 RX ADMIN — HEPARIN SODIUM 5000 UNITS: 5000 INJECTION INTRAVENOUS; SUBCUTANEOUS at 21:11

## 2021-04-10 RX ADMIN — Medication 10 ML: at 21:12

## 2021-04-10 RX ADMIN — SODIUM CHLORIDE 150 ML/HR: 900 INJECTION, SOLUTION INTRAVENOUS at 09:54

## 2021-04-10 RX ADMIN — ONDANSETRON 4 MG: 2 INJECTION INTRAMUSCULAR; INTRAVENOUS at 19:46

## 2021-04-10 RX ADMIN — SODIUM CHLORIDE 150 ML/HR: 900 INJECTION, SOLUTION INTRAVENOUS at 05:20

## 2021-04-10 RX ADMIN — METOCLOPRAMIDE HYDROCHLORIDE 5 MG: 5 INJECTION INTRAMUSCULAR; INTRAVENOUS at 23:07

## 2021-04-10 RX ADMIN — POTASSIUM PHOSPHATE, MONOBASIC AND POTASSIUM PHOSPHATE, DIBASIC: 224; 236 INJECTION, SOLUTION, CONCENTRATE INTRAVENOUS at 13:07

## 2021-04-10 RX ADMIN — HEPARIN SODIUM 5000 UNITS: 5000 INJECTION INTRAVENOUS; SUBCUTANEOUS at 05:26

## 2021-04-10 RX ADMIN — HEPARIN SODIUM 5000 UNITS: 5000 INJECTION INTRAVENOUS; SUBCUTANEOUS at 14:55

## 2021-04-10 RX ADMIN — ONDANSETRON 4 MG: 2 INJECTION INTRAMUSCULAR; INTRAVENOUS at 05:16

## 2021-04-10 RX ADMIN — Medication 10 ML: at 05:31

## 2021-04-10 NOTE — ED PROVIDER NOTES
81-year-old male with a history of cannabis induced gastroparesis GERD and esophagitis presents with complaints of 1 day of vomiting and abdominal cramping. 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 Patient has had several admissions relating to nausea vomiting and his gastroparesis in the past  Patient rather agitated this evening having difficulty resting in the bed so that the staff can begin the treatment  Patient is afebrile and normotensive    The history is provided by the patient. Heat Exposure  This is a recurrent problem. The current episode started yesterday. The problem occurs constantly. The problem has been gradually worsening. Associated symptoms include abdominal pain. Pertinent negatives include no chest pain, no headaches and no shortness of breath. The symptoms are aggravated by drinking and eating. Nothing relieves the symptoms. He has tried nothing for the symptoms. Past Medical History:   Diagnosis Date    BETTY (acute kidney injury) ([facility] [address] 8/12/2014    Clostridium difficile colitis 3/20/2011    Gastroparesis 2005    emptying study normal -2006    GERD (gastroesophageal reflux disease)     HIATAL HERNIA SINCE 1999    PUD (peptic ulcer disease) ALL DX IN 2008    ESOPHAGITIS, + H PYLORI    Uncontrolled hypertension 6/26/2018       Past Surgical History:   Procedure Laterality Date    COLONOSCOPY  4/08    ESOPHAGITIS, COLONIC ULCER X1    EGD  4/08    H.  HERNIA, ULCER X2, H PYLORI,    EGD  2011    h pylori -neg    HX WISDOM TEETH EXTRACTION  2/10/11         Family History:   Problem Relation Age of Onset    Other Mother         L+W    Diabetes Maternal Grandmother     Sickle Cell Anemia Father     Heart Disease Paternal Grandfather     Other Sister         ALL L+W    Other Son         L+W       Social History     Socioeconomic History    Marital status:      Spouse name: Not on file    Number of children: Not on file    Years of education: Not on file    Highest education level: Not on file   Occupational History    Not on file   Social Needs    Financial resource strain: Not on file    Food insecurity     Worry: Not on file     Inability: Not on file    Transportation needs     Medical: Not on file     Non-medical: Not on file   Tobacco Use    Smoking status: Current Every Day Smoker     Packs/day: 0.25     Years: 2.00     Pack years: 0.50     Types: Cigarettes    Smokeless tobacco: Never Used   Substance and Sexual Activity    Alcohol use: No    Drug use: Yes     Types: Marijuana    Sexual activity: Yes     Partners: Female   Lifestyle    Physical activity     Days per week: Not on file     Minutes per session: Not on file    Stress: Not on file   Relationships    Social connections     Talks on phone: Not on file     Gets together: Not on file     Attends Nondenominational service: Not on file     Active member of club or organization: Not on file     Attends meetings of clubs or organizations: Not on file     Relationship status: Not on file    Intimate partner violence     Fear of current or ex partner: Not on file     Emotionally abused: Not on file     Physically abused: Not on file     Forced sexual activity: Not on file   Other Topics Concern    Not on file   Social History Narrative    3/17/11:  PATIENT LIVES WITH GIRLFRIEND, Ervin Shah (CELL: [phone]), HER 3YEAR OLD DAUGHTER AND THEIR 3YEAR OLD SON. ALTON IS CURRENTLY 13 WEEKS PREGNANT. PATIENT'S JOB AT [employer] WAS DOWNSIZED ABOUT A YEAR AGO, AND HE HAS BEEN A STAY HOME DAD SINCE. ALTON WORKS IN HOUSEKEEPING POSITION. ALLERGIES: Amoxicillin, Aspirin, Hydrocodone, Ibuprofen, Pcn [penicillins], and Phenergan [promethazine]    Review of Systems   Constitutional: Negative for activity change, chills, diaphoresis and fever. HENT: Negative for dental problem, hearing loss, nosebleeds, rhinorrhea and sore throat. Eyes: Negative for pain, discharge, redness and visual disturbance. Respiratory: Negative for cough, chest tightness and shortness of breath. Cardiovascular: Negative for chest pain, palpitations and leg swelling. Gastrointestinal: Positive for abdominal pain, nausea and vomiting. Negative for constipation and diarrhea. Endocrine: Negative for cold intolerance, heat intolerance, polydipsia and polyuria. Genitourinary: Negative for dysuria and flank pain. Musculoskeletal: Negative for arthralgias, back pain, joint swelling, myalgias and neck pain. Skin: Negative for pallor and rash. Allergic/Immunologic: Negative for environmental allergies and food allergies. Neurological: Negative for dizziness, tremors, light-headedness, numbness and headaches. Hematological: Negative for adenopathy. Does not bruise/bleed easily. Psychiatric/Behavioral: Positive for agitation. Negative for confusion and dysphoric mood. The patient is nervous/anxious and is hyperactive. All other systems reviewed and are negative. Vitals:    04/09/21 2215   BP: (!) 129/92   Pulse: (!) 125   Resp: 22   Temp: 97.8 °F (36.6 °C)   SpO2: 95%   Weight: 65.8 kg (145 lb)   Height: 5' 2\" (1.575 m)            Physical Exam  Vitals signs and nursing note reviewed. Constitutional:       General: He is in acute distress. Appearance: Normal appearance. He is well-developed and normal weight. He is diaphoretic. HENT:      Head: Normocephalic and atraumatic. Right Ear: External ear normal.      Left Ear: External ear normal.      Mouth/Throat:      Mouth: Mucous membranes are moist.      Pharynx: Oropharynx is clear. No oropharyngeal exudate. Eyes:      General: No scleral icterus. Extraocular Movements: Extraocular movements intact. Conjunctiva/sclera: Conjunctivae normal.      Pupils: Pupils are equal, round, and reactive to light. Neck:      Musculoskeletal: Normal range of motion and neck supple. Thyroid: No thyromegaly. Vascular: No JVD. Cardiovascular:      Rate and Rhythm: Regular rhythm. Tachycardia present. Heart sounds: Normal heart sounds. No murmur. No friction rub. No gallop. Pulmonary:      Effort: Pulmonary effort is normal. No respiratory distress. Breath sounds: Normal breath sounds. No wheezing. Abdominal:      General: Bowel sounds are normal. There is no distension. Palpations: Abdomen is soft. There is no mass. Tenderness: There is abdominal tenderness. Musculoskeletal: Normal range of motion. General: No tenderness or deformity. Skin:     General: Skin is warm. Capillary Refill: Capillary refill takes less than 2 seconds. Findings: No rash. Neurological:      General: No focal deficit present. Mental Status: He is alert and oriented to person, place, and time. Cranial Nerves: No cranial nerve deficit. Sensory: No sensory deficit. Motor: No abnormal muscle tone. Coordination: Coordination normal.   Psychiatric:         Mood and Affect: Mood is anxious. Affect is labile. Speech: Speech is tangential.         Behavior: Behavior is uncooperative. Thought Content: Thought content normal.         Judgment: Judgment is impulsive.           MDM  Number of Diagnoses or Management Options  Acute kidney injury [facility]): new and requires workup  Cannabinoid hyperemesis syndrome: established and worsening  Intractable nausea and vomiting: new and requires workup  Non-traumatic rhabdomyolysis: new and requires workup  Spasm of abdominal muscles: new and requires workup  Diagnosis management comments: 77-year-old male with a history of cannabis induced gastroparesis  Here with 24 hours of vomiting  Patient does not report any blood in his emesis    Will start IV fluid bolus and Inapsine    Obtain usual labs and imaging    12:09 AM  Patient found to have acute kidney injury with an elevated CK consistent with at least some mild rhabdomyolysis    We will continue IV fluids  We will consult hospitalist for likely overnight observation admission for continued hydration and recheck of labs in the morning       Amount and/or Complexity of Data Reviewed  Clinical lab tests: ordered and reviewed  Tests in the radiology section of CPT®: ordered and reviewed  Tests in the medicine section of CPT®: ordered and reviewed  Decide to obtain previous medical records or to obtain history from someone other than the patient: yes  Obtain history from someone other than the patient: yes  Review and summarize past medical records: yes  Discuss the patient with other providers: yes  Independent visualization of images, tracings, or specimens: yes    Risk of Complications, Morbidity, and/or Mortality  Presenting problems: high  Diagnostic procedures: moderate  Management options: moderate  General comments: Elements of this note have been dictated via voice recognition software. Text and phrases may be limited by the accuracy of the software. The chart has been reviewed, but errors may still be present.       Patient Progress  Patient progress: improved         EKG    Date/Time: 4/9/2021 10:46 PM  Performed by: Aurelio Skelton MD  Authorized by: Aurelio Skelton MD     ECG reviewed by ED Physician in the absence of a cardiologist: yes    Previous ECG:     Previous ECG:  Unavailable  Interpretation:     Interpretation: abnormal    Quality:     Tracing quality:  Limited by artifact  Rate:     ECG rate:  131    ECG rate assessment: tachycardic    Rhythm:     Rhythm: sinus tachycardia    Ectopy:     Ectopy: none    QRS:     QRS axis:  Normal    QRS intervals:  Normal  Conduction:     Conduction: normal    ST segments:     ST segments:  Normal  T waves:     T waves: normal    Comments:      Sinus tachycardia with poor baseline  No acute ischemic changes

## 2021-04-10 NOTE — ROUTINE PROCESS
TRANSFER - IN REPORT: 
 
Verbal report received from Kishor Morton Vida being received from ER for routine progression of care. Report consisted of patients Situation, Background, Assessment and Recommendations(SBAR). Information from the following report(s) SBAR, Kardex, ED Summary and Recent Results was reviewed. Opportunity for questions and clarification was provided. Assessment completed upon patients arrival to unit and care assumed. Patient received to room 314. Patient connected to monitor and assessment completed. Plan of care reviewed. Patient oriented to room and call light. Patient aware to use call light to communicate any chest pain or needs. Admission skin assessment completed with second RN and reveals the following: Patient's skin is warm, dry, and intact. Sacrum and heels are intact with no signs of breakdown.
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
## 2021-04-10 NOTE — ED TRIAGE NOTES
Arrives without face mask in place. Reports generalized abdominal pain, n/v, cramping to legs and arms. Onset yesterday. 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 Diaphoretic on arrival. Unable to sit still during triage.  Dry heaving on arrival. Contractures to hands

## 2021-04-10 NOTE — PROGRESS NOTES
Patient seen and examined. Admitted after midnight. Chart reviewed including labs and vital signs. Admitted for intractable nausea and vomiting. Improved after taking shower. Loud systolic murmur, will get echo. Continue with current meds. Follow consults, labs and imaging studies. No charge billed to the patient for this encounter.

## 2021-04-10 NOTE — ROUTINE PROCESS
Bedside and Verbal shift change report given to Shantell Arias RN (oncoming nurse) by self (offgoing nurse). Report included the following information SBAR, Kardex, Intake/Output, MAR, and Recent Results.

## 2021-04-10 NOTE — PROGRESS NOTES
Problem: Falls - Risk of  Goal: *Absence of Falls  Description: Document Tal Yovani Fall Risk and appropriate interventions in the flowsheet.   Outcome: Progressing Towards Goal  Note: Fall Risk Interventions:            Medication Interventions: Patient to call before getting OOB, Teach patient to arise slowly

## 2021-04-11 LAB
ANION GAP SERPL CALC-SCNC: 7 MMOL/L (ref 7–16)
ATRIAL RATE: 131 BPM
BASOPHILS # BLD: 0 K/UL (ref 0–0.2)
BASOPHILS NFR BLD: 0 % (ref 0–2)
BUN SERPL-MCNC: 20 MG/DL (ref 6–23)
CALCIUM SERPL-MCNC: 9.1 MG/DL (ref 8.3–10.4)
CALCULATED P AXIS, ECG09: 72 DEGREES
CALCULATED R AXIS, ECG10: 76 DEGREES
CALCULATED T AXIS, ECG11: 66 DEGREES
CHLORIDE SERPL-SCNC: 113 MMOL/L (ref 98–107)
CK SERPL-CCNC: 1290 U/L (ref 21–215)
CO2 SERPL-SCNC: 21 MMOL/L (ref 21–32)
CREAT SERPL-MCNC: 1.15 MG/DL (ref 0.8–1.5)
DIAGNOSIS, 93000: NORMAL
DIFFERENTIAL METHOD BLD: ABNORMAL
EOSINOPHIL # BLD: 0 K/UL (ref 0–0.8)
EOSINOPHIL NFR BLD: 0 % (ref 0.5–7.8)
ERYTHROCYTE [DISTWIDTH] IN BLOOD BY AUTOMATED COUNT: 12.9 % (ref 11.9–14.6)
GLUCOSE SERPL-MCNC: 110 MG/DL (ref 65–100)
HCT VFR BLD AUTO: 37.3 % (ref 41.1–50.3)
HCT VFR BLD AUTO: 39.9 % (ref 41.1–50.3)
HGB BLD-MCNC: 14 G/DL (ref 13.6–17.2)
HGB BLD-MCNC: 14.7 G/DL (ref 13.6–17.2)
IMM GRANULOCYTES # BLD AUTO: 0.1 K/UL (ref 0–0.5)
IMM GRANULOCYTES NFR BLD AUTO: 1 % (ref 0–5)
LYMPHOCYTES # BLD: 1.4 K/UL (ref 0.5–4.6)
LYMPHOCYTES NFR BLD: 12 % (ref 13–44)
MCH RBC QN AUTO: 29.4 PG (ref 26.1–32.9)
MCHC RBC AUTO-ENTMCNC: 37.5 G/DL (ref 31.4–35)
MCV RBC AUTO: 78.2 FL (ref 79.6–97.8)
MONOCYTES # BLD: 1.5 K/UL (ref 0.1–1.3)
MONOCYTES NFR BLD: 13 % (ref 4–12)
NEUTS SEG # BLD: 8.6 K/UL (ref 1.7–8.2)
NEUTS SEG NFR BLD: 74 % (ref 43–78)
NRBC # BLD: 0 K/UL (ref 0–0.2)
P-R INTERVAL, ECG05: 150 MS
PHOSPHATE SERPL-MCNC: 2.9 MG/DL (ref 2.5–4.5)
PLATELET # BLD AUTO: 274 K/UL (ref 150–450)
PMV BLD AUTO: 9.8 FL (ref 9.4–12.3)
POTASSIUM SERPL-SCNC: 3.3 MMOL/L (ref 3.5–5.1)
Q-T INTERVAL, ECG07: 304 MS
QRS DURATION, ECG06: 66 MS
QTC CALCULATION (BEZET), ECG08: 448 MS
RBC # BLD AUTO: 4.77 M/UL (ref 4.23–5.6)
SODIUM SERPL-SCNC: 141 MMOL/L (ref 136–145)
VENTRICULAR RATE, ECG03: 131 BPM
WBC # BLD AUTO: 11.6 K/UL (ref 4.3–11.1)

## 2021-04-11 PROCEDURE — 74011250636 HC RX REV CODE- 250/636: Performed by: INTERNAL MEDICINE

## 2021-04-11 PROCEDURE — 74011250637 HC RX REV CODE- 250/637: Performed by: INTERNAL MEDICINE

## 2021-04-11 PROCEDURE — 74011250636 HC RX REV CODE- 250/636: Performed by: FAMILY MEDICINE

## 2021-04-11 PROCEDURE — 65270000029 HC RM PRIVATE

## 2021-04-11 PROCEDURE — 93306 TTE W/DOPPLER COMPLETE: CPT

## 2021-04-11 PROCEDURE — 85018 HEMOGLOBIN: CPT

## 2021-04-11 PROCEDURE — 74011000250 HC RX REV CODE- 250: Performed by: INTERNAL MEDICINE

## 2021-04-11 PROCEDURE — 74011250637 HC RX REV CODE- 250/637: Performed by: STUDENT IN AN ORGANIZED HEALTH CARE EDUCATION/TRAINING PROGRAM

## 2021-04-11 PROCEDURE — 74011250637 HC RX REV CODE- 250/637: Performed by: NURSE PRACTITIONER

## 2021-04-11 PROCEDURE — 74011250637 HC RX REV CODE- 250/637: Performed by: FAMILY MEDICINE

## 2021-04-11 PROCEDURE — C9113 INJ PANTOPRAZOLE SODIUM, VIA: HCPCS | Performed by: INTERNAL MEDICINE

## 2021-04-11 PROCEDURE — 80048 BASIC METABOLIC PNL TOTAL CA: CPT

## 2021-04-11 PROCEDURE — 82550 ASSAY OF CK (CPK): CPT

## 2021-04-11 PROCEDURE — 36415 COLL VENOUS BLD VENIPUNCTURE: CPT

## 2021-04-11 PROCEDURE — 74011250636 HC RX REV CODE- 250/636: Performed by: NURSE PRACTITIONER

## 2021-04-11 PROCEDURE — 84100 ASSAY OF PHOSPHORUS: CPT

## 2021-04-11 PROCEDURE — 85025 COMPLETE CBC W/AUTO DIFF WBC: CPT

## 2021-04-11 RX ORDER — SUCRALFATE 1 G/1
1 TABLET ORAL
Status: DISCONTINUED | OUTPATIENT
Start: 2021-04-11 | End: 2021-04-11

## 2021-04-11 RX ORDER — POTASSIUM CHLORIDE 14.9 MG/ML
20 INJECTION INTRAVENOUS
Status: COMPLETED | OUTPATIENT
Start: 2021-04-11 | End: 2021-04-11

## 2021-04-11 RX ORDER — ZOLPIDEM TARTRATE 5 MG/1
5 TABLET ORAL
Status: COMPLETED | OUTPATIENT
Start: 2021-04-11 | End: 2021-04-11

## 2021-04-11 RX ORDER — SUCRALFATE 1 G/10ML
1 SUSPENSION ORAL
Status: DISCONTINUED | OUTPATIENT
Start: 2021-04-11 | End: 2021-04-11

## 2021-04-11 RX ORDER — SCOLOPAMINE TRANSDERMAL SYSTEM 1 MG/1
1 PATCH, EXTENDED RELEASE TRANSDERMAL
Status: DISCONTINUED | OUTPATIENT
Start: 2021-04-11 | End: 2021-04-13 | Stop reason: HOSPADM

## 2021-04-11 RX ORDER — METOPROLOL TARTRATE 25 MG/1
25 TABLET, FILM COATED ORAL 2 TIMES DAILY
Status: DISCONTINUED | OUTPATIENT
Start: 2021-04-11 | End: 2021-04-11

## 2021-04-11 RX ORDER — SUCRALFATE 1 G/1
1 TABLET ORAL
Status: DISCONTINUED | OUTPATIENT
Start: 2021-04-11 | End: 2021-04-13 | Stop reason: HOSPADM

## 2021-04-11 RX ORDER — ONDANSETRON 2 MG/ML
4 INJECTION INTRAMUSCULAR; INTRAVENOUS EVERY 8 HOURS
Status: DISCONTINUED | OUTPATIENT
Start: 2021-04-11 | End: 2021-04-13

## 2021-04-11 RX ADMIN — ZOLPIDEM TARTRATE 5 MG: 5 TABLET ORAL at 21:35

## 2021-04-11 RX ADMIN — ONDANSETRON 4 MG: 2 INJECTION INTRAMUSCULAR; INTRAVENOUS at 15:47

## 2021-04-11 RX ADMIN — POTASSIUM CHLORIDE 20 MEQ: 14.9 INJECTION, SOLUTION INTRAVENOUS at 17:28

## 2021-04-11 RX ADMIN — SODIUM CHLORIDE 150 ML/HR: 900 INJECTION, SOLUTION INTRAVENOUS at 16:19

## 2021-04-11 RX ADMIN — ONDANSETRON 4 MG: 2 INJECTION INTRAMUSCULAR; INTRAVENOUS at 23:14

## 2021-04-11 RX ADMIN — Medication 10 ML: at 14:44

## 2021-04-11 RX ADMIN — SODIUM CHLORIDE 150 ML/HR: 900 INJECTION, SOLUTION INTRAVENOUS at 01:21

## 2021-04-11 RX ADMIN — Medication 10 ML: at 06:19

## 2021-04-11 RX ADMIN — PANTOPRAZOLE SODIUM 40 MG: 40 INJECTION, POWDER, FOR SOLUTION INTRAVENOUS at 11:04

## 2021-04-11 RX ADMIN — SODIUM CHLORIDE 150 ML/HR: 900 INJECTION, SOLUTION INTRAVENOUS at 09:00

## 2021-04-11 RX ADMIN — PANTOPRAZOLE SODIUM 40 MG: 40 INJECTION, POWDER, FOR SOLUTION INTRAVENOUS at 21:34

## 2021-04-11 RX ADMIN — SUCRALFATE 1 G: 1 TABLET ORAL at 15:46

## 2021-04-11 RX ADMIN — POTASSIUM CHLORIDE 20 MEQ: 14.9 INJECTION, SOLUTION INTRAVENOUS at 14:58

## 2021-04-11 RX ADMIN — METOCLOPRAMIDE HYDROCHLORIDE 5 MG: 5 INJECTION INTRAMUSCULAR; INTRAVENOUS at 17:31

## 2021-04-11 RX ADMIN — METOCLOPRAMIDE HYDROCHLORIDE 5 MG: 5 INJECTION INTRAMUSCULAR; INTRAVENOUS at 06:19

## 2021-04-11 RX ADMIN — ONDANSETRON 4 MG: 2 INJECTION INTRAMUSCULAR; INTRAVENOUS at 09:00

## 2021-04-11 RX ADMIN — Medication 10 ML: at 21:35

## 2021-04-11 RX ADMIN — ONDANSETRON 4 MG: 2 INJECTION INTRAMUSCULAR; INTRAVENOUS at 03:23

## 2021-04-11 RX ADMIN — SODIUM CHLORIDE 150 ML/HR: 900 INJECTION, SOLUTION INTRAVENOUS at 23:15

## 2021-04-11 RX ADMIN — METOCLOPRAMIDE HYDROCHLORIDE 5 MG: 5 INJECTION INTRAMUSCULAR; INTRAVENOUS at 11:04

## 2021-04-11 RX ADMIN — SUCRALFATE 1 G: 1 TABLET ORAL at 11:04

## 2021-04-11 RX ADMIN — METOCLOPRAMIDE HYDROCHLORIDE 5 MG: 5 INJECTION INTRAMUSCULAR; INTRAVENOUS at 23:51

## 2021-04-11 NOTE — H&P (VIEW-ONLY)
[facility] Consult Note Consulting GI Physician:  Dr. Tsering Landon Referring Provider:  Dr. Chantelle Cruz Consult Date:  4/11/2021 Admit Date:  4/9/2021 Chief Complaint:  hematemesis Subjective:  
 
History of Present Illness:  Patient is a 42 y.o. male with PMH of including but not limited to,  Recurrent esophagitis with recurrent Alexandra-Lynch tear, hx of H pylori treated with Prevpac 2011, gastroparesis documented on gastric emptying study in 2012, cannabinoid hyperemesis syndrome, sickle cell trait, and anxiety who is seen in consultation at the request of Dr. Johny Leon for hematemesis. Mr. Maximo Tineo was admitted with intractable N&V, BETTY and rhabdomyolysis on 4/10. On evaluation in the ED on 4/9, he had a BUN of 26, creat 1.68 Hgb 16.1, normal LFTS and elevated CK of 833. Positive UDS for THC. Negative abdominal series. Mr. Maximo Tineo is a well known patient to our practice. He was last evaluated during a hospitalization 7/2019. He has had multiple EGDs over the years and multiple hospital admissions,but has not been followed in the office since 2014. He apparently was doing well after his last hospitalization in 7/2019 on Pantoprazole 40mg one po daily until 3 days ago. 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 This was then followed by N&V. He has vomited \"too many times to count. \" He developed an episode of coffee ground emesis last night followed by darker red blood this am. He has abdominal \"cramping. \" He denies any GERD, melena or recent weight loss. He had a BM yesterday. He has been on erythromycin in the past for his gastroparesis with the most relief. He has had some results with sucralfate and metoclopramide. He stops these, when his symptoms improve. He takes zofran with some relief. He is unable to tolerate phenergan or Ativan. They both make him feel restless. Compazine hasn't been effective.  He recently lost his father and started smoking marijuana cigarettes again to cope with his stress. He has anxiety and doesn't take anything for this or see a counselor. He denies any NSAIDS, ETOH. He is able to keep down some liquids this am. Asking for sucralfate to be changed from tablets to liquid. Most recent EGD on 7/18/2019 by Dr. Arin Griffin revealed LA grade C esophagitis at the distal esophagus, mild non bleeding Alexandra-Lynch tear. *hx is obtained by both the patient and the spouse. The spouse is concerned about his anxiety and coping skills. She is also concerned about his frequently low potassium. Encouraged her to discuss these items with her PCP. PMH: 
Past Medical History:  
Diagnosis Date  BETTY (acute kidney injury) ([facility] [address] 8/12/2014  Clostridium difficile colitis 3/20/2011  Gastroparesis 2005  
 emptying study normal -2006  GERD (gastroesophageal reflux disease) [place]  PUD (peptic ulcer disease) ALL DX IN 2008 ESOPHAGITIS, + H PYLORI  
 Uncontrolled hypertension 6/26/2018 PSH: 
Past Surgical History:  
Procedure Laterality Date  COLONOSCOPY  4/08 ESOPHAGITIS, COLONIC ULCER X1  
 EGD  4/08 H. HERNIA, ULCER X2, H PYLORI,  
 EGD  2011  
 h pylori -neg  HX WISDOM TEETH EXTRACTION  2/10/11 Allergies: Allergies Allergen Reactions  Amoxicillin Nausea Only  Aspirin Other (comments)  
  ulcers  Hydrocodone Rash Tolerates hydromorphone, morphine, tramadol  Ibuprofen Other (comments) Hx of ulcers  Pcn [Penicillins] Rash  Phenergan [Promethazine] Nausea and Vomiting and Other (comments) Home Medications: 
Prior to Admission medications Medication Sig Start Date End Date Taking?  Authorizing Provider  
hyoscyamine SL (Levsin/SL) 0.125 mg SL tablet 2 Tabs by SubLINGual route every six (6) hours as needed for Cramping. 7/31/20   Shailesh Botello MD  
capsaicin (CAPZASIN-HP) 0.1 % topical cream Apply  to affected area three (3) times daily. 10/12/19   Shalonda Dixon MD  
 
 
[facility] Medications: 
Current Facility-Administered Medications Medication Dose Route Frequency  pantoprazole (PROTONIX) 40 mg in 0.9% sodium chloride 10 mL injection  40 mg IntraVENous Q12H  
 sucralfate (CARAFATE) tablet 1 g  1 g Oral AC&HS  sodium chloride (NS) flush 5-40 mL  5-40 mL IntraVENous Q8H  
 sodium chloride (NS) flush 5-40 mL  5-40 mL IntraVENous PRN  
 acetaminophen (TYLENOL) tablet 650 mg  650 mg Oral Q6H PRN Or  
 acetaminophen (TYLENOL) suppository 650 mg  650 mg Rectal Q6H PRN  polyethylene glycol (MIRALAX) packet 17 g  17 g Oral DAILY PRN  
 ondansetron (ZOFRAN) injection 4 mg  4 mg IntraVENous Q6H PRN  
 0.9% sodium chloride infusion  175 mL/hr IntraVENous CONTINUOUS  
 [Held by provider] heparin (porcine) injection 5,000 Units  5,000 Units SubCUTAneous Q8H  
 metoclopramide HCl (REGLAN) injection 5 mg  5 mg IntraVENous Q6H Social History: 
Social History Tobacco Use  Smoking status: Current Every Day Smoker Packs/day: 0.25 Years: 2.00 Pack years: 0.50 Types: Cigarettes  Smokeless tobacco: Never Used Substance Use Topics  Alcohol use: No  
 
Marijuana cigarettes Family History: 
Family History Problem Relation Age of Onset Vivek Winn Other Mother L+W  Diabetes Maternal Grandmother  Sickle Cell Anemia Father  Heart Disease Paternal Grandfather  Other Sister ALL L+W  Other Son   
     L+W Review of Systems: A detailed 10 system ROS is obtained, with pertinent positives as listed above. All others are negative. Diet:  NPO with sips Objective:  
 
Physical Exam: 
Vitals: 
Visit Vitals /81 Pulse (!) 108 Temp 99 °F (37.2 °C) Resp 20 Ht 5' 2\" (1.575 m) Wt 65.8 kg (145 lb) SpO2 98% BMI 26.52 kg/m² Gen:  Pt is alert, cooperative, no acute distress SLEEPY BUT AWAKENS TO PROVIDE SOME HX; SPOUSE AT BEDSIDE Skin: Extremities and face reveal no rashes. HEENT: Sclerae anicteric. Extra-occular muscles are intact. No oral ulcers. No abnormal pigmentation of the lips. The neck is supple. Cardiovascular: Regular rate and rhythm. No murmurs, gallops, or rubs. Respiratory:  Comfortable breathing with no accessory muscle use. Clear breath sounds anteriorly with no wheezes, rales, or rhonchi. GI:  Abdomen nondistended, soft, and nontender. Normal active bowel sounds. No enlargement of the liver or spleen. No masses palpable. Musculoskeletal:  No pitting edema of the lower legs. Neurological:  Gross memory appears intact. Patient is alert and oriented. Psychiatric:  Mood appears appropriate with judgement intact. Lymphatic:  No cervical or supraclavicular adenopathy. Laboratory:   
Recent Labs 04/11/21 
0796 04/09/21 
2228 WBC 11.6* 7.3 HGB 14.0 16.1 HCT 37.3* 42.2  324 MCV 78.2* 76.9*  139  
K 3.3* 3.6 * 107 CO2 21 16*  
BUN 20 26* CREA 1.15 1.68* CA 9.1 10.3 MG  --  2.6*  
* 143* AP  --  127 ALT  --  40  
TBILI  --  1.0 ALB  --  4.6 TP  --  8.9*  
LPSE  --  83 Assessment:  
 
Principal Problem: 
  BETTY (acute kidney injury) ([facility] (4/10/2021) Active Problems: 
  Nondiabetic gastroparesis (3/17/2011) Sickle cell trait ([facility] (3/5/2013) Anxiety (4/30/2014) Esophageal reflux (4/30/2014) PUD (peptic ulcer disease) (4/30/2014) Intractable nausea and vomiting (8/1/2020) Cannabinoid hyperemesis syndrome (8/2/2020) Rhabdomyolysis (4/10/2021) 42 y.o. male with PMH of including but not limited to,  Recurrent esophagitis with recurrent Alexandra-Lynch tear, hx of H pylori treated with Prevpac 2011, gastroparesis documented on gastric emptying study in 2012, cannabinoid hyperemesis syndrome, sickle cell trait, and anxiety who is seen in consultation at the request of Dr. Zheng Dust for hematemesis.  Mr. Pulliam Liner was admitted with intractable N&V, BETTY and rhabdomyolysis on 4/10 (with increasing CK). Mr. Mena Mahmood is well known to our practice with recurrent esophagitis and recurrent Alexandra-Lynch tear. He was doing well until recent \"heat exhaustion\" and current rhabdomyolysis. He has dropped his Hgb to 14 from 16.1, but is hemodynamically stable with a normal BUN. He has no associated melena. This is likely recurrent esophagitis with likely recurrent Alexandra-Lynch tear. Plan:  
 
-Agree with pantoprazole 40mg IV bid  
-Change sucralfate tablet to liquid QID 
-Zofran 4mg every 8 hours scheduled 
-Agree with metoclopramide 5mg every 6 hours 
-Continue to hold heparin 
-Clear liquid diet nothing red 
-NPO after midnight 
-Schedule EGD tomorrow 4/12 with Dr. Sid Flood. Risks and benefits discussed with patient and spouse to include risk of infection, bleeding, perforation, anesthesia, and possible mortality. Patient verbalized understanding and wishes to proceed with EGD. Call for increased bleeding and may need to be done more emergently. 
-Serial H&H and transfuse as needed 
-Consider adding back Erythromycin if not improving. This has worked for him in the past.  
-Discussed trying to find other ways of coping with stress in place of marijuana. Encouraged him to discuss with his PCP 
-Serial CK and management of rhabdomyolysis as per primary team 
Alexandrea Murphy. Gene [facility] Patient is seen and examined in collaboration with Dr. Kristine Luna. Assessment and plan as per Dr. Kristine Luna.

## 2021-04-11 NOTE — ROUTINE PROCESS
Verbal bedside report given to Korin Christianson oncoming RN. Patient's situation, background, assessment and recommendations provided. Opportunity for questions provided. Oncoming RN assumed care of patient.

## 2021-04-11 NOTE — PROGRESS NOTES
Patient had vomited approx 200 ml of brownish/red fluid this am. He and his spouse state that sometimes its black. He is currently up ambulating on the unit with his wife.

## 2021-04-11 NOTE — PROGRESS NOTES
Pt's current BP is 173/76 with a heart rate of 125. Dr. Marlene Hyman notified. No orders received at this time.

## 2021-04-11 NOTE — PROGRESS NOTES
Problem: Falls - Risk of  Goal: *Absence of Falls  Description: Document Amandavera Radha Fall Risk and appropriate interventions in the flowsheet.   Outcome: Progressing Towards Goal  Note: Fall Risk Interventions:            Medication Interventions: Patient to call before getting OOB, Teach patient to arise slowly                   Problem: Patient Education: Go to Patient Education Activity  Goal: Patient/Family Education  Outcome: Progressing Towards Goal

## 2021-04-11 NOTE — PROGRESS NOTES
Pt's emesis this morning become bloody. Dr Mckeon Half aware. Morning heparin injection held. No new orders received at this time.

## 2021-04-11 NOTE — PROGRESS NOTES
Pt asked for Benadryl for sleep.  Md notified, orders received for a one time dose of benadryl 25 mg PO

## 2021-04-11 NOTE — PROGRESS NOTES
[facility] Hospitalist Progress Note     Name:  Tabitha Tomas  Age:46 y.o. Sex:male   :  1975    MRN:  [id number]     Admit Date:  2021    Reason for Admission:  BETTY (acute kidney injury) [facility]) [N17.9]    Hospital Course/Interval history:     46M with PMH of  Recurrent esophagitis with recurrent Alexandra-Lynch tear, hx of H pylori, gastroparesis, cannabinoid hyperemesis syndrome, sickle cell trait, and anxiety presented to the ER with chief complaint of intractable vomiting. He was admitted for mild BETTY and intractable nausea and vomiting. Patient developed hematemesis and GI consulted with concerns of Alexandra-Lynch tear. Assessment & Plan     Intractable nausea and vomiting:  Likely secondary to cannabinoid hyperemesis syndrome. Now started to have hematemesis. GI consulted with plan for EGD tomorrow. Continue PPI and Carafate, IV Reglan. Allow showers whenever patient wants to as it helps with his vomiting. Serial H&H.  N.p.o. after midnight. BETTY: Likely secondary to dehydration. Creatinine normalized after IV fluids. Trend creatinine daily. Mild rhabdo:  Likely due to dehydration. CK trending up slowly so continue IV fluids. Trend CK. Systolic murmur:  Ordered echo and did not show any valvular disease. Hypokalemia:  Replace and monitor. Marijuana abuse:  Patient reports he had clean 6 months but then due to deaths in the family he started using marijuana again. Both patient and wife counseled about finding alternative ways to cope with anxiety and stress. High risk patient  Diet:  DIET REGULAR  DVT PPx: SCDs  Code status: Full Code  Disposition/Expected LOS: 2 to 3 days. Subjective (21): Patient has had multiple episodes of vomiting overnight and this morning started having blood in vomit. Denies any chest pain.     Review of Systems: 14 point review of systems is otherwise negative with the exception of the elements mentioned above.    Objective:     Patient Vitals for the past 24 hrs:   Temp Pulse Resp BP SpO2   04/11/21 0730 99 °F (37.2 °C) (!) 108 20 129/81 98 %   04/11/21 0327 99.1 °F (37.3 °C) (!) 118 20 135/84 97 %   04/10/21 2312 99.3 °F (37.4 °C) (!) 120 20 (!) 144/90 96 %   04/10/21 2114 99.7 °F (37.6 °C) (!) 125 20 (!) 173/76 93 %   04/10/21 1818 97.9 °F (36.6 °C) (!) 109 20 (!) 157/83 96 %   04/10/21 1235 97.7 °F (36.5 °C) (!) 110 17 (!) 153/99 91 %     Oxygen Therapy  O2 Sat (%): 98 % (04/11/21 0730)  Pulse via Oximetry: 102 beats per minute (04/10/21 0254)  O2 Device: None (Room air) (04/11/21 0327)    Body mass index is 26.52 kg/m². Physical Exam:   General:  Mild distress, speaking in full sentences, no use of accessory muscles   Head:  AT/NC. Lungs:  Clear to auscultation bilaterally   CV:  Regular rate and rhythm with normal S1 and S2   Abdomen:  Soft, nontender, nondistended, normoactive bowel sounds   Extremities:  No cyanosis clubbing or edema   Neuro:  Nonfocal, A&O x3   Psych:  Normal affect   Skin:  No obvious rash noted. Data Review:  I have reviewed all labs, meds, and studies from the last 24 hours:    Labs:    Recent Results (from the past 24 hour(s))   CBC WITH AUTOMATED DIFF    Collection Time: 04/11/21  6:16 AM   Result Value Ref Range    WBC 11.6 (H) 4.3 - 11.1 K/uL    RBC 4.77 4.23 - 5.6 M/uL    HGB 14.0 13.6 - 17.2 g/dL    HCT 37.3 (L) 41.1 - 50.3 %    MCV 78.2 (L) 79.6 - 97.8 FL    MCH 29.4 26.1 - 32.9 PG    MCHC 37.5 (H) 31.4 - 35.0 g/dL    RDW 12.9 11.9 - 14.6 %    PLATELET 036 618 - 091 K/uL    MPV 9.8 9.4 - 12.3 FL    ABSOLUTE NRBC 0.00 0.0 - 0.2 K/uL    DF AUTOMATED      NEUTROPHILS 74 43 - 78 %    LYMPHOCYTES 12 (L) 13 - 44 %    MONOCYTES 13 (H) 4.0 - 12.0 %    EOSINOPHILS 0 (L) 0.5 - 7.8 %    BASOPHILS 0 0.0 - 2.0 %    IMMATURE GRANULOCYTES 1 0.0 - 5.0 %    ABS. NEUTROPHILS 8.6 (H) 1.7 - 8.2 K/UL    ABS. LYMPHOCYTES 1.4 0.5 - 4.6 K/UL    ABS. MONOCYTES 1.5 (H) 0.1 - 1.3 K/UL    ABS. EOSINOPHILS 0.0 0.0 - 0.8 K/UL    ABS. BASOPHILS 0.0 0.0 - 0.2 K/UL    ABS. IMM. GRANS. 0.1 0.0 - 0.5 K/UL   CK    Collection Time: 04/11/21  6:16 AM   Result Value Ref Range    CK 1,290 (H) 21 - 898 U/L   METABOLIC PANEL, BASIC    Collection Time: 04/11/21  6:16 AM   Result Value Ref Range    Sodium 141 136 - 145 mmol/L    Potassium 3.3 (L) 3.5 - 5.1 mmol/L    Chloride 113 (H) 98 - 107 mmol/L    CO2 21 21 - 32 mmol/L    Anion gap 7 7 - 16 mmol/L    Glucose 110 (H) 65 - 100 mg/dL    BUN 20 6 - 23 MG/DL    Creatinine 1.15 0.8 - 1.5 MG/DL    GFR est AA >60 >60 ml/min/1.73m2    GFR est non-AA >60 >60 ml/min/1.73m2    Calcium 9.1 8.3 - 10.4 MG/DL   PHOSPHORUS    Collection Time: 04/11/21  6:16 AM   Result Value Ref Range    Phosphorus 2.9 2.5 - 4.5 MG/DL       All Micro Results     None          EKG Results     Procedure 720 Value Units Date/Time    EKG, 12 LEAD, INITIAL [[id number]] Collected: 04/09/21 2238    Order Status: Completed Updated: 04/11/21 0825     Ventricular Rate 131 BPM      Atrial Rate 131 BPM      P-R Interval 150 ms      QRS Duration 66 ms      Q-T Interval 304 ms      QTC Calculation (Bezet) 448 ms      Calculated P Axis 72 degrees      Calculated R Axis 76 degrees      Calculated T Axis 66 degrees      Diagnosis --     Sinus tachycardia  Right atrial enlargement  Junctional ST depression, probably normal  Borderline ECG  When compared with ECG of 02-AUG-2020 08:33,  Vent. rate has increased BY  54 BPM            Other Studies:  No results found.     Current Meds:   Current Facility-Administered Medications   Medication Dose Route Frequency    sodium chloride (NS) flush 5-40 mL  5-40 mL IntraVENous Q8H    sodium chloride (NS) flush 5-40 mL  5-40 mL IntraVENous PRN    acetaminophen (TYLENOL) tablet 650 mg  650 mg Oral Q6H PRN    Or    acetaminophen (TYLENOL) suppository 650 mg  650 mg Rectal Q6H PRN    polyethylene glycol (MIRALAX) packet 17 g  17 g Oral DAILY PRN    ondansetron (ZOFRAN) injection 4 mg  4 mg IntraVENous Q6H PRN    0.9% sodium chloride infusion  175 mL/hr IntraVENous CONTINUOUS    [Held by provider] heparin (porcine) injection 5,000 Units  5,000 Units SubCUTAneous Q8H    metoclopramide HCl (REGLAN) injection 5 mg  5 mg IntraVENous Q6H       Problem List:  Hospital Problems as of 4/11/2021 Date Reviewed: 3/1/2019          Codes Class Noted - Resolved POA    * (Principal) BETTY (acute kidney injury) ([facility] [address] ICD-10-CM: N17.9  ICD-9-CM: 584.9  4/10/2021 - Present Yes        Rhabdomyolysis ICD-10-CM: M62.82  ICD-9-CM: 728.88  4/10/2021 - Present Yes        Cannabinoid hyperemesis syndrome ICD-10-CM: R11.2, F12.90  ICD-9-CM: 536.2, 305.20  8/2/2020 - Present Yes        Intractable nausea and vomiting ICD-10-CM: R11.2  ICD-9-CM: 536.2  8/1/2020 - Present Yes        Anxiety (Chronic) ICD-10-CM: F41.9  ICD-9-CM: 300.00  4/30/2014 - Present Yes        Esophageal reflux (Chronic) ICD-10-CM: K21.9  ICD-9-CM: 530.81  4/30/2014 - Present Yes        PUD (peptic ulcer disease) (Chronic) ICD-10-CM: K27.9  ICD-9-CM: 533.90  4/30/2014 - Present Yes        Sickle cell trait (HCC) (Chronic) ICD-10-CM: D57.3  ICD-9-CM: 282.5  3/5/2013 - Present Yes        Nondiabetic gastroparesis ICD-10-CM: K31.84  ICD-9-CM: 536.3  3/17/2011 - Present Yes               Part of this note was written by using a voice dictation software and the note has been proof read but may still contain some grammatical/other typographical errors.     Signed By: Zbigniew Conroy MD   [employer] [facility]    April 11, 2021  5:15 PM

## 2021-04-11 NOTE — ROUTINE PROCESS
Verbal bedside report received from Juan, [facility]. Assumed care of patient.

## 2021-04-12 ENCOUNTER — ANESTHESIA (OUTPATIENT)
Dept: ENDOSCOPY | Age: 46
DRG: 682 | End: 2021-04-12
Payer: COMMERCIAL

## 2021-04-12 ENCOUNTER — ANESTHESIA EVENT (OUTPATIENT)
Dept: ENDOSCOPY | Age: 46
DRG: 682 | End: 2021-04-12
Payer: COMMERCIAL

## 2021-04-12 LAB
ANION GAP SERPL CALC-SCNC: 8 MMOL/L (ref 7–16)
BASOPHILS # BLD: 0 K/UL (ref 0–0.2)
BASOPHILS NFR BLD: 0 % (ref 0–2)
BUN SERPL-MCNC: 15 MG/DL (ref 6–23)
CALCIUM SERPL-MCNC: 9 MG/DL (ref 8.3–10.4)
CHLORIDE SERPL-SCNC: 112 MMOL/L (ref 98–107)
CK SERPL-CCNC: 1462 U/L (ref 21–215)
CO2 SERPL-SCNC: 20 MMOL/L (ref 21–32)
CREAT SERPL-MCNC: 0.94 MG/DL (ref 0.8–1.5)
DIFFERENTIAL METHOD BLD: ABNORMAL
EOSINOPHIL # BLD: 0.2 K/UL (ref 0–0.8)
EOSINOPHIL NFR BLD: 2 % (ref 0.5–7.8)
ERYTHROCYTE [DISTWIDTH] IN BLOOD BY AUTOMATED COUNT: 12.8 % (ref 11.9–14.6)
GLUCOSE SERPL-MCNC: 96 MG/DL (ref 65–100)
HCT VFR BLD AUTO: 39 % (ref 41.1–50.3)
HGB BLD-MCNC: 14.2 G/DL (ref 13.6–17.2)
IMM GRANULOCYTES # BLD AUTO: 0 K/UL (ref 0–0.5)
IMM GRANULOCYTES NFR BLD AUTO: 0 % (ref 0–5)
LYMPHOCYTES # BLD: 2.2 K/UL (ref 0.5–4.6)
LYMPHOCYTES NFR BLD: 22 % (ref 13–44)
MCH RBC QN AUTO: 29.2 PG (ref 26.1–32.9)
MCHC RBC AUTO-ENTMCNC: 36.4 G/DL (ref 31.4–35)
MCV RBC AUTO: 80.2 FL (ref 79.6–97.8)
MONOCYTES # BLD: 1.1 K/UL (ref 0.1–1.3)
MONOCYTES NFR BLD: 11 % (ref 4–12)
NEUTS SEG # BLD: 6.4 K/UL (ref 1.7–8.2)
NEUTS SEG NFR BLD: 65 % (ref 43–78)
NRBC # BLD: 0 K/UL (ref 0–0.2)
PLATELET # BLD AUTO: 312 K/UL (ref 150–450)
PMV BLD AUTO: 8.9 FL (ref 9.4–12.3)
POTASSIUM SERPL-SCNC: 3.4 MMOL/L (ref 3.5–5.1)
RBC # BLD AUTO: 4.86 M/UL (ref 4.23–5.6)
SODIUM SERPL-SCNC: 140 MMOL/L (ref 136–145)
WBC # BLD AUTO: 9.9 K/UL (ref 4.3–11.1)

## 2021-04-12 PROCEDURE — 74011250636 HC RX REV CODE- 250/636: Performed by: NURSE PRACTITIONER

## 2021-04-12 PROCEDURE — 76060000031 HC ANESTHESIA FIRST 0.5 HR: Performed by: INTERNAL MEDICINE

## 2021-04-12 PROCEDURE — 82550 ASSAY OF CK (CPK): CPT

## 2021-04-12 PROCEDURE — 74011250636 HC RX REV CODE- 250/636: Performed by: FAMILY MEDICINE

## 2021-04-12 PROCEDURE — 74011250636 HC RX REV CODE- 250/636: Performed by: INTERNAL MEDICINE

## 2021-04-12 PROCEDURE — 74011250637 HC RX REV CODE- 250/637: Performed by: NURSE PRACTITIONER

## 2021-04-12 PROCEDURE — 74011250637 HC RX REV CODE- 250/637: Performed by: INTERNAL MEDICINE

## 2021-04-12 PROCEDURE — 36415 COLL VENOUS BLD VENIPUNCTURE: CPT

## 2021-04-12 PROCEDURE — 80048 BASIC METABOLIC PNL TOTAL CA: CPT

## 2021-04-12 PROCEDURE — 74011000250 HC RX REV CODE- 250: Performed by: NURSE ANESTHETIST, CERTIFIED REGISTERED

## 2021-04-12 PROCEDURE — C9113 INJ PANTOPRAZOLE SODIUM, VIA: HCPCS | Performed by: INTERNAL MEDICINE

## 2021-04-12 PROCEDURE — 0DB38ZX EXCISION OF LOWER ESOPHAGUS, VIA NATURAL OR ARTIFICIAL OPENING ENDOSCOPIC, DIAGNOSTIC: ICD-10-PCS | Performed by: INTERNAL MEDICINE

## 2021-04-12 PROCEDURE — 74011000250 HC RX REV CODE- 250: Performed by: INTERNAL MEDICINE

## 2021-04-12 PROCEDURE — 65270000029 HC RM PRIVATE

## 2021-04-12 PROCEDURE — 88305 TISSUE EXAM BY PATHOLOGIST: CPT

## 2021-04-12 PROCEDURE — 74011250636 HC RX REV CODE- 250/636: Performed by: NURSE ANESTHETIST, CERTIFIED REGISTERED

## 2021-04-12 PROCEDURE — 77030021593 HC FCPS BIOP ENDOSC BSC -A: Performed by: INTERNAL MEDICINE

## 2021-04-12 PROCEDURE — 2709999900 HC NON-CHARGEABLE SUPPLY: Performed by: INTERNAL MEDICINE

## 2021-04-12 PROCEDURE — 76040000025: Performed by: INTERNAL MEDICINE

## 2021-04-12 PROCEDURE — 85025 COMPLETE CBC W/AUTO DIFF WBC: CPT

## 2021-04-12 RX ORDER — METOCLOPRAMIDE 10 MG/1
5 TABLET ORAL
Status: DISCONTINUED | OUTPATIENT
Start: 2021-04-12 | End: 2021-04-13

## 2021-04-12 RX ORDER — PROPOFOL 10 MG/ML
INJECTION, EMULSION INTRAVENOUS
Status: DISCONTINUED | OUTPATIENT
Start: 2021-04-12 | End: 2021-04-12 | Stop reason: HOSPADM

## 2021-04-12 RX ORDER — PANTOPRAZOLE SODIUM 40 MG/1
40 TABLET, DELAYED RELEASE ORAL
Status: DISCONTINUED | OUTPATIENT
Start: 2021-04-12 | End: 2021-04-13 | Stop reason: HOSPADM

## 2021-04-12 RX ORDER — PROPOFOL 10 MG/ML
INJECTION, EMULSION INTRAVENOUS AS NEEDED
Status: DISCONTINUED | OUTPATIENT
Start: 2021-04-12 | End: 2021-04-12 | Stop reason: HOSPADM

## 2021-04-12 RX ORDER — ONDANSETRON 2 MG/ML
4 INJECTION INTRAMUSCULAR; INTRAVENOUS ONCE
Status: COMPLETED | OUTPATIENT
Start: 2021-04-12 | End: 2021-04-12

## 2021-04-12 RX ORDER — LIDOCAINE HYDROCHLORIDE 20 MG/ML
INJECTION, SOLUTION EPIDURAL; INFILTRATION; INTRACAUDAL; PERINEURAL AS NEEDED
Status: DISCONTINUED | OUTPATIENT
Start: 2021-04-12 | End: 2021-04-12 | Stop reason: HOSPADM

## 2021-04-12 RX ORDER — SODIUM CHLORIDE, SODIUM LACTATE, POTASSIUM CHLORIDE, CALCIUM CHLORIDE 600; 310; 30; 20 MG/100ML; MG/100ML; MG/100ML; MG/100ML
1000 INJECTION, SOLUTION INTRAVENOUS CONTINUOUS
Status: DISCONTINUED | OUTPATIENT
Start: 2021-04-12 | End: 2021-04-12 | Stop reason: HOSPADM

## 2021-04-12 RX ADMIN — SODIUM CHLORIDE, SODIUM LACTATE, POTASSIUM CHLORIDE, AND CALCIUM CHLORIDE 1000 ML: 600; 310; 30; 20 INJECTION, SOLUTION INTRAVENOUS at 09:44

## 2021-04-12 RX ADMIN — PROPOFOL 300 MCG/KG/MIN: 10 INJECTION, EMULSION INTRAVENOUS at 10:23

## 2021-04-12 RX ADMIN — Medication 10 ML: at 16:25

## 2021-04-12 RX ADMIN — PROPOFOL 50 MG: 10 INJECTION, EMULSION INTRAVENOUS at 10:23

## 2021-04-12 RX ADMIN — Medication 10 ML: at 06:14

## 2021-04-12 RX ADMIN — SUCRALFATE 1 G: 1 TABLET ORAL at 11:24

## 2021-04-12 RX ADMIN — ONDANSETRON 4 MG: 2 INJECTION INTRAMUSCULAR; INTRAVENOUS at 08:47

## 2021-04-12 RX ADMIN — PROPOFOL 50 MG: 10 INJECTION, EMULSION INTRAVENOUS at 10:22

## 2021-04-12 RX ADMIN — Medication 10 ML: at 21:05

## 2021-04-12 RX ADMIN — ONDANSETRON 4 MG: 2 INJECTION INTRAMUSCULAR; INTRAVENOUS at 16:24

## 2021-04-12 RX ADMIN — PANTOPRAZOLE SODIUM 40 MG: 40 INJECTION, POWDER, FOR SOLUTION INTRAVENOUS at 08:47

## 2021-04-12 RX ADMIN — ONDANSETRON 4 MG: 2 INJECTION INTRAMUSCULAR; INTRAVENOUS at 21:05

## 2021-04-12 RX ADMIN — PANTOPRAZOLE SODIUM 40 MG: 40 TABLET, DELAYED RELEASE ORAL at 16:24

## 2021-04-12 RX ADMIN — LIDOCAINE HYDROCHLORIDE 60 MG: 20 INJECTION, SOLUTION EPIDURAL; INFILTRATION; INTRACAUDAL; PERINEURAL at 10:22

## 2021-04-12 RX ADMIN — SUCRALFATE 1 G: 1 TABLET ORAL at 16:24

## 2021-04-12 NOTE — PROCEDURES
EGD Procedure Note    PreOp Diagnosis:   Nausea and vomiting,   hematemesis,   reflux     PostOp Diagnosis:  Hiatal hernia,   moderate esophagitis     Medications:  Monitored Anesthesia    Procedure:  EGD   Instrument:   After informed consent was obtained, the patient was sedated and the endoscope was inserted  in the mouth and advanced into the duodenum without difficulty. The scope was slowly withdrawn while the mucosa was carefully inspected, including a retroflexed view of the cardia and fundus. Findings:       ESOPHAGUS: The proximal and mid esophagus appeared normal.  Esophagitis was identified in the distal esophagus, consistent with changes of acid reflux. This was LA class ' B'. This is the likely source of recent hematemesis  In the distal esophagus, the Z-line was irregular, suggesting a short segment of Joy's esophagus versus chronic reflux changes. There was slight nodularity in the distal esophagus. There were no masses, strictures, or ulcers present. Multiple biopsies were obtained to evaluate for intestinal metaplasia. STOMACH: The gastric mucosa was unremarkable throughout. There were no erosions, ulcerations, polyps, mass lesions, vascular ectasias or other abnormalities noted. The pylorus was patent and easily intubated. There was a 2cm hiatal hernia noted by direct view and retroflexion within the stomach. DUODENUM: The endoscope was advanced as far as possible into the duodenum. The villi appeared normal.  There were no mucosal breaks, erosions, ulcerations, vascular ectasias or other abnormalities present. Recommendations: Follow up pathology results  Continue b.i.d. PPI, Reglan, Zofran. Strict reflux diet.  Followup EGD in 1 year if Joy's esophagus is confirmed     Feliz Pak MD

## 2021-04-12 NOTE — PROGRESS NOTES
BSN, ALFREDITO met with pt and spouse, Anuradha Albarado, at bedside with appropriate PPE and social distancing. Pt lying in bed. Pt alert and oriented to person, place, time, and situation. Pt verified demographic information. PCP verified as Dr. Citlalli Horner and pt was last seen by PCP within last 6 months. Pt lives in 2 story home with spouse, 4 steps to enter into the home and 14 steps to second floor. Pt reports being independent with ADLs, working, and driving prior to admission. Pt reports having no DMEs. Pt family able to transport home and pt able to transport self to doctor apt,  medications, and get groceries. [facility] primary [facility] [place]. Pt reports being able to afford all medications currenly on but may have issues if added medication \"expensive\". Pt reports plans to discharge home. Pt has never used HH or been to SNF. No needs noted at this time. CM to continue to follow and monitor for any needs that may occur. Care Management Interventions  PCP Verified by CM: Yes(Dr. Citlalli Horner)  Mode of Transport at Discharge:  Other (see comment)(Family )  Transition of Care Consult (CM Consult): Discharge Planning  Discharge Durable Medical Equipment: No  Physical Therapy Consult: No  Occupational Therapy Consult: No  Speech Therapy Consult: No  Current Support Network: Own Home, Lives with Spouse  Confirm Follow Up Transport: Self  The Plan for Transition of Care is Related to the Following Treatment Goals : Return to baseline   [address] Provided?: No  Discharge Location  Discharge Placement: Home

## 2021-04-12 NOTE — PROGRESS NOTES
TRANSFER - IN REPORT:    Verbal report received from BANDAR Arana(name) on [employer]  being received from room 914(unit) for ordered procedure      Report consisted of patients Situation, Background, Assessment and   Recommendations(SBAR). Information from the following report(s) SBAR and MAR was reviewed with the receiving nurse. Opportunity for questions and clarification was provided. Assessment completed upon patients arrival to unit and care assumed.

## 2021-04-12 NOTE — ANESTHESIA POSTPROCEDURE EVALUATION
Procedure(s):  ESOPHAGOGASTRODUODENOSCOPY (EGD) ROOM 914  ESOPHAGOGASTRODUODENAL (EGD) BIOPSY. total IV anesthesia    Anesthesia Post Evaluation        Patient location during evaluation: PACU  Patient participation: complete - patient participated  Level of consciousness: awake  Pain management: adequate  Airway patency: patent  Anesthetic complications: no  Cardiovascular status: acceptable  Respiratory status: acceptable  Hydration status: acceptable  Post anesthesia nausea and vomiting:  none      INITIAL Post-op Vital signs:   Vitals Value Taken Time   /87 04/12/21 1052   Temp     Pulse 90 04/12/21 1052   Resp 17 04/12/21 1052   SpO2 93 % 04/12/21 1127   Vitals shown include unvalidated device data.

## 2021-04-12 NOTE — ANESTHESIA PREPROCEDURE EVALUATION
Anesthetic History     PONV          Review of Systems / Medical History  Patient summary reviewed and pertinent labs reviewed    Pulmonary          Smoker         Neuro/Psych   Within defined limits           Cardiovascular    Hypertension: well controlled              Exercise tolerance: >4 METS  Comments:        GI/Hepatic/Renal     GERD: well controlled      Hiatal hernia and PUD     Endo/Other             Other Findings          Anesthetic History     PONV          Review of Systems / Medical History  Patient summary reviewed, nursing notes reviewed and pertinent labs reviewed    Pulmonary          Smoker (quit 1 year ago)         Neuro/Psych   Within defined limits           Cardiovascular  Within defined limits                Exercise tolerance: >4 METS     GI/Hepatic/Renal     GERD: well controlled      PUD    Comments: N/V hx of jeremiah woodward tear Endo/Other  Within defined limits           Other Findings              Physical Exam    Airway  Mallampati: II  TM Distance: 4 - 6 cm  Neck ROM: normal range of motion   Mouth opening: Normal     Cardiovascular    Rhythm: regular  Rate: normal    Murmur: Grade 2, Mitral area     Dental  No notable dental hx       Pulmonary  Breath sounds clear to auscultation               Abdominal  GI exam deferred       Other Findings            Anesthetic Plan    ASA: 3, emergent  Anesthesia type: general          Induction: Intravenous and RSI  Anesthetic plan and risks discussed with: Patient                Physical Exam    Airway  Mallampati: II  TM Distance: 4 - 6 cm  Neck ROM: normal range of motion   Mouth opening: Normal     Cardiovascular  Regular rate and rhythm,  S1 and S2 normal,  no murmur, click, rub, or gallop             Dental    Dentition: Poor dentition     Pulmonary  Breath sounds clear to auscultation               Abdominal         Other Findings            Anesthetic Plan    ASA: 2, emergent  Anesthesia type: total IV anesthesia          Induction: Intravenous  Anesthetic plan and risks discussed with: Patient      Admitted with bloody emesis 4 days ago

## 2021-04-12 NOTE — PROGRESS NOTES
Progress Note    Patient: Spencer Mckeon MRN: [id number]  SSN: [id number]    YOB: 1975  Age: 42 y.o. Sex: male      Admit Date: 4/9/2021    LOS: 2 days     Subjective:     Patient with past medical history of    Recurrent esophagitis with recurrent Alexandra-Lynch tear,   hx of H pylori,   gastroparesis,   cannabinoid hyperemesis syndrome,   sickle cell trait, and   anxiety     Admitted this time due to intractable vomiting with BETTY. Patient also had hematemesis and GI was consulted. He is on scheduled for EGD today. Objective:     Vitals:    04/12/21 1034 04/12/21 1044 04/12/21 1052 04/12/21 1300   BP: 103/68 122/86 124/87 (!) 149/104   Pulse: 97 74 90 79   Resp: 16 15 17 16   Temp:    97.9 °F (36.6 °C)   SpO2: 99% 96% 99% 95%   Weight:       Height:            Intake and Output:  Current Shift: No intake/output data recorded. Last three shifts: 04/10 1901 - 04/12 0700  In: 350 [P.O.:350]  Out: 200     Physical Exam:     General:                    The patient is a pleasant middle aged male in no acute distress. Head:                                   Normocephalic/atraumatic. Eyes:                                   No palpebral pallor or scleral icterus. ENT:                                    External auricular and nasal exam within normal limits. Mucous membranes are moist.  Neck:                                   Supple, non-tender, no JVD. Lungs:                       Clear to auscultation bilaterally without wheezes or crackles. No respiratory distress or accessory muscle use. Heart:                                  Regular rate and rhythm, without murmurs, rubs, or gallops. Abdomen:                  Soft, non-tender, mildly distended with normoactive bowel sounds. Genitourinary:           No tenderness over the bladder or bilateral CVAs.   Extremities: Without clubbing, cyanosis, or edema. Skin:                                    Normal color, texture, and turgor. No rashes, lesions, or jaundice. Pulses:                      Radial and dorsalis pedis pulses present 2+ bilaterally. Capillary refill <2s. Neurologic:                CN II-XII grossly intact and symmetrical.                                               Moving all four extremities well with no focal deficits. Psychiatric:                Pleasant demeanor, appropriate affect. Alert and oriented x 3      Lab/Data Review:    Recent Results (from the past 24 hour(s))   CBC WITH AUTOMATED DIFF    Collection Time: 04/12/21  4:20 AM   Result Value Ref Range    WBC 9.9 4.3 - 11.1 K/uL    RBC 4.86 4.23 - 5.6 M/uL    HGB 14.2 13.6 - 17.2 g/dL    HCT 39.0 (L) 41.1 - 50.3 %    MCV 80.2 79.6 - 97.8 FL    MCH 29.2 26.1 - 32.9 PG    MCHC 36.4 (H) 31.4 - 35.0 g/dL    RDW 12.8 11.9 - 14.6 %    PLATELET 032 874 - 270 K/uL    MPV 8.9 (L) 9.4 - 12.3 FL    ABSOLUTE NRBC 0.00 0.0 - 0.2 K/uL    DF AUTOMATED      NEUTROPHILS 65 43 - 78 %    LYMPHOCYTES 22 13 - 44 %    MONOCYTES 11 4.0 - 12.0 %    EOSINOPHILS 2 0.5 - 7.8 %    BASOPHILS 0 0.0 - 2.0 %    IMMATURE GRANULOCYTES 0 0.0 - 5.0 %    ABS. NEUTROPHILS 6.4 1.7 - 8.2 K/UL    ABS. LYMPHOCYTES 2.2 0.5 - 4.6 K/UL    ABS. MONOCYTES 1.1 0.1 - 1.3 K/UL    ABS. EOSINOPHILS 0.2 0.0 - 0.8 K/UL    ABS. BASOPHILS 0.0 0.0 - 0.2 K/UL    ABS. IMM.  GRANS. 0.0 0.0 - 0.5 K/UL   CK    Collection Time: 04/12/21  4:20 AM   Result Value Ref Range    CK 1,462 (H) 21 - 292 U/L   METABOLIC PANEL, BASIC    Collection Time: 04/12/21  4:20 AM   Result Value Ref Range    Sodium 140 136 - 145 mmol/L    Potassium 3.4 (L) 3.5 - 5.1 mmol/L    Chloride 112 (H) 98 - 107 mmol/L    CO2 20 (L) 21 - 32 mmol/L    Anion gap 8 7 - 16 mmol/L    Glucose 96 65 - 100 mg/dL    BUN 15 6 - 23 MG/DL    Creatinine 0.94 0.8 - 1.5 MG/DL    GFR est AA >60 >60 ml/min/1.73m2 GFR est non-AA >60 >60 ml/min/1.73m2    Calcium 9.0 8.3 - 10.4 MG/DL     XR chest   4-9-2021  IMPRESSION  No acute process.       Current Facility-Administered Medications:     pantoprazole (PROTONIX) tablet 40 mg, 40 mg, Oral, ACB&D, Antonina Calle MD    metoclopramide HCl (REGLAN) tablet 5 mg, 5 mg, Oral, AC&HS, Antonina Calle MD    [DISCONTINUED] promethazine (PHENERGAN) tablet 12.5 mg, 12.5 mg, Oral, Q6H PRN **OR** ondansetron (ZOFRAN) injection 4 mg, 4 mg, IntraVENous, Q8H, Fernanda Lark, NP, 4 mg at 04/12/21 0847    sucralfate (CARAFATE) tablet 1 g, 1 g, Oral, TIDAC, Fernanda Lark, NP, 1 g at 04/12/21 1124    scopolamine (TRANSDERM-SCOP) 1 mg over 3 days 1 Patch, 1 Patch, TransDERmal, Q72H, Leonides Meigs, MD, 1 Patch at 04/11/21 2000    sodium chloride (NS) flush 5-40 mL, 5-40 mL, IntraVENous, Q8H, Zeferino WINCHESTER MD, 10 mL at 04/12/21 2672    sodium chloride (NS) flush 5-40 mL, 5-40 mL, IntraVENous, PRN, Karolina Guardado MD    acetaminophen (TYLENOL) tablet 650 mg, 650 mg, Oral, Q6H PRN **OR** acetaminophen (TYLENOL) suppository 650 mg, 650 mg, Rectal, Q6H PRN, Karolina Guardado MD    polyethylene glycol (MIRALAX) packet 17 g, 17 g, Oral, DAILY PRN, Karolina Guardado MD    0.9% sodium chloride infusion, 150 mL/hr, IntraVENous, CONTINUOUS, Tiffany Izquierdo MD, Last Rate: 150 mL/hr at 04/11/21 2315, 150 mL/hr at 04/11/21 2315    [Held by provider] heparin (porcine) injection 5,000 Units, 5,000 Units, SubCUTAneous, Q8H, Karolina Guardado MD, Stopped at 04/11/21 1400      Assessment:     Principal Problem:    BETTY (acute kidney injury) ([facility] (4/10/2021)    Active Problems:    Nondiabetic gastroparesis (3/17/2011)      Sickle cell trait ([facility] (3/5/2013)      Anxiety (4/30/2014)      Esophageal reflux (4/30/2014)      PUD (peptic ulcer disease) (4/30/2014)      Intractable nausea and vomiting (8/1/2020)      Cannabinoid hyperemesis syndrome (8/2/2020)      Rhabdomyolysis (4/10/2021)        Plan: BETTY   Likely from volume depletion   Related to ?marijuana use   Mild rhabdomyolysis  Improved with hydration   Monitor renal function and intake and output. Avoid nephrotoxic agents. Vomiting   Hematemesis   From marijuana use   I advised him to stop using marijuana. S/P EGD showing esophagitis, ? Joy's esophagus vs chronic reflux changes. Biopsy was obtained. Also he is found to have hiatal hernia. Continue Pantoprazole     Hypokalemia   Will continue with replacement     Marijuana use   I advised him to stop using it. I have discussed the plan of care with patient.       DVT prophylaxis : SCD         Signed By: Jane Connelly MD     April 12, 2021

## 2021-04-12 NOTE — ROUTINE PROCESS
TRANSFER - OUT REPORT: 
 
Verbal report given on Jemal Stock  being transferred to 9th floor(unit) for routine post - op Report consisted of patients Situation, Background, Assessment and  
Recommendations(SBAR). Information from the following report(s) SBAR, Kardex, Procedure Summary, Intake/Output and MAR was reviewed with the receiving nurse. Lines:  
Peripheral IV 04/09/21 Left Forearm (Active) Site Assessment Clean, dry, & intact 04/11/21 0246 Phlebitis Assessment 0 04/11/21 0942 Infiltration Assessment 0 04/11/21 0942 Dressing Status Clean, dry, & intact 04/11/21 0327 Dressing Type Transparent 04/11/21 0327 Hub Color/Line Status Patent; Flushed; Infusing 04/11/21 0327 Alcohol Cap Used No 04/11/21 0327 Opportunity for questions and clarification was provided. Patient transported with: 
 exozet

## 2021-04-12 NOTE — INTERVAL H&P NOTE
Update History & Physical 
 
The Patient's History and Physical attached below was reviewed with the patient and I examined the patient. There was no change in the plan, or pertinent findings. The surgical site was confirmed with the patient. Plan:  The risk, benefits, expected outcome, and alternative to the recommended procedure have been discussed with the patient. Patient understands and wants to proceed with the procedure.  
 
Electronically signed by Anu Aguero MD

## 2021-04-13 VITALS
DIASTOLIC BLOOD PRESSURE: 87 MMHG | WEIGHT: 145 LBS | RESPIRATION RATE: 18 BRPM | OXYGEN SATURATION: 94 % | HEART RATE: 60 BPM | BODY MASS INDEX: 26.68 KG/M2 | TEMPERATURE: 98.1 F | SYSTOLIC BLOOD PRESSURE: 125 MMHG | HEIGHT: 62 IN

## 2021-04-13 LAB
ANION GAP SERPL CALC-SCNC: 5 MMOL/L (ref 7–16)
BASOPHILS # BLD: 0 K/UL (ref 0–0.2)
BASOPHILS NFR BLD: 0 % (ref 0–2)
BUN SERPL-MCNC: 16 MG/DL (ref 6–23)
CALCIUM SERPL-MCNC: 8.4 MG/DL (ref 8.3–10.4)
CHLORIDE SERPL-SCNC: 111 MMOL/L (ref 98–107)
CK SERPL-CCNC: 827 U/L (ref 21–215)
CO2 SERPL-SCNC: 24 MMOL/L (ref 21–32)
CREAT SERPL-MCNC: 0.92 MG/DL (ref 0.8–1.5)
DIFFERENTIAL METHOD BLD: ABNORMAL
EOSINOPHIL # BLD: 0 K/UL (ref 0–0.8)
EOSINOPHIL NFR BLD: 0 % (ref 0.5–7.8)
ERYTHROCYTE [DISTWIDTH] IN BLOOD BY AUTOMATED COUNT: 12.6 % (ref 11.9–14.6)
GLUCOSE SERPL-MCNC: 89 MG/DL (ref 65–100)
HCT VFR BLD AUTO: 36.2 % (ref 41.1–50.3)
HGB BLD-MCNC: 13.1 G/DL (ref 13.6–17.2)
IMM GRANULOCYTES # BLD AUTO: 0 K/UL (ref 0–0.5)
IMM GRANULOCYTES NFR BLD AUTO: 0 % (ref 0–5)
LYMPHOCYTES # BLD: 3.5 K/UL (ref 0.5–4.6)
LYMPHOCYTES NFR BLD: 40 % (ref 13–44)
MCH RBC QN AUTO: 28.7 PG (ref 26.1–32.9)
MCHC RBC AUTO-ENTMCNC: 36.2 G/DL (ref 31.4–35)
MCV RBC AUTO: 79.4 FL (ref 79.6–97.8)
MONOCYTES # BLD: 0.9 K/UL (ref 0.1–1.3)
MONOCYTES NFR BLD: 10 % (ref 4–12)
NEUTS SEG # BLD: 4.3 K/UL (ref 1.7–8.2)
NEUTS SEG NFR BLD: 49 % (ref 43–78)
NRBC # BLD: 0 K/UL (ref 0–0.2)
PLATELET # BLD AUTO: 296 K/UL (ref 150–450)
PMV BLD AUTO: 9.1 FL (ref 9.4–12.3)
POTASSIUM SERPL-SCNC: 3.3 MMOL/L (ref 3.5–5.1)
RBC # BLD AUTO: 4.56 M/UL (ref 4.23–5.6)
SODIUM SERPL-SCNC: 140 MMOL/L (ref 136–145)
WBC # BLD AUTO: 8.8 K/UL (ref 4.3–11.1)

## 2021-04-13 PROCEDURE — 80048 BASIC METABOLIC PNL TOTAL CA: CPT

## 2021-04-13 PROCEDURE — 82550 ASSAY OF CK (CPK): CPT

## 2021-04-13 PROCEDURE — 74011250636 HC RX REV CODE- 250/636: Performed by: NURSE PRACTITIONER

## 2021-04-13 PROCEDURE — 74011250637 HC RX REV CODE- 250/637: Performed by: INTERNAL MEDICINE

## 2021-04-13 PROCEDURE — 74011250637 HC RX REV CODE- 250/637: Performed by: NURSE PRACTITIONER

## 2021-04-13 PROCEDURE — 85025 COMPLETE CBC W/AUTO DIFF WBC: CPT

## 2021-04-13 PROCEDURE — 36415 COLL VENOUS BLD VENIPUNCTURE: CPT

## 2021-04-13 RX ORDER — PANTOPRAZOLE SODIUM 40 MG/1
40 TABLET, DELAYED RELEASE ORAL
Qty: 60 TAB | Refills: 0 | Status: SHIPPED | OUTPATIENT
Start: 2021-04-13 | End: 2021-05-13

## 2021-04-13 RX ORDER — ONDANSETRON 2 MG/ML
4 INJECTION INTRAMUSCULAR; INTRAVENOUS
Status: DISCONTINUED | OUTPATIENT
Start: 2021-04-13 | End: 2021-04-13 | Stop reason: HOSPADM

## 2021-04-13 RX ORDER — SUCRALFATE 1 G/1
1 TABLET ORAL
Qty: 90 TAB | Refills: 0 | Status: SHIPPED | OUTPATIENT
Start: 2021-04-13 | End: 2021-05-13

## 2021-04-13 RX ADMIN — ONDANSETRON 4 MG: 2 INJECTION INTRAMUSCULAR; INTRAVENOUS at 09:11

## 2021-04-13 RX ADMIN — SUCRALFATE 1 G: 1 TABLET ORAL at 09:11

## 2021-04-13 RX ADMIN — ONDANSETRON 4 MG: 2 INJECTION INTRAMUSCULAR; INTRAVENOUS at 01:14

## 2021-04-13 RX ADMIN — Medication 10 ML: at 05:13

## 2021-04-13 RX ADMIN — PANTOPRAZOLE SODIUM 40 MG: 40 TABLET, DELAYED RELEASE ORAL at 09:11

## 2021-04-13 NOTE — DISCHARGE INSTRUCTIONS
Do not use Marijuana. If worsened, call your doctor or return to hospital.   When follow up with your doctor, make sure that your doctor is aware of this admission and review hospital record and follow up on lab or other results for continuity of care. Also, review your medications with your doctor for possible need of adjustment or refills.

## 2021-04-13 NOTE — PROGRESS NOTES
[facility] Progress Note      Admit Date: 4/9/2021    CC: GERD    Subjective:     Patient feeling much better. He is tolerating a regular diet. No recurrent nausea or vomiting. Reflux much improved. He is not taking Reglan presently. Medications:  Current Facility-Administered Medications   Medication Dose Route Frequency    pantoprazole (PROTONIX) tablet 40 mg  40 mg Oral ACB&D    metoclopramide HCl (REGLAN) tablet 5 mg  5 mg Oral AC&HS    ondansetron (ZOFRAN) injection 4 mg  4 mg IntraVENous Q8H    sucralfate (CARAFATE) tablet 1 g  1 g Oral TIDAC    scopolamine (TRANSDERM-SCOP) 1 mg over 3 days 1 Patch  1 Patch TransDERmal Q72H    sodium chloride (NS) flush 5-40 mL  5-40 mL IntraVENous Q8H    sodium chloride (NS) flush 5-40 mL  5-40 mL IntraVENous PRN    acetaminophen (TYLENOL) tablet 650 mg  650 mg Oral Q6H PRN    Or    acetaminophen (TYLENOL) suppository 650 mg  650 mg Rectal Q6H PRN    polyethylene glycol (MIRALAX) packet 17 g  17 g Oral DAILY PRN    0.9% sodium chloride infusion  75 mL/hr IntraVENous CONTINUOUS    [Held by provider] heparin (porcine) injection 5,000 Units  5,000 Units SubCUTAneous Q8H       ROS: Otherwise negative in 10 systems except as noted above in Subjective. Objective:   Vitals:  Visit Vitals  /87   Pulse 60   Temp 98.1 °F (36.7 °C)   Resp 18   Ht 5' 2\" (1.575 m)   Wt 65.8 kg (145 lb)   SpO2 94%   BMI 26.52 kg/m²       Intake/Output:  No intake/output data recorded.   04/11 1901 - 04/13 0700  In: 26 [P.O.:236]  Out: 800     Exam:  General: Well nourished, no distress  HEENT: Sclera anicteric, conjunctiva pink  Resp: Comfortable breathing, no accessory muscle use  Neuro: Alert and oriented, grossly intact  Skin: No rash or jaundice     Data Review (Labs):    Recent Results (from the past 24 hour(s))   CBC WITH AUTOMATED DIFF    Collection Time: 04/13/21  3:58 AM   Result Value Ref Range    WBC 8.8 4.3 - 11.1 K/uL    RBC 4.56 4.23 - 5.6 M/uL HGB 13.1 (L) 13.6 - 17.2 g/dL    HCT 36.2 (L) 41.1 - 50.3 %    MCV 79.4 (L) 79.6 - 97.8 FL    MCH 28.7 26.1 - 32.9 PG    MCHC 36.2 (H) 31.4 - 35.0 g/dL    RDW 12.6 11.9 - 14.6 %    PLATELET 971 331 - 336 K/uL    MPV 9.1 (L) 9.4 - 12.3 FL    ABSOLUTE NRBC 0.00 0.0 - 0.2 K/uL    DF AUTOMATED      NEUTROPHILS 49 43 - 78 %    LYMPHOCYTES 40 13 - 44 %    MONOCYTES 10 4.0 - 12.0 %    EOSINOPHILS 0 (L) 0.5 - 7.8 %    BASOPHILS 0 0.0 - 2.0 %    IMMATURE GRANULOCYTES 0 0.0 - 5.0 %    ABS. NEUTROPHILS 4.3 1.7 - 8.2 K/UL    ABS. LYMPHOCYTES 3.5 0.5 - 4.6 K/UL    ABS. MONOCYTES 0.9 0.1 - 1.3 K/UL    ABS. EOSINOPHILS 0.0 0.0 - 0.8 K/UL    ABS. BASOPHILS 0.0 0.0 - 0.2 K/UL    ABS. IMM. GRANS. 0.0 0.0 - 0.5 K/UL   CK    Collection Time: 04/13/21  3:58 AM   Result Value Ref Range     (H) 21 - 135 U/L   METABOLIC PANEL, BASIC    Collection Time: 04/13/21  3:58 AM   Result Value Ref Range    Sodium 140 136 - 145 mmol/L    Potassium 3.3 (L) 3.5 - 5.1 mmol/L    Chloride 111 (H) 98 - 107 mmol/L    CO2 24 21 - 32 mmol/L    Anion gap 5 (L) 7 - 16 mmol/L    Glucose 89 65 - 100 mg/dL    BUN 16 6 - 23 MG/DL    Creatinine 0.92 0.8 - 1.5 MG/DL    GFR est AA >60 >60 ml/min/1.73m2    GFR est non-AA >60 >60 ml/min/1.73m2    Calcium 8.4 8.3 - 10.4 MG/DL       Assessment:     Principal Problem:    BETTY (acute kidney injury) ([facility] (4/10/2021)    Active Problems:    Nondiabetic gastroparesis (3/17/2011)      Sickle cell trait (H[facility]) (3/5/2013)      Anxiety (4/30/2014)      Esophageal reflux (4/30/2014)      PUD (peptic ulcer disease) (4/30/2014)      Intractable nausea and vomiting (8/1/2020)      Cannabinoid hyperemesis syndrome (8/2/2020)      Rhabdomyolysis (4/10/2021)        Plan:      1. Cont protonix BID and carafate TID  2. DC reglan  3. Cont Zofran PRN  4. Avoid NSAIDs  5. Office follow up 4-6 weeks  6. Check pathology  7. Stable for DC home today from GI standpoint.     Denilson Sinclair MD  [facility]

## 2021-04-13 NOTE — PROGRESS NOTES
Problem: Falls - Risk of  Goal: *Absence of Falls  Description: Document [place] Fall Risk and appropriate interventions in the flowsheet.   Outcome: Resolved/Met  Note: Fall Risk Interventions:            Medication Interventions: Teach patient to arise slowly         History of Falls Interventions: Door open when patient unattended         Problem: Patient Education: Go to Patient Education Activity  Goal: Patient/Family Education  Outcome: Resolved/Met

## 2021-04-13 NOTE — PROGRESS NOTES
Pt to d/c home today. Spouse to transport home. No needs identified. CM available if any new needs arise. Care Management Interventions  PCP Verified by CM: Yes(Dr. Irma Cary)  Mode of Transport at Discharge:  Other (see comment)(Family )  Transition of Care Consult (CM Consult): Discharge Planning  Discharge Durable Medical Equipment: No  Physical Therapy Consult: No  Occupational Therapy Consult: No  Speech Therapy Consult: No  Current Support Network: Own Home, Lives with Spouse  Confirm Follow Up Transport: Self  The Plan for Transition of Care is Related to the Following Treatment Goals : Return to baseline   [address] Provided?: No  Discharge Location  Discharge Placement: Home

## 2021-04-13 NOTE — DISCHARGE SUMMARY
Discharge Summary     Patient: Jenna Leyva MRN: [id number]  SSN: [id number]    YOB: 1975  Age: 42 y.o.   Sex: male       Admit Date: 4/9/2021    Discharge Date: 4/13/2021      Admission Diagnoses: BETTY (acute kidney injury) ([facility] [N17.9]    Discharge Diagnoses:   Problem List as of 4/13/2021 Date Reviewed: 3/1/2019          Codes Class Noted - Resolved    RESOLVED: Nausea and vomiting ICD-10-CM: R11.2  ICD-9-CM: 787.01  3/5/2013 - 3/13/2018        * (Principal) BETTY (acute kidney injury) ([facility] ICD-10-CM: N17.9  ICD-9-CM: 584.9  4/10/2021 - Present        Rhabdomyolysis ICD-10-CM: M62.82  ICD-9-CM: 728.88  4/10/2021 - Present        Cannabinoid hyperemesis syndrome ICD-10-CM: R11.2, F12.90  ICD-9-CM: 536.2, 305.20  8/2/2020 - Present        Intractable nausea and vomiting ICD-10-CM: R11.2  ICD-9-CM: 536.2  8/1/2020 - Present        Hematemesis ICD-10-CM: K92.0  ICD-9-CM: 578.0  6/26/2018 - Present        Erosive esophagitis ICD-10-CM: K22.10  ICD-9-CM: 530.19  3/11/2018 - Present        Polysubstance abuse ([facility] (Chronic) ICD-10-CM: F19.10  ICD-9-CM: 305.90  7/11/2016 - Present        Tetrahydrocannabinol (THC) use disorder, moderate, dependence (HCC) (Chronic) ICD-10-CM: F12.20  ICD-9-CM: 304.30  7/11/2016 - Present        Microcytosis (Chronic) ICD-10-CM: R71.8  ICD-9-CM: 790.09  8/12/2014 - Present        Anxiety (Chronic) ICD-10-CM: F41.9  ICD-9-CM: 300.00  4/30/2014 - Present        Tobacco abuse (Chronic) ICD-10-CM: Z72.0  ICD-9-CM: 305.1  4/30/2014 - Present        H/O hiatal hernia (Chronic) ICD-10-CM: Z87.19  ICD-9-CM: V12.79  4/30/2014 - Present        Esophageal reflux (Chronic) ICD-10-CM: K21.9  ICD-9-CM: 530.81  4/30/2014 - Present        PUD (peptic ulcer disease) (Chronic) ICD-10-CM: K27.9  ICD-9-CM: 533.90  4/30/2014 - Present        Sickle cell trait (HCC) (Chronic) ICD-10-CM: D57.3  ICD-9-CM: 282.5  3/5/2013 - Present        Nondiabetic gastroparesis ICD-10-CM: K31.84  ICD-9-CM: 536.3  3/17/2011 - Present        RESOLVED: Hyperkalemia ICD-10-CM: E87.5  ICD-9-CM: 276.7  8/2/2020 - 4/10/2021        RESOLVED: Nausea and vomiting ICD-10-CM: R11.2  ICD-9-CM: 787.01  12/13/2018 - 3/1/2019        RESOLVED: Acute gastroenteritis ICD-10-CM: K52.9  ICD-9-CM: 558.9  6/26/2018 - 7/17/2019        RESOLVED: Uncontrolled hypertension ICD-10-CM: I10  ICD-9-CM: 401.9  6/26/2018 - 3/1/2019        RESOLVED: Intractable nausea and vomiting ICD-10-CM: R11.2  ICD-9-CM: 536.2  6/24/2018 - 7/17/2019        RESOLVED: Elevated BP without diagnosis of hypertension ICD-10-CM: R03.0  ICD-9-CM: 796.2  6/24/2018 - 3/1/2019        RESOLVED: Gastrointestinal hemorrhage with hematemesis ICD-10-CM: K92.0  ICD-9-CM: 578.0  3/13/2018 - 7/17/2019        RESOLVED: Hematemesis ICD-10-CM: K92.0  ICD-9-CM: 578.0  11/28/2016 - 3/13/2018        RESOLVED: Intractable nausea and vomiting ICD-10-CM: R11.2  ICD-9-CM: 536.2  11/28/2016 - 3/13/2018        RESOLVED: Lactic acidosis ICD-10-CM: Y77.2  ICD-9-CM: 276.2  11/28/2016 - 3/13/2018        RESOLVED: Upper GI bleed ICD-10-CM: K92.2  ICD-9-CM: 578.9  9/17/2016 - 3/13/2018        RESOLVED: Intractable cyclical vomiting with nausea ICD-10-CM: R11.15  ICD-9-CM: 536.2  7/11/2016 - 3/13/2018        RESOLVED: Cannabinoid hyperemesis syndrome ICD-10-CM: R11.2, F12.90  ICD-9-CM: 536.2, 305.20  7/11/2016 - 3/13/2018        RESOLVED: Hypomagnesemia ICD-10-CM: E83.42  ICD-9-CM: 275.2  8/14/2014 - 3/13/2018        RESOLVED: Marijuana abuse (Chronic) ICD-10-CM: F12.10  ICD-9-CM: 305.20  8/12/2014 - 7/17/2019        RESOLVED: BETTY (acute kidney injury) ([facility] ICD-10-CM: N17.9  ICD-9-CM: 584.9  8/12/2014 - 3/13/2018        RESOLVED: Acute blood loss anemia ICD-10-CM: D62  ICD-9-CM: 285.1  5/1/2014 - 3/13/2018        RESOLVED: Acute upper GI bleed (Chronic) ICD-10-CM: K92.2  ICD-9-CM: 578.9  4/30/2014 - 3/13/2018        RESOLVED: Hypokalemia ICD-10-CM: E87.6  ICD-9-CM: 276.8  7/30/2013 - 4/10/2021        RESOLVED: N&V (nausea and vomiting) ICD-10-CM: R11.2  ICD-9-CM: 787.01  8/12/2012 - 8/15/2012        RESOLVED: Abdominal pain ICD-10-CM: R10.9  ICD-9-CM: 789.00  8/12/2012 - 8/15/2012        RESOLVED: Gastroparesis (Chronic) ICD-10-CM: K31.84  ICD-9-CM: 536.3  8/12/2012 - 3/13/2018        RESOLVED: Nausea ICD-10-CM: R11.0  ICD-9-CM: 787.02  4/12/2012 - 8/15/2012        RESOLVED: Clostridium difficile colitis ICD-10-CM: A04.72  ICD-9-CM: 008.45  3/20/2011 - 8/15/2012        RESOLVED: Epigastric pain ICD-10-CM: R10.13  ICD-9-CM: 789.06  3/18/2011 - 8/15/2012        RESOLVED: Intractable vomiting ICD-10-CM: R11.10  ICD-9-CM: 536.2  3/17/2011 - 8/15/2012               Discharge Condition: Stable    Hospital Course:     Patient with past medical history of    Recurrent esophagitis with recurrent Alexandra-Lynch tear,   hx of H pylori,   gastroparesis,   cannabinoid hyperemesis syndrome,   sickle cell trait, and   anxiety      Admitted this time due to intractable vomiting with BETTY. Patient also had hematemesis and GI was consulted.      Patient had EGD which showed esophagitis, ? Joy's esophagus vs chronic reflux changes. Biopsy was obtained. Also he is found to have hiatal hernia. Patient is treated with Pantoprazole and Carafate. He is advised to stop using marijuana. He voiced understanding. BETTY is improved and renal function is back to baseline. Physical Exam:      General:                    The patient is a pleasant middle aged male in no acute distress.    Head:                                   Normocephalic/atraumatic. Eyes:                                   No palpebral pallor or scleral icterus. ENT:                                    External auricular and nasal exam within normal limits.                                             AXPYVC membranes are moist.  Neck:                                   Supple, non-tender, no JVD.   Lungs:                       Clear to auscultation bilaterally without wheezes or crackles.                                             No respiratory distress or accessory muscle use. Heart:                                  Regular rate and rhythm, without murmurs, rubs, or gallops. Abdomen:                  Soft, non-tender, mildly distended with normoactive bowel sounds. Genitourinary:           No tenderness over the bladder or bilateral CVAs. Extremities:               Without clubbing, cyanosis, or edema. Skin:                                    Normal color, texture, and turgor. No rashes, lesions, or jaundice. Pulses:                      Radial and dorsalis pedis pulses present 2+ bilaterally.                                               Capillary refill <2s. Neurologic:                CN II-XII grossly intact and symmetrical.                                               Moving all four extremities well with no focal deficits. Psychiatric:                Pleasant demeanor, appropriate affect. Alert and oriented x 3    Consults: Gastroenterology    Significant Diagnostic Studies:     Recent Results (from the past 24 hour(s))   CBC WITH AUTOMATED DIFF    Collection Time: 04/13/21  3:58 AM   Result Value Ref Range    WBC 8.8 4.3 - 11.1 K/uL    RBC 4.56 4.23 - 5.6 M/uL    HGB 13.1 (L) 13.6 - 17.2 g/dL    HCT 36.2 (L) 41.1 - 50.3 %    MCV 79.4 (L) 79.6 - 97.8 FL    MCH 28.7 26.1 - 32.9 PG    MCHC 36.2 (H) 31.4 - 35.0 g/dL    RDW 12.6 11.9 - 14.6 %    PLATELET 465 165 - 652 K/uL    MPV 9.1 (L) 9.4 - 12.3 FL    ABSOLUTE NRBC 0.00 0.0 - 0.2 K/uL    DF AUTOMATED      NEUTROPHILS 49 43 - 78 %    LYMPHOCYTES 40 13 - 44 %    MONOCYTES 10 4.0 - 12.0 %    EOSINOPHILS 0 (L) 0.5 - 7.8 %    BASOPHILS 0 0.0 - 2.0 %    IMMATURE GRANULOCYTES 0 0.0 - 5.0 %    ABS. NEUTROPHILS 4.3 1.7 - 8.2 K/UL    ABS. LYMPHOCYTES 3.5 0.5 - 4.6 K/UL    ABS. MONOCYTES 0.9 0.1 - 1.3 K/UL    ABS. EOSINOPHILS 0.0 0.0 - 0.8 K/UL    ABS. BASOPHILS 0.0 0.0 - 0.2 K/UL    ABS. IMM. Suzan Neighbors. 0.0 0.0 - 0.5 K/UL   CK    Collection Time: 04/13/21  3:58 AM   Result Value Ref Range     (H) 21 - 199 U/L   METABOLIC PANEL, BASIC    Collection Time: 04/13/21  3:58 AM   Result Value Ref Range    Sodium 140 136 - 145 mmol/L    Potassium 3.3 (L) 3.5 - 5.1 mmol/L    Chloride 111 (H) 98 - 107 mmol/L    CO2 24 21 - 32 mmol/L    Anion gap 5 (L) 7 - 16 mmol/L    Glucose 89 65 - 100 mg/dL    BUN 16 6 - 23 MG/DL    Creatinine 0.92 0.8 - 1.5 MG/DL    GFR est AA >60 >60 ml/min/1.73m2    GFR est non-AA >60 >60 ml/min/1.73m2    Calcium 8.4 8.3 - 10.4 MG/DL     XR chest   4-9-2021  IMPRESSION  No acute process. Disposition: home    Discharge Medications:   Current Discharge Medication List      START taking these medications    Details   pantoprazole (PROTONIX) 40 mg tablet Take 1 Tab by mouth Before breakfast and dinner for 30 days. Qty: 60 Tab, Refills: 0      sucralfate (CARAFATE) 1 gram tablet Take 1 Tab by mouth Before breakfast, lunch, and dinner for 30 days. Qty: 90 Tab, Refills: 0         STOP taking these medications       hyoscyamine SL (Levsin/SL) 0.125 mg SL tablet Comments:   Reason for Stopping:         capsaicin (CAPZASIN-HP) 0.1 % topical cream Comments:   Reason for Stopping:               Activity: Activity as tolerated  Diet: Cardiac Diet  Wound Care: Keep wound clean and dry    Follow-up Appointments   Procedures    FOLLOW UP VISIT Appointment in: Other (Specify) See your primary doctor in 3-5 days. See GI specialty in 1 month     See your primary doctor in 3-5 days. See GI specialty in 1 month     Standing Status:   Standing     Number of Occurrences:   1     Order Specific Question:   Appointment in     Answer: Other (Specify)     I have discussed the plan of care with patient and spouse at bedside. Time spent on discharge is 39 minutes.         Signed By: Carin Powell MD     April 13, 2021

## 2021-04-13 NOTE — PROGRESS NOTES
Patient discharged home as ordered. He and his spouse had no questions or concerns about his discharge. He was provided with two prescriptions, and the patient states he has taken protonix and carafate prior to this admission, so no questions. His is transporting the patient to their home.

## 2021-05-04 ENCOUNTER — HOSPITAL ENCOUNTER (EMERGENCY)
Age: 46
Discharge: HOME | End: 2021-05-04
Attending: EMERGENCY MEDICINE
Payer: COMMERCIAL

## 2021-05-04 VITALS
OXYGEN SATURATION: 97 % | HEART RATE: 68 BPM | RESPIRATION RATE: 18 BRPM | DIASTOLIC BLOOD PRESSURE: 77 MMHG | TEMPERATURE: 97.9 F | BODY MASS INDEX: 27.38 KG/M2 | SYSTOLIC BLOOD PRESSURE: 113 MMHG | WEIGHT: 145 LBS | HEIGHT: 61 IN

## 2021-05-04 DIAGNOSIS — M54.9 MID BACK PAIN ON LEFT SIDE: ICD-10-CM
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
## 2021-05-04 DIAGNOSIS — T14.8XXA MUSCLE STRAIN: ICD-10-CM

## 2021-05-04 PROCEDURE — 99283 EMERGENCY DEPT VISIT LOW MDM: CPT

## 2021-05-04 RX ORDER — METAXALONE 800 MG/1
800 TABLET ORAL 4 TIMES DAILY
Qty: 20 TAB | Refills: 0 | Status: SHIPPED | OUTPATIENT
Start: 2021-05-04 | End: 2021-05-09

## 2021-05-04 NOTE — Clinical Note
[address] [facility] EMERGENCY DEPT 
[address] [facility] 
[phone] Work/School Note Date: 5/4/2021 To Whom It May concern: 
 
Dusty Shelton was seen and treated today in the emergency room by the following provider(s): 
Attending Provider: Hiram Souza MD 
Physician Assistant: PRAVIN Miles. Jemal Olivares is excused from work/school on 5/4/2021 through 5/7/2021. He is medically clear to return to work/school on 5/8/2021. Sincerely, PRAVIN Pascal
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
## 2021-05-04 NOTE — ED NOTES
I have reviewed discharge instructions with the patient. The patient verbalized understanding. Patient left ED via Discharge Method: ambulatory to Home with family    Opportunity for questions and clarification provided. Patient given 1 scripts. To continue your aftercare when you leave the hospital, you may receive an automated call from our care team to check in on how you are doing. This is a free service and part of our promise to provide the best care and service to meet your aftercare needs.  If you have questions, or wish to unsubscribe from this service please call [phone]. Thank you for Choosing our [facility] Emergency Department.
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
## 2021-06-10 ENCOUNTER — HOSPITAL ENCOUNTER (INPATIENT)
Age: 46
LOS: 3 days | Discharge: HOME | DRG: 683 | End: 2021-06-13
Attending: STUDENT IN AN ORGANIZED HEALTH CARE EDUCATION/TRAINING PROGRAM | Admitting: FAMILY MEDICINE

## 2021-06-10 ENCOUNTER — APPOINTMENT (OUTPATIENT)
Dept: GENERAL RADIOLOGY | Age: 46
DRG: 683 | End: 2021-06-10
Attending: STUDENT IN AN ORGANIZED HEALTH CARE EDUCATION/TRAINING PROGRAM

## 2021-06-10 DIAGNOSIS — R11.2 INTRACTABLE VOMITING WITH NAUSEA, UNSPECIFIED VOMITING TYPE: Primary | ICD-10-CM

## 2021-06-10 DIAGNOSIS — N17.9 AKI (ACUTE KIDNEY INJURY) (HCC): ICD-10-CM

## 2021-06-10 PROBLEM — R94.31 PROLONGED Q-T INTERVAL ON ECG: Status: ACTIVE | Noted: 2021-06-10

## 2021-06-10 PROBLEM — R65.10 SIRS (SYSTEMIC INFLAMMATORY RESPONSE SYNDROME) (HCC): Status: ACTIVE | Noted: 2021-06-10

## 2021-06-10 PROBLEM — R11.15 CYCLIC VOMITING SYNDROME: Status: ACTIVE | Noted: 2021-06-10

## 2021-06-10 LAB
ALBUMIN SERPL-MCNC: 4.7 G/DL (ref 3.5–5)
ALBUMIN/GLOB SERPL: 1 {RATIO} (ref 1.2–3.5)
ALP SERPL-CCNC: 118 U/L (ref 50–136)
ALT SERPL-CCNC: 50 U/L (ref 12–65)
ANION GAP SERPL CALC-SCNC: 11 MMOL/L (ref 7–16)
APPEARANCE UR: CLEAR
AST SERPL-CCNC: 82 U/L (ref 15–37)
ATRIAL RATE: 109 BPM
ATRIAL RATE: 144 BPM
BACTERIA URNS QL MICRO: 0 /HPF
BASOPHILS # BLD: 0 K/UL (ref 0–0.2)
BASOPHILS NFR BLD: 0 % (ref 0–2)
BILIRUB SERPL-MCNC: 1.4 MG/DL (ref 0.2–1.1)
BILIRUB UR QL: NEGATIVE
BUN SERPL-MCNC: 32 MG/DL (ref 6–23)
CALCIUM SERPL-MCNC: 10.4 MG/DL (ref 8.3–10.4)
CALCULATED P AXIS, ECG09: 57 DEGREES
CALCULATED P AXIS, ECG09: 81 DEGREES
CALCULATED R AXIS, ECG10: 74 DEGREES
CALCULATED R AXIS, ECG10: 86 DEGREES
CALCULATED T AXIS, ECG11: 29 DEGREES
CALCULATED T AXIS, ECG11: 66 DEGREES
CASTS URNS QL MICRO: ABNORMAL /LPF
CHLORIDE SERPL-SCNC: 101 MMOL/L (ref 98–107)
CO2 SERPL-SCNC: 23 MMOL/L (ref 21–32)
COLOR UR: YELLOW
CREAT SERPL-MCNC: 1.63 MG/DL (ref 0.8–1.5)
DIAGNOSIS, 93000: NORMAL
DIAGNOSIS, 93000: NORMAL
DIFFERENTIAL METHOD BLD: ABNORMAL
EOSINOPHIL # BLD: 0 K/UL (ref 0–0.8)
EOSINOPHIL NFR BLD: 0 % (ref 0.5–7.8)
EPI CELLS #/AREA URNS HPF: 0 /HPF
ERYTHROCYTE [DISTWIDTH] IN BLOOD BY AUTOMATED COUNT: 13.1 % (ref 11.9–14.6)
GLOBULIN SER CALC-MCNC: 4.7 G/DL (ref 2.3–3.5)
GLUCOSE SERPL-MCNC: 153 MG/DL (ref 65–100)
GLUCOSE UR STRIP.AUTO-MCNC: NEGATIVE MG/DL
HCT VFR BLD AUTO: 44.9 % (ref 41.1–50.3)
HGB BLD-MCNC: 16.7 G/DL (ref 13.6–17.2)
HGB UR QL STRIP: ABNORMAL
IMM GRANULOCYTES # BLD AUTO: 0.1 K/UL (ref 0–0.5)
IMM GRANULOCYTES NFR BLD AUTO: 1 % (ref 0–5)
KETONES UR QL STRIP.AUTO: 15 MG/DL
LACTATE SERPL-SCNC: 1.3 MMOL/L (ref 0.4–2)
LACTATE SERPL-SCNC: 2.4 MMOL/L (ref 0.4–2)
LACTATE SERPL-SCNC: 3.4 MMOL/L (ref 0.4–2)
LEUKOCYTE ESTERASE UR QL STRIP.AUTO: NEGATIVE
LIPASE SERPL-CCNC: 120 U/L (ref 73–393)
LYMPHOCYTES # BLD: 1.7 K/UL (ref 0.5–4.6)
LYMPHOCYTES NFR BLD: 7 % (ref 13–44)
MAGNESIUM SERPL-MCNC: 2.2 MG/DL (ref 1.8–2.4)
MCH RBC QN AUTO: 29.7 PG (ref 26.1–32.9)
MCHC RBC AUTO-ENTMCNC: 37.2 G/DL (ref 31.4–35)
MCV RBC AUTO: 79.8 FL (ref 79.6–97.8)
MONOCYTES # BLD: 2.5 K/UL (ref 0.1–1.3)
MONOCYTES NFR BLD: 11 % (ref 4–12)
NEUTS SEG # BLD: 18.9 K/UL (ref 1.7–8.2)
NEUTS SEG NFR BLD: 81 % (ref 43–78)
NITRITE UR QL STRIP.AUTO: NEGATIVE
NRBC # BLD: 0 K/UL (ref 0–0.2)
P-R INTERVAL, ECG05: 118 MS
P-R INTERVAL, ECG05: 148 MS
PH UR STRIP: 7 [PH] (ref 5–9)
PLATELET # BLD AUTO: 397 K/UL (ref 150–450)
PMV BLD AUTO: 9.3 FL (ref 9.4–12.3)
POTASSIUM SERPL-SCNC: 3.1 MMOL/L (ref 3.5–5.1)
PROT SERPL-MCNC: 9.4 G/DL (ref 6.3–8.2)
PROT UR STRIP-MCNC: ABNORMAL MG/DL
Q-T INTERVAL, ECG07: 340 MS
Q-T INTERVAL, ECG07: 348 MS
QRS DURATION, ECG06: 66 MS
QRS DURATION, ECG06: 70 MS
QTC CALCULATION (BEZET), ECG08: 468 MS
QTC CALCULATION (BEZET), ECG08: 526 MS
RBC # BLD AUTO: 5.63 M/UL (ref 4.23–5.6)
RBC #/AREA URNS HPF: ABNORMAL /HPF
SODIUM SERPL-SCNC: 135 MMOL/L (ref 138–145)
SP GR UR REFRACTOMETRY: 1.02 (ref 1–1.02)
UROBILINOGEN UR QL STRIP.AUTO: 1 EU/DL (ref 0.2–1)
VENTRICULAR RATE, ECG03: 109 BPM
VENTRICULAR RATE, ECG03: 144 BPM
WBC # BLD AUTO: 23.3 K/UL (ref 4.3–11.1)
WBC URNS QL MICRO: ABNORMAL /HPF

## 2021-06-10 PROCEDURE — 36415 COLL VENOUS BLD VENIPUNCTURE: CPT

## 2021-06-10 PROCEDURE — 74011000258 HC RX REV CODE- 258: Performed by: FAMILY MEDICINE

## 2021-06-10 PROCEDURE — C9113 INJ PANTOPRAZOLE SODIUM, VIA: HCPCS | Performed by: FAMILY MEDICINE

## 2021-06-10 PROCEDURE — 96374 THER/PROPH/DIAG INJ IV PUSH: CPT

## 2021-06-10 PROCEDURE — 74011000250 HC RX REV CODE- 250: Performed by: FAMILY MEDICINE

## 2021-06-10 PROCEDURE — 96360 HYDRATION IV INFUSION INIT: CPT

## 2021-06-10 PROCEDURE — 74011250637 HC RX REV CODE- 250/637: Performed by: STUDENT IN AN ORGANIZED HEALTH CARE EDUCATION/TRAINING PROGRAM

## 2021-06-10 PROCEDURE — 81001 URINALYSIS AUTO W/SCOPE: CPT

## 2021-06-10 PROCEDURE — 99285 EMERGENCY DEPT VISIT HI MDM: CPT

## 2021-06-10 PROCEDURE — 83605 ASSAY OF LACTIC ACID: CPT

## 2021-06-10 PROCEDURE — 65660000000 HC RM CCU STEPDOWN

## 2021-06-10 PROCEDURE — 94762 N-INVAS EAR/PLS OXIMTRY CONT: CPT

## 2021-06-10 PROCEDURE — 87040 BLOOD CULTURE FOR BACTERIA: CPT

## 2021-06-10 PROCEDURE — 83690 ASSAY OF LIPASE: CPT

## 2021-06-10 PROCEDURE — 83735 ASSAY OF MAGNESIUM: CPT

## 2021-06-10 PROCEDURE — 74022 RADEX COMPL AQT ABD SERIES: CPT

## 2021-06-10 PROCEDURE — 2709999900 HC NON-CHARGEABLE SUPPLY

## 2021-06-10 PROCEDURE — 94760 N-INVAS EAR/PLS OXIMETRY 1: CPT

## 2021-06-10 PROCEDURE — 85025 COMPLETE CBC W/AUTO DIFF WBC: CPT

## 2021-06-10 PROCEDURE — 80053 COMPREHEN METABOLIC PANEL: CPT

## 2021-06-10 PROCEDURE — 74011250636 HC RX REV CODE- 250/636: Performed by: FAMILY MEDICINE

## 2021-06-10 PROCEDURE — 93005 ELECTROCARDIOGRAM TRACING: CPT

## 2021-06-10 PROCEDURE — 74011250636 HC RX REV CODE- 250/636: Performed by: STUDENT IN AN ORGANIZED HEALTH CARE EDUCATION/TRAINING PROGRAM

## 2021-06-10 RX ORDER — SODIUM CHLORIDE 0.9 % (FLUSH) 0.9 %
5-10 SYRINGE (ML) INJECTION EVERY 8 HOURS
Status: DISCONTINUED | OUTPATIENT
Start: 2021-06-10 | End: 2021-06-13 | Stop reason: HOSPADM

## 2021-06-10 RX ORDER — MAGNESIUM SULFATE HEPTAHYDRATE 40 MG/ML
2 INJECTION, SOLUTION INTRAVENOUS
Status: COMPLETED | OUTPATIENT
Start: 2021-06-10 | End: 2021-06-10

## 2021-06-10 RX ORDER — POTASSIUM CHLORIDE 14.9 MG/ML
20 INJECTION INTRAVENOUS
Status: COMPLETED | OUTPATIENT
Start: 2021-06-10 | End: 2021-06-10

## 2021-06-10 RX ORDER — HALOPERIDOL 5 MG/ML
3 INJECTION INTRAMUSCULAR
Status: DISCONTINUED | OUTPATIENT
Start: 2021-06-10 | End: 2021-06-10

## 2021-06-10 RX ORDER — SODIUM CHLORIDE 0.9 % (FLUSH) 0.9 %
5-10 SYRINGE (ML) INJECTION AS NEEDED
Status: DISCONTINUED | OUTPATIENT
Start: 2021-06-10 | End: 2021-06-13 | Stop reason: HOSPADM

## 2021-06-10 RX ORDER — SODIUM CHLORIDE 9 MG/ML
50 INJECTION, SOLUTION INTRAVENOUS CONTINUOUS
Status: DISCONTINUED | OUTPATIENT
Start: 2021-06-10 | End: 2021-06-13 | Stop reason: HOSPADM

## 2021-06-10 RX ORDER — ACETAMINOPHEN 650 MG/1
650 SUPPOSITORY RECTAL
Status: DISCONTINUED | OUTPATIENT
Start: 2021-06-10 | End: 2021-06-13 | Stop reason: HOSPADM

## 2021-06-10 RX ORDER — SODIUM CHLORIDE 0.9 % (FLUSH) 0.9 %
5-40 SYRINGE (ML) INJECTION AS NEEDED
Status: DISCONTINUED | OUTPATIENT
Start: 2021-06-10 | End: 2021-06-13 | Stop reason: HOSPADM

## 2021-06-10 RX ORDER — POLYETHYLENE GLYCOL 3350 17 G/17G
17 POWDER, FOR SOLUTION ORAL DAILY PRN
Status: DISCONTINUED | OUTPATIENT
Start: 2021-06-10 | End: 2021-06-13 | Stop reason: HOSPADM

## 2021-06-10 RX ORDER — SODIUM CHLORIDE 0.9 % (FLUSH) 0.9 %
5-40 SYRINGE (ML) INJECTION EVERY 8 HOURS
Status: DISCONTINUED | OUTPATIENT
Start: 2021-06-10 | End: 2021-06-13 | Stop reason: HOSPADM

## 2021-06-10 RX ORDER — ONDANSETRON 4 MG/1
4 TABLET, ORALLY DISINTEGRATING ORAL
Status: DISCONTINUED | OUTPATIENT
Start: 2021-06-10 | End: 2021-06-11

## 2021-06-10 RX ORDER — LORAZEPAM 2 MG/ML
1 INJECTION INTRAMUSCULAR
Status: DISCONTINUED | OUTPATIENT
Start: 2021-06-10 | End: 2021-06-13 | Stop reason: HOSPADM

## 2021-06-10 RX ORDER — PANTOPRAZOLE SODIUM 40 MG/1
40 TABLET, DELAYED RELEASE ORAL DAILY
Status: ON HOLD | COMMUNITY
End: 2022-03-31 | Stop reason: SDUPTHER

## 2021-06-10 RX ORDER — ACETAMINOPHEN 325 MG/1
650 TABLET ORAL
Status: DISCONTINUED | OUTPATIENT
Start: 2021-06-10 | End: 2021-06-13 | Stop reason: HOSPADM

## 2021-06-10 RX ORDER — METOCLOPRAMIDE HYDROCHLORIDE 5 MG/ML
10 INJECTION INTRAMUSCULAR; INTRAVENOUS ONCE
Status: COMPLETED | OUTPATIENT
Start: 2021-06-10 | End: 2021-06-10

## 2021-06-10 RX ORDER — ONDANSETRON 2 MG/ML
4 INJECTION INTRAMUSCULAR; INTRAVENOUS
Status: DISCONTINUED | OUTPATIENT
Start: 2021-06-10 | End: 2021-06-11

## 2021-06-10 RX ADMIN — POTASSIUM CHLORIDE 20 MEQ: 200 INJECTION, SOLUTION INTRAVENOUS at 19:15

## 2021-06-10 RX ADMIN — PANTOPRAZOLE SODIUM 40 MG: 40 INJECTION, POWDER, FOR SOLUTION INTRAVENOUS at 20:02

## 2021-06-10 RX ADMIN — SODIUM CHLORIDE 150 ML/HR: 900 INJECTION, SOLUTION INTRAVENOUS at 20:57

## 2021-06-10 RX ADMIN — SODIUM CHLORIDE 150 ML/HR: 900 INJECTION, SOLUTION INTRAVENOUS at 15:53

## 2021-06-10 RX ADMIN — POTASSIUM CHLORIDE 20 MEQ: 200 INJECTION, SOLUTION INTRAVENOUS at 17:23

## 2021-06-10 RX ADMIN — ONDANSETRON 4 MG: 2 INJECTION INTRAMUSCULAR; INTRAVENOUS at 16:01

## 2021-06-10 RX ADMIN — MAGNESIUM SULFATE HEPTAHYDRATE 2 G: 40 INJECTION, SOLUTION INTRAVENOUS at 14:46

## 2021-06-10 RX ADMIN — SODIUM CHLORIDE 1000 ML: 900 INJECTION, SOLUTION INTRAVENOUS at 15:19

## 2021-06-10 RX ADMIN — Medication 10 ML: at 15:53

## 2021-06-10 RX ADMIN — ONDANSETRON 4 MG: 2 INJECTION INTRAMUSCULAR; INTRAVENOUS at 20:01

## 2021-06-10 RX ADMIN — Medication 10 ML: at 20:06

## 2021-06-10 RX ADMIN — METOCLOPRAMIDE HYDROCHLORIDE 10 MG: 5 INJECTION INTRAMUSCULAR; INTRAVENOUS at 14:24

## 2021-06-10 RX ADMIN — MENTHOL, METHYL SALICYLATE: 10; 15 CREAM TOPICAL at 12:31

## 2021-06-10 RX ADMIN — SODIUM CHLORIDE 1000 ML: 900 INJECTION, SOLUTION INTRAVENOUS at 12:06

## 2021-06-10 RX ADMIN — SODIUM CHLORIDE 1000 ML: 900 INJECTION, SOLUTION INTRAVENOUS at 12:30

## 2021-06-10 RX ADMIN — MENTHOL, METHYL SALICYLATE: 10; 15 CREAM TOPICAL at 20:00

## 2021-06-10 RX ADMIN — CEFTRIAXONE 1 G: 1 INJECTION, POWDER, FOR SOLUTION INTRAMUSCULAR; INTRAVENOUS at 15:19

## 2021-06-10 RX ADMIN — PANTOPRAZOLE SODIUM 40 MG: 40 INJECTION, POWDER, FOR SOLUTION INTRAVENOUS at 16:16

## 2021-06-10 NOTE — PROGRESS NOTES
TRANSFER - IN REPORT:    Verbal report received from [facility] RN(name) on [address]  being received from ED(unit) for routine progression of care      Report consisted of patients Situation, Background, Assessment and   Recommendations(SBAR). Information from the following report(s) Kardex, ED Summary, Intake/Output and MAR was reviewed with the receiving nurse. Opportunity for questions and clarification was provided. Assessment will be completed upon patients arrival to unit and care assumed.

## 2021-06-10 NOTE — H&P
[facility] History and Physical       Name:  Jean Paul Bhagat  Age:46 y.o. Sex:male   :  1975    MRN:  [id number]   PCP:  Dawood Cordova MD      Admit Date:  6/10/2021 11:37 AM   Chief Complaint:nausea,vomiting    Reason for Admission:   Cyclic vomiting syndrome [R11.15]    Assessment & Plan:     -SIRS-tachycardia and elevated white blood count. Most probably secondary to his persistent nausea vomiting. Empiric Rocephin probably can wean off antibiotics soon.    -Cyclic nausea vomiting secondary to marijuana use-continue Zofran.    -Hypokalemia-secondary patient nausea vomiting-replace    -BETTY-secondary to patient nausea vomiting-continue IV fluids.    -Prolonged QTC-resolved      Disposition/Expected LOS: 2 days  Diet: DIET ADULT  DIET ADULT ORAL NUTRITION SUPPLEMENT  VTE ppx: SCD  GI ppx: Protonix  Code status: Full Code  Surrogate decision-maker: Spouse      History of Presenting Illness:     Jean Paul Bhagat is a 42 y.o. male with medical history of cyclic nausea vomiting secondary to marijuana use, last EGD in 2021 esophagitis, ? Joy's esophagus vs chronic reflux changes. Biopsy was obtained. Also he is found to have hiatal hernia. Patient uses a as needed Zofran and is on Protonix at home. Since past 1 week patient said he started having constant nausea vomiting at times mild blood-tinged on the retching which was about 5 days ago but none since then. As his nausea vomiting persisted, unable to take anything p.o. decided come to emergency room for further evaluation. Complains of mild abdominal pain on persistent retching otherwise and no abdominal pain. No fever or headache or dizziness or hemoptysis or hematochezia    At presentation heart rate of 140/min, improved later on after fluids ranging from 110 to 115/min. Initial EKG prolonged QTC, once heart rate improved repeat a EKG shows resolved 11prolonged QTC.     WBC 23.3, potassium 3.1, creatinine 1.63, GFR of 59, initial lactic acid of 3.4. Chest x-ray with acute abdominal series no acute findings. Patient will be admitted for cyclic nausea vomiting secondary to marijuana use, SIRS, BETTY and hypokalemia. Review of Systems:  A 14 point review of systems was taken and pertinent positive as per HPI. Past Medical History:   Diagnosis Date    BETTY (acute kidney injury) ([facility] [address] 8/12/2014    Clostridium difficile colitis 3/20/2011    Gastroparesis 2005    emptying study normal -2006    GERD (gastroesophageal reflux disease)     HIATAL HERNIA SINCE 1999    PUD (peptic ulcer disease) ALL DX IN 2008    ESOPHAGITIS, + H PYLORI    Uncontrolled hypertension 6/26/2018       Past Surgical History:   Procedure Laterality Date    COLONOSCOPY  4/08    ESOPHAGITIS, COLONIC ULCER X1    EGD  4/08    H.  HERNIA, ULCER X2, H PYLORI,    EGD  2011    h pylori -neg    HX WISDOM TEETH EXTRACTION  2/10/11       Family History : reviewed  Family History   Problem Relation Age of Onset    Other Mother         L+W    Diabetes Maternal Grandmother     Sickle Cell Anemia Father     Heart Disease Paternal Grandfather     Other Sister         ALL L+W    Other Son         L+W        Social History     Tobacco Use    Smoking status: Current Every Day Smoker     Packs/day: 0.25     Years: 2.00     Pack years: 0.50     Types: Cigarettes    Smokeless tobacco: Never Used   Substance Use Topics    Alcohol use: No       Allergies   Allergen Reactions    Amoxicillin Nausea Only    Aspirin Other (comments)     ulcers    Hydrocodone Rash     Tolerates hydromorphone, morphine, tramadol    Ibuprofen Other (comments)     Hx of ulcers    Pcn [Penicillins] Rash    Phenergan [Promethazine] Nausea and Vomiting and Other (comments)       Immunization History   Administered Date(s) Administered    TDAP Vaccine 04/21/2012         PTA Medications:  Current Outpatient Medications   Medication Instructions    pantoprazole (PROTONIX) 40 mg, Oral, DAILY       Objective:     Patient Vitals for the past 24 hrs:   Temp Pulse Resp BP SpO2   06/10/21 1554 99 °F (37.2 °C) (!) 113 18 (!) 149/89 96 %   06/10/21 1516     94 %   06/10/21 1455  (!) 117   96 %   06/10/21 1446  (!) 110  (!) 134/100    06/10/21 1445  (!) 112   94 %   06/10/21 1409  (!) 106   97 %   06/10/21 1251  (!) 118   98 %   06/10/21 1222  (!) 128  (!) 139/104 95 %   06/10/21 1211  (!) 130   97 %   06/10/21 1200     94 %   06/10/21 1137 97.7 °F (36.5 °C) (!) 140 18 (!) 130/103 94 %       Oxygen Therapy  O2 Sat (%): 96 % (06/10/21 1554)  Pulse via Oximetry: 114 beats per minute (06/10/21 1516)  O2 Device: None (Room air) (06/10/21 1137)    Body mass index is 27.4 kg/m². Physical Exam:    General:  No acute distress, speaking in full sentences  HEENT:  Pupils equal and reactive to light and accommodation, oropharynx is clear . Dry mucous membrane  Neck:   Supple, no lymphadenopathy, no JVD   Lungs:  Clear to auscultation bilaterally   CV:   Tachycardia normal S1 and S2   Abdomen:  Soft, nontender, nondistended, normoactive bowel sounds   Extremities:  No cyanosis clubbing or edema   Neuro:  Nonfocal, A&O x3   Psych:  Normal mood and affect       Data Reviewed: I have reviewed all labs, meds, and studies.       Recent Results (from the past 24 hour(s))   CBC WITH AUTOMATED DIFF    Collection Time: 06/10/21 11:40 AM   Result Value Ref Range    WBC 23.3 (H) 4.3 - 11.1 K/uL    RBC 5.63 (H) 4.23 - 5.6 M/uL    HGB 16.7 13.6 - 17.2 g/dL    HCT 44.9 41.1 - 50.3 %    MCV 79.8 79.6 - 97.8 FL    MCH 29.7 26.1 - 32.9 PG    MCHC 37.2 (H) 31.4 - 35.0 g/dL    RDW 13.1 11.9 - 14.6 %    PLATELET 838 922 - 153 K/uL    MPV 9.3 (L) 9.4 - 12.3 FL    ABSOLUTE NRBC 0.00 0.0 - 0.2 K/uL    DF AUTOMATED      NEUTROPHILS 81 (H) 43 - 78 %    LYMPHOCYTES 7 (L) 13 - 44 %    MONOCYTES 11 4.0 - 12.0 %    EOSINOPHILS 0 (L) 0.5 - 7.8 %    BASOPHILS 0 0.0 - 2.0 %    IMMATURE GRANULOCYTES 1 0.0 - 5.0 %    ABS. NEUTROPHILS 18.9 (H) 1.7 - 8.2 K/UL    ABS. LYMPHOCYTES 1.7 0.5 - 4.6 K/UL    ABS. MONOCYTES 2.5 (H) 0.1 - 1.3 K/UL    ABS. EOSINOPHILS 0.0 0.0 - 0.8 K/UL    ABS. BASOPHILS 0.0 0.0 - 0.2 K/UL    ABS. IMM. GRANS. 0.1 0.0 - 0.5 K/UL   METABOLIC PANEL, COMPREHENSIVE    Collection Time: 06/10/21 11:40 AM   Result Value Ref Range    Sodium 135 (L) 138 - 145 mmol/L    Potassium 3.1 (L) 3.5 - 5.1 mmol/L    Chloride 101 98 - 107 mmol/L    CO2 23 21 - 32 mmol/L    Anion gap 11 7 - 16 mmol/L    Glucose 153 (H) 65 - 100 mg/dL    BUN 32 (H) 6 - 23 MG/DL    Creatinine 1.63 (H) 0.8 - 1.5 MG/DL    GFR est AA 59 (L) >60 ml/min/1.73m2    GFR est non-AA 49 (L) >60 ml/min/1.73m2    Calcium 10.4 8.3 - 10.4 MG/DL    Bilirubin, total 1.4 (H) 0.2 - 1.1 MG/DL    ALT (SGPT) 50 12 - 65 U/L    AST (SGOT) 82 (H) 15 - 37 U/L    Alk.  phosphatase 118 50 - 136 U/L    Protein, total 9.4 (H) 6.3 - 8.2 g/dL    Albumin 4.7 3.5 - 5.0 g/dL    Globulin 4.7 (H) 2.3 - 3.5 g/dL    A-G Ratio 1.0 (L) 1.2 - 3.5     MAGNESIUM    Collection Time: 06/10/21 11:40 AM   Result Value Ref Range    Magnesium 2.2 1.8 - 2.4 mg/dL   LIPASE    Collection Time: 06/10/21 11:40 AM   Result Value Ref Range    Lipase 120 73 - 393 U/L   EKG, 12 LEAD, INITIAL    Collection Time: 06/10/21 11:41 AM   Result Value Ref Range    Ventricular Rate 144 BPM    Atrial Rate 144 BPM    P-R Interval 118 ms    QRS Duration 66 ms    Q-T Interval 340 ms    QTC Calculation (Bezet) 526 ms    Calculated P Axis 81 degrees    Calculated R Axis 86 degrees    Calculated T Axis 66 degrees    Diagnosis       Sinus tachycardia  Right atrial enlargement  Nonspecific ST abnormality  Abnormal ECG  When compared with ECG of 09-APR-2021 22:38,  ST now depressed in Inferior leads  Confirmed by Laura Bach ([id number]) on 6/10/2021 3:33:15 PM     LACTIC ACID    Collection Time: 06/10/21 12:10 PM   Result Value Ref Range    Lactic acid 3.4 (H) 0.4 - 2.0 MMOL/L   EKG, 12 LEAD, INITIAL    Collection Time: 06/10/21  1:46 PM   Result Value Ref Range    Ventricular Rate 109 BPM    Atrial Rate 109 BPM    P-R Interval 148 ms    QRS Duration 70 ms    Q-T Interval 348 ms    QTC Calculation (Bezet) 468 ms    Calculated P Axis 57 degrees    Calculated R Axis 74 degrees    Calculated T Axis 29 degrees    Diagnosis       Sinus tachycardia  Junctional ST depression, probably abnormal  Abnormal ECG  When compared with ECG of 09-APR-2021 22:38,  Nonspecific T wave abnormality now evident in Inferior leads  Confirmed by Princess Reynolds ([id number]) on 6/10/2021 3:35:38 PM     LACTIC ACID    Collection Time: 06/10/21  2:45 PM   Result Value Ref Range    Lactic acid 2.4 (H) 0.4 - 2.0 MMOL/L       EKG Results     Procedure 720 Value Units Date/Time    EKG, 12 LEAD, INITIAL [[id number]] Collected: 06/10/21 1346    Order Status: Completed Updated: 06/10/21 1535     Ventricular Rate 109 BPM      Atrial Rate 109 BPM      P-R Interval 148 ms      QRS Duration 70 ms      Q-T Interval 348 ms      QTC Calculation (Bezet) 468 ms      Calculated P Axis 57 degrees      Calculated R Axis 74 degrees      Calculated T Axis 29 degrees      Diagnosis --     Sinus tachycardia  Junctional ST depression, probably abnormal  Abnormal ECG  When compared with ECG of 09-APR-2021 22:38,  Nonspecific T wave abnormality now evident in Inferior leads  Confirmed by Princess Reynolds [id number]) on 6/10/2021 3:35:38 PM      EKG (Check If Upper Abdominal Pain or symptoms of SOB, Diaphoresis, or Tachycardia) [[id number]] Collected: 06/10/21 1141    Order Status: Completed Updated: 06/10/21 1533     Ventricular Rate 144 BPM      Atrial Rate 144 BPM      P-R Interval 118 ms      QRS Duration 66 ms      Q-T Interval 340 ms      QTC Calculation (Bezet) 526 ms      Calculated P Axis 81 degrees      Calculated R Axis 86 degrees      Calculated T Axis 66 degrees      Diagnosis --     Sinus tachycardia  Right atrial enlargement  Nonspecific ST abnormality  Abnormal ECG  When compared with ECG of 09-APR-2021 22:38,  ST now depressed in Inferior leads  Confirmed by Mauro Lanza ([id number]) on 6/10/2021 3:33:15 PM      EKG, 12 LEAD, INITIAL [[id number]]     Order Status: Canceled           All Micro Results     Procedure Component Value Units Date/Time    CULTURE, BLOOD [[id number]] Collected: 06/10/21 1518    Order Status: Completed Specimen: Blood Updated: 06/10/21 1613    CULTURE, BLOOD [[id number]]     Order Status: Sent Specimen: Blood           Other Studies:  XR ABD ACUTE W 1 V CHEST    Result Date: 6/10/2021  Chest and Abdomen 3 Views dated 6/10/2021 Clinical Information: Nausea and vomiting for 5 days. Elevated white blood cell count One view of the chest shows  heart to be normal in size and mediastinum unremarkable. Lungs are clear and pulmonary vascularity unremarkable. No pleural effusion. No free air under the diaphragm. Two views of the abdomen show  a nonspecific bowel gas pattern and no abnormal calcification.      No Acute Abnormality         Medications:  Medications Administered      Medications Administered     0.9% sodium chloride infusion     Admin Date  06/10/2021 Action  New Bag Dose  150 mL/hr Rate  150 mL/hr Route  IntraVENous Administered By  Sahil Dempsey          cefTRIAXone (ROCEPHIN) 1 g in 0.9% sodium chloride (MBP/ADV) 50 mL MBP     Admin Date  06/10/2021 Action  New Bag Dose  1 g Rate  100 mL/hr Route  IntraVENous Administered By  Peterson Parry RN          magnesium sulfate 2 g/50 ml IVPB (premix or compounded)     Admin Date  06/10/2021 Action  New Bag Dose  2 g Rate  50 mL/hr Route  IntraVENous Administered By  Peterson Parry RN          methyl salicylate-menthol (BENGAY) 15-10 % cream     Admin Date  06/10/2021 Action  Given Dose   Route  Topical Administered By  Peterson Parry RN          metoclopramide HCl (REGLAN) injection 10 mg     Admin Date  06/10/2021 Action  Given Dose  10 mg Route  IntraVENous Administered By  Esther GARZA          ondansetron [facility]) injection 4 mg     Admin Date  06/10/2021 Action  Given Dose  4 mg Route  IntraVENous Administered By  Ash CHASE          sodium chloride (NS) flush 5-10 mL     Admin Date  06/10/2021 Action  Given Dose  10 mL Route  IntraVENous Administered By  Satish Segundo          sodium chloride 0.9 % bolus infusion 1,000 mL     Admin Date  06/10/2021 Action  New Bag Dose  1,000 mL Rate  1,000 mL/hr Route  IntraVENous Administered By  Rashida Davis RN           Admin Date  06/10/2021 Action  New Bag Dose  1,000 mL Rate  1,000 mL/hr Route  IntraVENous Administered By  Rashida Davis RN           Admin Date  06/10/2021 Action  New Bag Dose  1,000 mL Rate  1,000 mL/hr Route  IntraVENous Administered By  Rashida Davis RN                      Problem List:     Hospital Problems as of 6/10/2021 Date Reviewed: 3/1/2019        Codes Class Noted - Resolved POA    Cyclic vomiting syndrome ICD-10-CM: R11.15  ICD-9-CM: 536.2  6/10/2021 - Present Unknown        * (Principal) SIRS (systemic inflammatory response syndrome) (HCC) ICD-10-CM: R65.10  ICD-9-CM: 995.90  6/10/2021 - Present Unknown        Prolonged Q-T interval on ECG ICD-10-CM: R94.31  ICD-9-CM: 794.31  6/10/2021 - Present Unknown        [facility] ICD-10-CM: N17.9  ICD-9-CM: 584.9  4/10/2021 - Present Yes        Hypokalemia ICD-10-CM: E87.6  ICD-9-CM: 276.8  7/30/2013 - Present Unknown                 Signed By: Andria Oscar MD   [facility]    Fatou 10, 2021

## 2021-06-10 NOTE — ED TRIAGE NOTES
Pt ambulatory unassisted to triage with mask in place. Pt complains of vomiting x5 days. Reports associated abdominal pain. Denies CP, SOB, diarrhea.

## 2021-06-10 NOTE — PROGRESS NOTES
Hourly rounds completed throughout this shift. Pt continues to vomit since he came to the floor. Zofran 4 mg I.V  Given. Pt had small blood in one of his emesis. Pt resting in bed; denies needs at this time. Will continue to monitor and report to oncoming night shift nurse.

## 2021-06-10 NOTE — ED PROVIDER NOTES
79-year-old male presents to the ER with what he reports is cyclical vomiting syndrome from cannabis. States this is been ongoing for the past 4 days. Reports he has had episodes that last approximately 1 week and they occur every few months. He reports decreased p.o. intake secondary to continued vomiting. Denies relief with Phenergan suppositories as well as 8mg Zofran ODT's. Denies fever, chills. Reports one episode of blood-tinged vomitus but that has since resolved. Denies melena, hematochezia or coffee-ground emesis. Denies any significant abdominal pain. Patient denies dysuria, abdominal pain or any other significant complaints besides his nausea and vomiting. Past Medical History:   Diagnosis Date    BETTY (acute kidney injury) ([facility] [address] 8/12/2014    Clostridium difficile colitis 3/20/2011    Gastroparesis 2005    emptying study normal -2006    GERD (gastroesophageal reflux disease)     HIATAL HERNIA SINCE 1999    PUD (peptic ulcer disease) ALL DX IN 2008    ESOPHAGITIS, + H PYLORI    Uncontrolled hypertension 6/26/2018       Past Surgical History:   Procedure Laterality Date    COLONOSCOPY  4/08    ESOPHAGITIS, COLONIC ULCER X1    EGD  4/08    H.  HERNIA, ULCER X2, H PYLORI,    EGD  2011    h pylori -neg    HX WISDOM TEETH EXTRACTION  2/10/11         Family History:   Problem Relation Age of Onset    Other Mother         L+W    Diabetes Maternal Grandmother     Sickle Cell Anemia Father     Heart Disease Paternal Grandfather     Other Sister         ALL L+W    Other Son         L+W       Social History     Socioeconomic History    Marital status:      Spouse name: Not on file    Number of children: Not on file    Years of education: Not on file    Highest education level: Not on file   Occupational History    Not on file   Tobacco Use    Smoking status: Current Every Day Smoker     Packs/day: 0.25     Years: 2.00     Pack years: 0.50     Types: Cigarettes    Smokeless tobacco: Never Used   Substance and Sexual Activity    Alcohol use: No    Drug use: Yes     Types: Marijuana    Sexual activity: Yes     Partners: Female   Other Topics Concern    Not on file   Social History Narrative    3/17/11:  PATIENT LIVES WITH GIRLFRIEND, ALTON (CELL: [phone]), HER 3YEAR OLD DAUGHTER AND THEIR 3YEAR OLD SON. ALTON IS CURRENTLY 13 WEEKS PREGNANT. PATIENT'S JOB AT [employer] WAS DOWNSIZED ABOUT A YEAR AGO, AND HE HAS BEEN A STAY HOME DAD SINCE. ALTON WORKS IN HOUSEKEEPING POSITION. Social Determinants of Health     Financial Resource Strain:     Difficulty of Paying Living Expenses:    Food Insecurity:     Worried About Running Out of Food in the Last Year:     [address] in the Last Year:    Transportation Needs:     Lack of Transportation (Medical):  Lack of Transportation (Non-Medical):    Physical Activity:     Days of Exercise per Week:     Minutes of Exercise per Session:    Stress:     Feeling of Stress :    Social Connections:     Frequency of Communication with Friends and Family:     Frequency of Social Gatherings with Friends and Family:     Attends Orthodox Services:     Active Member of Clubs or Organizations:     Attends Club or Organization Meetings:     Marital Status:    Intimate Partner Violence:     Fear of Current or Ex-Partner:     Emotionally Abused:     Physically Abused:     Sexually Abused: ALLERGIES: Amoxicillin, Aspirin, Hydrocodone, Ibuprofen, Pcn [penicillins], and Phenergan [promethazine]    Review of Systems   Constitutional: Negative for chills and fever. HENT: Negative for congestion and sore throat. Eyes: Negative for visual disturbance. Respiratory: Negative for cough and shortness of breath. Cardiovascular: Negative for chest pain. Gastrointestinal: Positive for nausea and vomiting. Negative for abdominal pain and diarrhea.    Endocrine: Negative for polyuria. Genitourinary: Negative for difficulty urinating and dysuria. Musculoskeletal: Negative for neck pain and neck stiffness. Skin: Negative for rash. Neurological: Negative for weakness and headaches. All other systems reviewed and are negative. Vitals:    06/10/21 1137 06/10/21 1200   BP: (!) 130/103    Pulse: (!) 140    Resp: 18    Temp: 97.7 °F (36.5 °C)    SpO2: 94% 94%   Weight: 65.8 kg (145 lb)    Height: 5' 1\" (1.549 m)             Physical Exam  Vitals and nursing note reviewed. Constitutional:       Appearance: Normal appearance. HENT:      Head: Normocephalic and atraumatic. Nose: Nose normal.      Mouth/Throat:      Mouth: Mucous membranes are moist.   Eyes:      Extraocular Movements: Extraocular movements intact. Cardiovascular:      Rate and Rhythm: Normal rate and regular rhythm. Heart sounds: Normal heart sounds. Pulmonary:      Effort: Pulmonary effort is normal.      Breath sounds: Normal breath sounds. No wheezing, rhonchi or rales. Abdominal:      General: Abdomen is flat. Palpations: Abdomen is soft. Tenderness: There is no abdominal tenderness. There is no guarding. Comments: No peritoneal signs, nontender abdomen on exam.   Musculoskeletal:         General: Normal range of motion. Cervical back: Normal range of motion. Skin:     General: Skin is warm and dry. Neurological:      General: No focal deficit present. Mental Status: He is alert and oriented to person, place, and time. Psychiatric:         Mood and Affect: Mood normal.          MDM  Number of Diagnoses or Management Options  BETTY (acute kidney injury) ([facility] [address]  Intractable vomiting with nausea, unspecified vomiting type  Diagnosis management comments: 62-year-old male with history of cyclic vomiting syndrome from cannabis, presents ER with continued vomiting for 3 days now. States no improvement with Zofran and Phenergan prescription.   Reports some relief with hot showers at home. Patient arrives tachycardic, will give 1 L bolus IV fluid. EKG with prolonged QTC, limited on IV interventions given the prolonged QTC. Will order capsaicin cream to apply to his abdomen, unfortunately pharmacy is not have this medication, Cathy Staggers will have to work. Patient given a total of 2 L bolus IV fluid, also given 2 g magnesium given his wide QTC. Repeat EKG shows improvement of QTC down to 469, will give IM Haldol, 5 mg. Patient's labs show white count 23.3, Acute kidney injury with GFR 49. BUN 32, creatinine 1.63. Potassium low at 3.1, will replace once patient is able to tolerate p.o. fluids. Lipase normal.  Initial lactic acid elevated 3.4. Again although patient's labs appear that he is septic, he has no abdominal pain, there is no source of infection except his symptom of nausea. Again he was rechecked multiple times continues to have a benign abdomen, denies dysuria, pending urine sample from patient. Patient is white count likely combination of acute phase reactant from his continued active vomiting which she has done continually here in the emergency department along with his dehydration. Hematocrit today is approximately 45, previously is normal is in the high 30s. Given patient's continued intractable vomiting with dehydration and acute kidney injury, he would benefit from medical admission. I spoke with hospitalist who agreed to admit this patient for continued evaluation and treatment. Patient and family voiced understanding and agreement. EKG interpretation, sinus tachycardia, rate 144, , QRS 66, QTc 526, normal axis, nonspecific ST segment abnormality, abnormal EKG, prolonged QTC. Repeat EKG interpretation from 1346, sinus tachycardia, rate 109, , QRS 70, QTc 469, normal axis, no significant ST elevation or depression.        Amount and/or Complexity of Data Reviewed  Clinical lab tests: ordered and reviewed  Discussion of test results with the performing providers: yes  Discuss the patient with other providers: yes  Independent visualization of images, tracings, or specimens: yes    Risk of Complications, Morbidity, and/or Mortality  Presenting problems: moderate  Diagnostic procedures: moderate  Management options: moderate           Procedures

## 2021-06-11 PROBLEM — E44.1 MILD PROTEIN-CALORIE MALNUTRITION (HCC): Status: ACTIVE | Noted: 2021-06-11

## 2021-06-11 LAB
AMPHET UR QL SCN: NEGATIVE
ANION GAP SERPL CALC-SCNC: 9 MMOL/L (ref 7–16)
BARBITURATES UR QL SCN: NEGATIVE
BASOPHILS # BLD: 0 K/UL (ref 0–0.2)
BASOPHILS NFR BLD: 0 % (ref 0–2)
BENZODIAZ UR QL: NEGATIVE
BUN SERPL-MCNC: 15 MG/DL (ref 6–23)
CALCIUM SERPL-MCNC: 8.8 MG/DL (ref 8.3–10.4)
CANNABINOIDS UR QL SCN: POSITIVE
CHLORIDE SERPL-SCNC: 108 MMOL/L (ref 98–107)
CO2 SERPL-SCNC: 21 MMOL/L (ref 21–32)
COCAINE UR QL SCN: NEGATIVE
CREAT SERPL-MCNC: 0.81 MG/DL (ref 0.8–1.5)
DIFFERENTIAL METHOD BLD: ABNORMAL
EOSINOPHIL # BLD: 0.2 K/UL (ref 0–0.8)
EOSINOPHIL NFR BLD: 1 % (ref 0.5–7.8)
ERYTHROCYTE [DISTWIDTH] IN BLOOD BY AUTOMATED COUNT: 12.9 % (ref 11.9–14.6)
GLUCOSE SERPL-MCNC: 101 MG/DL (ref 65–100)
HCT VFR BLD AUTO: 38.9 % (ref 41.1–50.3)
HGB BLD-MCNC: 14.3 G/DL (ref 13.6–17.2)
IMM GRANULOCYTES # BLD AUTO: 0.2 K/UL (ref 0–0.5)
IMM GRANULOCYTES NFR BLD AUTO: 1 % (ref 0–5)
LYMPHOCYTES # BLD: 1.7 K/UL (ref 0.5–4.6)
LYMPHOCYTES NFR BLD: 7 % (ref 13–44)
MAGNESIUM SERPL-MCNC: 2.4 MG/DL (ref 1.8–2.4)
MCH RBC QN AUTO: 29.3 PG (ref 26.1–32.9)
MCHC RBC AUTO-ENTMCNC: 36.8 G/DL (ref 31.4–35)
MCV RBC AUTO: 79.7 FL (ref 79.6–97.8)
METHADONE UR QL: NEGATIVE
MONOCYTES # BLD: 1.7 K/UL (ref 0.1–1.3)
MONOCYTES NFR BLD: 6 % (ref 4–12)
NEUTS SEG # BLD: 22.3 K/UL (ref 1.7–8.2)
NEUTS SEG NFR BLD: 86 % (ref 43–78)
NRBC # BLD: 0 K/UL (ref 0–0.2)
OPIATES UR QL: NEGATIVE
PCP UR QL: NEGATIVE
PLATELET # BLD AUTO: 299 K/UL (ref 150–450)
PMV BLD AUTO: 9.3 FL (ref 9.4–12.3)
POTASSIUM SERPL-SCNC: 2.8 MMOL/L (ref 3.5–5.1)
PROCALCITONIN SERPL-MCNC: 0.52 NG/ML
RBC # BLD AUTO: 4.88 M/UL (ref 4.23–5.6)
SODIUM SERPL-SCNC: 138 MMOL/L (ref 138–145)
WBC # BLD AUTO: 26 K/UL (ref 4.3–11.1)

## 2021-06-11 PROCEDURE — 2709999900 HC NON-CHARGEABLE SUPPLY

## 2021-06-11 PROCEDURE — 74011000250 HC RX REV CODE- 250: Performed by: FAMILY MEDICINE

## 2021-06-11 PROCEDURE — 83735 ASSAY OF MAGNESIUM: CPT

## 2021-06-11 PROCEDURE — 80307 DRUG TEST PRSMV CHEM ANLYZR: CPT

## 2021-06-11 PROCEDURE — 74011250636 HC RX REV CODE- 250/636: Performed by: INTERNAL MEDICINE

## 2021-06-11 PROCEDURE — 74011000258 HC RX REV CODE- 258: Performed by: FAMILY MEDICINE

## 2021-06-11 PROCEDURE — 36415 COLL VENOUS BLD VENIPUNCTURE: CPT

## 2021-06-11 PROCEDURE — 74011250637 HC RX REV CODE- 250/637: Performed by: FAMILY MEDICINE

## 2021-06-11 PROCEDURE — C9113 INJ PANTOPRAZOLE SODIUM, VIA: HCPCS | Performed by: FAMILY MEDICINE

## 2021-06-11 PROCEDURE — 74011250636 HC RX REV CODE- 250/636: Performed by: FAMILY MEDICINE

## 2021-06-11 PROCEDURE — 65660000000 HC RM CCU STEPDOWN

## 2021-06-11 PROCEDURE — 84145 PROCALCITONIN (PCT): CPT

## 2021-06-11 PROCEDURE — 80048 BASIC METABOLIC PNL TOTAL CA: CPT

## 2021-06-11 PROCEDURE — 85025 COMPLETE CBC W/AUTO DIFF WBC: CPT

## 2021-06-11 PROCEDURE — 74011250637 HC RX REV CODE- 250/637: Performed by: INTERNAL MEDICINE

## 2021-06-11 RX ORDER — ONDANSETRON 2 MG/ML
4 INJECTION INTRAMUSCULAR; INTRAVENOUS
Status: DISCONTINUED | OUTPATIENT
Start: 2021-06-11 | End: 2021-06-13 | Stop reason: HOSPADM

## 2021-06-11 RX ORDER — QUETIAPINE FUMARATE 25 MG/1
50 TABLET, FILM COATED ORAL
Status: DISCONTINUED | OUTPATIENT
Start: 2021-06-11 | End: 2021-06-13 | Stop reason: HOSPADM

## 2021-06-11 RX ORDER — POTASSIUM CHLORIDE 14.9 MG/ML
20 INJECTION INTRAVENOUS
Status: COMPLETED | OUTPATIENT
Start: 2021-06-11 | End: 2021-06-11

## 2021-06-11 RX ORDER — ONDANSETRON 2 MG/ML
6 INJECTION INTRAMUSCULAR; INTRAVENOUS
Status: DISCONTINUED | OUTPATIENT
Start: 2021-06-11 | End: 2021-06-13

## 2021-06-11 RX ORDER — ONDANSETRON 4 MG/1
4 TABLET, ORALLY DISINTEGRATING ORAL
Status: DISCONTINUED | OUTPATIENT
Start: 2021-06-11 | End: 2021-06-11

## 2021-06-11 RX ORDER — ONDANSETRON 2 MG/ML
4 INJECTION INTRAMUSCULAR; INTRAVENOUS
Status: DISCONTINUED | OUTPATIENT
Start: 2021-06-11 | End: 2021-06-11

## 2021-06-11 RX ORDER — DIPHENHYDRAMINE HYDROCHLORIDE 50 MG/ML
25 INJECTION, SOLUTION INTRAMUSCULAR; INTRAVENOUS
Status: DISCONTINUED | OUTPATIENT
Start: 2021-06-11 | End: 2021-06-13 | Stop reason: HOSPADM

## 2021-06-11 RX ADMIN — MENTHOL, METHYL SALICYLATE: 10; 15 CREAM TOPICAL at 12:30

## 2021-06-11 RX ADMIN — PANTOPRAZOLE SODIUM 40 MG: 40 INJECTION, POWDER, FOR SOLUTION INTRAVENOUS at 08:52

## 2021-06-11 RX ADMIN — POTASSIUM CHLORIDE 20 MEQ: 200 INJECTION, SOLUTION INTRAVENOUS at 10:34

## 2021-06-11 RX ADMIN — ONDANSETRON 4 MG: 2 INJECTION INTRAMUSCULAR; INTRAVENOUS at 00:42

## 2021-06-11 RX ADMIN — ONDANSETRON 4 MG: 2 INJECTION INTRAMUSCULAR; INTRAVENOUS at 05:32

## 2021-06-11 RX ADMIN — MENTHOL, METHYL SALICYLATE: 10; 15 CREAM TOPICAL at 21:35

## 2021-06-11 RX ADMIN — Medication 10 ML: at 05:02

## 2021-06-11 RX ADMIN — PANTOPRAZOLE SODIUM 40 MG: 40 INJECTION, POWDER, FOR SOLUTION INTRAVENOUS at 21:28

## 2021-06-11 RX ADMIN — Medication 10 ML: at 05:03

## 2021-06-11 RX ADMIN — MENTHOL, METHYL SALICYLATE: 10; 15 CREAM TOPICAL at 06:00

## 2021-06-11 RX ADMIN — Medication 10 ML: at 13:36

## 2021-06-11 RX ADMIN — Medication 10 ML: at 22:00

## 2021-06-11 RX ADMIN — SODIUM CHLORIDE 150 ML/HR: 900 INJECTION, SOLUTION INTRAVENOUS at 01:27

## 2021-06-11 RX ADMIN — POTASSIUM CHLORIDE 20 MEQ: 200 INJECTION, SOLUTION INTRAVENOUS at 13:14

## 2021-06-11 RX ADMIN — SODIUM CHLORIDE 50 ML/HR: 900 INJECTION, SOLUTION INTRAVENOUS at 15:36

## 2021-06-11 RX ADMIN — POTASSIUM CHLORIDE 20 MEQ: 200 INJECTION, SOLUTION INTRAVENOUS at 08:52

## 2021-06-11 RX ADMIN — QUETIAPINE FUMARATE 50 MG: 25 TABLET ORAL at 21:28

## 2021-06-11 RX ADMIN — CEFTRIAXONE 1 G: 1 INJECTION, POWDER, FOR SOLUTION INTRAMUSCULAR; INTRAVENOUS at 16:27

## 2021-06-11 RX ADMIN — ONDANSETRON 6 MG: 2 INJECTION INTRAMUSCULAR; INTRAVENOUS at 17:47

## 2021-06-11 RX ADMIN — ACETAMINOPHEN 650 MG: 325 TABLET ORAL at 21:27

## 2021-06-11 NOTE — PROGRESS NOTES
[facility]ist Progress Note     Name: Zaida Garcia   Age: 42 y.o. Sex: male  : 1975    MRN:     [id number]    Admit Date:  6/10/2021    Reason for Admission:  Cyclic vomiting syndrome [R11.15]    Assessment & Plan     SIRS  Meets criteria with tachycardia and elevated white blood count but without any identifable source. UA is neg and CXR without abnormality. Most probably secondary to his persistent nausea vomiting.  - on empiric Rocephin but will check Procalc      Cyclic nausea vomiting secondary to Cannabis hyperemesis syndrome   Continues to have nausea and vomiting. Unable to tolerate PO today  - supportive management with zofran and reglan. Allergic to phenegran and does not want Ativan  - order Utox  - counseled on stopping cannabis use  - hot shower as needed    Hypokalemia  BMP reviewed. K+ of 2.8  - will order 60mEq of KCl IV  - recheck BMP tomorrow    BETTY - improved with IVF       Diet:  DIET ADULT  DIET ADULT ORAL NUTRITION SUPPLEMENT  DVT PPx: scds  GI Ppx: ppi  Code: Full Code    Dispo / Discharge Planning: pending clinical course      Hospital Course/Subjective:     Please refer to the admission H&P for details of presentation. In summary, Zaida Garcia is a 42 y.o. male with medical history significant for cyclic nausea vomiting secondary to marijuana use, esophagitis(?Joy's esophagus vs chronic reflux changes per) who presented to ED with persistence nausea and vomiting since . Noted to be tachycardic to 140s in the ED and improved with fluids. WBC 23.3, potassium 3.1, creatinine 1.63, GFR of 59, initial lactic acid of 3.4. Chest x-ray with acute abdominal series no acute findings. Subjective/24 hr Events (21) : Patient is seen and examined at bedside. Continues to have nausea and vomiting. Reports that he is unable to sleep and unable to sit still. Normally smokes weed to sleep.    Patient denies fever, chills, chest pains, shortness of breath. Review of Systems: 14 point review of systems is otherwise negative with the exception of the elements mentioned above. Objective:     Patient Vitals for the past 24 hrs:   Temp Pulse Resp BP SpO2   06/11/21 1546 98.6 °F (37 °C) 95 20 (!) 143/94 91 %   06/11/21 1131 98.1 °F (36.7 °C) 90 20 (!) 142/105 95 %   06/11/21 0742 98.4 °F (36.9 °C) 91 22 (!) 142/105 94 %   06/11/21 0419 97.9 °F (36.6 °C) 91 26 136/89 95 %   06/10/21 2331 98.7 °F (37.1 °C) 95 26 (!) 153/87 97 %   06/10/21 1921 98.8 °F (37.1 °C) (!) 115 26 (!) 148/98 98 %     Oxygen Therapy  O2 Sat (%): 91 % (06/11/21 1546)  Pulse via Oximetry: 114 beats per minute (06/10/21 1516)  O2 Device: None (Room air) (06/11/21 0419)    Body mass index is 26.55 kg/m². Physical Exam:   General:     alert, awake, no acute distress. HENT:   normocephalic, atraumatic. Eyes:   anicteric sclerae, normal conjunctiva, EOMI  Neck:    supple, non-tender. Trachea midline. Lungs:   CTAB, no wheezing, rhonchi, rales  Cardiac:   RRR, Normal S1 and S2. Abdomen:   Soft, non distended, nontender, +BS, no guarding/rebound  Extremities:   Warm, dry. No edema , pedal pulses present, no digital cyanosis  Skin:   Warn, dry, normnal turgor and texture; no rash, ulcers   Neuro:  AAOx3.  No gross focal neurological deficit  Psychiatric:  No anxiety, calm, cooperative    Data Review:  I have reviewed all labs, meds, and studies from the last 24 hours:    Labs:    Recent Results (from the past 24 hour(s))   URINALYSIS W/ RFLX MICROSCOPIC    Collection Time: 06/10/21  7:40 PM   Result Value Ref Range    Color YELLOW      Appearance CLEAR      Specific gravity 1.018 1.001 - 1.023      pH (UA) 7.0 5.0 - 9.0      Protein TRACE (A) NEG mg/dL    Glucose Negative mg/dL    Ketone 15 (A) NEG mg/dL    Bilirubin Negative NEG      Blood SMALL (A) NEG      Urobilinogen 1.0 0.2 - 1.0 EU/dL    Nitrites Negative NEG      Leukocyte Esterase Negative NEG      WBC 0-3 0 /hpf RBC 10-20 0 /hpf    Epithelial cells 0 0 /hpf    Bacteria 0 0 /hpf    Casts 0-3 0 /lpf   LACTIC ACID    Collection Time: 06/10/21  8:06 PM   Result Value Ref Range    Lactic acid 1.3 0.4 - 2.0 MMOL/L   METABOLIC PANEL, BASIC    Collection Time: 06/11/21  6:22 AM   Result Value Ref Range    Sodium 138 138 - 145 mmol/L    Potassium 2.8 (L) 3.5 - 5.1 mmol/L    Chloride 108 (H) 98 - 107 mmol/L    CO2 21 21 - 32 mmol/L    Anion gap 9 7 - 16 mmol/L    Glucose 101 (H) 65 - 100 mg/dL    BUN 15 6 - 23 MG/DL    Creatinine 0.81 0.8 - 1.5 MG/DL    GFR est AA >60 >60 ml/min/1.73m2    GFR est non-AA >60 >60 ml/min/1.73m2    Calcium 8.8 8.3 - 10.4 MG/DL   CBC WITH AUTOMATED DIFF    Collection Time: 06/11/21  6:22 AM   Result Value Ref Range    WBC 26.0 (H) 4.3 - 11.1 K/uL    RBC 4.88 4.23 - 5.6 M/uL    HGB 14.3 13.6 - 17.2 g/dL    HCT 38.9 (L) 41.1 - 50.3 %    MCV 79.7 79.6 - 97.8 FL    MCH 29.3 26.1 - 32.9 PG    MCHC 36.8 (H) 31.4 - 35.0 g/dL    RDW 12.9 11.9 - 14.6 %    PLATELET 651 033 - 642 K/uL    MPV 9.3 (L) 9.4 - 12.3 FL    ABSOLUTE NRBC 0.00 0.0 - 0.2 K/uL    DF AUTOMATED      NEUTROPHILS 86 (H) 43 - 78 %    LYMPHOCYTES 7 (L) 13 - 44 %    MONOCYTES 6 4.0 - 12.0 %    EOSINOPHILS 1 0.5 - 7.8 %    BASOPHILS 0 0.0 - 2.0 %    IMMATURE GRANULOCYTES 1 0.0 - 5.0 %    ABS. NEUTROPHILS 22.3 (H) 1.7 - 8.2 K/UL    ABS. LYMPHOCYTES 1.7 0.5 - 4.6 K/UL    ABS. MONOCYTES 1.7 (H) 0.1 - 1.3 K/UL    ABS. EOSINOPHILS 0.2 0.0 - 0.8 K/UL    ABS. BASOPHILS 0.0 0.0 - 0.2 K/UL    ABS. IMM.  GRANS. 0.2 0.0 - 0.5 K/UL   MAGNESIUM    Collection Time: 06/11/21  6:22 AM   Result Value Ref Range    Magnesium 2.4 1.8 - 2.4 mg/dL       All Micro Results     Procedure Component Value Units Date/Time    CULTURE, BLOOD [[id number]] Collected: 06/10/21 1518    Order Status: Completed Specimen: Blood Updated: 06/11/21 0701     Special Requests: --        RIGHT  HAND       Culture result: NO GROWTH AFTER 14 HOURS       CULTURE, BLOOD [[id number]] Collected: 06/10/21 1518    Order Status: Completed Specimen: Blood Updated: 06/11/21 0701     Special Requests: --        RIGHT  ARM       Culture result: NO GROWTH AFTER 13 HOURS             EKG Results     Procedure 720 Value Units Date/Time    EKG, 12 LEAD, INITIAL [[id number]] Collected: 06/10/21 1346    Order Status: Completed Updated: 06/10/21 1535     Ventricular Rate 109 BPM      Atrial Rate 109 BPM      P-R Interval 148 ms      QRS Duration 70 ms      Q-T Interval 348 ms      QTC Calculation (Bezet) 468 ms      Calculated P Axis 57 degrees      Calculated R Axis 74 degrees      Calculated T Axis 29 degrees      Diagnosis --     Sinus tachycardia  Junctional ST depression, probably abnormal  Abnormal ECG  When compared with ECG of 09-APR-2021 22:38,  Nonspecific T wave abnormality now evident in Inferior leads  Confirmed by Deepika Kaba ([id number]) on 6/10/2021 3:35:38 PM      EKG (Check If Upper Abdominal Pain or symptoms of SOB, Diaphoresis, or Tachycardia) [[id number]] Collected: 06/10/21 1141    Order Status: Completed Updated: 06/10/21 1533     Ventricular Rate 144 BPM      Atrial Rate 144 BPM      P-R Interval 118 ms      QRS Duration 66 ms      Q-T Interval 340 ms      QTC Calculation (Bezet) 526 ms      Calculated P Axis 81 degrees      Calculated R Axis 86 degrees      Calculated T Axis 66 degrees      Diagnosis --     Sinus tachycardia  Right atrial enlargement  Nonspecific ST abnormality  Abnormal ECG  When compared with ECG of 09-APR-2021 22:38,  ST now depressed in Inferior leads  Confirmed by Deepika Kaba ([id number]) on 6/10/2021 3:33:15 PM      EKG, 12 LEAD, INITIAL [[id number]]     Order Status: Canceled           Other Studies:  XR ABD ACUTE W 1 V CHEST    Result Date: 6/10/2021  Chest and Abdomen 3 Views dated 6/10/2021 Clinical Information: Nausea and vomiting for 5 days. Elevated white blood cell count One view of the chest shows  heart to be normal in size and mediastinum unremarkable.    Lungs are clear and pulmonary vascularity unremarkable. No pleural effusion. No free air under the diaphragm. Two views of the abdomen show  a nonspecific bowel gas pattern and no abnormal calcification.      No Acute Abnormality          Current Meds:   Current Facility-Administered Medications   Medication Dose Route Frequency    QUEtiapine (SEROquel) tablet 50 mg  50 mg Oral QHS    diphenhydrAMINE (BENADRYL) injection 25 mg  25 mg IntraVENous QHS PRN    ondansetron (ZOFRAN) injection 4 mg  4 mg IntraVENous Q4H PRN    Or    ondansetron (ZOFRAN ODT) tablet 4 mg  4 mg Oral Q8H PRN    sodium chloride (NS) flush 5-10 mL  5-10 mL IntraVENous Q8H    sodium chloride (NS) flush 5-10 mL  5-10 mL IntraVENous PRN    methyl salicylate-menthol (BENGAY) 15-10 % cream   Topical TID    sodium chloride (NS) flush 5-40 mL  5-40 mL IntraVENous Q8H    sodium chloride (NS) flush 5-40 mL  5-40 mL IntraVENous PRN    acetaminophen (TYLENOL) tablet 650 mg  650 mg Oral Q6H PRN    Or    acetaminophen (TYLENOL) suppository 650 mg  650 mg Rectal Q6H PRN    polyethylene glycol (MIRALAX) packet 17 g  17 g Oral DAILY PRN    pantoprazole (PROTONIX) 40 mg in 0.9% sodium chloride 10 mL injection  40 mg IntraVENous Q12H    0.9% sodium chloride infusion  50 mL/hr IntraVENous CONTINUOUS    LORazepam (ATIVAN) injection 1 mg  1 mg IntraVENous Q4H PRN    cefTRIAXone (ROCEPHIN) 1 g in 0.9% sodium chloride (MBP/ADV) 50 mL MBP  1 g IntraVENous Q24H       Problem List:  Hospital Problems as of 6/11/2021 Date Reviewed: 3/1/2019        Codes Class Noted - Resolved POA    Cyclic vomiting syndrome ICD-10-CM: R11.15  ICD-9-CM: 536.2  6/10/2021 - Present Unknown        * (Principal) SIRS (systemic inflammatory response syndrome) (HCC) ICD-10-CM: R65.10  ICD-9-CM: 995.90  6/10/2021 - Present Unknown        Prolonged Q-T interval on ECG ICD-10-CM: R94.31  ICD-9-CM: 794.31  6/10/2021 - Present Unknown        BETTY (acute kidney injury) ([facility] ICD-10-CM: N17.9  ICD-9-CM: 584.9  4/10/2021 - Present Yes        Cannabis hyperemesis syndrome concurrent with and due to cannabis abuse [facility]) ICD-10-CM: F12.188  ICD-9-CM: 536.2, 305.20  8/12/2014 - Present Unknown        Hypokalemia ICD-10-CM: E87.6  ICD-9-CM: 276.8  7/30/2013 - Present Unknown               Part of this note was written by using a voice dictation software and the note has been proof read but may still contain some grammatical/other typographical errors.     Signed By: Andrew Arias MD   [employer] [facility]    June 11, 2021  3:22 PM

## 2021-06-11 NOTE — PROGRESS NOTES
CM met with patient and patient's spouse Edgar Cisneros [phone] at bedside, to complete assessment. Patient presented groggy and was unable to complete full assessment, due to vomiting. Demographic information verified by spouse. The patient lives with his spouse in a two story home with 5 steps at the entrance. The patient is independent with completing ADL's and drives. Per chart review, patient does not have a payor source on file. According to spouse, Insurance Card was provided in ED and the patient is insured with [employer]. DME: NONE    Patient obtains his prescription medications from [facility] in [facility]. Spouse voiced no concerns with obtaining medications in the community, unless the patient's  insurance policy is no longer active and unable to assist with medications. Discharge planning: Patient to return home when medically stable. Please consult or notify CM if any needs shall arise. CM continues to follow. Care Management Interventions  PCP Verified by CM: Yes  Mode of Transport at Discharge: Other (see comment) (Spouse- Gerald Olson [phone].)  Transition of Care Consult (CM Consult): Discharge Planning  Discharge Durable Medical Equipment: No  Physical Therapy Consult: No  Occupational Therapy Consult: No  Speech Therapy Consult: No  Current Support Network: Lives with Spouse, Own Home  Confirm Follow Up Transport: Self  The Plan for Transition of Care is Related to the Following Treatment Goals : Return to Baseline. The Patient and/or Patient Representative was Provided with a Choice of Provider and Agrees with the Discharge Plan?: Yes  Name of the Patient Representative Who was Provided with a Choice of Provider and Agrees with the Discharge Plan: Patient/Patient's Spouse.    [place] Resource Information Provided?: No  Discharge Location  Discharge Placement: Home

## 2021-06-11 NOTE — PROGRESS NOTES
Very restless, up and down from bed all night long. Will drink water then vomit. He says he can not stop drinking because if he dry heaves he may bleed and the water gives him something to bring up.  zofran given x 3. I offered him the ordered ativan but he refused. IVF infusing. SR/ST on monitor. Connected to continuous SAT monitor; on RA. Hourly rounds in progress. Bed in low locked position. Call light and personal items within reach. Will continue to monitor and give report to oncoming day shift nurse.

## 2021-06-11 NOTE — PROGRESS NOTES
Comprehensive Nutrition Assessment    Type and Reason for Visit: Initial, Positive nutrition screen  Best Practice Alert for Malnutrition Screening Tool: Recently Lost Weight Without Trying: Yes, If Yes, How Much Weight Loss: 2-13 lbs, Eating Poorly Due to Decreased Appetite: Yes    Nutrition Recommendations/Plan:   Meals and Snacks:  Continue current diet. Progression per MD as medically appropriate. Nutrition Supplement Therapy:   Medical food supplement therapy:  Continue Ensure Clear three times per day (this provides 240 kcal and 8 grams protein per bottle)      Malnutrition Assessment:  Malnutrition Status: Mild malnutrition  Context: Acute illness  Findings of clinical characteristics of malnutrition:   Energy Intake:  7 - 50% or less of est energy requirements for 5 or more days  Weight Loss:  Unable to assess (3% wt loss unable to clarify time frame of loss)     Body Fat Loss:  No significant body fat loss,     Muscle Mass Loss:  No significant muscle mass loss,    Fluid Accumulation:  Unable to assess,     Strength:  Not performed     Nutrition Assessment:   Nutrition History: Reports only able to tolerate small amounts of fluids for several days with NV for ~1 week. Nutrition Background: cyclic nausea vomiting secondary to marijuana use, last EGD in April 2021 esophagitis, ? Joy's esophagus vs chronic reflux changes. Admitted for SIRS, cyclic nausea vomiting, hypokalemia, BETTY. Daily Update:  Visit with pt and wife at bedside. Initial attempt pt in shower, upon return lying in bed. He reports he does best with having liquids despite ongoing vomiting. He indicates frequency of vomiting is decreasing. Abdominal Status (last documented): Nausea, Vomiting. Last BM unknown. NS @ 50 ml/hr. K and Mg replacement active.    Nutrition Related Findings:   No physical findings indicative of malnutrition      Current Nutrition Therapies:  ADULT DIET Clear Liquid  ADULT ORAL NUTRITION SUPPLEMENT Breakfast, Lunch, Dinner; Clear Liquid    Current Intake:   Average Meal Intake: 51-75% (clears) Average Supplement Intake: Unable to assess      Anthropometric Measures:  Height: 5' 1\" (154.9 cm)  Current Body Wt: 63.7 kg (140 lb 6.9 oz) (6/11), Weight source: Standing scale  BMI: 26.5, Overweight (BMI 25.0-29. 9)  Admission Body Weight: 145 lb 1 oz (stated)  Ideal Body Weight (lbs) (Calculated): 112 lbs (51 kg), 125.4 %  Usual Body Wt: 65.8 kg (145 lb) (145-150# per pt, majority of EMR hx 145#), Percent weight change: -3.1          Edema: No data recorded   Estimated Daily Nutrient Needs:  Energy (kcal/day): 8134-5113 (Kcal/kg (25-30), Weight Used: Current (63.7 kg))  Protein (g/day): 64-76 (1-1.2 g/kg) Weight Used: (Current)  Fluid (ml/day):   (1 ml/kcal)    Nutrition Diagnosis:   · Inadequate oral intake related to altered GI function as evidenced by NPO or clear liquid status due to medical condition (admitted w cyclic vomiting)    Nutrition Interventions:   Food and/or Nutrient Delivery: Continue current diet, Continue oral nutrition supplement     Coordination of Nutrition Care: Continue to monitor while inpatient  Plan of Care discussed with Missy Morales RN, IDT rounds. Goals: Active Goal: Tolerate diet > full liquids within 5 days    Nutrition Monitoring and Evaluation:      Food/Nutrient Intake Outcomes: Diet advancement/tolerance, Food and nutrient intake, Supplement intake  Physical Signs/Symptoms Outcomes: Biochemical data, GI status, Meal time behavior    Discharge Planning:     Too soon to determine    Rai Guan RD, LDN on 6/11/2021 at 1:37 PM  Contact: [phone]

## 2021-06-11 NOTE — ACP (ADVANCE CARE PLANNING)
Advance Care Planning   Advance Care Planning Inpatient Note  [facility] Department    Today's Date: 6/11/2021  Unit: [facility] 6 MED SURG    Received request from patient. Upon review of chart and communication with care team, patient's decision making abilities are not in question. Patient was/were present in the room during visit.     Goals of ACP Conversation:  inquire about directives    Health Care Decision Makers:      Click here to complete [address] including selection of the Healthcare Decision Maker Relationship (ie \"Primary\")     Today we:  Daniel 46 (Patient Wishes) on file:  None     Assessment:    *left forms with patient for review     Interventions:  Deferred conversation as patient not interested in completing an advance directive at this time     Outcomes/Plan:  none     Electronically signed by Pasha Juárez on 6/11/2021 at 10:35 AM

## 2021-06-11 NOTE — PROGRESS NOTES
Hourly rounds completed throughout this shift. Pt continues to vomit after each meal. Pt asked to not eat and rest his bowls but pt and wife said pt will start throwing up blood if he does not eat. Zofran given per MAR. Pt resting in bed; denies needs at this time. Will continue to monitor and report to oncoming night shift nurse.

## 2021-06-12 PROBLEM — N17.9 AKI (ACUTE KIDNEY INJURY) (HCC): Status: RESOLVED | Noted: 2021-04-10 | Resolved: 2021-06-12

## 2021-06-12 PROBLEM — R94.31 PROLONGED Q-T INTERVAL ON ECG: Status: RESOLVED | Noted: 2021-06-10 | Resolved: 2021-06-12

## 2021-06-12 LAB
ANION GAP SERPL CALC-SCNC: 9 MMOL/L (ref 7–16)
BUN SERPL-MCNC: 13 MG/DL (ref 6–23)
CALCIUM SERPL-MCNC: 8.7 MG/DL (ref 8.3–10.4)
CHLORIDE SERPL-SCNC: 106 MMOL/L (ref 98–107)
CO2 SERPL-SCNC: 24 MMOL/L (ref 21–32)
CREAT SERPL-MCNC: 0.7 MG/DL (ref 0.8–1.5)
ERYTHROCYTE [DISTWIDTH] IN BLOOD BY AUTOMATED COUNT: 12.8 % (ref 11.9–14.6)
GLUCOSE SERPL-MCNC: 94 MG/DL (ref 65–100)
HCT VFR BLD AUTO: 39.1 % (ref 41.1–50.3)
HGB BLD-MCNC: 14.2 G/DL (ref 13.6–17.2)
MCH RBC QN AUTO: 29.2 PG (ref 26.1–32.9)
MCHC RBC AUTO-ENTMCNC: 36.3 G/DL (ref 31.4–35)
MCV RBC AUTO: 80.3 FL (ref 79.6–97.8)
NRBC # BLD: 0 K/UL (ref 0–0.2)
PLATELET # BLD AUTO: 280 K/UL (ref 150–450)
PMV BLD AUTO: 9.6 FL (ref 9.4–12.3)
POTASSIUM SERPL-SCNC: 3.3 MMOL/L (ref 3.5–5.1)
RBC # BLD AUTO: 4.87 M/UL (ref 4.23–5.6)
SODIUM SERPL-SCNC: 139 MMOL/L (ref 136–145)
WBC # BLD AUTO: 13.5 K/UL (ref 4.3–11.1)

## 2021-06-12 PROCEDURE — 74011250636 HC RX REV CODE- 250/636: Performed by: FAMILY MEDICINE

## 2021-06-12 PROCEDURE — 80048 BASIC METABOLIC PNL TOTAL CA: CPT

## 2021-06-12 PROCEDURE — 74011250636 HC RX REV CODE- 250/636: Performed by: INTERNAL MEDICINE

## 2021-06-12 PROCEDURE — 74011250637 HC RX REV CODE- 250/637: Performed by: INTERNAL MEDICINE

## 2021-06-12 PROCEDURE — 85027 COMPLETE CBC AUTOMATED: CPT

## 2021-06-12 PROCEDURE — 65660000000 HC RM CCU STEPDOWN

## 2021-06-12 PROCEDURE — 74011000250 HC RX REV CODE- 250: Performed by: FAMILY MEDICINE

## 2021-06-12 PROCEDURE — C9113 INJ PANTOPRAZOLE SODIUM, VIA: HCPCS | Performed by: FAMILY MEDICINE

## 2021-06-12 PROCEDURE — 36415 COLL VENOUS BLD VENIPUNCTURE: CPT

## 2021-06-12 PROCEDURE — 2709999900 HC NON-CHARGEABLE SUPPLY

## 2021-06-12 RX ORDER — POTASSIUM CHLORIDE 14.9 MG/ML
20 INJECTION INTRAVENOUS
Status: COMPLETED | OUTPATIENT
Start: 2021-06-12 | End: 2021-06-12

## 2021-06-12 RX ADMIN — Medication 10 ML: at 06:00

## 2021-06-12 RX ADMIN — POTASSIUM CHLORIDE 20 MEQ: 200 INJECTION, SOLUTION INTRAVENOUS at 08:58

## 2021-06-12 RX ADMIN — ONDANSETRON 6 MG: 2 INJECTION INTRAMUSCULAR; INTRAVENOUS at 06:08

## 2021-06-12 RX ADMIN — Medication 10 ML: at 20:59

## 2021-06-12 RX ADMIN — Medication 10 ML: at 21:23

## 2021-06-12 RX ADMIN — PANTOPRAZOLE SODIUM 40 MG: 40 INJECTION, POWDER, FOR SOLUTION INTRAVENOUS at 08:58

## 2021-06-12 RX ADMIN — ONDANSETRON 6 MG: 2 INJECTION INTRAMUSCULAR; INTRAVENOUS at 17:19

## 2021-06-12 RX ADMIN — Medication 10 ML: at 14:34

## 2021-06-12 RX ADMIN — QUETIAPINE FUMARATE 50 MG: 25 TABLET ORAL at 21:23

## 2021-06-12 RX ADMIN — MENTHOL, METHYL SALICYLATE: 10; 15 CREAM TOPICAL at 12:17

## 2021-06-12 RX ADMIN — POTASSIUM CHLORIDE 20 MEQ: 200 INJECTION, SOLUTION INTRAVENOUS at 11:06

## 2021-06-12 RX ADMIN — Medication 10 ML: at 14:33

## 2021-06-12 RX ADMIN — MENTHOL, METHYL SALICYLATE: 10; 15 CREAM TOPICAL at 20:58

## 2021-06-12 RX ADMIN — ONDANSETRON 6 MG: 2 INJECTION INTRAMUSCULAR; INTRAVENOUS at 11:06

## 2021-06-12 NOTE — PROGRESS NOTES
[facility]ist Progress Note     Name: Dimitrios Gallo   Age: 42 y.o. Sex: male  : 1975    MRN:     [id number]    Admit Date:  6/10/2021    Reason for Admission:  Cyclic vomiting syndrome [R11.15]    Assessment & Plan     SIRS  Meets criteria with tachycardia and elevated white blood count but without any identifable source. UA is neg and CXR without abnormality. Most probably secondary to his persistent nausea vomiting. BCx without any growth. Leukocytosis downtrending  - will dc antibiotics and monitor off abx     Cyclic nausea vomiting secondary to Cannabis hyperemesis syndrome   - supportive management with zofran scheduled before meals. Allergic to phenegran and does not want Ativan  - counseled on stopping cannabis use  - hot shower as needed    Hypokalemia  BMP reviewed. K+ of 2.8 --> 3.3 today  - will order 40 mEq of KCl IV  - recheck BMP tomorrow         Diet:  DIET ADULT  DIET ADULT ORAL NUTRITION SUPPLEMENT  DVT PPx: scds  GI Ppx: ppi  Code: Full Code    Dispo / Discharge Planning: pending clinical course      Hospital Course/Subjective:     Please refer to the admission H&P for details of presentation. In summary, Dimitrios Gallo is a 42 y.o. male with medical history significant for cyclic nausea vomiting secondary to marijuana use, esophagitis(?Joy's esophagus vs chronic reflux changes per) who presented to ED with persistence nausea and vomiting since . Noted to be tachycardic to 140s in the ED and improved with fluids. WBC 23.3, potassium 3.1, creatinine 1.63, GFR of 59, initial lactic acid of 3.4. Chest x-ray with acute abdominal series no acute findings. BETTY has resolved with IVF. Hypokalemia resolved with repletion. Subjective/24 hr Events (21) : Patient is seen and examined at bedside.     Reports he has slept well with seroquel this AM.   Had 3-4 episodes of n/v overnight but able to tolerate most of his breakfast without any n/v. Patient denies fever, chills, chest pains, shortness of breath. Review of Systems: 14 point review of systems is otherwise negative with the exception of the elements mentioned above. Objective:     Patient Vitals for the past 24 hrs:   Temp Pulse Resp BP SpO2   06/12/21 1224 98.6 °F (37 °C) 73 20 (!) 123/98 94 %   06/12/21 0831 98.8 °F (37.1 °C) 82 22 (!) 128/92 94 %   06/12/21 0346    120/80    06/12/21 0319 98.5 °F (36.9 °C) 88 20 (!) 149/102 94 %   06/11/21 2250 98.5 °F (36.9 °C) (!) 105 20 105/75 94 %   06/11/21 2121 99.4 °F (37.4 °C) (!) 101 20 (!) 145/100 93 %   06/11/21 1546 98.6 °F (37 °C) 95 20 (!) 143/94 91 %     Oxygen Therapy  O2 Sat (%): 94 % (06/12/21 1224)  Pulse via Oximetry: 114 beats per minute (06/10/21 1516)  O2 Device: None (Room air) (06/12/21 0319)    Body mass index is 26.55 kg/m². Physical Exam:   General:     alert, awake, no acute distress. HENT:   normocephalic, atraumatic. Eyes:   anicteric sclerae, normal conjunctiva, EOMI  Neck:    supple, non-tender. Trachea midline. Lungs:   CTAB, no wheezing, rhonchi, rales  Cardiac:   RRR, Normal S1 and S2. Abdomen:   Soft, non distended, nontender, +BS, no guarding/rebound  Extremities:   Warm, dry. No edema , pedal pulses present, no digital cyanosis  Skin:   Warn, dry, normnal turgor and texture; no rash, ulcers   Neuro:  AAOx3.  No gross focal neurological deficit  Psychiatric:  No anxiety, calm, cooperative    Data Review:  I have reviewed all labs, meds, and studies from the last 24 hours:    Labs:    Recent Results (from the past 24 hour(s))   PROCALCITONIN    Collection Time: 06/11/21  5:43 PM   Result Value Ref Range    Procalcitonin 0.52 ng/mL   DRUG SCREEN, URINE    Collection Time: 06/11/21  6:06 PM   Result Value Ref Range    PCP(PHENCYCLIDINE) Negative      BENZODIAZEPINES Negative      COCAINE Negative      AMPHETAMINES Negative      METHADONE Negative      THC (TH-CANNABINOL) Positive      OPIATES Negative BARBITURATES Negative     CBC W/O DIFF    Collection Time: 06/12/21  6:48 AM   Result Value Ref Range    WBC 13.5 (H) 4.3 - 11.1 K/uL    RBC 4.87 4.23 - 5.6 M/uL    HGB 14.2 13.6 - 17.2 g/dL    HCT 39.1 (L) 41.1 - 50.3 %    MCV 80.3 79.6 - 97.8 FL    MCH 29.2 26.1 - 32.9 PG    MCHC 36.3 (H) 31.4 - 35.0 g/dL    RDW 12.8 11.9 - 14.6 %    PLATELET 621 279 - 190 K/uL    MPV 9.6 9.4 - 12.3 FL    ABSOLUTE NRBC 0.00 0.0 - 0.2 K/uL   METABOLIC PANEL, BASIC    Collection Time: 06/12/21  6:48 AM   Result Value Ref Range    Sodium 139 136 - 145 mmol/L    Potassium 3.3 (L) 3.5 - 5.1 mmol/L    Chloride 106 98 - 107 mmol/L    CO2 24 21 - 32 mmol/L    Anion gap 9 7 - 16 mmol/L    Glucose 94 65 - 100 mg/dL    BUN 13 6 - 23 MG/DL    Creatinine 0.70 (L) 0.8 - 1.5 MG/DL    GFR est AA >60 >60 ml/min/1.73m2    GFR est non-AA >60 >60 ml/min/1.73m2    Calcium 8.7 8.3 - 10.4 MG/DL       All Micro Results     Procedure Component Value Units Date/Time    CULTURE, BLOOD [[id number]] Collected: 06/10/21 1518    Order Status: Completed Specimen: Blood Updated: 06/12/21 1126     Special Requests: --        RIGHT  ARM       Culture result: NO GROWTH 2 DAYS       CULTURE, BLOOD [[id number]] Collected: 06/10/21 1518    Order Status: Completed Specimen: Blood Updated: 06/12/21 1126     Special Requests: --        RIGHT  HAND       Culture result: NO GROWTH 2 DAYS             EKG Results     Procedure 720 Value Units Date/Time    EKG, 12 LEAD, INITIAL [[id number]] Collected: 06/10/21 1346    Order Status: Completed Updated: 06/10/21 1535     Ventricular Rate 109 BPM      Atrial Rate 109 BPM      P-R Interval 148 ms      QRS Duration 70 ms      Q-T Interval 348 ms      QTC Calculation (Bezet) 468 ms      Calculated P Axis 57 degrees      Calculated R Axis 74 degrees      Calculated T Axis 29 degrees      Diagnosis --     Sinus tachycardia  Junctional ST depression, probably abnormal  Abnormal ECG  When compared with ECG of 09-APR-2021 22:38,  Nonspecific T wave abnormality now evident in Inferior leads  Confirmed by Christelle May [id number]) on 6/10/2021 3:35:38 PM      EKG (Check If Upper Abdominal Pain or symptoms of SOB, Diaphoresis, or Tachycardia) [[id number]] Collected: 06/10/21 1141    Order Status: Completed Updated: 06/10/21 1533     Ventricular Rate 144 BPM      Atrial Rate 144 BPM      P-R Interval 118 ms      QRS Duration 66 ms      Q-T Interval 340 ms      QTC Calculation (Bezet) 526 ms      Calculated P Axis 81 degrees      Calculated R Axis 86 degrees      Calculated T Axis 66 degrees      Diagnosis --     Sinus tachycardia  Right atrial enlargement  Nonspecific ST abnormality  Abnormal ECG  When compared with ECG of 09-APR-2021 22:38,  ST now depressed in Inferior leads  Confirmed by Christelle May ([id number]) on 6/10/2021 3:33:15 PM      EKG, 12 LEAD, INITIAL [[id number]]     Order Status: Canceled           Other Studies:  XR ABD ACUTE W 1 V CHEST    Result Date: 6/10/2021  Chest and Abdomen 3 Views dated 6/10/2021 Clinical Information: Nausea and vomiting for 5 days. Elevated white blood cell count One view of the chest shows  heart to be normal in size and mediastinum unremarkable. Lungs are clear and pulmonary vascularity unremarkable. No pleural effusion. No free air under the diaphragm. Two views of the abdomen show  a nonspecific bowel gas pattern and no abnormal calcification.      No Acute Abnormality          Current Meds:   Current Facility-Administered Medications   Medication Dose Route Frequency    [START ON 6/13/2021] pantoprazole (PROTONIX) 40 mg in 0.9% sodium chloride 10 mL injection  40 mg IntraVENous ACB    QUEtiapine (SEROquel) tablet 50 mg  50 mg Oral QHS    diphenhydrAMINE (BENADRYL) injection 25 mg  25 mg IntraVENous QHS PRN    ondansetron (ZOFRAN) injection 6 mg  6 mg IntraVENous TIDAC    ondansetron (ZOFRAN) injection 4 mg  4 mg IntraVENous Q6H PRN    sodium chloride (NS) flush 5-10 mL  5-10 mL IntraVENous Q8H    sodium chloride (NS) flush 5-10 mL  5-10 mL IntraVENous PRN    methyl salicylate-menthol (BENGAY) 15-10 % cream   Topical TID    sodium chloride (NS) flush 5-40 mL  5-40 mL IntraVENous Q8H    sodium chloride (NS) flush 5-40 mL  5-40 mL IntraVENous PRN    acetaminophen (TYLENOL) tablet 650 mg  650 mg Oral Q6H PRN    Or    acetaminophen (TYLENOL) suppository 650 mg  650 mg Rectal Q6H PRN    polyethylene glycol (MIRALAX) packet 17 g  17 g Oral DAILY PRN    0.9% sodium chloride infusion  50 mL/hr IntraVENous CONTINUOUS    LORazepam (ATIVAN) injection 1 mg  1 mg IntraVENous Q4H PRN       Problem List:  Hospital Problems as of 6/12/2021 Date Reviewed: 3/1/2019        Codes Class Noted - Resolved POA    Mild protein-calorie malnutrition ([facility] ICD-10-CM: E44.1  ICD-9-CM: 263.1  6/11/2021 - Present Yes        Cyclic vomiting syndrome ICD-10-CM: R11.15  ICD-9-CM: 536.2  6/10/2021 - Present Yes        * (Principal) SIRS (systemic inflammatory response syndrome) ([facility]) ICD-10-CM: R65.10  ICD-9-CM: 995.90  6/10/2021 - Present Unknown        Prolonged Q-T interval on ECG ICD-10-CM: R94.31  ICD-9-CM: 794.31  6/10/2021 - Present Unknown        Cannabis hyperemesis syndrome concurrent with and due to cannabis abuse ([facility] ICD-10-CM: P23.582  ICD-9-CM: 536.2, 305.20  8/12/2014 - Present Yes        Hypokalemia ICD-10-CM: E87.6  ICD-9-CM: 276.8  7/30/2013 - Present Yes        RESOLVED: BETTY (acute kidney injury) ([facility] ICD-10-CM: N17.9  ICD-9-CM: 584.9  4/10/2021 - 6/12/2021 Yes               Part of this note was written by using a voice dictation software and the note has been proof read but may still contain some grammatical/other typographical errors.     Signed By: Brigitte East MD   [facility] Hospitalist Service    June 12, 2021  3:22 PM

## 2021-06-12 NOTE — PROGRESS NOTES
Pt  is alert and oriented x 4 wife visiting during the night concerned about patient's condition wants to talk to MD in the morning, denies pain at this time. Hourly rounds completed  Bed locked low call light within reach. Will continue to monitor care and will give bedside report to on coming RN.

## 2021-06-12 NOTE — PROGRESS NOTES
Problem: Falls - Risk of  Goal: *Absence of Falls  Description: Document Paul Garcia Fall Risk and appropriate interventions in the flowsheet.   Outcome: Progressing Towards Goal  Note: Fall Risk Interventions:            Medication Interventions: Patient to call before getting OOB                   Problem: Patient Education: Go to Patient Education Activity  Goal: Patient/Family Education  Outcome: Progressing Towards Goal     Problem: Nausea/Vomiting (Adult)  Goal: *Absence of nausea/vomiting  Outcome: Progressing Towards Goal

## 2021-06-12 NOTE — PROGRESS NOTES
Hourly rounds completed throughout this shift. Pt vomited 2x this shift per pt. Zofran given per MAR. Pt resting in bed; denies needs at this time. Will continue to monitor and report to oncoming night shift nurse.

## 2021-06-13 VITALS
HEIGHT: 61 IN | WEIGHT: 140.5 LBS | TEMPERATURE: 98.4 F | HEART RATE: 69 BPM | BODY MASS INDEX: 26.53 KG/M2 | SYSTOLIC BLOOD PRESSURE: 121 MMHG | RESPIRATION RATE: 22 BRPM | OXYGEN SATURATION: 94 % | DIASTOLIC BLOOD PRESSURE: 85 MMHG

## 2021-06-13 LAB
ANION GAP SERPL CALC-SCNC: 6 MMOL/L (ref 7–16)
BUN SERPL-MCNC: 17 MG/DL (ref 6–23)
CALCIUM SERPL-MCNC: 8.7 MG/DL (ref 8.3–10.4)
CHLORIDE SERPL-SCNC: 107 MMOL/L (ref 98–107)
CO2 SERPL-SCNC: 26 MMOL/L (ref 21–32)
CREAT SERPL-MCNC: 0.88 MG/DL (ref 0.8–1.5)
ERYTHROCYTE [DISTWIDTH] IN BLOOD BY AUTOMATED COUNT: 12.7 % (ref 11.9–14.6)
GLUCOSE SERPL-MCNC: 90 MG/DL (ref 65–100)
HCT VFR BLD AUTO: 36.7 % (ref 41.1–50.3)
HGB BLD-MCNC: 13.5 G/DL (ref 13.6–17.2)
MCH RBC QN AUTO: 29.7 PG (ref 26.1–32.9)
MCHC RBC AUTO-ENTMCNC: 36.8 G/DL (ref 31.4–35)
MCV RBC AUTO: 80.7 FL (ref 79.6–97.8)
NRBC # BLD: 0 K/UL (ref 0–0.2)
PLATELET # BLD AUTO: 293 K/UL (ref 150–450)
PMV BLD AUTO: 9 FL (ref 9.4–12.3)
POTASSIUM SERPL-SCNC: 3.4 MMOL/L (ref 3.5–5.1)
RBC # BLD AUTO: 4.55 M/UL (ref 4.23–5.6)
SODIUM SERPL-SCNC: 139 MMOL/L (ref 138–145)
WBC # BLD AUTO: 6.8 K/UL (ref 4.3–11.1)

## 2021-06-13 PROCEDURE — 80048 BASIC METABOLIC PNL TOTAL CA: CPT

## 2021-06-13 PROCEDURE — 36415 COLL VENOUS BLD VENIPUNCTURE: CPT

## 2021-06-13 PROCEDURE — 74011250636 HC RX REV CODE- 250/636: Performed by: INTERNAL MEDICINE

## 2021-06-13 PROCEDURE — 85027 COMPLETE CBC AUTOMATED: CPT

## 2021-06-13 PROCEDURE — C9113 INJ PANTOPRAZOLE SODIUM, VIA: HCPCS | Performed by: INTERNAL MEDICINE

## 2021-06-13 PROCEDURE — 2709999900 HC NON-CHARGEABLE SUPPLY

## 2021-06-13 PROCEDURE — 74011000250 HC RX REV CODE- 250: Performed by: INTERNAL MEDICINE

## 2021-06-13 RX ORDER — QUETIAPINE FUMARATE 50 MG/1
50 TABLET, FILM COATED ORAL
Qty: 30 TABLET | Refills: 0 | Status: SHIPPED | OUTPATIENT
Start: 2021-06-13 | End: 2022-03-31

## 2021-06-13 RX ORDER — ONDANSETRON 4 MG/1
4 TABLET, ORALLY DISINTEGRATING ORAL
Qty: 21 TABLET | Refills: 0 | Status: SHIPPED | OUTPATIENT
Start: 2021-06-13 | End: 2021-06-20

## 2021-06-13 RX ADMIN — ONDANSETRON 6 MG: 2 INJECTION INTRAMUSCULAR; INTRAVENOUS at 08:04

## 2021-06-13 RX ADMIN — Medication 10 ML: at 05:38

## 2021-06-13 RX ADMIN — SODIUM CHLORIDE 50 ML/HR: 900 INJECTION, SOLUTION INTRAVENOUS at 09:21

## 2021-06-13 RX ADMIN — MENTHOL, METHYL SALICYLATE: 10; 15 CREAM TOPICAL at 06:00

## 2021-06-13 RX ADMIN — PANTOPRAZOLE SODIUM 40 MG: 40 INJECTION, POWDER, FOR SOLUTION INTRAVENOUS at 08:03

## 2021-06-13 NOTE — PROGRESS NOTES
CM spoke with patient, regarding discharge needs. Patient denies any discharge needs at this time. CM provided pricing of discharge medications according to [employer], as patient is listed as self pay per chart. Patient confirmed he can obtain discharge medications and voiced no difficulty with obtaining medications in the community. Patient confirmed he has a good rx card. Patient to discharge home this day. Spouse to transport the patient home. Please consult or notify CM if any needs shall arise. CM remains available. Care Management Interventions  PCP Verified by CM: Yes  Mode of Transport at Discharge: Other (see comment) (Spouse- Patti Ramirez [phone].)  Transition of Care Consult (CM Consult): Discharge Planning  Discharge Durable Medical Equipment: No  Physical Therapy Consult: No  Occupational Therapy Consult: No  Speech Therapy Consult: No  Current Support Network: Lives with Spouse, Own Home  Confirm Follow Up Transport: Self  The Plan for Transition of Care is Related to the Following Treatment Goals : Return to Baseline. The Patient and/or Patient Representative was Provided with a Choice of Provider and Agrees with the Discharge Plan?: Yes  Name of the Patient Representative Who was Provided with a Choice of Provider and Agrees with the Discharge Plan: Patient/Patient's Spouse.    [place] Resource Information Provided?: No  Discharge Location  Discharge Placement: Home

## 2021-06-13 NOTE — PROGRESS NOTES
Problem: Falls - Risk of  Goal: *Absence of Falls  Description: Document Alec Garcia Fall Risk and appropriate interventions in the flowsheet.   Outcome: Progressing Towards Goal  Note: Fall Risk Interventions:            Medication Interventions: Bed/chair exit alarm, Patient to call before getting OOB, Evaluate medications/consider consulting pharmacy, Teach patient to arise slowly         History of Falls Interventions: Bed/chair exit alarm         Problem: Patient Education: Go to Patient Education Activity  Goal: Patient/Family Education  Outcome: Progressing Towards Goal     Problem: Nausea/Vomiting (Adult)  Goal: *Absence of nausea/vomiting  Outcome: Progressing Towards Goal     Problem: Patient Education: Go to Patient Education Activity  Goal: Patient/Family Education  Outcome: Progressing Towards Goal     Problem: Pain  Goal: *Control of Pain  Outcome: Progressing Towards Goal     Problem: Patient Education: Go to Patient Education Activity  Goal: Patient/Family Education  Outcome: Progressing Towards Goal     Problem: Breathing Pattern - Ineffective  Goal: *Absence of hypoxia  Outcome: Progressing Towards Goal  Goal: *Use of effective breathing techniques  Outcome: Progressing Towards Goal     Problem: Patient Education: Go to Patient Education Activity  Goal: Patient/Family Education  Outcome: Progressing Towards Goal

## 2021-06-13 NOTE — PROGRESS NOTES
Discharge teaching gone over with patient. Patient verbalizes understanding of teaching. IV removed. IV catheter intact. No active bleeding. Telemetry removed. Patient left ambulatory with family.

## 2021-06-13 NOTE — DISCHARGE SUMMARY
[facility] Hospitalist Discharge Summary     Name:    Myke Malhotra  42 y.o.  male  :    1975     MRN:   [id number]       Admitting Physician: Russell Cosme MD Admit Date:  6/10/2021 11:37 AM   Attending Physician: Eagle Toussaint MD  Primary Care Physician: Kimberly Jackman MD       Discharge Physician: Mynor Barry MD  Discharge date: 21   Discharged Condition: Stable    Indication for Admission:   Chief Complaint   Patient presents with    Vomiting        Reasons for hospitalization:  Hospital Problems as of 2021 Date Reviewed: 3/1/2019        Codes Class Noted - Resolved POA    Mild protein-calorie malnutrition ([facility] ICD-10-CM: E44.1  ICD-9-CM: 263.1  2021 - Present Yes        Cyclic vomiting syndrome ICD-10-CM: R11.15  ICD-9-CM: 536.2  6/10/2021 - Present Yes        * (Principal) SIRS (systemic inflammatory response syndrome) ([facility] ICD-10-CM: R65.10  ICD-9-CM: 995.90  6/10/2021 - Present Yes        Cannabis hyperemesis syndrome concurrent with and due to cannabis abuse ([facility] ICD-10-CM: Z56.030  ICD-9-CM: 536.2, 305.20  2014 - Present Yes        Hypokalemia ICD-10-CM: E87.6  ICD-9-CM: 276.8  2013 - Present Yes        RESOLVED: Prolonged Q-T interval on ECG ICD-10-CM: R94.31  ICD-9-CM: 794.31  6/10/2021 - 2021 Yes        RESOLVED: BETTY (acute kidney injury) ([facility] ICD-10-CM: N17.9  ICD-9-CM: 584.9  4/10/2021 - 2021 Yes                 Discharge Diagnosis: Cannabis hyperemesis syndrome    Did Patient have Sepsis (YES OR NO): No      Hospital Course :  Please refer to the admission H&P for details of presentation. In summary, Karishma Cannon is a 42 y.o. male with past medical history significant for cyclic nausea vomiting secondary to marijuana use, esophagitis(?Joy's esophagus vs chronic reflux changes per) who presented to ED with persistence nausea and vomiting since . Noted to be tachycardic to 140s in the ED and improved with fluids.  WBC 23.3, potassium 3.1, creatinine 1.63, GFR of 59, initial lactic acid of 3.4. Chest x-ray with acute abdominal series no acute findings. BETTY has resolved with IVF. Hypokalemia resolved with repletion. Blood cultures are negative to date. Patient is nausea and vomiting has improved with symptomatic management. Patient is now tolerating diet without any issues. Patient remains afebrile and leukocytosis has resolved. Reports that he has been sleeping better with Seroquel at bedtime. Patient is medically stable for discharge. Patient is to continue taking medications as prescribed and to follow up with PCP on discharge. He has been instructed to discuss with his PCP regarding his sleep issues and possible need for sleep study for any obstructive respiratory patterns during sleep. Patient is instructed to to call a physician or return to ED if any concerns/symptoms worsened. Discharge summary and encounter summary was sent to PCP electronically via \"Comm [facility]gt\" link in [facility], if possible. Consults: None     Disposition: home  Diet: DIET ADULT ORAL NUTRITION SUPPLEMENT  DIET ADULT   Code Status: Full Code    Discharge Info:     Current Discharge Medication List      START taking these medications    Details   QUEtiapine (SEROquel) 50 mg tablet Take 1 Tablet by mouth nightly. Qty: 30 Tablet, Refills: 0  Start date: 6/13/2021      ondansetron (ZOFRAN ODT) 4 mg disintegrating tablet Take 1 Tablet by mouth every eight (8) hours as needed for Nausea or Vomiting for up to 7 days. Qty: 21 Tablet, Refills: 0  Start date: 6/13/2021, End date: 6/20/2021         CONTINUE these medications which have NOT CHANGED    Details   pantoprazole (Protonix) 40 mg tablet Take 40 mg by mouth daily.              Medications Discontinued During This Encounter   Medication Reason    haloperidol lactate (HALDOL) injection 3 mg     ondansetron (ZOFRAN ODT) tablet 4 mg     ondansetron (ZOFRAN) injection 4 mg     ondansetron (ZOFRAN ODT) tablet 4 mg     ondansetron (ZOFRAN) injection 4 mg     cefTRIAXone (ROCEPHIN) 1 g in 0.9% sodium chloride (MBP/ADV) 50 mL MBP     pantoprazole (PROTONIX) 40 mg in 0.9% sodium chloride 10 mL injection     ondansetron (ZOFRAN) injection 6 mg          Follow Up Orders:  No orders of the defined types were placed in this encounter. Follow-up Information     Follow up With Specialties Details Why Contact Info    Home Deleon MD Family Medicine In 2 weeks As needed [address]  [phone]              Discharge Exam:    Patient Vitals for the past 24 hrs:   Temp Pulse Resp BP SpO2   06/13/21 1200  69      06/13/21 1102 98.4 °F (36.9 °C) 79 22 121/85 94 %   06/13/21 0800  72      06/13/21 0722 98.8 °F (37.1 °C) 72 16 118/74 95 %   06/13/21 0400  62      06/13/21 0258 98.6 °F (37 °C) 68 20 111/79 94 %   06/12/21 2226 98.9 °F (37.2 °C) 74 20 116/83 93 %   06/12/21 1919 99.2 °F (37.3 °C) 81 16 (!) 144/95 95 %   06/12/21 1634 98.8 °F (37.1 °C) 65 18 (!) 133/95 95 %     Oxygen Therapy  O2 Sat (%): 94 % (06/13/21 1102)  Pulse via Oximetry: 114 beats per minute (06/10/21 1516)  O2 Device: None (Room air) (06/13/21 0258)    Estimated body mass index is 26.55 kg/m² as calculated from the following:    Height as of this encounter: 5' 1\" (1.549 m). Weight as of this encounter: 63.7 kg (140 lb 8 oz). Intake/Output Summary (Last 24 hours) at 6/13/2021 1308  Last data filed at 6/13/2021 0252  Gross per 24 hour   Intake 760 ml   Output 300 ml   Net 460 ml       *Note that automatically entered I/Os may not be accurate; dependent on patient compliance with collection and accurate  by assistants. Physical Exam:   General:                     alert, awake, no acute distress. HENT:                         normocephalic, atraumatic.    Eyes:                           anicteric sclerae, normal conjunctiva, EOMI  Neck:                          supple, non-tender. Trachea midline. Lungs:                        CTAB, no wheezing, rhonchi, rales  Cardiac:                      RRR, Normal S1 and S2. Abdomen:                  Soft, non distended, nontender, +BS, no guarding/rebound  Extremities:               Warm, dry. No edema , pedal pulses present, no digital cyanosis  Skin:                           Warn, dry, normnal turgor and texture; no rash, ulcers   Neuro:              AAOx3.  No gross focal neurological deficit  Psychiatric:                No anxiety, calm, cooperative    All Labs from Last 24 Hrs:  Recent Results (from the past 24 hour(s))   CBC W/O DIFF    Collection Time: 06/13/21  6:40 AM   Result Value Ref Range    WBC 6.8 4.3 - 11.1 K/uL    RBC 4.55 4.23 - 5.6 M/uL    HGB 13.5 (L) 13.6 - 17.2 g/dL    HCT 36.7 (L) 41.1 - 50.3 %    MCV 80.7 79.6 - 97.8 FL    MCH 29.7 26.1 - 32.9 PG    MCHC 36.8 (H) 31.4 - 35.0 g/dL    RDW 12.7 11.9 - 14.6 %    PLATELET 243 531 - 079 K/uL    MPV 9.0 (L) 9.4 - 12.3 FL    ABSOLUTE NRBC 0.00 0.0 - 0.2 K/uL   METABOLIC PANEL, BASIC    Collection Time: 06/13/21  6:40 AM   Result Value Ref Range    Sodium 139 138 - 145 mmol/L    Potassium 3.4 (L) 3.5 - 5.1 mmol/L    Chloride 107 98 - 107 mmol/L    CO2 26 21 - 32 mmol/L    Anion gap 6 (L) 7 - 16 mmol/L    Glucose 90 65 - 100 mg/dL    BUN 17 6 - 23 MG/DL    Creatinine 0.88 0.8 - 1.5 MG/DL    GFR est AA >60 >60 ml/min/1.73m2    GFR est non-AA >60 >60 ml/min/1.73m2    Calcium 8.7 8.3 - 10.4 MG/DL       All Micro Results     Procedure Component Value Units Date/Time    CULTURE, BLOOD [[id number]] Collected: 06/10/21 3432    Order Status: Completed Specimen: Blood Updated: 06/13/21 1032     Special Requests: --        RIGHT  ARM       Culture result: NO GROWTH 3 DAYS       CULTURE, BLOOD [[id number]] Collected: 06/10/21 1518    Order Status: Completed Specimen: Blood Updated: 06/13/21 1032     Special Requests: --        RIGHT  HAND       Culture result: NO GROWTH 3 DAYS SARS-CoV-2 Lab Results  \"Novel Coronavirus\" Test: No results found for: COV2NT   \"Emergent Disease\" Test: No results found for: EDPR  \"SARS-COV-2\" Test: No results found for: XGCOVT  Rapid Test: No results found for: COVR         Diagnostic Imaging/Tests:   XR ABD ACUTE W 1 V CHEST    Result Date: 6/10/2021  Chest and Abdomen 3 Views dated 6/10/2021 Clinical Information: Nausea and vomiting for 5 days. Elevated white blood cell count One view of the chest shows  heart to be normal in size and mediastinum unremarkable. Lungs are clear and pulmonary vascularity unremarkable. No pleural effusion. No free air under the diaphragm. Two views of the abdomen show  a nonspecific bowel gas pattern and no abnormal calcification. No Acute Abnormality       Echocardiogram/EKG results:  No results found for this visit on 06/10/21.     EKG Results     Procedure 720 Value Units Date/Time    EKG, 12 LEAD, INITIAL [[id number]] Collected: 06/10/21 1346    Order Status: Completed Updated: 06/10/21 1535     Ventricular Rate 109 BPM      Atrial Rate 109 BPM      P-R Interval 148 ms      QRS Duration 70 ms      Q-T Interval 348 ms      QTC Calculation (Bezet) 468 ms      Calculated P Axis 57 degrees      Calculated R Axis 74 degrees      Calculated T Axis 29 degrees      Diagnosis --     Sinus tachycardia  Junctional ST depression, probably abnormal  Abnormal ECG  When compared with ECG of 09-APR-2021 22:38,  Nonspecific T wave abnormality now evident in Inferior leads  Confirmed by Shekhar aHll ([id number]) on 6/10/2021 3:35:38 PM      EKG (Check If Upper Abdominal Pain or symptoms of SOB, Diaphoresis, or Tachycardia) [[id number]] Collected: 06/10/21 1141    Order Status: Completed Updated: 06/10/21 1533     Ventricular Rate 144 BPM      Atrial Rate 144 BPM      P-R Interval 118 ms      QRS Duration 66 ms      Q-T Interval 340 ms      QTC Calculation (Bezet) 526 ms      Calculated P Axis 81 degrees      Calculated R Axis 86 degrees      Calculated T Axis 66 degrees      Diagnosis --     Sinus tachycardia  Right atrial enlargement  Nonspecific ST abnormality  Abnormal ECG  When compared with ECG of 09-APR-2021 22:38,  ST now depressed in Inferior leads  Confirmed by [facility] ([id number]) on 6/10/2021 3:33:15 PM      EKG, 12 LEAD, INITIAL [[id number]]     Order Status: Canceled           Results for orders placed or performed during the hospital encounter of 06/10/21   EKG, 12 LEAD, INITIAL   Result Value Ref Range    Ventricular Rate 109 BPM    Atrial Rate 109 BPM    P-R Interval 148 ms    QRS Duration 70 ms    Q-T Interval 348 ms    QTC Calculation (Bezet) 468 ms    Calculated P Axis 57 degrees    Calculated R Axis 74 degrees    Calculated T Axis 29 degrees    Diagnosis       Sinus tachycardia  Junctional ST depression, probably abnormal  Abnormal ECG  When compared with ECG of 09-APR-2021 22:38,  Nonspecific T wave abnormality now evident in Inferior leads  Confirmed by [facility] ([id number]) on 6/10/2021 3:35:38 PM         Procedures done this admission:  * No surgery found *        Time spent in patient discharge planning and coordination 40 minutes.       Signed By: Kaleb Holland MD   [facility] Hospitalist Service    June 13, 2021  1:08 PM

## 2021-06-13 NOTE — PROGRESS NOTES
Hourly rounds completed. No vomiting noted. Rested well this shift. Denies any needs at this time. Wife is at bedside. Call light within reach. Will give report to oncoming RN.

## 2021-06-15 LAB
BACTERIA SPEC CULT: NORMAL
BACTERIA SPEC CULT: NORMAL
SERVICE CMNT-IMP: NORMAL
SERVICE CMNT-IMP: NORMAL

## 2022-03-18 PROBLEM — R65.10 SIRS (SYSTEMIC INFLAMMATORY RESPONSE SYNDROME) (HCC): Status: ACTIVE | Noted: 2021-06-10

## 2022-03-19 PROBLEM — M62.82 RHABDOMYOLYSIS: Status: ACTIVE | Noted: 2021-04-10

## 2022-03-19 PROBLEM — R11.2 INTRACTABLE NAUSEA AND VOMITING: Status: ACTIVE | Noted: 2020-08-01

## 2022-03-19 PROBLEM — R11.15 CYCLIC VOMITING SYNDROME: Status: ACTIVE | Noted: 2021-06-10

## 2022-03-19 PROBLEM — F12.90 CANNABINOID HYPEREMESIS SYNDROME: Status: ACTIVE | Noted: 2020-08-02

## 2022-03-19 PROBLEM — K22.10 EROSIVE ESOPHAGITIS: Status: ACTIVE | Noted: 2018-03-11

## 2022-03-19 PROBLEM — R11.2 CANNABINOID HYPEREMESIS SYNDROME: Status: ACTIVE | Noted: 2020-08-02

## 2022-03-19 PROBLEM — E44.1 MILD PROTEIN-CALORIE MALNUTRITION (HCC): Status: ACTIVE | Noted: 2021-06-11

## 2022-03-20 PROBLEM — K92.0 HEMATEMESIS: Status: ACTIVE | Noted: 2018-06-26

## 2022-03-27 ENCOUNTER — HOSPITAL ENCOUNTER (INPATIENT)
Age: 47
LOS: 1 days | Discharge: HOME | DRG: 392 | End: 2022-03-31
Attending: EMERGENCY MEDICINE | Admitting: INTERNAL MEDICINE

## 2022-03-27 ENCOUNTER — APPOINTMENT (OUTPATIENT)
Dept: CT IMAGING | Age: 47
DRG: 392 | End: 2022-03-27
Attending: PHYSICIAN ASSISTANT

## 2022-03-27 DIAGNOSIS — K31.84 GASTROPARESIS: ICD-10-CM

## 2022-03-27 DIAGNOSIS — R00.0 TACHYCARDIA: ICD-10-CM

## 2022-03-27 DIAGNOSIS — R11.2 CANNABINOID HYPEREMESIS SYNDROME: Primary | ICD-10-CM

## 2022-03-27 DIAGNOSIS — R11.2 NAUSEA AND VOMITING, INTRACTABILITY OF VOMITING NOT SPECIFIED, UNSPECIFIED VOMITING TYPE: ICD-10-CM

## 2022-03-27 DIAGNOSIS — I10 HYPERTENSION, UNSPECIFIED TYPE: ICD-10-CM

## 2022-03-27 DIAGNOSIS — E86.0 DEHYDRATION: ICD-10-CM

## 2022-03-27 DIAGNOSIS — N39.0 URINARY TRACT INFECTION WITHOUT HEMATURIA, SITE UNSPECIFIED: ICD-10-CM

## 2022-03-27 DIAGNOSIS — F12.90 CANNABINOID HYPEREMESIS SYNDROME: Primary | ICD-10-CM

## 2022-03-27 PROBLEM — D72.829 LEUKOCYTOSIS: Status: ACTIVE | Noted: 2022-03-27

## 2022-03-27 PROBLEM — N19 ACUTE PRERENAL AZOTEMIA: Status: ACTIVE | Noted: 2022-03-27

## 2022-03-27 PROBLEM — E87.6 HYPOKALEMIA: Status: ACTIVE | Noted: 2022-03-27

## 2022-03-27 PROBLEM — E87.20 LACTIC ACIDOSIS: Status: ACTIVE | Noted: 2022-03-27

## 2022-03-27 PROBLEM — E87.20 NORMAL ANION GAP METABOLIC ACIDOSIS: Status: ACTIVE | Noted: 2022-03-27

## 2022-03-27 LAB
ALBUMIN SERPL-MCNC: 5 G/DL (ref 3.5–5)
ALBUMIN/GLOB SERPL: 1.2 {RATIO} (ref 1.2–3.5)
ALP SERPL-CCNC: 120 U/L (ref 50–136)
ALT SERPL-CCNC: 31 U/L (ref 12–65)
AMPHET UR QL SCN: NEGATIVE
ANION GAP SERPL CALC-SCNC: 13 MMOL/L (ref 7–16)
APPEARANCE UR: CLEAR
AST SERPL-CCNC: 31 U/L (ref 15–37)
BACTERIA URNS QL MICRO: 0 /HPF
BARBITURATES UR QL SCN: NEGATIVE
BASOPHILS # BLD: 0 K/UL (ref 0–0.2)
BASOPHILS NFR BLD: 0 % (ref 0–2)
BENZODIAZ UR QL: NEGATIVE
BILIRUB SERPL-MCNC: 0.9 MG/DL (ref 0.2–1.1)
BILIRUB UR QL: NEGATIVE
BILIRUB UR QL: NEGATIVE
BUN SERPL-MCNC: 31 MG/DL (ref 6–23)
CALCIUM SERPL-MCNC: 10.7 MG/DL (ref 8.3–10.4)
CANNABINOIDS UR QL SCN: POSITIVE
CASTS URNS QL MICRO: ABNORMAL /LPF
CHLORIDE SERPL-SCNC: 108 MMOL/L (ref 98–107)
CK SERPL-CCNC: 956 U/L (ref 21–215)
CO2 SERPL-SCNC: 17 MMOL/L (ref 21–32)
COCAINE UR QL SCN: NEGATIVE
COLOR UR: YELLOW
CREAT SERPL-MCNC: 1.4 MG/DL (ref 0.8–1.5)
DIFFERENTIAL METHOD BLD: ABNORMAL
EOSINOPHIL # BLD: 0 K/UL (ref 0–0.8)
EOSINOPHIL NFR BLD: 0 % (ref 0.5–7.8)
EPI CELLS #/AREA URNS HPF: ABNORMAL /HPF
ERYTHROCYTE [DISTWIDTH] IN BLOOD BY AUTOMATED COUNT: 12.8 % (ref 11.9–14.6)
GLOBULIN SER CALC-MCNC: 4.2 G/DL (ref 2.3–3.5)
GLUCOSE SERPL-MCNC: 160 MG/DL (ref 65–100)
GLUCOSE UR QL STRIP.AUTO: NEGATIVE MG/DL
GLUCOSE UR STRIP.AUTO-MCNC: NEGATIVE MG/DL
HCT VFR BLD AUTO: 45 % (ref 41.1–50.3)
HGB BLD-MCNC: 16.8 G/DL (ref 13.6–17.2)
HGB UR QL STRIP: ABNORMAL
IMM GRANULOCYTES # BLD AUTO: 0.1 K/UL (ref 0–0.5)
IMM GRANULOCYTES NFR BLD AUTO: 0 % (ref 0–5)
KETONES UR QL STRIP.AUTO: 15 MG/DL
KETONES UR-MCNC: 40 MG/DL
LACTATE SERPL-SCNC: 1.4 MMOL/L (ref 0.4–2)
LACTATE SERPL-SCNC: 3.2 MMOL/L (ref 0.4–2)
LEUKOCYTE ESTERASE UR QL STRIP.AUTO: NEGATIVE
LEUKOCYTE ESTERASE UR QL STRIP: NEGATIVE
LIPASE SERPL-CCNC: 116 U/L (ref 73–393)
LYMPHOCYTES # BLD: 1.3 K/UL (ref 0.5–4.6)
LYMPHOCYTES NFR BLD: 11 % (ref 13–44)
MAGNESIUM SERPL-MCNC: 2.4 MG/DL (ref 1.8–2.4)
MCH RBC QN AUTO: 29.4 PG (ref 26.1–32.9)
MCHC RBC AUTO-ENTMCNC: 37.3 G/DL (ref 31.4–35)
MCV RBC AUTO: 78.8 FL (ref 79.6–97.8)
METHADONE UR QL: NEGATIVE
MONOCYTES # BLD: 1.3 K/UL (ref 0.1–1.3)
MONOCYTES NFR BLD: 11 % (ref 4–12)
MUCOUS THREADS URNS QL MICRO: ABNORMAL /LPF
NEUTS SEG # BLD: 8.9 K/UL (ref 1.7–8.2)
NEUTS SEG NFR BLD: 77 % (ref 43–78)
NITRITE UR QL STRIP.AUTO: POSITIVE
NITRITE UR QL: NEGATIVE
NRBC # BLD: 0 K/UL (ref 0–0.2)
OPIATES UR QL: NEGATIVE
OTHER OBSERVATIONS,UCOM: ABNORMAL
PCP UR QL: NEGATIVE
PH UR STRIP: 5.5 [PH] (ref 5–9)
PH UR: 5.5 [PH] (ref 5–9)
PHOSPHATE SERPL-MCNC: 1.6 MG/DL (ref 2.5–4.5)
PLATELET # BLD AUTO: 420 K/UL (ref 150–450)
PMV BLD AUTO: 9 FL (ref 9.4–12.3)
POTASSIUM SERPL-SCNC: 3.4 MMOL/L (ref 3.5–5.1)
PROT SERPL-MCNC: 9.2 G/DL (ref 6.3–8.2)
PROT UR QL: 100 MG/DL
PROT UR STRIP-MCNC: 100 MG/DL
RBC # BLD AUTO: 5.71 M/UL (ref 4.23–5.6)
RBC # UR STRIP: ABNORMAL /UL
RBC #/AREA URNS HPF: ABNORMAL /HPF
SERVICE CMNT-IMP: ABNORMAL
SODIUM SERPL-SCNC: 138 MMOL/L (ref 138–145)
SP GR UR REFRACTOMETRY: 1.01 (ref 1–1.02)
SP GR UR: 1.02 (ref 1–1.02)
UROBILINOGEN UR QL STRIP.AUTO: 0.2 EU/DL (ref 0.2–1)
UROBILINOGEN UR QL: 0.2 EU/DL (ref 0.2–1)
WBC # BLD AUTO: 11.5 K/UL (ref 4.3–11.1)
WBC URNS QL MICRO: ABNORMAL /HPF

## 2022-03-27 PROCEDURE — 96375 TX/PRO/DX INJ NEW DRUG ADDON: CPT

## 2022-03-27 PROCEDURE — 85025 COMPLETE CBC W/AUTO DIFF WBC: CPT

## 2022-03-27 PROCEDURE — 2709999900 HC NON-CHARGEABLE SUPPLY

## 2022-03-27 PROCEDURE — 74011250636 HC RX REV CODE- 250/636: Performed by: PHYSICIAN ASSISTANT

## 2022-03-27 PROCEDURE — 96374 THER/PROPH/DIAG INJ IV PUSH: CPT

## 2022-03-27 PROCEDURE — 99285 EMERGENCY DEPT VISIT HI MDM: CPT

## 2022-03-27 PROCEDURE — 83605 ASSAY OF LACTIC ACID: CPT

## 2022-03-27 PROCEDURE — 74011000250 HC RX REV CODE- 250: Performed by: INTERNAL MEDICINE

## 2022-03-27 PROCEDURE — 84100 ASSAY OF PHOSPHORUS: CPT

## 2022-03-27 PROCEDURE — 74011000636 HC RX REV CODE- 636: Performed by: EMERGENCY MEDICINE

## 2022-03-27 PROCEDURE — 83735 ASSAY OF MAGNESIUM: CPT

## 2022-03-27 PROCEDURE — 87086 URINE CULTURE/COLONY COUNT: CPT

## 2022-03-27 PROCEDURE — 74011000250 HC RX REV CODE- 250: Performed by: PHYSICIAN ASSISTANT

## 2022-03-27 PROCEDURE — 74011000258 HC RX REV CODE- 258: Performed by: PHYSICIAN ASSISTANT

## 2022-03-27 PROCEDURE — G0378 HOSPITAL OBSERVATION PER HR: HCPCS

## 2022-03-27 PROCEDURE — 82550 ASSAY OF CK (CPK): CPT

## 2022-03-27 PROCEDURE — 74177 CT ABD & PELVIS W/CONTRAST: CPT

## 2022-03-27 PROCEDURE — 81003 URINALYSIS AUTO W/O SCOPE: CPT

## 2022-03-27 PROCEDURE — 36415 COLL VENOUS BLD VENIPUNCTURE: CPT

## 2022-03-27 PROCEDURE — 80307 DRUG TEST PRSMV CHEM ANLYZR: CPT

## 2022-03-27 PROCEDURE — 93005 ELECTROCARDIOGRAM TRACING: CPT | Performed by: PHYSICIAN ASSISTANT

## 2022-03-27 PROCEDURE — 74011000258 HC RX REV CODE- 258: Performed by: EMERGENCY MEDICINE

## 2022-03-27 PROCEDURE — 74011250637 HC RX REV CODE- 250/637: Performed by: INTERNAL MEDICINE

## 2022-03-27 PROCEDURE — 96372 THER/PROPH/DIAG INJ SC/IM: CPT

## 2022-03-27 PROCEDURE — 83690 ASSAY OF LIPASE: CPT

## 2022-03-27 PROCEDURE — 80053 COMPREHEN METABOLIC PANEL: CPT

## 2022-03-27 PROCEDURE — 96365 THER/PROPH/DIAG IV INF INIT: CPT

## 2022-03-27 PROCEDURE — 74011250636 HC RX REV CODE- 250/636: Performed by: INTERNAL MEDICINE

## 2022-03-27 PROCEDURE — 87040 BLOOD CULTURE FOR BACTERIA: CPT

## 2022-03-27 PROCEDURE — C9113 INJ PANTOPRAZOLE SODIUM, VIA: HCPCS | Performed by: PHYSICIAN ASSISTANT

## 2022-03-27 RX ORDER — SODIUM CHLORIDE 0.9 % (FLUSH) 0.9 %
5-10 SYRINGE (ML) INJECTION AS NEEDED
Status: DISCONTINUED | OUTPATIENT
Start: 2022-03-27 | End: 2022-03-27 | Stop reason: SDUPTHER

## 2022-03-27 RX ORDER — HALOPERIDOL 5 MG/ML
5 INJECTION INTRAMUSCULAR ONCE
Status: DISCONTINUED | OUTPATIENT
Start: 2022-03-27 | End: 2022-03-27

## 2022-03-27 RX ORDER — POTASSIUM CHLORIDE 20 MEQ/1
40 TABLET, EXTENDED RELEASE ORAL 3 TIMES DAILY
Status: DISCONTINUED | OUTPATIENT
Start: 2022-03-27 | End: 2022-03-28

## 2022-03-27 RX ORDER — LORAZEPAM 1 MG/1
1 TABLET ORAL
Status: DISCONTINUED | OUTPATIENT
Start: 2022-03-27 | End: 2022-03-29

## 2022-03-27 RX ORDER — METOCLOPRAMIDE HYDROCHLORIDE 5 MG/ML
5 INJECTION INTRAMUSCULAR; INTRAVENOUS
Status: DISCONTINUED | OUTPATIENT
Start: 2022-03-27 | End: 2022-03-29

## 2022-03-27 RX ORDER — SODIUM CHLORIDE 0.9 % (FLUSH) 0.9 %
5-40 SYRINGE (ML) INJECTION AS NEEDED
Status: DISCONTINUED | OUTPATIENT
Start: 2022-03-27 | End: 2022-03-31 | Stop reason: HOSPADM

## 2022-03-27 RX ORDER — SODIUM CHLORIDE 0.9 % (FLUSH) 0.9 %
5-40 SYRINGE (ML) INJECTION EVERY 8 HOURS
Status: DISCONTINUED | OUTPATIENT
Start: 2022-03-27 | End: 2022-03-31 | Stop reason: HOSPADM

## 2022-03-27 RX ORDER — ENOXAPARIN SODIUM 100 MG/ML
40 INJECTION SUBCUTANEOUS EVERY 24 HOURS
Status: DISCONTINUED | OUTPATIENT
Start: 2022-03-27 | End: 2022-03-28

## 2022-03-27 RX ORDER — PANTOPRAZOLE SODIUM 40 MG/1
40 TABLET, DELAYED RELEASE ORAL
Status: DISCONTINUED | OUTPATIENT
Start: 2022-03-28 | End: 2022-03-28

## 2022-03-27 RX ORDER — ACETAMINOPHEN 650 MG/1
650 SUPPOSITORY RECTAL
Status: DISCONTINUED | OUTPATIENT
Start: 2022-03-27 | End: 2022-03-31 | Stop reason: HOSPADM

## 2022-03-27 RX ORDER — DIPHENHYDRAMINE HYDROCHLORIDE 50 MG/ML
12.5 INJECTION, SOLUTION INTRAMUSCULAR; INTRAVENOUS
Status: COMPLETED | OUTPATIENT
Start: 2022-03-27 | End: 2022-03-27

## 2022-03-27 RX ORDER — ACETAMINOPHEN 325 MG/1
650 TABLET ORAL
Status: DISCONTINUED | OUTPATIENT
Start: 2022-03-27 | End: 2022-03-31 | Stop reason: HOSPADM

## 2022-03-27 RX ORDER — POLYETHYLENE GLYCOL 3350 17 G/17G
17 POWDER, FOR SOLUTION ORAL DAILY PRN
Status: DISCONTINUED | OUTPATIENT
Start: 2022-03-27 | End: 2022-03-31 | Stop reason: HOSPADM

## 2022-03-27 RX ORDER — ALPRAZOLAM 0.5 MG/1
0.5 TABLET ORAL
Status: CANCELLED | OUTPATIENT
Start: 2022-03-27

## 2022-03-27 RX ORDER — SODIUM CHLORIDE 0.9 % (FLUSH) 0.9 %
10 SYRINGE (ML) INJECTION
Status: COMPLETED | OUTPATIENT
Start: 2022-03-27 | End: 2022-03-27

## 2022-03-27 RX ORDER — ONDANSETRON 2 MG/ML
4 INJECTION INTRAMUSCULAR; INTRAVENOUS ONCE
Status: COMPLETED | OUTPATIENT
Start: 2022-03-27 | End: 2022-03-27

## 2022-03-27 RX ORDER — SODIUM CHLORIDE, SODIUM LACTATE, POTASSIUM CHLORIDE, CALCIUM CHLORIDE 600; 310; 30; 20 MG/100ML; MG/100ML; MG/100ML; MG/100ML
200 INJECTION, SOLUTION INTRAVENOUS CONTINUOUS
Status: DISCONTINUED | OUTPATIENT
Start: 2022-03-27 | End: 2022-03-30

## 2022-03-27 RX ORDER — SODIUM CHLORIDE 0.9 % (FLUSH) 0.9 %
5-10 SYRINGE (ML) INJECTION EVERY 8 HOURS
Status: DISCONTINUED | OUTPATIENT
Start: 2022-03-27 | End: 2022-03-27 | Stop reason: SDUPTHER

## 2022-03-27 RX ORDER — QUETIAPINE FUMARATE 25 MG/1
50 TABLET, FILM COATED ORAL
Status: DISCONTINUED | OUTPATIENT
Start: 2022-03-27 | End: 2022-03-27

## 2022-03-27 RX ORDER — HALOPERIDOL 5 MG/ML
5 INJECTION INTRAMUSCULAR ONCE
Status: COMPLETED | OUTPATIENT
Start: 2022-03-27 | End: 2022-03-27

## 2022-03-27 RX ADMIN — SODIUM CHLORIDE, POTASSIUM CHLORIDE, SODIUM LACTATE AND CALCIUM CHLORIDE 200 ML/HR: 600; 310; 30; 20 INJECTION, SOLUTION INTRAVENOUS at 19:30

## 2022-03-27 RX ADMIN — METOCLOPRAMIDE HYDROCHLORIDE 5 MG: 5 INJECTION INTRAMUSCULAR; INTRAVENOUS at 22:26

## 2022-03-27 RX ADMIN — SODIUM CHLORIDE, PRESERVATIVE FREE 10 ML: 5 INJECTION INTRAVENOUS at 22:26

## 2022-03-27 RX ADMIN — SODIUM CHLORIDE 100 ML: 9 INJECTION, SOLUTION INTRAVENOUS at 12:18

## 2022-03-27 RX ADMIN — DIPHENHYDRAMINE HYDROCHLORIDE 12.5 MG: 50 INJECTION, SOLUTION INTRAMUSCULAR; INTRAVENOUS at 12:03

## 2022-03-27 RX ADMIN — Medication 10 ML: at 12:18

## 2022-03-27 RX ADMIN — SODIUM CHLORIDE 1000 ML: 900 INJECTION, SOLUTION INTRAVENOUS at 12:02

## 2022-03-27 RX ADMIN — HALOPERIDOL LACTATE 5 MG: 5 INJECTION, SOLUTION INTRAMUSCULAR at 12:02

## 2022-03-27 RX ADMIN — POTASSIUM CHLORIDE 40 MEQ: 20 TABLET, EXTENDED RELEASE ORAL at 22:26

## 2022-03-27 RX ADMIN — SODIUM CHLORIDE 1000 ML: 9 INJECTION, SOLUTION INTRAVENOUS at 16:17

## 2022-03-27 RX ADMIN — CEFTRIAXONE 1 G: 1 INJECTION, POWDER, FOR SOLUTION INTRAMUSCULAR; INTRAVENOUS at 16:29

## 2022-03-27 RX ADMIN — ENOXAPARIN SODIUM 40 MG: 40 INJECTION SUBCUTANEOUS at 22:26

## 2022-03-27 RX ADMIN — SODIUM CHLORIDE, PRESERVATIVE FREE 40 MG: 5 INJECTION INTRAVENOUS at 13:45

## 2022-03-27 RX ADMIN — IOPAMIDOL 100 ML: 755 INJECTION, SOLUTION INTRAVENOUS at 12:17

## 2022-03-27 RX ADMIN — ONDANSETRON 4 MG: 2 INJECTION INTRAMUSCULAR; INTRAVENOUS at 12:02

## 2022-03-27 NOTE — ED TRIAGE NOTES
Patient ambulatory to triage with mask in place. Patient reports lower abd pain/cramping and n/v x 3 days. Denies fever, chills, diarrhea.

## 2022-03-27 NOTE — PROGRESS NOTES
TRANSFER - IN REPORT:    Verbal report received from Will RN on [employer]  being received from ED for routine progression of care      Report consisted of patients Situation, Background, Assessment and   Recommendations(SBAR). Information from the following report(s) SBAR was reviewed with the receiving nurse. Opportunity for questions and clarification was provided.

## 2022-03-27 NOTE — H&P
[facility] H&P      Patient ID:  NAME:  Radha Bey   Age/Sex: 41 y.o. male  :   1975   MRN:   [id number]    Admission Date: 3/27/2022  Chief Complaint: Malaise with N/V  Reason for Admission: Cyclic vomiting syndrome    Assessment/Plan (MDM):     Principal Problem:    Cyclic vomiting syndrome (6/10/2021)  - Likely due to cannabis hyperemesis syndrome - recent THC use and hot showers help  - Shows signs of severe dehydration  - Start LR @ 200 ml/hr  - Reglan PRN  - Ativan PRN  - Encouraged to stop THC    Active Problems:    Acute prerenal azotemia (3/27/2022)  - Baseline BUN 13/Cr 0.7  - 3/27 BUN 31/Cr 1.4  - Due to dehydration  - Start LR @ 200 ml/hr  - Strict I/O  - Check BMP tomorrow AM      Hypokalemia (3/27/2022)  - Due to losses in emesis and decreased PO intake  - Replace with KCL  - Check BMP tomorrow AM      Normal anion gap metabolic acidosis ()  - HCO3 17  - Due to losses in emesis and decreased PO intake  - Start LR @ 200 ml/hr  - Reglan/Ativan for N/V  - Check BMP in AM      Lactic acidosis (3/27/2022)  - Due to dehydration and vomiting  - 3/27 Lactic 3.2 --> fluid bolus  - 3/27 Lactic 1.4  - Resolved      Leukocytosis (3/27/2022)  - Likely reactive  - No left shift  - No s/s of infection otherwise  - UA unremarkable  - Urine culture sent in ER  - Blood cultures sent in ER  - Got Ceftriaxone x 1 dose in ER  - Will not continue antibiotics at this point      Sinus Tachycardia  - Due to severe dehydration  - Start LR @ 200 ml/hr  - Check vitals Q4-6H  - No CP      Nondiabetic gastroparesis (3/17/2011)  - Reglan PRN      Esophageal reflux (2014)  - Continue PPI      Cannabis hyperemesis syndrome concurrent with and due to cannabis abuse ([address] (2014)  - Recurrent issue  - Hot showers is only thing that makes it better  - Ok for hot showers while in hospital      Tetrahydrocannabinol [facility]) use disorder, moderate, dependence ([address] (2016)  - Recently smoked more in [email or web address] with THC      Sickle cell trait ([facility] (3/5/2013)      Anxiety (4/30/2014)    Disposition: Home in 24-48 hours  Diet: Full Liquid Diet  VTE ppx: Lovenox  GI ppx: PPI  CODE STATUS: FULL CODE    PCP: Nini Gaitan MD    Attending MD: Betty Yusuf DO    Treatment Team: Attending Provider: Alexander Holland DO; Physician Assistant: PRAVIN Tracy; Primary Nurse: Krishna Ramsay RN; Respiratory Therapist: Chaz Marino, RT    HPI:     Kwabena Voss is a 41year old CM with a PMH of THC use, cannabis hyperemesis syndrome, anxiety, and GERD who presented to the ER with 7 days of nausea with malaise and not feeling good and 4 days of persistent vomiting, up to 20 time daily, which is only relieved by hot showers. He states he went to [place] the week prior and smoked more marijuana than he usually does. When he returned home he started feeling weak and had not energy and nausea. He didn't get around much and didn't eat much. Then on 3/24 he started having vomiting all day long. The only thing that made it better was hot showers. This continued for 4 days and finally he came to the ER. In the ER, he got Haldol, Zofran and Benadryl but was still unable to hold down PO. Denies abdominal pain. May diarrhea, has had normal stools. Denies CP/SOB/cough. Denies F/C. Denies HA. Complete ROS done and is as stated in HPI or otherwise negative    Past Medical History:   Diagnosis Date    BETTY (acute kidney injury) ([facility] 8/12/2014    Clostridium difficile colitis 3/20/2011    Gastroparesis 2005    emptying study normal -2006    GERD (gastroesophageal reflux disease)     HIATAL HERNIA SINCE 1999    PUD (peptic ulcer disease) ALL DX IN 2008    ESOPHAGITIS, + H PYLORI    Uncontrolled hypertension 6/26/2018        Past Surgical History:   Procedure Laterality Date    COLONOSCOPY  4/08    ESOPHAGITIS, COLONIC ULCER X1    EGD  4/08    H.  HERNIA, ULCER X2, H PYLORI,    EGD      h pylori -neg    HX WISDOM TEETH EXTRACTION  2/10/11        Prior to Admission Medications   Prescriptions Last Dose Informant Patient Reported? Taking? QUEtiapine (SEROquel) 50 mg tablet   No No   Sig: Take 1 Tablet by mouth nightly. pantoprazole (Protonix) 40 mg tablet   Yes No   Sig: Take 40 mg by mouth daily. Facility-Administered Medications: None       Allergies   Allergen Reactions    Amoxicillin Nausea Only    Aspirin Other (comments)     ulcers    Hydrocodone Rash     Tolerates hydromorphone, morphine, tramadol    Ibuprofen Other (comments)     Hx of ulcers    Pcn [Penicillins] Rash    Phenergan [Promethazine] Nausea and Vomiting and Other (comments)        Social History     Tobacco Use    Smoking status: Current Every Day Smoker     Packs/day: 0.25     Years: 2.00     Pack years: 0.50     Types: Cigarettes    Smokeless tobacco: Never Used   Substance Use Topics    Alcohol use: No        Family History   Problem Relation Age of Onset    Other Mother         L+W    Diabetes Maternal Grandmother     Sickle Cell Anemia Father     Heart Disease Paternal Grandfather     Other Sister         ALL L+W    Other Son         L+W        Objective:       Visit Vitals  BP (!) 174/113   Pulse (!) 136   Temp 98.4 °F (36.9 °C)   Resp 14   Ht 5' 1\" (1.549 m)   Wt 63.5 kg (140 lb)   SpO2 94%   BMI 26.45 kg/m²        Temp (24hrs), Av.4 °F (36.9 °C), Min:98.4 °F (36.9 °C), Max:98.4 °F (36.9 °C)      Oxygen Therapy  O2 Sat (%): 94 % (22 1643)  Pulse via Oximetry: 136 beats per minute (22 1643)  O2 Device: None (Room air) (22 1643)      Physical Exam:    General:    Alert, cooperative, appears ill. Eyes:   PERRLA EOMI Anicteric  Head:   Normocephalic, without obvious abnormality, atraumatic. ENT:  Nares normal. No drainage. Lungs:   Clear to auscultation bilaterally. No Wheezing or Rhonchi. No rales.   Heart:   Tachy, reg rhythm,  no murmur, rub or gallop. No JVD. Abdomen:   Soft, non-tender. Not distended. Bowel sounds normal.   MSK:  No edema. No clubbing or cyanosis. No deformities/lesions. Skin:     Texture, turgor normal. No rashes or lesions. No Jaundice. Neurologic: Alert and oriented x 3, no focal deficits   Psychiatry:      No depression/anxiety. Mood congruent for context. Heme/Lymph/Immune: No echymoses or overt signs of bleeding. Lab/Data Review:  Recent Results (from the past 24 hour(s))   CBC WITH AUTOMATED DIFF    Collection Time: 03/27/22  9:59 AM   Result Value Ref Range    WBC 11.5 (H) 4.3 - 11.1 K/uL    RBC 5.71 (H) 4.23 - 5.6 M/uL    HGB 16.8 13.6 - 17.2 g/dL    HCT 45.0 41.1 - 50.3 %    MCV 78.8 (L) 79.6 - 97.8 FL    MCH 29.4 26.1 - 32.9 PG    MCHC 37.3 (H) 31.4 - 35.0 g/dL    RDW 12.8 11.9 - 14.6 %    PLATELET 970 692 - 354 K/uL    MPV 9.0 (L) 9.4 - 12.3 FL    ABSOLUTE NRBC 0.00 0.0 - 0.2 K/uL    DF AUTOMATED      NEUTROPHILS 77 43 - 78 %    LYMPHOCYTES 11 (L) 13 - 44 %    MONOCYTES 11 4.0 - 12.0 %    EOSINOPHILS 0 (L) 0.5 - 7.8 %    BASOPHILS 0 0.0 - 2.0 %    IMMATURE GRANULOCYTES 0 0.0 - 5.0 %    ABS. NEUTROPHILS 8.9 (H) 1.7 - 8.2 K/UL    ABS. LYMPHOCYTES 1.3 0.5 - 4.6 K/UL    ABS. MONOCYTES 1.3 0.1 - 1.3 K/UL    ABS. EOSINOPHILS 0.0 0.0 - 0.8 K/UL    ABS. BASOPHILS 0.0 0.0 - 0.2 K/UL    ABS. IMM. GRANS. 0.1 0.0 - 0.5 K/UL   METABOLIC PANEL, COMPREHENSIVE    Collection Time: 03/27/22  9:59 AM   Result Value Ref Range    Sodium 138 138 - 145 mmol/L    Potassium 3.4 (L) 3.5 - 5.1 mmol/L    Chloride 108 (H) 98 - 107 mmol/L    CO2 17 (L) 21 - 32 mmol/L    Anion gap 13 7 - 16 mmol/L    Glucose 160 (H) 65 - 100 mg/dL    BUN 31 (H) 6 - 23 MG/DL    Creatinine 1.40 0.8 - 1.5 MG/DL    GFR est AA >60 >60 ml/min/1.73m2    GFR est non-AA 58 (L) >60 ml/min/1.73m2    Calcium 10.7 (H) 8.3 - 10.4 MG/DL    Bilirubin, total 0.9 0.2 - 1.1 MG/DL    ALT (SGPT) 31 12 - 65 U/L    AST (SGOT) 31 15 - 37 U/L    Alk.  phosphatase 120 50 - 136 U/L    Protein, total 9.2 (H) 6.3 - 8.2 g/dL    Albumin 5.0 3.5 - 5.0 g/dL    Globulin 4.2 (H) 2.3 - 3.5 g/dL    A-G Ratio 1.2 1.2 - 3.5     LIPASE    Collection Time: 03/27/22  9:59 AM   Result Value Ref Range    Lipase 116 73 - 393 U/L   LACTIC ACID    Collection Time: 03/27/22 12:05 PM   Result Value Ref Range    Lactic acid 3.2 (H) 0.4 - 2.0 MMOL/L   URINALYSIS W/ RFLX MICROSCOPIC    Collection Time: 03/27/22 12:05 PM   Result Value Ref Range    Color YELLOW      Appearance CLEAR      Specific gravity 1.010 1.001 - 1.023      pH (UA) 5.5 5.0 - 9.0      Protein 100 (A) NEG mg/dL    Glucose Negative NEG mg/dL    Ketone 15 (A) NEG mg/dL    Bilirubin Negative NEG      Blood LARGE (A) NEG      Urobilinogen 0.2 0.2 - 1.0 EU/dL    Nitrites Positive (A) NEG      Leukocyte Esterase Negative NEG      WBC 0-3 0 /hpf    RBC 0-3 0 /hpf    Epithelial cells 0-3 0 /hpf    Bacteria 0 0 /hpf    Casts 0-3 0 /lpf    Mucus TRACE 0 /lpf    Other observations RESULTS VERIFIED MANUALLY     DRUG SCREEN, URINE    Collection Time: 03/27/22 12:05 PM   Result Value Ref Range    PCP(PHENCYCLIDINE) Negative      BENZODIAZEPINES Negative      COCAINE Negative      AMPHETAMINES Negative      METHADONE Negative      THC (TH-CANNABINOL) Positive      OPIATES Negative      BARBITURATES Negative     LACTIC ACID    Collection Time: 03/27/22  2:54 PM   Result Value Ref Range    Lactic acid 1.4 0.4 - 2.0 MMOL/L   POC URINE MACROSCOPIC    Collection Time: 03/27/22  3:27 PM   Result Value Ref Range    Spec. gravity (POC) 1.020 1.001 - 1.023      pH, urine  (POC) 5.5 5.0 - 9.0      Protein (POC) 100 (A) NEG mg/dL    Glucose, urine (POC) Negative NEG mg/dL    Ketones (POC) 40 (A) NEG mg/dL    Bilirubin (POC) Negative NEG      Blood (POC) MODERATE (A) NEG      Urobilinogen (POC) 0.2 0.2 - 1.0 EU/dL    Nitrite (POC) Negative NEG      Leukocyte esterase (POC) Negative NEG      Performed by [facility]        Imaging:  CT ABD PELV W CONT    Result Date: 3/27/2022  CT ABDOMEN AND PELVIS WITH CONTRAST 3/27/2022 12:54 PM History: ; Patient ambulatory to triage with mask in place. Patient reports lower abd pain/cramping and n/v x 3 days. Denies fever, chills, diarrhea. TECHNIQUE: The patient received 100 mL Isovue-370 nonionic IV contrast. Axial images were obtained through the abdomen and pelvis. Coronal reformatted images were generated. All CT scans at this facility used dose modulation, interactive reconstruction and/or weight based dosing when appropriate to reduce radiation dose to as low as reasonably achievable. Comparison: 2/6/2016 Findings: Included portions of the lung bases are clear. ABDOMEN: Gallbladder: Normal. Liver: No focal hepatic lesions or biliary ductal dilatation. Pancreas: Normal. Spleen: Normal. Adrenal glands: Normal. Kidneys: The kidneys enhance symmetrically with contrast and there is no hydronephrosis. A 9 mm medial upper pole left renal cortical lesion (image 22) measures 29 Hounsfield units in density. Although incompletely characterized this is likely a cyst. Consider routine follow-up renal sonography for further characterization. Bowel: The appendix is normal in appearance. Small bowel loops are normal in caliber. Retroperitoneum:No adenopathy. Abdominal aorta is normal in caliber. PELVIS:The bladder is normal in appearance. There is no free pelvic fluid. Bones: There are no aggressive osseous lesions. 1. No acute abdominal findings. 2. Suspected small left renal cortical cyst. Consider routine follow-up sonography for confirmation. Cardiac Studies:  EKG Results     Procedure 720 Value Units Date/Time    EKG, 12 LEAD, REPEAT [[id number]]     Order Status: Completed     EKG, 12 LEAD, INITIAL [[id number]]     Order Status: Completed           No results found for this visit on 03/27/22. Cultures:   All Micro Results     Procedure Component Value Units Date/Time    CULTURE, BLOOD [[id number]] Collected: 03/27/22 5006 Order Status: Completed Specimen: Blood Updated: 03/27/22 1744    CULTURE, URINE [[id number]]     Order Status: Sent Specimen: Urine from Clean catch     CULTURE, BLOOD [[id number]]     Order Status: Sent Specimen: Blood           Active Problems:     Principal Diagnosis Cyclic vomiting syndrome    Hospital Problems as of 3/27/2022 Date Reviewed: 3/1/2019          Codes Class Noted - Resolved POA    * (Principal) Cyclic vomiting syndrome ICD-10-CM: R11.15  ICD-9-CM: 536.2  6/10/2021 - Present Yes        Acute prerenal azotemia ICD-10-CM: N19  ICD-9-CM: 790.6  3/27/2022 - Present Yes        Hypokalemia ICD-10-CM: E87.6  ICD-9-CM: 276.8  3/27/2022 - Present Yes        Normal anion gap metabolic acidosis PEF-40-WQ: E87.2  ICD-9-CM: 276.2  3/27/2022 - Present Yes        Lactic acidosis ICD-10-CM: E87.2  ICD-9-CM: 276.2  3/27/2022 - Present Yes        Leukocytosis ICD-10-CM: D72.829  ICD-9-CM: 288.60  3/27/2022 - Present Yes        Sinus tachycardia ICD-10-CM: R00.0  ICD-9-CM: 427.89  3/27/2022 - Present Yes        Nondiabetic gastroparesis ICD-10-CM: K31.84  ICD-9-CM: 536.3  3/17/2011 - Present Yes        Tetrahydrocannabinol (THC) use disorder, moderate, dependence (HCC) (Chronic) ICD-10-CM: F12.20  ICD-9-CM: 304.30  7/11/2016 - Present Yes        Cannabis hyperemesis syndrome concurrent with and due to cannabis abuse ([facility] ICD-10-CM: F12.188  ICD-9-CM: 536.2, 305.20  8/12/2014 - Present Yes        Esophageal reflux (Chronic) ICD-10-CM: K21.9  ICD-9-CM: 530.81  4/30/2014 - Present Yes        Anxiety (Chronic) ICD-10-CM: F41.9  ICD-9-CM: 300.00  4/30/2014 - Present Yes        Sickle cell trait (HCC) (Chronic) ICD-10-CM: D57.3  ICD-9-CM: 282.5  3/5/2013 - Present Yes              Anticipated discharge: 24-48 hours    DO Devin Mann Hospitalist Service  3/27/2022 4:58 PM

## 2022-03-27 NOTE — ED PROVIDER NOTES
Patient presents to the emergency department accompanied by his significant other who states over the last 4 days he has had nausea, vomiting and abdominal pain. He has vomited over 20 times each day. He denies any fever or diarrhea. Patient has a known history of gastroparesis and cannabinoid induced hyperemesis syndrome. He does admit to me that he recently traveled to [facility] and began smoking more marijuana. He last used 1 week ago. He states that normally Haldol and Benadryl helped his symptoms. Patient denies any fever, flank pain or urinary symptoms. His abdominal discomfort is more in his upper abdomen           Past Medical History:   Diagnosis Date    BETTY (acute kidney injury) ([facility] 8/12/2014    Clostridium difficile colitis 3/20/2011    Gastroparesis 2005    emptying study normal -2006    GERD (gastroesophageal reflux disease)     HIATAL HERNIA SINCE 1999    PUD (peptic ulcer disease) ALL DX IN 2008    ESOPHAGITIS, + H PYLORI    Uncontrolled hypertension 6/26/2018       Past Surgical History:   Procedure Laterality Date    COLONOSCOPY  4/08    ESOPHAGITIS, COLONIC ULCER X1    EGD  4/08    H.  HERNIA, ULCER X2, H PYLORI,    EGD  2011    h pylori -neg    HX WISDOM TEETH EXTRACTION  2/10/11         Family History:   Problem Relation Age of Onset    Other Mother         L+W    Diabetes Maternal Grandmother     Sickle Cell Anemia Father     Heart Disease Paternal Grandfather     Other Sister         ALL L+W    Other Son         L+W       Social History     Socioeconomic History    Marital status:      Spouse name: Not on file    Number of children: Not on file    Years of education: Not on file    Highest education level: Not on file   Occupational History    Not on file   Tobacco Use    Smoking status: Current Every Day Smoker     Packs/day: 0.25     Years: 2.00     Pack years: 0.50     Types: Cigarettes    Smokeless tobacco: Never Used   Substance and Sexual Activity    Alcohol use: No    Drug use: Yes     Types: Marijuana    Sexual activity: Yes     Partners: Female   Other Topics Concern    Not on file   Social History Narrative    3/17/11:  PATIENT LIVES WITH GIRLFRIEND, ALTON (CELL: [phone]), HER 3YEAR OLD DAUGHTER AND THEIR 3YEAR OLD SON. ALTON IS CURRENTLY 13 WEEKS PREGNANT. PATIENT'S JOB AT [employer] WAS DOWNSIZED ABOUT A YEAR AGO, AND HE HAS BEEN A STAY HOME DAD SINCE. ALTON WORKS IN HOUSEKEEPING POSITION. Social Determinants of Health     Financial Resource Strain:     Difficulty of Paying Living Expenses: Not on file   Food Insecurity:     Worried About Running Out of Food in the Last Year: Not on file    Sriram of Food in the Last Year: Not on file   Transportation Needs:     Lack of Transportation (Medical): Not on file    Lack of Transportation (Non-Medical): Not on file   Physical Activity:     Days of Exercise per Week: Not on file    Minutes of Exercise per Session: Not on file   Stress:     Feeling of Stress : Not on file   Social Connections:     Frequency of Communication with Friends and Family: Not on file    Frequency of Social Gatherings with Friends and Family: Not on file    Attends [employer] Services: Not on file    Active Member of [address] [place] [address] [employer] or Organizations: Not on file    Attends Club or Organization Meetings: Not on file    Marital Status: Not on file   Intimate Partner Violence:     Fear of Current or Ex-Partner: Not on file    Emotionally Abused: Not on file    Physically Abused: Not on file    Sexually Abused: Not on file   Housing Stability:     Unable to Pay for Housing in the Last Year: Not on file    Number of [place] in the Last Year: Not on file    Unstable Housing in the Last Year: Not on file         ALLERGIES: Amoxicillin, Aspirin, Hydrocodone, Ibuprofen, Pcn [penicillins], and Phenergan [promethazine]    Review of Systems   Constitutional: Positive for fatigue.  Negative for fever. Gastrointestinal: Positive for abdominal pain, nausea and vomiting. Genitourinary: Negative for dysuria. All other systems reviewed and are negative. Vitals:    03/27/22 0951 03/27/22 1213   BP: (!) 165/113 (!) 157/115   Pulse: (!) 145 (!) 123   Resp: 16 20   Temp: 98.4 °F (36.9 °C)    SpO2: 95% 97%   Weight: 63.5 kg (140 lb)    Height: 5' 1\" (1.549 m)             Physical Exam  Vitals and nursing note reviewed. Constitutional:       Appearance: He is well-developed. HENT:      Head: Normocephalic and atraumatic. Mouth/Throat:      Mouth: Mucous membranes are dry. Comments: Dry oral mucosa  Eyes:      Extraocular Movements: Extraocular movements intact. Cardiovascular:      Rate and Rhythm: Regular rhythm. Tachycardia present. Pulses: Normal pulses. Heart sounds: Normal heart sounds. Pulmonary:      Effort: Pulmonary effort is normal.      Breath sounds: Normal breath sounds. Abdominal:      General: Abdomen is flat. Palpations: Abdomen is soft. Tenderness: There is abdominal tenderness. There is no guarding. Comments: Patient has minimal epigastric tenderness to palpation   Musculoskeletal:         General: Normal range of motion. Cervical back: Normal range of motion and neck supple. Skin:     General: Skin is warm and dry. Capillary Refill: Capillary refill takes less than 2 seconds. Neurological:      Mental Status: He is alert. Psychiatric:         Mood and Affect: Mood normal.         Behavior: Behavior normal.         Thought Content:  Thought content normal.          MDM  Number of Diagnoses or Management Options  Cannabinoid hyperemesis syndrome  Dehydration  Gastroparesis  Nausea and vomiting, intractability of vomiting not specified, unspecified vomiting type  Tachycardia  Urinary tract infection without hematuria, site unspecified  Diagnosis management comments: Differential diagnoses: Cannabinoid induced hyperemesis syndrome, gastroparesis, viral gastroenteritis, intra-abdominal infection, dehydration, sepsis, electrolyte abnormality    Patient has been very difficult to establish an IV and ER attending Dr. Beba Marie placed an ultrasound guided line. I was then later told that when he had his CT scan of his abdomen unfortunately the line blew and he now only has a small 22 in the hand. Fluids are infusing slowly due to lack of good IV access but his heart rate has already come down to 105 after the Haldol, Benadryl and Zofran around 1:25 PM.  His lactic acid is elevated at 3.2 but I suspect that this is more related to dehydration given his CT scan is unremarkable. I am still awaiting for urine specimen but in the meantime we will go ahead and have the nurse try to obtain a blood culture and give Rocephin while we are waiting for more fluids to infuse and recheck his lactic acid. He appears much more comfortable at 1:25 PM    Nurses have not been able to establish an additional IV and as of 2:51 PM blood cultures and Rocephin have not been given. I have asked the nurse to draw a new lactic acid after the first liter finished and a second liter will be infusing and as long as the lactic is trending downward I think he will be okay to go home as it appears to be due to dehydration. I have discussed the case with ER attending Dr. Beba Marie  who is in agreement with management plan    Patient's repeat lactic acid normalized and he was going to be given Rocephin as he does have a UTI however his heart rate went back up into the 140s and I do not think he can be discharged home. UDS is only positive for marijuana.   I have once again asked the nurses to try to at least get one blood culture as he will be admitted and I spoke to the hospitalist Dr. Stephen Elmore at [phone] who will admit the patient and have Dr. Sharla Jara see him         Amount and/or Complexity of Data Reviewed  Clinical lab tests: reviewed  Tests in the radiology section of CPT®: reviewed  Discuss the patient with other providers: yes    Risk of Complications, Morbidity, and/or Mortality  Presenting problems: moderate  Diagnostic procedures: moderate  Management options: moderate    Patient Progress  Patient progress: improved         EKG    Date/Time: 3/27/2022 4:54 PM  Performed by: PRAVIN Alvarado  Authorized by:  PRAVIN Alvarado     ECG reviewed by ED Physician in the absence of a cardiologist: yes    Previous ECG:     Previous ECG:  Unavailable  Interpretation:     Interpretation: abnormal    Comments:      Repeat EKG performed at 1641 which shows sinus tachycardia rate of 147 bpm with no evidence of a STEMI

## 2022-03-28 ENCOUNTER — ANESTHESIA EVENT (OUTPATIENT)
Dept: ENDOSCOPY | Age: 47
DRG: 392 | End: 2022-03-28

## 2022-03-28 ENCOUNTER — ANESTHESIA (OUTPATIENT)
Dept: ENDOSCOPY | Age: 47
DRG: 392 | End: 2022-03-28

## 2022-03-28 PROBLEM — K92.2 GI BLEED: Status: ACTIVE | Noted: 2022-03-28

## 2022-03-28 PROBLEM — I10 HYPERTENSION: Status: ACTIVE | Noted: 2022-03-28

## 2022-03-28 PROBLEM — R11.2 NAUSEA AND VOMITING: Status: ACTIVE | Noted: 2022-03-28

## 2022-03-28 LAB
ANION GAP SERPL CALC-SCNC: 8 MMOL/L (ref 7–16)
ATRIAL RATE: 152 BPM
BUN SERPL-MCNC: 17 MG/DL (ref 6–23)
CALCIUM SERPL-MCNC: 9.5 MG/DL (ref 8.3–10.4)
CALCULATED P AXIS, ECG09: 66 DEGREES
CALCULATED R AXIS, ECG10: 51 DEGREES
CALCULATED T AXIS, ECG11: 53 DEGREES
CHLORIDE SERPL-SCNC: 113 MMOL/L (ref 98–107)
CO2 SERPL-SCNC: 23 MMOL/L (ref 21–32)
CREAT SERPL-MCNC: 0.8 MG/DL (ref 0.8–1.5)
DIAGNOSIS, 93000: NORMAL
ERYTHROCYTE [DISTWIDTH] IN BLOOD BY AUTOMATED COUNT: 13.1 % (ref 11.9–14.6)
GLUCOSE SERPL-MCNC: 113 MG/DL (ref 65–100)
HCT VFR BLD AUTO: 38.4 % (ref 41.1–50.3)
HCT VFR BLD AUTO: 40.4 % (ref 41.1–50.3)
HGB BLD-MCNC: 13.9 G/DL (ref 13.6–17.2)
HGB BLD-MCNC: 14.9 G/DL (ref 13.6–17.2)
MCH RBC QN AUTO: 29.6 PG (ref 26.1–32.9)
MCHC RBC AUTO-ENTMCNC: 36.9 G/DL (ref 31.4–35)
MCV RBC AUTO: 80.3 FL (ref 79.6–97.8)
NRBC # BLD: 0 K/UL (ref 0–0.2)
P-R INTERVAL, ECG05: 118 MS
PLATELET # BLD AUTO: 330 K/UL (ref 150–450)
PMV BLD AUTO: 9.1 FL (ref 9.4–12.3)
POTASSIUM SERPL-SCNC: 3.9 MMOL/L (ref 3.5–5.1)
Q-T INTERVAL, ECG07: 326 MS
QRS DURATION, ECG06: 64 MS
QTC CALCULATION (BEZET), ECG08: 518 MS
RBC # BLD AUTO: 5.03 M/UL (ref 4.23–5.6)
SODIUM SERPL-SCNC: 144 MMOL/L (ref 136–145)
VENTRICULAR RATE, ECG03: 152 BPM
WBC # BLD AUTO: 13.2 K/UL (ref 4.3–11.1)

## 2022-03-28 PROCEDURE — 80048 BASIC METABOLIC PNL TOTAL CA: CPT

## 2022-03-28 PROCEDURE — G0378 HOSPITAL OBSERVATION PER HR: HCPCS

## 2022-03-28 PROCEDURE — 2709999900 HC NON-CHARGEABLE SUPPLY

## 2022-03-28 PROCEDURE — 0DJ08ZZ INSPECTION OF UPPER INTESTINAL TRACT, VIA NATURAL OR ARTIFICIAL OPENING ENDOSCOPIC: ICD-10-PCS | Performed by: INTERNAL MEDICINE

## 2022-03-28 PROCEDURE — 96375 TX/PRO/DX INJ NEW DRUG ADDON: CPT

## 2022-03-28 PROCEDURE — 76060000032 HC ANESTHESIA 0.5 TO 1 HR: Performed by: INTERNAL MEDICINE

## 2022-03-28 PROCEDURE — 74011000250 HC RX REV CODE- 250: Performed by: INTERNAL MEDICINE

## 2022-03-28 PROCEDURE — 36415 COLL VENOUS BLD VENIPUNCTURE: CPT

## 2022-03-28 PROCEDURE — 76040000025: Performed by: INTERNAL MEDICINE

## 2022-03-28 PROCEDURE — 77030040361 HC SLV COMPR DVT MDII -B: Performed by: INTERNAL MEDICINE

## 2022-03-28 PROCEDURE — 74011250636 HC RX REV CODE- 250/636: Performed by: INTERNAL MEDICINE

## 2022-03-28 PROCEDURE — 2709999900 HC NON-CHARGEABLE SUPPLY: Performed by: INTERNAL MEDICINE

## 2022-03-28 PROCEDURE — C9113 INJ PANTOPRAZOLE SODIUM, VIA: HCPCS | Performed by: FAMILY MEDICINE

## 2022-03-28 PROCEDURE — 96376 TX/PRO/DX INJ SAME DRUG ADON: CPT

## 2022-03-28 PROCEDURE — 74011000250 HC RX REV CODE- 250: Performed by: NURSE ANESTHETIST, CERTIFIED REGISTERED

## 2022-03-28 PROCEDURE — 85018 HEMOGLOBIN: CPT

## 2022-03-28 PROCEDURE — 74011250636 HC RX REV CODE- 250/636: Performed by: ANESTHESIOLOGY

## 2022-03-28 PROCEDURE — 77030039425 HC BLD LARYNG TRULITE DISP TELE -A: Performed by: ANESTHESIOLOGY

## 2022-03-28 PROCEDURE — 74011250637 HC RX REV CODE- 250/637: Performed by: INTERNAL MEDICINE

## 2022-03-28 PROCEDURE — C9113 INJ PANTOPRAZOLE SODIUM, VIA: HCPCS | Performed by: INTERNAL MEDICINE

## 2022-03-28 PROCEDURE — 74011000250 HC RX REV CODE- 250: Performed by: FAMILY MEDICINE

## 2022-03-28 PROCEDURE — 77030037088 HC TUBE ENDOTRACH ORAL NSL COVD-A: Performed by: ANESTHESIOLOGY

## 2022-03-28 PROCEDURE — 74011250636 HC RX REV CODE- 250/636: Performed by: FAMILY MEDICINE

## 2022-03-28 PROCEDURE — 74011250636 HC RX REV CODE- 250/636: Performed by: NURSE ANESTHETIST, CERTIFIED REGISTERED

## 2022-03-28 PROCEDURE — 85027 COMPLETE CBC AUTOMATED: CPT

## 2022-03-28 RX ORDER — ONDANSETRON 2 MG/ML
4 INJECTION INTRAMUSCULAR; INTRAVENOUS
Status: COMPLETED | OUTPATIENT
Start: 2022-03-28 | End: 2022-03-28

## 2022-03-28 RX ORDER — LIDOCAINE HYDROCHLORIDE 20 MG/ML
INJECTION, SOLUTION EPIDURAL; INFILTRATION; INTRACAUDAL; PERINEURAL AS NEEDED
Status: DISCONTINUED | OUTPATIENT
Start: 2022-03-28 | End: 2022-03-28 | Stop reason: HOSPADM

## 2022-03-28 RX ORDER — LABETALOL HYDROCHLORIDE 5 MG/ML
10 INJECTION, SOLUTION INTRAVENOUS
Status: DISCONTINUED | OUTPATIENT
Start: 2022-03-28 | End: 2022-03-31 | Stop reason: HOSPADM

## 2022-03-28 RX ORDER — ROCURONIUM BROMIDE 10 MG/ML
INJECTION, SOLUTION INTRAVENOUS AS NEEDED
Status: DISCONTINUED | OUTPATIENT
Start: 2022-03-28 | End: 2022-03-28 | Stop reason: HOSPADM

## 2022-03-28 RX ORDER — SODIUM CHLORIDE 0.9 % (FLUSH) 0.9 %
5-40 SYRINGE (ML) INJECTION EVERY 8 HOURS
Status: DISCONTINUED | OUTPATIENT
Start: 2022-03-28 | End: 2022-03-28 | Stop reason: HOSPADM

## 2022-03-28 RX ORDER — SODIUM CHLORIDE 0.9 % (FLUSH) 0.9 %
5-40 SYRINGE (ML) INJECTION AS NEEDED
Status: DISCONTINUED | OUTPATIENT
Start: 2022-03-28 | End: 2022-03-28 | Stop reason: HOSPADM

## 2022-03-28 RX ORDER — SUCCINYLCHOLINE CHLORIDE 20 MG/ML
INJECTION INTRAMUSCULAR; INTRAVENOUS AS NEEDED
Status: DISCONTINUED | OUTPATIENT
Start: 2022-03-28 | End: 2022-03-28 | Stop reason: HOSPADM

## 2022-03-28 RX ORDER — SODIUM CHLORIDE, SODIUM LACTATE, POTASSIUM CHLORIDE, CALCIUM CHLORIDE 600; 310; 30; 20 MG/100ML; MG/100ML; MG/100ML; MG/100ML
1000 INJECTION, SOLUTION INTRAVENOUS CONTINUOUS
Status: DISCONTINUED | OUTPATIENT
Start: 2022-03-28 | End: 2022-03-28 | Stop reason: HOSPADM

## 2022-03-28 RX ORDER — SODIUM CHLORIDE, SODIUM LACTATE, POTASSIUM CHLORIDE, CALCIUM CHLORIDE 600; 310; 30; 20 MG/100ML; MG/100ML; MG/100ML; MG/100ML
75 INJECTION, SOLUTION INTRAVENOUS CONTINUOUS
Status: DISCONTINUED | OUTPATIENT
Start: 2022-03-28 | End: 2022-03-28 | Stop reason: HOSPADM

## 2022-03-28 RX ORDER — DIPHENHYDRAMINE HYDROCHLORIDE 50 MG/ML
25 INJECTION, SOLUTION INTRAMUSCULAR; INTRAVENOUS ONCE
Status: COMPLETED | OUTPATIENT
Start: 2022-03-29 | End: 2022-03-29

## 2022-03-28 RX ORDER — LABETALOL HYDROCHLORIDE 5 MG/ML
10 INJECTION, SOLUTION INTRAVENOUS ONCE
Status: COMPLETED | OUTPATIENT
Start: 2022-03-28 | End: 2022-03-28

## 2022-03-28 RX ORDER — PROPOFOL 10 MG/ML
INJECTION, EMULSION INTRAVENOUS AS NEEDED
Status: DISCONTINUED | OUTPATIENT
Start: 2022-03-28 | End: 2022-03-28 | Stop reason: HOSPADM

## 2022-03-28 RX ORDER — ONDANSETRON 2 MG/ML
INJECTION INTRAMUSCULAR; INTRAVENOUS AS NEEDED
Status: DISCONTINUED | OUTPATIENT
Start: 2022-03-28 | End: 2022-03-28 | Stop reason: HOSPADM

## 2022-03-28 RX ORDER — NALOXONE HYDROCHLORIDE 0.4 MG/ML
0.2 INJECTION, SOLUTION INTRAMUSCULAR; INTRAVENOUS; SUBCUTANEOUS AS NEEDED
Status: DISCONTINUED | OUTPATIENT
Start: 2022-03-28 | End: 2022-03-28 | Stop reason: HOSPADM

## 2022-03-28 RX ORDER — HYDRALAZINE HYDROCHLORIDE 20 MG/ML
10 INJECTION INTRAMUSCULAR; INTRAVENOUS ONCE
Status: COMPLETED | OUTPATIENT
Start: 2022-03-28 | End: 2022-03-28

## 2022-03-28 RX ORDER — DEXAMETHASONE SODIUM PHOSPHATE 100 MG/10ML
INJECTION INTRAMUSCULAR; INTRAVENOUS AS NEEDED
Status: DISCONTINUED | OUTPATIENT
Start: 2022-03-28 | End: 2022-03-28 | Stop reason: HOSPADM

## 2022-03-28 RX ORDER — ONDANSETRON 2 MG/ML
4 INJECTION INTRAMUSCULAR; INTRAVENOUS ONCE
Status: COMPLETED | OUTPATIENT
Start: 2022-03-28 | End: 2022-03-28

## 2022-03-28 RX ADMIN — SODIUM CHLORIDE, SODIUM LACTATE, POTASSIUM CHLORIDE, AND CALCIUM CHLORIDE 1000 ML: 600; 310; 30; 20 INJECTION, SOLUTION INTRAVENOUS at 15:04

## 2022-03-28 RX ADMIN — LABETALOL HYDROCHLORIDE 10 MG: 5 INJECTION INTRAVENOUS at 08:11

## 2022-03-28 RX ADMIN — LIDOCAINE HYDROCHLORIDE 100 MG: 20 INJECTION, SOLUTION EPIDURAL; INFILTRATION; INTRACAUDAL; PERINEURAL at 17:11

## 2022-03-28 RX ADMIN — ONDANSETRON 4 MG: 2 INJECTION INTRAMUSCULAR; INTRAVENOUS at 14:48

## 2022-03-28 RX ADMIN — SUCCINYLCHOLINE CHLORIDE 160 MG: 20 INJECTION, SOLUTION INTRAMUSCULAR; INTRAVENOUS at 17:12

## 2022-03-28 RX ADMIN — SODIUM CHLORIDE 40 MG: 9 INJECTION, SOLUTION INTRAMUSCULAR; INTRAVENOUS; SUBCUTANEOUS at 20:09

## 2022-03-28 RX ADMIN — SODIUM CHLORIDE 40 MG: 9 INJECTION, SOLUTION INTRAMUSCULAR; INTRAVENOUS; SUBCUTANEOUS at 08:12

## 2022-03-28 RX ADMIN — ONDANSETRON 4 MG: 2 INJECTION INTRAMUSCULAR; INTRAVENOUS at 17:56

## 2022-03-28 RX ADMIN — ONDANSETRON 4 MG: 2 INJECTION INTRAMUSCULAR; INTRAVENOUS at 17:24

## 2022-03-28 RX ADMIN — SODIUM CHLORIDE, POTASSIUM CHLORIDE, SODIUM LACTATE AND CALCIUM CHLORIDE 200 ML/HR: 600; 310; 30; 20 INJECTION, SOLUTION INTRAVENOUS at 02:08

## 2022-03-28 RX ADMIN — PROPOFOL 200 MG: 10 INJECTION, EMULSION INTRAVENOUS at 17:11

## 2022-03-28 RX ADMIN — LABETALOL HYDROCHLORIDE 10 MG: 5 INJECTION INTRAVENOUS at 20:21

## 2022-03-28 RX ADMIN — DEXAMETHASONE SODIUM PHOSPHATE 10 MG: 10 INJECTION INTRAMUSCULAR; INTRAVENOUS at 17:22

## 2022-03-28 RX ADMIN — ROCURONIUM BROMIDE 5 MG: 50 INJECTION, SOLUTION INTRAVENOUS at 17:11

## 2022-03-28 RX ADMIN — METOCLOPRAMIDE HYDROCHLORIDE 5 MG: 5 INJECTION INTRAMUSCULAR; INTRAVENOUS at 14:06

## 2022-03-28 RX ADMIN — SODIUM CHLORIDE, PRESERVATIVE FREE 10 ML: 5 INJECTION INTRAVENOUS at 04:28

## 2022-03-28 RX ADMIN — LABETALOL HYDROCHLORIDE 10 MG: 5 INJECTION INTRAVENOUS at 04:29

## 2022-03-28 RX ADMIN — PHENYLEPHRINE HYDROCHLORIDE 150 MCG: 10 INJECTION INTRAVENOUS at 17:27

## 2022-03-28 RX ADMIN — LORAZEPAM 1 MG: 1 TABLET ORAL at 03:03

## 2022-03-28 RX ADMIN — METOCLOPRAMIDE HYDROCHLORIDE 5 MG: 5 INJECTION INTRAMUSCULAR; INTRAVENOUS at 04:29

## 2022-03-28 RX ADMIN — HYDRALAZINE HYDROCHLORIDE 10 MG: 20 INJECTION INTRAMUSCULAR; INTRAVENOUS at 00:28

## 2022-03-28 RX ADMIN — SODIUM CHLORIDE, PRESERVATIVE FREE 10 ML: 5 INJECTION INTRAVENOUS at 20:25

## 2022-03-28 RX ADMIN — PHENYLEPHRINE HYDROCHLORIDE 150 MCG: 10 INJECTION INTRAVENOUS at 17:23

## 2022-03-28 NOTE — PROGRESS NOTES
TRANSFER - IN REPORT:    Verbal report received from [place], RN on [address]  being received from PACU for ordered procedure      Report consisted of patients Situation, Background, Assessment and   Recommendations(SBAR). Information from the following report(s) Procedure Summary was reviewed with the receiving nurse. Opportunity for questions and clarification was provided. Assessment completed upon patients arrival to unit and care assumed.

## 2022-03-28 NOTE — ANESTHESIA PREPROCEDURE EVALUATION
Anesthetic History     PONV          Review of Systems / Medical History  Patient summary reviewed and pertinent labs reviewed    Pulmonary          Smoker         Neuro/Psych   Within defined limits           Cardiovascular    Hypertension: well controlled              Exercise tolerance: >4 METS  Comments:        GI/Hepatic/Renal     GERD: well controlled      Hiatal hernia and PUD     Endo/Other             Other Findings          Anesthetic History     PONV          Review of Systems / Medical History  Patient summary reviewed, nursing notes reviewed and pertinent labs reviewed    Pulmonary          Smoker (quit 1 year ago)         Neuro/Psych   Within defined limits           Cardiovascular  Within defined limits                Exercise tolerance: >4 METS     GI/Hepatic/Renal     GERD: well controlled      PUD    Comments: N/V hx of jeremiah woodward tear Endo/Other  Within defined limits           Other Findings              Physical Exam    Airway  Mallampati: II  TM Distance: 4 - 6 cm  Neck ROM: normal range of motion   Mouth opening: Normal     Cardiovascular    Rhythm: regular  Rate: normal    Murmur: Grade 2, Mitral area     Dental  No notable dental hx       Pulmonary  Breath sounds clear to auscultation               Abdominal  GI exam deferred       Other Findings            Anesthetic Plan    ASA: 3, emergent  Anesthesia type: general          Induction: Intravenous and RSI  Anesthetic plan and risks discussed with: Patient                Physical Exam    Airway  Mallampati: II  TM Distance: 4 - 6 cm  Neck ROM: normal range of motion   Mouth opening: Normal     Cardiovascular  Regular rate and rhythm,  S1 and S2 normal,  no murmur, click, rub, or gallop             Dental  No notable dental hx       Pulmonary  Breath sounds clear to auscultation               Abdominal         Other Findings            Anesthetic Plan    ASA: 2  Anesthesia type: general          Induction: Intravenous  Anesthetic plan and risks discussed with: Patient

## 2022-03-28 NOTE — CONSULTS
[facility] Consult Note       Primary GI Physician:  Dr. Kurt Logan    Referring Provider:  Dr. Villegas Last Date:  3/28/2022    Admit Date:  3/27/2022    Chief Complaint:  GI bleed    Subjective:     History of Present Illness:  Patient is a 41 y.o. male with PMH including but not limited to reflux, PUD, esophagitis with MWT, cannabinoid hyperemesis syndrome, HTN, gastroparesis documented on GES 2012, hx of H pylori s/p Prevpac in 2011, sickle cell trait, anxiety, C diff in 2011, who is seen in consultation at the request of Dr. Aleida Feliciano for GI bleed. He was admitted after presenting with increased nausea and vomiting with coffee ground emesis since Thursday, unable to tolerate po intake. Initially emesis was liquid and foodstuff, but has since become coffee ground in appearance. He has a history of reflux and has been taking Protonix daily, which has controlled the reflux. He has not stopped it. He denies any abd pain, chest pain, chronic cough, difficulty swallowing, changes in bowel habits with diarrhea or constipation, or any bloody or black stools. He has similar symptoms in April 2021 and underwent EGD with Dr. Sachi Wright 12 April 2021 with HH, moderate esophagitis - LA class B. He was noted in the ER to have BETTY and leukocytosis, normal LFTs and lipase. Hgb initially 16.8 and 14.9 on recheck. He describes he was in [place] about 3 weeks ago and smoked marijuana while there. UDS positive for THC. CT scan abd/pelvis with no acute findings. He has had persistent n/v since admission, last occurring about 30 mins prior to this consult.     PMH:  Past Medical History:   Diagnosis Date    BETTY (acute kidney injury) ([facility] [address] 8/12/2014    Clostridium difficile colitis 3/20/2011    Gastroparesis 2005    emptying study normal -2006    GERD (gastroesophageal reflux disease)     HIATAL HERNIA SINCE 1999    PUD (peptic ulcer disease) ALL DX IN 2008    ESOPHAGITIS, + H PYLORI    Uncontrolled hypertension 6/26/2018     Multiple EGDs since 2008, most recent:  EGD 12 April 2021 with Dr. Shade Hicks Diagnosis:  Hiatal hernia,   moderate esophagitis    Findings:    ESOPHAGUS: The proximal and mid esophagus appeared normal.  Esophagitis was identified in the distal esophagus, consistent with changes of acid reflux. This was LA class ' B'. This is the likely source of recent hematemesis  In the distal esophagus, the Z-line was irregular, suggesting a short segment of Joy's esophagus versus chronic reflux changes. There was slight nodularity in the distal esophagus. There were no masses, strictures, or ulcers present. Multiple biopsies were obtained to evaluate for intestinal metaplasia.     STOMACH: The gastric mucosa was unremarkable throughout. There were no erosions, ulcerations, polyps, mass lesions, vascular ectasias or other abnormalities noted. The pylorus was patent and easily intubated. There was a 2cm hiatal hernia noted by direct view and retroflexion within the stomach.     DUODENUM: The endoscope was advanced as far as possible into the duodenum. The villi appeared normal.  There were no mucosal breaks, erosions, ulcerations, vascular ectasias or other abnormalities present.    Recommendations: Follow up pathology results  Continue b.i.d. PPI, Reglan, Zofran. Strict reflux diet. Followup EGD in 1 year if Joy's esophagus is confirmed   Pathology  DIAGNOSIS       A:  \" ESOPHAGEAL BIOPSIES\":         GLANDULAR EPITHELIUM WITHOUT SIGNIFICANT GOBLET CELL TYPE INTESTINAL   METAPLASIA OR DYSPLASIA IN ASSOCIATION WITH MINIMALLY HYPERPLASTIC   SQUAMOUS EPITHELIUM FOCALLY HAVING CHANGES OF REPAIR. DETACHED FRAGMENT OF   INFLAMED FIBRINOUS DEBRIS SUSPICIOUS FOR ULCER BASE.     PSH:  Past Surgical History:   Procedure Laterality Date    COLONOSCOPY  4/08    ESOPHAGITIS, COLONIC ULCER X1    EGD  4/08    H.  HERNIA, ULCER X2, H PYLORI,    EGD  2011    h pylori -neg    HX WISDOM TEETH EXTRACTION  2/10/11       Allergies: Allergies   Allergen Reactions    Amoxicillin Nausea Only    Aspirin Other (comments)     ulcers    Hydrocodone Rash     Tolerates hydromorphone, morphine, tramadol    Ibuprofen Other (comments)     Hx of ulcers    Pcn [Penicillins] Rash    Phenergan [Promethazine] Nausea and Vomiting and Other (comments)       Home Medications:  Prior to Admission medications    Medication Sig Start Date End Date Taking? Authorizing Provider   pantoprazole (Protonix) 40 mg tablet Take 40 mg by mouth daily. Yes Provider, Historical   QUEtiapine (SEROquel) 50 mg tablet Take 1 Tablet by mouth nightly. Patient not taking: Reported on 3/27/2022 6/13/21   Yina Esqueda MD       [facility] Medications:  Current Facility-Administered Medications   Medication Dose Route Frequency    pantoprazole (PROTONIX) 40 mg in 0.9% sodium chloride 10 mL injection  40 mg IntraVENous Q12H    labetaloL (NORMODYNE;TRANDATE) injection 10 mg  10 mg IntraVENous Q6H PRN    sodium chloride (NS) flush 5-40 mL  5-40 mL IntraVENous Q8H    sodium chloride (NS) flush 5-40 mL  5-40 mL IntraVENous PRN    acetaminophen (TYLENOL) tablet 650 mg  650 mg Oral Q6H PRN    Or    acetaminophen (TYLENOL) suppository 650 mg  650 mg Rectal Q6H PRN    polyethylene glycol (MIRALAX) packet 17 g  17 g Oral DAILY PRN    metoclopramide HCl (REGLAN) injection 5 mg  5 mg IntraVENous Q6H PRN    lactated Ringers infusion  200 mL/hr IntraVENous CONTINUOUS    LORazepam (ATIVAN) tablet 1 mg  1 mg Oral Q6H PRN       Social History:  Social History     Tobacco Use    Smoking status: Current Every Day Smoker     Packs/day: 0.25     Years: 2.00     Pack years: 0.50     Types: Cigarettes    Smokeless tobacco: Never Used   Substance Use Topics    Alcohol use: No       Pt denies any history of drug use, blood transfusions, or tattoos.     Family History:  Family History   Problem Relation Age of Onset    Other Mother         L+W  Diabetes Maternal Grandmother     Sickle Cell Anemia Father     Heart Disease Paternal Grandfather     Other Sister         ALL L+W    Other Son         L+W       Review of Systems:  A detailed 10 system ROS is obtained, with pertinent positives as listed above. All others are negative. Diet:  NPO    Objective:     Physical Exam:  Vitals:  Visit Vitals  BP (!) 149/106   Pulse (!) 102   Temp 97.7 °F (36.5 °C)   Resp 19   Ht 5' 1\" (1.549 m)   Wt 62.3 kg (137 lb 4.8 oz)   SpO2 94%   BMI 25.94 kg/m²     Gen:  Pt is alert, cooperative, no acute distress, lying in bed  Skin:  Extremities and face reveal no rashes. HEENT: Sclerae anicteric. Extra-occular muscles are intact. No oral ulcers. No abnormal pigmentation of the lips. The neck is supple. Cardiovascular: Tachycardic. Respiratory:  Comfortable breathing with no accessory muscle use. Clear breath sounds anteriorly with no wheezes, rales, or rhonchi. GI:  Abdomen nondistended, soft, and nontender. Normal active bowel sounds. No enlargement of the liver or spleen. No masses palpable. Rectal:  Deferred  Musculoskeletal:  No pitting edema of the lower legs. Neurological:  Gross memory appears intact. Patient is alert and oriented. Psychiatric:  Mood appears appropriate with judgement intact. Lymphatic:  No cervical or supraclavicular adenopathy. Laboratory:    Recent Labs     03/28/22  0518 03/27/22  0959   WBC 13.2* 11.5*   HGB 14.9 16.8   HCT 40.4* 45.0    420   MCV 80.3 78.8*    138   K 3.9 3.4*   * 108*   CO2 23 17*   BUN 17 31*   CREA 0.80 1.40   CA 9.5 10.7*   MG  --  2.4   * 160*   AP  --  120   AST  --  31   ALT  --  31   TBILI  --  0.9   ALB  --  5.0   TP  --  9.2*   LPSE  --  116      CT ABDOMEN AND PELVIS WITH CONTRAST 3/27/2022 12:54 PM   History: ; Patient ambulatory to triage with mask in place. Patient reports  lower abd pain/cramping and n/v x 3 days.  Denies fever, chills, diarrhea.    TECHNIQUE: The patient received 100 mL Isovue-370 nonionic IV contrast. Axial  images were obtained through the abdomen and pelvis. Coronal reformatted images  were generated. All CT scans at this facility used dose modulation, interactive  reconstruction and/or weight based dosing when appropriate to reduce radiation  dose to as low as reasonably achievable.   Comparison: 2/6/2016   Findings: Included portions of the lung bases are clear.   ABDOMEN:   Gallbladder: Normal.   Liver: No focal hepatic lesions or biliary ductal dilatation.   Pancreas: Normal.   Spleen: Normal.   Adrenal glands: Normal.   Kidneys: The kidneys enhance symmetrically with contrast and there is no  hydronephrosis. A 9 mm medial upper pole left renal cortical lesion (image 22)  measures 29 Hounsfield units in density. Although incompletely characterized  this is likely a cyst. Consider routine follow-up renal sonography for further  characterization.   Bowel: The appendix is normal in appearance. Small bowel loops are normal in  caliber.   Retroperitoneum:No adenopathy. Abdominal aorta is normal in caliber.    PELVIS:The bladder is normal in appearance. There is no free pelvic fluid.   Bones: There are no aggressive osseous lesions.   IMPRESSION   1. No acute abdominal findings.   2. Suspected small left renal cortical cyst. Consider routine follow-up  sonography for confirmation.     Assessment:     Principal Problem:    Cyclic vomiting syndrome (6/10/2021)    Active Problems:    Nondiabetic gastroparesis (3/17/2011)      Sickle cell trait ([address] (3/5/2013)      Hypokalemia (3/27/2022)      Anxiety (4/30/2014)      Esophageal reflux (4/30/2014)      Cannabis hyperemesis syndrome concurrent with and due to cannabis abuse ([address] (8/12/2014)      Tetrahydrocannabinol (THC) use disorder, moderate, dependence ([address] (7/11/2016)      Normal anion gap metabolic acidosis (4/57/2194)      Acute prerenal azotemia (3/27/2022)      Lactic acidosis (3/27/2022) Leukocytosis (3/27/2022)      Sinus tachycardia (3/27/2022)    42 yo male pt of Dr. Jose Brush with PMH including but not limited to reflux, PUD, esophagitis with MWT, cannabinoid hyperemesis syndrome, HTN, gastroparesis documented on GES 2012, hx of H pylori s/p Prevpac in 2011, sickle cell trait, anxiety, C diff in 2011, who is seen in consultation 28 March 2022 at the request of Dr. Poonam Lorenz for GI bleed, who was admitted after presenting with intractable n/v with coffee ground emesis, with hx of prior esophagitis, MWT, and cannabinoid hyperemesis syndrome. Prior EGD with Dr. Stalin Jain 12 April 2021 for similar sxs with HH, moderate esophagitis - LA class B. He was noted in the ER to have BETTY and leukocytosis, normal LFTs and lipase, Hgb initially 16.8 and 14.9 on recheck. UDS positive for THC. CT scan abd/pelvis with no acute findings. He likely has flare up of cannabinoid hyperemesis syndrome and may have recurrent inflammation, MW tear, ulceration. Plan:     - Supportive care, maintain electrolytes. - NPO.  - EGD with Dr. Jose Brush today. Discussed risks including but not limited to bleeding, perforation, acute cardiopulmonary event, sedation risks, IV complications, aspiration, and pt states understanding and agrees to proceed. - Protonix 40 mg IV Q 12 hrs. - Monitor labs and transfuse PRN if hgb <7.  - Continue Reglan PRN. May need to give short term scheduled dosing.  - Follow. Patient is seen and examined in collaboration with Dr. Juanito Hong. Assessment and plan as per Dr. Jose Brush. Savi Holliday [facility]  [facility]

## 2022-03-28 NOTE — PROGRESS NOTES
Patient had another episode of vomiting. 200cc of bright red blood noted. PRAVIN Pelaez with [facility] made aware.

## 2022-03-28 NOTE — PROGRESS NOTES
Pt N/V uncontrolled, approximately 6 emesis episodes since admission to floor. Emesis is getting progressively darker and coffee ground. Pt BP and HR elevated. Stat EKG, remote telemetry, H&H and labetalol ordered by Dr. Vikram Nye.

## 2022-03-28 NOTE — PROGRESS NOTES
TRANSFER - IN REPORT:    Verbal report received from Lilian Machado RN(name) on [address]  being received from 606(unit) for ordered procedure      Report consisted of patients Situation, Background, Assessment and   Recommendations(SBAR). Information from the following report(s) SBAR and Kardex was reviewed with the receiving nurse. Opportunity for questions and clarification was provided. Assessment completed upon patients arrival to unit and care assumed.

## 2022-03-28 NOTE — PROGRESS NOTES
Spoke briefly to Mr. Malhotra in room 606 about discharge planning. Prior to this admit, he was independent with ADLs. He does not have health insurance, but has a PCP. At discharge, his plan is home. No additional discharge needs identified, but Case Management available as needed. Care Management Interventions  PCP Verified by CM:  Yes  Support Systems: Spouse/Significant Other  Confirm Follow Up Transport: Self  The Plan for Transition of Care is Related to the Following Treatment Goals : home when stable  Discharge Location  Patient Expects to be Discharged to[de-identified] Home

## 2022-03-28 NOTE — PROGRESS NOTES
03/27/22 1930   Dual Skin Pressure Injury Assessment   Dual Skin Pressure Injury Assessment WDL   Second Care Provider (Based on [address] [place] [address]) Maverick Fair, RN   Skin Integumentary   Skin Integumentary (WDL) X    Pressure  Injury Documentation No Pressure Injury Noted-Pressure Ulcer Prevention Initiated   Skin Color Appropriate for ethnicity   Skin Condition/Temp Warm   Skin Integrity Intact   Nails X   Exceptions to WDL Discolored  (left thumb)   Wound Prevention and Protection Methods   Orientation of Wound Prevention Posterior   Location of Wound Prevention Sacrum/Coccyx   Dressing Present  No   Wound Offloading (Prevention Methods) Bed, pressure reduction mattress

## 2022-03-28 NOTE — PROGRESS NOTES
TRANSFER - OUT REPORT:    Verbal report given to Tobin Lucero on Jemal Lomeli  being transferred to GI lab for ordered procedure       Report consisted of patients Situation, Background, Assessment and   Recommendations(SBAR). Information from the following report(s) SBAR, Kardex, MAR, Recent Results and Cardiac Rhythm sinus tach was reviewed with the receiving nurse. Lines:   Peripheral IV 03/27/22 Right Hand (Active)   Site Assessment Clean, dry, & intact 03/28/22 0220   Phlebitis Assessment 0 03/28/22 0220   Infiltration Assessment 0 03/28/22 0220   Dressing Status Clean, dry, & intact 03/28/22 0220   Dressing Type Transparent 03/28/22 0220   Hub Color/Line Status Blue; Infusing 03/28/22 0220   Alcohol Cap Used Yes 03/28/22 0220        Opportunity for questions and clarification was provided.

## 2022-03-28 NOTE — PERIOP NOTES
TRANSFER - OUT REPORT:    Verbal report given to [facility] on [address]  being transferred to [phone] for routine progression of care       Report consisted of patients Situation, Background, Assessment and   Recommendations(SBAR). Information from the following report(s) SBAR, OR Summary, Procedure Summary, Intake/Output, MAR, Recent Results and Cardiac Rhythm NSR was reviewed with the receiving nurse. Lines:   Peripheral IV 03/27/22 Left Hand (Active)   Site Assessment Clean, dry, & intact 03/28/22 1744   Phlebitis Assessment 0 03/28/22 1744   Infiltration Assessment 0 03/28/22 1744   Dressing Status Clean, dry, & intact 03/28/22 1744   Dressing Type Transparent 03/28/22 1744   Hub Color/Line Status Patent 03/28/22 1744   Alcohol Cap Used Yes 03/28/22 1100       Peripheral IV 03/28/22 Right Wrist (Active)   Site Assessment Clean, dry, & intact 03/28/22 1744   Phlebitis Assessment 0 03/28/22 1744   Infiltration Assessment 0 03/28/22 1744   Dressing Status Clean, dry, & intact 03/28/22 1744   Dressing Type Transparent 03/28/22 1744   Hub Color/Line Status Patent 03/28/22 1744        Opportunity for questions and clarification was provided. Patient transported with:   Tech    VTE prophylaxis orders have not been written for [address]. Patient and family given floor number and nurses name. Family updated re: pt status after security code verified.

## 2022-03-28 NOTE — H&P
Date of Surgery Update:  Ruben Rome was seen and examined. History and physical has been reviewed. The patient has been examined.  There have been no significant clinical changes since the completion of the originally dated History and Physical.    Signed By: Tea Piper MD     March 28, 2022 5:01 PM

## 2022-03-28 NOTE — ANESTHESIA POSTPROCEDURE EVALUATION
Procedure(s):  ESOPHAGOGASTRODUODENOSCOPY (EGD)/ 26 *Rm 606. general    Anesthesia Post Evaluation        Patient location during evaluation: PACU  Patient participation: complete - patient participated  Level of consciousness: awake and alert  Pain management: adequate  Airway patency: patent  Anesthetic complications: no  Cardiovascular status: acceptable  Respiratory status: acceptable  Hydration status: acceptable  Post anesthesia nausea and vomiting:  none  Final Post Anesthesia Temperature Assessment:  Normothermia (36.0-37.5 degrees C)      INITIAL Post-op Vital signs:   Vitals Value Taken Time   /106 03/28/22 1824   Temp 37.2 °C (98.9 °F) 03/28/22 1744   Pulse 91 03/28/22 1828   Resp 16 03/28/22 1824   SpO2 95 % 03/28/22 1825   Vitals shown include unvalidated device data.

## 2022-03-28 NOTE — PROGRESS NOTES
Hospitalist Progress Note   Admit Date:  3/27/2022 10:56 AM   Name:  Alvina Cruz   Age:  41 y.o. Sex:  male  :  1975   MRN:  [id number]   Room:  606/    Presenting Complaint: Abdominal Pain    Reason(s) for Admission: Cyclic vomiting syndrome [R11.15]  Normal anion gap metabolic acidosis [facility] Course & Interval History:       Mr. Darrell Paredes is a 40 yo male with PMH of THC use, GI bleed  (esophagitis, jeremiah-woodward tear) , hyperemesis syndrome who is admitted with nausea/ emesis with BETTY. He was on fluu liquids and IVF hydration. He developed acute GI bleeding on 3-28-22 with dark red emesis. Discharge plans pending. Subjective/24hr Events (22):     Threw up about 500 cc bright red emesis, ROS limited per interactions and feeling unwell, denies abdominal pain      Assessment & Plan:     Principal Problem:    GI bleed (3/28/2022)- new 3-28-22  ·   NPO  ·  cc/hr   · IV protonix every 12 hours   · STAT HGB now and repeat every 6 hours   · GI consulted, messaged and aware   · Stop lovenox          Sickle cell trait ([facility] [address]           Hypokalemia (3/27/2022)  ·   Replaced, repeat lab          Anxiety        Cannabis hyperemesis syndrome concurrent with and due to cannabis abuse ([address]     Tetrahydrocannabinol (THC) use disorder, moderate, dependence ([facility] [address]  ·   Needs illicit drug cessation              Normal anion gap metabolic acidosis (3/54/1141)  ·  resolved            Acute prerenal azotemia (3/27/2022)  ·   Resolved  · Continued IVF            Lactic acidosis (3/27/2022)  ·  resolved          Leukocytosis (3/27/2022)  ·   Trend CBC  · UA negative for UTI  · Blood cultures pending   · Needs hematuria followup with urology           Hypertension (3/28/2022)  ·   As needed IV labetalol  · Tele monitoring       Hypophosphatemia:  · Repeat lab        Elevated CPK:  · Repeat lab tomorrow           Dispo/Discharge Planning:     pending     Diet:  DIET NPO  DVT PPx:  SCD  Code status: Full Code    Hospital Problems as of 3/28/2022 Date Reviewed: 3/1/2019          Codes Class Noted - Resolved POA    * (Principal) GI bleed ICD-10-CM: K92.2  ICD-9-CM: 578.9  3/28/2022 - Present Yes        Nausea and vomiting ICD-10-CM: R11.2  ICD-9-CM: 787.01  3/28/2022 - Present Yes        Hypertension ICD-10-CM: I10  ICD-9-CM: 401.9  3/28/2022 - Present Yes        Hypokalemia ICD-10-CM: E87.6  ICD-9-CM: 276.8  3/27/2022 - Present Yes        Normal anion gap metabolic acidosis [id number]-41-UX: E87.2  ICD-9-CM: 276.2  3/27/2022 - Present Yes        Acute prerenal azotemia ICD-10-CM: N19  ICD-9-CM: 790.6  3/27/2022 - Present Yes        Lactic acidosis ICD-10-CM: E87.2  ICD-9-CM: 276.2  3/27/2022 - Present Yes        Leukocytosis ICD-10-CM: D72.829  ICD-9-CM: 288.60  3/27/2022 - Present Yes        Sinus tachycardia ICD-10-CM: R00.0  ICD-9-CM: 427.89  3/27/2022 - Present Yes        Cyclic vomiting syndrome ICD-10-CM: R11.15  ICD-9-CM: 536.2  6/10/2021 - Present Yes        Tetrahydrocannabinol (THC) use disorder, moderate, dependence (HCC) (Chronic) ICD-10-CM: F12.20  ICD-9-CM: 304.30  7/11/2016 - Present Yes        Cannabis hyperemesis syndrome concurrent with and due to cannabis abuse ([facility] ICD-10-CM: F12.188  ICD-9-CM: 536.2, 305.20  8/12/2014 - Present Yes        Anxiety (Chronic) ICD-10-CM: F41.9  ICD-9-CM: 300.00  4/30/2014 - Present Yes        Esophageal reflux (Chronic) ICD-10-CM: K21.9  ICD-9-CM: 530.81  4/30/2014 - Present Yes        Sickle cell trait (HCC) (Chronic) ICD-10-CM: D57.3  ICD-9-CM: 282.5  3/5/2013 - Present Yes        Nondiabetic gastroparesis ICD-10-CM: K31.84  ICD-9-CM: 536.3  3/17/2011 - Present Yes              Objective:     Patient Vitals for the past 24 hrs:   Temp Pulse Resp BP SpO2   03/28/22 0733 97.7 °F (36.5 °C) (!) 102 19 (!) 149/106 94 %   03/28/22 0554 97.9 °F (36.6 °C) (!) 116 18 (!) 148/105 92 %   03/28/22 0412 -- (!) 132 -- -- --   03/28/22 0303 -- Sandra Dyer 143 -- -- --   03/28/22 0217 -- (!) 142 -- (!) 158/96 94 %   03/27/22 2340 97.9 °F (36.6 °C) (!) 123 18 (!) 173/107 92 %   03/27/22 1923 99.6 °F (37.6 °C) (!) 121 18 (!) 159/107 92 %   03/27/22 1822 -- (!) 121 21 (!) 147/98 94 %   03/27/22 1807 -- (!) 123 21 (!) 143/108 93 %   03/27/22 1752 -- (!) 119 22 (!) 142/101 93 %   03/27/22 1737 -- (!) 129 18 (!) 164/103 92 %   03/27/22 1722 -- (!) 134 18 (!) 150/119 94 %   03/27/22 1707 -- (!) 138 15 (!) 150/109 96 %   03/27/22 1652 -- (!) 134 13 (!) 137/97 95 %   03/27/22 1643 -- -- -- -- 94 %   03/27/22 1638 -- (!) 136 14 (!) 174/113 94 %   03/27/22 1213 -- (!) 123 20 (!) 157/115 97 %   03/27/22 0951 98.4 °F (36.9 °C) (!) 145 16 (!) 165/113 95 %     Oxygen Therapy  O2 Sat (%): 94 % (03/28/22 0733)  Pulse via Oximetry: 123 beats per minute (03/27/22 1822)  O2 Device: None (Room air) (03/28/22 0220)    Estimated body mass index is 25.94 kg/m² as calculated from the following:    Height as of this encounter: 5' 1\" (1.549 m). Weight as of this encounter: 62.3 kg (137 lb 4.8 oz). Intake/Output Summary (Last 24 hours) at 3/28/2022 0756  Last data filed at 3/28/2022 0733  Gross per 24 hour   Intake 120 ml   Output 1150 ml   Net -1030 ml         Physical Exam:     Blood pressure (!) 149/106, pulse (!) 102, temperature 97.7 °F (36.5 °C), resp. rate 19, height 5' 1\" (1.549 m), weight 62.3 kg (137 lb 4.8 oz), SpO2 94 %. General:    Well nourished. No overt distress, alert   CV:   Regular, tachycardic, no edema  Lungs:   CTAB. No wheezing, rhonchi, or rales. Respirations even, unlabored  Abdomen: Bowel sounds present. Soft, nontender, nondistended. Extremities: No cyanosis or clubbing. No edema  Skin:     No rashes and normal coloration. Warm and dry. Neuro:   grossly intact. Psych:  Normal mood and affect.       I have reviewed ordered lab tests and independently visualized imaging below:    Recent Labs:  Recent Results (from the past 48 hour(s))   CBC WITH AUTOMATED DIFF    Collection Time: 03/27/22  9:59 AM   Result Value Ref Range    WBC 11.5 (H) 4.3 - 11.1 K/uL    RBC 5.71 (H) 4.23 - 5.6 M/uL    HGB 16.8 13.6 - 17.2 g/dL    HCT 45.0 41.1 - 50.3 %    MCV 78.8 (L) 79.6 - 97.8 FL    MCH 29.4 26.1 - 32.9 PG    MCHC 37.3 (H) 31.4 - 35.0 g/dL    RDW 12.8 11.9 - 14.6 %    PLATELET 763 231 - 858 K/uL    MPV 9.0 (L) 9.4 - 12.3 FL    ABSOLUTE NRBC 0.00 0.0 - 0.2 K/uL    DF AUTOMATED      NEUTROPHILS 77 43 - 78 %    LYMPHOCYTES 11 (L) 13 - 44 %    MONOCYTES 11 4.0 - 12.0 %    EOSINOPHILS 0 (L) 0.5 - 7.8 %    BASOPHILS 0 0.0 - 2.0 %    IMMATURE GRANULOCYTES 0 0.0 - 5.0 %    ABS. NEUTROPHILS 8.9 (H) 1.7 - 8.2 K/UL    ABS. LYMPHOCYTES 1.3 0.5 - 4.6 K/UL    ABS. MONOCYTES 1.3 0.1 - 1.3 K/UL    ABS. EOSINOPHILS 0.0 0.0 - 0.8 K/UL    ABS. BASOPHILS 0.0 0.0 - 0.2 K/UL    ABS. IMM. GRANS. 0.1 0.0 - 0.5 K/UL   METABOLIC PANEL, COMPREHENSIVE    Collection Time: 03/27/22  9:59 AM   Result Value Ref Range    Sodium 138 138 - 145 mmol/L    Potassium 3.4 (L) 3.5 - 5.1 mmol/L    Chloride 108 (H) 98 - 107 mmol/L    CO2 17 (L) 21 - 32 mmol/L    Anion gap 13 7 - 16 mmol/L    Glucose 160 (H) 65 - 100 mg/dL    BUN 31 (H) 6 - 23 MG/DL    Creatinine 1.40 0.8 - 1.5 MG/DL    GFR est AA >60 >60 ml/min/1.73m2    GFR est non-AA 58 (L) >60 ml/min/1.73m2    Calcium 10.7 (H) 8.3 - 10.4 MG/DL    Bilirubin, total 0.9 0.2 - 1.1 MG/DL    ALT (SGPT) 31 12 - 65 U/L    AST (SGOT) 31 15 - 37 U/L    Alk.  phosphatase 120 50 - 136 U/L    Protein, total 9.2 (H) 6.3 - 8.2 g/dL    Albumin 5.0 3.5 - 5.0 g/dL    Globulin 4.2 (H) 2.3 - 3.5 g/dL    A-G Ratio 1.2 1.2 - 3.5     LIPASE    Collection Time: 03/27/22  9:59 AM   Result Value Ref Range    Lipase 116 73 - 393 U/L   CK    Collection Time: 03/27/22  9:59 AM   Result Value Ref Range     (H) 21 - 215 U/L   PHOSPHORUS    Collection Time: 03/27/22  9:59 AM   Result Value Ref Range    Phosphorus 1.6 (L) 2.5 - 4.5 MG/DL   MAGNESIUM    Collection Time: 03/27/22  9:59 AM   Result Value Ref Range    Magnesium 2.4 1.8 - 2.4 mg/dL   LACTIC ACID    Collection Time: 03/27/22 12:05 PM   Result Value Ref Range    Lactic acid 3.2 (H) 0.4 - 2.0 MMOL/L   URINALYSIS W/ RFLX MICROSCOPIC    Collection Time: 03/27/22 12:05 PM   Result Value Ref Range    Color YELLOW      Appearance CLEAR      Specific gravity 1.010 1.001 - 1.023      pH (UA) 5.5 5.0 - 9.0      Protein 100 (A) NEG mg/dL    Glucose Negative NEG mg/dL    Ketone 15 (A) NEG mg/dL    Bilirubin Negative NEG      Blood LARGE (A) NEG      Urobilinogen 0.2 0.2 - 1.0 EU/dL    Nitrites Positive (A) NEG      Leukocyte Esterase Negative NEG      WBC 0-3 0 /hpf    RBC 0-3 0 /hpf    Epithelial cells 0-3 0 /hpf    Bacteria 0 0 /hpf    Casts 0-3 0 /lpf    Mucus TRACE 0 /lpf    Other observations RESULTS VERIFIED MANUALLY     DRUG SCREEN, URINE    Collection Time: 03/27/22 12:05 PM   Result Value Ref Range    PCP(PHENCYCLIDINE) Negative      BENZODIAZEPINES Negative      COCAINE Negative      AMPHETAMINES Negative      METHADONE Negative      THC (TH-CANNABINOL) Positive      OPIATES Negative      BARBITURATES Negative     LACTIC ACID    Collection Time: 03/27/22  2:54 PM   Result Value Ref Range    Lactic acid 1.4 0.4 - 2.0 MMOL/L   POC URINE MACROSCOPIC    Collection Time: 03/27/22  3:27 PM   Result Value Ref Range    Spec. gravity (POC) 1.020 1.001 - 1.023      pH, urine  (POC) 5.5 5.0 - 9.0      Protein (POC) 100 (A) NEG mg/dL    Glucose, urine (POC) Negative NEG mg/dL    Ketones (POC) 40 (A) NEG mg/dL    Bilirubin (POC) Negative NEG      Blood (POC) MODERATE (A) NEG      Urobilinogen (POC) 0.2 0.2 - 1.0 EU/dL    Nitrite (POC) Negative NEG      Leukocyte esterase (POC) Negative NEG      Performed by Eliseo Stone, BLOOD    Collection Time: 03/27/22  4:37 PM    Specimen: Blood   Result Value Ref Range    Special Requests: NO SPECIAL REQUESTS  RIGHT  HAND        Culture result: NO GROWTH AFTER 13 HOURS     CULTURE, BLOOD Collection Time: 03/27/22  8:18 PM    Specimen: Blood   Result Value Ref Range    Special Requests: RIGHT  HAND        Culture result: NO GROWTH AFTER 11 HOURS     EKG, 12 LEAD, INITIAL    Collection Time: 03/28/22  3:06 AM   Result Value Ref Range    Ventricular Rate 152 BPM    Atrial Rate 152 BPM    P-R Interval 118 ms    QRS Duration 64 ms    Q-T Interval 326 ms    QTC Calculation (Bezet) 518 ms    Calculated P Axis 66 degrees    Calculated R Axis 51 degrees    Calculated T Axis 53 degrees    Diagnosis       Sinus tachycardia  Nonspecific ST abnormality  Abnormal ECG  When compared with ECG of 28-MAR-2022 03:03,  Previous ECG has undetermined rhythm, needs review     METABOLIC PANEL, BASIC    Collection Time: 03/28/22  5:18 AM   Result Value Ref Range    Sodium 144 136 - 145 mmol/L    Potassium 3.9 3.5 - 5.1 mmol/L    Chloride 113 (H) 98 - 107 mmol/L    CO2 23 21 - 32 mmol/L    Anion gap 8 7 - 16 mmol/L    Glucose 113 (H) 65 - 100 mg/dL    BUN 17 6 - 23 MG/DL    Creatinine 0.80 0.8 - 1.5 MG/DL    GFR est AA >60 >60 ml/min/1.73m2    GFR est non-AA >60 >60 ml/min/1.73m2    Calcium 9.5 8.3 - 10.4 MG/DL   CBC W/O DIFF    Collection Time: 03/28/22  5:18 AM   Result Value Ref Range    WBC 13.2 (H) 4.3 - 11.1 K/uL    RBC 5.03 4.23 - 5.6 M/uL    HGB 14.9 13.6 - 17.2 g/dL    HCT 40.4 (L) 41.1 - 50.3 %    MCV 80.3 79.6 - 97.8 FL    MCH 29.6 26.1 - 32.9 PG    MCHC 36.9 (H) 31.4 - 35.0 g/dL    RDW 13.1 11.9 - 14.6 %    PLATELET 255 390 - 568 K/uL    MPV 9.1 (L) 9.4 - 12.3 FL    ABSOLUTE NRBC 0.00 0.0 - 0.2 K/uL       All Micro Results     Procedure Component Value Units Date/Time    CULTURE, BLOOD [[id number]] Collected: 03/27/22 1637    Order Status: Completed Specimen: Blood Updated: 03/28/22 0735     Special Requests: --        NO SPECIAL REQUESTS  RIGHT  HAND       Culture result: NO GROWTH AFTER 13 HOURS       CULTURE, BLOOD [[id number]] Collected: 03/27/22 2018    Order Status: Completed Specimen: Blood Updated: 03/28/22 3025     Special Requests: --        RIGHT  HAND       Culture result: NO GROWTH AFTER 11 HOURS       CULTURE, URINE [[id number]] Collected: 03/27/22 4684    Order Status: Completed Specimen: Urine from Clean catch Updated: 03/27/22 6628          Other Studies:  CT ABD PELV W CONT    Result Date: 3/27/2022  CT ABDOMEN AND PELVIS WITH CONTRAST 3/27/2022 12:54 PM History: ; Patient ambulatory to triage with mask in place. Patient reports lower abd pain/cramping and n/v x 3 days. Denies fever, chills, diarrhea. TECHNIQUE: The patient received 100 mL Isovue-370 nonionic IV contrast. Axial images were obtained through the abdomen and pelvis. Coronal reformatted images were generated. All CT scans at this facility used dose modulation, interactive reconstruction and/or weight based dosing when appropriate to reduce radiation dose to as low as reasonably achievable. Comparison: 2/6/2016 Findings: Included portions of the lung bases are clear. ABDOMEN: Gallbladder: Normal. Liver: No focal hepatic lesions or biliary ductal dilatation. Pancreas: Normal. Spleen: Normal. Adrenal glands: Normal. Kidneys: The kidneys enhance symmetrically with contrast and there is no hydronephrosis. A 9 mm medial upper pole left renal cortical lesion (image 22) measures 29 Hounsfield units in density. Although incompletely characterized this is likely a cyst. Consider routine follow-up renal sonography for further characterization. Bowel: The appendix is normal in appearance. Small bowel loops are normal in caliber. Retroperitoneum:No adenopathy. Abdominal aorta is normal in caliber. PELVIS:The bladder is normal in appearance. There is no free pelvic fluid. Bones: There are no aggressive osseous lesions. 1. No acute abdominal findings. 2. Suspected small left renal cortical cyst. Consider routine follow-up sonography for confirmation.         Current Meds:  Current Facility-Administered Medications   Medication Dose Route Frequency    pantoprazole (PROTONIX) 40 mg in 0.9% sodium chloride 10 mL injection  40 mg IntraVENous Q12H    labetaloL (NORMODYNE;TRANDATE) injection 10 mg  10 mg IntraVENous Q6H PRN    sodium chloride (NS) flush 5-40 mL  5-40 mL IntraVENous Q8H    sodium chloride (NS) flush 5-40 mL  5-40 mL IntraVENous PRN    acetaminophen (TYLENOL) tablet 650 mg  650 mg Oral Q6H PRN    Or    acetaminophen (TYLENOL) suppository 650 mg  650 mg Rectal Q6H PRN    polyethylene glycol (MIRALAX) packet 17 g  17 g Oral DAILY PRN    metoclopramide HCl (REGLAN) injection 5 mg  5 mg IntraVENous Q6H PRN    lactated Ringers infusion  200 mL/hr IntraVENous CONTINUOUS    LORazepam (ATIVAN) tablet 1 mg  1 mg Oral Q6H PRN       Signed:  Laura Dewitt MD    Part of this note may have been written by using a voice dictation software. The note has been proof read but may still contain some grammatical/other typographical errors.

## 2022-03-28 NOTE — PROCEDURES
Endoscopic Gastroduodenoscopy Procedure Note    Indications:  42 yo male pt of Dr. Odilon Pillai with PMH including but not limited to reflux, PUD, esophagitis with MWT, cannabinoid hyperemesis syndrome, HTN, gastroparesis documented on GES 2012, hx of H pylori s/p Prevpac in 2011, sickle cell trait, anxiety, C diff in 2011, who is seen in consultation 28 March 2022 at the request of Dr. Erika Webb for GI bleed, who was admitted after presenting with intractable n/v with coffee ground emesis, with hx of prior esophagitis, MWT, and cannabinoid hyperemesis syndrome. Prior EGD with Dr. Lisset Wright 12 April 2021 for similar sxs with HH, moderate esophagitis - LA class B. He was noted in the ER to have BETTY and leukocytosis, normal LFTs and lipase, Hgb initially 16.8 and 14.9 on recheck. UDS positive for THC. CT scan abd/pelvis with no acute findings. He likely has flare up of cannabinoid hyperemesis syndrome and may have recurrent inflammation, MW tear, ulceration. 42 yo male pt of Dr. Odilon Pillai with PMH including but not limited to reflux, PUD, esophagitis with MWT, cannabinoid hyperemesis syndrome, HTN, gastroparesis documented on GES 2012, hx of H pylori s/p Prevpac in 2011, sickle cell trait, anxiety, C diff in 2011, who is seen in consultation 28 March 2022 at the request of Dr. Erika Webb for GI bleed, who was admitted after presenting with intractable n/v with coffee ground emesis, with hx of prior esophagitis, MWT, and cannabinoid hyperemesis syndrome. Prior EGD with Dr. Lisset Wright 12 April 2021 for similar sxs with HH, moderate esophagitis - LA class B. He was noted in the ER to have BETTY and leukocytosis, normal LFTs and lipase, Hgb initially 16.8 and 14.9 on recheck. UDS positive for THC. CT scan abd/pelvis with no acute findings. He likely has flare up of cannabinoid hyperemesis syndrome and may have recurrent inflammation, MW tear, ulceration.     Anesthesia/Sedation: General anesthesia with intubation    Pre-Procedure Physical:    Current Facility-Administered Medications   Medication Dose Route Frequency    pantoprazole (PROTONIX) 40 mg in 0.9% sodium chloride 10 mL injection  40 mg IntraVENous Q12H    labetaloL (NORMODYNE;TRANDATE) injection 10 mg  10 mg IntraVENous Q6H PRN    lactated Ringers infusion 1,000 mL  1,000 mL IntraVENous CONTINUOUS    sodium chloride (NS) flush 5-40 mL  5-40 mL IntraVENous Q8H    sodium chloride (NS) flush 5-40 mL  5-40 mL IntraVENous PRN    acetaminophen (TYLENOL) tablet 650 mg  650 mg Oral Q6H PRN    Or    acetaminophen (TYLENOL) suppository 650 mg  650 mg Rectal Q6H PRN    polyethylene glycol (MIRALAX) packet 17 g  17 g Oral DAILY PRN    metoclopramide HCl (REGLAN) injection 5 mg  5 mg IntraVENous Q6H PRN    lactated Ringers infusion  200 mL/hr IntraVENous CONTINUOUS    LORazepam (ATIVAN) tablet 1 mg  1 mg Oral Q6H PRN      Amoxicillin, Aspirin, Hydrocodone, Ibuprofen, Pcn [penicillins], and Phenergan [promethazine]    Patient Vitals for the past 8 hrs:   BP Temp Pulse Resp SpO2 Height Weight   03/28/22 1456 (!) 150/98 98.4 °F (36.9 °C) 88 16 96 % 5' 1\" (1.549 m) 61.2 kg (135 lb)   03/28/22 1137 (!) 120/93 98.3 °F (36.8 °C) 79 -- 97 % -- --       Exam      Airway: clear   Heart: normal S1and S2    Lungs: clear bilateral  Abdomen: soft, nontender, bowel sounds present and normal in all quads   Mental Status: awake, alert and oriented to person, place and time          Procedure Details     Informed consent was obtained for the procedure, including conscious sedation. Risks of pancreatitis, infection, perforation, hemorrhage, adverse drug reaction and aspiration were discussed. The patient was placed in the left lateral decubitus position.   Based on the pre-procedure assessment, including review of the patient's medical history, medications, allergies, and review of systems, he had been deemed to be an appropriate candidate for conscious sedation; he was therefore sedated with the medications listed below. He was monitored continuously with ECG tracing, pulse oximetry, blood pressure monitoring, and direct observation. The EGD gastroscope was inserted into the mouth and advanced under direct vision to the second portion of the duodenum. A careful inspection was made as the gastroscope was withdrawn, including a retroflexed view of the proximal stomach; findings and interventions are described below. Appropriate photodocumentation was obtained. Findings:   Esophagus- Normal except for LA grade minimal esophagitis. No active bleeding. No MWT. Stomach- Some strands of black material.  No active bleeding. Mild gastritis. One area on the proximal body that was especially red and might be emetogenic gastritis. Very small hiatal hernia. Duodenum- Normal.    Therapies: None    Specimens: None    Estimated Blood Loss: 0 cc           Complications:   None; patient tolerated the procedure well. Attending Attestation:  I performed the procedure. Impression:   No active bleeding. Very mild esophagitis. No MWT. Very mild gastritis. Recommendations:  Continue to avoid Cannabis. IV Protonix 2X a day. Start clears.   Antiemetics prn.    Bert Mckay MD

## 2022-03-28 NOTE — PROGRESS NOTES
Patient in GI prep waiting for scheduled procedure. Patient actively vomiting bile. Patient complains of nausea. Dr. Marshal Rosen called and made aware. MD gave verbal orders to give 4mg IV Zofran once. Patient denies allergy. 1448: Zofran 4mg IV given per orders (see MAR).

## 2022-03-29 LAB
ANION GAP SERPL CALC-SCNC: 7 MMOL/L (ref 7–16)
ATRIAL RATE: 77 BPM
BUN SERPL-MCNC: 16 MG/DL (ref 6–23)
CALCIUM SERPL-MCNC: 8.9 MG/DL (ref 8.3–10.4)
CALCULATED P AXIS, ECG09: 40 DEGREES
CALCULATED R AXIS, ECG10: 46 DEGREES
CALCULATED T AXIS, ECG11: 35 DEGREES
CHLORIDE SERPL-SCNC: 109 MMOL/L (ref 98–107)
CK SERPL-CCNC: 1651 U/L (ref 21–215)
CO2 SERPL-SCNC: 24 MMOL/L (ref 21–32)
CREAT SERPL-MCNC: 1 MG/DL (ref 0.8–1.5)
DIAGNOSIS, 93000: NORMAL
GLUCOSE SERPL-MCNC: 101 MG/DL (ref 65–100)
HCT VFR BLD AUTO: 37.6 % (ref 41.1–50.3)
HCT VFR BLD AUTO: 42.5 % (ref 41.1–50.3)
HGB BLD-MCNC: 13.5 G/DL (ref 13.6–17.2)
HGB BLD-MCNC: 15.2 G/DL (ref 13.6–17.2)
P-R INTERVAL, ECG05: 144 MS
PHOSPHATE SERPL-MCNC: 4 MG/DL (ref 2.5–4.5)
POTASSIUM SERPL-SCNC: 3.8 MMOL/L (ref 3.5–5.1)
Q-T INTERVAL, ECG07: 374 MS
QRS DURATION, ECG06: 68 MS
QTC CALCULATION (BEZET), ECG08: 423 MS
SODIUM SERPL-SCNC: 140 MMOL/L (ref 138–145)
VENTRICULAR RATE, ECG03: 77 BPM

## 2022-03-29 PROCEDURE — 74011000250 HC RX REV CODE- 250: Performed by: INTERNAL MEDICINE

## 2022-03-29 PROCEDURE — 82550 ASSAY OF CK (CPK): CPT

## 2022-03-29 PROCEDURE — 85018 HEMOGLOBIN: CPT

## 2022-03-29 PROCEDURE — 36415 COLL VENOUS BLD VENIPUNCTURE: CPT

## 2022-03-29 PROCEDURE — C9113 INJ PANTOPRAZOLE SODIUM, VIA: HCPCS | Performed by: INTERNAL MEDICINE

## 2022-03-29 PROCEDURE — G0378 HOSPITAL OBSERVATION PER HR: HCPCS

## 2022-03-29 PROCEDURE — 80048 BASIC METABOLIC PNL TOTAL CA: CPT

## 2022-03-29 PROCEDURE — 74011250636 HC RX REV CODE- 250/636: Performed by: FAMILY MEDICINE

## 2022-03-29 PROCEDURE — 94760 N-INVAS EAR/PLS OXIMETRY 1: CPT

## 2022-03-29 PROCEDURE — 96376 TX/PRO/DX INJ SAME DRUG ADON: CPT

## 2022-03-29 PROCEDURE — 74011250636 HC RX REV CODE- 250/636: Performed by: INTERNAL MEDICINE

## 2022-03-29 PROCEDURE — 84100 ASSAY OF PHOSPHORUS: CPT

## 2022-03-29 PROCEDURE — 93005 ELECTROCARDIOGRAM TRACING: CPT | Performed by: INTERNAL MEDICINE

## 2022-03-29 RX ORDER — METOCLOPRAMIDE HYDROCHLORIDE 5 MG/ML
5 INJECTION INTRAMUSCULAR; INTRAVENOUS
Status: COMPLETED | OUTPATIENT
Start: 2022-03-29 | End: 2022-03-29

## 2022-03-29 RX ORDER — ONDANSETRON 2 MG/ML
4 INJECTION INTRAMUSCULAR; INTRAVENOUS
Status: DISCONTINUED | OUTPATIENT
Start: 2022-03-29 | End: 2022-03-31 | Stop reason: HOSPADM

## 2022-03-29 RX ORDER — HYDRALAZINE HYDROCHLORIDE 20 MG/ML
10 INJECTION INTRAMUSCULAR; INTRAVENOUS
Status: DISCONTINUED | OUTPATIENT
Start: 2022-03-29 | End: 2022-03-31 | Stop reason: HOSPADM

## 2022-03-29 RX ORDER — DIPHENHYDRAMINE HYDROCHLORIDE 50 MG/ML
12.5 INJECTION, SOLUTION INTRAMUSCULAR; INTRAVENOUS
Status: DISCONTINUED | OUTPATIENT
Start: 2022-03-29 | End: 2022-03-31 | Stop reason: HOSPADM

## 2022-03-29 RX ADMIN — METOCLOPRAMIDE HYDROCHLORIDE 5 MG: 5 INJECTION INTRAMUSCULAR; INTRAVENOUS at 00:04

## 2022-03-29 RX ADMIN — SODIUM CHLORIDE, PRESERVATIVE FREE 10 ML: 5 INJECTION INTRAVENOUS at 05:08

## 2022-03-29 RX ADMIN — HYDRALAZINE HYDROCHLORIDE 10 MG: 20 INJECTION INTRAMUSCULAR; INTRAVENOUS at 20:28

## 2022-03-29 RX ADMIN — SODIUM CHLORIDE, PRESERVATIVE FREE 10 ML: 5 INJECTION INTRAVENOUS at 20:18

## 2022-03-29 RX ADMIN — SODIUM CHLORIDE 40 MG: 9 INJECTION, SOLUTION INTRAMUSCULAR; INTRAVENOUS; SUBCUTANEOUS at 09:29

## 2022-03-29 RX ADMIN — ONDANSETRON 4 MG: 2 INJECTION INTRAMUSCULAR; INTRAVENOUS at 16:37

## 2022-03-29 RX ADMIN — DIPHENHYDRAMINE HYDROCHLORIDE 25 MG: 50 INJECTION, SOLUTION INTRAMUSCULAR; INTRAVENOUS at 00:04

## 2022-03-29 RX ADMIN — DIPHENHYDRAMINE HYDROCHLORIDE 12.5 MG: 50 INJECTION, SOLUTION INTRAMUSCULAR; INTRAVENOUS at 09:29

## 2022-03-29 RX ADMIN — SODIUM CHLORIDE, PRESERVATIVE FREE 10 ML: 5 INJECTION INTRAVENOUS at 13:46

## 2022-03-29 RX ADMIN — SODIUM CHLORIDE 40 MG: 9 INJECTION, SOLUTION INTRAMUSCULAR; INTRAVENOUS; SUBCUTANEOUS at 20:18

## 2022-03-29 RX ADMIN — DIPHENHYDRAMINE HYDROCHLORIDE 12.5 MG: 50 INJECTION, SOLUTION INTRAMUSCULAR; INTRAVENOUS at 23:46

## 2022-03-29 RX ADMIN — SODIUM CHLORIDE, POTASSIUM CHLORIDE, SODIUM LACTATE AND CALCIUM CHLORIDE 200 ML/HR: 600; 310; 30; 20 INJECTION, SOLUTION INTRAVENOUS at 04:00

## 2022-03-29 RX ADMIN — HYDRALAZINE HYDROCHLORIDE 10 MG: 20 INJECTION INTRAMUSCULAR; INTRAVENOUS at 09:29

## 2022-03-29 RX ADMIN — METOCLOPRAMIDE HYDROCHLORIDE 5 MG: 5 INJECTION INTRAMUSCULAR; INTRAVENOUS at 05:07

## 2022-03-29 RX ADMIN — ONDANSETRON 4 MG: 2 INJECTION INTRAMUSCULAR; INTRAVENOUS at 22:13

## 2022-03-29 NOTE — PROGRESS NOTES
Hospitalist Progress Note   Admit Date:  3/27/2022 10:56 AM   Name:  Hulan Oppenheim   Age:  41 y.o. Sex:  male  :  1975   MRN:  [id number]   Room:  606/01    Presenting Complaint: Abdominal Pain    Reason(s) for Admission: Cyclic vomiting syndrome [R11.15]  Normal anion gap metabolic acidosis [facility] Course & Interval History:     Mr. Gurmeet Davidson is a 40 yo male with PMH of THC use, GI bleed  (esophagitis, jeremiah-woodward tear) , hyperemesis syndrome who is admitted with nausea/ emesis with BETTY. He was on full liquids and IVF hydration. He developed acute GI bleeding on 3-28-22 with dark red emesis. Seen by GI s/p EGD showing esophagitis/ gastritis. CTAP shows renal cyst.   CPK elevated. Discharge plans pending. Subjective/24hr Events (22):     Wife present, patient in shower as this helps his nausea the most, as result he is continually taking off remote tele and unhooking IV, says zofran and benadryl helpful but not ativan nor reglan, asking about carafate     Still with nausea/ emesis    Assessment & Plan:     Principal Problem:    GI bleed (3/28/2022)- new 3-28-22  ·  clear liquids as tolerant  ·  cc/hr as able    · IV protonix every 12 hours   · Daily CBC  · GI appreciated   · Stopped lovenox          Sickle cell trait ([address]   ·   noted          Hypokalemia (3/27/2022)  ·   Replaced, repeat lab          Anxiety        Cannabis hyperemesis syndrome concurrent with and due to cannabis abuse ([address]     Tetrahydrocannabinol (THC) use disorder, moderate, dependence ([address]  ·   Needs illicit drug cessation              Normal anion gap metabolic acidosis (2194)  ·  resolved            Acute prerenal azotemia (3/27/2022)  ·   Resolved  · Continued IVF as able            Lactic acidosis (3/27/2022)  ·  resolved          Leukocytosis (3/27/2022)  ·   Trend CBC  · UA negative for UTI  · Blood cultures pending, negative to date    · Needs hematuria followup with urology           Hypertension (3/28/2022)  ·   As needed IV labetalol/ or hydralazine  · Tele monitoring       Hypophosphatemia:  · Repeat lab resolved         Elevated CPK:  · Repeat lab tomorrow   · Has ordered IVF          Dispo/Discharge Planning:     pending     Diet:  ADULT DIET Clear Liquid  DVT PPx:  SCD  Code status: Full Code    Hospital Problems as of 3/29/2022 Date Reviewed: 3/1/2019          Codes Class Noted - Resolved POA    * (Principal) GI bleed ICD-10-CM: K92.2  ICD-9-CM: 578.9  3/28/2022 - Present Yes        Nausea and vomiting ICD-10-CM: R11.2  ICD-9-CM: 787.01  3/28/2022 - Present Yes        Hypertension ICD-10-CM: I10  ICD-9-CM: 401.9  3/28/2022 - Present Yes        Hypokalemia ICD-10-CM: E87.6  ICD-9-CM: 276.8  3/27/2022 - Present Yes        Normal anion gap metabolic acidosis QBR-59-EL: E87.2  ICD-9-CM: 276.2  3/27/2022 - Present Yes        Acute prerenal azotemia ICD-10-CM: N19  ICD-9-CM: 790.6  3/27/2022 - Present Yes        Lactic acidosis ICD-10-CM: E87.2  ICD-9-CM: 276.2  3/27/2022 - Present Yes        Leukocytosis ICD-10-CM: D72.829  ICD-9-CM: 288.60  3/27/2022 - Present Yes        Sinus tachycardia ICD-10-CM: R00.0  ICD-9-CM: 427.89  3/27/2022 - Present Yes        Cyclic vomiting syndrome ICD-10-CM: R11.15  ICD-9-CM: 536.2  6/10/2021 - Present Yes        Tetrahydrocannabinol (THC) use disorder, moderate, dependence (HCC) (Chronic) ICD-10-CM: F12.20  ICD-9-CM: 304.30  7/11/2016 - Present Yes        Cannabis hyperemesis syndrome concurrent with and due to cannabis abuse ([facility] ICD-10-CM: F12.188  ICD-9-CM: 536.2, 305.20  8/12/2014 - Present Yes        Anxiety (Chronic) ICD-10-CM: F41.9  ICD-9-CM: 300.00  4/30/2014 - Present Yes        Esophageal reflux (Chronic) ICD-10-CM: K21.9  ICD-9-CM: 530.81  4/30/2014 - Present Yes        Sickle cell trait (HCC) (Chronic) ICD-10-CM: D57.3  ICD-9-CM: 282.5  3/5/2013 - Present Yes        Nondiabetic gastroparesis ICD-10-CM: K31.84  ICD-9-CM: 536.3  3/17/2011 - Present Yes              Objective:     Patient Vitals for the past 24 hrs:   Temp Pulse Resp BP SpO2   03/29/22 1227 98.4 °F (36.9 °C) (!) 105 20 (!) 146/103 96 %   03/29/22 0843 98.4 °F (36.9 °C) 92 20 (!) 142/102 94 %   03/29/22 0822 -- -- -- -- 95 %   03/29/22 0342 97.9 °F (36.6 °C) 91 18 (!) 146/99 97 %   03/29/22 0036 98.5 °F (36.9 °C) 78 18 109/73 91 %   03/28/22 2235 98.3 °F (36.8 °C) 99 18 (!) 143/100 90 %   03/28/22 2000 -- 91 -- -- --   03/28/22 1940 98.4 °F (36.9 °C) 96 18 (!) 157/106 94 %   03/28/22 1824 98 °F (36.7 °C) 96 16 (!) 152/106 98 %   03/28/22 1819 -- 94 17 (!) 150/100 97 %   03/28/22 1814 -- 98 15 (!) 149/105 98 %   03/28/22 1809 -- 100 16 (!) 146/105 97 %   03/28/22 1804 -- (!) 106 15 (!) 143/98 98 %   03/28/22 1759 -- (!) 110 16 (!) 152/109 97 %   03/28/22 1754 -- 94 15 (!) 149/104 100 %   03/28/22 1747 -- 92 16 (!) 132/96 100 %   03/28/22 1746 -- 90 15 125/87 --   03/28/22 1744 98.9 °F (37.2 °C) 96 14 125/87 100 %   03/28/22 1456 98.4 °F (36.9 °C) 88 16 (!) 150/98 96 %     Oxygen Therapy  O2 Sat (%): 96 % (03/29/22 1227)  Pulse via Oximetry: 97 beats per minute (03/28/22 1824)  O2 Device: None (Room air) (03/29/22 0822)  O2 Flow Rate (L/min): 6 l/min (03/28/22 4174)    Estimated body mass index is 25.32 kg/m² as calculated from the following:    Height as of this encounter: 5' 1\" (1.549 m). Weight as of this encounter: 60.8 kg (134 lb). Intake/Output Summary (Last 24 hours) at 3/29/2022 1336  Last data filed at 3/28/2022 2235  Gross per 24 hour   Intake 500 ml   Output 1500 ml   Net -1000 ml         Physical Exam:     Blood pressure (!) 146/103, pulse (!) 105, temperature 98.4 °F (36.9 °C), resp. rate 20, height 5' 1\" (1.549 m), weight 60.8 kg (134 lb), SpO2 96 %. General:    Well nourished. Walking about room, having active emesis   Neuro:   grossly intact. Psych:  Normal mood and affect.       I have reviewed ordered lab tests and independently visualized imaging below:    Recent Labs:  Recent Results (from the past 48 hour(s))   LACTIC ACID    Collection Time: 03/27/22  2:54 PM   Result Value Ref Range    Lactic acid 1.4 0.4 - 2.0 MMOL/L   CULTURE, URINE    Collection Time: 03/27/22  2:58 PM    Specimen: Clean catch; Urine   Result Value Ref Range    Special Requests: NO SPECIAL REQUESTS      Culture result: <10,000 COLONIES/mL MIXED SKIN BEATRIZ ISOLATED     POC URINE MACROSCOPIC    Collection Time: 03/27/22  3:27 PM   Result Value Ref Range    Spec. gravity (POC) 1.020 1.001 - 1.023      pH, urine  (POC) 5.5 5.0 - 9.0      Protein (POC) 100 (A) NEG mg/dL    Glucose, urine (POC) Negative NEG mg/dL    Ketones (POC) 40 (A) NEG mg/dL    Bilirubin (POC) Negative NEG      Blood (POC) MODERATE (A) NEG      Urobilinogen (POC) 0.2 0.2 - 1.0 EU/dL    Nitrite (POC) Negative NEG      Leukocyte esterase (POC) Negative NEG      Performed by [facility] [place]    CULTURE, BLOOD    Collection Time: 03/27/22  4:37 PM    Specimen: Blood   Result Value Ref Range    Special Requests: NO SPECIAL REQUESTS  RIGHT  HAND        Culture result: NO GROWTH 2 DAYS     CULTURE, BLOOD    Collection Time: 03/27/22  8:18 PM    Specimen: Blood   Result Value Ref Range    Special Requests: RIGHT  HAND        Culture result: NO GROWTH 2 DAYS     EKG, 12 LEAD, INITIAL    Collection Time: 03/28/22  3:06 AM   Result Value Ref Range    Ventricular Rate 152 BPM    Atrial Rate 152 BPM    P-R Interval 118 ms    QRS Duration 64 ms    Q-T Interval 326 ms    QTC Calculation (Bezet) 518 ms    Calculated P Axis 66 degrees    Calculated R Axis 51 degrees    Calculated T Axis 53 degrees    Diagnosis       Sinus tachycardia  Nonspecific ST abnormality  Abnormal ECG  When compared with ECG of 28-MAR-2022 03:03,  Previous ECG has undetermined rhythm, needs review  Confirmed by [facility]  MD ()KIANA ([id number]) on 3/28/2022 9:19:69 AM     METABOLIC PANEL, BASIC    Collection Time: 03/28/22  5:18 AM   Result Value Ref Range    Sodium 144 136 - 145 mmol/L    Potassium 3.9 3.5 - 5.1 mmol/L    Chloride 113 (H) 98 - 107 mmol/L    CO2 23 21 - 32 mmol/L    Anion gap 8 7 - 16 mmol/L    Glucose 113 (H) 65 - 100 mg/dL    BUN 17 6 - 23 MG/DL    Creatinine 0.80 0.8 - 1.5 MG/DL    GFR est AA >60 >60 ml/min/1.73m2    GFR est non-AA >60 >60 ml/min/1.73m2    Calcium 9.5 8.3 - 10.4 MG/DL   CBC W/O DIFF    Collection Time: 03/28/22  5:18 AM   Result Value Ref Range    WBC 13.2 (H) 4.3 - 11.1 K/uL    RBC 5.03 4.23 - 5.6 M/uL    HGB 14.9 13.6 - 17.2 g/dL    HCT 40.4 (L) 41.1 - 50.3 %    MCV 80.3 79.6 - 97.8 FL    MCH 29.6 26.1 - 32.9 PG    MCHC 36.9 (H) 31.4 - 35.0 g/dL    RDW 13.1 11.9 - 14.6 %    PLATELET 655 354 - 226 K/uL    MPV 9.1 (L) 9.4 - 12.3 FL    ABSOLUTE NRBC 0.00 0.0 - 0.2 K/uL   HGB & HCT    Collection Time: 03/28/22 12:46 PM   Result Value Ref Range    HGB 13.9 13.6 - 17.2 g/dL    HCT 38.4 (L) 41.1 - 50.3 %   HGB & HCT    Collection Time: 03/28/22  9:48 PM   Result Value Ref Range    HGB 13.5 (L) 13.6 - 17.2 g/dL    HCT 37.6 (L) 41.1 - 50.3 %   HGB & HCT    Collection Time: 03/29/22  2:12 AM   Result Value Ref Range    HGB 15.2 13.6 - 17.2 g/dL    HCT 42.5 41.1 - 40.8 %   METABOLIC PANEL, BASIC    Collection Time: 03/29/22  2:12 AM   Result Value Ref Range    Sodium 140 138 - 145 mmol/L    Potassium 3.8 3.5 - 5.1 mmol/L    Chloride 109 (H) 98 - 107 mmol/L    CO2 24 21 - 32 mmol/L    Anion gap 7 7 - 16 mmol/L    Glucose 101 (H) 65 - 100 mg/dL    BUN 16 6 - 23 MG/DL    Creatinine 1.00 0.8 - 1.5 MG/DL    GFR est AA >60 >60 ml/min/1.73m2    GFR est non-AA >60 >60 ml/min/1.73m2    Calcium 8.9 8.3 - 10.4 MG/DL   PHOSPHORUS    Collection Time: 03/29/22  2:12 AM   Result Value Ref Range    Phosphorus 4.0 2.5 - 4.5 MG/DL   CK    Collection Time: 03/29/22  2:12 AM   Result Value Ref Range    CK 1,651 (H) 21 - 215 U/L   EKG, 12 LEAD, INITIAL    Collection Time: 03/29/22  9:11 AM   Result Value Ref Range    Ventricular Rate 77 BPM    Atrial Rate 77 BPM    P-R Interval 144 ms    QRS Duration 68 ms    Q-T Interval 374 ms    QTC Calculation (Bezet) 423 ms    Calculated P Axis 40 degrees    Calculated R Axis 46 degrees    Calculated T Axis 35 degrees    Diagnosis       Normal sinus rhythm with sinus arrhythmia  Normal ECG  When compared with ECG of 28-MAR-2022 03:06,  Vent. rate has decreased BY  75 BPM  Nonspecific ST abnormality is no longer Present  Confirmed by Rose Goldmann ([id number]) on 3/29/2022 10:29:34 AM         All Micro Results     Procedure Component Value Units Date/Time    CULTURE, BLOOD [[id number]] Collected: 03/27/22 1637    Order Status: Completed Specimen: Blood Updated: 03/29/22 1133     Special Requests: --        NO SPECIAL REQUESTS  RIGHT  HAND       Culture result: NO GROWTH 2 DAYS       CULTURE, BLOOD [[id number]] Collected: 03/27/22 2018    Order Status: Completed Specimen: Blood Updated: 03/29/22 1133     Special Requests: --        RIGHT  HAND       Culture result: NO GROWTH 2 DAYS       CULTURE, URINE [[id number]] Collected: 03/27/22 1458    Order Status: Completed Specimen: Urine from Clean catch Updated: 03/29/22 0800     Special Requests: NO SPECIAL REQUESTS        Culture result:       <10,000 COLONIES/mL MIXED SKIN BEATRIZ ISOLATED                Other Studies:  No results found.     Current Meds:  Current Facility-Administered Medications   Medication Dose Route Frequency    diphenhydrAMINE (BENADRYL) injection 12.5 mg  12.5 mg IntraVENous Q6H PRN    ondansetron (ZOFRAN) injection 4 mg  4 mg IntraVENous Q6H PRN    hydrALAZINE (APRESOLINE) 20 mg/mL injection 10 mg  10 mg IntraVENous Q6H PRN    pantoprazole (PROTONIX) 40 mg in 0.9% sodium chloride 10 mL injection  40 mg IntraVENous Q12H    labetaloL (NORMODYNE;TRANDATE) injection 10 mg  10 mg IntraVENous Q6H PRN    sodium chloride (NS) flush 5-40 mL  5-40 mL IntraVENous Q8H    sodium chloride (NS) flush 5-40 mL  5-40 mL IntraVENous PRN    acetaminophen (TYLENOL) tablet 650 mg 650 mg Oral Q6H PRN    Or    acetaminophen (TYLENOL) suppository 650 mg  650 mg Rectal Q6H PRN    polyethylene glycol (MIRALAX) packet 17 g  17 g Oral DAILY PRN    lactated Ringers infusion  200 mL/hr IntraVENous CONTINUOUS       Signed:  Gen Mathew MD    Part of this note may have been written by using a voice dictation software. The note has been proof read but may still contain some grammatical/other typographical errors.

## 2022-03-29 NOTE — PROGRESS NOTES
Shauna Mann (Wife) would like to receive an update from the MD.     Her phone number is [phone].

## 2022-03-29 NOTE — PROGRESS NOTES
Hourly rounding completed during shift. All needs met at this time. Bed L/L with call bell in reach. Report given to oncoming RN.

## 2022-03-29 NOTE — PROGRESS NOTES
[facility] Progress Note         Admit Date:  3/27/2022    Today's Date:  3/29/2022    CC:  GI bleed    Subjective:     Patient is sleepy. His family at the bedside describes he was given Benadryl about 930 hrs due to not sleeping well overnight. Per RN notes, he had 1 episode of emesis overnight with light brown with tinged red emesis. He has been taking frequent hot showers to relieve the nausea. Medications:   Current Facility-Administered Medications   Medication Dose Route Frequency    diphenhydrAMINE (BENADRYL) injection 12.5 mg  12.5 mg IntraVENous Q6H PRN    [Held by provider] ondansetron (ZOFRAN) injection 4 mg  4 mg IntraVENous Q6H PRN    hydrALAZINE (APRESOLINE) 20 mg/mL injection 10 mg  10 mg IntraVENous Q6H PRN    pantoprazole (PROTONIX) 40 mg in 0.9% sodium chloride 10 mL injection  40 mg IntraVENous Q12H    labetaloL (NORMODYNE;TRANDATE) injection 10 mg  10 mg IntraVENous Q6H PRN    sodium chloride (NS) flush 5-40 mL  5-40 mL IntraVENous Q8H    sodium chloride (NS) flush 5-40 mL  5-40 mL IntraVENous PRN    acetaminophen (TYLENOL) tablet 650 mg  650 mg Oral Q6H PRN    Or    acetaminophen (TYLENOL) suppository 650 mg  650 mg Rectal Q6H PRN    polyethylene glycol (MIRALAX) packet 17 g  17 g Oral DAILY PRN    lactated Ringers infusion  200 mL/hr IntraVENous CONTINUOUS       Review of Systems:  ROS was obtained, with pertinent positives as listed above. No chest pain or SOB. Diet:  Clears    Objective:   Vitals:  Visit Vitals  BP (!) 142/102 (BP 1 Location: Right upper arm, BP Patient Position: Sitting)   Pulse 92   Temp 98.4 °F (36.9 °C)   Resp 20   Ht 5' 1\" (1.549 m)   Wt 60.8 kg (134 lb)   SpO2 94%   BMI 25.32 kg/m²     Intake/Output:  No intake/output data recorded. 03/27 1901 - 03/29 0700  In: 620 [P.O.:120;  I.V.:500]  Out: 3150 [Urine:950]  Exam:  General appearance: sleeping, cooperative, no distress, lying in bed  Neuro:  Sleeping, opens eyes off and on, vadim    Data Review (Labs):    Recent Labs     03/29/22  0212 03/28/22  2148 03/28/22  1246 03/28/22  0518 03/27/22  0959   WBC  --   --   --  13.2* 11.5*   HGB 15.2 13.5* 13.9 14.9 16.8   HCT 42.5 37.6* 38.4* 40.4* 45.0   PLT  --   --   --  330 420   MCV  --   --   --  80.3 78.8*     --   --  144 138   K 3.8  --   --  3.9 3.4*   *  --   --  113* 108*   CO2 24  --   --  23 17*   BUN 16  --   --  17 31*   CREA 1.00  --   --  0.80 1.40   CA 8.9  --   --  9.5 10.7*   MG  --   --   --   --  2.4   *  --   --  113* 160*   AP  --   --   --   --  120   AST  --   --   --   --  31   ALT  --   --   --   --  31   TBILI  --   --   --   --  0.9   ALB  --   --   --   --  5.0   TP  --   --   --   --  9.2*   LPSE  --   --   --   --  116     CT ABDOMEN AND PELVIS WITH CONTRAST 3/27/2022 12:54 PM   History: ; Patient ambulatory to triage with mask in place. Patient reports  lower abd pain/cramping and n/v x 3 days. Denies fever, chills, diarrhea.    TECHNIQUE: The patient received 100 mL Isovue-370 nonionic IV contrast. Axial  images were obtained through the abdomen and pelvis. Coronal reformatted images  were generated.  All CT scans at this facility used dose modulation, interactive  reconstruction and/or weight based dosing when appropriate to reduce radiation  dose to as low as reasonably achievable.   Comparison: 2/6/2016   Findings: Included portions of the lung bases are clear.   ABDOMEN:   Gallbladder: Normal.   Liver: No focal hepatic lesions or biliary ductal dilatation.   Pancreas: Normal.   Spleen: Normal.   Adrenal glands: Normal.   Kidneys: The kidneys enhance symmetrically with contrast and there is no  hydronephrosis. A 9 mm medial upper pole left renal cortical lesion (image 22)  measures 29 Hounsfield units in density. Although incompletely characterized  this is likely a cyst. Consider routine follow-up renal sonography for further  characterization.   Bowel:  The appendix is normal in appearance. Small bowel loops are normal in  caliber.   Retroperitoneum:No adenopathy. Abdominal aorta is normal in caliber.    PELVIS:The bladder is normal in appearance. There is no free pelvic fluid.   Bones: There are no aggressive osseous lesions.   IMPRESSION   1. No acute abdominal findings.   2. Suspected small left renal cortical cyst. Consider routine follow-up  sonography for confirmation. EGD 28 March 2022 with Dr. Zeeshan Thomas   Findings:   Esophagus- Normal except for LA grade minimal esophagitis. No active bleeding. No MWT. Stomach- Some strands of black material.  No active bleeding. Mild gastritis. One area on the proximal body that was especially red and might be emetogenic gastritis. Very small hiatal hernia. Duodenum- Normal.  Impression:   No active bleeding. Very mild esophagitis. No MWT. Very mild gastritis. Recommendations:  Continue to avoid Cannabis. IV Protonix 2X a day. Start clears. Antiemetics prn.     Assessment:     Principal Problem:    GI bleed (3/28/2022)    Active Problems:    Nondiabetic gastroparesis (3/17/2011)      Sickle cell trait ([address] (3/5/2013)      Hypokalemia (3/27/2022)      Anxiety (4/30/2014)      Esophageal reflux (4/30/2014)      Cannabis hyperemesis syndrome concurrent with and due to cannabis abuse ([address] (8/12/2014)      Tetrahydrocannabinol (THC) use disorder, moderate, dependence ([address] (3/19/8788)      Cyclic vomiting syndrome (6/10/2021)      Normal anion gap metabolic acidosis (2/68/9367)      Acute prerenal azotemia (3/27/2022)      Lactic acidosis (3/27/2022)      Leukocytosis (3/27/2022)      Sinus tachycardia (3/27/2022)      Nausea and vomiting (3/28/2022)      Hypertension (3/28/2022)    42 yo male pt of Dr. Zeeshan Thomas with PMH including but not limited to reflux, PUD, esophagitis with MWT, cannabinoid hyperemesis syndrome, HTN, gastroparesis documented on GES 2012, hx of H pylori s/p Prevpac in 2011, sickle cell trait, anxiety, C diff in 2011, who was seen in consultation 28 March 2022 at the request of Dr. Erika Webb for GI bleed, who was admitted after presenting with intractable n/v with coffee ground emesis, with hx of prior esophagitis, MWT, and cannabinoid hyperemesis syndrome. Prior EGD with Dr. Lisset Wright 12 April 2021 for similar sxs with HH, moderate esophagitis - LA class B. He was noted in the ER to have BETTY and leukocytosis, normal LFTs and lipase, Hgb initially 16.8 and 14.9 on recheck. UDS positive for THC. CT scan abd/pelvis with no acute findings. EGD 28 March 2022 with Dr. Odilon Pillai noted no active bleeding, very mild esophagitis, no MWT, very mild gastritis. Symptoms likely associated with flare up of cannabinoid hyperemesis syndrome. Plan:     - Supportive care, maintain electrolytes. - Protonix 40 mg IV Q 12 hrs. - Monitor labs and transfuse PRN if hgb <7.  - Continue Reglan PRN. May need to give short term scheduled dosing.  - Antiemetics PRN. - Avoid Cannabis. - Clear liquids and advance diet as tolerated. - Follow up in our office in 4-6 weeks. Our office will contact pt to make the appt. Patient is seen and examined in collaboration with Dr. Aleksandr Staley. Assessment and plan as per Dr. Odilon Pillai. Savi Jacobson  [facility]

## 2022-03-29 NOTE — PROGRESS NOTES
Problem: Aspiration - Risk of  Goal: *Absence of aspiration  Outcome: Progressing Towards Goal     Problem: Patient Education: Go to Patient Education Activity  Goal: Patient/Family Education  Outcome: Progressing Towards Goal     Problem: Falls - Risk of  Goal: *Absence of Falls  Description: Document Marciana Distance Fall Risk and appropriate interventions in the flowsheet.   Outcome: Progressing Towards Goal  Note: Fall Risk Interventions:  Mobility Interventions: Patient to call before getting OOB         Medication Interventions: Evaluate medications/consider consulting pharmacy,Teach patient to arise slowly    Elimination Interventions: Call light in reach              Problem: Patient Education: Go to Patient Education Activity  Goal: Patient/Family Education  Outcome: Progressing Towards Goal

## 2022-03-29 NOTE — PROGRESS NOTES
Pt remains in the bathroom, taking a hot shower at this time with uncontrolled nausea and restlessness. Has had 1x emesis episode throughout shift, which was light brown with tinged of red. Has c/o persistent nausea and restlessness throughout shift. Notified night MDs and orders were given for 1x Benadryl IV and 1x additional Reglan IV. However, it is ineffective. Pt frequents to take hot showers to relieve his nausea and ends up taking off his IV lines, cardiac monitor, and SCDs each time. Notified Dr. Ivy Mention. Tele monitoring d/c'ed and IVF held last night per MD orders due to noncompliance. SCDs refused at this time. Hourly rounds completed, all needs met this shift. Bed in L/L with call light in reach. Will continue to monitor care and give report to oncoming nurse. Wife remains at bedside.

## 2022-03-30 LAB
ANION GAP SERPL CALC-SCNC: 11 MMOL/L (ref 7–16)
BACTERIA SPEC CULT: NORMAL
BASOPHILS # BLD: 0 K/UL (ref 0–0.2)
BASOPHILS NFR BLD: 0 % (ref 0–2)
BUN SERPL-MCNC: 19 MG/DL (ref 6–23)
CALCIUM SERPL-MCNC: 9 MG/DL (ref 8.3–10.4)
CHLORIDE SERPL-SCNC: 106 MMOL/L (ref 98–107)
CK SERPL-CCNC: 1658 U/L (ref 21–215)
CK SERPL-CCNC: 1867 U/L (ref 21–215)
CO2 SERPL-SCNC: 21 MMOL/L (ref 21–32)
CREAT SERPL-MCNC: 0.9 MG/DL (ref 0.8–1.5)
DIFFERENTIAL METHOD BLD: ABNORMAL
EOSINOPHIL # BLD: 0 K/UL (ref 0–0.8)
EOSINOPHIL NFR BLD: 0 % (ref 0.5–7.8)
ERYTHROCYTE [DISTWIDTH] IN BLOOD BY AUTOMATED COUNT: 12.7 % (ref 11.9–14.6)
GLUCOSE SERPL-MCNC: 92 MG/DL (ref 65–100)
HCT VFR BLD AUTO: 38.5 % (ref 41.1–50.3)
HGB BLD-MCNC: 14.4 G/DL (ref 13.6–17.2)
IMM GRANULOCYTES # BLD AUTO: 0 K/UL (ref 0–0.5)
IMM GRANULOCYTES NFR BLD AUTO: 0 % (ref 0–5)
LYMPHOCYTES # BLD: 2.2 K/UL (ref 0.5–4.6)
LYMPHOCYTES NFR BLD: 29 % (ref 13–44)
MCH RBC QN AUTO: 30 PG (ref 26.1–32.9)
MCHC RBC AUTO-ENTMCNC: 37.4 G/DL (ref 31.4–35)
MCV RBC AUTO: 80.2 FL (ref 79.6–97.8)
MONOCYTES # BLD: 0.9 K/UL (ref 0.1–1.3)
MONOCYTES NFR BLD: 11 % (ref 4–12)
NEUTS SEG # BLD: 4.6 K/UL (ref 1.7–8.2)
NEUTS SEG NFR BLD: 59 % (ref 43–78)
NRBC # BLD: 0 K/UL (ref 0–0.2)
PLATELET # BLD AUTO: 312 K/UL (ref 150–450)
PMV BLD AUTO: 9.6 FL (ref 9.4–12.3)
POTASSIUM SERPL-SCNC: 3 MMOL/L (ref 3.5–5.1)
POTASSIUM SERPL-SCNC: 3 MMOL/L (ref 3.5–5.1)
RBC # BLD AUTO: 4.8 M/UL (ref 4.23–5.6)
SERVICE CMNT-IMP: NORMAL
SODIUM SERPL-SCNC: 138 MMOL/L (ref 138–145)
WBC # BLD AUTO: 7.8 K/UL (ref 4.3–11.1)

## 2022-03-30 PROCEDURE — 96376 TX/PRO/DX INJ SAME DRUG ADON: CPT

## 2022-03-30 PROCEDURE — 36415 COLL VENOUS BLD VENIPUNCTURE: CPT

## 2022-03-30 PROCEDURE — C9113 INJ PANTOPRAZOLE SODIUM, VIA: HCPCS | Performed by: INTERNAL MEDICINE

## 2022-03-30 PROCEDURE — 74011250636 HC RX REV CODE- 250/636: Performed by: INTERNAL MEDICINE

## 2022-03-30 PROCEDURE — 84132 ASSAY OF SERUM POTASSIUM: CPT

## 2022-03-30 PROCEDURE — 80048 BASIC METABOLIC PNL TOTAL CA: CPT

## 2022-03-30 PROCEDURE — 74011000250 HC RX REV CODE- 250: Performed by: INTERNAL MEDICINE

## 2022-03-30 PROCEDURE — 2709999900 HC NON-CHARGEABLE SUPPLY

## 2022-03-30 PROCEDURE — 96375 TX/PRO/DX INJ NEW DRUG ADDON: CPT

## 2022-03-30 PROCEDURE — 65270000029 HC RM PRIVATE

## 2022-03-30 PROCEDURE — 74011250637 HC RX REV CODE- 250/637: Performed by: INTERNAL MEDICINE

## 2022-03-30 PROCEDURE — G0378 HOSPITAL OBSERVATION PER HR: HCPCS

## 2022-03-30 PROCEDURE — 85025 COMPLETE CBC W/AUTO DIFF WBC: CPT

## 2022-03-30 PROCEDURE — 82550 ASSAY OF CK (CPK): CPT

## 2022-03-30 RX ORDER — POTASSIUM CHLORIDE 14.9 MG/ML
20 INJECTION INTRAVENOUS
Status: COMPLETED | OUTPATIENT
Start: 2022-03-30 | End: 2022-03-30

## 2022-03-30 RX ORDER — SUCRALFATE 1 G/1
1 TABLET ORAL
Status: DISCONTINUED | OUTPATIENT
Start: 2022-03-30 | End: 2022-03-31 | Stop reason: HOSPADM

## 2022-03-30 RX ADMIN — DIPHENHYDRAMINE HYDROCHLORIDE 12.5 MG: 50 INJECTION, SOLUTION INTRAMUSCULAR; INTRAVENOUS at 05:29

## 2022-03-30 RX ADMIN — SUCRALFATE 1 G: 1 TABLET ORAL at 09:26

## 2022-03-30 RX ADMIN — SODIUM CHLORIDE 40 MG: 9 INJECTION, SOLUTION INTRAMUSCULAR; INTRAVENOUS; SUBCUTANEOUS at 22:11

## 2022-03-30 RX ADMIN — DIPHENHYDRAMINE HYDROCHLORIDE 12.5 MG: 50 INJECTION, SOLUTION INTRAMUSCULAR; INTRAVENOUS at 14:00

## 2022-03-30 RX ADMIN — SUCRALFATE 1 G: 1 TABLET ORAL at 22:10

## 2022-03-30 RX ADMIN — POTASSIUM CHLORIDE 20 MEQ: 14.9 INJECTION, SOLUTION INTRAVENOUS at 13:49

## 2022-03-30 RX ADMIN — SODIUM CHLORIDE, PRESERVATIVE FREE 10 ML: 5 INJECTION INTRAVENOUS at 05:29

## 2022-03-30 RX ADMIN — ONDANSETRON 4 MG: 2 INJECTION INTRAMUSCULAR; INTRAVENOUS at 03:55

## 2022-03-30 RX ADMIN — SUCRALFATE 1 G: 1 TABLET ORAL at 11:42

## 2022-03-30 RX ADMIN — DIPHENHYDRAMINE HYDROCHLORIDE 12.5 MG: 50 INJECTION, SOLUTION INTRAMUSCULAR; INTRAVENOUS at 22:23

## 2022-03-30 RX ADMIN — SUCRALFATE 1 G: 1 TABLET ORAL at 16:32

## 2022-03-30 RX ADMIN — POTASSIUM BICARBONATE 40 MEQ: 782 TABLET, EFFERVESCENT ORAL at 22:12

## 2022-03-30 RX ADMIN — SODIUM CHLORIDE, POTASSIUM CHLORIDE, SODIUM LACTATE AND CALCIUM CHLORIDE 200 ML/HR: 600; 310; 30; 20 INJECTION, SOLUTION INTRAVENOUS at 01:44

## 2022-03-30 RX ADMIN — POTASSIUM CHLORIDE 20 MEQ: 14.9 INJECTION, SOLUTION INTRAVENOUS at 11:41

## 2022-03-30 RX ADMIN — HYDRALAZINE HYDROCHLORIDE 10 MG: 20 INJECTION INTRAMUSCULAR; INTRAVENOUS at 03:55

## 2022-03-30 RX ADMIN — SODIUM CHLORIDE 40 MG: 9 INJECTION, SOLUTION INTRAMUSCULAR; INTRAVENOUS; SUBCUTANEOUS at 08:23

## 2022-03-30 RX ADMIN — SODIUM CHLORIDE, PRESERVATIVE FREE 10 ML: 5 INJECTION INTRAVENOUS at 13:50

## 2022-03-30 NOTE — PROGRESS NOTES
IV site was puffy and red. Pt denies any discomfort. Fluids complete. IV pulled. Will report to night nurse to continue to monitor. Provider notified of potassium levels.  Advised to recheck levels in the AM.

## 2022-03-30 NOTE — PROGRESS NOTES
Problem: Aspiration - Risk of  Goal: *Absence of aspiration  Outcome: Progressing Towards Goal     Problem: Patient Education: Go to Patient Education Activity  Goal: Patient/Family Education  Outcome: Progressing Towards Goal     Problem: Falls - Risk of  Goal: *Absence of Falls  Description: Document Homero Mcneil Fall Risk and appropriate interventions in the flowsheet.   Outcome: Progressing Towards Goal  Note: Fall Risk Interventions:  Mobility Interventions: Patient to call before getting OOB         Medication Interventions: Evaluate medications/consider consulting pharmacy,Teach patient to arise slowly    Elimination Interventions: Call light in reach,Urinal in reach              Problem: Patient Education: Go to Patient Education Activity  Goal: Patient/Family Education  Outcome: Progressing Towards Goal

## 2022-03-30 NOTE — PROGRESS NOTES
Pt is in bed resting at this time. C/o nausea 3 times thus far during shift. Pt gets in the hot shower to relieve nausea. Pt requested PRN meds for nausea once during the shift so far and went to sleep. IV fluids infusing at a slower rate than ordered due to discomfort while infusing. Pt has ambulated the halls multiple times during the shift. Will continue to monitor and provide care.

## 2022-03-30 NOTE — PROGRESS NOTES
attempted initial visit. RN was leaving patient's room as  arrived and stated that patient was trying to rest.   provided prayer and is available to follow up as needed.   Signed by  Gonzalo Khan M.Div.

## 2022-03-30 NOTE — PROGRESS NOTES
Hospitalist Progress Note   Admit Date:  3/27/2022 10:56 AM   Name:  Mary Burroughs   Age:  41 y.o. Sex:  male  :  1975   MRN:  [id number]   Room:  [facility]/    Presenting Complaint: Abdominal Pain    Reason(s) for Admission: Cyclic vomiting syndrome [R11.15]  Normal anion gap metabolic acidosis [E44.8]  Nausea and vomiting [R11.2]     Hospital Course & Interval History:     Mr. Richard Christensen is a 40 yo male with PMH of THC use, GI bleed  (esophagitis, jeremiah-woodward tear) , hyperemesis syndrome who is admitted with nausea/ emesis with EBTTY. He was on full liquids and IVF hydration. He developed acute GI bleeding on 3-28-22 with dark red emesis. Seen by GI s/p EGD showing esophagitis/ gastritis. CTAP shows renal cyst.   CPK elevated. Discharge plans pending. Subjective/24hr Events (22):       Seen walking about and feels better, hot showers still helpful, ate ok wants to advance diet now, no current nausea, or emesis     Assessment & Plan:     Principal Problem:    GI bleed (3/28/2022)- new 3-28-22  · Advance diet to soft   · Off IVF    · IV protonix every 12 hours   · carafate added 3-30-22  · Daily CBC, HGB 14.4  · GI appreciated   · Stopped lovenox          Sickle cell trait ([facility] [address]   ·   noted          Hypokalemia (3/27/2022)  ·   Replaced, repeat lab          Anxiety        Cannabis hyperemesis syndrome concurrent with and due to cannabis abuse (HCC)     Tetrahydrocannabinol (THC) use disorder, moderate, dependence (HCC)  ·   Needs illicit drug cessation              Normal anion gap metabolic acidosis (315)  ·  resolved            Acute prerenal azotemia (3/27/2022)  ·   Resolved            Lactic acidosis (3/27/2022)  ·  resolved          Leukocytosis (3/27/2022)  ·   Trend CBC  · UA negative for UTI  · Blood cultures pending, negative to date    · Needs hematuria followup with urology           Hypertension (3/28/2022)  ·   As needed IV labetalol/ or hydralazine  · Tele monitoring       Hypophosphatemia:  · Repeat lab resolved         Elevated CPK:  · Repeat lab  · currently off IVF          Dispo/Discharge Planning:     pending     Diet:  ADULT DIET Easy to Chew  DVT PPx:  SCD  Code status: Full Code    Hospital Problems as of 3/30/2022 Date Reviewed: 3/1/2019          Codes Class Noted - Resolved POA    * (Principal) GI bleed ICD-10-CM: K92.2  ICD-9-CM: 578.9  3/28/2022 - Present Yes        Nausea and vomiting ICD-10-CM: R11.2  ICD-9-CM: 787.01  3/28/2022 - Present Yes        Hypertension ICD-10-CM: I10  ICD-9-CM: 401.9  3/28/2022 - Present Yes        Hypokalemia ICD-10-CM: E87.6  ICD-9-CM: 276.8  3/27/2022 - Present Yes        Normal anion gap metabolic acidosis [facility]-: E87.2  ICD-9-CM: 276.2  3/27/2022 - Present Yes        Acute prerenal azotemia ICD-10-CM: N19  ICD-9-CM: 790.6  3/27/2022 - Present Yes        Lactic acidosis ICD-10-CM: E87.2  ICD-9-CM: 276.2  3/27/2022 - Present Yes        Leukocytosis ICD-10-CM: D72.829  ICD-9-CM: 288.60  3/27/2022 - Present Yes        Sinus tachycardia ICD-10-CM: R00.0  ICD-9-CM: 427.89  3/27/2022 - Present Yes        Cyclic vomiting syndrome ICD-10-CM: R11.15  ICD-9-CM: 536.2  6/10/2021 - Present Yes        Tetrahydrocannabinol (THC) use disorder, moderate, dependence (HCC) (Chronic) ICD-10-CM: F12.20  ICD-9-CM: 304.30  7/11/2016 - Present Yes        Cannabis hyperemesis syndrome concurrent with and due to cannabis abuse ([facility] [address] ICD-10-CM: F12.188  ICD-9-CM: 536.2, 305.20  8/12/2014 - Present Yes        Anxiety (Chronic) ICD-10-CM: F41.9  ICD-9-CM: 300.00  4/30/2014 - Present Yes        Esophageal reflux (Chronic) ICD-10-CM: K21.9  ICD-9-CM: 530.81  4/30/2014 - Present Yes        Sickle cell trait (HCC) (Chronic) ICD-10-CM: D57.3  ICD-9-CM: 282.5  3/5/2013 - Present Yes        Nondiabetic gastroparesis ICD-10-CM: K31.84  ICD-9-CM: 536.3  3/17/2011 - Present Yes              Objective:     Patient Vitals for the past 24 hrs:   Temp Pulse Resp BP SpO2   03/30/22 1211 97.6 °F (36.4 °C) 79 16 (!) 145/104 96 %   03/30/22 0754 98.8 °F (37.1 °C) (!) 113 16 (!) 126/90 95 %   03/30/22 0319 98.4 °F (36.9 °C) 92 18 (!) 133/100 97 %   03/29/22 2350 98.9 °F (37.2 °C) (!) 123 20 (!) 121/93 93 %   03/29/22 2020 98.1 °F (36.7 °C) (!) 106 18 (!) 133/111 95 %   03/29/22 1546 98.9 °F (37.2 °C) 95 16 (!) 148/108 96 %     Oxygen Therapy  O2 Sat (%): 96 % (03/30/22 1211)  Pulse via Oximetry: 97 beats per minute (03/28/22 1824)  O2 Device: None (Room air) (03/29/22 1600)  O2 Flow Rate (L/min): 6 l/min (03/28/22 1744)    Estimated body mass index is 25.13 kg/m² as calculated from the following:    Height as of this encounter: 5' 1\" (1.549 m). Weight as of this encounter: 60.3 kg (133 lb). Intake/Output Summary (Last 24 hours) at 3/30/2022 1508  Last data filed at 3/29/2022 2350  Gross per 24 hour   Intake --   Output 650 ml   Net -650 ml         Physical Exam:     Blood pressure (!) 145/104, pulse 79, temperature 97.6 °F (36.4 °C), resp. rate 16, height 5' 1\" (1.549 m), weight 60.3 kg (133 lb), SpO2 96 %. General:    Well nourished. No distress   CVS:  RRR, no edema   PULM:  clear  GI:  nontender , soft   Neuro:   grossly intact. Psych:  Normal mood and affect.       I have reviewed ordered lab tests and independently visualized imaging below:    Recent Labs:  Recent Results (from the past 48 hour(s))   HGB & HCT    Collection Time: 03/28/22  9:48 PM   Result Value Ref Range    HGB 13.5 (L) 13.6 - 17.2 g/dL    HCT 37.6 (L) 41.1 - 50.3 %   HGB & HCT    Collection Time: 03/29/22  2:12 AM   Result Value Ref Range    HGB 15.2 13.6 - 17.2 g/dL    HCT 42.5 41.1 - 74.1 %   METABOLIC PANEL, BASIC    Collection Time: 03/29/22  2:12 AM   Result Value Ref Range    Sodium 140 138 - 145 mmol/L    Potassium 3.8 3.5 - 5.1 mmol/L    Chloride 109 (H) 98 - 107 mmol/L    CO2 24 21 - 32 mmol/L    Anion gap 7 7 - 16 mmol/L    Glucose 101 (H) 65 - 100 mg/dL    BUN 16 6 - 23 MG/DL    Creatinine 1.00 0.8 - 1.5 MG/DL    GFR est AA >60 >60 ml/min/1.73m2    GFR est non-AA >60 >60 ml/min/1.73m2    Calcium 8.9 8.3 - 10.4 MG/DL   PHOSPHORUS    Collection Time: 03/29/22  2:12 AM   Result Value Ref Range    Phosphorus 4.0 2.5 - 4.5 MG/DL   CK    Collection Time: 03/29/22  2:12 AM   Result Value Ref Range    CK 1,651 (H) 21 - 215 U/L   EKG, 12 LEAD, INITIAL    Collection Time: 03/29/22  9:11 AM   Result Value Ref Range    Ventricular Rate 77 BPM    Atrial Rate 77 BPM    P-R Interval 144 ms    QRS Duration 68 ms    Q-T Interval 374 ms    QTC Calculation (Bezet) 423 ms    Calculated P Axis 40 degrees    Calculated R Axis 46 degrees    Calculated T Axis 35 degrees    Diagnosis       Normal sinus rhythm with sinus arrhythmia  Normal ECG  When compared with ECG of 28-MAR-2022 03:06,  Vent.  rate has decreased BY  75 BPM  Nonspecific ST abnormality is no longer Present  Confirmed by Jules Alvarado ([id number]) on 3/29/2022 12:56:76 AM     METABOLIC PANEL, BASIC    Collection Time: 03/30/22  6:57 AM   Result Value Ref Range    Sodium 138 138 - 145 mmol/L    Potassium 3.0 (L) 3.5 - 5.1 mmol/L    Chloride 106 98 - 107 mmol/L    CO2 21 21 - 32 mmol/L    Anion gap 11 7 - 16 mmol/L    Glucose 92 65 - 100 mg/dL    BUN 19 6 - 23 MG/DL    Creatinine 0.90 0.8 - 1.5 MG/DL    GFR est AA >60 >60 ml/min/1.73m2    GFR est non-AA >60 >60 ml/min/1.73m2    Calcium 9.0 8.3 - 10.4 MG/DL   CK    Collection Time: 03/30/22  6:57 AM   Result Value Ref Range    CK 1,867 (H) 21 - 215 U/L   CBC WITH AUTOMATED DIFF    Collection Time: 03/30/22  6:57 AM   Result Value Ref Range    WBC 7.8 4.3 - 11.1 K/uL    RBC 4.80 4.23 - 5.6 M/uL    HGB 14.4 13.6 - 17.2 g/dL    HCT 38.5 (L) 41.1 - 50.3 %    MCV 80.2 79.6 - 97.8 FL    MCH 30.0 26.1 - 32.9 PG    MCHC 37.4 (H) 31.4 - 35.0 g/dL    RDW 12.7 11.9 - 14.6 %    PLATELET 664 656 - 550 K/uL    MPV 9.6 9.4 - 12.3 FL    ABSOLUTE NRBC 0.00 0.0 - 0.2 K/uL    DF AUTOMATED      NEUTROPHILS 59 43 - 78 %    LYMPHOCYTES 29 13 - 44 %    MONOCYTES 11 4.0 - 12.0 %    EOSINOPHILS 0 (L) 0.5 - 7.8 %    BASOPHILS 0 0.0 - 2.0 %    IMMATURE GRANULOCYTES 0 0.0 - 5.0 %    ABS. NEUTROPHILS 4.6 1.7 - 8.2 K/UL    ABS. LYMPHOCYTES 2.2 0.5 - 4.6 K/UL    ABS. MONOCYTES 0.9 0.1 - 1.3 K/UL    ABS. EOSINOPHILS 0.0 0.0 - 0.8 K/UL    ABS. BASOPHILS 0.0 0.0 - 0.2 K/UL    ABS. IMM. GRANS. 0.0 0.0 - 0.5 K/UL       All Micro Results     Procedure Component Value Units Date/Time    CULTURE, URINE [[id number]] Collected: 03/27/22 1458    Order Status: Completed Specimen: Urine from Clean catch Updated: 03/30/22 0820     Special Requests: NO SPECIAL REQUESTS        Culture result:       <10,000 COLONIES/mL MIXED SKIN BEATRIZ ISOLATED          CULTURE, BLOOD [[id number]] Collected: 03/27/22 2018    Order Status: Completed Specimen: Blood Updated: 03/30/22 0722     Special Requests: --        RIGHT  HAND       Culture result: NO GROWTH 3 DAYS       CULTURE, BLOOD [[id number]] Collected: 03/27/22 1637    Order Status: Completed Specimen: Blood Updated: 03/30/22 0722     Special Requests: --        NO SPECIAL REQUESTS  RIGHT  HAND       Culture result: NO GROWTH 3 DAYS             Other Studies:  No results found.     Current Meds:  Current Facility-Administered Medications   Medication Dose Route Frequency    sucralfate (CARAFATE) tablet 1 g  1 g Oral AC&HS    potassium chloride 20 mEq in 100 ml IVPB  20 mEq IntraVENous Q2H    diphenhydrAMINE (BENADRYL) injection 12.5 mg  12.5 mg IntraVENous Q6H PRN    ondansetron (ZOFRAN) injection 4 mg  4 mg IntraVENous Q6H PRN    hydrALAZINE (APRESOLINE) 20 mg/mL injection 10 mg  10 mg IntraVENous Q6H PRN    pantoprazole (PROTONIX) 40 mg in 0.9% sodium chloride 10 mL injection  40 mg IntraVENous Q12H    labetaloL (NORMODYNE;TRANDATE) injection 10 mg  10 mg IntraVENous Q6H PRN    sodium chloride (NS) flush 5-40 mL  5-40 mL IntraVENous Q8H    sodium chloride (NS) flush 5-40 mL  5-40 mL IntraVENous PRN  acetaminophen (TYLENOL) tablet 650 mg  650 mg Oral Q6H PRN    Or    acetaminophen (TYLENOL) suppository 650 mg  650 mg Rectal Q6H PRN    polyethylene glycol (MIRALAX) packet 17 g  17 g Oral DAILY PRN       Signed:  Miguel Randhawa MD    Part of this note may have been written by using a voice dictation software. The note has been proof read but may still contain some grammatical/other typographical errors.

## 2022-03-30 NOTE — PROGRESS NOTES
Problem: Falls - Risk of  Goal: *Absence of Falls  Description: Document Abilio Griffith Fall Risk and appropriate interventions in the flowsheet.   Outcome: Progressing Towards Goal  Note: Fall Risk Interventions:  Mobility Interventions: Patient to call before getting OOB         Medication Interventions: Evaluate medications/consider consulting pharmacy    Elimination Interventions: Call light in reach

## 2022-03-30 NOTE — PROGRESS NOTES
Resting comfortably in bed. A/O x4. Has had 1x emesis episode throughout shift, which was brown-tinged. Has c/o n/v, medicated per MAR. Has been on-and-off on IVF fluids in between hot shower breaks. Hourly rounds completed, all needs met this shift. Bed in L/L with call light in reach. Will continue to monitor care and give report to oncoming nurse. Wife remains at bedside.

## 2022-03-31 VITALS
RESPIRATION RATE: 16 BRPM | SYSTOLIC BLOOD PRESSURE: 109 MMHG | BODY MASS INDEX: 25.11 KG/M2 | OXYGEN SATURATION: 96 % | HEART RATE: 87 BPM | TEMPERATURE: 99.1 F | DIASTOLIC BLOOD PRESSURE: 83 MMHG | WEIGHT: 133 LBS | HEIGHT: 61 IN

## 2022-03-31 LAB
ANION GAP SERPL CALC-SCNC: 7 MMOL/L (ref 7–16)
BASOPHILS # BLD: 0 K/UL (ref 0–0.2)
BASOPHILS NFR BLD: 0 % (ref 0–2)
BUN SERPL-MCNC: 17 MG/DL (ref 6–23)
CALCIUM SERPL-MCNC: 8.9 MG/DL (ref 8.3–10.4)
CHLORIDE SERPL-SCNC: 106 MMOL/L (ref 98–107)
CK SERPL-CCNC: 1142 U/L (ref 21–215)
CO2 SERPL-SCNC: 25 MMOL/L (ref 21–32)
CREAT SERPL-MCNC: 1 MG/DL (ref 0.8–1.5)
DIFFERENTIAL METHOD BLD: ABNORMAL
EOSINOPHIL # BLD: 0.1 K/UL (ref 0–0.8)
EOSINOPHIL NFR BLD: 1 % (ref 0.5–7.8)
ERYTHROCYTE [DISTWIDTH] IN BLOOD BY AUTOMATED COUNT: 12.7 % (ref 11.9–14.6)
GLUCOSE SERPL-MCNC: 95 MG/DL (ref 65–100)
HCT VFR BLD AUTO: 37.6 % (ref 41.1–50.3)
HGB BLD-MCNC: 13.7 G/DL (ref 13.6–17.2)
IMM GRANULOCYTES # BLD AUTO: 0 K/UL (ref 0–0.5)
IMM GRANULOCYTES NFR BLD AUTO: 0 % (ref 0–5)
LYMPHOCYTES # BLD: 3.1 K/UL (ref 0.5–4.6)
LYMPHOCYTES NFR BLD: 44 % (ref 13–44)
MAGNESIUM SERPL-MCNC: 2.1 MG/DL (ref 1.8–2.4)
MCH RBC QN AUTO: 29.2 PG (ref 26.1–32.9)
MCHC RBC AUTO-ENTMCNC: 36.4 G/DL (ref 31.4–35)
MCV RBC AUTO: 80.2 FL (ref 79.6–97.8)
MONOCYTES # BLD: 0.8 K/UL (ref 0.1–1.3)
MONOCYTES NFR BLD: 11 % (ref 4–12)
NEUTS SEG # BLD: 3 K/UL (ref 1.7–8.2)
NEUTS SEG NFR BLD: 43 % (ref 43–78)
NRBC # BLD: 0 K/UL (ref 0–0.2)
PLATELET # BLD AUTO: 301 K/UL (ref 150–450)
PMV BLD AUTO: 9.1 FL (ref 9.4–12.3)
POTASSIUM SERPL-SCNC: 3.2 MMOL/L (ref 3.5–5.1)
RBC # BLD AUTO: 4.69 M/UL (ref 4.23–5.6)
SODIUM SERPL-SCNC: 138 MMOL/L (ref 138–145)
WBC # BLD AUTO: 7 K/UL (ref 4.3–11.1)

## 2022-03-31 PROCEDURE — 74011000250 HC RX REV CODE- 250: Performed by: INTERNAL MEDICINE

## 2022-03-31 PROCEDURE — 80048 BASIC METABOLIC PNL TOTAL CA: CPT

## 2022-03-31 PROCEDURE — 74011250636 HC RX REV CODE- 250/636: Performed by: INTERNAL MEDICINE

## 2022-03-31 PROCEDURE — 36415 COLL VENOUS BLD VENIPUNCTURE: CPT

## 2022-03-31 PROCEDURE — C9113 INJ PANTOPRAZOLE SODIUM, VIA: HCPCS | Performed by: INTERNAL MEDICINE

## 2022-03-31 PROCEDURE — 85025 COMPLETE CBC W/AUTO DIFF WBC: CPT

## 2022-03-31 PROCEDURE — 74011250637 HC RX REV CODE- 250/637: Performed by: INTERNAL MEDICINE

## 2022-03-31 PROCEDURE — 83735 ASSAY OF MAGNESIUM: CPT

## 2022-03-31 PROCEDURE — 2709999900 HC NON-CHARGEABLE SUPPLY

## 2022-03-31 PROCEDURE — 82550 ASSAY OF CK (CPK): CPT

## 2022-03-31 RX ORDER — SUCRALFATE 1 G/10ML
1 SUSPENSION ORAL 4 TIMES DAILY
Qty: 420 ML | Refills: 0 | Status: SHIPPED | OUTPATIENT
Start: 2022-03-31

## 2022-03-31 RX ORDER — PANTOPRAZOLE SODIUM 40 MG/1
40 TABLET, DELAYED RELEASE ORAL DAILY
Qty: 90 TABLET | Refills: 0 | Status: SHIPPED | OUTPATIENT
Start: 2022-03-31

## 2022-03-31 RX ADMIN — SODIUM CHLORIDE 40 MG: 9 INJECTION, SOLUTION INTRAMUSCULAR; INTRAVENOUS; SUBCUTANEOUS at 08:01

## 2022-03-31 RX ADMIN — POTASSIUM BICARBONATE 40 MEQ: 782 TABLET, EFFERVESCENT ORAL at 07:49

## 2022-03-31 RX ADMIN — SODIUM CHLORIDE, PRESERVATIVE FREE 10 ML: 5 INJECTION INTRAVENOUS at 06:00

## 2022-03-31 RX ADMIN — SUCRALFATE 1 G: 1 TABLET ORAL at 07:50

## 2022-03-31 NOTE — PROGRESS NOTES
[address] March 31, 2022       RE: Hany Dumont      To Whom It May Concern,    This is to certify that Hany Dumont was in the hospital from 3/27/2022-3/31/2022. Please feel free to contact my office if you have any questions or concerns. Thank you for your assistance in this matter.       Sincerely,  Tonie Hernandez RN

## 2022-03-31 NOTE — PROGRESS NOTES
Problem: Aspiration - Risk of  Goal: *Absence of aspiration  3/31/2022 0809 by Rosenda Henao RN  Outcome: Resolved/Met  3/31/2022 0704 by Rosenda Henao RN  Outcome: Progressing Towards Goal     Problem: Patient Education: Go to Patient Education Activity  Goal: Patient/Family Education  Outcome: Resolved/Met     Problem: Falls - Risk of  Goal: *Absence of Falls  Description: Document Cindia Neigh Fall Risk and appropriate interventions in the flowsheet.   3/31/2022 0809 by Rosenda Henao RN  Outcome: Resolved/Met  Note: Fall Risk Interventions:  Mobility Interventions: Bed/chair exit alarm         Medication Interventions: Teach patient to arise slowly    Elimination Interventions: Bed/chair exit alarm           3/31/2022 0704 by Rosenda Henao RN  Outcome: Progressing Towards Goal  Note: Fall Risk Interventions:  Mobility Interventions: Patient to call before getting OOB         Medication Interventions: Teach patient to arise slowly,Patient to call before getting OOB    Elimination Interventions: Call light in reach

## 2022-03-31 NOTE — DISCHARGE INSTRUCTIONS
Patient Education        Dehydration: Care Instructions  Your Care Instructions  Dehydration happens when your body loses too much fluid. This might happen when you do not drink enough water or you lose large amounts of fluids from your body because of diarrhea, vomiting, or sweating. Severe dehydration can be life-threatening. Water and minerals called electrolytes help put your body fluids back in balance. Learn the early signs of fluid loss, and drink more fluids to prevent dehydration. Follow-up care is a key part of your treatment and safety. Be sure to make and go to all appointments, and call your doctor if you are having problems. It's also a good idea to know your test results and keep a list of the medicines you take. How can you care for yourself at home? · Drink plenty of fluids. Choose water and other clear liquids until you feel better. If you have kidney, heart, or liver disease and have to limit fluids, talk with your doctor before you increase the amount of fluids you drink. · If you do not feel like eating or drinking, try taking small sips of water, sports drinks, or other rehydration drinks. · Get plenty of rest.  To prevent dehydration  · Add more fluids to your diet and daily routine, unless your doctor has told you not to. · During hot weather, drink more fluids. Drink even more fluids if you exercise a lot. Stay away from drinks with alcohol. · Watch for the symptoms of dehydration. These include:  ? A dry, sticky mouth. ? Not much urine. ? Dry and sunken eyes. ? Feeling very tired. · Learn what problems can lead to dehydration. These include:  ? Diarrhea, fever, and vomiting. ? Any illness with a fever, such as pneumonia or the flu. ? Activities that cause heavy sweating, such as endurance races and heavy outdoor work in hot or humid weather. ?  Certain medicines, such as cold and allergy pills (antihistamines), pills that remove water from the body (diuretics), and laxatives. ? Certain diseases, such as diabetes, cancer, and heart or kidney disease. When should you call for help? Call 911 anytime you think you may need emergency care. For example, call if:    · You passed out (lost consciousness). Call your doctor now or seek immediate medical care if:    · You are confused and cannot think clearly.     · You are dizzy or lightheaded, or you feel like you may faint.     · You have signs of needing more fluids. You have sunken eyes, a dry mouth, and you pass only a little urine.     · You cannot keep fluids down.     · You have diarrhea that lasts for more than a few days. Watch closely for changes in your health, and be sure to contact your doctor if:    · You are not making tears.     · Your skin is very dry and sags slowly back into place after you pinch it.     · Your mouth and eyes are very dry. Where can you learn more? Go to http://www.gray.com/  Enter Q814 in the search box to learn more about \"Dehydration: Care Instructions. \"  Current as of: July 1, 2021               Content Version: 13.2  © 0652-3668 [email or web address]. Care instructions adapted under license by [employer] (which disclaims liability or warranty for this information). If you have questions about a medical condition or this instruction, always ask your healthcare professional. [address] any warranty or liability for your use of this information.

## 2022-03-31 NOTE — PROGRESS NOTES
Hourly rounds completed. All needs met. Pt c/o nausea x 1. Interventions per MAR. Will give report to the oncoming day shift nurse.

## 2022-03-31 NOTE — PROGRESS NOTES
Discharge note:  Home with no additional needs identified, other than reduce or eliminate THC intake. Care Management Interventions  PCP Verified by CM:  Yes  Support Systems: Spouse/Significant Other  Confirm Follow Up Transport: Self  The Plan for Transition of Care is Related to the Following Treatment Goals : home when stable  Discharge Location  Patient Expects to be Discharged to[de-identified] Home

## 2022-03-31 NOTE — PROGRESS NOTES
Problem: Aspiration - Risk of  Goal: *Absence of aspiration  Outcome: Progressing Towards Goal     Problem: Falls - Risk of  Goal: *Absence of Falls  Description: Document Alok Fall Risk and appropriate interventions in the flowsheet.   Outcome: Progressing Towards Goal  Note: Fall Risk Interventions:  Mobility Interventions: Patient to call before getting OOB         Medication Interventions: Teach patient to arise slowly,Patient to call before getting OOB    Elimination Interventions: Call light in reach

## 2022-03-31 NOTE — DISCHARGE SUMMARY
Hospitalist Discharge Summary   Admit Date:  3/27/2022 10:56 AM   DC Note date: 3/31/2022  Name:  Antonia Steiner   Age:  41 y.o.   Sex:  male  :  1975   MRN:  [id number]   Room:  [facility]  PCP:  Darrion Mahmood MD    Presenting Complaint: Abdominal Pain    Initial Admission Diagnosis: Cyclic vomiting syndrome [R11.15]  Normal anion gap metabolic acidosis [U39.1]  Nausea and vomiting [R11.2]     Problem List for this Hospitalization:  Hospital Problems as of 3/31/2022 Date Reviewed: 3/1/2019          Codes Class Noted - Resolved POA    * (Principal) GI bleed ICD-10-CM: K92.2  ICD-9-CM: 578.9  3/28/2022 - Present Yes        Nausea and vomiting ICD-10-CM: R11.2  ICD-9-CM: 787.01  3/28/2022 - Present Yes        Hypertension ICD-10-CM: I10  ICD-9-CM: 401.9  3/28/2022 - Present Yes        Hypokalemia ICD-10-CM: E87.6  ICD-9-CM: 276.8  3/27/2022 - Present Yes        Normal anion gap metabolic acidosis AOX-24-TO: E87.2  ICD-9-CM: 276.2  3/27/2022 - Present Yes        Acute prerenal azotemia ICD-10-CM: N19  ICD-9-CM: 790.6  3/27/2022 - Present Yes        Lactic acidosis ICD-10-CM: E87.2  ICD-9-CM: 276.2  3/27/2022 - Present Yes        Leukocytosis ICD-10-CM: D72.829  ICD-9-CM: 288.60  3/27/2022 - Present Yes        Sinus tachycardia ICD-10-CM: R00.0  ICD-9-CM: 427.89  3/27/2022 - Present Yes        Cyclic vomiting syndrome ICD-10-CM: R11.15  ICD-9-CM: 536.2  6/10/2021 - Present Yes        Tetrahydrocannabinol (THC) use disorder, moderate, dependence (HCC) (Chronic) ICD-10-CM: F12.20  ICD-9-CM: 304.30  2016 - Present Yes        Cannabis hyperemesis syndrome concurrent with and due to cannabis abuse ([facility] ICD-10-CM: F12.188  ICD-9-CM: 536.2, 305.20  2014 - Present Yes        Anxiety (Chronic) ICD-10-CM: F41.9  ICD-9-CM: 300.00  2014 - Present Yes        Esophageal reflux (Chronic) ICD-10-CM: K21.9  ICD-9-CM: 530.81  2014 - Present Yes        Sickle cell trait ([facility] (Chronic) ICD-10-CM: D57.3  ICD-9-CM: 282.5  3/5/2013 - Present Yes        Nondiabetic gastroparesis ICD-10-CM: K31.84  ICD-9-CM: 536.3  3/17/2011 - Present Yes            Did Patient have Sepsis (YES OR NO): no          Hospital Course:      Mr. Ally Peters is a 42 yo male with PMH of THC use, GI bleed 2021 (esophagitis, jeremiah-woodward tear) , hyperemesis syndrome who is admitted with nausea/ emesis with BETTY. He was on full liquids and IVF hydration. He developed acute GI bleeding on 3-28-22 with dark red emesis. Seen by GI s/p EGD showing esophagitis/ gastritis. Symptoms controlled with anti emetics. Diet advanced and tolerant.    hypokalemia replaced. CTAP shows renal cyst. He will need urology/ PCP followup for ultrasound outpatient. CPK elevated, improved and without complaints of myalgias.          Discharge plans are to home. Disposition: Home or Self Care  Diet: ADULT DIET Easy to Chew  Code Status: Full Code    Follow Up Orders: Follow-up Appointments   Procedures    FOLLOW UP VISIT Appointment in: One Week 1 week PCP     1 week PCP     Standing Status:   Standing     Number of Occurrences:   1     Order Specific Question:   Appointment in     Answer: One Week       Follow-up Information     Follow up With Specialties Details Why Contact Info    Dennise Mera MD Family Medicine   [phone]  [place] [address] [employer] [id number]  [phone]            Follow up labs/diagnostics (ultimately defer to outpatient provider):      Renal ultrasound  Potassium check     Time spent in patient discharge and coordination 35  minutes. Plan was discussed with patient. All questions answered. Patient was stable at time of discharge. Instructions given to call a physician or return if any concerns. Discharge Info:   Current Discharge Medication List      START taking these medications    Details   sucralfate (CARAFATE) 100 mg/mL suspension Take 10 mL by mouth four (4) times daily.   Qty: 420 mL, Refills: 0  Start date: 3/31/2022 CONTINUE these medications which have CHANGED    Details   pantoprazole (Protonix) 40 mg tablet Take 1 Tablet by mouth daily. Qty: 90 Tablet, Refills: 0  Start date: 3/31/2022         STOP taking these medications       QUEtiapine (SEROquel) 50 mg tablet Comments:   Reason for Stopping:               Procedures done this admission:  Procedure(s):  ESOPHAGOGASTRODUODENOSCOPY (EGD)/ 26 *Rm 606    Consults this admission:  IP CONSULT TO GASTROENTEROLOGY    Echocardiogram/EKG results:  No results found for this or any previous visit. EKG Results     Procedure 720 Value Units Date/Time    EKG, 12 LEAD, INITIAL [[id number]] Collected: 03/29/22 0911    Order Status: Completed Updated: 03/29/22 1030     Ventricular Rate 77 BPM      Atrial Rate 77 BPM      P-R Interval 144 ms      QRS Duration 68 ms      Q-T Interval 374 ms      QTC Calculation (Bezet) 423 ms      Calculated P Axis 40 degrees      Calculated R Axis 46 degrees      Calculated T Axis 35 degrees      Diagnosis --     Normal sinus rhythm with sinus arrhythmia  Normal ECG  When compared with ECG of 28-MAR-2022 03:06,  Vent.  rate has decreased BY  75 BPM  Nonspecific ST abnormality is no longer Present  Confirmed by Jan Lewis ([id number]) on 3/29/2022 10:29:34 AM      EKG, 12 LEAD, INITIAL [[id number]] Collected: 03/28/22 0306    Order Status: Completed Updated: 03/28/22 0954     Ventricular Rate 152 BPM      Atrial Rate 152 BPM      P-R Interval 118 ms      QRS Duration 64 ms      Q-T Interval 326 ms      QTC Calculation (Bezet) 518 ms      Calculated P Axis 66 degrees      Calculated R Axis 51 degrees      Calculated T Axis 53 degrees      Diagnosis --     Sinus tachycardia  Nonspecific ST abnormality  Abnormal ECG  When compared with ECG of 28-MAR-2022 03:03,  Previous ECG has undetermined rhythm, needs review  Confirmed by Hill Shah MD (), KIANA MCCAULEY ([id number]) on 3/28/2022 9:54:47 AM      EKG, 12 LEAD, REPEAT [[id number]]     Order Status: Completed     EKG, 12 LEAD, INITIAL [[id number]]     Order Status: Completed           Diagnostic Imaging/Tests:   CT ABD PELV W CONT    Result Date: 3/27/2022  CT ABDOMEN AND PELVIS WITH CONTRAST 3/27/2022 12:54 PM History: ; Patient ambulatory to triage with mask in place. Patient reports lower abd pain/cramping and n/v x 3 days. Denies fever, chills, diarrhea. TECHNIQUE: The patient received 100 mL Isovue-370 nonionic IV contrast. Axial images were obtained through the abdomen and pelvis. Coronal reformatted images were generated. All CT scans at this facility used dose modulation, interactive reconstruction and/or weight based dosing when appropriate to reduce radiation dose to as low as reasonably achievable. Comparison: 2/6/2016 Findings: Included portions of the lung bases are clear. ABDOMEN: Gallbladder: Normal. Liver: No focal hepatic lesions or biliary ductal dilatation. Pancreas: Normal. Spleen: Normal. Adrenal glands: Normal. Kidneys: The kidneys enhance symmetrically with contrast and there is no hydronephrosis. A 9 mm medial upper pole left renal cortical lesion (image 22) measures 29 Hounsfield units in density. Although incompletely characterized this is likely a cyst. Consider routine follow-up renal sonography for further characterization. Bowel: The appendix is normal in appearance. Small bowel loops are normal in caliber. Retroperitoneum:No adenopathy. Abdominal aorta is normal in caliber. PELVIS:The bladder is normal in appearance. There is no free pelvic fluid. Bones: There are no aggressive osseous lesions. 1. No acute abdominal findings. 2. Suspected small left renal cortical cyst. Consider routine follow-up sonography for confirmation.         All Micro Results     Procedure Component Value Units Date/Time    CULTURE, URINE [[id number]] Collected: 03/27/22 1458    Order Status: Completed Specimen: Urine from Clean catch Updated: 03/30/22 0820     Special Requests: NO SPECIAL REQUESTS Culture result:       <10,000 COLONIES/mL MIXED SKIN BEATRIZ ISOLATED          CULTURE, BLOOD [[id number]] Collected: 03/27/22 2018    Order Status: Completed Specimen: Blood Updated: 03/30/22 0722     Special Requests: --        RIGHT  HAND       Culture result: NO GROWTH 3 DAYS       CULTURE, BLOOD [[id number]] Collected: 03/27/22 1637    Order Status: Completed Specimen: Blood Updated: 03/30/22 0722     Special Requests: --        NO SPECIAL REQUESTS  RIGHT  HAND       Culture result: NO GROWTH 3 DAYS             Labs: Results:       BMP, Mg, Phos Recent Labs     03/31/22  0519 03/30/22  1651 03/30/22  0657 03/29/22 0212 03/29/22 0212     --  138  --  140   K 3.2* 3.0* 3.0*   < > 3.8     --  106  --  109*   CO2 25  --  21  --  24   AGAP 7  --  11  --  7   BUN 17  --  19  --  16   CREA 1.00  --  0.90  --  1.00   CA 8.9  --  9.0  --  8.9   GLU 95  --  92  --  101*   MG 2.1  --   --   --   --    PHOS  --   --   --   --  4.0    < > = values in this interval not displayed. CBC Recent Labs     03/31/22 0519 03/30/22 0657 03/29/22 0212   WBC 7.0 7.8  --    RBC 4.69 4.80  --    HGB 13.7 14.4 15.2   HCT 37.6* 38.5* 42.5    312  --    GRANS 43 59  --    LYMPH 44 29  --    EOS 1 0*  --    MONOS 11 11  --    BASOS 0 0  --    IG 0 0  --    ANEU 3.0 4.6  --    ABL 3.1 2.2  --    SOCORRO 0.1 0.0  --    ABM 0.8 0.9  --    ABB 0.0 0.0  --    AIG 0.0 0.0  --       LFT No results for input(s): ALT, TBIL, AP, TP, ALB, GLOB, AGRAT in the last 72 hours.     No lab exists for component: SGOT, GPT   Cardiac Testing Lab Results   Component Value Date/Time    CK 1,142 (H) 03/31/2022 05:19 AM    CK 1,658 (H) 03/30/2022 04:51 PM    CK 1,867 (H) 03/30/2022 06:57 AM    Troponin-I, Qt. <0.02 (L) 08/09/2014 11:20 PM      Coagulation Tests Lab Results   Component Value Date/Time    Prothrombin time 14.7 (H) 07/16/2019 08:54 PM    Prothrombin time 11.3 09/17/2016 06:57 AM    Prothrombin time 11.4 02/03/2016 09:30 PM    INR 1.2 07/16/2019 08:54 PM    INR 1.1 09/17/2016 06:57 AM    INR 1.1 02/03/2016 09:30 PM    aPTT 25.7 04/29/2014 03:06 PM      A1c Lab Results   Component Value Date/Time    Hemoglobin A1c 5.5 08/12/2014 06:00 AM      Lipid Panel No results found for: CHOL, CHOLPOCT, CHOLX, CHLST, CHOLV, 105123, HDL, HDLP, LDL, LDLC, DLDLP, 290881, VLDLC, VLDL, TGLX, TRIGL, TRIGP, TGLPOCT, CHHD, CHHDX   Thyroid Panel Lab Results   Component Value Date/Time    TSH 0.263 (L) 03/11/2018 11:02 PM    TSH 0.280 (L) 08/12/2014 06:00 AM    T4, Free 1.1 03/11/2018 11:02 PM    T4, Free 1.1 07/31/2013 05:29 AM    T3, total 1.00 03/11/2018 11:02 PM    T3, total 2.11 (H) 07/31/2013 05:29 AM        Most Recent UA Lab Results   Component Value Date/Time    Color YELLOW 03/27/2022 12:05 PM    Appearance CLEAR 03/27/2022 12:05 PM    Specific gravity 1.021 08/01/2020 09:38 PM    pH (UA) 5.5 03/27/2022 12:05 PM    Protein 100 (A) 03/27/2022 12:05 PM    Glucose Negative 03/27/2022 12:05 PM    Ketone 15 (A) 03/27/2022 12:05 PM    Bilirubin Negative 03/27/2022 12:05 PM    Blood LARGE (A) 03/27/2022 12:05 PM    Urobilinogen 0.2 03/27/2022 12:05 PM    Nitrites Positive (A) 03/27/2022 12:05 PM    Leukocyte Esterase Negative 03/27/2022 12:05 PM    WBC 0-3 03/27/2022 12:05 PM    RBC 0-3 03/27/2022 12:05 PM    Epithelial cells 0-3 03/27/2022 12:05 PM    Bacteria 0 03/27/2022 12:05 PM    Casts 0-3 03/27/2022 12:05 PM    Crystals, urine 0 01/29/2020 09:04 AM    Mucus TRACE 03/27/2022 12:05 PM    Other observations RESULTS VERIFIED MANUALLY 03/27/2022 12:05 PM          All Labs from Last 24 Hrs:  Recent Results (from the past 24 hour(s))   POTASSIUM    Collection Time: 03/30/22  4:51 PM   Result Value Ref Range    Potassium 3.0 (L) 3.5 - 5.1 mmol/L   CK    Collection Time: 03/30/22  4:51 PM   Result Value Ref Range    CK 1,658 (H) 21 - 344 U/L   METABOLIC PANEL, BASIC    Collection Time: 03/31/22  5:19 AM   Result Value Ref Range    Sodium 138 138 - 145 mmol/L Potassium 3.2 (L) 3.5 - 5.1 mmol/L    Chloride 106 98 - 107 mmol/L    CO2 25 21 - 32 mmol/L    Anion gap 7 7 - 16 mmol/L    Glucose 95 65 - 100 mg/dL    BUN 17 6 - 23 MG/DL    Creatinine 1.00 0.8 - 1.5 MG/DL    GFR est AA >60 >60 ml/min/1.73m2    GFR est non-AA >60 >60 ml/min/1.73m2    Calcium 8.9 8.3 - 10.4 MG/DL   CBC WITH AUTOMATED DIFF    Collection Time: 03/31/22  5:19 AM   Result Value Ref Range    WBC 7.0 4.3 - 11.1 K/uL    RBC 4.69 4.23 - 5.6 M/uL    HGB 13.7 13.6 - 17.2 g/dL    HCT 37.6 (L) 41.1 - 50.3 %    MCV 80.2 79.6 - 97.8 FL    MCH 29.2 26.1 - 32.9 PG    MCHC 36.4 (H) 31.4 - 35.0 g/dL    RDW 12.7 11.9 - 14.6 %    PLATELET 744 000 - 238 K/uL    MPV 9.1 (L) 9.4 - 12.3 FL    ABSOLUTE NRBC 0.00 0.0 - 0.2 K/uL    DF AUTOMATED      NEUTROPHILS 43 43 - 78 %    LYMPHOCYTES 44 13 - 44 %    MONOCYTES 11 4.0 - 12.0 %    EOSINOPHILS 1 0.5 - 7.8 %    BASOPHILS 0 0.0 - 2.0 %    IMMATURE GRANULOCYTES 0 0.0 - 5.0 %    ABS. NEUTROPHILS 3.0 1.7 - 8.2 K/UL    ABS. LYMPHOCYTES 3.1 0.5 - 4.6 K/UL    ABS. MONOCYTES 0.8 0.1 - 1.3 K/UL    ABS. EOSINOPHILS 0.1 0.0 - 0.8 K/UL    ABS. BASOPHILS 0.0 0.0 - 0.2 K/UL    ABS. IMM.  GRANS. 0.0 0.0 - 0.5 K/UL   CK    Collection Time: 03/31/22  5:19 AM   Result Value Ref Range    CK 1,142 (H) 21 - 215 U/L   MAGNESIUM    Collection Time: 03/31/22  5:19 AM   Result Value Ref Range    Magnesium 2.1 1.8 - 2.4 mg/dL       Current Med List in Hospital:   Current Facility-Administered Medications   Medication Dose Route Frequency    sucralfate (CARAFATE) tablet 1 g  1 g Oral AC&HS    diphenhydrAMINE (BENADRYL) injection 12.5 mg  12.5 mg IntraVENous Q6H PRN    ondansetron (ZOFRAN) injection 4 mg  4 mg IntraVENous Q6H PRN    hydrALAZINE (APRESOLINE) 20 mg/mL injection 10 mg  10 mg IntraVENous Q6H PRN    pantoprazole (PROTONIX) 40 mg in 0.9% sodium chloride 10 mL injection  40 mg IntraVENous Q12H    labetaloL (NORMODYNE;TRANDATE) injection 10 mg  10 mg IntraVENous Q6H PRN    sodium chloride (NS) flush 5-40 mL  5-40 mL IntraVENous Q8H    sodium chloride (NS) flush 5-40 mL  5-40 mL IntraVENous PRN    acetaminophen (TYLENOL) tablet 650 mg  650 mg Oral Q6H PRN    Or    acetaminophen (TYLENOL) suppository 650 mg  650 mg Rectal Q6H PRN    polyethylene glycol (MIRALAX) packet 17 g  17 g Oral DAILY PRN       Allergies   Allergen Reactions    Amoxicillin Nausea Only    Aspirin Other (comments)     ulcers    Hydrocodone Rash     Tolerates hydromorphone, morphine, tramadol    Ibuprofen Other (comments)     Hx of ulcers    Pcn [Penicillins] Rash    Phenergan [Promethazine] Nausea and Vomiting and Other (comments)     Immunization History   Administered Date(s) Administered    TDAP Vaccine 04/21/2012       Recent Vital Data:  Patient Vitals for the past 24 hrs:   Temp Pulse Resp BP SpO2   03/31/22 0745 99.1 °F (37.3 °C) 87 16 109/83 96 %   03/31/22 0043 98.3 °F (36.8 °C) 85 18 120/89 96 %   03/30/22 1901 99.1 °F (37.3 °C) (!) 104 18 121/87 96 %   03/30/22 1211 97.6 °F (36.4 °C) 79 16 (!) 145/104 96 %     Oxygen Therapy  O2 Sat (%): 96 % (03/31/22 0745)  Pulse via Oximetry: 97 beats per minute (03/28/22 1824)  O2 Device: None (Room air) (03/29/22 1600)  O2 Flow Rate (L/min): 6 l/min (03/28/22 1744)    Estimated body mass index is 25.13 kg/m² as calculated from the following:    Height as of this encounter: 5' 1\" (1.549 m). Weight as of this encounter: 60.3 kg (133 lb). Intake/Output Summary (Last 24 hours) at 3/31/2022 0805  Last data filed at 3/31/2022 0755  Gross per 24 hour   Intake 900 ml   Output 1000 ml   Net -100 ml         Physical Exam:    General:    Well nourished. No overt distress  CV:   RRR. No m/r/g. No JVD, no edema   Lungs:   CTAB. No wheezing, rhonchi, or rales. Even, unlabored  Abdomen:   Soft, nontender, nondistended. Extremities: Warm and dry. No cyanosis or clubbing. No edema. Skin:     No rashes.   Normal coloration  Neuro:   grossly intact. Psych:  Normal mood and affect. Signed:  Kyung Orourke MD    Part of this note may have been written by using a voice dictation software. The note has been proof read but may still contain some grammatical/other typographical errors.

## 2022-07-23 ENCOUNTER — APPOINTMENT (OUTPATIENT)
Dept: GENERAL RADIOLOGY | Age: 47
DRG: 394 | End: 2022-07-23

## 2022-07-23 ENCOUNTER — HOSPITAL ENCOUNTER (INPATIENT)
Age: 47
LOS: 3 days | Discharge: HOME | DRG: 394 | End: 2022-07-26
Attending: EMERGENCY MEDICINE | Admitting: INTERNAL MEDICINE

## 2022-07-23 DIAGNOSIS — E87.20 METABOLIC ACIDOSIS: ICD-10-CM

## 2022-07-23 DIAGNOSIS — N17.9 ACUTE RENAL FAILURE, UNSPECIFIED ACUTE RENAL FAILURE TYPE (HCC): Primary | ICD-10-CM

## 2022-07-23 DIAGNOSIS — R11.15 CYCLICAL VOMITING: ICD-10-CM

## 2022-07-23 PROBLEM — E83.52 HYPERCALCEMIA: Status: ACTIVE | Noted: 2022-07-23

## 2022-07-23 PROBLEM — I10 HYPERTENSION: Status: ACTIVE | Noted: 2022-03-28

## 2022-07-23 PROBLEM — R73.9 HYPERGLYCEMIA: Status: ACTIVE | Noted: 2022-07-23

## 2022-07-23 PROBLEM — R74.8 ELEVATED CPK: Status: ACTIVE | Noted: 2022-07-23

## 2022-07-23 PROBLEM — F12.90 CANNABINOID HYPEREMESIS SYNDROME: Status: ACTIVE | Noted: 2020-08-02

## 2022-07-23 PROBLEM — R11.2 CANNABINOID HYPEREMESIS SYNDROME: Status: ACTIVE | Noted: 2020-08-02

## 2022-07-23 PROBLEM — R74.8 ELEVATED LIVER ENZYMES: Status: ACTIVE | Noted: 2022-07-23

## 2022-07-23 PROBLEM — Z72.0 TOBACCO USE: Chronic | Status: ACTIVE | Noted: 2022-07-23

## 2022-07-23 PROBLEM — R31.9 HEMATURIA: Status: ACTIVE | Noted: 2022-07-23

## 2022-07-23 LAB
ALBUMIN SERPL-MCNC: 5.2 G/DL (ref 3.5–5)
ALBUMIN/GLOB SERPL: 1.3 {RATIO} (ref 1.2–3.5)
ALP SERPL-CCNC: 126 U/L (ref 50–136)
ALT SERPL-CCNC: 43 U/L (ref 12–65)
AMPHET UR QL SCN: NEGATIVE
ANION GAP SERPL CALC-SCNC: 15 MMOL/L (ref 7–16)
AST SERPL-CCNC: 63 U/L (ref 15–37)
BASOPHILS # BLD: 0 K/UL (ref 0–0.2)
BASOPHILS NFR BLD: 0 % (ref 0–2)
BENZODIAZ UR QL: NEGATIVE
BILIRUB SERPL-MCNC: 1.4 MG/DL (ref 0.2–1.1)
BILIRUB UR QL: NEGATIVE
BUN SERPL-MCNC: 74 MG/DL (ref 6–23)
CALCIUM SERPL-MCNC: 10.6 MG/DL (ref 8.3–10.4)
CANNABINOIDS UR QL SCN: POSITIVE
CHLORIDE SERPL-SCNC: 104 MMOL/L (ref 98–107)
CK SERPL-CCNC: 2846 U/L (ref 21–215)
CO2 SERPL-SCNC: 18 MMOL/L (ref 21–32)
COCAINE UR QL SCN: NEGATIVE
CREAT SERPL-MCNC: 3 MG/DL (ref 0.8–1.5)
DIFFERENTIAL METHOD BLD: ABNORMAL
EOSINOPHIL # BLD: 0 K/UL (ref 0–0.8)
EOSINOPHIL NFR BLD: 0 % (ref 0.5–7.8)
ERYTHROCYTE [DISTWIDTH] IN BLOOD BY AUTOMATED COUNT: 12.7 % (ref 11.9–14.6)
GLOBULIN SER CALC-MCNC: 4.1 G/DL (ref 2.3–3.5)
GLUCOSE SERPL-MCNC: 145 MG/DL (ref 65–100)
GLUCOSE UR QL STRIP.AUTO: NEGATIVE MG/DL
HCT VFR BLD AUTO: 40 % (ref 41.1–50.3)
HGB BLD-MCNC: 15 G/DL (ref 13.6–17.2)
IMM GRANULOCYTES # BLD AUTO: 0.1 K/UL (ref 0–0.5)
IMM GRANULOCYTES NFR BLD AUTO: 1 % (ref 0–5)
KETONES UR-MCNC: 40 MG/DL
LEUKOCYTE ESTERASE UR QL STRIP: NEGATIVE
LIPASE SERPL-CCNC: 128 U/L (ref 73–393)
LYMPHOCYTES # BLD: 1.5 K/UL (ref 0.5–4.6)
LYMPHOCYTES NFR BLD: 12 % (ref 13–44)
MAGNESIUM SERPL-MCNC: 2.8 MG/DL (ref 1.8–2.4)
MCH RBC QN AUTO: 29.7 PG (ref 26.1–32.9)
MCHC RBC AUTO-ENTMCNC: 37.5 G/DL (ref 31.4–35)
MCV RBC AUTO: 79.2 FL (ref 79.6–97.8)
MONOCYTES # BLD: 1.6 K/UL (ref 0.1–1.3)
MONOCYTES NFR BLD: 13 % (ref 4–12)
NEUTS SEG # BLD: 9 K/UL (ref 1.7–8.2)
NEUTS SEG NFR BLD: 74 % (ref 43–78)
NITRITE UR QL: NEGATIVE
NRBC # BLD: 0 K/UL (ref 0–0.2)
OPIATES UR QL: NEGATIVE
PH UR: 6 [PH] (ref 5–9)
PLATELET # BLD AUTO: 331 K/UL (ref 150–450)
PMV BLD AUTO: 8.9 FL (ref 9.4–12.3)
POTASSIUM SERPL-SCNC: 3.5 MMOL/L (ref 3.5–5.1)
PROT SERPL-MCNC: 9.3 G/DL (ref 6.3–8.2)
PROT UR QL: 100 MG/DL
RBC # BLD AUTO: 5.05 M/UL (ref 4.23–5.6)
RBC # UR STRIP: ABNORMAL /UL
SERVICE CMNT-IMP: ABNORMAL
SODIUM SERPL-SCNC: 137 MMOL/L (ref 136–145)
SP GR UR: 1.01 (ref 1–1.02)
UROBILINOGEN UR QL: 0.2 EU/DL (ref 0.2–1)
WBC # BLD AUTO: 12.2 K/UL (ref 4.3–11.1)

## 2022-07-23 PROCEDURE — 99285 EMERGENCY DEPT VISIT HI MDM: CPT

## 2022-07-23 PROCEDURE — 2580000003 HC RX 258: Performed by: PHYSICIAN ASSISTANT

## 2022-07-23 PROCEDURE — 1100000000 HC RM PRIVATE

## 2022-07-23 PROCEDURE — 81003 URINALYSIS AUTO W/O SCOPE: CPT

## 2022-07-23 PROCEDURE — 93005 ELECTROCARDIOGRAM TRACING: CPT | Performed by: PHYSICIAN ASSISTANT

## 2022-07-23 PROCEDURE — 82550 ASSAY OF CK (CPK): CPT

## 2022-07-23 PROCEDURE — 6360000002 HC RX W HCPCS: Performed by: EMERGENCY MEDICINE

## 2022-07-23 PROCEDURE — 2580000003 HC RX 258: Performed by: EMERGENCY MEDICINE

## 2022-07-23 PROCEDURE — 74022 RADEX COMPL AQT ABD SERIES: CPT

## 2022-07-23 PROCEDURE — 2580000003 HC RX 258: Performed by: INTERNAL MEDICINE

## 2022-07-23 PROCEDURE — 83735 ASSAY OF MAGNESIUM: CPT

## 2022-07-23 PROCEDURE — 83690 ASSAY OF LIPASE: CPT

## 2022-07-23 PROCEDURE — 85025 COMPLETE CBC W/AUTO DIFF WBC: CPT

## 2022-07-23 PROCEDURE — 80053 COMPREHEN METABOLIC PANEL: CPT

## 2022-07-23 PROCEDURE — 96374 THER/PROPH/DIAG INJ IV PUSH: CPT

## 2022-07-23 PROCEDURE — 80307 DRUG TEST PRSMV CHEM ANLYZR: CPT

## 2022-07-23 RX ORDER — ONDANSETRON 4 MG/1
4 TABLET, ORALLY DISINTEGRATING ORAL EVERY 8 HOURS PRN
Status: DISCONTINUED | OUTPATIENT
Start: 2022-07-23 | End: 2022-07-26 | Stop reason: HOSPADM

## 2022-07-23 RX ORDER — SODIUM CHLORIDE, SODIUM LACTATE, POTASSIUM CHLORIDE, AND CALCIUM CHLORIDE .6; .31; .03; .02 G/100ML; G/100ML; G/100ML; G/100ML
1000 INJECTION, SOLUTION INTRAVENOUS
Status: COMPLETED | OUTPATIENT
Start: 2022-07-23 | End: 2022-07-23

## 2022-07-23 RX ORDER — DIPHENHYDRAMINE HYDROCHLORIDE 50 MG/ML
12.5 INJECTION INTRAMUSCULAR; INTRAVENOUS EVERY 6 HOURS PRN
Status: DISCONTINUED | OUTPATIENT
Start: 2022-07-23 | End: 2022-07-26 | Stop reason: HOSPADM

## 2022-07-23 RX ORDER — SODIUM CHLORIDE 9 MG/ML
INJECTION, SOLUTION INTRAVENOUS PRN
Status: DISCONTINUED | OUTPATIENT
Start: 2022-07-23 | End: 2022-07-26 | Stop reason: HOSPADM

## 2022-07-23 RX ORDER — ONDANSETRON 2 MG/ML
4 INJECTION INTRAMUSCULAR; INTRAVENOUS EVERY 6 HOURS PRN
Status: DISCONTINUED | OUTPATIENT
Start: 2022-07-23 | End: 2022-07-26 | Stop reason: HOSPADM

## 2022-07-23 RX ORDER — POLYETHYLENE GLYCOL 3350 17 G/17G
17 POWDER, FOR SOLUTION ORAL DAILY PRN
Status: DISCONTINUED | OUTPATIENT
Start: 2022-07-23 | End: 2022-07-26 | Stop reason: HOSPADM

## 2022-07-23 RX ORDER — ACETAMINOPHEN 325 MG/1
650 TABLET ORAL EVERY 6 HOURS PRN
Status: DISCONTINUED | OUTPATIENT
Start: 2022-07-23 | End: 2022-07-26 | Stop reason: HOSPADM

## 2022-07-23 RX ORDER — SODIUM CHLORIDE 0.9 % (FLUSH) 0.9 %
5-40 SYRINGE (ML) INJECTION EVERY 12 HOURS SCHEDULED
Status: DISCONTINUED | OUTPATIENT
Start: 2022-07-23 | End: 2022-07-26 | Stop reason: HOSPADM

## 2022-07-23 RX ORDER — ONDANSETRON 2 MG/ML
4 INJECTION INTRAMUSCULAR; INTRAVENOUS
Status: COMPLETED | OUTPATIENT
Start: 2022-07-23 | End: 2022-07-23

## 2022-07-23 RX ORDER — SODIUM CHLORIDE 9 MG/ML
INJECTION, SOLUTION INTRAVENOUS CONTINUOUS
Status: DISCONTINUED | OUTPATIENT
Start: 2022-07-23 | End: 2022-07-25

## 2022-07-23 RX ORDER — SODIUM CHLORIDE 0.9 % (FLUSH) 0.9 %
5-40 SYRINGE (ML) INJECTION PRN
Status: DISCONTINUED | OUTPATIENT
Start: 2022-07-23 | End: 2022-07-26 | Stop reason: HOSPADM

## 2022-07-23 RX ORDER — ACETAMINOPHEN 650 MG/1
650 SUPPOSITORY RECTAL EVERY 6 HOURS PRN
Status: DISCONTINUED | OUTPATIENT
Start: 2022-07-23 | End: 2022-07-26 | Stop reason: HOSPADM

## 2022-07-23 RX ORDER — LABETALOL HYDROCHLORIDE 5 MG/ML
5 INJECTION, SOLUTION INTRAVENOUS EVERY 6 HOURS PRN
Status: DISCONTINUED | OUTPATIENT
Start: 2022-07-23 | End: 2022-07-26 | Stop reason: HOSPADM

## 2022-07-23 RX ADMIN — SODIUM CHLORIDE, POTASSIUM CHLORIDE, SODIUM LACTATE AND CALCIUM CHLORIDE 1000 ML: 600; 310; 30; 20 INJECTION, SOLUTION INTRAVENOUS at 17:58

## 2022-07-23 RX ADMIN — SODIUM CHLORIDE: 9 INJECTION, SOLUTION INTRAVENOUS at 22:05

## 2022-07-23 RX ADMIN — SODIUM CHLORIDE, POTASSIUM CHLORIDE, SODIUM LACTATE AND CALCIUM CHLORIDE 1000 ML: 600; 310; 30; 20 INJECTION, SOLUTION INTRAVENOUS at 16:02

## 2022-07-23 RX ADMIN — ONDANSETRON 4 MG: 2 INJECTION INTRAMUSCULAR; INTRAVENOUS at 17:58

## 2022-07-23 RX ADMIN — SODIUM CHLORIDE, PRESERVATIVE FREE 5 ML: 5 INJECTION INTRAVENOUS at 22:02

## 2022-07-23 ASSESSMENT — ENCOUNTER SYMPTOMS
DIARRHEA: 0
VOMITING: 1
SHORTNESS OF BREATH: 0
FACIAL SWELLING: 0
NAUSEA: 1
ABDOMINAL PAIN: 1

## 2022-07-23 ASSESSMENT — PAIN SCALES - GENERAL
PAINLEVEL_OUTOF10: 10
PAINLEVEL_OUTOF10: 0

## 2022-07-23 NOTE — ED PROVIDER NOTES
[facility] Emergency Department Provider Note                   PCP:                Barbara Youngblood MD               Age: 41 y.o. Sex: male       ICD-10-CM    1. Acute renal failure, unspecified acute renal failure type ([facility]  N17.9       2. Cyclical vomiting  O83.71       3. Metabolic acidosis  X63.5           DISPOSITION Decision To Admit 07/23/2022 06:52:40 PM       MDM  Number of Diagnoses or Management Options  Acute renal failure, unspecified acute renal failure type (HCC)  Cyclical vomiting  Metabolic acidosis  Diagnosis management comments: I wore appropriate PPE throughout this patient's ED visit. Amina Sprague MD, 6:04 PM      Mild generalized abdominal tenderness. Will admit for acute renal failure. CK and CBC pending. Given fluids. Amount and/or Complexity of Data Reviewed  Clinical lab tests: ordered and reviewed  Tests in the medicine section of CPT®: reviewed and ordered  Review and summarize past medical records: yes  Discuss the patient with other providers: yes  Independent visualization of images, tracings, or specimens: yes           Orders Placed This Encounter   Procedures    CMP    Lipase    Magnesium    CK    CBC with Auto Differential    Diet NPO    POCT Urine Dipstick    EKG 12 Lead    Saline lock IV        Grissom Odor Alysia is a 41 y.o. male who presents to the Emergency Department with chief complaint of    Chief Complaint   Patient presents with    Emesis      61-year-old male with a history of cyclical vomiting syndrome, gastroparesis, peptic ulcer disease, GERD, esophagitis, C. difficile, rhabdomyolysis presents with multiple episodes of vomiting for the past 4 days since working out in the heat. He has been trying Zofran at home without improvement. Denies diarrhea. He has some abdominal cramping related to dry heaving. Denies documented fevers or urinary symptoms. No blood in vomit or stools. All other systems reviewed and are negative.     Review of Systems Constitutional:  Negative for fever. HENT:  Negative for facial swelling. Eyes:  Negative for visual disturbance. Respiratory:  Negative for shortness of breath. Cardiovascular:  Negative for chest pain. Gastrointestinal:  Positive for abdominal pain, nausea and vomiting. Negative for diarrhea. Genitourinary:  Negative for dysuria. Musculoskeletal:  Negative for joint swelling. Skin:  Negative for rash. Neurological:  Negative for speech difficulty. Psychiatric/Behavioral:  Negative for confusion. All other systems reviewed and are negative. Past Medical History:   Diagnosis Date    LORNA (acute kidney injury) ([facility] 8/12/2014    Clostridium difficile colitis 3/20/2011    Gastrointestinal disorder SINCE 1999    Gastroparesis 2005    emptying study normal -2006    GERD (gastroesophageal reflux disease)     PUD (peptic ulcer disease) ALL DX IN 2008    ESOPHAGITIS, + H PYLORI    Uncontrolled hypertension 6/26/2018        Past Surgical History:   Procedure Laterality Date    COLONOSCOPY  4/08    ESOPHAGITIS, COLONIC ULCER X1    UPPER GASTROINTESTINAL ENDOSCOPY  4/08    H. HERNIA, ULCER X2, H PYLORI,    UPPER GASTROINTESTINAL ENDOSCOPY  2011    h pylori -neg    WISDOM TOOTH EXTRACTION  2/10/11        Family History   Problem Relation Age of Onset    Other Son         L+W    Other Sister         ALL L+W    Heart Disease Paternal Grandfather     Sickle Cell Anemia Father     Diabetes Maternal Grandmother     Other Mother         L+W        Social History     Socioeconomic History    Marital status:    Tobacco Use    Smoking status: Every Day     Packs/day: 0.25     Types: Cigarettes    Smokeless tobacco: Never   Substance and Sexual Activity    Alcohol use: No    Drug use: Yes     Types: Marijuana [employer])   Social History Narrative    3/17/11:  PATIENT LIVES WITH GIRLFRIEND, SHIVANI (CELL: [phone]), HER 3YEAR OLD DAUGHTER AND THEIR 3YEAR OLD SON.   SHIVANI IS CURRENTLY 13 WEEKS PREGNANT. PATIENT'S JOB AT [employer] WAS DOWNSIZED ABOUT A YEAR AGO, AND HE HAS         Allergies: Aspirin, Ibuprofen, Hydrocodone, Penicillins, and Promethazine    Previous Medications    PANTOPRAZOLE (PROTONIX) 40 MG TABLET    Take 40 mg by mouth daily    SUCRALFATE (CARAFATE) 1 GM/10ML SUSPENSION    Take 1 g by mouth 4 times daily        Vitals signs and nursing note reviewed. Patient Vitals for the past 4 hrs:   Pulse Resp BP SpO2   07/23/22 1846 (!) 130 24 (!) 156/112 93 %   07/23/22 1715 (!) 134 17 (!) 123/100 97 %   07/23/22 1545 (!) 150 20 (!) 131/103 100 %          Physical Exam  Vitals and nursing note reviewed. Constitutional:       Appearance: Normal appearance. He is ill-appearing. HENT:      Head: Normocephalic and atraumatic. Nose: Nose normal.      Mouth/Throat:      Mouth: Mucous membranes are moist.   Eyes:      Extraocular Movements: Extraocular movements intact. Pupils: Pupils are equal, round, and reactive to light. Cardiovascular:      Rate and Rhythm: Regular rhythm. Tachycardia present. Pulmonary:      Effort: Pulmonary effort is normal. Tachypnea present. No respiratory distress. Breath sounds: Normal breath sounds. Abdominal:      General: Abdomen is flat. There is no distension. Palpations: Abdomen is soft. Tenderness: There is generalized abdominal tenderness. Musculoskeletal:         General: No deformity. Normal range of motion. Cervical back: Normal range of motion and neck supple. Skin:     General: Skin is warm and dry. Neurological:      General: No focal deficit present. Mental Status: He is alert. Mental status is at baseline.    Psychiatric:         Mood and Affect: Mood normal.        Procedures    ED EKG Interpretation  EKG was interpreted in the absence of a cardiologist.    Rate: Rate: Tachycardia  EKG Interpretation: EKG Interpretation: sinus rhythm  ST Segments: Nonspecific ST segments - NO STEMI    Labs Reviewed   COMPREHENSIVE METABOLIC PANEL - Abnormal; Notable for the following components:       Result Value    CO2 18 (*)     Glucose 145 (*)     BUN 74 (*)     Creatinine 3.00 (*)     GFR  29 (*)     GFR Non-African American 24 (*)     Calcium 10.6 (*)     Total Bilirubin 1.4 (*)     AST 63 (*)     Total Protein 9.3 (*)     Albumin 5.2 (*)     Globulin 4.1 (*)     All other components within normal limits   CBC WITH AUTO DIFFERENTIAL - Abnormal; Notable for the following components:    WBC 12.2 (*)     Hematocrit 40.0 (*)     MCV 79.2 (*)     MCHC 37.5 (*)     MPV 8.9 (*)     Lymphocytes 12 (*)     Monocytes 13 (*)     Eosinophils % 0 (*)     Segs Absolute 9.0 (*)     Absolute Mono # 1.6 (*)     All other components within normal limits   LIPASE   MAGNESIUM   CK        No orders to display                            Voice dictation software was used during the making of this note. This software is not perfect and grammatical and other typographical errors may be present. This note has not been completely proofread for errors.      Otto Mandujano MD  07/23/22 3515

## 2022-07-23 NOTE — H&P
Hospitalist History and Physical   Admit Date:  2022  5:11 PM   Name:  Nichole Cid   Age:  41 y.o. Sex:  male  :  1975   MRN:  [id number]   Room:  02/    Presenting Complaint: Emesis     Reason(s) for Admission: LORNA (acute kidney injury) ([facility] [address] [N17.9]     History of Present Illness:       Nichole Cid is a 41 y.o. male with medical history of THC use with cyclical emesis syndrome, GI bleed due to esophagitis/ yao obrien tear, admitted with emesis and LORNA. He worked outside 4 days ago moving furniture and developed emesis. Unable to keep anything down. Feels better if has liquids and hot showers. Typically improves with zofran. Has LORNA, creatinine 3.0. making dark urine. No lucas bleeding. Unable to sleep. He received IVF bolus while in the ED. Wife present and gives history due to his active ongoing emesis.       FULL CODE  Wife Lynette Preciado [phone]    Review of Systems:  10 systems limited due to active emesis     Assessment & Plan:     Principal Problem:    LORNA (acute kidney injury) ([facility] [address]  Plan:   Admit to remote tele   Creatinine 3.0  Hydrate  cc/hr and followup BMP       Active Problems:    Hyperglycemia  Plan:   Trend lab            Elevated liver enzymes  Plan:   Trend lab          Hypercalcemia  Plan:   Trend lab      Hematuria  Plan:   Followup urology           Cannabinoid hyperemesis syndrome  Plan:   Needs cessation   Check UDS  Supportive care   IVF hydration   Check ABDOMINAL series          Anxiety  Plan:   Supportive care        Hypertension  Plan:   As needed labetalol       Elevated CPK:  Hydrate and trend CPK      Dispo/Discharge Planning:     pending    Diet: Diet NPO  VTE ppx: SCD  Code status: FULL  Hospital Problems:  Principal Problem:    LORNA (acute kidney injury) ([facility] [address]  Active Problems:    Hyperglycemia    Elevated liver enzymes    Hypercalcemia    Hematuria    Cannabinoid hyperemesis syndrome    Anxiety    Hypertension  Resolved Problems:    * No resolved hospital problems. *       Past History:     Past Medical History:   Diagnosis Date    LORNA (acute kidney injury) ([facility] [address] 8/12/2014    LORNA (acute kidney injury) ([facility] 7/23/2022    Clostridium difficile colitis 3/20/2011    Gastrointestinal disorder SINCE 1999    Gastroparesis 2005    emptying study normal -2006    GERD (gastroesophageal reflux disease)     PUD (peptic ulcer disease) ALL DX IN 2008    ESOPHAGITIS, + H PYLORI    Uncontrolled hypertension 6/26/2018     Past Surgical History:   Procedure Laterality Date    COLONOSCOPY  4/08    ESOPHAGITIS, COLONIC ULCER X1    UPPER GASTROINTESTINAL ENDOSCOPY  4/08    H. HERNIA, ULCER X2, H PYLORI,    UPPER GASTROINTESTINAL ENDOSCOPY  2011    h pylori -neg    WISDOM TOOTH EXTRACTION  2/10/11      Social History     Tobacco Use    Smoking status: Every Day     Packs/day: 0.25     Types: Cigarettes    Smokeless tobacco: Never   Substance Use Topics    Alcohol use: No      Social History     Substance and Sexual Activity   Drug Use Yes    Types: Marijuana Champ Cue)     Family History   Problem Relation Age of Onset    Other Son         L+W    Other Sister         ALL L+W    Heart Disease Paternal Grandfather     Sickle Cell Anemia Father     Diabetes Maternal Grandmother     Other Mother         L+W      Family history reviewed and negative except as noted above.       Immunization History   Administered Date(s) Administered    Tdap (Boostrix, Adacel) 04/21/2012     Allergies   Allergen Reactions    Aspirin Other (See Comments)     ulcers    Ibuprofen Other (See Comments)     Hx of ulcers    Hydrocodone Rash     Tolerates hydromorphone, morphine, tramadol    Penicillins Nausea Only and Rash    Promethazine Nausea And Vomiting and Other (See Comments)     Prior to Admit Medications:  Current Outpatient Medications   Medication Instructions    pantoprazole (PROTONIX) 40 mg, Oral, DAILY    sucralfate (CARAFATE) 1 g, Oral, 4 TIMES DAILY         Objective: Patient Vitals for the past 24 hrs:   Pulse Resp BP SpO2   07/23/22 1846 (!) 130 24 (!) 156/112 93 %   07/23/22 1832 (!) 101 15 (!) 141/104 99 %   07/23/22 1802 (!) 121 17 129/83 97 %   07/23/22 1742 (!) 111 24 (!) 147/108 (!) 81 %   07/23/22 1732 (!) 124 (!) 37 (!) 140/102 99 %   07/23/22 1715 (!) 134 17 (!) 123/100 97 %   07/23/22 1712 -- -- (!) 152/109 97 %   07/23/22 1545 (!) 150 20 (!) 131/103 100 %       Oxygen Therapy  SpO2: 93 %  O2 Device: None (Room air)    Estimated body mass index is 26.52 kg/m² as calculated from the following:    Height as of this encounter: 5' 2\" (1.575 m). Weight as of this encounter: 145 lb (65.8 kg). Intake/Output Summary (Last 24 hours) at 7/23/2022 1943  Last data filed at 7/23/2022 1848  Gross per 24 hour   Intake 1000 ml   Output --   Net 1000 ml         Physical Exam:    Blood pressure (!) 156/112, pulse (!) 130, resp. rate 24, height 5' 2\" (1.575 m), weight 145 lb (65.8 kg), SpO2 93 %. General:    Well nourished. Alert, actively vomiting    Head:  Normocephalic, atraumatic  Eyes:  Sclerae appear normal.  Pupils equally round. ENT:  Nares appear normal, no drainage. Moist oral mucosa  Neck:  No restricted ROM. Trachea midline   CV:   RRR. No m/r/g. No jugular venous distension. Lungs:   CTAB. No wheezing, rhonchi, or rales. Symmetric expansion. Abdomen: Bowel sounds present. Soft, nontender, nondistended. Extremities: No cyanosis or clubbing. No edema  Skin:     No rashes and normal coloration. Warm and dry. Neuro:  CN II-XII grossly intact. Psych:  Normal mood and affect.       I have reviewed ordered lab tests and independently visualized imaging below:    Last 24hr Labs:  Recent Results (from the past 24 hour(s))   CMP    Collection Time: 07/23/22  3:56 PM   Result Value Ref Range    Sodium 137 136 - 145 mmol/L    Potassium 3.5 3.5 - 5.1 mmol/L    Chloride 104 98 - 107 mmol/L    CO2 18 (L) 21 - 32 mmol/L    Anion Gap 15 7 - 16 mmol/L    Glucose 145 (H) 65 - 100 mg/dL    BUN 74 (H) 6 - 23 MG/DL    Creatinine 3.00 (H) 0.8 - 1.5 MG/DL    GFR African American 29 (L) >60 ml/min/1.73m2    GFR Non- 24 (L) >60 ml/min/1.73m2    Calcium 10.6 (H) 8.3 - 10.4 MG/DL    Total Bilirubin 1.4 (H) 0.2 - 1.1 MG/DL    ALT 43 12 - 65 U/L    AST 63 (H) 15 - 37 U/L    Alk Phosphatase 126 50 - 136 U/L    Total Protein 9.3 (H) 6.3 - 8.2 g/dL    Albumin 5.2 (H) 3.5 - 5.0 g/dL    Globulin 4.1 (H) 2.3 - 3.5 g/dL    Albumin/Globulin Ratio 1.3 1.2 - 3.5     Lipase    Collection Time: 07/23/22  3:56 PM   Result Value Ref Range    Lipase 128 73 - 393 U/L   Magnesium    Collection Time: 07/23/22  3:56 PM   Result Value Ref Range    Magnesium 2.8 (H) 1.8 - 2.4 mg/dL   CK    Collection Time: 07/23/22  3:56 PM   Result Value Ref Range    Total CK 2,846 (H) 21 - 215 U/L   CBC with Auto Differential    Collection Time: 07/23/22  6:17 PM   Result Value Ref Range    WBC 12.2 (H) 4.3 - 11.1 K/uL    RBC 5.05 4.23 - 5.6 M/uL    Hemoglobin 15.0 13.6 - 17.2 g/dL    Hematocrit 40.0 (L) 41.1 - 50.3 %    MCV 79.2 (L) 79.6 - 97.8 FL    MCH 29.7 26.1 - 32.9 PG    MCHC 37.5 (H) 31.4 - 35.0 g/dL    RDW 12.7 11.9 - 14.6 %    Platelets 290 335 - 975 K/uL    MPV 8.9 (L) 9.4 - 12.3 FL    nRBC 0.00 0.0 - 0.2 K/uL    Differential Type AUTOMATED      Seg Neutrophils 74 43 - 78 %    Lymphocytes 12 (L) 13 - 44 %    Monocytes 13 (H) 4.0 - 12.0 %    Eosinophils % 0 (L) 0.5 - 7.8 %    Basophils 0 0.0 - 2.0 %    Immature Granulocytes 1 0.0 - 5.0 %    Segs Absolute 9.0 (H) 1.7 - 8.2 K/UL    Absolute Lymph # 1.5 0.5 - 4.6 K/UL    Absolute Mono # 1.6 (H) 0.1 - 1.3 K/UL    Absolute Eos # 0.0 0.0 - 0.8 K/UL    Basophils Absolute 0.0 0.0 - 0.2 K/UL    Absolute Immature Granulocyte 0.1 0.0 - 0.5 K/UL   POCT Urinalysis no Micro    Collection Time: 07/23/22  6:52 PM   Result Value Ref Range    Specific Gravity, Urine, POC 1.015 1.001 - 1.023      pH, Urine, POC 6.0 5.0 - 9.0      Protein, Urine,  (A) NEG mg/dL    Glucose, UA POC Negative NEG mg/dL    KETONES, Urine, POC 40 (A) NEG mg/dL    Bilirubin, Urine, POC Negative NEG      Blood, UA POC MODERATE (A) NEG      URINE UROBILINOGEN POC 0.2 0.2 - 1.0 EU/dL    Nitrate, Urine, POC Negative NEG      Leukocyte Est, UA POC Negative NEG      Performed by: Violet Markham        Other Studies:  No results found. Echocardiogram:  No results found for this or any previous visit. Meds previously ordered:  Orders Placed This Encounter   Medications    lactated ringers bolus    ondansetron (ZOFRAN) injection 4 mg    lactated ringers bolus    0.9 % sodium chloride infusion    diphenhydrAMINE (BENADRYL) injection 12.5 mg         Signed:  Malathi Dorman MD    Part of this note may have been written by using a voice dictation software. The note has been proof read but may still contain some grammatical/other typographical errors.

## 2022-07-24 LAB
ALBUMIN SERPL-MCNC: 4.2 G/DL (ref 3.5–5)
ALBUMIN/GLOB SERPL: 1.1 {RATIO} (ref 1.2–3.5)
ALP SERPL-CCNC: 111 U/L (ref 50–136)
ALT SERPL-CCNC: 45 U/L (ref 12–65)
ANION GAP SERPL CALC-SCNC: 8 MMOL/L (ref 7–16)
AST SERPL-CCNC: 72 U/L (ref 15–37)
BASOPHILS # BLD: 0 K/UL (ref 0–0.2)
BASOPHILS NFR BLD: 0 % (ref 0–2)
BILIRUB SERPL-MCNC: 1.2 MG/DL (ref 0.2–1.1)
BUN SERPL-MCNC: 49 MG/DL (ref 6–23)
CALCIUM SERPL-MCNC: 9.3 MG/DL (ref 8.3–10.4)
CHLORIDE SERPL-SCNC: 107 MMOL/L (ref 98–107)
CK SERPL-CCNC: 2730 U/L (ref 21–215)
CO2 SERPL-SCNC: 23 MMOL/L (ref 21–32)
CREAT SERPL-MCNC: 1.8 MG/DL (ref 0.8–1.5)
DIFFERENTIAL METHOD BLD: ABNORMAL
EKG ATRIAL RATE: 148 BPM
EKG DIAGNOSIS: NORMAL
EKG P AXIS: 73 DEGREES
EKG P-R INTERVAL: 116 MS
EKG Q-T INTERVAL: 334 MS
EKG QRS DURATION: 64 MS
EKG QTC CALCULATION (BAZETT): 524 MS
EKG R AXIS: 69 DEGREES
EKG T AXIS: 61 DEGREES
EKG VENTRICULAR RATE: 148 BPM
EOSINOPHIL # BLD: 0 K/UL (ref 0–0.8)
EOSINOPHIL NFR BLD: 0 % (ref 0.5–7.8)
ERYTHROCYTE [DISTWIDTH] IN BLOOD BY AUTOMATED COUNT: 12.6 % (ref 11.9–14.6)
GLOBULIN SER CALC-MCNC: 3.8 G/DL (ref 2.3–3.5)
GLUCOSE SERPL-MCNC: 106 MG/DL (ref 65–100)
HCT VFR BLD AUTO: 45 % (ref 41.1–50.3)
HGB BLD-MCNC: 16.2 G/DL (ref 13.6–17.2)
IMM GRANULOCYTES # BLD AUTO: 0 K/UL (ref 0–0.5)
IMM GRANULOCYTES NFR BLD AUTO: 0 % (ref 0–5)
LYMPHOCYTES # BLD: 2.5 K/UL (ref 0.5–4.6)
LYMPHOCYTES NFR BLD: 21 % (ref 13–44)
MAGNESIUM SERPL-MCNC: 2.7 MG/DL (ref 1.8–2.4)
MCH RBC QN AUTO: 29.2 PG (ref 26.1–32.9)
MCHC RBC AUTO-ENTMCNC: 36 G/DL (ref 31.4–35)
MCV RBC AUTO: 81.2 FL (ref 79.6–97.8)
MONOCYTES # BLD: 1.6 K/UL (ref 0.1–1.3)
MONOCYTES NFR BLD: 14 % (ref 4–12)
NEUTS SEG # BLD: 7.9 K/UL (ref 1.7–8.2)
NEUTS SEG NFR BLD: 65 % (ref 43–78)
NRBC # BLD: 0 K/UL (ref 0–0.2)
PLATELET # BLD AUTO: 356 K/UL (ref 150–450)
PMV BLD AUTO: 9.6 FL (ref 9.4–12.3)
POTASSIUM SERPL-SCNC: 3.1 MMOL/L (ref 3.5–5.1)
PROT SERPL-MCNC: 8 G/DL (ref 6.3–8.2)
RBC # BLD AUTO: 5.54 M/UL (ref 4.23–5.6)
SODIUM SERPL-SCNC: 138 MMOL/L (ref 136–145)
WBC # BLD AUTO: 12.1 K/UL (ref 4.3–11.1)

## 2022-07-24 PROCEDURE — 2580000003 HC RX 258: Performed by: INTERNAL MEDICINE

## 2022-07-24 PROCEDURE — 36415 COLL VENOUS BLD VENIPUNCTURE: CPT

## 2022-07-24 PROCEDURE — 83735 ASSAY OF MAGNESIUM: CPT

## 2022-07-24 PROCEDURE — A4216 STERILE WATER/SALINE, 10 ML: HCPCS | Performed by: INTERNAL MEDICINE

## 2022-07-24 PROCEDURE — 85025 COMPLETE CBC W/AUTO DIFF WBC: CPT

## 2022-07-24 PROCEDURE — 82550 ASSAY OF CK (CPK): CPT

## 2022-07-24 PROCEDURE — 80053 COMPREHEN METABOLIC PANEL: CPT

## 2022-07-24 PROCEDURE — 1100000000 HC RM PRIVATE

## 2022-07-24 PROCEDURE — 2500000003 HC RX 250 WO HCPCS: Performed by: INTERNAL MEDICINE

## 2022-07-24 PROCEDURE — C9113 INJ PANTOPRAZOLE SODIUM, VIA: HCPCS | Performed by: INTERNAL MEDICINE

## 2022-07-24 PROCEDURE — 6370000000 HC RX 637 (ALT 250 FOR IP): Performed by: PHYSICIAN ASSISTANT

## 2022-07-24 PROCEDURE — 6360000002 HC RX W HCPCS: Performed by: INTERNAL MEDICINE

## 2022-07-24 RX ORDER — POTASSIUM CHLORIDE 20 MEQ/1
40 TABLET, EXTENDED RELEASE ORAL ONCE
Status: COMPLETED | OUTPATIENT
Start: 2022-07-24 | End: 2022-07-24

## 2022-07-24 RX ADMIN — SODIUM CHLORIDE: 9 INJECTION, SOLUTION INTRAVENOUS at 17:31

## 2022-07-24 RX ADMIN — ONDANSETRON 4 MG: 2 INJECTION INTRAMUSCULAR; INTRAVENOUS at 00:28

## 2022-07-24 RX ADMIN — ONDANSETRON 4 MG: 2 INJECTION INTRAMUSCULAR; INTRAVENOUS at 05:50

## 2022-07-24 RX ADMIN — SODIUM CHLORIDE, PRESERVATIVE FREE 5 ML: 5 INJECTION INTRAVENOUS at 12:30

## 2022-07-24 RX ADMIN — SODIUM CHLORIDE, PRESERVATIVE FREE 10 ML: 5 INJECTION INTRAVENOUS at 21:00

## 2022-07-24 RX ADMIN — DIPHENHYDRAMINE HYDROCHLORIDE 12.5 MG: 50 INJECTION, SOLUTION INTRAMUSCULAR; INTRAVENOUS at 05:56

## 2022-07-24 RX ADMIN — POTASSIUM CHLORIDE 40 MEQ: 20 TABLET, EXTENDED RELEASE ORAL at 09:32

## 2022-07-24 RX ADMIN — SODIUM CHLORIDE: 9 INJECTION, SOLUTION INTRAVENOUS at 09:33

## 2022-07-24 RX ADMIN — LABETALOL HYDROCHLORIDE 5 MG: 5 INJECTION INTRAVENOUS at 00:28

## 2022-07-24 RX ADMIN — PANTOPRAZOLE SODIUM 40 MG: 40 INJECTION, POWDER, LYOPHILIZED, FOR SOLUTION INTRAVENOUS at 09:32

## 2022-07-24 ASSESSMENT — PAIN SCALES - GENERAL
PAINLEVEL_OUTOF10: 0

## 2022-07-24 NOTE — PROGRESS NOTES
Comprehensive Nutrition Assessment    Type and Reason for Visit: Initial, Positive Nutrition Screen  Malnutrition Screening Tool: Malnutrition Screen  Have you recently lost weight without trying?: 2 to 13 pounds (1 point)  Have you been eating poorly because of a decreased appetite?: Yes (1 point)  Malnutrition Screening Tool Score: 2    Nutrition Recommendations/Plan:   Meals and Snacks:  Diet: Continue current order. Progression per MD.   Nutrition Supplement Therapy:  Medical food supplement therapy:  Initiate Ensure Clear three times per day (this provides 240 kcal and 8 grams protein per bottle)     Malnutrition Assessment:  Malnutrition Status: At risk for malnutrition (Comment) ([place], CLD)    Nutrition Assessment:  Nutrition History: Unable to obtain from pt at initial contact. Per H&P unable to keep anything down for 4 days PTA. No lucas muscle or fat loss appreciated of exposed body. Do You Have Any Cultural, Protestant, or Ethnic Food Preferences?: No   Nutrition Background:   Medical history of THC use with cyclical emesis syndrome, GI bleed due to esophagitis/ yao obrien tear. Admitted with emesis, LORNA, hyperglycemia, elevated LFT, hypercalcemia, hematuria. Nutrition Interval:  Pt and visitor sleeping at RD visit. Discussed with April Ojeda RN. Pt consuming water and all clear liquid items. He has taken multiple showers. Current Nutrition Therapies:  ADULT DIET; Clear Liquid    Current Intake:   Average Meal Intake: Unable to assess Average Supplements Intake: None Ordered      Anthropometric Measures:  Height: 5' 1\" (154.9 cm)  Current Body Wt: 134 lb 4.2 oz (60.9 kg) (7/23), Weight source: Standing Scale  BMI: 25.4, Overweight (BMI 25.0-29. 9)  Admission Body Weight: 145 lb (65.8 kg) (stated)  Ideal Body Weight (Kg) (Calculated): 51 kg (112 lbs), 119.9 %  Usual Body Wt:  (130-145# per EMR review fluctuating pattern), Percent weight change:  unable to determine        Edema:Peripheral Vascular  Peripheral Vascular (WDL): Within Defined Limits   BMI Category Overweight (BMI 25.0-29. 9)  Estimated Daily Nutrient Needs:  Energy (kcal/day): 3345-4305 (25-30 kcal/kg) (Kcal/kg Weight used: 60.9 kg Current  Protein (g/day): 48-61 (0.8-1 g/kg) Weight Used: (Current) 60.9 kg  Fluid (ml/day):   (1 ml/kcal)    Nutrition Diagnosis:   Inadequate oral intake related to altered GI function as evidenced by NPO or clear liquid status due to medical condition  Nutrition Interventions:   Food and/or Nutrient Delivery: Continue Current Diet, Start Oral Nutrition Supplement     Coordination of Nutrition Care: Continue to monitor while inpatient       Goals: Active Goal:  (Tolerate diet progression within 5 days)       Nutrition Monitoring and Evaluation:      Food/Nutrient Intake Outcomes: Diet Advancement/Tolerance, Supplement Intake, Food and Nutrient Intake  Physical Signs/Symptoms Outcomes: Biochemical Data, GI Status, Nausea or Vomiting, Weight    Discharge Planning:     Too soon to determine    NOEMI SIMEON, RD

## 2022-07-24 NOTE — ED NOTES
TRANSFER - OUT REPORT:    Verbal report given to Valeria Goldberg RN on [address]  being transferred to 6th floor for routine progression of patient care       Report consisted of patient's Situation, Background, Assessment and   Recommendations(SBAR). Information from the following report(s) ED SBAR was reviewed with the receiving nurse. Lines:   Peripheral IV 07/23/22 Left Hand (Active)   Site Assessment Clean, dry & intact 07/23/22 1556   Line Status Blood return noted 07/23/22 [address] changed 07/23/22 1556   Phlebitis Assessment No symptoms 07/23/22 1556   Infiltration Assessment 0 07/23/22 1556   Dressing Status Clean, dry & intact 07/23/22 1556   Dressing Type Transparent 07/23/22 1556   Dressing Intervention New 07/23/22 1556        Opportunity for questions and clarification was provided.       Patient transported with:  Yesenia Tate RN  07/23/22 8930

## 2022-07-24 NOTE — PROGRESS NOTES
Hospitalist Progress Note   Admit Date:  2022  5:11 PM   Name:  Jazzy Mejias   Age:  41 y.o. Sex:  male  :  1975   MRN:  [id number]   Room:  [facility]    Presenting Complaint: Emesis     Reason(s) for Admission: Cyclical vomiting [Q11.23]  Metabolic acidosis [C62.1]  LORNA (acute kidney injury) ([facility] [address] [N17.9]  Acute renal failure, unspecified acute renal failure type [facility]) [N17.9]     Hospital Course & Interval History:   Mr. Ira Pineda is a 79-year-old -American male with a history of sickle cell trait, active tobacco use (4-pack-year hx), THC use (reports last use was a little under 2 months ago), hiatal hernia (1168), cyclical vomiting syndrome, gastroparesis (dx  w/ nl emptying study in ), PUD (), GERD w/ esophagitis, remote yao obiren tear, C. Difficile (), HTN, LORNA () & rhabdomyolysis who presented, accompanied by his wife, to ED on afternoon of  with multiple episodes of vomiting onset  after working as a  in the [address] [place] [address]. He took Zofran at home w/o improvement. Mr. Luis Copeland reports that hot showers and liquids help him feel better. Endorsed abdominal cramping related to dry heaving & inability to hold food down. Denied documented diarrhea, hematochezia, melena, hemoptysis, hematemesis, insomnia, fevers or urinary symptoms. Actively vomiting on presentation to ED. ED labs revealed creatinine of 3.0. UA positive for protein (100 mg/dL), ketones (40 mg/dL) & moderate blood. IVF bolus administered in ED; UDS also positive for [facility] though pt denied use x 2 mths. Patient admitted to 6th floor for LORNA & cyclical vomiting on evening of . Patient also in rhabdomyolysis with CK 2846 U/L on admission. WBC count is elevated at 12.2 K/uL.     7:53 2022 XR acute abd series chest revealed nonobstructive bowel gas pattern. Patient states hasn't had a BM since admission.  Denies abdominal pain.    22: Patient's potassium low. KlorCon administered. BUN (74) & creatinine (1.8) still elevated, but improved from yesterday. GFR (A-A) decreased to 53. Total CK still elevated at 2,730 U/L, but improved since yesterday. WBC count remains elevated today (12.1 K/uL), patient remains afebrile    Pertinent family history: father is positive for SCA, passed away this beginning of 2022 from [facility]. Subjective/24hr Events (07/24/22):   Pleasant 82-year-old -American male found Semi-Rizo's in hospital bed stating, \"I'm feeling so much better, especially after hot shower this morning\". Currently denies nausea, vomiting, CP, abdominal pain, diarrhea, SOB. Assessment & Plan:     Principal Problem      Cyclic vomiting syndrome    - likely due to cannabis hyperemesis syndrome in context of h/o chronic THC ingestion (stopped just under 2 months ago per patient) & improvement of N/V with hot shower    - continue zofran po & IV prn    - cannabis education initiated, but patient reports stopping use a little under 2 months ago    Active Problems      Acute kidney injury likely secondary to rhabdomyolysis    - Creatinine is trending down towards normal. Still elevated. BMP ordered for tomorrow AM to assess kidneys. - continue continuous IV fluids       Hypokalemia    - another KlorCon dose ordered    - reassess w/ AM BMP with mag in AM     Elevated BP    - pt not on antihypertensives at home; will monitor    - continue labetalol prn    - in light of sodium levels wnl, will initiate low sodium diet     Mild hyperglycemia    - Can't find an A1C in patient's history    - in context of gastroparesis & mildly elevated glucoses (both random & also fasting), A1C ordered to assess for diabetes     Abnl LFTs    - attributed to hyperemesis; trending down    - repeat LFTs in AM         Discharge Planning:      Plan on at least 1 more midnight hospital stay, depending on AM labs. Diet:  ADULT DIET;  Clear Liquid  DVT PPx: SCDs  Code status: Full Code    Hospital Problems:  Principal Problem:    LORNA (acute kidney injury) ([facility] [address]  Active Problems:    Hyperglycemia    Elevated liver enzymes    Hypercalcemia    Hematuria    Elevated CPK    Tobacco use    Cannabinoid hyperemesis syndrome    Anxiety    Hypertension  Resolved Problems:    * No resolved hospital problems. *      Objective:   Patient Vitals for the past 24 hrs:   Temp Pulse Resp BP SpO2   07/24/22 0654 97.5 °F (36.4 °C) 86 16 (!) 143/101 97 %   07/24/22 0650 -- 88 -- -- --   07/24/22 0539 98.2 °F (36.8 °C) 84 18 129/86 97 %   07/24/22 0028 98.6 °F (37 °C) (!) 124 20 (!) 145/102 95 %   07/23/22 2157 -- (!) 120 -- -- --   07/23/22 2142 97.9 °F (36.6 °C) (!) 112 16 (!) 149/100 96 %   07/23/22 2039 -- -- -- (!) 145/111 --   07/23/22 1945 -- 99 14 -- 97 %   07/23/22 1927 -- (!) 109 25 -- 98 %   07/23/22 1857 -- (!) 121 23 -- 96 %   07/23/22 1846 -- (!) 130 24 (!) 156/112 93 %   07/23/22 1832 -- (!) 101 15 (!) 141/104 99 %   07/23/22 1802 -- (!) 121 17 129/83 97 %   07/23/22 1742 -- (!) 111 24 (!) 147/108 (!) 81 %   07/23/22 1732 -- (!) 124 (!) 37 (!) 140/102 99 %   07/23/22 1715 -- (!) 134 17 (!) 123/100 97 %   07/23/22 1712 -- -- -- (!) 152/109 97 %   07/23/22 1545 -- (!) 150 20 (!) 131/103 100 %       Oxygen Therapy  SpO2: 97 %  O2 Device: None (Room air)    Estimated body mass index is 25.36 kg/m² as calculated from the following:    Height as of this encounter: 5' 1\" (1.549 m). Weight as of this encounter: 134 lb 3.2 oz (60.9 kg). Intake/Output Summary (Last 24 hours) at 7/24/2022 1444  Last data filed at 7/24/2022 0727  Gross per 24 hour   Intake 2000 ml   Output 350 ml   Net 1650 ml         Physical Exam:     Blood pressure (!) 123/91, pulse 79, temperature 98.4 °F (36.9 °C), temperature source Oral, resp. rate 16, height 5' 1\" (1.549 m), weight 134 lb 3.2 oz (60.9 kg), SpO2 96 %. General:    Well nourished.     Head:  Normocephalic, atraumatic  Eyes:  Sclerae appear normal.  Pupils equally round. ENT:  Nares appear normal, no drainage. Moist oral mucosa  Neck:  No restricted ROM. Trachea midline   CV:   RRR. No m/r/g. No jugular venous distension. Lungs:   CTAB. No wheezing, rhonchi, or rales. Symmetric expansion. Abdomen:   Hyperactive Bsx4; no guarding, NT  Extremities: No cyanosis or clubbing. No edema  Skin:     No rashes and normal coloration. Warm and dry. Neuro:  CN II-XII grossly intact. Sensation intact. A&Ox3  Psych:  Normal mood and affect.       I have reviewed ordered lab tests and independently visualized imaging below:    Recent Labs:  Recent Results (from the past 48 hour(s))   EKG 12 Lead    Collection Time: 07/23/22  3:54 PM   Result Value Ref Range    Ventricular Rate 148 BPM    Atrial Rate 148 BPM    P-R Interval 116 ms    QRS Duration 64 ms    Q-T Interval 334 ms    QTc Calculation (Bazett) 524 ms    P Axis 73 degrees    R Axis 69 degrees    T Axis 61 degrees    Diagnosis Sinus tachycardia    CMP    Collection Time: 07/23/22  3:56 PM   Result Value Ref Range    Sodium 137 136 - 145 mmol/L    Potassium 3.5 3.5 - 5.1 mmol/L    Chloride 104 98 - 107 mmol/L    CO2 18 (L) 21 - 32 mmol/L    Anion Gap 15 7 - 16 mmol/L    Glucose 145 (H) 65 - 100 mg/dL    BUN 74 (H) 6 - 23 MG/DL    Creatinine 3.00 (H) 0.8 - 1.5 MG/DL    GFR African American 29 (L) >60 ml/min/1.73m2    GFR Non- 24 (L) >60 ml/min/1.73m2    Calcium 10.6 (H) 8.3 - 10.4 MG/DL    Total Bilirubin 1.4 (H) 0.2 - 1.1 MG/DL    ALT 43 12 - 65 U/L    AST 63 (H) 15 - 37 U/L    Alk Phosphatase 126 50 - 136 U/L    Total Protein 9.3 (H) 6.3 - 8.2 g/dL    Albumin 5.2 (H) 3.5 - 5.0 g/dL    Globulin 4.1 (H) 2.3 - 3.5 g/dL    Albumin/Globulin Ratio 1.3 1.2 - 3.5     Lipase    Collection Time: 07/23/22  3:56 PM   Result Value Ref Range    Lipase 128 73 - 393 U/L   Magnesium    Collection Time: 07/23/22  3:56 PM   Result Value Ref Range    Magnesium 2.8 (H) 1.8 - 2.4 mg/dL   CK Collection Time: 07/23/22  3:56 PM   Result Value Ref Range    Total CK 2,846 (H) 21 - 215 U/L   CBC with Auto Differential    Collection Time: 07/23/22  6:17 PM   Result Value Ref Range    WBC 12.2 (H) 4.3 - 11.1 K/uL    RBC 5.05 4.23 - 5.6 M/uL    Hemoglobin 15.0 13.6 - 17.2 g/dL    Hematocrit 40.0 (L) 41.1 - 50.3 %    MCV 79.2 (L) 79.6 - 97.8 FL    MCH 29.7 26.1 - 32.9 PG    MCHC 37.5 (H) 31.4 - 35.0 g/dL    RDW 12.7 11.9 - 14.6 %    Platelets 668 681 - 157 K/uL    MPV 8.9 (L) 9.4 - 12.3 FL    nRBC 0.00 0.0 - 0.2 K/uL    Differential Type AUTOMATED      Seg Neutrophils 74 43 - 78 %    Lymphocytes 12 (L) 13 - 44 %    Monocytes 13 (H) 4.0 - 12.0 %    Eosinophils % 0 (L) 0.5 - 7.8 %    Basophils 0 0.0 - 2.0 %    Immature Granulocytes 1 0.0 - 5.0 %    Segs Absolute 9.0 (H) 1.7 - 8.2 K/UL    Absolute Lymph # 1.5 0.5 - 4.6 K/UL    Absolute Mono # 1.6 (H) 0.1 - 1.3 K/UL    Absolute Eos # 0.0 0.0 - 0.8 K/UL    Basophils Absolute 0.0 0.0 - 0.2 K/UL    Absolute Immature Granulocyte 0.1 0.0 - 0.5 K/UL   POCT Urinalysis no Micro    Collection Time: 07/23/22  6:52 PM   Result Value Ref Range    Specific Gravity, Urine, POC 1.015 1.001 - 1.023      pH, Urine, POC 6.0 5.0 - 9.0      Protein, Urine,  (A) NEG mg/dL    Glucose, UA POC Negative NEG mg/dL    KETONES, Urine, POC 40 (A) NEG mg/dL    Bilirubin, Urine, POC Negative NEG      Blood, UA POC MODERATE (A) NEG      URINE UROBILINOGEN POC 0.2 0.2 - 1.0 EU/dL    Nitrate, Urine, POC Negative NEG      Leukocyte Est, UA POC Negative NEG      Performed by: Olinda Espinoza    Drug Screen Psych, Urine    Collection Time: 07/23/22  7:50 PM   Result Value Ref Range    Benzodiazepines, Urine Negative NEG      Opiates, Urine Negative NEG      Amphetamine, Urine Negative NEG      Cocaine, Urine Negative NEG      THC, TH-Cannabinol, Urine Positive (A) NEG     CK    Collection Time: 07/24/22  5:23 AM   Result Value Ref Range    Total CK 2,730 (H) 21 - 215 U/L   Comprehensive Metabolic Panel w/ Reflex to MG    Collection Time: 07/24/22  5:23 AM   Result Value Ref Range    Sodium 138 136 - 145 mmol/L    Potassium 3.1 (L) 3.5 - 5.1 mmol/L    Chloride 107 98 - 107 mmol/L    CO2 23 21 - 32 mmol/L    Anion Gap 8 7 - 16 mmol/L    Glucose 106 (H) 65 - 100 mg/dL    BUN 49 (H) 6 - 23 MG/DL    Creatinine 1.80 (H) 0.8 - 1.5 MG/DL    GFR  53 (L) >60 ml/min/1.73m2    GFR Non- 44 (L) >60 ml/min/1.73m2    Calcium 9.3 8.3 - 10.4 MG/DL    Total Bilirubin 1.2 (H) 0.2 - 1.1 MG/DL    ALT 45 12 - 65 U/L    AST 72 (H) 15 - 37 U/L    Alk Phosphatase 111 50 - 136 U/L    Total Protein 8.0 6.3 - 8.2 g/dL    Albumin 4.2 3.5 - 5.0 g/dL    Globulin 3.8 (H) 2.3 - 3.5 g/dL    Albumin/Globulin Ratio 1.1 (L) 1.2 - 3.5     CBC with Auto Differential    Collection Time: 07/24/22  5:23 AM   Result Value Ref Range    WBC 12.1 (H) 4.3 - 11.1 K/uL    RBC 5.54 4.23 - 5.6 M/uL    Hemoglobin 16.2 13.6 - 17.2 g/dL    Hematocrit 45.0 41.1 - 50.3 %    MCV 81.2 79.6 - 97.8 FL    MCH 29.2 26.1 - 32.9 PG    MCHC 36.0 (H) 31.4 - 35.0 g/dL    RDW 12.6 11.9 - 14.6 %    Platelets 607 521 - 305 K/uL    MPV 9.6 9.4 - 12.3 FL    nRBC 0.00 0.0 - 0.2 K/uL    Differential Type AUTOMATED      Seg Neutrophils 65 43 - 78 %    Lymphocytes 21 13 - 44 %    Monocytes 14 (H) 4.0 - 12.0 %    Eosinophils % 0 (L) 0.5 - 7.8 %    Basophils 0 0.0 - 2.0 %    Immature Granulocytes 0 0.0 - 5.0 %    Segs Absolute 7.9 1.7 - 8.2 K/UL    Absolute Lymph # 2.5 0.5 - 4.6 K/UL    Absolute Mono # 1.6 (H) 0.1 - 1.3 K/UL    Absolute Eos # 0.0 0.0 - 0.8 K/UL    Basophils Absolute 0.0 0.0 - 0.2 K/UL    Absolute Immature Granulocyte 0.0 0.0 - 0.5 K/UL   Magnesium    Collection Time: 07/24/22  5:23 AM   Result Value Ref Range    Magnesium 2.7 (H) 1.8 - 2.4 mg/dL       Other Studies:  XR ACUTE ABD SERIES CHEST 1 VW   Final Result   1. No acute findings within the chest.   2.  Nonobstructive bowel gas pattern.                    Current Meds:  Current Facility-Administered Medications   Medication Dose Route Frequency    pantoprazole (PROTONIX) 40 mg in sodium chloride (PF) 10 mL injection  40 mg IntraVENous Daily    sodium chloride flush 0.9 % injection 5-40 mL  5-40 mL IntraVENous 2 times per day    sodium chloride flush 0.9 % injection 5-40 mL  5-40 mL IntraVENous PRN    0.9 % sodium chloride infusion   IntraVENous PRN    ondansetron (ZOFRAN-ODT) disintegrating tablet 4 mg  4 mg Oral Q8H PRN    Or    ondansetron (ZOFRAN) injection 4 mg  4 mg IntraVENous Q6H PRN    polyethylene glycol (GLYCOLAX) packet 17 g  17 g Oral Daily PRN    acetaminophen (TYLENOL) tablet 650 mg  650 mg Oral Q6H PRN    Or    acetaminophen (TYLENOL) suppository 650 mg  650 mg Rectal Q6H PRN    0.9 % sodium chloride infusion   IntraVENous Continuous    diphenhydrAMINE (BENADRYL) injection 12.5 mg  12.5 mg IntraVENous Q6H PRN    labetalol (NORMODYNE;TRANDATE) injection 5 mg  5 mg IntraVENous Q6H PRN       Signed:  MEME Aquino-S2      Addendum:  Pt seen and examined with PA student, discussed plan of care with student. Admitted for hyperemesis, acute kidney injury and mild rhabdo; renal fxn improved with resolution of N/V this AM, tolerated liquid diet. If remains stable, advance diet in AM and repeat labs to monitor renal fxn; hopefully home soon. Agree with plan of care as outlined above.     May-Yin Claude Richters, PA-C  [facility]

## 2022-07-24 NOTE — PLAN OF CARE
Problem: Discharge Planning  Goal: Discharge to home or other facility with appropriate resources  Outcome: Progressing  Flowsheets  Taken 7/23/2022 2257  Discharge to home or other facility with appropriate resources: Identify barriers to discharge with patient and caregiver  Taken 7/23/2022 2157  Discharge to home or other facility with appropriate resources: Identify barriers to discharge with patient and caregiver     Problem: Pain  Goal: Verbalizes/displays adequate comfort level or baseline comfort level  Outcome: Progressing  Flowsheets (Taken 7/23/2022 2142)  Verbalizes/displays adequate comfort level or baseline comfort level:   Encourage patient to monitor pain and request assistance   Assess pain using appropriate pain scale     Problem: Safety - Adult  Goal: Free from fall injury  Outcome: Progressing  Flowsheets (Taken 7/23/2022 2149)  Free From Fall Injury: Instruct family/caregiver on patient safety

## 2022-07-24 NOTE — PROGRESS NOTES
Hospitalist Progress Note   Admit Date:  2022  5:11 PM   Name:  Arcadio Beatty   Age:  41 y.o. Sex:  male  :  1975   MRN:  [id number]   Room:  [facility]/    Presenting Complaint: Emesis     Reason(s) for Admission: Cyclical vomiting [V09.56]  Metabolic acidosis [G54.6]  LORNA (acute kidney injury) ([facility] [address] [N17.9]  Acute renal failure, unspecified acute renal failure type [facility]) [N17.9]     Hospital Course & Interval History:   Mr. Anthony Cho is a 82-year-old -American male with a history of sickle cell trait, active tobacco use (4-pack-year hx), THC use (reports last use was a little under 2 months ago), hiatal hernia (8386), cyclical vomiting syndrome, gastroparesis (dx  w/ nl emptying study in ), PUD (), GERD w/ esophagitis, remote yao obrien tear, C. Difficile (), HTN, LORNA () & rhabdomyolysis who presented, accompanied by his wife, to ED on afternoon of  with multiple episodes of vomiting onset  after working as a  in the [address] [place] [address]. He took Zofran at home w/o improvement. Mr. Mirian Baldwin reports that hot showers and liquids help him feel better. Endorsed abdominal cramping related to dry heaving & inability to hold food down x 2-3 days. Denied documented diarrhea, hematochezia, melena, hemoptysis, hematemesis, insomnia, fevers or urinary symptoms. Actively vomiting on presentation to ED. ED labs revealed creatinine of 3.0. UA positive for protein (100 mg/dL), ketones (40 mg/dL) & moderate blood. IVF bolus administered in ED. Patient admitted to 6th floor for LORNA & cyclical vomiting on evening of . UDS was positive for THC though pt denied marijuana use x 2 mths. Patient also in rhabdomyolysis with CK 2846 U/L on admission. WBC count is elevated at 12.2 K/uL.     7:53 2022 XR acute abd series chest revealed nonobstructive bowel gas pattern. Patient states hasn't had a BM since admission.  Denies abdominal pain.    07/24/22: Patient's potassium low. KlorCon administered. BUN (74) & creatinine (1.8) still elevated, but improved from yesterday. GFR (A-A) decreased to 53. Total CK still elevated at 2,730 U/L, but improved since yesterday. WBC count remains elevated today (12.1 K/uL), patient remains afebrile      Pertinent family history: father is positive for SCA, passed away this beginning of 2022 from [facility]. Subjective/24hr Events (07/24/22):   Pleasant 51-year-old -American male found Semi-Rizo's in hospital bed stating, \"I'm feeling so much better, especially after hot shower this morning\". Denies nausea, vomiting, CP, abdominal pain, diarrhea, SOB currently. Assessment & Plan:     Principal Problem      Cyclic vomiting syndrome    - likely due to cannabis hyperemesis syndrome in context of h/o chronic THC ingestion (stopped just under 2 months ago per patient) & improvement of N/V with hot shower    - continue zofran po & IV prn    - cannabis education initiated, but patient reports stopping use a little under 2 months ago    Active Problems      Acute kidney injury likely secondary to rhabdomyolysis    - Creatinine is trending down towards normal. Still elevated. BMP ordered for tomorrow AM to assess kidneys. - continue continuous IV fluids       Hypokalemia    - another KlorCon dose ordered    - reassess w/ AM BMP with mag     Mildly elevated BP    - continue labetalol prn    - in light of sodium levels wnl, will initiate low sodium diet     Mild hyperglycemia    - Can't find an A1C in patient's history    - in context of gastroparesis & mildly elevated glucoses (both random & also fasting), A1C ordered to assess for diabetes         Discharge Planning:      Plan on at least 1 more midnight hospital stay, depending on AM labs. Diet:  ADULT DIET;  Clear Liquid  ADULT ORAL NUTRITION SUPPLEMENT; Breakfast, Lunch, Dinner; Clear Liquid Oral Supplement  DVT PPx: SCDs  Code status: Full atraumatic  Eyes:  Sclerae appear normal.  Pupils equally round. ENT:  Nares appear normal, no drainage. Moist oral mucosa  Neck:  No restricted ROM. Trachea midline   CV:   RRR. No m/r/g. No jugular venous distension. Lungs:   CTAB. No wheezing, rhonchi, or rales. Symmetric expansion. Abdomen: Bowel sounds present. Soft, nontender, nondistended. Extremities: No cyanosis or clubbing. No edema  Skin:     No rashes and normal coloration. Warm and dry. Neuro:  CN II-XII grossly intact. Sensation intact. A&Ox3  Psych:  Normal mood and affect.       I have reviewed ordered lab tests and independently visualized imaging below:    Recent Labs:  Recent Results (from the past 48 hour(s))   EKG 12 Lead    Collection Time: 07/23/22  3:54 PM   Result Value Ref Range    Ventricular Rate 148 BPM    Atrial Rate 148 BPM    P-R Interval 116 ms    QRS Duration 64 ms    Q-T Interval 334 ms    QTc Calculation (Bazett) 524 ms    P Axis 73 degrees    R Axis 69 degrees    T Axis 61 degrees    Diagnosis Sinus tachycardia    CMP    Collection Time: 07/23/22  3:56 PM   Result Value Ref Range    Sodium 137 136 - 145 mmol/L    Potassium 3.5 3.5 - 5.1 mmol/L    Chloride 104 98 - 107 mmol/L    CO2 18 (L) 21 - 32 mmol/L    Anion Gap 15 7 - 16 mmol/L    Glucose 145 (H) 65 - 100 mg/dL    BUN 74 (H) 6 - 23 MG/DL    Creatinine 3.00 (H) 0.8 - 1.5 MG/DL    GFR African American 29 (L) >60 ml/min/1.73m2    GFR Non- 24 (L) >60 ml/min/1.73m2    Calcium 10.6 (H) 8.3 - 10.4 MG/DL    Total Bilirubin 1.4 (H) 0.2 - 1.1 MG/DL    ALT 43 12 - 65 U/L    AST 63 (H) 15 - 37 U/L    Alk Phosphatase 126 50 - 136 U/L    Total Protein 9.3 (H) 6.3 - 8.2 g/dL    Albumin 5.2 (H) 3.5 - 5.0 g/dL    Globulin 4.1 (H) 2.3 - 3.5 g/dL    Albumin/Globulin Ratio 1.3 1.2 - 3.5     Lipase    Collection Time: 07/23/22  3:56 PM   Result Value Ref Range    Lipase 128 73 - 393 U/L   Magnesium    Collection Time: 07/23/22  3:56 PM   Result Value Ref Range Magnesium 2.8 (H) 1.8 - 2.4 mg/dL   CK    Collection Time: 07/23/22  3:56 PM   Result Value Ref Range    Total CK 2,846 (H) 21 - 215 U/L   CBC with Auto Differential    Collection Time: 07/23/22  6:17 PM   Result Value Ref Range    WBC 12.2 (H) 4.3 - 11.1 K/uL    RBC 5.05 4.23 - 5.6 M/uL    Hemoglobin 15.0 13.6 - 17.2 g/dL    Hematocrit 40.0 (L) 41.1 - 50.3 %    MCV 79.2 (L) 79.6 - 97.8 FL    MCH 29.7 26.1 - 32.9 PG    MCHC 37.5 (H) 31.4 - 35.0 g/dL    RDW 12.7 11.9 - 14.6 %    Platelets 757 937 - 134 K/uL    MPV 8.9 (L) 9.4 - 12.3 FL    nRBC 0.00 0.0 - 0.2 K/uL    Differential Type AUTOMATED      Seg Neutrophils 74 43 - 78 %    Lymphocytes 12 (L) 13 - 44 %    Monocytes 13 (H) 4.0 - 12.0 %    Eosinophils % 0 (L) 0.5 - 7.8 %    Basophils 0 0.0 - 2.0 %    Immature Granulocytes 1 0.0 - 5.0 %    Segs Absolute 9.0 (H) 1.7 - 8.2 K/UL    Absolute Lymph # 1.5 0.5 - 4.6 K/UL    Absolute Mono # 1.6 (H) 0.1 - 1.3 K/UL    Absolute Eos # 0.0 0.0 - 0.8 K/UL    Basophils Absolute 0.0 0.0 - 0.2 K/UL    Absolute Immature Granulocyte 0.1 0.0 - 0.5 K/UL   POCT Urinalysis no Micro    Collection Time: 07/23/22  6:52 PM   Result Value Ref Range    Specific Gravity, Urine, POC 1.015 1.001 - 1.023      pH, Urine, POC 6.0 5.0 - 9.0      Protein, Urine,  (A) NEG mg/dL    Glucose, UA POC Negative NEG mg/dL    KETONES, Urine, POC 40 (A) NEG mg/dL    Bilirubin, Urine, POC Negative NEG      Blood, UA POC MODERATE (A) NEG      URINE UROBILINOGEN POC 0.2 0.2 - 1.0 EU/dL    Nitrate, Urine, POC Negative NEG      Leukocyte Est, UA POC Negative NEG      Performed by: Yrn Oh    Drug Screen Psych, Urine    Collection Time: 07/23/22  7:50 PM   Result Value Ref Range    Benzodiazepines, Urine Negative NEG      Opiates, Urine Negative NEG      Amphetamine, Urine Negative NEG      Cocaine, Urine Negative NEG      THC, TH-Cannabinol, Urine Positive (A) NEG     CK    Collection Time: 07/24/22  5:23 AM   Result Value Ref Range    Total CK 2,730 (H) 21 - 215 U/L   Comprehensive Metabolic Panel w/ Reflex to MG    Collection Time: 07/24/22  5:23 AM   Result Value Ref Range    Sodium 138 136 - 145 mmol/L    Potassium 3.1 (L) 3.5 - 5.1 mmol/L    Chloride 107 98 - 107 mmol/L    CO2 23 21 - 32 mmol/L    Anion Gap 8 7 - 16 mmol/L    Glucose 106 (H) 65 - 100 mg/dL    BUN 49 (H) 6 - 23 MG/DL    Creatinine 1.80 (H) 0.8 - 1.5 MG/DL    GFR  53 (L) >60 ml/min/1.73m2    GFR Non- 44 (L) >60 ml/min/1.73m2    Calcium 9.3 8.3 - 10.4 MG/DL    Total Bilirubin 1.2 (H) 0.2 - 1.1 MG/DL    ALT 45 12 - 65 U/L    AST 72 (H) 15 - 37 U/L    Alk Phosphatase 111 50 - 136 U/L    Total Protein 8.0 6.3 - 8.2 g/dL    Albumin 4.2 3.5 - 5.0 g/dL    Globulin 3.8 (H) 2.3 - 3.5 g/dL    Albumin/Globulin Ratio 1.1 (L) 1.2 - 3.5     CBC with Auto Differential    Collection Time: 07/24/22  5:23 AM   Result Value Ref Range    WBC 12.1 (H) 4.3 - 11.1 K/uL    RBC 5.54 4.23 - 5.6 M/uL    Hemoglobin 16.2 13.6 - 17.2 g/dL    Hematocrit 45.0 41.1 - 50.3 %    MCV 81.2 79.6 - 97.8 FL    MCH 29.2 26.1 - 32.9 PG    MCHC 36.0 (H) 31.4 - 35.0 g/dL    RDW 12.6 11.9 - 14.6 %    Platelets 952 876 - 936 K/uL    MPV 9.6 9.4 - 12.3 FL    nRBC 0.00 0.0 - 0.2 K/uL    Differential Type AUTOMATED      Seg Neutrophils 65 43 - 78 %    Lymphocytes 21 13 - 44 %    Monocytes 14 (H) 4.0 - 12.0 %    Eosinophils % 0 (L) 0.5 - 7.8 %    Basophils 0 0.0 - 2.0 %    Immature Granulocytes 0 0.0 - 5.0 %    Segs Absolute 7.9 1.7 - 8.2 K/UL    Absolute Lymph # 2.5 0.5 - 4.6 K/UL    Absolute Mono # 1.6 (H) 0.1 - 1.3 K/UL    Absolute Eos # 0.0 0.0 - 0.8 K/UL    Basophils Absolute 0.0 0.0 - 0.2 K/UL    Absolute Immature Granulocyte 0.0 0.0 - 0.5 K/UL   Magnesium    Collection Time: 07/24/22  5:23 AM   Result Value Ref Range    Magnesium 2.7 (H) 1.8 - 2.4 mg/dL       Other Studies:  XR ACUTE ABD SERIES CHEST 1 VW   Final Result   1.   No acute findings within the chest.   2.  Nonobstructive bowel gas pattern. Current Meds:  Current Facility-Administered Medications   Medication Dose Route Frequency    pantoprazole (PROTONIX) 40 mg in sodium chloride (PF) 10 mL injection  40 mg IntraVENous Daily    sodium chloride flush 0.9 % injection 5-40 mL  5-40 mL IntraVENous 2 times per day    sodium chloride flush 0.9 % injection 5-40 mL  5-40 mL IntraVENous PRN    0.9 % sodium chloride infusion   IntraVENous PRN    ondansetron (ZOFRAN-ODT) disintegrating tablet 4 mg  4 mg Oral Q8H PRN    Or    ondansetron (ZOFRAN) injection 4 mg  4 mg IntraVENous Q6H PRN    polyethylene glycol (GLYCOLAX) packet 17 g  17 g Oral Daily PRN    acetaminophen (TYLENOL) tablet 650 mg  650 mg Oral Q6H PRN    Or    acetaminophen (TYLENOL) suppository 650 mg  650 mg Rectal Q6H PRN    0.9 % sodium chloride infusion   IntraVENous Continuous    diphenhydrAMINE (BENADRYL) injection 12.5 mg  12.5 mg IntraVENous Q6H PRN    labetalol (NORMODYNE;TRANDATE) injection 5 mg  5 mg IntraVENous Q6H PRN       Signed:  Doretha Miller PA-S2    Part of this note may have been written by using a voice dictation software. The note has been proof read but may still contain some grammatical/other typographical errors.

## 2022-07-24 NOTE — PROGRESS NOTES
TRANSFER - IN REPORT:    Verbal report received from [address] on [address]  being received from ED for routine progression of patient care      Report consisted of patient's Situation, Background, Assessment and   Recommendations(SBAR). Information from the following report(s) Nurse Handoff Report was reviewed with the receiving nurse. Opportunity for questions and clarification was provided. Assessment will be completed upon patient's arrival to unit and care will be assumed.

## 2022-07-25 LAB
ALBUMIN SERPL-MCNC: 3.5 G/DL (ref 3.5–5)
ALBUMIN/GLOB SERPL: 1.1 {RATIO} (ref 1.2–3.5)
ALP SERPL-CCNC: 84 U/L (ref 50–136)
ALT SERPL-CCNC: 44 U/L (ref 12–65)
ANION GAP SERPL CALC-SCNC: 8 MMOL/L (ref 7–16)
ANION GAP SERPL CALC-SCNC: 8 MMOL/L (ref 7–16)
AST SERPL-CCNC: 51 U/L (ref 15–37)
BILIRUB SERPL-MCNC: 1.3 MG/DL (ref 0.2–1.1)
BUN SERPL-MCNC: 21 MG/DL (ref 6–23)
BUN SERPL-MCNC: 25 MG/DL (ref 6–23)
CALCIUM SERPL-MCNC: 8.2 MG/DL (ref 8.3–10.4)
CALCIUM SERPL-MCNC: 8.6 MG/DL (ref 8.3–10.4)
CHLORIDE SERPL-SCNC: 108 MMOL/L (ref 98–107)
CHLORIDE SERPL-SCNC: 108 MMOL/L (ref 98–107)
CO2 SERPL-SCNC: 23 MMOL/L (ref 21–32)
CO2 SERPL-SCNC: 24 MMOL/L (ref 21–32)
CREAT SERPL-MCNC: 1 MG/DL (ref 0.8–1.5)
CREAT SERPL-MCNC: 1 MG/DL (ref 0.8–1.5)
ERYTHROCYTE [DISTWIDTH] IN BLOOD BY AUTOMATED COUNT: 12.5 % (ref 11.9–14.6)
ERYTHROCYTE [DISTWIDTH] IN BLOOD BY AUTOMATED COUNT: 12.6 % (ref 11.9–14.6)
EST. AVERAGE GLUCOSE BLD GHB EST-MCNC: 108 MG/DL
GLOBULIN SER CALC-MCNC: 3.3 G/DL (ref 2.3–3.5)
GLUCOSE SERPL-MCNC: 92 MG/DL (ref 65–100)
GLUCOSE SERPL-MCNC: 98 MG/DL (ref 65–100)
HBA1C MFR BLD: 5.4 % (ref 4.8–5.6)
HCT VFR BLD AUTO: 37 % (ref 41.1–50.3)
HCT VFR BLD AUTO: 38.7 % (ref 41.1–50.3)
HGB BLD-MCNC: 13.2 G/DL (ref 13.6–17.2)
HGB BLD-MCNC: 13.9 G/DL (ref 13.6–17.2)
MAGNESIUM SERPL-MCNC: 2.4 MG/DL (ref 1.8–2.4)
MCH RBC QN AUTO: 29.6 PG (ref 26.1–32.9)
MCH RBC QN AUTO: 29.7 PG (ref 26.1–32.9)
MCHC RBC AUTO-ENTMCNC: 35.7 G/DL (ref 31.4–35)
MCHC RBC AUTO-ENTMCNC: 35.9 G/DL (ref 31.4–35)
MCV RBC AUTO: 82.7 FL (ref 79.6–97.8)
MCV RBC AUTO: 83 FL (ref 79.6–97.8)
NRBC # BLD: 0 K/UL (ref 0–0.2)
NRBC # BLD: 0 K/UL (ref 0–0.2)
PLATELET # BLD AUTO: 249 K/UL (ref 150–450)
PLATELET # BLD AUTO: 287 K/UL (ref 150–450)
PMV BLD AUTO: 8.9 FL (ref 9.4–12.3)
PMV BLD AUTO: 9.1 FL (ref 9.4–12.3)
POTASSIUM SERPL-SCNC: 3.5 MMOL/L (ref 3.5–5.1)
POTASSIUM SERPL-SCNC: 3.5 MMOL/L (ref 3.5–5.1)
PROT SERPL-MCNC: 6.8 G/DL (ref 6.3–8.2)
RBC # BLD AUTO: 4.46 M/UL (ref 4.23–5.6)
RBC # BLD AUTO: 4.68 M/UL (ref 4.23–5.6)
SODIUM SERPL-SCNC: 139 MMOL/L (ref 136–145)
SODIUM SERPL-SCNC: 140 MMOL/L (ref 136–145)
WBC # BLD AUTO: 6.8 K/UL (ref 4.3–11.1)
WBC # BLD AUTO: 8.6 K/UL (ref 4.3–11.1)

## 2022-07-25 PROCEDURE — 83735 ASSAY OF MAGNESIUM: CPT

## 2022-07-25 PROCEDURE — 83036 HEMOGLOBIN GLYCOSYLATED A1C: CPT

## 2022-07-25 PROCEDURE — C9113 INJ PANTOPRAZOLE SODIUM, VIA: HCPCS | Performed by: INTERNAL MEDICINE

## 2022-07-25 PROCEDURE — 80053 COMPREHEN METABOLIC PANEL: CPT

## 2022-07-25 PROCEDURE — 6360000002 HC RX W HCPCS: Performed by: INTERNAL MEDICINE

## 2022-07-25 PROCEDURE — 85027 COMPLETE CBC AUTOMATED: CPT

## 2022-07-25 PROCEDURE — 2580000003 HC RX 258: Performed by: INTERNAL MEDICINE

## 2022-07-25 PROCEDURE — A4216 STERILE WATER/SALINE, 10 ML: HCPCS | Performed by: INTERNAL MEDICINE

## 2022-07-25 PROCEDURE — 36415 COLL VENOUS BLD VENIPUNCTURE: CPT

## 2022-07-25 PROCEDURE — 1100000000 HC RM PRIVATE

## 2022-07-25 RX ORDER — ONDANSETRON 4 MG/1
8 TABLET, ORALLY DISINTEGRATING ORAL EVERY 6 HOURS
Status: DISCONTINUED | OUTPATIENT
Start: 2022-07-25 | End: 2022-07-26 | Stop reason: HOSPADM

## 2022-07-25 RX ADMIN — ONDANSETRON 4 MG: 2 INJECTION INTRAMUSCULAR; INTRAVENOUS at 14:15

## 2022-07-25 RX ADMIN — DIPHENHYDRAMINE HYDROCHLORIDE 12.5 MG: 50 INJECTION, SOLUTION INTRAMUSCULAR; INTRAVENOUS at 06:26

## 2022-07-25 RX ADMIN — ONDANSETRON 4 MG: 2 INJECTION INTRAMUSCULAR; INTRAVENOUS at 05:53

## 2022-07-25 RX ADMIN — SODIUM CHLORIDE, PRESERVATIVE FREE 10 ML: 5 INJECTION INTRAVENOUS at 10:16

## 2022-07-25 RX ADMIN — SODIUM CHLORIDE: 9 INJECTION, SOLUTION INTRAVENOUS at 00:59

## 2022-07-25 RX ADMIN — DIPHENHYDRAMINE HYDROCHLORIDE 12.5 MG: 50 INJECTION, SOLUTION INTRAMUSCULAR; INTRAVENOUS at 00:44

## 2022-07-25 RX ADMIN — PANTOPRAZOLE SODIUM 40 MG: 40 INJECTION, POWDER, LYOPHILIZED, FOR SOLUTION INTRAVENOUS at 10:16

## 2022-07-25 RX ADMIN — SODIUM CHLORIDE, PRESERVATIVE FREE 5 ML: 5 INJECTION INTRAVENOUS at 20:14

## 2022-07-25 RX ADMIN — ONDANSETRON 4 MG: 2 INJECTION INTRAMUSCULAR; INTRAVENOUS at 20:13

## 2022-07-25 NOTE — CARE COORDINATION
CM met with patient to discuss discharge planning. Patient is a 41year old male, admitted with LORNA. Patient presented alert and oriented. Demographics verified. Patient resides with his spouse Antwon Garay [phone] in a two story home with steps at the main entrance. Patient is independent at baseline and drives. Patient is self employed. Patient uninsured. [facility] consulted. PCP verified. Patient denies any challenges with obtaining medications in the community. Discharge planning: Patient to return home with family at discharge. Patient is agreeable to supportive resources for [facility] use. No additional discharge needs noted at this time. CM continues to follow care plan.       07/25/22 9739   Service Assessment   Patient Orientation Alert and Oriented   Cognition Alert   History Provided By Patient   Primary Caregiver Self   Support Systems Spouse/Significant Other   Patient's Healthcare Decision Maker is: Legal Next of Kin   PCP Verified by CM Yes   Last Visit to PCP Within last 6 months   Prior Functional Level Independent in ADLs/IADLs   Current Functional Level Independent in ADLs/IADLs   Can patient return to prior living arrangement Yes   Ability to make needs known: Good   Social/Functional History   Lives With Spouse   Type of [address] [facility] Two level   Home Access Stairs to enter with rails   Entrance Stairs - Number of Steps 5   ADL Assistance Independent   Ambulation Assistance Independent   Active  Yes   Mode of Transportation Car   Occupation Self employed   Discharge Planning   Type of [address] Prior To Admission None   DME Ordered? No   Potential Assistance Purchasing Medications No   Patient expects to be discharged to: [address] Discharge   Transition of Care Consult (CM Consult) Discharge [address] Discharge None   [employer] Information Provided?  No   Mode of Transport at Discharge Other (see comment)  (Spouse Shan Marshall [phone])   Confirm Follow Up Transport Self   Condition of Participation: Discharge Planning   The Plan for Transition of Care is related to the following treatment goals: Return to baseline.

## 2022-07-25 NOTE — PROGRESS NOTES
Pt alert and oriented. Hourly rounds completed, bed low/locked. Shift report will be given to on coming nurse.

## 2022-07-25 NOTE — PROGRESS NOTES
Hospitalist Progress Note   Admit Date:  2022  5:11 PM   Name:  Alvina Lugo   Age:  41 y.o. Sex:  male  :  1975   MRN:  [id number]   Room:  [facility]/    Presenting Complaint: Emesis     Reason(s) for Admission: Cyclical vomiting [G14.37]  Metabolic acidosis [Q86.0]  LORNA (acute kidney injury) ([facility] [address] [N17.9]  Acute renal failure, unspecified acute renal failure type [facility]) [N17.9]     Hospital Course & Interval History:   Mr. Raghu Schmid is a 53-year-old -American male with a history of sickle cell trait, active tobacco use (4-pack-year hx), THC use (reports last use was a little under 2 months ago), hiatal hernia (3447), cyclical vomiting syndrome, gastroparesis (dx  w/ nl emptying study in ), PUD (), GERD w/ esophagitis, remote yao obrien tear, C. Difficile (), HTN, LORNA () & rhabdomyolysis who presented, accompanied by his wife, to ED on afternoon of  with multiple episodes of vomiting onset  after working as a  in the [address] [place] [address]. He took Zofran at home w/o improvement. Mr. Gwen Haley reports that hot showers and liquids help him feel better. Endorsed abdominal cramping related to dry heaving & inability to hold food down x 2-3 days. Denied documented diarrhea, hematochezia, melena, hemoptysis, hematemesis, insomnia, fevers or urinary symptoms. Actively vomiting on presentation to ED. ED labs revealed creatinine of 3.0. UA positive for protein (100 mg/dL), ketones (40 mg/dL) & moderate blood. IVF bolus administered in ED. Patient admitted to 6th floor for LORNA & cyclical vomiting on evening of . UDS was positive for THC though pt denied marijuana use x 2 mths. Patient also in rhabdomyolysis with CK 2846 U/L on admission. WBC count is elevated at 12.2 K/uL.     7:53 2022 XR acute abd series chest revealed nonobstructive bowel gas pattern. Patient states hasn't had a BM since admission. Denies abdominal pain. little under 2 months ago    - registered dietician consult met with pt yesterday afternoon & appreciated. - advancing diet as tolerated    Active Problems    Acute kidney injury likely secondary to rhabdomyolysis    - Creatinine has normalized & BUN trending much closer to normal.     - f/u BMP for tomorrow    - DC IVF       Hypokalemia    - K+ & Mg2+ wnl today    - resolved       Elevated BP    - BP controlled       Mild hyperglycemia    - in context of gastroparesis & mildly elevated glucoses (both random & also fasting), A1C ordered to assess for diabetes & found to be 5.4%    - will continue to monitor random & fasting glucose for elevated readings       Abnl LFTs    - attributed to hyperemesis; trending down    - LFTs this AM reveals albumin, globulin, & total protein that has returned to wnl. AST remains elevated at 51 & bilirubin at 1.3, but these are still trending toward normal ranges       Discharge Planning:    - anticipate dc home in AM     Diet:  No diet orders on file  DVT PPx: SCDs  Code status: Full Code          Objective:   Patient Vitals for the past 24 hrs:   Temp Pulse Resp BP SpO2   07/25/22 1135 97.7 °F (36.5 °C) 60 18 120/79 97 %   07/25/22 0802 98.4 °F (36.9 °C) 70 18 (!) 135/103 95 %   07/25/22 0424 98.6 °F (37 °C) 74 16 120/88 94 %   07/24/22 2320 98.4 °F (36.9 °C) 71 17 (!) 124/90 96 %   07/24/22 1924 99 °F (37.2 °C) 81 17 116/87 98 %   07/24/22 1501 98.4 °F (36.9 °C) 79 16 (!) 123/91 96 %   07/24/22 1239 98.7 °F (37.1 °C) 79 18 (!) 122/97 --       Oxygen Therapy  SpO2: 97 %  O2 Device: None (Room air)    Estimated body mass index is 26.83 kg/m² as calculated from the following:    Height as of this encounter: 5' 1\" (1.549 m). Weight as of this encounter: 141 lb 15.6 oz (64.4 kg).     Intake/Output Summary (Last 24 hours) at 7/25/2022 1210  Last data filed at 7/24/2022 1754  Gross per 24 hour   Intake 120 ml   Output 200 ml   Net -80 ml         Physical Exam:     Blood pressure 120/79, pulse 60, temperature 97.7 °F (36.5 °C), temperature source Axillary, resp. rate 18, height 5' 1\" (1.549 m), weight 141 lb 15.6 oz (64.4 kg), SpO2 97 %. General:    Well nourished. In no acute distress. Head:  Normocephalic, atraumatic  Eyes:  Sclerae appear normal.  Pupils equally round. ENT:  Nares appear normal, no drainage. Moist oral mucosa  Neck:  No restricted ROM. Trachea midline   CV:   RRR. No m/r/g. No jugular venous distension. Lungs:   CTAB. No wheezing, rhonchi, or rales. Symmetric expansion. Abdomen: Bowel sounds hypoactive. Soft, non-tender, non-distention  Extremities: No cyanosis or clubbing. No edema  Skin:     No rashes and normal coloration. Warm and dry. Neuro:  CN II-XII grossly intact. Sensation intact. A&Ox3  Psych:  Normal mood and affect.       I have reviewed ordered lab tests and independently visualized imaging below:    Recent Labs:  Recent Results (from the past 48 hour(s))   EKG 12 Lead    Collection Time: 07/23/22  3:54 PM   Result Value Ref Range    Ventricular Rate 148 BPM    Atrial Rate 148 BPM    P-R Interval 116 ms    QRS Duration 64 ms    Q-T Interval 334 ms    QTc Calculation (Bazett) 524 ms    P Axis 73 degrees    R Axis 69 degrees    T Axis 61 degrees    Diagnosis Sinus tachycardia    CMP    Collection Time: 07/23/22  3:56 PM   Result Value Ref Range    Sodium 137 136 - 145 mmol/L    Potassium 3.5 3.5 - 5.1 mmol/L    Chloride 104 98 - 107 mmol/L    CO2 18 (L) 21 - 32 mmol/L    Anion Gap 15 7 - 16 mmol/L    Glucose 145 (H) 65 - 100 mg/dL    BUN 74 (H) 6 - 23 MG/DL    Creatinine 3.00 (H) 0.8 - 1.5 MG/DL    GFR African American 29 (L) >60 ml/min/1.73m2    GFR Non- 24 (L) >60 ml/min/1.73m2    Calcium 10.6 (H) 8.3 - 10.4 MG/DL    Total Bilirubin 1.4 (H) 0.2 - 1.1 MG/DL    ALT 43 12 - 65 U/L    AST 63 (H) 15 - 37 U/L    Alk Phosphatase 126 50 - 136 U/L    Total Protein 9.3 (H) 6.3 - 8.2 g/dL    Albumin 5.2 (H) 3.5 - 5.0 g/dL Globulin 4.1 (H) 2.3 - 3.5 g/dL    Albumin/Globulin Ratio 1.3 1.2 - 3.5     Lipase    Collection Time: 07/23/22  3:56 PM   Result Value Ref Range    Lipase 128 73 - 393 U/L   Magnesium    Collection Time: 07/23/22  3:56 PM   Result Value Ref Range    Magnesium 2.8 (H) 1.8 - 2.4 mg/dL   CK    Collection Time: 07/23/22  3:56 PM   Result Value Ref Range    Total CK 2,846 (H) 21 - 215 U/L   CBC with Auto Differential    Collection Time: 07/23/22  6:17 PM   Result Value Ref Range    WBC 12.2 (H) 4.3 - 11.1 K/uL    RBC 5.05 4.23 - 5.6 M/uL    Hemoglobin 15.0 13.6 - 17.2 g/dL    Hematocrit 40.0 (L) 41.1 - 50.3 %    MCV 79.2 (L) 79.6 - 97.8 FL    MCH 29.7 26.1 - 32.9 PG    MCHC 37.5 (H) 31.4 - 35.0 g/dL    RDW 12.7 11.9 - 14.6 %    Platelets 987 249 - 072 K/uL    MPV 8.9 (L) 9.4 - 12.3 FL    nRBC 0.00 0.0 - 0.2 K/uL    Differential Type AUTOMATED      Seg Neutrophils 74 43 - 78 %    Lymphocytes 12 (L) 13 - 44 %    Monocytes 13 (H) 4.0 - 12.0 %    Eosinophils % 0 (L) 0.5 - 7.8 %    Basophils 0 0.0 - 2.0 %    Immature Granulocytes 1 0.0 - 5.0 %    Segs Absolute 9.0 (H) 1.7 - 8.2 K/UL    Absolute Lymph # 1.5 0.5 - 4.6 K/UL    Absolute Mono # 1.6 (H) 0.1 - 1.3 K/UL    Absolute Eos # 0.0 0.0 - 0.8 K/UL    Basophils Absolute 0.0 0.0 - 0.2 K/UL    Absolute Immature Granulocyte 0.1 0.0 - 0.5 K/UL   POCT Urinalysis no Micro    Collection Time: 07/23/22  6:52 PM   Result Value Ref Range    Specific Gravity, Urine, POC 1.015 1.001 - 1.023      pH, Urine, POC 6.0 5.0 - 9.0      Protein, Urine,  (A) NEG mg/dL    Glucose, UA POC Negative NEG mg/dL    KETONES, Urine, POC 40 (A) NEG mg/dL    Bilirubin, Urine, POC Negative NEG      Blood, UA POC MODERATE (A) NEG      URINE UROBILINOGEN POC 0.2 0.2 - 1.0 EU/dL    Nitrate, Urine, POC Negative NEG      Leukocyte Est, UA POC Negative NEG      Performed by: Olinda Espinoza    Drug Screen Psych, Urine    Collection Time: 07/23/22  7:50 PM   Result Value Ref Range    Benzodiazepines, Urine Negative NEG      Opiates, Urine Negative NEG      Amphetamine, Urine Negative NEG      Cocaine, Urine Negative NEG      THC, TH-Cannabinol, Urine Positive (A) NEG     CK    Collection Time: 07/24/22  5:23 AM   Result Value Ref Range    Total CK 2,730 (H) 21 - 215 U/L   Comprehensive Metabolic Panel w/ Reflex to MG    Collection Time: 07/24/22  5:23 AM   Result Value Ref Range    Sodium 138 136 - 145 mmol/L    Potassium 3.1 (L) 3.5 - 5.1 mmol/L    Chloride 107 98 - 107 mmol/L    CO2 23 21 - 32 mmol/L    Anion Gap 8 7 - 16 mmol/L    Glucose 106 (H) 65 - 100 mg/dL    BUN 49 (H) 6 - 23 MG/DL    Creatinine 1.80 (H) 0.8 - 1.5 MG/DL    GFR  53 (L) >60 ml/min/1.73m2    GFR Non- 44 (L) >60 ml/min/1.73m2    Calcium 9.3 8.3 - 10.4 MG/DL    Total Bilirubin 1.2 (H) 0.2 - 1.1 MG/DL    ALT 45 12 - 65 U/L    AST 72 (H) 15 - 37 U/L    Alk Phosphatase 111 50 - 136 U/L    Total Protein 8.0 6.3 - 8.2 g/dL    Albumin 4.2 3.5 - 5.0 g/dL    Globulin 3.8 (H) 2.3 - 3.5 g/dL    Albumin/Globulin Ratio 1.1 (L) 1.2 - 3.5     CBC with Auto Differential    Collection Time: 07/24/22  5:23 AM   Result Value Ref Range    WBC 12.1 (H) 4.3 - 11.1 K/uL    RBC 5.54 4.23 - 5.6 M/uL    Hemoglobin 16.2 13.6 - 17.2 g/dL    Hematocrit 45.0 41.1 - 50.3 %    MCV 81.2 79.6 - 97.8 FL    MCH 29.2 26.1 - 32.9 PG    MCHC 36.0 (H) 31.4 - 35.0 g/dL    RDW 12.6 11.9 - 14.6 %    Platelets 604 926 - 274 K/uL    MPV 9.6 9.4 - 12.3 FL    nRBC 0.00 0.0 - 0.2 K/uL    Differential Type AUTOMATED      Seg Neutrophils 65 43 - 78 %    Lymphocytes 21 13 - 44 %    Monocytes 14 (H) 4.0 - 12.0 %    Eosinophils % 0 (L) 0.5 - 7.8 %    Basophils 0 0.0 - 2.0 %    Immature Granulocytes 0 0.0 - 5.0 %    Segs Absolute 7.9 1.7 - 8.2 K/UL    Absolute Lymph # 2.5 0.5 - 4.6 K/UL    Absolute Mono # 1.6 (H) 0.1 - 1.3 K/UL    Absolute Eos # 0.0 0.0 - 0.8 K/UL    Basophils Absolute 0.0 0.0 - 0.2 K/UL    Absolute Immature Granulocyte 0.0 0.0 - 0.5 K/UL   Magnesium Collection Time: 07/24/22  5:23 AM   Result Value Ref Range    Magnesium 2.7 (H) 1.8 - 2.4 mg/dL   CBC    Collection Time: 07/25/22  5:23 AM   Result Value Ref Range    WBC 8.6 4.3 - 11.1 K/uL    RBC 4.68 4.23 - 5.6 M/uL    Hemoglobin 13.9 13.6 - 17.2 g/dL    Hematocrit 38.7 (L) 41.1 - 50.3 %    MCV 82.7 79.6 - 97.8 FL    MCH 29.7 26.1 - 32.9 PG    MCHC 35.9 (H) 31.4 - 35.0 g/dL    RDW 12.6 11.9 - 14.6 %    Platelets 657 334 - 660 K/uL    MPV 9.1 (L) 9.4 - 12.3 FL    nRBC 0.00 0.0 - 0.2 K/uL   Hemoglobin A1C    Collection Time: 07/25/22  5:23 AM   Result Value Ref Range    Hemoglobin A1C 5.4 4.8 - 5.6 %    eAG 108 mg/dL   Comprehensive Metabolic Panel    Collection Time: 07/25/22  5:23 AM   Result Value Ref Range    Sodium 140 136 - 145 mmol/L    Potassium 3.5 3.5 - 5.1 mmol/L    Chloride 108 (H) 98 - 107 mmol/L    CO2 24 21 - 32 mmol/L    Anion Gap 8 7 - 16 mmol/L    Glucose 98 65 - 100 mg/dL    BUN 25 (H) 6 - 23 MG/DL    Creatinine 1.00 0.8 - 1.5 MG/DL    GFR African American >60 >60 ml/min/1.73m2    GFR Non- >60 >60 ml/min/1.73m2    Calcium 8.6 8.3 - 10.4 MG/DL    Total Bilirubin 1.3 (H) 0.2 - 1.1 MG/DL    ALT 44 12 - 65 U/L    AST 51 (H) 15 - 37 U/L    Alk Phosphatase 84 50 - 136 U/L    Total Protein 6.8 6.3 - 8.2 g/dL    Albumin 3.5 3.5 - 5.0 g/dL    Globulin 3.3 2.3 - 3.5 g/dL    Albumin/Globulin Ratio 1.1 (L) 1.2 - 3.5     Magnesium    Collection Time: 07/25/22  5:23 AM   Result Value Ref Range    Magnesium 2.4 1.8 - 2.4 mg/dL       Other Studies:  XR ACUTE ABD SERIES CHEST 1 VW   Final Result   1. No acute findings within the chest.   2.  Nonobstructive bowel gas pattern.                    Current Meds:  Current Facility-Administered Medications   Medication Dose Route Frequency    ondansetron (ZOFRAN-ODT) disintegrating tablet 8 mg  8 mg Oral Q6H    pantoprazole (PROTONIX) 40 mg in sodium chloride (PF) 10 mL injection  40 mg IntraVENous Daily    sodium chloride flush 0.9 % injection 5-40 mL  5-40 mL IntraVENous 2 times per day    sodium chloride flush 0.9 % injection 5-40 mL  5-40 mL IntraVENous PRN    0.9 % sodium chloride infusion   IntraVENous PRN    ondansetron (ZOFRAN-ODT) disintegrating tablet 4 mg  4 mg Oral Q8H PRN    Or    ondansetron (ZOFRAN) injection 4 mg  4 mg IntraVENous Q6H PRN    polyethylene glycol (GLYCOLAX) packet 17 g  17 g Oral Daily PRN    acetaminophen (TYLENOL) tablet 650 mg  650 mg Oral Q6H PRN    Or    acetaminophen (TYLENOL) suppository 650 mg  650 mg Rectal Q6H PRN    0.9 % sodium chloride infusion   IntraVENous Continuous    diphenhydrAMINE (BENADRYL) injection 12.5 mg  12.5 mg IntraVENous Q6H PRN    labetalol (NORMODYNE;TRANDATE) injection 5 mg  5 mg IntraVENous Q6H PRN       Signed:  MEME Amaro-S2      Addendum:  Pt seen and examined with PA student, discussed plan of care with student. Agree with plan of care as outlined above. N/V improving / resolving, renal fxn returning to baseline with normalized electrolyte levels.   Anticipate dc in AM.    GREGG Wild Hospitalist

## 2022-07-26 VITALS
HEIGHT: 61 IN | WEIGHT: 141.98 LBS | BODY MASS INDEX: 26.81 KG/M2 | HEART RATE: 57 BPM | OXYGEN SATURATION: 97 % | RESPIRATION RATE: 16 BRPM | SYSTOLIC BLOOD PRESSURE: 114 MMHG | TEMPERATURE: 98.2 F | DIASTOLIC BLOOD PRESSURE: 87 MMHG

## 2022-07-26 PROCEDURE — 6360000002 HC RX W HCPCS: Performed by: INTERNAL MEDICINE

## 2022-07-26 PROCEDURE — 2580000003 HC RX 258: Performed by: INTERNAL MEDICINE

## 2022-07-26 RX ORDER — ONDANSETRON 4 MG/1
4 TABLET, ORALLY DISINTEGRATING ORAL EVERY 8 HOURS PRN
Qty: 30 TABLET | Refills: 0 | Status: SHIPPED | OUTPATIENT
Start: 2022-07-26 | End: 2022-08-10

## 2022-07-26 RX ADMIN — SODIUM CHLORIDE, PRESERVATIVE FREE 10 ML: 5 INJECTION INTRAVENOUS at 12:10

## 2022-07-26 RX ADMIN — ONDANSETRON 4 MG: 2 INJECTION INTRAMUSCULAR; INTRAVENOUS at 02:15

## 2022-07-26 NOTE — DISCHARGE SUMMARY
Hospitalist Discharge Summary   Admit Date:  2022  5:11 PM   DC Note date: 2022  Name:  Khushboo Alston   Age:  41 y.o. Sex:  male  :  1975   MRN:  [id number]   Room:  [facility]  PCP:  Bhakti Dailey MD    Presenting Complaint: Emesis     Initial Admission Diagnosis: Cyclical vomiting [S05.24]  Metabolic acidosis [S02.3]  LORNA (acute kidney injury) ([facility] [address] [N17.9]  Acute renal failure, unspecified acute renal failure type ([facility] [address] [N17.9]     Problem List for this Hospitalization (present on admission):  Principal Problem    Cyclic vomiting syndrome    - likely due to cannabis hyperemesis syndrome in context of h/o chronic THC ingestion (stopped just under 2 months ago per patient) & improvement of N/V with hot shower    - patient responded well to scheduled Zofran (versus prn) w/ no more reported N/V    - cannabis education initiated, but patient reports stopping use a little under 2 months ago. Patient reports that he is going to try out [facility] for continued avoidance of cannabis products & for help quitting smoking (smokes 3-4/ day)    - patient responded well to advancing diet & tolerated regular adult diet    - in light of no vomiting since yesterday midday, to dissipation of nausea, to BM this AM, to toleration of regular adult diet, to resolution of LORNA, to acceptable vital signs, & to patient reporting \"feeling better\", patient is stable for d/c to home with f/u post-hospitalization PCP appt in 1 week. Active Problems    Acute kidney injury likely secondary to rhabdomyolysis    - Creatinine, GFR, & BUN have all normalized.      - resolved    - patient educated on importance of remaining hydrated, especially when working as a  out in the heat      Hypokalemia    - K+ remains wnl    - resolved       Elevated BP    - BP controlled    - f/u w/ PCP outpatient    - benefits low sodium diet & smoking cessation on BP control discussed with patient       Mild hyperglycemia    - in context of gastroparesis & mildly elevated glucoses (both random & also fasting), A1C ordered to assess for diabetes & found to be 5.4%    - will continue to monitor random & fasting glucose for elevated readings       Abnl LFTs    - attributed to hyperemesis; trending down    - LFTs this AM reveals albumin, globulin, & total protein that has returned to wnl. AST remains elevated at 51 & bilirubin at 1.3, but these are still trending toward normal ranges     - due to cessation of emesis yesterday & downward trend of abnl LFT values, no need to recheck    - f/u with PCP outpatient         Did Patient have Sepsis: [address] [facility] Course:  Mr. Ethlyn Aase is a 44-year-old -American male with a history of sickle cell trait, active tobacco use (4-pack-year hx), THC use (reports last use was a little under 2 months ago), hiatal hernia (1052), cyclical vomiting syndrome, gastroparesis (dx 2005 w/ nl emptying study in 2006), PUD (2008), GERD w/ esophagitis, remote yao obrien tear, C. Difficile (2011), HTN, LORNA (2014) & rhabdomyolysis who presented, accompanied by his wife, to ED on afternoon of 07/23 with multiple episodes of vomiting onset 07/19 after working as a  in the [address] [place] [address]. He took Zofran at home w/o improvement. Mr. Guido Paredes reports that hot showers and liquids help him feel better. Endorsed abdominal cramping related to dry heaving & inability to hold food down x 2-3 days. Denied documented diarrhea, hematochezia, melena, hemoptysis, hematemesis, insomnia, fevers or urinary symptoms. Actively vomiting on presentation to ED. Pertinent family history: father is positive for SCA, passed away this beginning of 2022 from [facility]. ED labs revealed creatinine of 3.0. UA positive for protein (100 mg/dL), ketones (40 mg/dL) & moderate blood. IVF bolus administered in ED. Patient admitted to 6th floor for LORNA & cyclical vomiting on evening of 07/23.   UDS was positive for THC though pt denied marijuana use x 2 mths. Patient also in rhabdomyolysis with CK 2846 U/L on admission. WBC count is elevated at 12.2 K/uL.    07/23/2022 07:53 XR acute abd series chest revealed nonobstructive bowel gas pattern. Patient states hasn't had a BM since admission. Denies abdominal pain.     07/24/22: Patient's potassium low. KlorCon administered. BUN (74) & creatinine (1.8) still elevated, but improved from yesterday. GFR (A-A) decreased to 53. Total CK still elevated at 2,730 U/L, but improved since yesterday. WBC count remains elevated today (12.1 K/uL), patient remains afebrile     Per dietitian consult: malnutrition screening tool score of 2. goal of diet progression w/in 5 days w/ recommendation to continue current diet order w/ progression per MD. Initiate Ensure clear tid.      07/25/22 AM  Mg2+ nl  K+, creatinine, GFR, & WBC returned to wnl today. BUN 25 (almost returned to normal range of 6-23)  Patient afebrile. A1C returned 5.4%. Not quite prediabetic. Zofran scheduled     07/25/22 PM  Diet advanced, IVF d/c'd  No additional emesis    07/26/22  +BM  -N/V/D, hematemesis, hemoptysis, abdominal pain  BUN & Cr wnl  Remains afebrile  From IM Hospitalist perspective, he is medically stable & ready for d/c. Disposition: Home  Diet: ADULT DIET; Regular  Code Status: Full Code    Follow Ups:    [facility] Correa  [facility] [id number]  When: w/in 1 week of d/c for post-hospitalization f/u      Time spent in patient discharge and coordination 45 minutes. Follow up labs/diagnostics (ultimately defer to outpatient provider):  none    Plan was discussed with patient, patient's girlfriend, nurse, & . All questions answered. Patient was stable at time of discharge. Instructions given to call a physician or return if any concerns.     Current Discharge Medication List        START taking these medications    Details   ondansetron Labs     07/23/22  1556 07/24/22  0523 07/25/22  0523   BILITOT 1.4* 1.2* 1.3*   ALKPHOS 126 111 84   AST 63* 72* 51*   ALT 43 45 44   PROT 9.3* 8.0 6.8   LABALBU 5.2* 4.2 3.5   GLOB 4.1* 3.8* 3.3      Cardiac  No results found for: NTPROBNP, TROPHS   Coags Lab Results   Component Value Date/Time    PROTIME 14.7 07/16/2019 08:54 PM    INR 1.2 07/16/2019 08:54 PM      A1c Lab Results   Component Value Date/Time    LABA1C 5.4 07/25/2022 05:23 AM     07/25/2022 05:23 AM      Lipids No results found for: CHOL, LDLCALC, LABVLDL, HDL, CHOLHDLRATIO, TRIG   Thyroid  No results found for: Zelphia Pontiff     Most Recent UA Lab Results   Component Value Date/Time    COLORU YELLOW 03/27/2022 12:05 PM    SPECGRAV 1.010 03/27/2022 12:05 PM    PROTEINU 100 03/27/2022 12:05 PM    GLUCOSEU Negative 07/23/2022 06:52 PM    GLUCOSEU Negative 03/27/2022 12:05 PM    KETUA 15 03/27/2022 12:05 PM    BILIRUBINUR Negative 03/27/2022 12:05 PM    BLOODU MODERATE 07/23/2022 06:52 PM    BLOODU LARGE 03/27/2022 12:05 PM    NITRU Positive 03/27/2022 12:05 PM    LEUKOCYTESUR Negative 03/27/2022 12:05 PM    WBCUA 0-3 03/27/2022 12:05 PM    RBCUA 0-3 03/27/2022 12:05 PM    BACTERIA 0 03/27/2022 12:05 PM    MUCUS TRACE 03/27/2022 12:05 PM          All Labs from Last 24 Hrs:  Recent Results (from the past 24 hour(s))   CBC    Collection Time: 07/25/22  2:36 PM   Result Value Ref Range    WBC 6.8 4.3 - 11.1 K/uL    RBC 4.46 4.23 - 5.6 M/uL    Hemoglobin 13.2 (L) 13.6 - 17.2 g/dL    Hematocrit 37.0 (L) 41.1 - 50.3 %    MCV 83.0 79.6 - 97.8 FL    MCH 29.6 26.1 - 32.9 PG    MCHC 35.7 (H) 31.4 - 35.0 g/dL    RDW 12.5 11.9 - 14.6 %    Platelets 785 608 - 652 K/uL    MPV 8.9 (L) 9.4 - 12.3 FL    nRBC 0.00 0.0 - 0.2 K/uL   Basic Metabolic Panel    Collection Time: 07/25/22  2:36 PM   Result Value Ref Range    Sodium 139 136 - 145 mmol/L    Potassium 3.5 3.5 - 5.1 mmol/L    Chloride 108 (H) 98 - 107 mmol/L    CO2 23 21 - 32 mmol/L    Anion Gap 8 7 - 16 mmol/L    Glucose 92 65 - 100 mg/dL    BUN 21 6 - 23 MG/DL    Creatinine 1.00 0.8 - 1.5 MG/DL    GFR African American >60 >60 ml/min/1.73m2    GFR Non- >60 >60 ml/min/1.73m2    Calcium 8.2 (L) 8.3 - 10.4 MG/DL       Allergies   Allergen Reactions    Aspirin Other (See Comments)     ulcers    Ibuprofen Other (See Comments)     Hx of ulcers    Hydrocodone Rash     Tolerates hydromorphone, morphine, tramadol    Penicillins Nausea Only and Rash    Promethazine Nausea And Vomiting and Other (See Comments)     Immunization History   Administered Date(s) Administered    Tdap (Boostrix, Adacel) 04/21/2012       Recent Vital Data:  Patient Vitals for the past 24 hrs:   Temp Pulse Resp BP SpO2   07/26/22 0713 98.2 °F (36.8 °C) 73 18 (!) 121/95 96 %   07/26/22 0308 98.6 °F (37 °C) 76 18 112/81 95 %   07/25/22 2331 98.8 °F (37.1 °C) 71 18 116/79 95 %   07/25/22 1909 98.8 °F (37.1 °C) 86 20 (!) 125/100 93 %   07/25/22 1724 98.4 °F (36.9 °C) 68 20 (!) 122/97 96 %   07/25/22 1135 97.7 °F (36.5 °C) 60 18 120/79 97 %       Oxygen Therapy  SpO2: 96 %  O2 Device: None (Room air)    Estimated body mass index is 26.83 kg/m² as calculated from the following:    Height as of this encounter: 5' 1\" (1.549 m). Weight as of this encounter: 141 lb 15.6 oz (64.4 kg). No intake or output data in the 24 hours ending 07/26/22 1005      Physical Exam:    General:    Well nourished. No overt distress. Head:  Normocephalic, atraumatic  Eyes:  Sclerae appear normal.  Pupils equally round. HENT:  Nares appear normal, no drainage. Moist mucous membranes  Neck:  No restricted ROM. Trachea midline  CV:   RRR. No m/r/g. No JVD  Lungs:   CTAB. No wheezing, rhonchi, or rales. Respirations even, unlabored  Abdomen:   Soft, nontender, nondistended. Normoactive bowel sounds. Extremities: Warm and dry. No cyanosis or clubbing. No edema. Skin:     No rashes.   Normal coloration  Neuro:  CN II-XII grossly intact. Psych:  Normal mood and affect. Signed:  MEME Ritchie-S2      Addendum:  Pt seen and examined with PA student, discussed plan of care with student. Agree with plan of care as outlined; ABOVE ALL PT COUNSELED IMPORTANCE OF ABSTAINING FROM MARIJUANA ABUSE!!!!!  Stable for dc home today.     MayGREGORY JacoboC  [employer]

## 2022-07-27 ENCOUNTER — TELEPHONE (OUTPATIENT)
Dept: INTERNAL MEDICINE CLINIC | Facility: CLINIC | Age: 47
End: 2022-07-27

## 2022-07-27 NOTE — TELEPHONE ENCOUNTER
Called patient to schedule TCV appt following discharge from [facility] 07/26/2022. Dx Acute Renal Failure    Patient states he is feeling better and was able to get medications and will be contacting Dr Estrellita Canales to schedule follow up appt in 1 week. Patient did not have questions or concerns.

## 2022-07-29 NOTE — PROGRESS NOTES
Addended by: JOSE ALFREDO CALDERON on: 7/28/2022 07:53 PM     Modules accepted: Orders     Pt arrived floor via stretcher. family at bedside. Pt is alert and oriented x 4. Pt oriented to rm and call light. Dual skin assessment performed with Asha Madrigal. No skin issues noted on pt. Pt denies needs at this time. Will continue to monitor.

## 2022-12-03 ENCOUNTER — HOSPITAL ENCOUNTER (INPATIENT)
Age: 47
LOS: 2 days | Discharge: HOME | DRG: 683 | End: 2022-12-05
Attending: EMERGENCY MEDICINE | Admitting: FAMILY MEDICINE

## 2022-12-03 ENCOUNTER — APPOINTMENT (OUTPATIENT)
Dept: GENERAL RADIOLOGY | Age: 47
DRG: 683 | End: 2022-12-03

## 2022-12-03 DIAGNOSIS — F12.90 CANNABINOID HYPEREMESIS SYNDROME: Primary | ICD-10-CM

## 2022-12-03 DIAGNOSIS — R11.2 CANNABINOID HYPEREMESIS SYNDROME: Primary | ICD-10-CM

## 2022-12-03 DIAGNOSIS — N17.9 ACUTE KIDNEY INJURY (HCC): ICD-10-CM

## 2022-12-03 DIAGNOSIS — E87.6 HYPOKALEMIA: ICD-10-CM

## 2022-12-03 PROBLEM — E87.1 HYPONATREMIA: Status: ACTIVE | Noted: 2022-12-03

## 2022-12-03 PROBLEM — E87.20 LACTIC ACIDOSIS: Status: ACTIVE | Noted: 2022-03-27

## 2022-12-03 LAB
ALBUMIN SERPL-MCNC: 5.2 G/DL (ref 3.5–5)
ALBUMIN/GLOB SERPL: 1.2 {RATIO} (ref 0.4–1.6)
ALP SERPL-CCNC: 121 U/L (ref 50–136)
ALT SERPL-CCNC: 33 U/L (ref 12–65)
AMPHET UR QL SCN: NEGATIVE
ANION GAP SERPL CALC-SCNC: 11 MMOL/L (ref 2–11)
APPEARANCE UR: ABNORMAL
AST SERPL-CCNC: 35 U/L (ref 15–37)
BACTERIA URNS QL MICRO: 0 /HPF
BARBITURATES UR QL SCN: NEGATIVE
BASOPHILS # BLD: 0 K/UL (ref 0–0.2)
BASOPHILS NFR BLD: 0 % (ref 0–2)
BENZODIAZ UR QL: NEGATIVE
BILIRUB SERPL-MCNC: 1.4 MG/DL (ref 0.2–1.1)
BILIRUB UR QL: NEGATIVE
BUN SERPL-MCNC: 38 MG/DL (ref 6–23)
CALCIUM SERPL-MCNC: 11.1 MG/DL (ref 8.3–10.4)
CANNABINOIDS UR QL SCN: POSITIVE
CHLORIDE SERPL-SCNC: 101 MMOL/L (ref 101–110)
CO2 SERPL-SCNC: 20 MMOL/L (ref 21–32)
COCAINE UR QL SCN: NEGATIVE
COLOR UR: ABNORMAL
CREAT SERPL-MCNC: 2 MG/DL (ref 0.8–1.5)
DIFFERENTIAL METHOD BLD: ABNORMAL
EKG ATRIAL RATE: 104 BPM
EKG DIAGNOSIS: NORMAL
EKG P AXIS: 68 DEGREES
EKG P-R INTERVAL: 153 MS
EKG Q-T INTERVAL: 327 MS
EKG QRS DURATION: 71 MS
EKG QTC CALCULATION (BAZETT): 431 MS
EKG R AXIS: 48 DEGREES
EKG T AXIS: 11 DEGREES
EKG VENTRICULAR RATE: 104 BPM
EOSINOPHIL # BLD: 0 K/UL (ref 0–0.8)
EOSINOPHIL NFR BLD: 0 % (ref 0.5–7.8)
EPI CELLS #/AREA URNS HPF: ABNORMAL /HPF
ERYTHROCYTE [DISTWIDTH] IN BLOOD BY AUTOMATED COUNT: 12.8 % (ref 11.9–14.6)
GLOBULIN SER CALC-MCNC: 4.3 G/DL (ref 2.8–4.5)
GLUCOSE SERPL-MCNC: 159 MG/DL (ref 65–100)
GLUCOSE UR STRIP.AUTO-MCNC: NEGATIVE MG/DL
HCT VFR BLD AUTO: 49.8 % (ref 41.1–50.3)
HGB BLD-MCNC: 18.6 G/DL (ref 13.6–17.2)
HGB UR QL STRIP: ABNORMAL
IMM GRANULOCYTES # BLD AUTO: 0.1 K/UL (ref 0–0.5)
IMM GRANULOCYTES NFR BLD AUTO: 1 % (ref 0–5)
KETONES UR QL STRIP.AUTO: 40 MG/DL
LACTATE SERPL-SCNC: 3.9 MMOL/L (ref 0.4–2)
LACTATE SERPL-SCNC: <0.1 MMOL/L (ref 0.4–2)
LEUKOCYTE ESTERASE UR QL STRIP.AUTO: ABNORMAL
LIPASE SERPL-CCNC: 119 U/L (ref 73–393)
LYMPHOCYTES # BLD: 2.1 K/UL (ref 0.5–4.6)
LYMPHOCYTES NFR BLD: 16 % (ref 13–44)
MAGNESIUM SERPL-MCNC: 2 MG/DL (ref 1.8–2.4)
MCH RBC QN AUTO: 29.4 PG (ref 26.1–32.9)
MCHC RBC AUTO-ENTMCNC: 37.3 G/DL (ref 31.4–35)
MCV RBC AUTO: 78.7 FL (ref 82–102)
METHADONE UR QL: NEGATIVE
MONOCYTES # BLD: 1.7 K/UL (ref 0.1–1.3)
MONOCYTES NFR BLD: 13 % (ref 4–12)
MUCOUS THREADS URNS QL MICRO: ABNORMAL /LPF
NEUTS SEG # BLD: 9.2 K/UL (ref 1.7–8.2)
NEUTS SEG NFR BLD: 70 % (ref 43–78)
NITRITE UR QL STRIP.AUTO: NEGATIVE
NRBC # BLD: 0 K/UL (ref 0–0.2)
OPIATES UR QL: NEGATIVE
OTHER OBSERVATIONS: ABNORMAL
PCP UR QL: NEGATIVE
PH UR STRIP: 6 [PH] (ref 5–9)
PLATELET # BLD AUTO: 387 K/UL (ref 150–450)
PMV BLD AUTO: 9.9 FL (ref 9.4–12.3)
POTASSIUM SERPL-SCNC: 2.9 MMOL/L (ref 3.5–5.1)
PROT SERPL-MCNC: 9.5 G/DL (ref 6.3–8.2)
PROT UR STRIP-MCNC: 100 MG/DL
RBC # BLD AUTO: 6.33 M/UL (ref 4.23–5.6)
RBC #/AREA URNS HPF: ABNORMAL /HPF
SODIUM SERPL-SCNC: 132 MMOL/L (ref 133–143)
SP GR UR REFRACTOMETRY: 1.03 (ref 1–1.02)
UROBILINOGEN UR QL STRIP.AUTO: 1 EU/DL (ref 0.2–1)
WBC # BLD AUTO: 13 K/UL (ref 4.3–11.1)
WBC URNS QL MICRO: ABNORMAL /HPF

## 2022-12-03 PROCEDURE — 80307 DRUG TEST PRSMV CHEM ANLYZR: CPT

## 2022-12-03 PROCEDURE — 1100000000 HC RM PRIVATE

## 2022-12-03 PROCEDURE — 81001 URINALYSIS AUTO W/SCOPE: CPT

## 2022-12-03 PROCEDURE — 6360000002 HC RX W HCPCS: Performed by: FAMILY MEDICINE

## 2022-12-03 PROCEDURE — 83690 ASSAY OF LIPASE: CPT

## 2022-12-03 PROCEDURE — 6370000000 HC RX 637 (ALT 250 FOR IP): Performed by: NURSE PRACTITIONER

## 2022-12-03 PROCEDURE — 6360000002 HC RX W HCPCS

## 2022-12-03 PROCEDURE — 85025 COMPLETE CBC W/AUTO DIFF WBC: CPT

## 2022-12-03 PROCEDURE — 83735 ASSAY OF MAGNESIUM: CPT

## 2022-12-03 PROCEDURE — 93005 ELECTROCARDIOGRAM TRACING: CPT

## 2022-12-03 PROCEDURE — 96374 THER/PROPH/DIAG INJ IV PUSH: CPT

## 2022-12-03 PROCEDURE — 36415 COLL VENOUS BLD VENIPUNCTURE: CPT

## 2022-12-03 PROCEDURE — 83605 ASSAY OF LACTIC ACID: CPT

## 2022-12-03 PROCEDURE — 80053 COMPREHEN METABOLIC PANEL: CPT

## 2022-12-03 PROCEDURE — A4216 STERILE WATER/SALINE, 10 ML: HCPCS | Performed by: FAMILY MEDICINE

## 2022-12-03 PROCEDURE — 96375 TX/PRO/DX INJ NEW DRUG ADDON: CPT

## 2022-12-03 PROCEDURE — C9113 INJ PANTOPRAZOLE SODIUM, VIA: HCPCS | Performed by: FAMILY MEDICINE

## 2022-12-03 PROCEDURE — 2580000003 HC RX 258: Performed by: FAMILY MEDICINE

## 2022-12-03 PROCEDURE — 74022 RADEX COMPL AQT ABD SERIES: CPT

## 2022-12-03 PROCEDURE — 2580000003 HC RX 258

## 2022-12-03 PROCEDURE — 99285 EMERGENCY DEPT VISIT HI MDM: CPT

## 2022-12-03 RX ORDER — POTASSIUM CHLORIDE 20 MEQ/1
40 TABLET, EXTENDED RELEASE ORAL 2 TIMES DAILY WITH MEALS
Status: COMPLETED | OUTPATIENT
Start: 2022-12-03 | End: 2022-12-03

## 2022-12-03 RX ORDER — METOCLOPRAMIDE HYDROCHLORIDE 5 MG/ML
10 INJECTION INTRAMUSCULAR; INTRAVENOUS ONCE
Status: COMPLETED | OUTPATIENT
Start: 2022-12-03 | End: 2022-12-03

## 2022-12-03 RX ORDER — POTASSIUM CHLORIDE 7.45 MG/ML
10 INJECTION INTRAVENOUS
Status: COMPLETED | OUTPATIENT
Start: 2022-12-03 | End: 2022-12-03

## 2022-12-03 RX ORDER — ACETAMINOPHEN 325 MG/1
650 TABLET ORAL EVERY 6 HOURS PRN
Status: DISCONTINUED | OUTPATIENT
Start: 2022-12-03 | End: 2022-12-05 | Stop reason: HOSPADM

## 2022-12-03 RX ORDER — ACETAMINOPHEN 650 MG/1
650 SUPPOSITORY RECTAL EVERY 6 HOURS PRN
Status: DISCONTINUED | OUTPATIENT
Start: 2022-12-03 | End: 2022-12-05 | Stop reason: HOSPADM

## 2022-12-03 RX ORDER — POTASSIUM CHLORIDE 20 MEQ/1
40 TABLET, EXTENDED RELEASE ORAL PRN
Status: DISCONTINUED | OUTPATIENT
Start: 2022-12-03 | End: 2022-12-03

## 2022-12-03 RX ORDER — ONDANSETRON 2 MG/ML
4 INJECTION INTRAMUSCULAR; INTRAVENOUS EVERY 6 HOURS PRN
Status: DISCONTINUED | OUTPATIENT
Start: 2022-12-03 | End: 2022-12-05 | Stop reason: HOSPADM

## 2022-12-03 RX ORDER — SODIUM CHLORIDE 0.9 % (FLUSH) 0.9 %
5-40 SYRINGE (ML) INJECTION PRN
Status: DISCONTINUED | OUTPATIENT
Start: 2022-12-03 | End: 2022-12-05 | Stop reason: HOSPADM

## 2022-12-03 RX ORDER — SODIUM CHLORIDE 0.9 % (FLUSH) 0.9 %
5-40 SYRINGE (ML) INJECTION EVERY 12 HOURS SCHEDULED
Status: DISCONTINUED | OUTPATIENT
Start: 2022-12-03 | End: 2022-12-05 | Stop reason: HOSPADM

## 2022-12-03 RX ORDER — DIPHENHYDRAMINE HYDROCHLORIDE 50 MG/ML
25 INJECTION INTRAMUSCULAR; INTRAVENOUS ONCE
Status: COMPLETED | OUTPATIENT
Start: 2022-12-03 | End: 2022-12-03

## 2022-12-03 RX ORDER — POTASSIUM CHLORIDE 7.45 MG/ML
10 INJECTION INTRAVENOUS PRN
Status: DISCONTINUED | OUTPATIENT
Start: 2022-12-03 | End: 2022-12-03

## 2022-12-03 RX ORDER — SODIUM CHLORIDE, SODIUM LACTATE, POTASSIUM CHLORIDE, AND CALCIUM CHLORIDE .6; .31; .03; .02 G/100ML; G/100ML; G/100ML; G/100ML
1000 INJECTION, SOLUTION INTRAVENOUS ONCE
Status: COMPLETED | OUTPATIENT
Start: 2022-12-03 | End: 2022-12-03

## 2022-12-03 RX ORDER — PROCHLORPERAZINE EDISYLATE 5 MG/ML
10 INJECTION INTRAMUSCULAR; INTRAVENOUS EVERY 6 HOURS PRN
Status: DISCONTINUED | OUTPATIENT
Start: 2022-12-03 | End: 2022-12-05 | Stop reason: HOSPADM

## 2022-12-03 RX ORDER — SODIUM CHLORIDE 9 MG/ML
INJECTION, SOLUTION INTRAVENOUS CONTINUOUS
Status: DISCONTINUED | OUTPATIENT
Start: 2022-12-03 | End: 2022-12-05 | Stop reason: HOSPADM

## 2022-12-03 RX ORDER — SODIUM CHLORIDE 9 MG/ML
INJECTION, SOLUTION INTRAVENOUS PRN
Status: DISCONTINUED | OUTPATIENT
Start: 2022-12-03 | End: 2022-12-05 | Stop reason: HOSPADM

## 2022-12-03 RX ORDER — POLYETHYLENE GLYCOL 3350 17 G/17G
17 POWDER, FOR SOLUTION ORAL DAILY PRN
Status: DISCONTINUED | OUTPATIENT
Start: 2022-12-03 | End: 2022-12-05 | Stop reason: HOSPADM

## 2022-12-03 RX ORDER — ONDANSETRON 2 MG/ML
4 INJECTION INTRAMUSCULAR; INTRAVENOUS ONCE
Status: COMPLETED | OUTPATIENT
Start: 2022-12-03 | End: 2022-12-03

## 2022-12-03 RX ORDER — ONDANSETRON 4 MG/1
4 TABLET, ORALLY DISINTEGRATING ORAL EVERY 8 HOURS PRN
Status: DISCONTINUED | OUTPATIENT
Start: 2022-12-03 | End: 2022-12-05 | Stop reason: HOSPADM

## 2022-12-03 RX ADMIN — SODIUM CHLORIDE, POTASSIUM CHLORIDE, SODIUM LACTATE AND CALCIUM CHLORIDE 1000 ML: 600; 310; 30; 20 INJECTION, SOLUTION INTRAVENOUS at 06:05

## 2022-12-03 RX ADMIN — METOCLOPRAMIDE 10 MG: 5 INJECTION, SOLUTION INTRAMUSCULAR; INTRAVENOUS at 05:54

## 2022-12-03 RX ADMIN — ONDANSETRON 4 MG: 2 INJECTION INTRAMUSCULAR; INTRAVENOUS at 22:56

## 2022-12-03 RX ADMIN — POTASSIUM CHLORIDE 40 MEQ: 1500 TABLET, EXTENDED RELEASE ORAL at 18:38

## 2022-12-03 RX ADMIN — POTASSIUM CHLORIDE 10 MEQ: 7.46 INJECTION, SOLUTION INTRAVENOUS at 06:05

## 2022-12-03 RX ADMIN — SODIUM CHLORIDE 40 MG: 9 INJECTION, SOLUTION INTRAMUSCULAR; INTRAVENOUS; SUBCUTANEOUS at 08:47

## 2022-12-03 RX ADMIN — POTASSIUM CHLORIDE 10 MEQ: 7.46 INJECTION, SOLUTION INTRAVENOUS at 07:22

## 2022-12-03 RX ADMIN — ONDANSETRON 4 MG: 2 INJECTION INTRAMUSCULAR; INTRAVENOUS at 04:31

## 2022-12-03 RX ADMIN — POTASSIUM CHLORIDE 10 MEQ: 7.46 INJECTION, SOLUTION INTRAVENOUS at 08:47

## 2022-12-03 RX ADMIN — SODIUM CHLORIDE, POTASSIUM CHLORIDE, SODIUM LACTATE AND CALCIUM CHLORIDE 1000 ML: 600; 310; 30; 20 INJECTION, SOLUTION INTRAVENOUS at 04:56

## 2022-12-03 RX ADMIN — ONDANSETRON 4 MG: 2 INJECTION INTRAMUSCULAR; INTRAVENOUS at 08:47

## 2022-12-03 RX ADMIN — PROCHLORPERAZINE EDISYLATE 10 MG: 5 INJECTION INTRAMUSCULAR; INTRAVENOUS at 11:50

## 2022-12-03 RX ADMIN — POTASSIUM CHLORIDE 10 MEQ: 7.46 INJECTION, SOLUTION INTRAVENOUS at 11:42

## 2022-12-03 RX ADMIN — POTASSIUM CHLORIDE 40 MEQ: 1500 TABLET, EXTENDED RELEASE ORAL at 08:47

## 2022-12-03 RX ADMIN — DIPHENHYDRAMINE HYDROCHLORIDE 25 MG: 50 INJECTION, SOLUTION INTRAMUSCULAR; INTRAVENOUS at 05:54

## 2022-12-03 RX ADMIN — SODIUM CHLORIDE: 9 INJECTION, SOLUTION INTRAVENOUS at 08:49

## 2022-12-03 ASSESSMENT — PAIN SCALES - GENERAL: PAINLEVEL_OUTOF10: 5

## 2022-12-03 ASSESSMENT — PAIN - FUNCTIONAL ASSESSMENT: PAIN_FUNCTIONAL_ASSESSMENT: 0-10

## 2022-12-03 ASSESSMENT — ENCOUNTER SYMPTOMS
SORE THROAT: 0
DIARRHEA: 0
WHEEZING: 0
SINUS PRESSURE: 0
SHORTNESS OF BREATH: 0
VOMITING: 1
ABDOMINAL PAIN: 0
CHEST TIGHTNESS: 0
SINUS PAIN: 0
NAUSEA: 1
COLOR CHANGE: 0
COUGH: 0

## 2022-12-03 NOTE — ASSESSMENT & PLAN NOTE
- Noted prior history of this  - Zofran, compazine PRN  - May need hot shower as well for symptom control

## 2022-12-03 NOTE — PROGRESS NOTES
Hospitalist Progress Note   Admit Date:  12/3/2022  3:12 AM   Name:  Celina Griffith   Age:  41 y.o. Sex:  male  :  1975   MRN:  [id number]   Room:  ER13/13    Presenting Complaint: Emesis     Reason(s) for Admission: Lactic acidosis [facility] Course:    41 y.o. male who presented to ED with intractable N/V. Patient reports symptoms started ~5 days ago. Does have h/o cyclical vomiting syndrome as he does use marijuana. Admits to recent usage last week at [facility]. Denies chest pain. Upon ER evaluation, Lactic acid is 3.9.  K is 2.9. Na is 132. Cr 2.0. Subjective & 24hr Events (22): A&O x3, N/V resolved. Advancing diet today. Assessment & Plan:     LORNA (acute kidney injury) ([address]  Assessment & Plan  - Cr up to 2.0 from baseline ~1  - IVFs  - Recheck after rehydration     Hypokalemia  Assessment & Plan  - K is 2.9  - Replace  - Recheck  - Electrolyte replacement protocol ordered     Cannabis hyperemesis syndrome concurrent with and due to cannabis abuse ([address]  Assessment & Plan  - Noted prior history of this  - Zofran, compazine PRN  - May need hot shower as well for symptom control     * Lactic acidosis  Assessment & Plan  - Lactic acid of 3.9  - Likely from intractable vomiting  - IVFs  - Trend until <2     Hyponatremia  Assessment & Plan  - Mild at 132  - NS IVFs  - Recheck in AM     Hypertension  Assessment & Plan  - Does not appear to be on home meds  - PRN hydralazine     Polysubstance abuse ([address]  Assessment & Plan  - education on cessation      Anticipated discharge needs:      TBD    Diet:  ADULT DIET;  Clear Liquid  DVT PPx: Lovenox SQ  Code status: Full Code    Hospital Problems:  Principal Problem:    Lactic acidosis  Active Problems:    LORNA (acute kidney injury) ([address]    Hyponatremia    Cannabis hyperemesis syndrome concurrent with and due to cannabis abuse ([address]    Hypokalemia    Polysubstance abuse ([address]    Hypertension  Resolved Problems: * No resolved hospital problems. *      Objective:   Patient Vitals for the past 24 hrs:   Temp Pulse Resp BP SpO2   12/03/22 1140 -- (!) 120 17 -- 97 %   12/03/22 1130 -- 98 24 (!) 139/104 (!) 85 %   12/03/22 1115 -- (!) 116 13 (!) 132/108 93 %   12/03/22 1100 -- 94 20 (!) 139/112 90 %   12/03/22 1045 -- (!) 120 22 127/86 97 %   12/03/22 1030 -- 95 20 (!) 138/102 93 %   12/03/22 1015 -- (!) 105 17 (!) 126/100 95 %   12/03/22 1000 -- (!) 110 25 (!) 130/106 91 %   12/03/22 0945 -- (!) 107 20 (!) 120/93 95 %   12/03/22 0930 -- (!) 103 25 (!) 124/94 90 %   12/03/22 0915 -- (!) 101 23 (!) 132/108 (!) 88 %   12/03/22 0900 -- (!) 105 22 (!) 125/101 96 %   12/03/22 0845 -- (!) 117 15 (!) 112/97 97 %   12/03/22 0830 -- (!) 122 17 (!) 129/98 96 %   12/03/22 0815 -- (!) 114 15 (!) 148/102 92 %   12/03/22 0800 -- (!) 112 13 (!) 166/118 94 %   12/03/22 0745 -- (!) 115 (!) 9 125/67 96 %   12/03/22 0710 -- (!) 110 14 (!) 144/132 96 %   12/03/22 0705 -- (!) 130 (!) 33 -- 96 %   12/03/22 0655 -- (!) 105 14 -- 94 %   12/03/22 0645 -- (!) 101 23 -- 94 %   12/03/22 0635 -- (!) 114 22 -- 95 %   12/03/22 0625 -- 100 19 -- 94 %   12/03/22 0615 -- (!) 104 12 -- 95 %   12/03/22 0545 -- -- -- (!) 156/125 97 %   12/03/22 0304 97.6 °F (36.4 °C) (!) 149 18 (!) 135/103 96 %       Oxygen Therapy  SpO2: 97 %  Pulse via Oximetry: 122 beats per minute    Estimated body mass index is 26.64 kg/m² as calculated from the following:    Height as of this encounter: 5' 1\" (1.549 m). Weight as of this encounter: 141 lb (64 kg). No intake or output data in the 24 hours ending 12/03/22 1209      Physical Exam:   Blood pressure (!) 139/104, pulse (!) 120, temperature 97.6 °F (36.4 °C), temperature source Oral, resp. rate 17, height 5' 1\" (1.549 m), weight 141 lb (64 kg), SpO2 97 %. General:    Well nourished. Head:  Normocephalic, atraumatic  Eyes:  Sclerae appear normal.  Pupils equally round. ENT:  Nares appear normal, no drainage.   Moist oral mucosa  Neck:  No restricted ROM. Trachea midline   CV:   RRR. No m/r/g. No jugular venous distension. Lungs:   CTAB. No wheezing, rhonchi, or rales. Symmetric expansion. Abdomen: Bowel sounds present. Soft, nontender, nondistended. Extremities: No cyanosis or clubbing. No edema  Skin:     No rashes and normal coloration. Warm and dry. Neuro:  CN II-XII grossly intact. Sensation intact. A&Ox3  Psych:  Normal mood and affect.       I have personally reviewed labs and tests showing:  Recent Labs:  Recent Results (from the past 48 hour(s))   CMP    Collection Time: 12/03/22  3:41 AM   Result Value Ref Range    Sodium 132 (L) 133 - 143 mmol/L    Potassium 2.9 (L) 3.5 - 5.1 mmol/L    Chloride 101 101 - 110 mmol/L    CO2 20 (L) 21 - 32 mmol/L    Anion Gap 11 2 - 11 mmol/L    Glucose 159 (H) 65 - 100 mg/dL    BUN 38 (H) 6 - 23 MG/DL    Creatinine 2.00 (H) 0.8 - 1.5 MG/DL    Est, Glom Filt Rate 41 (L) >60 ml/min/1.73m2    Calcium 11.1 (H) 8.3 - 10.4 MG/DL    Total Bilirubin 1.4 (H) 0.2 - 1.1 MG/DL    ALT 33 12 - 65 U/L    AST 35 15 - 37 U/L    Alk Phosphatase 121 50 - 136 U/L    Total Protein 9.5 (H) 6.3 - 8.2 g/dL    Albumin 5.2 (H) 3.5 - 5.0 g/dL    Globulin 4.3 2.8 - 4.5 g/dL    Albumin/Globulin Ratio 1.2 0.4 - 1.6     Lipase    Collection Time: 12/03/22  3:41 AM   Result Value Ref Range    Lipase 119 73 - 393 U/L   Lactate, Sepsis    Collection Time: 12/03/22  3:41 AM   Result Value Ref Range    Lactic Acid, Sepsis 3.9 (HH) 0.4 - 2.0 MMOL/L   CBC with Auto Differential    Collection Time: 12/03/22  3:41 AM   Result Value Ref Range    WBC 13.0 (H) 4.3 - 11.1 K/uL    RBC 6.33 (H) 4.23 - 5.6 M/uL    Hemoglobin 18.6 (H) 13.6 - 17.2 g/dL    Hematocrit 49.8 41.1 - 50.3 %    MCV 78.7 (L) 82 - 102 FL    MCH 29.4 26.1 - 32.9 PG    MCHC 37.3 (H) 31.4 - 35.0 g/dL    RDW 12.8 11.9 - 14.6 %    Platelets 606 520 - 462 K/uL    MPV 9.9 9.4 - 12.3 FL    nRBC 0.00 0.0 - 0.2 K/uL    Differential Type AUTOMATED      Seg Neutrophils 70 43 - 78 %    Lymphocytes 16 13 - 44 %    Monocytes 13 (H) 4.0 - 12.0 %    Eosinophils % 0 (L) 0.5 - 7.8 %    Basophils 0 0.0 - 2.0 %    Immature Granulocytes 1 0.0 - 5.0 %    Segs Absolute 9.2 (H) 1.7 - 8.2 K/UL    Absolute Lymph # 2.1 0.5 - 4.6 K/UL    Absolute Mono # 1.7 (H) 0.1 - 1.3 K/UL    Absolute Eos # 0.0 0.0 - 0.8 K/UL    Basophils Absolute 0.0 0.0 - 0.2 K/UL    Absolute Immature Granulocyte 0.1 0.0 - 0.5 K/UL   Magnesium    Collection Time: 12/03/22  3:41 AM   Result Value Ref Range    Magnesium 2.0 1.8 - 2.4 mg/dL   EKG 12 Lead    Collection Time: 12/03/22  6:17 AM   Result Value Ref Range    Ventricular Rate 104 BPM    Atrial Rate 104 BPM    P-R Interval 153 ms    QRS Duration 71 ms    Q-T Interval 327 ms    QTc Calculation (Bazett) 431 ms    P Axis 68 degrees    R Axis 48 degrees    T Axis 11 degrees    Diagnosis Sinus tachycardia    Urinalysis w rflx microscopic    Collection Time: 12/03/22 10:40 AM   Result Value Ref Range    Color, UA YELLOW/STRAW      Appearance CLOUDY      Specific Gravity, UA 1.029 (H) 1.001 - 1.023      pH, Urine 6.0 5.0 - 9.0      Protein,  (A) NEG mg/dL    Glucose, UA Negative mg/dL    Ketones, Urine 40 (A) NEG mg/dL    Bilirubin Urine Negative NEG      Blood, Urine MODERATE (A) NEG      Urobilinogen, Urine 1.0 0.2 - 1.0 EU/dL    Nitrite, Urine Negative NEG      Leukocyte Esterase, Urine TRACE (A) NEG      WBC, UA 0-3 0 /hpf    RBC, UA 0-3 0 /hpf    Epithelial Cells UA 0-3 0 /hpf    BACTERIA, URINE 0 0 /hpf    Mucus, UA 1+ (H) 0 /lpf    Other observations RESULTS VERIFIED MANUALLY     Urine Drug Screen    Collection Time: 12/03/22 10:40 AM   Result Value Ref Range    PCP, Urine Negative NEG      Benzodiazepines, Urine Negative NEG      Cocaine, Urine Negative NEG      Amphetamine, Urine Negative NEG      Methadone, Urine Negative NEG      THC, TH-Cannabinol, Urine Positive (A) NEG      Opiates, Urine Negative NEG      Barbiturates, Urine Negative NEG I have personally reviewed imaging studies showing: Other Studies:  XR ACUTE ABD SERIES CHEST 1 VW   Final Result   Nonspecific, nonobstructed bowel gas pattern. No acute intrathoracic process.           Current Meds:  Current Facility-Administered Medications   Medication Dose Route Frequency    pantoprazole (PROTONIX) 40 mg in sodium chloride (PF) 0.9 % 10 mL injection  40 mg IntraVENous Daily    sodium chloride flush 0.9 % injection 5-40 mL  5-40 mL IntraVENous 2 times per day    sodium chloride flush 0.9 % injection 5-40 mL  5-40 mL IntraVENous PRN    0.9 % sodium chloride infusion   IntraVENous PRN    ondansetron (ZOFRAN-ODT) disintegrating tablet 4 mg  4 mg Oral Q8H PRN    Or    ondansetron (ZOFRAN) injection 4 mg  4 mg IntraVENous Q6H PRN    polyethylene glycol (GLYCOLAX) packet 17 g  17 g Oral Daily PRN    acetaminophen (TYLENOL) tablet 650 mg  650 mg Oral Q6H PRN    Or    acetaminophen (TYLENOL) suppository 650 mg  650 mg Rectal Q6H PRN    0.9 % sodium chloride infusion   IntraVENous Continuous    prochlorperazine (COMPAZINE) injection 10 mg  10 mg IntraVENous Q6H PRN    potassium chloride (KLOR-CON M) extended release tablet 40 mEq  40 mEq Oral BID WC     Current Outpatient Medications   Medication Sig    pantoprazole (PROTONIX) 40 MG tablet Take 40 mg by mouth daily       Signed:  NANCY Fonseca - GAVIN

## 2022-12-03 NOTE — H&P
Hospitalist History and Physical   Admit Date:  12/3/2022  3:12 AM   Name:  Glenn Angeles   Age:  41 y.o. Sex:  male  :  1975   MRN:  [id number]     Presenting Complaint: N/V  Reason(s) for Admission: Lactic acidosis [E87.20]     History of Present Illness:   Glenn Angeles is a 41 y.o. male who presented to ED with intractable N/V. Patient reports symptoms started ~5 days ago. Does have h/o cyclical vomiting syndrome as he does use marijuana. Admits to recent usage last week at [facility]. Denies chest pain. Upon ER evaluation, Lactic acid is 3.9.  K is 2.9. Na is 132. Cr 2.0. Hospitalist asked to admit. Review of Systems:  10 systems reviewed and negative except as noted in HPI. Assessment & Plan:   LORNA (acute kidney injury) ([facility]  Assessment & Plan  - Cr up to 2.0 from baseline ~1  - IVFs  - Recheck after rehydration    Hypokalemia  Assessment & Plan  - K is 2.9  - Replace  - Recheck  - Electrolyte replacement protocol ordered    Cannabis hyperemesis syndrome concurrent with and due to cannabis abuse ([address]  Assessment & Plan  - Noted prior history of this  - Zofran, compazine PRN  - May need hot shower as well for symptom control    * Lactic acidosis  Assessment & Plan  - Lactic acid of 3.9  - Likely from intractable vomiting  - IVFs  - Trend until <2    Hyponatremia  Assessment & Plan  - Mild at 132  - NS IVFs  - Recheck in AM    Hypertension  Assessment & Plan  - Does not appear to be on home meds  - PRN hydralazine    Polysubstance abuse ([address]  Assessment & Plan  - Of note      Disposition: inpatient      Past medical history reviewed.     Past Medical History:   Diagnosis Date    LORNA (acute kidney injury) ([address] 2014    LORNA (acute kidney injury) ([address] 2022    Clostridium difficile colitis 3/20/2011    Gastrointestinal disorder SINCE     Gastroparesis 2005    emptying study normal -2006    GERD (gastroesophageal reflux disease)     PUD (peptic ulcer disease) ALL DX IN 2008    ESOPHAGITIS, + H PYLORI    Uncontrolled hypertension 6/26/2018     Past surgical history reviewed. Past Surgical History:   Procedure Laterality Date    COLONOSCOPY  4/08    ESOPHAGITIS, COLONIC ULCER X1    UPPER GASTROINTESTINAL ENDOSCOPY  4/08    H. HERNIA, ULCER X2, H PYLORI,    UPPER GASTROINTESTINAL ENDOSCOPY  2011    h pylori -neg    WISDOM TOOTH EXTRACTION  2/10/11      Allergies   Allergen Reactions    Aspirin Other (See Comments)     ulcers    Ibuprofen Other (See Comments)     Hx of ulcers    Hydrocodone Rash     Tolerates hydromorphone, morphine, tramadol    Penicillins Nausea Only and Rash    Promethazine Nausea And Vomiting and Other (See Comments)      Social History     Tobacco Use    Smoking status: Every Day     Packs/day: 0.25     Types: Cigarettes    Smokeless tobacco: Never   Substance Use Topics    Alcohol use: No      Family History   Problem Relation Age of Onset    Other Mother         L+W    Sickle Cell Anemia Father     Other Sister         ALL L+W    Diabetes Maternal Grandmother     Heart Disease Paternal Grandfather     Other Son         L+W      Family history reviewed and noncontributory to patient's acute condition; no relevant family history unless otherwise noted above.   Immunization History   Administered Date(s) Administered    Tdap (Boostrix, Adacel) 04/21/2012     PTA Medications:  Current Outpatient Medications   Medication Instructions    pantoprazole (PROTONIX) 40 mg, Oral, DAILY       Objective:   Patient Vitals for the past 24 hrs:   Temp Pulse Resp BP SpO2   12/03/22 0710 -- (!) 110 14 (!) 144/132 96 %   12/03/22 0705 -- (!) 130 (!) 33 -- 96 %   12/03/22 0655 -- (!) 105 14 -- 94 %   12/03/22 0645 -- (!) 101 23 -- 94 %   12/03/22 0635 -- (!) 114 22 -- 95 %   12/03/22 0625 -- 100 19 -- 94 %   12/03/22 0615 -- (!) 104 12 -- 95 %   12/03/22 0545 -- -- -- (!) 156/125 97 %   12/03/22 0304 97.6 °F (36.4 °C) (!) 149 18 (!) 135/103 96 % Estimated body mass index is 26.64 kg/m² as calculated from the following:    Height as of this encounter: 5' 1\" (1.549 m). Weight as of this encounter: 141 lb (64 kg). No intake or output data in the 24 hours ending 12/03/22 0752      Physical Exam:  General:    Well nourished. No overt distress  Head:  Normocephalic, atraumatic  Eyes:  Sclerae appear normal.  Pupils equally round. HENT:  Nares appear normal, no drainage. Moist mucous membranes  Neck:  No restricted ROM. Trachea midline  CV:   RRR. S1/S2 auscultated  Lungs:   CTAB. No wheezing, rhonchi, or rales. Appears even, unlabored  Abdomen: Bowel sounds present. Soft, nontender, nondistended. Extremities: Warm and dry. No cyanosis or clubbing. No edema. Skin:     No rashes. Normal turgor. Normal coloration  Neuro:  Cranial nerves II-XII grossly intact. Sensation intact  Psych:  Normal mood and affect.   Alert and oriented x3    Data Ordered and Personally Reviewed:    Last 24hr Labs:  Recent Results (from the past 24 hour(s))   CMP    Collection Time: 12/03/22  3:41 AM   Result Value Ref Range    Sodium 132 (L) 133 - 143 mmol/L    Potassium 2.9 (L) 3.5 - 5.1 mmol/L    Chloride 101 101 - 110 mmol/L    CO2 20 (L) 21 - 32 mmol/L    Anion Gap 11 2 - 11 mmol/L    Glucose 159 (H) 65 - 100 mg/dL    BUN 38 (H) 6 - 23 MG/DL    Creatinine 2.00 (H) 0.8 - 1.5 MG/DL    Est, Glom Filt Rate 41 (L) >60 ml/min/1.73m2    Calcium 11.1 (H) 8.3 - 10.4 MG/DL    Total Bilirubin 1.4 (H) 0.2 - 1.1 MG/DL    ALT 33 12 - 65 U/L    AST 35 15 - 37 U/L    Alk Phosphatase 121 50 - 136 U/L    Total Protein 9.5 (H) 6.3 - 8.2 g/dL    Albumin 5.2 (H) 3.5 - 5.0 g/dL    Globulin 4.3 2.8 - 4.5 g/dL    Albumin/Globulin Ratio 1.2 0.4 - 1.6     Lipase    Collection Time: 12/03/22  3:41 AM   Result Value Ref Range    Lipase 119 73 - 393 U/L   Lactate, Sepsis    Collection Time: 12/03/22  3:41 AM   Result Value Ref Range    Lactic Acid, Sepsis 3.9 (HH) 0.4 - 2.0 MMOL/L CBC with Auto Differential    Collection Time: 12/03/22  3:41 AM   Result Value Ref Range    WBC 13.0 (H) 4.3 - 11.1 K/uL    RBC 6.33 (H) 4.23 - 5.6 M/uL    Hemoglobin 18.6 (H) 13.6 - 17.2 g/dL    Hematocrit 49.8 41.1 - 50.3 %    MCV 78.7 (L) 82 - 102 FL    MCH 29.4 26.1 - 32.9 PG    MCHC 37.3 (H) 31.4 - 35.0 g/dL    RDW 12.8 11.9 - 14.6 %    Platelets 688 019 - 936 K/uL    MPV 9.9 9.4 - 12.3 FL    nRBC 0.00 0.0 - 0.2 K/uL    Differential Type AUTOMATED      Seg Neutrophils 70 43 - 78 %    Lymphocytes 16 13 - 44 %    Monocytes 13 (H) 4.0 - 12.0 %    Eosinophils % 0 (L) 0.5 - 7.8 %    Basophils 0 0.0 - 2.0 %    Immature Granulocytes 1 0.0 - 5.0 %    Segs Absolute 9.2 (H) 1.7 - 8.2 K/UL    Absolute Lymph # 2.1 0.5 - 4.6 K/UL    Absolute Mono # 1.7 (H) 0.1 - 1.3 K/UL    Absolute Eos # 0.0 0.0 - 0.8 K/UL    Basophils Absolute 0.0 0.0 - 0.2 K/UL    Absolute Immature Granulocyte 0.1 0.0 - 0.5 K/UL   EKG 12 Lead    Collection Time: 12/03/22  6:17 AM   Result Value Ref Range    Ventricular Rate 104 BPM    Atrial Rate 104 BPM    P-R Interval 153 ms    QRS Duration 71 ms    Q-T Interval 327 ms    QTc Calculation (Bazett) 431 ms    P Axis 68 degrees    R Axis 48 degrees    T Axis 11 degrees    Diagnosis Sinus tachycardia        Signed:  Li Alejandro MD

## 2022-12-03 NOTE — ED TRIAGE NOTES
Pt c/o n/v since Monday, has hx of cyclical vomiting related to marijuana usage, last use was on Thanksgiving; reports using PO zofran at home without relief

## 2022-12-03 NOTE — ED PROVIDER NOTES
Emergency Department Provider Note                   PCP:                Magnus Duffy MD               Age: 41 y.o. Sex: male       ICD-10-CM    1. Cannabinoid hyperemesis syndrome  R11.2     F12.90       2. Acute kidney injury ([facility] [address]  N17.9       3. Hypokalemia  E87.6           DISPOSITION Admitted 12/03/2022 06:33:30 AM        MDM  Number of Diagnoses or Management Options  Acute kidney injury ([facility] [address]): new, needed workup  Cannabinoid hyperemesis syndrome: new, needed workup  Hypokalemia: new, needed workup  Diagnosis management comments: 29-year-old male presents with vomiting after smoking marijuana. CBC shows mild leukocytosis likely related to vomiting. Metabolic panel shows creatinine of 2.0 his baseline is around 1. He had hypokalemia with potassium of 2.9. He was given 2 L of LR and potassium replacement. He had an ab series which was negative for obstruction or perforation. He was admitted to the hospitalist service. Amount and/or Complexity of Data Reviewed  Clinical lab tests: ordered and reviewed  Tests in the radiology section of CPT®: ordered and reviewed    Risk of Complications, Morbidity, and/or Mortality  Presenting problems: moderate  Diagnostic procedures: moderate  Management options: moderate    Patient Progress  Patient progress: stable       ED Course as of 12/03/22 0635   Sat Dec 03, 2022   0401 Acute ab series negative. [CJ]   0500 CBC shows WBS 13.0, likely secondary to vomiting. Awaiting metabolic panel. [CJ]   1563 Metabolic panel shows hypokalemia, creatinine of 2.0, baseline around 1. Spoke with nursing staff to move patient from lobby to bed so he can be admitted to the hospitalist service. [CJ]   0603 Presenting 3 to 4 days after he smoked marijuana. He states that he has had intractable vomiting. He attempted p.o. Zofran at home. CBC shows white blood cell count of 13.0. CMP shows hypokalemia, 2.9, creatinine of 2.0, baseline around 1.0.   He has had Zofran and Reglan and 2 L of fluid in the emergency department continues to vomit. [CJ]   6100 Discussed admission with patient due to continued vomiting and LORNA with hypokalemia. Patient agreeable. Hospitalist stephanie. [CJ]      ED Course User Index  [CJ] David Lemos, APRN - CNP        Orders Placed This Encounter   Procedures    XR ACUTE ABD SERIES CHEST 1 VW    CMP    Lipase    Lactate, Sepsis    Urinalysis w rflx microscopic    CBC with Auto Differential    EKG 12 Lead    ADMIT TO INPATIENT        Medications   lactated ringers bolus (1,000 mLs IntraVENous New Bag 12/3/22 0456)   lactated ringers bolus (1,000 mLs IntraVENous New Bag 12/3/22 0605)   potassium chloride 10 mEq/100 mL IVPB (Peripheral Line) (10 mEq IntraVENous New Bag 12/3/22 0605)   ondansetron (ZOFRAN) injection 4 mg (4 mg IntraVENous Given 12/3/22 0431)   metoclopramide (REGLAN) injection 10 mg (10 mg IntraVENous Given 12/3/22 0554)   diphenhydrAMINE (BENADRYL) injection 25 mg (25 mg IntraVENous Given 12/3/22 0554)       New Prescriptions    No medications on file        Gloris Dance is a 41 y.o. male who presents to the Emergency Department with chief complaint of    Chief Complaint   Patient presents with    Emesis      71-year-old male with past medical history of GERD presents for 5 days of vomiting. States that he has a history of cyclical vomiting related to marijuana use. States that he smoked marijuana on Thanksgiving and the vomiting started shortly thereafter. Reports that he does have decreased urine output. He denies fevers, chills, diarrhea. The history is provided by the patient.    Nausea & Vomiting  Severity:  Severe  Duration:  5 days  Timing:  Constant  Able to tolerate:  Liquids  Progression:  Worsening  Chronicity:  New  Recent urination:  Decreased  Relieved by:  Nothing  Worsened by:  Nothing  Ineffective treatments:  None tried  Associated symptoms: no abdominal pain, no arthralgias, no chills, no cough, no diarrhea, no fever, no headaches, no myalgias and no sore throat    Risk factors: no alcohol use, no diabetes, not pregnant, no prior abdominal surgery, no sick contacts, no suspect food intake and no travel to endemic areas        Review of Systems   Constitutional:  Negative for chills and fever. HENT:  Negative for congestion, dental problem, sinus pressure, sinus pain and sore throat. Respiratory:  Negative for cough, chest tightness, shortness of breath and wheezing. Cardiovascular:  Negative for chest pain and palpitations. Gastrointestinal:  Positive for nausea and vomiting. Negative for abdominal pain and diarrhea. Genitourinary:  Negative for decreased urine volume, difficulty urinating and urgency. Musculoskeletal:  Negative for arthralgias and myalgias. Skin:  Negative for color change, pallor, rash and wound. Neurological:  Negative for headaches. Hematological:  Negative for adenopathy. Does not bruise/bleed easily. Psychiatric/Behavioral:  Negative for agitation, behavioral problems and confusion. All other systems reviewed and are negative. Past Medical History:   Diagnosis Date    LORNA (acute kidney injury) ([facility] [address] 8/12/2014    LORNA (acute kidney injury) ([facility] [address] 7/23/2022    Clostridium difficile colitis 3/20/2011    Gastrointestinal disorder SINCE 1999    Gastroparesis 2005    emptying study normal -2006    GERD (gastroesophageal reflux disease)     PUD (peptic ulcer disease) ALL DX IN 2008    ESOPHAGITIS, + H PYLORI    Uncontrolled hypertension 6/26/2018        Past Surgical History:   Procedure Laterality Date    COLONOSCOPY  4/08    ESOPHAGITIS, COLONIC ULCER X1    UPPER GASTROINTESTINAL ENDOSCOPY  4/08    H.  HERNIA, ULCER X2, H PYLORI,    UPPER GASTROINTESTINAL ENDOSCOPY  2011    h pylori -neg    WISDOM TOOTH EXTRACTION  2/10/11        Family History   Problem Relation Age of Onset    Other Mother         L+W    Sickle Cell Anemia Father     Other Sister         ALL L+W    Diabetes Maternal Grandmother     Heart Disease Paternal Grandfather     Other Son         L+W        Social History     Socioeconomic History    Marital status:    Tobacco Use    Smoking status: Every Day     Packs/day: 0.25     Types: Cigarettes    Smokeless tobacco: Never   Substance and Sexual Activity    Alcohol use: No    Drug use: Yes     Types: Marijuana [place])   Social History Narrative    3/17/11:  PATIENT LIVES WITH GIRLFRIEND, SHIVANI (CELL: [phone]), HER 3YEAR OLD DAUGHTER AND THEIR 3YEAR OLD SON. SHIVANI IS CURRENTLY 13 WEEKS PREGNANT. PATIENT'S JOB AT [employer] WAS DOWNSIZED ABOUT A YEAR AGO, AND HE HAS          Aspirin, Ibuprofen, Hydrocodone, Penicillins, and Promethazine     Previous Medications    PANTOPRAZOLE (PROTONIX) 40 MG TABLET    Take 40 mg by mouth daily        Vitals signs and nursing note reviewed. Patient Vitals for the past 4 hrs:   Temp Pulse Resp BP SpO2   12/03/22 0615 -- (!) 104 12 -- 95 %   12/03/22 0545 -- -- -- (!) 156/125 97 %   12/03/22 0304 97.6 °F (36.4 °C) (!) 149 18 (!) 135/103 96 %          Physical Exam  Vitals and nursing note reviewed. Constitutional:       General: He is not in acute distress. Appearance: Normal appearance. He is normal weight. He is ill-appearing. He is not toxic-appearing. HENT:      Head: Normocephalic and atraumatic. Right Ear: External ear normal.      Left Ear: External ear normal.      Nose: No congestion or rhinorrhea. Mouth/Throat:      Mouth: Mucous membranes are dry. Pharynx: No oropharyngeal exudate or posterior oropharyngeal erythema. Eyes:      Extraocular Movements: Extraocular movements intact. Pupils: Pupils are equal, round, and reactive to light. Neck:      Vascular: No carotid bruit. Cardiovascular:      Rate and Rhythm: Regular rhythm. Tachycardia present. Pulses: Normal pulses. Heart sounds: Normal heart sounds. No murmur heard. No friction rub.  No gallop. Pulmonary:      Effort: Pulmonary effort is normal. No respiratory distress. Breath sounds: No stridor. No wheezing or rhonchi. Abdominal:      General: Abdomen is flat. Bowel sounds are normal. There is no distension. Palpations: Abdomen is soft. Tenderness: There is no abdominal tenderness. Musculoskeletal:         General: No swelling, tenderness, deformity or signs of injury. Normal range of motion. Cervical back: Normal range of motion. No rigidity or tenderness. Right lower leg: No edema. Left lower leg: No edema. Lymphadenopathy:      Cervical: No cervical adenopathy. Skin:     General: Skin is warm. Capillary Refill: Capillary refill takes less than 2 seconds. Coloration: Skin is not jaundiced or pale. Findings: No bruising, erythema, lesion or rash. Neurological:      General: No focal deficit present. Mental Status: He is alert and oriented to person, place, and time. Cranial Nerves: No cranial nerve deficit. Sensory: No sensory deficit. Motor: No weakness. Coordination: Coordination normal.      Gait: Gait normal.      Deep Tendon Reflexes: Reflexes normal.   Psychiatric:         Mood and Affect: Mood normal.         Behavior: Behavior normal.         Thought Content: Thought content normal.         Judgment: Judgment normal.        Procedures    Results for orders placed or performed during the hospital encounter of 12/03/22   XR ACUTE ABD SERIES CHEST 1 VW    Narrative    EXAMINATION: Acute Abdominal Series. HISTORY: Vomiting    TECHNIQUE: Supine and upright views of the abdomen. Single frontal view of the  chest.    COMPARISON: 7/23/2022    FINDINGS:   There is a nonspecific, nonobstructed bowel gas pattern. No definitive evidence  of free air. The cardiac silhouette mediastinum and pulmonary vasculature are within normal  limits.     The lungs are clear and there is no pleural effusion or pneumothorax. Osseous structures and soft tissues appear within normal limits. Impression    Nonspecific, nonobstructed bowel gas pattern. No acute intrathoracic process.    CMP   Result Value Ref Range    Sodium 132 (L) 133 - 143 mmol/L    Potassium 2.9 (L) 3.5 - 5.1 mmol/L    Chloride 101 101 - 110 mmol/L    CO2 20 (L) 21 - 32 mmol/L    Anion Gap 11 2 - 11 mmol/L    Glucose 159 (H) 65 - 100 mg/dL    BUN 38 (H) 6 - 23 MG/DL    Creatinine 2.00 (H) 0.8 - 1.5 MG/DL    Est, Glom Filt Rate 41 (L) >60 ml/min/1.73m2    Calcium 11.1 (H) 8.3 - 10.4 MG/DL    Total Bilirubin 1.4 (H) 0.2 - 1.1 MG/DL    ALT 33 12 - 65 U/L    AST 35 15 - 37 U/L    Alk Phosphatase 121 50 - 136 U/L    Total Protein 9.5 (H) 6.3 - 8.2 g/dL    Albumin 5.2 (H) 3.5 - 5.0 g/dL    Globulin 4.3 2.8 - 4.5 g/dL    Albumin/Globulin Ratio 1.2 0.4 - 1.6     Lipase   Result Value Ref Range    Lipase 119 73 - 393 U/L   Lactate, Sepsis   Result Value Ref Range    Lactic Acid, Sepsis 3.9 (HH) 0.4 - 2.0 MMOL/L   CBC with Auto Differential   Result Value Ref Range    WBC 13.0 (H) 4.3 - 11.1 K/uL    RBC 6.33 (H) 4.23 - 5.6 M/uL    Hemoglobin 18.6 (H) 13.6 - 17.2 g/dL    Hematocrit 49.8 41.1 - 50.3 %    MCV 78.7 (L) 82 - 102 FL    MCH 29.4 26.1 - 32.9 PG    MCHC 37.3 (H) 31.4 - 35.0 g/dL    RDW 12.8 11.9 - 14.6 %    Platelets 853 912 - 612 K/uL    MPV 9.9 9.4 - 12.3 FL    nRBC 0.00 0.0 - 0.2 K/uL    Differential Type AUTOMATED      Seg Neutrophils 70 43 - 78 %    Lymphocytes 16 13 - 44 %    Monocytes 13 (H) 4.0 - 12.0 %    Eosinophils % 0 (L) 0.5 - 7.8 %    Basophils 0 0.0 - 2.0 %    Immature Granulocytes 1 0.0 - 5.0 %    Segs Absolute 9.2 (H) 1.7 - 8.2 K/UL    Absolute Lymph # 2.1 0.5 - 4.6 K/UL    Absolute Mono # 1.7 (H) 0.1 - 1.3 K/UL    Absolute Eos # 0.0 0.0 - 0.8 K/UL    Basophils Absolute 0.0 0.0 - 0.2 K/UL    Absolute Immature Granulocyte 0.1 0.0 - 0.5 K/UL        XR ACUTE ABD SERIES CHEST 1 VW   Final Result   Nonspecific, nonobstructed bowel gas pattern. No acute intrathoracic process.                   ===================================  ED EKG Interpretation  EKG was interpreted in the absence of a cardiologist.    Rate: 99  EKG Interpretation: EKG Interpretation: sinus rhythm  ST Segments: Normal ST segments - NO STEMI    NANCY Morfin CNP 6:35 AM         Voice dictation software was used during the making of this note. This software is not perfect and grammatical and other typographical errors may be present. This note has not been completely proofread for errors.      NANCY Delgado CNP  12/03/22 5643

## 2022-12-03 NOTE — PROGRESS NOTES
TRANSFER - IN REPORT:    Verbal report received from [facility], [address] [facility] on [address]  being received from ED OF for routine progression of patient care      Report consisted of patient's Situation, Background, Assessment and   Recommendations(SBAR). Information from the following report(s) Nurse Handoff Report was reviewed with the receiving nurse. Opportunity for questions and clarification was provided. Assessment completed upon patient's arrival to unit and care assumed.

## 2022-12-03 NOTE — ED NOTES
TRANSFER - OUT REPORT:    Verbal report given to [address] [facility] [place] on [address]  being transferred to ER OF for routine progression of patient care       Report consisted of patient's Situation, Background, Assessment and   Recommendations(SBAR). Information from the following report(s) Nurse Handoff Report was reviewed with the receiving nurse. [place] Assessment: Presents to emergency department  because of falls (Syncope, seizure, or loss of consciousness): No, Age > 79: No, Altered Mental Status, Intoxication with alcohol or substance confusion (Disorientation, impaired judgment, poor safety awaremess, or inability to follow instructions): No, Impaired Mobility: Ambulates or transfers with assistive devices or assistance; Unable to ambulate or transer.: No, Nursing Judgement: No  Lines:   Peripheral IV 12/03/22 Right Antecubital (Active)        Opportunity for questions and clarification was provided.       Patient transported with:  Yareli Pitts RN  12/03/22 4969

## 2022-12-04 LAB
ALBUMIN SERPL-MCNC: 3.5 G/DL (ref 3.5–5)
ALBUMIN/GLOB SERPL: 1.1 {RATIO} (ref 0.4–1.6)
ALP SERPL-CCNC: 86 U/L (ref 50–136)
ALT SERPL-CCNC: 33 U/L (ref 12–65)
ANION GAP SERPL CALC-SCNC: 8 MMOL/L (ref 2–11)
AST SERPL-CCNC: 46 U/L (ref 15–37)
BASOPHILS # BLD: 0 K/UL (ref 0–0.2)
BASOPHILS NFR BLD: 0 % (ref 0–2)
BILIRUB SERPL-MCNC: 0.6 MG/DL (ref 0.2–1.1)
BUN SERPL-MCNC: 18 MG/DL (ref 6–23)
CALCIUM SERPL-MCNC: 8.9 MG/DL (ref 8.3–10.4)
CHLORIDE SERPL-SCNC: 108 MMOL/L (ref 101–110)
CO2 SERPL-SCNC: 22 MMOL/L (ref 21–32)
CREAT SERPL-MCNC: 1 MG/DL (ref 0.8–1.5)
DIFFERENTIAL METHOD BLD: ABNORMAL
EOSINOPHIL # BLD: 0 K/UL (ref 0–0.8)
EOSINOPHIL NFR BLD: 0 % (ref 0.5–7.8)
ERYTHROCYTE [DISTWIDTH] IN BLOOD BY AUTOMATED COUNT: 12.6 % (ref 11.9–14.6)
GLOBULIN SER CALC-MCNC: 3.3 G/DL (ref 2.8–4.5)
GLUCOSE SERPL-MCNC: 114 MG/DL (ref 65–100)
HCT VFR BLD AUTO: 41.4 % (ref 41.1–50.3)
HGB BLD-MCNC: 15.1 G/DL (ref 13.6–17.2)
IMM GRANULOCYTES # BLD AUTO: 0 K/UL (ref 0–0.5)
IMM GRANULOCYTES NFR BLD AUTO: 0 % (ref 0–5)
LYMPHOCYTES # BLD: 1.6 K/UL (ref 0.5–4.6)
LYMPHOCYTES NFR BLD: 16 % (ref 13–44)
MCH RBC QN AUTO: 29.8 PG (ref 26.1–32.9)
MCHC RBC AUTO-ENTMCNC: 36.5 G/DL (ref 31.4–35)
MCV RBC AUTO: 81.8 FL (ref 82–102)
MONOCYTES # BLD: 0.9 K/UL (ref 0.1–1.3)
MONOCYTES NFR BLD: 9 % (ref 4–12)
NEUTS SEG # BLD: 7.9 K/UL (ref 1.7–8.2)
NEUTS SEG NFR BLD: 75 % (ref 43–78)
NRBC # BLD: 0 K/UL (ref 0–0.2)
PLATELET # BLD AUTO: 282 K/UL (ref 150–450)
PMV BLD AUTO: 9.4 FL (ref 9.4–12.3)
POTASSIUM SERPL-SCNC: 3.6 MMOL/L (ref 3.5–5.1)
PROT SERPL-MCNC: 6.8 G/DL (ref 6.3–8.2)
RBC # BLD AUTO: 5.06 M/UL (ref 4.23–5.6)
SODIUM SERPL-SCNC: 138 MMOL/L (ref 133–143)
WBC # BLD AUTO: 10.5 K/UL (ref 4.3–11.1)

## 2022-12-04 PROCEDURE — 80053 COMPREHEN METABOLIC PANEL: CPT

## 2022-12-04 PROCEDURE — 85025 COMPLETE CBC W/AUTO DIFF WBC: CPT

## 2022-12-04 PROCEDURE — C9113 INJ PANTOPRAZOLE SODIUM, VIA: HCPCS | Performed by: FAMILY MEDICINE

## 2022-12-04 PROCEDURE — 6370000000 HC RX 637 (ALT 250 FOR IP): Performed by: HOSPITALIST

## 2022-12-04 PROCEDURE — A4216 STERILE WATER/SALINE, 10 ML: HCPCS | Performed by: FAMILY MEDICINE

## 2022-12-04 PROCEDURE — 2580000003 HC RX 258: Performed by: FAMILY MEDICINE

## 2022-12-04 PROCEDURE — 6360000002 HC RX W HCPCS: Performed by: FAMILY MEDICINE

## 2022-12-04 PROCEDURE — 1100000000 HC RM PRIVATE

## 2022-12-04 PROCEDURE — 36415 COLL VENOUS BLD VENIPUNCTURE: CPT

## 2022-12-04 RX ORDER — SENNA PLUS 8.6 MG/1
2 TABLET ORAL 2 TIMES DAILY PRN
Qty: 30 TABLET | Refills: 0 | Status: SHIPPED | OUTPATIENT
Start: 2022-12-04 | End: 2023-12-04

## 2022-12-04 RX ORDER — ONDANSETRON 4 MG/1
4 TABLET, FILM COATED ORAL 3 TIMES DAILY PRN
Qty: 30 TABLET | Refills: 0 | Status: SHIPPED | OUTPATIENT
Start: 2022-12-04

## 2022-12-04 RX ORDER — SENNA PLUS 8.6 MG/1
2 TABLET ORAL 2 TIMES DAILY
Status: DISCONTINUED | OUTPATIENT
Start: 2022-12-04 | End: 2022-12-05 | Stop reason: HOSPADM

## 2022-12-04 RX ADMIN — SODIUM CHLORIDE, PRESERVATIVE FREE 10 ML: 5 INJECTION INTRAVENOUS at 08:56

## 2022-12-04 RX ADMIN — PROCHLORPERAZINE EDISYLATE 10 MG: 5 INJECTION INTRAMUSCULAR; INTRAVENOUS at 12:23

## 2022-12-04 RX ADMIN — SENNOSIDES 17.2 MG: 8.6 TABLET, FILM COATED ORAL at 17:56

## 2022-12-04 RX ADMIN — SODIUM CHLORIDE, PRESERVATIVE FREE 10 ML: 5 INJECTION INTRAVENOUS at 21:36

## 2022-12-04 RX ADMIN — ONDANSETRON 4 MG: 2 INJECTION INTRAMUSCULAR; INTRAVENOUS at 23:01

## 2022-12-04 RX ADMIN — SENNOSIDES 17.2 MG: 8.6 TABLET, FILM COATED ORAL at 21:36

## 2022-12-04 RX ADMIN — SODIUM CHLORIDE 40 MG: 9 INJECTION, SOLUTION INTRAMUSCULAR; INTRAVENOUS; SUBCUTANEOUS at 08:54

## 2022-12-04 RX ADMIN — PROCHLORPERAZINE EDISYLATE 10 MG: 5 INJECTION INTRAMUSCULAR; INTRAVENOUS at 00:58

## 2022-12-04 RX ADMIN — SODIUM CHLORIDE: 9 INJECTION, SOLUTION INTRAVENOUS at 00:59

## 2022-12-04 RX ADMIN — PROCHLORPERAZINE EDISYLATE 10 MG: 5 INJECTION INTRAMUSCULAR; INTRAVENOUS at 21:34

## 2022-12-04 NOTE — PROGRESS NOTES
INTERNAL MEDICINE PROGRESS NOTE    Subjective:   Daily Progress Note: 2022 1:02 PM    Today, feels better. No acute events. Some vomiting persist    Meds:  Current Facility-Administered Medications   Medication Dose Route Frequency    sodium chloride flush 0.9 % injection 5-40 mL  5-40 mL IntraVENous 2 times per day    sodium chloride flush 0.9 % injection 5-40 mL  5-40 mL IntraVENous PRN    0.9 % sodium chloride infusion   IntraVENous PRN    ondansetron (ZOFRAN-ODT) disintegrating tablet 4 mg  4 mg Oral Q8H PRN    Or    ondansetron (ZOFRAN) injection 4 mg  4 mg IntraVENous Q6H PRN    polyethylene glycol (GLYCOLAX) packet 17 g  17 g Oral Daily PRN    acetaminophen (TYLENOL) tablet 650 mg  650 mg Oral Q6H PRN    Or    acetaminophen (TYLENOL) suppository 650 mg  650 mg Rectal Q6H PRN    0.9 % sodium chloride infusion   IntraVENous Continuous    prochlorperazine (COMPAZINE) injection 10 mg  10 mg IntraVENous Q6H PRN         Objective:   Vitals:  BP (!) 148/107 Comment: Primary RN notified  Pulse 69   Temp 97.7 °F (36.5 °C) (Oral)   Resp 18   Ht 5' 1\" (1.549 m)   Wt 141 lb (64 kg)   SpO2 94%   BMI 26.64 kg/m²       Temp (24hrs), Av.6 °F (37 °C), Min:97.7 °F (36.5 °C), Max:99.5 °F (37.5 °C)      I/O:  701 - 1900  In: -   Out: 4981 [Urine:550]  1901 -  07  In: 0792 [I.V.:3026]  Out: 850 [Urine:250]    Exam:  General appearance: awake, alert, cooperative, moderate distress, appears stated age  Head: Normocephalic, without obvious abnormality, atraumatic  Back: symmetric, no curvature. ROM normal. No CVA tenderness. Lungs: clear to auscultation bilaterally   Heart: regular rate and rhythm, S1, S2 normal, no murmur, click, rub or gallop. Abdomen: soft, no tenderness, no distension, normal bowel sound, no masses, no organomegaly  Extremities: atraumatic, no cyanosis - Bilateral lower limbs edema none. Skin: No rashes or ulceration.   Neurologic: Grossly intact -    Data Review (Labs):  Recent Labs     12/03/22  0341 12/04/22  0708   WBC 13.0* 10.5   HGB 18.6* 15.1   HCT 49.8 41.4    282     Recent Labs     12/03/22  0341 12/04/22  0708   * 138   K 2.9* 3.6    108   CO2 20* 22   BUN 38* 18   MG 2.0  --        Assessment/Plan:     Acute kidney injury, prerenal, resolved w/ IVF  Hypokalemia, replaced, resolved  Cannabis hyperemesis syndrome concurrent with and due to cannabis abuse, improved  Lactic acidosis, resolved  Hyponatremia, hypovolemic, resolved  Hypertension, controlled    Dispo; home tomorrow    Signed By: Kiera Boucher MD     December 4, 2022

## 2022-12-04 NOTE — PROGRESS NOTES
END OF SHIFT NOTE:    INTAKE/OUTPUT  12/03 0701 - 12/04 0700  In: 2154 [I.V.:3026]  Out: 850 [Urine:250]  Voiding: Yes  Catheter: No  Drain:              Flatus: Patient does have flatus present. Stool: 0 occurrences. Characteristics:           Stool Assessment  Last BM (including prior to admit): 12/03/22 (per patient)    Emesis:  occurrences. Characteristics:   Emesis Appearance: Undigested food    VITAL SIGNS  Patient Vitals for the past 12 hrs:   Temp Pulse Resp BP SpO2   12/04/22 1523 99 °F (37.2 °C) 70 17 103/71 91 %   12/04/22 1204 97.7 °F (36.5 °C) 69 18 (!) 148/107 94 %   12/04/22 0710 98.5 °F (36.9 °C) 77 18 115/88 93 %       Pain Assessment  Pain Level: 5 (12/03/22 0304)          Ambulating  Yes, ambulating halls    Shift report given to oncoming nurse at the bedside. Bonita Simpson

## 2022-12-04 NOTE — CARE COORDINATION
CM met with patient with spouse at bedside to complete initial assessment - patient was suppose to discharge home today but d/c cancelled due to patient vomiting. Patient states that he lives in a two story home with his spouse and his in laws and two children. Patient is completely independent with ADL's, still drives and is self employed. No DME's used. Demographics and PCP confirmed. No Hx of HH or STR. Patient discharged from the hospital in July. Colleague provided patient with resources for [facility]MICAL DEPENDENCY [facility]. Spouse states they followed up but could not afford the program as it was $1,300. Patient agreeable to getting more resources. CM provided a list of rehab/treatment centers that are free of charge. CM will continue to follow. Patient likely will discharge home tomorrow with no needs. 12/04/22 1203   Service Assessment   Patient Orientation Alert and Oriented   Cognition Alert   History Provided By Medical Record   Support Systems Spouse/Significant Other   Prior Functional Level Independent in ADLs/IADLs   Current Functional Level Independent in ADLs/IADLs   Can patient return to prior living arrangement Yes   Ability to make needs known: Good   Social/Functional History   Type of [address] Two [address]   Entrance Stairs - Number of Steps 5   Discharge Planning   Type of Residence House   Living Arrangements Spouse/Significant Other;Children;Family Members   Current Services Prior To Admission None   Potential Assistance Needed N/A   DME Ordered?  No   Type of Home Care Services None   Patient expects to be discharged to: [address] Discharge   Services [facility]

## 2022-12-04 NOTE — CARE COORDINATION
CM reviewed patient's chart. Patient recently here in July. Previous CM provided patient with resources for [facility] use. Patient's plan, then was to follow up with [facility] CHEMICAL [facility].

## 2022-12-04 NOTE — DISCHARGE SUMMARY
[unfilled]    Physician Discharge Summary     Patient ID:  Tamika Canales  [id number]  41 y.o.  1975    Admit date: 12/3/2022    Discharge date: 12-5-2022    Diagnosis:  Acute kidney injury, prerenal, resolved w/ IVF  Hypokalemia, replaced, resolved  Cannabis hyperemesis syndrome concurrent with and due to cannabis abuse, improved  Lactic acidosis, resolved  Hyponatremia, hypovolemic, resolved  Hypertension, controlled    Hospital course:   41 y.o. male who presented to ED with intractable N/V. Patient reports symptoms started ~5 days ago. Does have h/o cyclical vomiting syndrome as he does use marijuana. Admits to recent usage last week at [facility]. Denies chest pain. Upon ER evaluation, Lactic acid is 3.9.  K is 2.9. Na is 132. Cr 2.0. Hospitalist asked to admit. Patient admitted and treated as above. On day of discharge, patient exam showed normal exam, pt able to hydrate himself despite mild vomiting episodes. PCP: Gunner Dove MD    Patient Instructions:   Current Discharge Medication List        START taking these medications    Details   senna (SENOKOT) 8.6 MG tablet Take 2 tablets by mouth 2 times daily as needed for Constipation  Qty: 30 tablet, Refills: 0      ondansetron (ZOFRAN) 4 MG tablet Take 1 tablet by mouth 3 times daily as needed for Nausea or Vomiting  Qty: 30 tablet, Refills: 0           CONTINUE these medications which have NOT CHANGED    Details   pantoprazole (PROTONIX) 40 MG tablet Take 40 mg by mouth daily           STOP taking these medications       sucralfate (CARAFATE) 1 GM/10ML suspension Comments:   Reason for Stopping:               Instructions:     Diet and activity as tolerated, adequate hydration, no recreational drugs  PCP in 1-2 weeks    Time 20 min  Please send copy to primary physician.     Signed:  Charu Stokes MD

## 2022-12-04 NOTE — PROGRESS NOTES
END OF SHIFT NOTE:    INTAKE/OUTPUT  12/03 0701 - 12/04 0700  In: -   Out: 850 [Urine:250]  Voiding: yes  Catheter: no  Drain:              Flatus: Patient does have flatus present. Stool:  0 occurrences. Characteristics:       Emesis: 3 occurrences. Characteristics:        VITAL SIGNS  Patient Vitals for the past 12 hrs:   Temp Pulse Resp BP SpO2   12/04/22 0306 98.8 °F (37.1 °C) (!) 102 18 (!) 145/110 97 %   12/03/22 2300 98.6 °F (37 °C) 91 18 134/85 93 %   12/03/22 1912 99.5 °F (37.5 °C) 87 18 111/84 97 %   12/03/22 1819 98.7 °F (37.1 °C) 88 20 (!) 158/105 --       Pain Assessment                Ambulating  Yes in room. Slept after being given Compazine earlier in shift. Shift report given to oncoming nurse at the bedside.     Abraham Hill RN

## 2022-12-05 VITALS
RESPIRATION RATE: 18 BRPM | HEIGHT: 61 IN | WEIGHT: 141 LBS | DIASTOLIC BLOOD PRESSURE: 77 MMHG | HEART RATE: 82 BPM | BODY MASS INDEX: 26.62 KG/M2 | OXYGEN SATURATION: 93 % | SYSTOLIC BLOOD PRESSURE: 117 MMHG | TEMPERATURE: 98.1 F

## 2022-12-05 PROCEDURE — 2580000003 HC RX 258: Performed by: FAMILY MEDICINE

## 2022-12-05 PROCEDURE — 2580000003 HC RX 258: Performed by: HOSPITALIST

## 2022-12-05 RX ORDER — ONDANSETRON 4 MG/1
4 TABLET, ORALLY DISINTEGRATING ORAL EVERY 8 HOURS PRN
Qty: 30 TABLET | Refills: 0 | Status: SHIPPED | OUTPATIENT
Start: 2022-12-05

## 2022-12-05 RX ADMIN — SODIUM CHLORIDE, PRESERVATIVE FREE 10 ML: 5 INJECTION INTRAVENOUS at 11:13

## 2022-12-05 RX ADMIN — SODIUM CHLORIDE: 9 INJECTION, SOLUTION INTRAVENOUS at 04:00

## 2022-12-05 NOTE — CARE COORDINATION
Pt with discharge orders this day. Pt to return home with spouse. Available resources provided to pt/spouse by CM. No additional CM needs at discharge. 12/04/22 1203   Service Assessment   Patient Orientation Alert and Oriented   Cognition Alert   History Provided By Medical Record   Primary Caregiver Self   Support Systems Spouse/Significant Other   PCP Verified by CM Yes   Prior Functional Level Independent in ADLs/IADLs   Current Functional Level Independent in ADLs/IADLs   Can patient return to prior living arrangement Yes   Ability to make needs known: Good   Family able to assist with home care needs: Yes   Social/Functional History   Type of [address] Two [address]   Entrance Stairs - Number of Steps 5   Discharge Planning   Type of Residence House   Living Arrangements Spouse/Significant Other;Children;Family Members   Current Services Prior To Admission None   Potential Assistance Needed N/A   DME Ordered?  No   Type of Home Care Services None   Patient expects to be discharged to: [address] Discharge   Services At/After Discharge Community Resources   Confirm Follow Up Transport Family   Condition of Participation: Discharge Planning   The Plan for Transition of Care is related to the following treatment goals: Home with spouse

## 2022-12-05 NOTE — PROGRESS NOTES
END OF SHIFT NOTE:    INTAKE/OUTPUT  12/04 0701 - 12/05 0700  In: 966 [I.V.:966]  Out: 1800 [Urine:550]  Voiding: yes  Catheter: no  Drain:              Flatus: Patient does have flatus present. Stool:  0 occurrences. Characteristics:       Emesis: 2 occurrences. Characteristics:   Emesis Assessment  Emesis Appearance: Undigested food    VITAL SIGNS  Patient Vitals for the past 12 hrs:   Temp Pulse Resp BP SpO2   12/05/22 0259 98.8 °F (37.1 °C) 69 18 105/81 95 %   12/04/22 2250 98.8 °F (37.1 °C) 85 18 (!) 157/105 97 %   12/04/22 1952 99.5 °F (37.5 °C) 73 20 116/84 95 %       Pain Assessment                Ambulating  Yes. Slept after Zofran given earlier in night. Shift report given to oncoming nurse at the bedside.     Daren Castillo RN

## 2022-12-05 NOTE — PROGRESS NOTES
DC instructions reviewed with patient. Pt verbalized understanding. Peripheral iv removed.  No other needs at this time

## 2023-03-22 ENCOUNTER — APPOINTMENT (OUTPATIENT)
Dept: CT IMAGING | Age: 48
DRG: 683 | End: 2023-03-22

## 2023-03-22 ENCOUNTER — HOSPITAL ENCOUNTER (INPATIENT)
Age: 48
LOS: 3 days | Discharge: HOME | DRG: 683 | End: 2023-03-25
Attending: EMERGENCY MEDICINE | Admitting: INTERNAL MEDICINE

## 2023-03-22 DIAGNOSIS — R11.2 INTRACTABLE NAUSEA AND VOMITING: ICD-10-CM

## 2023-03-22 DIAGNOSIS — R10.9 ABDOMINAL PAIN, UNSPECIFIED ABDOMINAL LOCATION: Primary | ICD-10-CM

## 2023-03-22 PROBLEM — D72.829 LEUKOCYTOSIS: Status: ACTIVE | Noted: 2022-03-27

## 2023-03-22 LAB
ALBUMIN SERPL-MCNC: 4.1 G/DL (ref 3.5–5)
ALBUMIN/GLOB SERPL: 1.1 (ref 0.4–1.6)
ALP SERPL-CCNC: 106 U/L (ref 50–136)
ALT SERPL-CCNC: 30 U/L (ref 12–65)
ANION GAP SERPL CALC-SCNC: 7 MMOL/L (ref 2–11)
APPEARANCE UR: ABNORMAL
AST SERPL-CCNC: 48 U/L (ref 15–37)
BACTERIA URNS QL MICRO: 0 /HPF
BASOPHILS # BLD: 0 K/UL (ref 0–0.2)
BASOPHILS NFR BLD: 0 % (ref 0–2)
BILIRUB SERPL-MCNC: 0.7 MG/DL (ref 0.2–1.1)
BILIRUB UR QL: NEGATIVE
BUN SERPL-MCNC: 43 MG/DL (ref 6–23)
CALCIUM SERPL-MCNC: 8.8 MG/DL (ref 8.3–10.4)
CASTS URNS QL MICRO: ABNORMAL /LPF
CHLORIDE SERPL-SCNC: 114 MMOL/L (ref 101–110)
CO2 SERPL-SCNC: 21 MMOL/L (ref 21–32)
COLOR UR: ABNORMAL
CREAT SERPL-MCNC: 2 MG/DL (ref 0.8–1.5)
DIFFERENTIAL METHOD BLD: ABNORMAL
EOSINOPHIL # BLD: 0 K/UL (ref 0–0.8)
EOSINOPHIL NFR BLD: 0 % (ref 0.5–7.8)
EPI CELLS #/AREA URNS HPF: ABNORMAL /HPF
ERYTHROCYTE [DISTWIDTH] IN BLOOD BY AUTOMATED COUNT: 13.1 % (ref 11.9–14.6)
GLOBULIN SER CALC-MCNC: 3.7 G/DL (ref 2.8–4.5)
GLUCOSE SERPL-MCNC: 105 MG/DL (ref 65–100)
GLUCOSE UR STRIP.AUTO-MCNC: NEGATIVE MG/DL
HCT VFR BLD AUTO: 42.7 % (ref 41.1–50.3)
HGB BLD-MCNC: 15.7 G/DL (ref 13.6–17.2)
HGB UR QL STRIP: ABNORMAL
IMM GRANULOCYTES # BLD AUTO: 0.1 K/UL (ref 0–0.5)
IMM GRANULOCYTES NFR BLD AUTO: 1 % (ref 0–5)
KETONES UR QL STRIP.AUTO: ABNORMAL MG/DL
LACTATE SERPL-SCNC: 1.5 MMOL/L (ref 0.4–2)
LACTATE SERPL-SCNC: 3.4 MMOL/L (ref 0.4–2)
LACTATE SERPL-SCNC: 4.9 MMOL/L (ref 0.4–2)
LEUKOCYTE ESTERASE UR QL STRIP.AUTO: NEGATIVE
LIPASE SERPL-CCNC: 60 U/L (ref 73–393)
LYMPHOCYTES # BLD: 1.8 K/UL (ref 0.5–4.6)
LYMPHOCYTES NFR BLD: 13 % (ref 13–44)
MAGNESIUM SERPL-MCNC: 2.3 MG/DL (ref 1.8–2.4)
MCH RBC QN AUTO: 29.8 PG (ref 26.1–32.9)
MCHC RBC AUTO-ENTMCNC: 36.8 G/DL (ref 31.4–35)
MCV RBC AUTO: 81 FL (ref 82–102)
MONOCYTES # BLD: 1.8 K/UL (ref 0.1–1.3)
MONOCYTES NFR BLD: 12 % (ref 4–12)
NEUTS SEG # BLD: 10.6 K/UL (ref 1.7–8.2)
NEUTS SEG NFR BLD: 74 % (ref 43–78)
NITRITE UR QL STRIP.AUTO: NEGATIVE
NRBC # BLD: 0 K/UL (ref 0–0.2)
PH UR STRIP: 5 (ref 5–9)
PLATELET # BLD AUTO: 330 K/UL (ref 150–450)
PMV BLD AUTO: 8.5 FL (ref 9.4–12.3)
POTASSIUM SERPL-SCNC: 4.2 MMOL/L (ref 3.5–5.1)
PROCALCITONIN SERPL-MCNC: 0.13 NG/ML (ref 0–0.49)
PROT SERPL-MCNC: 7.8 G/DL (ref 6.3–8.2)
PROT UR STRIP-MCNC: 100 MG/DL
RBC # BLD AUTO: 5.27 M/UL (ref 4.23–5.6)
RBC #/AREA URNS HPF: ABNORMAL /HPF
SODIUM SERPL-SCNC: 142 MMOL/L (ref 133–143)
SP GR UR REFRACTOMETRY: 1.02 (ref 1–1.02)
UROBILINOGEN UR QL STRIP.AUTO: 0.2 EU/DL (ref 0.2–1)
WBC # BLD AUTO: 14.2 K/UL (ref 4.3–11.1)
WBC URNS QL MICRO: ABNORMAL /HPF

## 2023-03-22 PROCEDURE — 81001 URINALYSIS AUTO W/SCOPE: CPT

## 2023-03-22 PROCEDURE — 83605 ASSAY OF LACTIC ACID: CPT

## 2023-03-22 PROCEDURE — 96361 HYDRATE IV INFUSION ADD-ON: CPT

## 2023-03-22 PROCEDURE — 6360000004 HC RX CONTRAST MEDICATION: Performed by: NURSE PRACTITIONER

## 2023-03-22 PROCEDURE — 1100000000 HC RM PRIVATE

## 2023-03-22 PROCEDURE — 71260 CT THORAX DX C+: CPT

## 2023-03-22 PROCEDURE — 2580000003 HC RX 258: Performed by: INTERNAL MEDICINE

## 2023-03-22 PROCEDURE — 96365 THER/PROPH/DIAG IV INF INIT: CPT

## 2023-03-22 PROCEDURE — 83735 ASSAY OF MAGNESIUM: CPT

## 2023-03-22 PROCEDURE — 96375 TX/PRO/DX INJ NEW DRUG ADDON: CPT

## 2023-03-22 PROCEDURE — 84145 PROCALCITONIN (PCT): CPT

## 2023-03-22 PROCEDURE — 6360000002 HC RX W HCPCS: Performed by: NURSE PRACTITIONER

## 2023-03-22 PROCEDURE — 80047 BASIC METABLC PNL IONIZED CA: CPT

## 2023-03-22 PROCEDURE — 6360000002 HC RX W HCPCS: Performed by: INTERNAL MEDICINE

## 2023-03-22 PROCEDURE — 96366 THER/PROPH/DIAG IV INF ADDON: CPT

## 2023-03-22 PROCEDURE — 87040 BLOOD CULTURE FOR BACTERIA: CPT

## 2023-03-22 PROCEDURE — 2580000003 HC RX 258: Performed by: NURSE PRACTITIONER

## 2023-03-22 PROCEDURE — 83690 ASSAY OF LIPASE: CPT

## 2023-03-22 PROCEDURE — 85025 COMPLETE CBC W/AUTO DIFF WBC: CPT

## 2023-03-22 PROCEDURE — 80053 COMPREHEN METABOLIC PANEL: CPT

## 2023-03-22 PROCEDURE — 99285 EMERGENCY DEPT VISIT HI MDM: CPT

## 2023-03-22 RX ORDER — SODIUM CHLORIDE 0.9 % (FLUSH) 0.9 %
10 SYRINGE (ML) INJECTION
Status: COMPLETED | OUTPATIENT
Start: 2023-03-22 | End: 2023-03-22

## 2023-03-22 RX ORDER — PROCHLORPERAZINE EDISYLATE 5 MG/ML
10 INJECTION INTRAMUSCULAR; INTRAVENOUS EVERY 6 HOURS PRN
Status: DISCONTINUED | OUTPATIENT
Start: 2023-03-22 | End: 2023-03-25 | Stop reason: HOSPADM

## 2023-03-22 RX ORDER — ENOXAPARIN SODIUM 100 MG/ML
40 INJECTION SUBCUTANEOUS DAILY
Status: DISCONTINUED | OUTPATIENT
Start: 2023-03-23 | End: 2023-03-22 | Stop reason: CLARIF

## 2023-03-22 RX ORDER — SODIUM CHLORIDE 0.9 % (FLUSH) 0.9 %
5-40 SYRINGE (ML) INJECTION PRN
Status: DISCONTINUED | OUTPATIENT
Start: 2023-03-22 | End: 2023-03-25 | Stop reason: HOSPADM

## 2023-03-22 RX ORDER — ACETAMINOPHEN 650 MG/1
650 SUPPOSITORY RECTAL EVERY 6 HOURS PRN
Status: DISCONTINUED | OUTPATIENT
Start: 2023-03-22 | End: 2023-03-25 | Stop reason: HOSPADM

## 2023-03-22 RX ORDER — METOCLOPRAMIDE HYDROCHLORIDE 5 MG/ML
10 INJECTION INTRAMUSCULAR; INTRAVENOUS EVERY 6 HOURS PRN
Status: DISCONTINUED | OUTPATIENT
Start: 2023-03-22 | End: 2023-03-25 | Stop reason: HOSPADM

## 2023-03-22 RX ORDER — MORPHINE SULFATE 4 MG/ML
4 INJECTION INTRAVENOUS ONCE
Status: DISCONTINUED | OUTPATIENT
Start: 2023-03-22 | End: 2023-03-22

## 2023-03-22 RX ORDER — POLYETHYLENE GLYCOL 3350 17 G/17G
17 POWDER, FOR SOLUTION ORAL DAILY PRN
Status: DISCONTINUED | OUTPATIENT
Start: 2023-03-22 | End: 2023-03-25 | Stop reason: HOSPADM

## 2023-03-22 RX ORDER — 0.9 % SODIUM CHLORIDE 0.9 %
1000 INTRAVENOUS SOLUTION INTRAVENOUS ONCE
Status: COMPLETED | OUTPATIENT
Start: 2023-03-22 | End: 2023-03-22

## 2023-03-22 RX ORDER — HEPARIN SODIUM 5000 [USP'U]/ML
5000 INJECTION, SOLUTION INTRAVENOUS; SUBCUTANEOUS 2 TIMES DAILY
Status: DISCONTINUED | OUTPATIENT
Start: 2023-03-23 | End: 2023-03-25 | Stop reason: HOSPADM

## 2023-03-22 RX ORDER — LORAZEPAM 2 MG/ML
0.5 INJECTION INTRAMUSCULAR EVERY 6 HOURS PRN
Status: DISCONTINUED | OUTPATIENT
Start: 2023-03-22 | End: 2023-03-25 | Stop reason: HOSPADM

## 2023-03-22 RX ORDER — ONDANSETRON 2 MG/ML
4 INJECTION INTRAMUSCULAR; INTRAVENOUS
Status: COMPLETED | OUTPATIENT
Start: 2023-03-22 | End: 2023-03-22

## 2023-03-22 RX ORDER — SODIUM CHLORIDE 9 MG/ML
INJECTION, SOLUTION INTRAVENOUS PRN
Status: DISCONTINUED | OUTPATIENT
Start: 2023-03-22 | End: 2023-03-25 | Stop reason: HOSPADM

## 2023-03-22 RX ORDER — ONDANSETRON 2 MG/ML
4 INJECTION INTRAMUSCULAR; INTRAVENOUS EVERY 6 HOURS PRN
Status: DISCONTINUED | OUTPATIENT
Start: 2023-03-22 | End: 2023-03-25 | Stop reason: HOSPADM

## 2023-03-22 RX ORDER — ACETAMINOPHEN 325 MG/1
650 TABLET ORAL EVERY 6 HOURS PRN
Status: DISCONTINUED | OUTPATIENT
Start: 2023-03-22 | End: 2023-03-25 | Stop reason: HOSPADM

## 2023-03-22 RX ORDER — SODIUM CHLORIDE 0.9 % (FLUSH) 0.9 %
5-40 SYRINGE (ML) INJECTION EVERY 12 HOURS SCHEDULED
Status: DISCONTINUED | OUTPATIENT
Start: 2023-03-22 | End: 2023-03-25 | Stop reason: HOSPADM

## 2023-03-22 RX ORDER — HALOPERIDOL 5 MG/ML
1 INJECTION INTRAMUSCULAR
Status: COMPLETED | OUTPATIENT
Start: 2023-03-22 | End: 2023-03-22

## 2023-03-22 RX ORDER — ONDANSETRON 8 MG/1
4 TABLET, ORALLY DISINTEGRATING ORAL EVERY 8 HOURS PRN
Status: DISCONTINUED | OUTPATIENT
Start: 2023-03-22 | End: 2023-03-25 | Stop reason: HOSPADM

## 2023-03-22 RX ORDER — 0.9 % SODIUM CHLORIDE 0.9 %
100 INTRAVENOUS SOLUTION INTRAVENOUS
Status: COMPLETED | OUTPATIENT
Start: 2023-03-22 | End: 2023-03-22

## 2023-03-22 RX ORDER — SODIUM CHLORIDE 9 MG/ML
INJECTION, SOLUTION INTRAVENOUS CONTINUOUS
Status: DISCONTINUED | OUTPATIENT
Start: 2023-03-22 | End: 2023-03-25

## 2023-03-22 RX ADMIN — SODIUM CHLORIDE, PRESERVATIVE FREE 10 ML: 5 INJECTION INTRAVENOUS at 21:08

## 2023-03-22 RX ADMIN — SODIUM CHLORIDE 1000 ML: 9 INJECTION, SOLUTION INTRAVENOUS at 12:34

## 2023-03-22 RX ADMIN — ONDANSETRON 4 MG: 2 INJECTION INTRAMUSCULAR; INTRAVENOUS at 12:30

## 2023-03-22 RX ADMIN — SODIUM CHLORIDE 100 ML: 9 INJECTION, SOLUTION INTRAVENOUS at 15:36

## 2023-03-22 RX ADMIN — PROCHLORPERAZINE EDISYLATE 10 MG: 5 INJECTION INTRAMUSCULAR; INTRAVENOUS at 21:15

## 2023-03-22 RX ADMIN — SODIUM CHLORIDE, PRESERVATIVE FREE 10 ML: 5 INJECTION INTRAVENOUS at 15:36

## 2023-03-22 RX ADMIN — SODIUM CHLORIDE 1000 ML: 9 INJECTION, SOLUTION INTRAVENOUS at 21:07

## 2023-03-22 RX ADMIN — SODIUM CHLORIDE 1000 ML: 9 INJECTION, SOLUTION INTRAVENOUS at 14:13

## 2023-03-22 RX ADMIN — SODIUM CHLORIDE: 9 INJECTION, SOLUTION INTRAVENOUS at 21:06

## 2023-03-22 RX ADMIN — HALOPERIDOL LACTATE 1 MG: 5 INJECTION, SOLUTION INTRAMUSCULAR at 13:06

## 2023-03-22 RX ADMIN — IOPAMIDOL 100 ML: 755 INJECTION, SOLUTION INTRAVENOUS at 15:35

## 2023-03-22 RX ADMIN — VANCOMYCIN HYDROCHLORIDE 1750 MG: 100 INJECTION, POWDER, LYOPHILIZED, FOR SOLUTION INTRAVENOUS at 14:10

## 2023-03-22 ASSESSMENT — LIFESTYLE VARIABLES
HOW OFTEN DO YOU HAVE A DRINK CONTAINING ALCOHOL: NEVER
HOW MANY STANDARD DRINKS CONTAINING ALCOHOL DO YOU HAVE ON A TYPICAL DAY: PATIENT DOES NOT DRINK

## 2023-03-22 ASSESSMENT — PAIN SCALES - GENERAL: PAINLEVEL_OUTOF10: 10

## 2023-03-22 ASSESSMENT — PAIN DESCRIPTION - LOCATION: LOCATION: ABDOMEN

## 2023-03-22 NOTE — ED PROVIDER NOTES
Absolute Lymph # 1.8 0.5 - 4.6 K/UL    Absolute Mono # 1.8 (H) 0.1 - 1.3 K/UL    Absolute Eos # 0.0 0.0 - 0.8 K/UL    Basophils Absolute 0.0 0.0 - 0.2 K/UL    Absolute Immature Granulocyte 0.1 0.0 - 0.5 K/UL   CMP   Result Value Ref Range    Sodium 142 133 - 143 mmol/L    Potassium 4.2 3.5 - 5.1 mmol/L    Chloride 114 (H) 101 - 110 mmol/L    CO2 21 21 - 32 mmol/L    Anion Gap 7 2 - 11 mmol/L    Glucose 105 (H) 65 - 100 mg/dL    BUN 43 (H) 6 - 23 MG/DL    Creatinine 2.00 (H) 0.8 - 1.5 MG/DL    Est, Glom Filt Rate 41 (L) >60 ml/min/1.73m2    Calcium 8.8 8.3 - 10.4 MG/DL    Total Bilirubin 0.7 0.2 - 1.1 MG/DL    ALT 30 12 - 65 U/L    AST 48 (H) 15 - 37 U/L    Alk Phosphatase 106 50 - 136 U/L    Total Protein 7.8 6.3 - 8.2 g/dL    Albumin 4.1 3.5 - 5.0 g/dL    Globulin 3.7 2.8 - 4.5 g/dL    Albumin/Globulin Ratio 1.1 0.4 - 1.6     Lactic Acid   Result Value Ref Range    Lactic Acid, Plasma 1.5 0.4 - 2.0 MMOL/L        CT CHEST ABDOMEN PELVIS W CONTRAST Additional Contrast? None   Final Result   No acute process in the chest, abdomen, or pelvis. Voice dictation software was used during the making of this note. This software is not perfect and grammatical and other typographical errors may be present. This note has not been completely proofread for errors.      Elisa Quinn, APRN - CNP  03/22/23 1913

## 2023-03-22 NOTE — ED TRIAGE NOTES
Patient arrives to ED pov from home. Patient reports n/v since Monday. Patient reports abdominal pain and feeling light headed from throwing up so much. Denies diarrhea.

## 2023-03-22 NOTE — H&P
rhonchi, or rales. Symmetric expansion. Abdomen:   Soft, nontender, nondistended. Extremities: No cyanosis or clubbing. No edema  Skin:     No rashes and normal coloration. Warm and dry. Neuro:  Lethargic post-medicaiton, though CN II-XII grossly intact. Sensation intact. Psych:  Normal mood and affect.       I have personally reviewed labs and tests:  Recent Labs:  Recent Results (from the past 24 hour(s))   Lipase    Collection Time: 03/22/23 12:20 PM   Result Value Ref Range    Lipase 60 (L) 73 - 393 U/L   Lactate, Sepsis    Collection Time: 03/22/23 12:20 PM   Result Value Ref Range    Lactic Acid, Sepsis 4.9 (HH) 0.4 - 2.0 MMOL/L   Magnesium    Collection Time: 03/22/23 12:20 PM   Result Value Ref Range    Magnesium 2.3 1.8 - 2.4 mg/dL   Procalcitonin    Collection Time: 03/22/23 12:20 PM   Result Value Ref Range    Procalcitonin 0.13 0.00 - 0.49 ng/mL   Lactate, Sepsis    Collection Time: 03/22/23  2:07 PM   Result Value Ref Range    Lactic Acid, Sepsis 3.4 (H) 0.4 - 2.0 MMOL/L   CBC with Auto Differential    Collection Time: 03/22/23  3:14 PM   Result Value Ref Range    WBC 14.2 (H) 4.3 - 11.1 K/uL    RBC 5.27 4.23 - 5.6 M/uL    Hemoglobin 15.7 13.6 - 17.2 g/dL    Hematocrit 42.7 41.1 - 50.3 %    MCV 81.0 (L) 82 - 102 FL    MCH 29.8 26.1 - 32.9 PG    MCHC 36.8 (H) 31.4 - 35.0 g/dL    RDW 13.1 11.9 - 14.6 %    Platelets 055 495 - 238 K/uL    MPV 8.5 (L) 9.4 - 12.3 FL    nRBC 0.00 0.0 - 0.2 K/uL    Differential Type AUTOMATED      Seg Neutrophils 74 43 - 78 %    Lymphocytes 13 13 - 44 %    Monocytes 12 4.0 - 12.0 %    Eosinophils % 0 (L) 0.5 - 7.8 %    Basophils 0 0.0 - 2.0 %    Immature Granulocytes 1 0.0 - 5.0 %    Segs Absolute 10.6 (H) 1.7 - 8.2 K/UL    Absolute Lymph # 1.8 0.5 - 4.6 K/UL    Absolute Mono # 1.8 (H) 0.1 - 1.3 K/UL    Absolute Eos # 0.0 0.0 - 0.8 K/UL    Basophils Absolute 0.0 0.0 - 0.2 K/UL    Absolute Immature Granulocyte 0.1 0.0 - 0.5 K/UL   CMP    Collection Time: 03/22/23 injection 10 mL    haloperidol lactate (HALDOL) injection 1 mg    0.9 % sodium chloride bolus    vancomycin (VANCOCIN) 1750 mg in sodium chloride 0.9 % 500 mL IVPB     Order Specific Question:   Antimicrobial Indications     Answer:   Intra-Abdominal Infection         Signed:  Aristides Bean MD    Part of this note may have been written by using a voice dictation software. The note has been proof read but may still contain some grammatical/other typographical errors.

## 2023-03-23 LAB
ANION GAP SERPL CALC-SCNC: 6 MMOL/L (ref 2–11)
BASOPHILS # BLD: 0 K/UL (ref 0–0.2)
BASOPHILS NFR BLD: 0 % (ref 0–2)
BUN SERPL-MCNC: 27 MG/DL (ref 6–23)
CALCIUM SERPL-MCNC: 9.1 MG/DL (ref 8.3–10.4)
CHLORIDE SERPL-SCNC: 116 MMOL/L (ref 101–110)
CO2 SERPL-SCNC: 21 MMOL/L (ref 21–32)
CREAT SERPL-MCNC: 1 MG/DL (ref 0.8–1.5)
DIFFERENTIAL METHOD BLD: ABNORMAL
EOSINOPHIL # BLD: 0 K/UL (ref 0–0.8)
EOSINOPHIL NFR BLD: 0 % (ref 0.5–7.8)
ERYTHROCYTE [DISTWIDTH] IN BLOOD BY AUTOMATED COUNT: 13.1 % (ref 11.9–14.6)
GLUCOSE SERPL-MCNC: 106 MG/DL (ref 65–100)
HCT VFR BLD AUTO: 40 % (ref 41.1–50.3)
HGB BLD-MCNC: 14.8 G/DL (ref 13.6–17.2)
IMM GRANULOCYTES # BLD AUTO: 0 K/UL (ref 0–0.5)
IMM GRANULOCYTES NFR BLD AUTO: 0 % (ref 0–5)
LYMPHOCYTES # BLD: 1.9 K/UL (ref 0.5–4.6)
LYMPHOCYTES NFR BLD: 16 % (ref 13–44)
MCH RBC QN AUTO: 29.8 PG (ref 26.1–32.9)
MCHC RBC AUTO-ENTMCNC: 37 G/DL (ref 31.4–35)
MCV RBC AUTO: 80.6 FL (ref 82–102)
MONOCYTES # BLD: 1.5 K/UL (ref 0.1–1.3)
MONOCYTES NFR BLD: 12 % (ref 4–12)
NEUTS SEG # BLD: 8.3 K/UL (ref 1.7–8.2)
NEUTS SEG NFR BLD: 71 % (ref 43–78)
NRBC # BLD: 0 K/UL (ref 0–0.2)
PLATELET # BLD AUTO: 311 K/UL (ref 150–450)
PMV BLD AUTO: 8.7 FL (ref 9.4–12.3)
POTASSIUM SERPL-SCNC: 4.3 MMOL/L (ref 3.5–5.1)
RBC # BLD AUTO: 4.96 M/UL (ref 4.23–5.6)
SODIUM SERPL-SCNC: 143 MMOL/L (ref 133–143)
WBC # BLD AUTO: 11.7 K/UL (ref 4.3–11.1)

## 2023-03-23 PROCEDURE — 36415 COLL VENOUS BLD VENIPUNCTURE: CPT

## 2023-03-23 PROCEDURE — 6360000002 HC RX W HCPCS: Performed by: INTERNAL MEDICINE

## 2023-03-23 PROCEDURE — 80048 BASIC METABOLIC PNL TOTAL CA: CPT

## 2023-03-23 PROCEDURE — 6370000000 HC RX 637 (ALT 250 FOR IP): Performed by: INTERNAL MEDICINE

## 2023-03-23 PROCEDURE — 85025 COMPLETE CBC W/AUTO DIFF WBC: CPT

## 2023-03-23 PROCEDURE — 1100000000 HC RM PRIVATE

## 2023-03-23 PROCEDURE — 2580000003 HC RX 258: Performed by: INTERNAL MEDICINE

## 2023-03-23 RX ORDER — PANTOPRAZOLE SODIUM 40 MG/1
40 TABLET, DELAYED RELEASE ORAL
Status: DISCONTINUED | OUTPATIENT
Start: 2023-03-23 | End: 2023-03-25 | Stop reason: HOSPADM

## 2023-03-23 RX ORDER — CALCIUM CARBONATE 200(500)MG
750 TABLET,CHEWABLE ORAL ONCE
Status: COMPLETED | OUTPATIENT
Start: 2023-03-23 | End: 2023-03-23

## 2023-03-23 RX ADMIN — SODIUM CHLORIDE: 9 INJECTION, SOLUTION INTRAVENOUS at 01:34

## 2023-03-23 RX ADMIN — SODIUM CHLORIDE: 9 INJECTION, SOLUTION INTRAVENOUS at 22:51

## 2023-03-23 RX ADMIN — ONDANSETRON 4 MG: 2 INJECTION INTRAMUSCULAR; INTRAVENOUS at 19:55

## 2023-03-23 RX ADMIN — PANTOPRAZOLE SODIUM 40 MG: 40 TABLET, DELAYED RELEASE ORAL at 15:21

## 2023-03-23 RX ADMIN — CALCIUM CARBONATE (ANTACID) CHEW TAB 500 MG 750 MG: 500 CHEW TAB at 12:11

## 2023-03-23 RX ADMIN — SODIUM CHLORIDE, PRESERVATIVE FREE 10 ML: 5 INJECTION INTRAVENOUS at 22:49

## 2023-03-23 RX ADMIN — PROCHLORPERAZINE EDISYLATE 10 MG: 5 INJECTION INTRAMUSCULAR; INTRAVENOUS at 22:49

## 2023-03-23 NOTE — CARE COORDINATION
Spoke to Mr. Charanjit Luevano  and his wife in room 611 about discharge planning. He is inpatient for N/V and LORNA. Prior to admit, he was independent with ADLs. No additional discharge needs voiced or identified. 03/23/23 1401   Service Assessment   Patient Orientation Alert and Oriented   Cognition Alert   History Provided By Patient   Primary Caregiver Self   Accompanied By/Relationship wife   Support Systems Spouse/Significant Other   PCP Verified by CM Yes   Prior Functional Level Independent in ADLs/IADLs   Current Functional Level Independent in ADLs/IADLs   Can patient return to prior living arrangement Yes   Ability to make needs known: Good   Family able to assist with home care needs: Yes   Would you like for me to discuss the discharge plan with any other family members/significant others, and if so, who?  No   Condition of Participation: Discharge Planning   The Plan for Transition of Care is related to the following treatment goals: home when stable, possibly later today

## 2023-03-23 NOTE — ED NOTES
TRANSFER - OUT REPORT:    Verbal report given to Reinaldo Goyal RN on Dmitri Baig Sol  being transferred to 65 for routine progression of patient care       Report consisted of patient's Situation, Background, Assessment and   Recommendations(SBAR). Information from the following report(s) Nurse Handoff Report was reviewed with the receiving nurse. [place] Assessment: Presents to emergency department  because of falls (Syncope, seizure, or loss of consciousness): No, Age > 79: No, Altered Mental Status, Intoxication with alcohol or substance confusion (Disorientation, impaired judgment, poor safety awaremess, or inability to follow instructions): No, Impaired Mobility: Ambulates or transfers with assistive devices or assistance; Unable to ambulate or transer.: No, Nursing Judgement: No  Lines:   Peripheral IV 03/22/23 Right Antecubital (Active)        Opportunity for questions and clarification was provided.       Patient transported with:  Ayla Cunningham RN  03/22/23 2007

## 2023-03-23 NOTE — DISCHARGE INSTRUCTIONS
If your condition is worse after hospital discharge, contact your healthcare provider or return to hospital.  You are advised to follow-up with your primary doctor and/or specialist.  With a follow-up visit, please make sure that your provider is aware of your admission and have them review your hospital record for any follow-up plans or investigations that need to be done. You may need prescription renewal when you complete your current prescription, at the time of follow-up. Please consult your healthcare provider regarding that.

## 2023-03-23 NOTE — PROGRESS NOTES
Problem: PAIN - ADULT  Goal: Verbalizes/displays adequate comfort level or baseline comfort level  Interventions:  - Encourage patient to monitor pain and request assistance  - Assess pain using appropriate pain scale  - Administer analgesics based on type and severity of pain and evaluate response  - Implement non-pharmacological measures as appropriate and evaluate response  - Consider cultural and social influences on pain and pain management  - Notify physician/advanced practitioner if interventions unsuccessful or patient reports new pain   Outcome: Progressing      Problem: INFECTION - ADULT  Goal: Absence or prevention of progression during hospitalization  INTERVENTIONS:  - Assess and monitor for signs and symptoms of infection  - Monitor lab/diagnostic results  - Monitor all insertion sites, i e  indwelling lines, tubes, and drains  - Monitor endotracheal (as able) and nasal secretions for changes in amount and color  - [place] appropriate cooling/warming therapies per order  - Administer medications as ordered  - Instruct and encourage patient and family to use good hand hygiene technique  - Identify and instruct in appropriate isolation precautions for identified infection/condition   Outcome: Progressing    Goal: Absence of fever/infection during neutropenic period  INTERVENTIONS:  - Monitor WBC  - Implement neutropenic guidelines   Outcome: Progressing      Problem: SAFETY ADULT  Goal: Patient will remain free of falls  INTERVENTIONS:  - Assess patient frequently for physical needs  -  Identify cognitive and physical deficits and behaviors that affect risk of falls    -  [place] fall precautions as indicated by assessment   - Educate patient/family on patient safety including physical limitations  - Instruct patient to call for assistance with activity based on assessment  - Modify environment to reduce risk of injury  - Consider OT/PT consult to assist with strengthening/mobility   Outcome: Progressing    Goal: Maintain or return to baseline ADL function  INTERVENTIONS:  -  Assess patient's ability to carry out ADLs; assess patient's baseline for ADL function and identify physical deficits which impact ability to perform ADLs (bathing, care of mouth/teeth, toileting, grooming, dressing, etc )  - Assess/evaluate cause of self-care deficits   - Assess range of motion  - Assess patient's mobility; develop plan if impaired  - Assess patient's need for assistive devices and provide as appropriate  - Encourage maximum independence but intervene and supervise when necessary  ¯ Involve family in performance of ADLs  ¯ Assess for home care needs following discharge   ¯ Request OT consult to assist with ADL evaluation and planning for discharge  ¯ Provide patient education as appropriate   Outcome: Progressing    Goal: Maintain or return mobility status to optimal level  INTERVENTIONS:  - Assess patient's baseline mobility status (ambulation, transfers, stairs, etc )    - Identify cognitive and physical deficits and behaviors that affect mobility  - Identify mobility aids required to assist with transfers and/or ambulation (gait belt, sit-to-stand, lift, walker, cane, etc )  - [place] fall precautions as indicated by assessment  - Record patient progress and toleration of activity level on Mobility SBAR; progress patient to next Phase/Stage  - Instruct patient to call for assistance with activity based on assessment  - Request Rehabilitation consult to assist with strengthening/weightbearing, etc    Outcome: Progressing      Problem: DISCHARGE PLANNING  Goal: Discharge to home or other facility with appropriate resources  INTERVENTIONS:  - Identify barriers to discharge w/patient and caregiver  - Arrange for needed discharge resources and transportation as appropriate  - Identify discharge learning needs (meds, wound care, etc )  - Arrange for interpretive services to assist at discharge as needed  - Refer to Case Management Department for coordinating discharge planning if the patient needs post-hospital services based on physician/advanced practitioner order or complex needs related to functional status, cognitive ability, or social support system   Outcome: Progressing      Problem: Prexisting or High Potential for Compromised Skin Integrity  Goal: Skin integrity is maintained or improved  INTERVENTIONS:  - Identify patients at risk for skin breakdown  - Assess and monitor skin integrity  - Assess and monitor nutrition and hydration status  - Monitor labs (i e  albumin)  - Assess for incontinence   - Turn and reposition patient  - Assist with mobility/ambulation  - Relieve pressure over bony prominences  - Avoid friction and shearing  - Provide appropriate hygiene as needed including keeping skin clean and dry  - Evaluate need for skin moisturizer/barrier cream  - Collaborate with interdisciplinary team (i e  Nutrition, Rehabilitation, etc )   - Patient/family teaching   Outcome: Progressing      Problem: DISCHARGE PLANNING - CARE MANAGEMENT  Goal: Discharge to post-acute care or home with appropriate resources  INTERVENTIONS:  - Conduct assessment to determine patient/family and health care team treatment goals, and need for post-acute services based on payer coverage, community resources, and patient preferences, and barriers to discharge  - Address psychosocial, clinical, and financial barriers to discharge as identified in assessment in conjunction with the patient/family and health care team  - Arrange appropriate level of post-acute services according to patient's   needs and preference and payer coverage in collaboration with the physician and health care team  - Communicate with and update the patient/family, physician, and health care team regarding progress on the discharge plan  - Arrange appropriate transportation to post-acute venues   Outcome: Progressing hours) at 3/23/2023 1427  Last data filed at 3/23/2023 1324  Gross per 24 hour   Intake 1840.07 ml   Output 450 ml   Net 1390.07 ml         Physical Exam:     Blood pressure (!) 125/99, pulse 92, temperature 98.8 °F (37.1 °C), temperature source Oral, resp. rate 16, height 5' 1\" (1.549 m), weight 145 lb (65.8 kg), SpO2 95 %. General:          Well nourished. No overt distress. Patient is lying flat in bed and talking well. No respiratory distress. Head:               Normocephalic, atraumatic  Eyes:               Sclerae appear normal.  Pupils equally round. HENT:             Nares appear normal, no drainage. Moist mucous membranes  Neck:               No restricted ROM. Trachea midline  CV:                  RRR. No m/r/g. No JVD  Lungs:             CTAB. No wheezing, rhonchi, or rales. Respirations even, unlabored  Abdomen:        Soft, nontender, mildly distended. Extremities:     Warm and dry. No cyanosis or clubbing. No edema. Skin:                No rashes. Normal coloration  Neuro:             CN II-XII grossly intact. Psych:             Normal mood and affect.     I have personally reviewed labs and tests:  Recent Labs:  Recent Results (from the past 48 hour(s))   Blood Culture 1    Collection Time: 03/22/23 12:20 PM    Specimen: Blood   Result Value Ref Range    Special Requests RIGHT  Antecubital        Culture NO GROWTH AFTER 19 HOURS     Lipase    Collection Time: 03/22/23 12:20 PM   Result Value Ref Range    Lipase 60 (L) 73 - 393 U/L   Lactate, Sepsis    Collection Time: 03/22/23 12:20 PM   Result Value Ref Range    Lactic Acid, Sepsis 4.9 (HH) 0.4 - 2.0 MMOL/L   Magnesium    Collection Time: 03/22/23 12:20 PM   Result Value Ref Range    Magnesium 2.3 1.8 - 2.4 mg/dL   Procalcitonin    Collection Time: 03/22/23 12:20 PM   Result Value Ref Range    Procalcitonin 0.13 0.00 - 0.49 ng/mL   Lactate, Sepsis    Collection Time: 03/22/23  2:07 PM   Result Value Ref Range    Lactic Acid, Sepsis 3.4 (H) 0.4 - 2.0 MMOL/L   CBC with Auto Differential    Collection Time: 03/22/23  3:14 PM   Result Value Ref Range    WBC 14.2 (H) 4.3 - 11.1 K/uL    RBC 5.27 4.23 - 5.6 M/uL    Hemoglobin 15.7 13.6 - 17.2 g/dL    Hematocrit 42.7 41.1 - 50.3 %    MCV 81.0 (L) 82 - 102 FL    MCH 29.8 26.1 - 32.9 PG    MCHC 36.8 (H) 31.4 - 35.0 g/dL    RDW 13.1 11.9 - 14.6 %    Platelets 878 934 - 614 K/uL    MPV 8.5 (L) 9.4 - 12.3 FL    nRBC 0.00 0.0 - 0.2 K/uL    Differential Type AUTOMATED      Seg Neutrophils 74 43 - 78 %    Lymphocytes 13 13 - 44 %    Monocytes 12 4.0 - 12.0 %    Eosinophils % 0 (L) 0.5 - 7.8 %    Basophils 0 0.0 - 2.0 %    Immature Granulocytes 1 0.0 - 5.0 %    Segs Absolute 10.6 (H) 1.7 - 8.2 K/UL    Absolute Lymph # 1.8 0.5 - 4.6 K/UL    Absolute Mono # 1.8 (H) 0.1 - 1.3 K/UL    Absolute Eos # 0.0 0.0 - 0.8 K/UL    Basophils Absolute 0.0 0.0 - 0.2 K/UL    Absolute Immature Granulocyte 0.1 0.0 - 0.5 K/UL   CMP    Collection Time: 03/22/23  3:14 PM   Result Value Ref Range    Sodium 142 133 - 143 mmol/L    Potassium 4.2 3.5 - 5.1 mmol/L    Chloride 114 (H) 101 - 110 mmol/L    CO2 21 21 - 32 mmol/L    Anion Gap 7 2 - 11 mmol/L    Glucose 105 (H) 65 - 100 mg/dL    BUN 43 (H) 6 - 23 MG/DL    Creatinine 2.00 (H) 0.8 - 1.5 MG/DL    Est, Glom Filt Rate 41 (L) >60 ml/min/1.73m2    Calcium 8.8 8.3 - 10.4 MG/DL    Total Bilirubin 0.7 0.2 - 1.1 MG/DL    ALT 30 12 - 65 U/L    AST 48 (H) 15 - 37 U/L    Alk Phosphatase 106 50 - 136 U/L    Total Protein 7.8 6.3 - 8.2 g/dL    Albumin 4.1 3.5 - 5.0 g/dL    Globulin 3.7 2.8 - 4.5 g/dL    Albumin/Globulin Ratio 1.1 0.4 - 1.6     Urinalysis    Collection Time: 03/22/23  3:56 PM   Result Value Ref Range    Color, UA YELLOW/STRAW      Appearance CLOUDY      Specific [place], UA 1.019 1.001 - 1.023      pH, Urine 5.0 5.0 - 9.0      Protein,  (A) NEG mg/dL    Glucose, UA Negative mg/dL    Ketones, Urine TRACE (A) NEG mg/dL    Bilirubin Urine Negative NEG      Blood,

## 2023-03-23 NOTE — DISCHARGE SUMMARY
MG/DL    Total Bilirubin 0.7 0.2 - 1.1 MG/DL    ALT 30 12 - 65 U/L    AST 48 (H) 15 - 37 U/L    Alk Phosphatase 106 50 - 136 U/L    Total Protein 7.8 6.3 - 8.2 g/dL    Albumin 4.1 3.5 - 5.0 g/dL    Globulin 3.7 2.8 - 4.5 g/dL    Albumin/Globulin Ratio 1.1 0.4 - 1.6     Urinalysis    Collection Time: 03/22/23  3:56 PM   Result Value Ref Range    Color, UA YELLOW/STRAW      Appearance CLOUDY      Specific [place], UA 1.019 1.001 - 1.023      pH, Urine 5.0 5.0 - 9.0      Protein,  (A) NEG mg/dL    Glucose, UA Negative mg/dL    Ketones, Urine TRACE (A) NEG mg/dL    Bilirubin Urine Negative NEG      Blood, Urine MODERATE (A) NEG      Urobilinogen, Urine 0.2 0.2 - 1.0 EU/dL    Nitrite, Urine Negative NEG      Leukocyte Esterase, Urine Negative NEG      WBC, UA 0-3 0 /hpf    RBC, UA 0-3 0 /hpf    Epithelial Cells UA 0-3 0 /hpf    BACTERIA, URINE 0 0 /hpf    Casts HYALINE 0 /lpf   Lactic Acid    Collection Time: 03/22/23  5:59 PM   Result Value Ref Range    Lactic Acid, Plasma 1.5 0.4 - 2.0 MMOL/L   Basic Metabolic Panel w/ Reflex to MG    Collection Time: 03/23/23  5:15 AM   Result Value Ref Range    Sodium 143 133 - 143 mmol/L    Potassium 4.3 3.5 - 5.1 mmol/L    Chloride 116 (H) 101 - 110 mmol/L    CO2 21 21 - 32 mmol/L    Anion Gap 6 2 - 11 mmol/L    Glucose 106 (H) 65 - 100 mg/dL    BUN 27 (H) 6 - 23 MG/DL    Creatinine 1.00 0.8 - 1.5 MG/DL    Est, Glom Filt Rate >60 >60 ml/min/1.73m2    Calcium 9.1 8.3 - 10.4 MG/DL   CBC with Auto Differential    Collection Time: 03/23/23  5:15 AM   Result Value Ref Range    WBC 11.7 (H) 4.3 - 11.1 K/uL    RBC 4.96 4.23 - 5.6 M/uL    Hemoglobin 14.8 13.6 - 17.2 g/dL    Hematocrit 40.0 (L) 41.1 - 50.3 %    MCV 80.6 (L) 82 - 102 FL    MCH 29.8 26.1 - 32.9 PG    MCHC 37.0 (H) 31.4 - 35.0 g/dL    RDW 13.1 11.9 - 14.6 %    Platelets 949 092 - 253 K/uL    MPV 8.7 (L) 9.4 - 12.3 FL    nRBC 0.00 0.0 - 0.2 K/uL    Differential Type AUTOMATED      Seg Neutrophils 71 43 - 78 % Lymphocytes 16 13 - 44 %    Monocytes 12 4.0 - 12.0 %    Eosinophils % 0 (L) 0.5 - 7.8 %    Basophils 0 0.0 - 2.0 %    Immature Granulocytes 0 0.0 - 5.0 %    Segs Absolute 8.3 (H) 1.7 - 8.2 K/UL    Absolute Lymph # 1.9 0.5 - 4.6 K/UL    Absolute Mono # 1.5 (H) 0.1 - 1.3 K/UL    Absolute Eos # 0.0 0.0 - 0.8 K/UL    Basophils Absolute 0.0 0.0 - 0.2 K/UL    Absolute Immature Granulocyte 0.0 0.0 - 0.5 K/UL       Allergies   Allergen Reactions    Aspirin Other (See Comments)     ulcers    Ibuprofen Other (See Comments)     Hx of ulcers    Hydrocodone Rash     Tolerates hydromorphone, morphine, tramadol    Penicillins Nausea Only and Rash    Promethazine Nausea And Vomiting and Other (See Comments)     Immunization History   Administered Date(s) Administered    TDaP, ADACEL (age 10y-63y), BOOSTRIX (age 10y+), IM, 0.5mL 04/21/2012       Recent Vital Data:  Patient Vitals for the past 24 hrs:   Temp Pulse Resp BP SpO2   03/23/23 0704 97.9 °F (36.6 °C) (!) 103 16 106/83 95 %   03/23/23 0341 99 °F (37.2 °C) (!) 124 18 (!) 146/103 95 %   03/22/23 2327 99.1 °F (37.3 °C) (!) 125 18 (!) 148/111 93 %   03/22/23 2048 98.6 °F (37 °C) (!) 106 18 (!) 143/107 92 %   03/22/23 1953 -- -- -- -- 100 %   03/22/23 1945 -- -- -- (!) 132/95 95 %   03/22/23 1942 -- -- -- -- 99 %   03/22/23 1941 -- -- -- -- 98 %   03/22/23 1939 -- -- -- -- 98 %   03/22/23 1938 -- -- -- -- 98 %   03/22/23 1937 -- -- -- -- 98 %   03/22/23 1933 -- -- -- (!) 145/109 98 %   03/22/23 1815 -- -- -- -- 95 %   03/22/23 1810 -- -- -- -- 95 %   03/22/23 1800 -- -- -- (!) 140/112 97 %   03/22/23 1745 -- -- -- (!) 145/105 95 %   03/22/23 1730 -- -- -- (!) 149/114 96 %   03/22/23 1715 -- -- -- (!) 144/109 95 %   03/22/23 1714 -- -- -- -- 97 %   03/22/23 1712 -- -- -- -- 99 %   03/22/23 1711 -- -- -- -- 98 %   03/22/23 1710 -- -- -- -- 97 %   03/22/23 1700 -- -- -- (!) 147/100 91 %   03/22/23 1645 -- -- -- (!) 149/109 94 %   03/22/23 1630 -- -- -- (!) 145/102 93 %   03/22/23

## 2023-03-24 PROCEDURE — 1100000000 HC RM PRIVATE

## 2023-03-24 PROCEDURE — 6370000000 HC RX 637 (ALT 250 FOR IP): Performed by: INTERNAL MEDICINE

## 2023-03-24 PROCEDURE — 2580000003 HC RX 258: Performed by: INTERNAL MEDICINE

## 2023-03-24 RX ADMIN — PANTOPRAZOLE SODIUM 40 MG: 40 TABLET, DELAYED RELEASE ORAL at 05:48

## 2023-03-24 RX ADMIN — ONDANSETRON 4 MG: 8 TABLET, ORALLY DISINTEGRATING ORAL at 10:48

## 2023-03-24 RX ADMIN — SODIUM CHLORIDE, PRESERVATIVE FREE 10 ML: 5 INJECTION INTRAVENOUS at 20:39

## 2023-03-24 RX ADMIN — SODIUM CHLORIDE: 9 INJECTION, SOLUTION INTRAVENOUS at 08:20

## 2023-03-24 RX ADMIN — SODIUM CHLORIDE, PRESERVATIVE FREE 10 ML: 5 INJECTION INTRAVENOUS at 08:20

## 2023-03-24 RX ADMIN — SODIUM CHLORIDE: 9 INJECTION, SOLUTION INTRAVENOUS at 03:08

## 2023-03-24 NOTE — PROGRESS NOTES
Comprehensive Nutrition Assessment    Type and Reason for Visit: Initial, Positive Nutrition Screen  Malnutrition Screening Tool: Malnutrition Screen  Have you recently lost weight without trying?: 2 to 13 pounds (1 point)  Have you been eating poorly because of a decreased appetite?: Yes (1 point)  Malnutrition Screening Tool Score: 2    Nutrition Recommendations/Plan:   Meals and Snacks:  Diet: Continue current order  Progression per MD.    Nutrition Supplement Therapy:  Medical food supplement therapy:  Continue Ensure Clear three times per day (this provides 240 kcal and 8 grams protein per bottle)      Malnutrition Assessment:  Malnutrition Status: At risk for malnutrition (Comment) (presented w NV, +clear liquid diet)  No lucas muscle or fat loss appreciated     Nutrition Assessment:  Nutrition History: Recently tried switching to vegan after ~ 1 month of limiting meet to once per day. Normal intake until eating [place] in [facility] which resulting in vomiting on Monday for several family members. Acute wt loss reported w vomiting. Do You Have Any Cultural, Taoism, or Ethnic Food Preferences?: No   Nutrition Background:       chronic marijuana user with history of cannabis hyperemesis syndrome, with previous hospital admissions. Admitted with LORNA, lactic acidosis, marijuana use. Nutrition Interval:  Visit with pt and wife at bedside. Pt sitting up dressed, in no acute distressed. Reports poor tolerance of clears thus far, encouraged attempts of ensure clear. Both with questions about promoting transition to vegan lifestyle for  health reasons. + protonix, NS @ 150 ml/hr. Zofran (prn last admin 3/24), compazine (last admin 3/23), reglan (prn no administration)  Current Nutrition Therapies:  ADULT DIET;  Clear Liquid  ADULT ORAL NUTRITION SUPPLEMENT; Breakfast, Lunch, Dinner; Clear Liquid Oral Supplement    Current Intake:   Average Meal Intake:  (clears only) Average Supplements Intake:  (has yet to try)      Anthropometric Measures:  Height: 5' 1\" (154.9 cm)  Current Body Wt: 145 lb 1 oz (65.8 kg) (3/22), Weight source: Stated  BMI: 27.4, Overweight (BMI 25.0-29. 9)  Admission Body Weight: 145 lb (65.8 kg) (stated)  Ideal Body Weight (Kg) (Calculated): 51 kg (112 lbs), 129.5 %  Usual Body Wt: 134 lb (60.8 kg) (only EMR source validated wt in July 2022), Percent weight change: 8.3       BMI Category Overweight (BMI 25.0-29. 9)  Estimated Daily Nutrient Needs:  Energy (kcal/day): 3282-5145 (25-30 kcal/kg) (Kcal/kg Weight used: 65.8 kg Admission  Protein (g/day): 52-66 Weight Used: (Admission) 65.8 kg  Fluid (ml/day):   (1 ml/kcal)    Nutrition Diagnosis:   Inadequate oral intake related to altered GI function as evidenced by  (NV on clears)  Nutrition Interventions:   Food and/or Nutrient Delivery: Continue Current Diet, Continue Oral Nutrition Supplement     Coordination of Nutrition Care: Continue to monitor while inpatient       Goals: Active Goal:  (Tolerate diet progression by next RD assessment)       Nutrition Monitoring and Evaluation:      Food/Nutrient Intake Outcomes: Diet Advancement/Tolerance, Food and Nutrient Intake, Supplement Intake  Physical Signs/Symptoms Outcomes: Biochemical Data, GI Status, Meal Time Behavior, Weight    Discharge Planning:     Too soon to determine    NOEMI SIMEON, MIHAI

## 2023-03-24 NOTE — PROGRESS NOTES
Hourly rounds performed, bed low locked call light within reach. Pt rested quietly during the night wife at bed side Infusing  150/hr . Denies pain, will continue to monitor.

## 2023-03-24 NOTE — PROGRESS NOTES
(H) 101 - 110 mmol/L    CO2 21 21 - 32 mmol/L    Anion Gap 6 2 - 11 mmol/L    Glucose 106 (H) 65 - 100 mg/dL    BUN 27 (H) 6 - 23 MG/DL    Creatinine 1.00 0.8 - 1.5 MG/DL    Est, Glom Filt Rate >60 >60 ml/min/1.73m2    Calcium 9.1 8.3 - 10.4 MG/DL   CBC with Auto Differential    Collection Time: 03/23/23  5:15 AM   Result Value Ref Range    WBC 11.7 (H) 4.3 - 11.1 K/uL    RBC 4.96 4.23 - 5.6 M/uL    Hemoglobin 14.8 13.6 - 17.2 g/dL    Hematocrit 40.0 (L) 41.1 - 50.3 %    MCV 80.6 (L) 82 - 102 FL    MCH 29.8 26.1 - 32.9 PG    MCHC 37.0 (H) 31.4 - 35.0 g/dL    RDW 13.1 11.9 - 14.6 %    Platelets 176 647 - 027 K/uL    MPV 8.7 (L) 9.4 - 12.3 FL    nRBC 0.00 0.0 - 0.2 K/uL    Differential Type AUTOMATED      Seg Neutrophils 71 43 - 78 %    Lymphocytes 16 13 - 44 %    Monocytes 12 4.0 - 12.0 %    Eosinophils % 0 (L) 0.5 - 7.8 %    Basophils 0 0.0 - 2.0 %    Immature Granulocytes 0 0.0 - 5.0 %    Segs Absolute 8.3 (H) 1.7 - 8.2 K/UL    Absolute Lymph # 1.9 0.5 - 4.6 K/UL    Absolute Mono # 1.5 (H) 0.1 - 1.3 K/UL    Absolute Eos # 0.0 0.0 - 0.8 K/UL    Basophils Absolute 0.0 0.0 - 0.2 K/UL    Absolute Immature Granulocyte 0.0 0.0 - 0.5 K/UL       Other Studies:  CT CHEST ABDOMEN PELVIS W CONTRAST Additional Contrast? None   Final Result   No acute process in the chest, abdomen, or pelvis.                 Current Meds:  Current Facility-Administered Medications   Medication Dose Route Frequency    pantoprazole (PROTONIX) tablet 40 mg  40 mg Oral QAM AC    sodium chloride flush 0.9 % injection 5-40 mL  5-40 mL IntraVENous 2 times per day    sodium chloride flush 0.9 % injection 5-40 mL  5-40 mL IntraVENous PRN    0.9 % sodium chloride infusion   IntraVENous PRN    ondansetron (ZOFRAN-ODT) disintegrating tablet 4 mg  4 mg Oral Q8H PRN    Or    ondansetron (ZOFRAN) injection 4 mg  4 mg IntraVENous Q6H PRN    polyethylene glycol (GLYCOLAX) packet 17 g  17 g Oral Daily PRN    acetaminophen (TYLENOL) tablet 650 mg  650 mg Oral Q6H PRN    Or    acetaminophen (TYLENOL) suppository 650 mg  650 mg Rectal Q6H PRN    prochlorperazine (COMPAZINE) injection 10 mg  10 mg IntraVENous Q6H PRN    LORazepam (ATIVAN) injection 0.5 mg  0.5 mg IntraVENous Q6H PRN    0.9 % sodium chloride infusion   IntraVENous Continuous    heparin (porcine) injection 5,000 Units  5,000 Units SubCUTAneous BID    metoclopramide (REGLAN) injection 10 mg  10 mg IntraVENous Q6H PRN       Signed:  Suresh Lindsay MD    Part of this note may have been written by using a voice dictation software. The note has been proof read but may still contain some grammatical/other typographical errors.

## 2023-03-25 VITALS
RESPIRATION RATE: 18 BRPM | SYSTOLIC BLOOD PRESSURE: 119 MMHG | TEMPERATURE: 98.6 F | BODY MASS INDEX: 27.38 KG/M2 | OXYGEN SATURATION: 97 % | HEIGHT: 61 IN | WEIGHT: 145 LBS | DIASTOLIC BLOOD PRESSURE: 89 MMHG | HEART RATE: 75 BPM

## 2023-03-25 PROCEDURE — 6370000000 HC RX 637 (ALT 250 FOR IP): Performed by: INTERNAL MEDICINE

## 2023-03-25 PROCEDURE — 2580000003 HC RX 258: Performed by: INTERNAL MEDICINE

## 2023-03-25 RX ORDER — PANTOPRAZOLE SODIUM 40 MG/1
40 TABLET, DELAYED RELEASE ORAL DAILY
Qty: 30 TABLET | Refills: 0 | Status: SHIPPED | OUTPATIENT
Start: 2023-03-25

## 2023-03-25 RX ADMIN — PANTOPRAZOLE SODIUM 40 MG: 40 TABLET, DELAYED RELEASE ORAL at 05:22

## 2023-03-25 RX ADMIN — SODIUM CHLORIDE, PRESERVATIVE FREE 10 ML: 5 INJECTION INTRAVENOUS at 08:49

## 2023-03-25 RX ADMIN — SODIUM CHLORIDE, PRESERVATIVE FREE 10 ML: 5 INJECTION INTRAVENOUS at 08:48

## 2023-03-25 ASSESSMENT — PAIN SCALES - GENERAL: PAINLEVEL_OUTOF10: 0

## 2023-03-25 NOTE — DISCHARGE SUMMARY
Hospitalist Discharge Summary   Admit Date:  3/22/2023 11:53 AM   DC Note date: 3/25/2023  Name:  Vivian Moreno   Age:  57 y.o. Sex:  male  :  1975   MRN:  [id number]   Room:  [facility]  PCP:  Yany Nair MD    Presenting Complaint: Nausea & Vomiting     Initial Admission Diagnosis: LORNA (acute kidney injury) ([facility] [address] [N17.9]     Problem List for this Hospitalization (present on admission):    Principal Problem:    LORNA (acute kidney injury) ([address]  Active Problems:    Tetrahydrocannabinol (THC) use disorder, moderate, dependence ([address]    Cannabis hyperemesis syndrome concurrent with and due to cannabis abuse ([address]    Intractable nausea and vomiting    Lactic acidosis    Leukocytosis  Resolved Problems:    * No resolved hospital problems. [facility] Course:  Patient is a chronic marijuana user with history of cannabis hyperemesis syndrome, with previous hospital admissions. Patient presented with nausea and vomiting. He used marijuana the last time 3 days prior to admission. Patient denies that his symptoms are related to marijuana use this time the. He stated that he went to eat at a [employer], and a lot of people from there got sick as well. He was found to have elevated lactic acid, acute kidney injury. CT abdomen and pelvis is unremarkable. Patient was treated with IV fluid, and symptomatic treatment for nausea, vomiting. Pt clinically improved and there was no nausea, no vomiting. Patient tolerated oral intake. Renal function has improved back to baseline. Clinically improved and is ambulating well. Patient is hemodynamically stable for discharge. Disposition: home   Diet: ADULT DIET; Clear Liquid  ADULT ORAL NUTRITION SUPPLEMENT; Breakfast, Lunch, Dinner; Clear Liquid Oral Supplement  Code Status: Full Code    Follow Ups:   Follow-up Information     Yany Nair MD. Go on 3/24/2023.     Specialty: Family Medicine  Why: at 8:20 KEEP AS

## 2023-03-25 NOTE — CARE COORDINATION
Pt is for discharge home today with family and no needs/supportive care orders received for CM at this time. CM will offer community resources,  if patient would like assistance with substance use hx. Patient to be transported by family. Update 2:34pm- Patient declined supportive resources. 03/23/23 1401   Service Assessment   Patient Orientation Alert and Oriented   Cognition Alert   History Provided By Patient   Primary Caregiver Self   Accompanied By/Relationship wife   Support Systems Spouse/Significant Other   PCP Verified by CM Yes   Prior Functional Level Independent in ADLs/IADLs   Current Functional Level Independent in ADLs/IADLs   Can patient return to prior living arrangement Yes   Ability to make needs known: Good   Family able to assist with home care needs: Yes   Would you like for me to discuss the discharge plan with any other family members/significant others, and if so, who?  No   Condition of Participation: Discharge Planning   The Plan for Transition of Care is related to the following treatment goals: home when stable

## 2023-03-26 LAB
BACTERIA SPEC CULT: NORMAL
SERVICE CMNT-IMP: NORMAL

## 2023-03-27 LAB
BACTERIA SPEC CULT: NORMAL
SERVICE CMNT-IMP: NORMAL

## 2023-03-28 LAB
BUN BLD-MCNC: 45 MG/DL (ref 8–26)
CHLORIDE BLD-SCNC: 118 MMOL/L (ref 98–107)
CO2 BLD-SCNC: 19.8 MMOL/L (ref 21–32)
CREAT BLD-MCNC: 1.94 MG/DL (ref 0.8–1.5)
GLUCOSE BLD-MCNC: 101 MG/DL (ref 65–100)
POTASSIUM BLD-SCNC: 4.4 MMOL/L (ref 3.5–5.1)
SODIUM BLD-SCNC: 146 MMOL/L (ref 136–145)

## 2023-06-28 ENCOUNTER — APPOINTMENT (OUTPATIENT)
Dept: GENERAL RADIOLOGY | Age: 48
DRG: 683 | End: 2023-06-28

## 2023-06-28 ENCOUNTER — APPOINTMENT (OUTPATIENT)
Dept: CT IMAGING | Age: 48
DRG: 683 | End: 2023-06-28

## 2023-06-28 ENCOUNTER — HOSPITAL ENCOUNTER (INPATIENT)
Age: 48
LOS: 5 days | Discharge: HOME | DRG: 683 | End: 2023-07-03
Attending: EMERGENCY MEDICINE | Admitting: FAMILY MEDICINE

## 2023-06-28 DIAGNOSIS — R11.15 CYCLICAL VOMITING: Primary | ICD-10-CM

## 2023-06-28 DIAGNOSIS — E87.20 LACTIC ACIDOSIS: ICD-10-CM

## 2023-06-28 DIAGNOSIS — N17.9 AKI (ACUTE KIDNEY INJURY) (HCC): ICD-10-CM

## 2023-06-28 PROBLEM — E87.8 LOW BICARBONATE: Status: ACTIVE | Noted: 2023-06-28

## 2023-06-28 PROBLEM — I10 UNCONTROLLED HYPERTENSION: Status: ACTIVE | Noted: 2023-06-28

## 2023-06-28 PROBLEM — R65.10 SIRS (SYSTEMIC INFLAMMATORY RESPONSE SYNDROME) (HCC): Status: ACTIVE | Noted: 2021-06-10

## 2023-06-28 PROBLEM — R79.89 HIGH SERUM LACTIC ACID: Status: ACTIVE | Noted: 2023-06-28

## 2023-06-28 PROBLEM — K44.9 HIATAL HERNIA: Status: ACTIVE | Noted: 2023-06-28

## 2023-06-28 LAB
ALBUMIN SERPL-MCNC: 5 G/DL (ref 3.5–5)
ALBUMIN/GLOB SERPL: 1.1 (ref 0.4–1.6)
ALP SERPL-CCNC: 152 U/L (ref 50–136)
ALT SERPL-CCNC: 27 U/L (ref 12–65)
ANION GAP SERPL CALC-SCNC: 18 MMOL/L (ref 2–11)
APPEARANCE UR: ABNORMAL
AST SERPL-CCNC: 27 U/L (ref 15–37)
BACTERIA URNS QL MICRO: NEGATIVE /HPF
BASOPHILS # BLD: 0 K/UL (ref 0–0.2)
BASOPHILS NFR BLD: 0 % (ref 0–2)
BILIRUB SERPL-MCNC: 1 MG/DL (ref 0.2–1.1)
BILIRUB UR QL: NEGATIVE
BUN SERPL-MCNC: 31 MG/DL (ref 6–23)
CALCIUM SERPL-MCNC: 10.6 MG/DL (ref 8.3–10.4)
CASTS URNS QL MICRO: ABNORMAL /LPF
CHLORIDE SERPL-SCNC: 108 MMOL/L (ref 101–110)
CO2 SERPL-SCNC: 13 MMOL/L (ref 21–32)
COLOR UR: ABNORMAL
CREAT SERPL-MCNC: 1.8 MG/DL (ref 0.8–1.5)
DIFFERENTIAL METHOD BLD: ABNORMAL
EKG ATRIAL RATE: 132 BPM
EKG DIAGNOSIS: NORMAL
EKG P AXIS: 79 DEGREES
EKG P-R INTERVAL: 157 MS
EKG Q-T INTERVAL: 294 MS
EKG QRS DURATION: 73 MS
EKG QTC CALCULATION (BAZETT): 436 MS
EKG R AXIS: 66 DEGREES
EKG T AXIS: 31 DEGREES
EKG VENTRICULAR RATE: 132 BPM
EOSINOPHIL # BLD: 0 K/UL (ref 0–0.8)
EOSINOPHIL NFR BLD: 0 % (ref 0.5–7.8)
EPI CELLS #/AREA URNS HPF: ABNORMAL /HPF
ERYTHROCYTE [DISTWIDTH] IN BLOOD BY AUTOMATED COUNT: 12.8 % (ref 11.9–14.6)
GLOBULIN SER CALC-MCNC: 4.5 G/DL (ref 2.8–4.5)
GLUCOSE BLD STRIP.AUTO-MCNC: 201 MG/DL (ref 65–100)
GLUCOSE SERPL-MCNC: 195 MG/DL (ref 65–100)
GLUCOSE UR STRIP.AUTO-MCNC: 250 MG/DL
HCT VFR BLD AUTO: 42.6 % (ref 41.1–50.3)
HGB BLD-MCNC: 15.9 G/DL (ref 13.6–17.2)
HGB UR QL STRIP: ABNORMAL
IMM GRANULOCYTES # BLD AUTO: 0.1 K/UL (ref 0–0.5)
IMM GRANULOCYTES NFR BLD AUTO: 1 % (ref 0–5)
KETONES UR QL STRIP.AUTO: >80 MG/DL
LACTATE SERPL-SCNC: 1.4 MMOL/L (ref 0.4–2)
LACTATE SERPL-SCNC: 2.9 MMOL/L (ref 0.4–2)
LACTATE SERPL-SCNC: 7.6 MMOL/L (ref 0.4–2)
LEUKOCYTE ESTERASE UR QL STRIP.AUTO: NEGATIVE
LYMPHOCYTES # BLD: 1.4 K/UL (ref 0.5–4.6)
LYMPHOCYTES NFR BLD: 12 % (ref 13–44)
MCH RBC QN AUTO: 29.3 PG (ref 26.1–32.9)
MCHC RBC AUTO-ENTMCNC: 37.3 G/DL (ref 31.4–35)
MCV RBC AUTO: 78.5 FL (ref 82–102)
MONOCYTES # BLD: 0.8 K/UL (ref 0.1–1.3)
MONOCYTES NFR BLD: 7 % (ref 4–12)
NEUTS SEG # BLD: 9.7 K/UL (ref 1.7–8.2)
NEUTS SEG NFR BLD: 81 % (ref 43–78)
NITRITE UR QL STRIP.AUTO: NEGATIVE
NRBC # BLD: 0 K/UL (ref 0–0.2)
PH UR STRIP: 5.5 (ref 5–9)
PLATELET # BLD AUTO: 401 K/UL (ref 150–450)
PMV BLD AUTO: 8.8 FL (ref 9.4–12.3)
POTASSIUM SERPL-SCNC: 4.1 MMOL/L (ref 3.5–5.1)
PROCALCITONIN SERPL-MCNC: <0.05 NG/ML (ref 0–0.49)
PROT SERPL-MCNC: 9.5 G/DL (ref 6.3–8.2)
PROT UR STRIP-MCNC: 100 MG/DL
RBC # BLD AUTO: 5.43 M/UL (ref 4.23–5.6)
RBC #/AREA URNS HPF: ABNORMAL /HPF
SERVICE CMNT-IMP: ABNORMAL
SODIUM SERPL-SCNC: 139 MMOL/L (ref 133–143)
SP GR UR REFRACTOMETRY: 1.03 (ref 1–1.02)
TROPONIN I SERPL HS-MCNC: 4.1 PG/ML (ref 0–14)
UROBILINOGEN UR QL STRIP.AUTO: 1 EU/DL (ref 0.2–1)
WBC # BLD AUTO: 12.1 K/UL (ref 4.3–11.1)
WBC URNS QL MICRO: ABNORMAL /HPF

## 2023-06-28 PROCEDURE — 71045 X-RAY EXAM CHEST 1 VIEW: CPT

## 2023-06-28 PROCEDURE — 2500000003 HC RX 250 WO HCPCS: Performed by: FAMILY MEDICINE

## 2023-06-28 PROCEDURE — 6360000002 HC RX W HCPCS: Performed by: FAMILY MEDICINE

## 2023-06-28 PROCEDURE — 85025 COMPLETE CBC W/AUTO DIFF WBC: CPT

## 2023-06-28 PROCEDURE — 71260 CT THORAX DX C+: CPT

## 2023-06-28 PROCEDURE — 84145 PROCALCITONIN (PCT): CPT

## 2023-06-28 PROCEDURE — 2580000003 HC RX 258: Performed by: FAMILY MEDICINE

## 2023-06-28 PROCEDURE — A4216 STERILE WATER/SALINE, 10 ML: HCPCS | Performed by: FAMILY MEDICINE

## 2023-06-28 PROCEDURE — 82962 GLUCOSE BLOOD TEST: CPT

## 2023-06-28 PROCEDURE — 84484 ASSAY OF TROPONIN QUANT: CPT

## 2023-06-28 PROCEDURE — 87040 BLOOD CULTURE FOR BACTERIA: CPT

## 2023-06-28 PROCEDURE — 6360000002 HC RX W HCPCS

## 2023-06-28 PROCEDURE — 6360000004 HC RX CONTRAST MEDICATION

## 2023-06-28 PROCEDURE — 1100000003 HC PRIVATE W/ TELEMETRY

## 2023-06-28 PROCEDURE — 99285 EMERGENCY DEPT VISIT HI MDM: CPT

## 2023-06-28 PROCEDURE — 96376 TX/PRO/DX INJ SAME DRUG ADON: CPT

## 2023-06-28 PROCEDURE — 2580000003 HC RX 258

## 2023-06-28 PROCEDURE — 96375 TX/PRO/DX INJ NEW DRUG ADDON: CPT

## 2023-06-28 PROCEDURE — C9113 INJ PANTOPRAZOLE SODIUM, VIA: HCPCS | Performed by: FAMILY MEDICINE

## 2023-06-28 PROCEDURE — 81001 URINALYSIS AUTO W/SCOPE: CPT

## 2023-06-28 PROCEDURE — 36415 COLL VENOUS BLD VENIPUNCTURE: CPT

## 2023-06-28 PROCEDURE — 96374 THER/PROPH/DIAG INJ IV PUSH: CPT

## 2023-06-28 PROCEDURE — 83605 ASSAY OF LACTIC ACID: CPT

## 2023-06-28 PROCEDURE — 80053 COMPREHEN METABOLIC PANEL: CPT

## 2023-06-28 PROCEDURE — 80307 DRUG TEST PRSMV CHEM ANLYZR: CPT

## 2023-06-28 RX ORDER — SODIUM CHLORIDE 0.9 % (FLUSH) 0.9 %
5-40 SYRINGE (ML) INJECTION PRN
Status: DISCONTINUED | OUTPATIENT
Start: 2023-06-28 | End: 2023-07-03 | Stop reason: HOSPADM

## 2023-06-28 RX ORDER — SODIUM CHLORIDE 0.9 % (FLUSH) 0.9 %
5-40 SYRINGE (ML) INJECTION EVERY 12 HOURS SCHEDULED
Status: DISCONTINUED | OUTPATIENT
Start: 2023-06-28 | End: 2023-07-03 | Stop reason: HOSPADM

## 2023-06-28 RX ORDER — DIPHENHYDRAMINE HCL 25 MG
25 CAPSULE ORAL NIGHTLY PRN
Status: DISCONTINUED | OUTPATIENT
Start: 2023-06-28 | End: 2023-07-03 | Stop reason: HOSPADM

## 2023-06-28 RX ORDER — POLYETHYLENE GLYCOL 3350 17 G/17G
17 POWDER, FOR SOLUTION ORAL DAILY PRN
Status: DISCONTINUED | OUTPATIENT
Start: 2023-06-28 | End: 2023-07-03 | Stop reason: HOSPADM

## 2023-06-28 RX ORDER — ACETAMINOPHEN 325 MG/1
650 TABLET ORAL EVERY 6 HOURS PRN
Status: DISCONTINUED | OUTPATIENT
Start: 2023-06-28 | End: 2023-07-03 | Stop reason: HOSPADM

## 2023-06-28 RX ORDER — SODIUM CHLORIDE, SODIUM LACTATE, POTASSIUM CHLORIDE, AND CALCIUM CHLORIDE .6; .31; .03; .02 G/100ML; G/100ML; G/100ML; G/100ML
30 INJECTION, SOLUTION INTRAVENOUS ONCE
Status: COMPLETED | OUTPATIENT
Start: 2023-06-28 | End: 2023-06-28

## 2023-06-28 RX ORDER — ONDANSETRON 2 MG/ML
4 INJECTION INTRAMUSCULAR; INTRAVENOUS EVERY 6 HOURS PRN
Status: DISCONTINUED | OUTPATIENT
Start: 2023-06-28 | End: 2023-07-03 | Stop reason: HOSPADM

## 2023-06-28 RX ORDER — SODIUM CHLORIDE 9 MG/ML
INJECTION, SOLUTION INTRAVENOUS PRN
Status: DISCONTINUED | OUTPATIENT
Start: 2023-06-28 | End: 2023-07-03 | Stop reason: HOSPADM

## 2023-06-28 RX ORDER — SODIUM CHLORIDE 0.9 % (FLUSH) 0.9 %
10 SYRINGE (ML) INJECTION
Status: COMPLETED | OUTPATIENT
Start: 2023-06-28 | End: 2023-06-28

## 2023-06-28 RX ORDER — ONDANSETRON 4 MG/1
4 TABLET, ORALLY DISINTEGRATING ORAL EVERY 8 HOURS PRN
Status: DISCONTINUED | OUTPATIENT
Start: 2023-06-28 | End: 2023-07-03 | Stop reason: HOSPADM

## 2023-06-28 RX ORDER — SODIUM CHLORIDE 9 MG/ML
INJECTION, SOLUTION INTRAVENOUS CONTINUOUS
Status: DISCONTINUED | OUTPATIENT
Start: 2023-06-28 | End: 2023-06-28

## 2023-06-28 RX ORDER — 0.9 % SODIUM CHLORIDE 0.9 %
100 INTRAVENOUS SOLUTION INTRAVENOUS
Status: COMPLETED | OUTPATIENT
Start: 2023-06-28 | End: 2023-06-28

## 2023-06-28 RX ORDER — LABETALOL HYDROCHLORIDE 5 MG/ML
10 INJECTION, SOLUTION INTRAVENOUS EVERY 4 HOURS PRN
Status: DISCONTINUED | OUTPATIENT
Start: 2023-06-28 | End: 2023-07-03 | Stop reason: HOSPADM

## 2023-06-28 RX ORDER — DEXTROSE AND SODIUM CHLORIDE 5; .9 G/100ML; G/100ML
INJECTION, SOLUTION INTRAVENOUS CONTINUOUS
Status: DISCONTINUED | OUTPATIENT
Start: 2023-06-28 | End: 2023-07-03 | Stop reason: HOSPADM

## 2023-06-28 RX ORDER — ONDANSETRON 2 MG/ML
4 INJECTION INTRAMUSCULAR; INTRAVENOUS
Status: COMPLETED | OUTPATIENT
Start: 2023-06-28 | End: 2023-06-28

## 2023-06-28 RX ORDER — HALOPERIDOL 5 MG/ML
2 INJECTION INTRAMUSCULAR
Status: COMPLETED | OUTPATIENT
Start: 2023-06-28 | End: 2023-06-28

## 2023-06-28 RX ADMIN — SODIUM CHLORIDE, PRESERVATIVE FREE 10 ML: 5 INJECTION INTRAVENOUS at 21:07

## 2023-06-28 RX ADMIN — SODIUM CHLORIDE, POTASSIUM CHLORIDE, SODIUM LACTATE AND CALCIUM CHLORIDE 1000 ML: 600; 310; 30; 20 INJECTION, SOLUTION INTRAVENOUS at 12:40

## 2023-06-28 RX ADMIN — DEXTROSE MONOHYDRATE AND SODIUM CHLORIDE: 5; .9 INJECTION, SOLUTION INTRAVENOUS at 21:07

## 2023-06-28 RX ADMIN — SODIUM CHLORIDE, PRESERVATIVE FREE 10 ML: 5 INJECTION INTRAVENOUS at 13:58

## 2023-06-28 RX ADMIN — ONDANSETRON 4 MG: 2 INJECTION INTRAMUSCULAR; INTRAVENOUS at 12:41

## 2023-06-28 RX ADMIN — LABETALOL HYDROCHLORIDE 10 MG: 5 INJECTION INTRAVENOUS at 16:42

## 2023-06-28 RX ADMIN — IOPAMIDOL 100 ML: 755 INJECTION, SOLUTION INTRAVENOUS at 13:55

## 2023-06-28 RX ADMIN — PANTOPRAZOLE SODIUM 40 MG: 40 INJECTION, POWDER, FOR SOLUTION INTRAVENOUS at 15:42

## 2023-06-28 RX ADMIN — SODIUM BICARBONATE 50 MEQ: 84 INJECTION, SOLUTION INTRAVENOUS at 15:46

## 2023-06-28 RX ADMIN — HALOPERIDOL LACTATE 2 MG: 5 INJECTION, SOLUTION INTRAMUSCULAR at 13:17

## 2023-06-28 RX ADMIN — SODIUM CHLORIDE 100 ML: 9 INJECTION, SOLUTION INTRAVENOUS at 13:58

## 2023-06-28 RX ADMIN — SODIUM CHLORIDE: 9 INJECTION, SOLUTION INTRAVENOUS at 15:41

## 2023-06-28 ASSESSMENT — ENCOUNTER SYMPTOMS
ABDOMINAL PAIN: 1
VOMITING: 1
SHORTNESS OF BREATH: 0
COUGH: 0
NAUSEA: 1

## 2023-06-28 ASSESSMENT — PAIN DESCRIPTION - LOCATION: LOCATION: ABDOMEN

## 2023-06-28 ASSESSMENT — PAIN - FUNCTIONAL ASSESSMENT: PAIN_FUNCTIONAL_ASSESSMENT: 0-10

## 2023-06-28 ASSESSMENT — PAIN SCALES - GENERAL
PAINLEVEL_OUTOF10: 0
PAINLEVEL_OUTOF10: 10

## 2023-06-29 LAB
AMPHET UR QL SCN: POSITIVE
ANION GAP SERPL CALC-SCNC: 6 MMOL/L (ref 2–11)
BARBITURATES UR QL SCN: NEGATIVE
BASOPHILS # BLD: 0 K/UL (ref 0–0.2)
BASOPHILS NFR BLD: 0 % (ref 0–2)
BENZODIAZ UR QL: NEGATIVE
BUN SERPL-MCNC: 15 MG/DL (ref 6–23)
CALCIUM SERPL-MCNC: 9.7 MG/DL (ref 8.3–10.4)
CANNABINOIDS UR QL SCN: POSITIVE
CHLORIDE SERPL-SCNC: 111 MMOL/L (ref 101–110)
CO2 SERPL-SCNC: 22 MMOL/L (ref 21–32)
COCAINE UR QL SCN: NEGATIVE
CREAT SERPL-MCNC: 1 MG/DL (ref 0.8–1.5)
DIFFERENTIAL METHOD BLD: ABNORMAL
EOSINOPHIL # BLD: 0 K/UL (ref 0–0.8)
EOSINOPHIL NFR BLD: 0 % (ref 0.5–7.8)
ERYTHROCYTE [DISTWIDTH] IN BLOOD BY AUTOMATED COUNT: 13 % (ref 11.9–14.6)
EST. AVERAGE GLUCOSE BLD GHB EST-MCNC: 108 MG/DL
GLUCOSE SERPL-MCNC: 149 MG/DL (ref 65–100)
HBA1C MFR BLD: 5.4 % (ref 4.8–5.6)
HCT VFR BLD AUTO: 44.3 % (ref 41.1–50.3)
HGB BLD-MCNC: 16.5 G/DL (ref 13.6–17.2)
IMM GRANULOCYTES # BLD AUTO: 0.1 K/UL (ref 0–0.5)
IMM GRANULOCYTES NFR BLD AUTO: 0 % (ref 0–5)
LYMPHOCYTES # BLD: 1.7 K/UL (ref 0.5–4.6)
LYMPHOCYTES NFR BLD: 12 % (ref 13–44)
MAGNESIUM SERPL-MCNC: 2.1 MG/DL (ref 1.8–2.4)
MCH RBC QN AUTO: 29.6 PG (ref 26.1–32.9)
MCHC RBC AUTO-ENTMCNC: 37.2 G/DL (ref 31.4–35)
MCV RBC AUTO: 79.5 FL (ref 82–102)
METHADONE UR QL: NEGATIVE
MONOCYTES # BLD: 1.4 K/UL (ref 0.1–1.3)
MONOCYTES NFR BLD: 10 % (ref 4–12)
NEUTS SEG # BLD: 10.7 K/UL (ref 1.7–8.2)
NEUTS SEG NFR BLD: 77 % (ref 43–78)
NRBC # BLD: 0 K/UL (ref 0–0.2)
OPIATES UR QL: NEGATIVE
PCP UR QL: NEGATIVE
PLATELET # BLD AUTO: 389 K/UL (ref 150–450)
PMV BLD AUTO: 8.7 FL (ref 9.4–12.3)
POTASSIUM SERPL-SCNC: 3.3 MMOL/L (ref 3.5–5.1)
RBC # BLD AUTO: 5.57 M/UL (ref 4.23–5.6)
SODIUM SERPL-SCNC: 139 MMOL/L (ref 133–143)
WBC # BLD AUTO: 13.9 K/UL (ref 4.3–11.1)

## 2023-06-29 PROCEDURE — A4216 STERILE WATER/SALINE, 10 ML: HCPCS | Performed by: FAMILY MEDICINE

## 2023-06-29 PROCEDURE — C9113 INJ PANTOPRAZOLE SODIUM, VIA: HCPCS | Performed by: FAMILY MEDICINE

## 2023-06-29 PROCEDURE — 83735 ASSAY OF MAGNESIUM: CPT

## 2023-06-29 PROCEDURE — 1100000003 HC PRIVATE W/ TELEMETRY

## 2023-06-29 PROCEDURE — 36415 COLL VENOUS BLD VENIPUNCTURE: CPT

## 2023-06-29 PROCEDURE — 6370000000 HC RX 637 (ALT 250 FOR IP): Performed by: FAMILY MEDICINE

## 2023-06-29 PROCEDURE — 85025 COMPLETE CBC W/AUTO DIFF WBC: CPT

## 2023-06-29 PROCEDURE — 6370000000 HC RX 637 (ALT 250 FOR IP): Performed by: INTERNAL MEDICINE

## 2023-06-29 PROCEDURE — 6360000002 HC RX W HCPCS: Performed by: FAMILY MEDICINE

## 2023-06-29 PROCEDURE — 2500000003 HC RX 250 WO HCPCS: Performed by: FAMILY MEDICINE

## 2023-06-29 PROCEDURE — 2580000003 HC RX 258: Performed by: FAMILY MEDICINE

## 2023-06-29 PROCEDURE — 80048 BASIC METABOLIC PNL TOTAL CA: CPT

## 2023-06-29 PROCEDURE — 83036 HEMOGLOBIN GLYCOSYLATED A1C: CPT

## 2023-06-29 RX ORDER — POTASSIUM CHLORIDE 20 MEQ/1
40 TABLET, EXTENDED RELEASE ORAL PRN
Status: DISCONTINUED | OUTPATIENT
Start: 2023-06-29 | End: 2023-06-30

## 2023-06-29 RX ORDER — LORAZEPAM 0.5 MG/1
0.5 TABLET ORAL ONCE
Status: DISCONTINUED | OUTPATIENT
Start: 2023-06-29 | End: 2023-06-29

## 2023-06-29 RX ORDER — POTASSIUM CHLORIDE 7.45 MG/ML
10 INJECTION INTRAVENOUS PRN
Status: DISCONTINUED | OUTPATIENT
Start: 2023-06-29 | End: 2023-06-30

## 2023-06-29 RX ORDER — AMLODIPINE BESYLATE 10 MG/1
10 TABLET ORAL DAILY
Status: DISCONTINUED | OUTPATIENT
Start: 2023-06-29 | End: 2023-07-03 | Stop reason: HOSPADM

## 2023-06-29 RX ADMIN — DIPHENHYDRAMINE HYDROCHLORIDE 25 MG: 25 CAPSULE ORAL at 23:33

## 2023-06-29 RX ADMIN — DEXTROSE MONOHYDRATE AND SODIUM CHLORIDE: 5; .9 INJECTION, SOLUTION INTRAVENOUS at 04:58

## 2023-06-29 RX ADMIN — ONDANSETRON 4 MG: 2 INJECTION INTRAMUSCULAR; INTRAVENOUS at 08:24

## 2023-06-29 RX ADMIN — POTASSIUM CHLORIDE 40 MEQ: 1500 TABLET, EXTENDED RELEASE ORAL at 12:34

## 2023-06-29 RX ADMIN — DEXTROSE MONOHYDRATE AND SODIUM CHLORIDE: 5; .9 INJECTION, SOLUTION INTRAVENOUS at 23:04

## 2023-06-29 RX ADMIN — PANTOPRAZOLE SODIUM 40 MG: 40 INJECTION, POWDER, FOR SOLUTION INTRAVENOUS at 04:58

## 2023-06-29 RX ADMIN — AMLODIPINE BESYLATE 10 MG: 10 TABLET ORAL at 14:38

## 2023-06-29 RX ADMIN — SODIUM CHLORIDE, PRESERVATIVE FREE 10 ML: 5 INJECTION INTRAVENOUS at 08:30

## 2023-06-29 RX ADMIN — SODIUM CHLORIDE, PRESERVATIVE FREE 10 ML: 5 INJECTION INTRAVENOUS at 20:26

## 2023-06-29 RX ADMIN — PANTOPRAZOLE SODIUM 40 MG: 40 INJECTION, POWDER, FOR SOLUTION INTRAVENOUS at 14:38

## 2023-06-29 RX ADMIN — LABETALOL HYDROCHLORIDE 10 MG: 5 INJECTION INTRAVENOUS at 08:25

## 2023-06-29 RX ADMIN — ONDANSETRON 4 MG: 2 INJECTION INTRAMUSCULAR; INTRAVENOUS at 20:25

## 2023-06-29 RX ADMIN — DEXTROSE MONOHYDRATE AND SODIUM CHLORIDE: 5; .9 INJECTION, SOLUTION INTRAVENOUS at 12:34

## 2023-06-29 RX ADMIN — LABETALOL HYDROCHLORIDE 10 MG: 5 INJECTION INTRAVENOUS at 00:21

## 2023-06-29 RX ADMIN — ONDANSETRON 4 MG: 2 INJECTION INTRAMUSCULAR; INTRAVENOUS at 14:38

## 2023-06-29 ASSESSMENT — PAIN SCALES - GENERAL
PAINLEVEL_OUTOF10: 0

## 2023-06-30 LAB
ANION GAP SERPL CALC-SCNC: 9 MMOL/L (ref 2–11)
BUN SERPL-MCNC: 16 MG/DL (ref 6–23)
CALCIUM SERPL-MCNC: 9.5 MG/DL (ref 8.3–10.4)
CHLORIDE SERPL-SCNC: 109 MMOL/L (ref 101–110)
CO2 SERPL-SCNC: 23 MMOL/L (ref 21–32)
CREAT SERPL-MCNC: 1 MG/DL (ref 0.8–1.5)
GLUCOSE SERPL-MCNC: 110 MG/DL (ref 65–100)
POTASSIUM SERPL-SCNC: 3.2 MMOL/L (ref 3.5–5.1)
SODIUM SERPL-SCNC: 141 MMOL/L (ref 133–143)

## 2023-06-30 PROCEDURE — 6370000000 HC RX 637 (ALT 250 FOR IP): Performed by: FAMILY MEDICINE

## 2023-06-30 PROCEDURE — 1100000003 HC PRIVATE W/ TELEMETRY

## 2023-06-30 PROCEDURE — 36415 COLL VENOUS BLD VENIPUNCTURE: CPT

## 2023-06-30 PROCEDURE — 80048 BASIC METABOLIC PNL TOTAL CA: CPT

## 2023-06-30 PROCEDURE — 6360000002 HC RX W HCPCS: Performed by: FAMILY MEDICINE

## 2023-06-30 PROCEDURE — C9113 INJ PANTOPRAZOLE SODIUM, VIA: HCPCS | Performed by: FAMILY MEDICINE

## 2023-06-30 PROCEDURE — 2580000003 HC RX 258: Performed by: FAMILY MEDICINE

## 2023-06-30 PROCEDURE — 6370000000 HC RX 637 (ALT 250 FOR IP): Performed by: INTERNAL MEDICINE

## 2023-06-30 PROCEDURE — A4216 STERILE WATER/SALINE, 10 ML: HCPCS | Performed by: FAMILY MEDICINE

## 2023-06-30 RX ORDER — LANOLIN ALCOHOL/MO/W.PET/CERES
6 CREAM (GRAM) TOPICAL NIGHTLY PRN
Status: DISCONTINUED | OUTPATIENT
Start: 2023-06-30 | End: 2023-07-03 | Stop reason: HOSPADM

## 2023-06-30 RX ORDER — LABETALOL 100 MG/1
50 TABLET, FILM COATED ORAL EVERY 8 HOURS SCHEDULED
Status: DISCONTINUED | OUTPATIENT
Start: 2023-06-30 | End: 2023-07-02

## 2023-06-30 RX ORDER — AMITRIPTYLINE HYDROCHLORIDE 10 MG/1
10 TABLET, FILM COATED ORAL NIGHTLY
Status: DISCONTINUED | OUTPATIENT
Start: 2023-06-30 | End: 2023-07-03 | Stop reason: HOSPADM

## 2023-06-30 RX ADMIN — ONDANSETRON 4 MG: 2 INJECTION INTRAMUSCULAR; INTRAVENOUS at 15:19

## 2023-06-30 RX ADMIN — PANTOPRAZOLE SODIUM 40 MG: 40 INJECTION, POWDER, FOR SOLUTION INTRAVENOUS at 15:22

## 2023-06-30 RX ADMIN — SODIUM CHLORIDE, PRESERVATIVE FREE 10 ML: 5 INJECTION INTRAVENOUS at 08:32

## 2023-06-30 RX ADMIN — LABETALOL HYDROCHLORIDE 50 MG: 100 TABLET, FILM COATED ORAL at 15:23

## 2023-06-30 RX ADMIN — SODIUM CHLORIDE, PRESERVATIVE FREE 10 ML: 5 INJECTION INTRAVENOUS at 20:30

## 2023-06-30 RX ADMIN — DEXTROSE MONOHYDRATE AND SODIUM CHLORIDE: 5; .9 INJECTION, SOLUTION INTRAVENOUS at 19:15

## 2023-06-30 RX ADMIN — POTASSIUM BICARBONATE 40 MEQ: 391 TABLET, EFFERVESCENT ORAL at 11:45

## 2023-06-30 RX ADMIN — ONDANSETRON 4 MG: 2 INJECTION INTRAMUSCULAR; INTRAVENOUS at 21:12

## 2023-06-30 RX ADMIN — ONDANSETRON 4 MG: 2 INJECTION INTRAMUSCULAR; INTRAVENOUS at 08:24

## 2023-06-30 RX ADMIN — AMITRIPTYLINE HYDROCHLORIDE 10 MG: 10 TABLET, FILM COATED ORAL at 20:31

## 2023-06-30 RX ADMIN — Medication 6 MG: at 22:09

## 2023-06-30 RX ADMIN — AMLODIPINE BESYLATE 10 MG: 10 TABLET ORAL at 09:35

## 2023-06-30 RX ADMIN — LABETALOL HYDROCHLORIDE 50 MG: 100 TABLET, FILM COATED ORAL at 22:09

## 2023-06-30 RX ADMIN — DIPHENHYDRAMINE HYDROCHLORIDE 25 MG: 25 CAPSULE ORAL at 22:09

## 2023-06-30 RX ADMIN — PANTOPRAZOLE SODIUM 40 MG: 40 INJECTION, POWDER, FOR SOLUTION INTRAVENOUS at 02:21

## 2023-06-30 RX ADMIN — POTASSIUM BICARBONATE 40 MEQ: 391 TABLET, EFFERVESCENT ORAL at 20:31

## 2023-06-30 RX ADMIN — ONDANSETRON 4 MG: 2 INJECTION INTRAMUSCULAR; INTRAVENOUS at 02:20

## 2023-06-30 RX ADMIN — DEXTROSE MONOHYDRATE AND SODIUM CHLORIDE: 5; .9 INJECTION, SOLUTION INTRAVENOUS at 09:54

## 2023-06-30 ASSESSMENT — PAIN SCALES - GENERAL
PAINLEVEL_OUTOF10: 0

## 2023-07-01 PROBLEM — R11.15 CYCLICAL VOMITING: Status: ACTIVE | Noted: 2021-06-10

## 2023-07-01 LAB
ANION GAP SERPL CALC-SCNC: 7 MMOL/L (ref 2–11)
BUN SERPL-MCNC: 17 MG/DL (ref 6–23)
CALCIUM SERPL-MCNC: 8.8 MG/DL (ref 8.3–10.4)
CHLORIDE SERPL-SCNC: 108 MMOL/L (ref 101–110)
CO2 SERPL-SCNC: 23 MMOL/L (ref 21–32)
CREAT SERPL-MCNC: 1.1 MG/DL (ref 0.8–1.5)
GLUCOSE SERPL-MCNC: 115 MG/DL (ref 65–100)
POTASSIUM SERPL-SCNC: 3.2 MMOL/L (ref 3.5–5.1)
SODIUM SERPL-SCNC: 138 MMOL/L (ref 133–143)

## 2023-07-01 PROCEDURE — 80048 BASIC METABOLIC PNL TOTAL CA: CPT

## 2023-07-01 PROCEDURE — 6370000000 HC RX 637 (ALT 250 FOR IP): Performed by: FAMILY MEDICINE

## 2023-07-01 PROCEDURE — C9113 INJ PANTOPRAZOLE SODIUM, VIA: HCPCS | Performed by: FAMILY MEDICINE

## 2023-07-01 PROCEDURE — A4216 STERILE WATER/SALINE, 10 ML: HCPCS | Performed by: FAMILY MEDICINE

## 2023-07-01 PROCEDURE — 6360000002 HC RX W HCPCS: Performed by: INTERNAL MEDICINE

## 2023-07-01 PROCEDURE — 6370000000 HC RX 637 (ALT 250 FOR IP): Performed by: INTERNAL MEDICINE

## 2023-07-01 PROCEDURE — 6360000002 HC RX W HCPCS: Performed by: FAMILY MEDICINE

## 2023-07-01 PROCEDURE — 1100000003 HC PRIVATE W/ TELEMETRY

## 2023-07-01 PROCEDURE — 36415 COLL VENOUS BLD VENIPUNCTURE: CPT

## 2023-07-01 PROCEDURE — 2580000003 HC RX 258: Performed by: FAMILY MEDICINE

## 2023-07-01 RX ORDER — SUCRALFATE 1 G/1
1 TABLET ORAL
Status: DISCONTINUED | OUTPATIENT
Start: 2023-07-01 | End: 2023-07-03 | Stop reason: HOSPADM

## 2023-07-01 RX ORDER — POTASSIUM CHLORIDE 7.45 MG/ML
10 INJECTION INTRAVENOUS ONCE
Status: COMPLETED | OUTPATIENT
Start: 2023-07-01 | End: 2023-07-01

## 2023-07-01 RX ORDER — POTASSIUM CHLORIDE 7.45 MG/ML
10 INJECTION INTRAVENOUS
Status: DISPENSED | OUTPATIENT
Start: 2023-07-01 | End: 2023-07-01

## 2023-07-01 RX ADMIN — LABETALOL HYDROCHLORIDE 50 MG: 100 TABLET, FILM COATED ORAL at 22:05

## 2023-07-01 RX ADMIN — Medication 6 MG: at 22:06

## 2023-07-01 RX ADMIN — POTASSIUM CHLORIDE 10 MEQ: 7.46 INJECTION, SOLUTION INTRAVENOUS at 06:42

## 2023-07-01 RX ADMIN — PANTOPRAZOLE SODIUM 40 MG: 40 INJECTION, POWDER, FOR SOLUTION INTRAVENOUS at 05:50

## 2023-07-01 RX ADMIN — DIPHENHYDRAMINE HYDROCHLORIDE 25 MG: 25 CAPSULE ORAL at 22:06

## 2023-07-01 RX ADMIN — POTASSIUM BICARBONATE 40 MEQ: 391 TABLET, EFFERVESCENT ORAL at 15:35

## 2023-07-01 RX ADMIN — DEXTROSE MONOHYDRATE AND SODIUM CHLORIDE: 5; .9 INJECTION, SOLUTION INTRAVENOUS at 15:36

## 2023-07-01 RX ADMIN — LABETALOL HYDROCHLORIDE 50 MG: 100 TABLET, FILM COATED ORAL at 14:39

## 2023-07-01 RX ADMIN — LABETALOL HYDROCHLORIDE 50 MG: 100 TABLET, FILM COATED ORAL at 05:50

## 2023-07-01 RX ADMIN — AMITRIPTYLINE HYDROCHLORIDE 10 MG: 10 TABLET, FILM COATED ORAL at 20:21

## 2023-07-01 RX ADMIN — SODIUM CHLORIDE, PRESERVATIVE FREE 10 ML: 5 INJECTION INTRAVENOUS at 20:21

## 2023-07-01 RX ADMIN — SUCRALFATE 1 G: 1 TABLET ORAL at 16:59

## 2023-07-01 RX ADMIN — POTASSIUM CHLORIDE 10 MEQ: 7.46 INJECTION, SOLUTION INTRAVENOUS at 05:49

## 2023-07-01 RX ADMIN — POTASSIUM CHLORIDE 10 MEQ: 7.46 INJECTION, SOLUTION INTRAVENOUS at 07:56

## 2023-07-01 RX ADMIN — PANTOPRAZOLE SODIUM 40 MG: 40 INJECTION, POWDER, FOR SOLUTION INTRAVENOUS at 16:59

## 2023-07-01 RX ADMIN — DEXTROSE MONOHYDRATE AND SODIUM CHLORIDE: 5; .9 INJECTION, SOLUTION INTRAVENOUS at 04:02

## 2023-07-01 RX ADMIN — POTASSIUM CHLORIDE 10 MEQ: 7.46 INJECTION, SOLUTION INTRAVENOUS at 11:43

## 2023-07-01 RX ADMIN — SODIUM CHLORIDE, PRESERVATIVE FREE 5 ML: 5 INJECTION INTRAVENOUS at 10:19

## 2023-07-01 RX ADMIN — SUCRALFATE 1 G: 1 TABLET ORAL at 20:21

## 2023-07-01 ASSESSMENT — PAIN SCALES - GENERAL
PAINLEVEL_OUTOF10: 0

## 2023-07-02 LAB
ANION GAP SERPL CALC-SCNC: 4 MMOL/L (ref 2–11)
BUN SERPL-MCNC: 19 MG/DL (ref 6–23)
CALCIUM SERPL-MCNC: 8.3 MG/DL (ref 8.3–10.4)
CHLORIDE SERPL-SCNC: 111 MMOL/L (ref 101–110)
CO2 SERPL-SCNC: 22 MMOL/L (ref 21–32)
CREAT SERPL-MCNC: 1 MG/DL (ref 0.8–1.5)
GLUCOSE SERPL-MCNC: 97 MG/DL (ref 65–100)
POTASSIUM SERPL-SCNC: 3 MMOL/L (ref 3.5–5.1)
POTASSIUM SERPL-SCNC: 3.7 MMOL/L (ref 3.5–5.1)
SODIUM SERPL-SCNC: 137 MMOL/L (ref 133–143)

## 2023-07-02 PROCEDURE — 6360000002 HC RX W HCPCS: Performed by: INTERNAL MEDICINE

## 2023-07-02 PROCEDURE — 6370000000 HC RX 637 (ALT 250 FOR IP): Performed by: FAMILY MEDICINE

## 2023-07-02 PROCEDURE — 2580000003 HC RX 258: Performed by: FAMILY MEDICINE

## 2023-07-02 PROCEDURE — 84132 ASSAY OF SERUM POTASSIUM: CPT

## 2023-07-02 PROCEDURE — 36415 COLL VENOUS BLD VENIPUNCTURE: CPT

## 2023-07-02 PROCEDURE — 6360000002 HC RX W HCPCS: Performed by: FAMILY MEDICINE

## 2023-07-02 PROCEDURE — 6370000000 HC RX 637 (ALT 250 FOR IP): Performed by: INTERNAL MEDICINE

## 2023-07-02 PROCEDURE — 80048 BASIC METABOLIC PNL TOTAL CA: CPT

## 2023-07-02 PROCEDURE — C9113 INJ PANTOPRAZOLE SODIUM, VIA: HCPCS | Performed by: FAMILY MEDICINE

## 2023-07-02 PROCEDURE — 1100000003 HC PRIVATE W/ TELEMETRY

## 2023-07-02 PROCEDURE — A4216 STERILE WATER/SALINE, 10 ML: HCPCS | Performed by: FAMILY MEDICINE

## 2023-07-02 RX ORDER — POTASSIUM CHLORIDE 7.45 MG/ML
10 INJECTION INTRAVENOUS
Status: COMPLETED | OUTPATIENT
Start: 2023-07-02 | End: 2023-07-02

## 2023-07-02 RX ORDER — LABETALOL 100 MG/1
50 TABLET, FILM COATED ORAL EVERY 12 HOURS SCHEDULED
Status: DISCONTINUED | OUTPATIENT
Start: 2023-07-02 | End: 2023-07-03 | Stop reason: HOSPADM

## 2023-07-02 RX ADMIN — LABETALOL HYDROCHLORIDE 50 MG: 100 TABLET, FILM COATED ORAL at 20:20

## 2023-07-02 RX ADMIN — POTASSIUM CHLORIDE 10 MEQ: 7.46 INJECTION, SOLUTION INTRAVENOUS at 12:17

## 2023-07-02 RX ADMIN — DIPHENHYDRAMINE HYDROCHLORIDE 25 MG: 25 CAPSULE ORAL at 22:21

## 2023-07-02 RX ADMIN — Medication 6 MG: at 22:21

## 2023-07-02 RX ADMIN — POTASSIUM BICARBONATE 40 MEQ: 391 TABLET, EFFERVESCENT ORAL at 09:37

## 2023-07-02 RX ADMIN — DEXTROSE MONOHYDRATE AND SODIUM CHLORIDE: 5; .9 INJECTION, SOLUTION INTRAVENOUS at 01:09

## 2023-07-02 RX ADMIN — POTASSIUM CHLORIDE 10 MEQ: 7.46 INJECTION, SOLUTION INTRAVENOUS at 17:52

## 2023-07-02 RX ADMIN — SUCRALFATE 1 G: 1 TABLET ORAL at 05:39

## 2023-07-02 RX ADMIN — PANTOPRAZOLE SODIUM 40 MG: 40 INJECTION, POWDER, FOR SOLUTION INTRAVENOUS at 17:43

## 2023-07-02 RX ADMIN — SUCRALFATE 1 G: 1 TABLET ORAL at 16:14

## 2023-07-02 RX ADMIN — PANTOPRAZOLE SODIUM 40 MG: 40 INJECTION, POWDER, FOR SOLUTION INTRAVENOUS at 05:38

## 2023-07-02 RX ADMIN — AMITRIPTYLINE HYDROCHLORIDE 10 MG: 10 TABLET, FILM COATED ORAL at 20:20

## 2023-07-02 RX ADMIN — SUCRALFATE 1 G: 1 TABLET ORAL at 20:20

## 2023-07-02 RX ADMIN — POTASSIUM CHLORIDE 10 MEQ: 7.46 INJECTION, SOLUTION INTRAVENOUS at 13:48

## 2023-07-02 RX ADMIN — SODIUM CHLORIDE, PRESERVATIVE FREE 10 ML: 5 INJECTION INTRAVENOUS at 20:20

## 2023-07-02 RX ADMIN — SUCRALFATE 1 G: 1 TABLET ORAL at 10:43

## 2023-07-02 RX ADMIN — POTASSIUM CHLORIDE 10 MEQ: 7.46 INJECTION, SOLUTION INTRAVENOUS at 16:14

## 2023-07-02 ASSESSMENT — PAIN SCALES - GENERAL
PAINLEVEL_OUTOF10: 0

## 2023-07-03 VITALS
TEMPERATURE: 97.7 F | SYSTOLIC BLOOD PRESSURE: 104 MMHG | OXYGEN SATURATION: 98 % | BODY MASS INDEX: 26.47 KG/M2 | WEIGHT: 140.2 LBS | RESPIRATION RATE: 19 BRPM | HEIGHT: 61 IN | DIASTOLIC BLOOD PRESSURE: 74 MMHG | HEART RATE: 70 BPM

## 2023-07-03 PROCEDURE — 6360000002 HC RX W HCPCS: Performed by: FAMILY MEDICINE

## 2023-07-03 PROCEDURE — 2580000003 HC RX 258: Performed by: FAMILY MEDICINE

## 2023-07-03 PROCEDURE — 6370000000 HC RX 637 (ALT 250 FOR IP): Performed by: INTERNAL MEDICINE

## 2023-07-03 PROCEDURE — C9113 INJ PANTOPRAZOLE SODIUM, VIA: HCPCS | Performed by: FAMILY MEDICINE

## 2023-07-03 PROCEDURE — A4216 STERILE WATER/SALINE, 10 ML: HCPCS | Performed by: FAMILY MEDICINE

## 2023-07-03 RX ADMIN — SODIUM CHLORIDE, PRESERVATIVE FREE 10 ML: 5 INJECTION INTRAVENOUS at 09:31

## 2023-07-03 RX ADMIN — SUCRALFATE 1 G: 1 TABLET ORAL at 05:02

## 2023-07-03 RX ADMIN — PANTOPRAZOLE SODIUM 40 MG: 40 INJECTION, POWDER, FOR SOLUTION INTRAVENOUS at 05:02

## 2023-07-03 ASSESSMENT — PAIN SCALES - GENERAL
PAINLEVEL_OUTOF10: 0
PAINLEVEL_OUTOF10: 0

## 2023-07-03 NOTE — CARE COORDINATION
CM met with patient and his spouse with discharge order placed. No needs identified. CM had given spouse information on [employer] for additional resources. [facility] referral sent for additional mental health resources. Spouse will transport patient home.

## 2023-07-03 NOTE — DISCHARGE SUMMARY
Hospitalist Discharge Summary   Admit Date:  2023 11:38 AM   DC Note date: 7/3/2023  Name:  Sabrina Barrientos   Age:  57 y.o. Sex:  male  :  1975   MRN:  [id number]   Room:  Formerly named [facility]  PCP:  Latrice Lopez MD    Presenting Complaint: Emesis     Initial Admission Diagnosis: Cyclical vomiting [D78.41]  Lactic acidosis [E87.20]  LORNA (acute kidney injury) ([address]) [N17.9]     Problem List for this Hospitalization (present on admission):    Principal Problem:    LORNA (acute kidney injury) ([address])  Active Problems:    SIRS (systemic inflammatory response syndrome) (HCC)    Tetrahydrocannabinol (THC) use disorder, moderate, dependence ([address])    Cannabinoid hyperemesis syndrome    Cyclical vomiting    Hypokalemia    Sickle cell trait ([address])    Low bicarbonate    Hiatal hernia    High serum lactic acid    Uncontrolled hypertension  Resolved Problems:    * No resolved hospital problems. *      Hospital Course:    Sabrina Barrientos is a 57 y.o. male with medical history of   Chronic marijuana use with cyclic vomiting  History of GERD     who presented with abdominal pain and vomiting. According to records, patient has had acid reflux symptoms. He started having abdominal upset with crampy feeling and started having several episodes of vomiting after eating fast food. Patient reported using marijuana 3 days ago. In the emergency room patient was hypertensive and with tachycardia, and was noted to have diaphoresis. No chest pain. No abnormal movements. Patient the patient was found to have some lactic acidosis with low bicarbonate level, tachycardia. Urine shows ketones from dipstick. Chest x-ray shows no abnormality. CT of the abdomen show small hiatal hernia. Patient initially admitted for SIRS. Patient was treated with IV fluid. Symptomatic treatment for cyclic vomiting. He is advised to stop using marijuana. His symptoms gradually improving.   Hospital course is

## 2023-07-03 NOTE — PROGRESS NOTES
Discharge paperwork reviewed with pt. Allowed time for questions and concerns and reviewed which meds to discontinue. Pt voiced understanding. IV removed.

## 2023-07-27 ENCOUNTER — HOSPITAL ENCOUNTER (EMERGENCY)
Age: 48
Discharge: HOME | End: 2023-07-27
Attending: EMERGENCY MEDICINE

## 2023-07-27 VITALS
RESPIRATION RATE: 16 BRPM | HEART RATE: 100 BPM | HEIGHT: 62 IN | DIASTOLIC BLOOD PRESSURE: 104 MMHG | SYSTOLIC BLOOD PRESSURE: 159 MMHG | WEIGHT: 140 LBS | OXYGEN SATURATION: 96 % | TEMPERATURE: 97.9 F | BODY MASS INDEX: 25.76 KG/M2

## 2023-07-27 DIAGNOSIS — R11.15 CYCLICAL VOMITING SYNDROME: Primary | ICD-10-CM

## 2023-07-27 LAB
ALBUMIN SERPL-MCNC: 4.8 G/DL (ref 3.5–5)
ALBUMIN/GLOB SERPL: 1 (ref 0.4–1.6)
ALP SERPL-CCNC: 144 U/L (ref 50–136)
ALT SERPL-CCNC: 22 U/L (ref 12–65)
ANION GAP SERPL CALC-SCNC: 15 MMOL/L (ref 2–11)
ANION GAP SERPL CALC-SCNC: 8 MMOL/L (ref 2–11)
AST SERPL-CCNC: 19 U/L (ref 15–37)
BASOPHILS # BLD: 0 K/UL (ref 0–0.2)
BASOPHILS NFR BLD: 0 % (ref 0–2)
BILIRUB SERPL-MCNC: 1.1 MG/DL (ref 0.2–1.1)
BILIRUB UR QL: ABNORMAL
BUN SERPL-MCNC: 21 MG/DL (ref 6–23)
BUN SERPL-MCNC: 26 MG/DL (ref 6–23)
CALCIUM SERPL-MCNC: 11 MG/DL (ref 8.3–10.4)
CALCIUM SERPL-MCNC: 8.7 MG/DL (ref 8.3–10.4)
CHLORIDE SERPL-SCNC: 107 MMOL/L (ref 101–110)
CHLORIDE SERPL-SCNC: 112 MMOL/L (ref 101–110)
CO2 SERPL-SCNC: 16 MMOL/L (ref 21–32)
CO2 SERPL-SCNC: 22 MMOL/L (ref 21–32)
CREAT SERPL-MCNC: 1 MG/DL (ref 0.8–1.5)
CREAT SERPL-MCNC: 1.5 MG/DL (ref 0.8–1.5)
DIFFERENTIAL METHOD BLD: ABNORMAL
EOSINOPHIL # BLD: 0 K/UL (ref 0–0.8)
EOSINOPHIL NFR BLD: 0 % (ref 0.5–7.8)
ERYTHROCYTE [DISTWIDTH] IN BLOOD BY AUTOMATED COUNT: 13.7 % (ref 11.9–14.6)
GLOBULIN SER CALC-MCNC: 4.7 G/DL (ref 2.8–4.5)
GLUCOSE SERPL-MCNC: 108 MG/DL (ref 65–100)
GLUCOSE SERPL-MCNC: 132 MG/DL (ref 65–100)
GLUCOSE UR QL STRIP.AUTO: NEGATIVE MG/DL
HCT VFR BLD AUTO: 49.1 % (ref 41.1–50.3)
HGB BLD-MCNC: 18.2 G/DL (ref 13.6–17.2)
IMM GRANULOCYTES # BLD AUTO: 0 K/UL (ref 0–0.5)
IMM GRANULOCYTES NFR BLD AUTO: 0 % (ref 0–5)
KETONES UR-MCNC: >160 MG/DL
LACTATE SERPL-SCNC: 1.6 MMOL/L (ref 0.4–2)
LACTATE SERPL-SCNC: 3.9 MMOL/L (ref 0.4–2)
LEUKOCYTE ESTERASE UR QL STRIP: NEGATIVE
LIPASE SERPL-CCNC: 75 U/L (ref 73–393)
LYMPHOCYTES # BLD: 1.7 K/UL (ref 0.5–4.6)
LYMPHOCYTES NFR BLD: 22 % (ref 13–44)
MAGNESIUM SERPL-MCNC: 2.1 MG/DL (ref 1.8–2.4)
MCH RBC QN AUTO: 29.6 PG (ref 26.1–32.9)
MCHC RBC AUTO-ENTMCNC: 37.1 G/DL (ref 31.4–35)
MCV RBC AUTO: 79.8 FL (ref 82–102)
MONOCYTES # BLD: 0.7 K/UL (ref 0.1–1.3)
MONOCYTES NFR BLD: 9 % (ref 4–12)
NEUTS SEG # BLD: 5 K/UL (ref 1.7–8.2)
NEUTS SEG NFR BLD: 68 % (ref 43–78)
NITRITE UR QL: NEGATIVE
NRBC # BLD: 0 K/UL (ref 0–0.2)
PH UR: 7 (ref 5–9)
PLATELET # BLD AUTO: 437 K/UL (ref 150–450)
PMV BLD AUTO: 8.3 FL (ref 9.4–12.3)
POTASSIUM SERPL-SCNC: 3.2 MMOL/L (ref 3.5–5.1)
POTASSIUM SERPL-SCNC: 3.6 MMOL/L (ref 3.5–5.1)
PROCALCITONIN SERPL-MCNC: <0.05 NG/ML (ref 0–0.49)
PROT SERPL-MCNC: 9.5 G/DL (ref 6.3–8.2)
PROT UR QL: >300 MG/DL
RBC # BLD AUTO: 6.15 M/UL (ref 4.23–5.6)
RBC # UR STRIP: ABNORMAL
SERVICE CMNT-IMP: ABNORMAL
SODIUM SERPL-SCNC: 138 MMOL/L (ref 133–143)
SODIUM SERPL-SCNC: 142 MMOL/L (ref 133–143)
SP GR UR: 1.02 (ref 1–1.02)
UROBILINOGEN UR QL: 1 EU/DL (ref 0.2–1)
WBC # BLD AUTO: 7.4 K/UL (ref 4.3–11.1)

## 2023-07-27 PROCEDURE — 81003 URINALYSIS AUTO W/O SCOPE: CPT

## 2023-07-27 PROCEDURE — 80053 COMPREHEN METABOLIC PANEL: CPT

## 2023-07-27 PROCEDURE — 2580000003 HC RX 258: Performed by: NURSE PRACTITIONER

## 2023-07-27 PROCEDURE — 99284 EMERGENCY DEPT VISIT MOD MDM: CPT

## 2023-07-27 PROCEDURE — 6360000002 HC RX W HCPCS: Performed by: NURSE PRACTITIONER

## 2023-07-27 PROCEDURE — C9113 INJ PANTOPRAZOLE SODIUM, VIA: HCPCS | Performed by: NURSE PRACTITIONER

## 2023-07-27 PROCEDURE — A4216 STERILE WATER/SALINE, 10 ML: HCPCS | Performed by: NURSE PRACTITIONER

## 2023-07-27 PROCEDURE — 83735 ASSAY OF MAGNESIUM: CPT

## 2023-07-27 PROCEDURE — 96361 HYDRATE IV INFUSION ADD-ON: CPT

## 2023-07-27 PROCEDURE — 6360000002 HC RX W HCPCS: Performed by: EMERGENCY MEDICINE

## 2023-07-27 PROCEDURE — 85025 COMPLETE CBC W/AUTO DIFF WBC: CPT

## 2023-07-27 PROCEDURE — 83605 ASSAY OF LACTIC ACID: CPT

## 2023-07-27 PROCEDURE — 96374 THER/PROPH/DIAG INJ IV PUSH: CPT

## 2023-07-27 PROCEDURE — 84145 PROCALCITONIN (PCT): CPT

## 2023-07-27 PROCEDURE — 83690 ASSAY OF LIPASE: CPT

## 2023-07-27 PROCEDURE — 96375 TX/PRO/DX INJ NEW DRUG ADDON: CPT

## 2023-07-27 RX ORDER — 0.9 % SODIUM CHLORIDE 0.9 %
1000 INTRAVENOUS SOLUTION INTRAVENOUS
Status: COMPLETED | OUTPATIENT
Start: 2023-07-27 | End: 2023-07-27

## 2023-07-27 RX ORDER — DIPHENHYDRAMINE HYDROCHLORIDE 50 MG/ML
25 INJECTION INTRAMUSCULAR; INTRAVENOUS
Status: COMPLETED | OUTPATIENT
Start: 2023-07-27 | End: 2023-07-27

## 2023-07-27 RX ORDER — ONDANSETRON 2 MG/ML
4 INJECTION INTRAMUSCULAR; INTRAVENOUS
Status: COMPLETED | OUTPATIENT
Start: 2023-07-27 | End: 2023-07-27

## 2023-07-27 RX ORDER — HALOPERIDOL 5 MG/ML
5 INJECTION INTRAMUSCULAR
Status: COMPLETED | OUTPATIENT
Start: 2023-07-27 | End: 2023-07-27

## 2023-07-27 RX ADMIN — SODIUM CHLORIDE 40 MG: 9 INJECTION INTRAMUSCULAR; INTRAVENOUS; SUBCUTANEOUS at 12:43

## 2023-07-27 RX ADMIN — SODIUM CHLORIDE 1000 ML: 9 INJECTION, SOLUTION INTRAVENOUS at 08:39

## 2023-07-27 RX ADMIN — ONDANSETRON 4 MG: 2 INJECTION INTRAMUSCULAR; INTRAVENOUS at 12:43

## 2023-07-27 RX ADMIN — SODIUM CHLORIDE 1000 ML: 9 INJECTION, SOLUTION INTRAVENOUS at 09:57

## 2023-07-27 RX ADMIN — DIPHENHYDRAMINE HYDROCHLORIDE 25 MG: 50 INJECTION, SOLUTION INTRAMUSCULAR; INTRAVENOUS at 08:57

## 2023-07-27 RX ADMIN — HALOPERIDOL LACTATE 5 MG: 5 INJECTION, SOLUTION INTRAMUSCULAR at 08:38

## 2023-07-27 ASSESSMENT — ENCOUNTER SYMPTOMS
VOMITING: 1
NAUSEA: 1
ABDOMINAL PAIN: 1
SHORTNESS OF BREATH: 0
DIARRHEA: 0

## 2023-07-27 ASSESSMENT — PAIN SCALES - GENERAL: PAINLEVEL_OUTOF10: 7

## 2023-07-27 ASSESSMENT — PAIN - FUNCTIONAL ASSESSMENT: PAIN_FUNCTIONAL_ASSESSMENT: 0-10

## 2023-07-27 NOTE — DISCHARGE INSTRUCTIONS
Try to stay hydrated at home. We discussed admission but you prefer to be discharged. Please return to the emergency department immediately for any new, worsening, or concerning symptoms.

## 2023-07-27 NOTE — ED PROVIDER NOTES
Emergency Department Provider Note       PCP: Edgar Noonan MD   Age: 57 y.o. Sex: male     DISPOSITION Decision To Discharge 07/27/2023 02:57:41 PM       ICD-10-CM    1. Cyclical vomiting syndrome  R11.15           Medical Decision Making     Complexity of Problems Addressed:  1 or more acute illnesses that pose a threat to life or bodily function. Data Reviewed and Analyzed:  Category 1:   I independently ordered and reviewed each unique test.  I reviewed external records: ED visit note from an outside group. I reviewed external records: provider visit note from PCP. I reviewed external records: provider visit note from outside specialist.  I reviewed external records: previous lab results from outside ED. Category 2:   I interpreted the labs, urinalysis. Category 3: Discussion of management or test interpretation. 70-year-old male presents emergency department today with complaint of nausea and vomiting. This is a recurrent issue due to cyclical vomiting syndrome. Generalized abdominal tenderness noted on exam but otherwise unremarkable exam today. Will check labs and treat for symptoms. Patient has had relief of symptoms with Haldol in the past.    Elevated lactic acid noted but I do not believe that this is due to an infectious process. Suspect that he is just dry. Receiving IV fluids and will recheck. Improvement of lactic acid after IV fluids. I did offer admission to the patient but he would prefer discharge. He states he has nausea medicines at home. Encouraged oral hydration. We discussed red flag symptoms and strict return precautions. Risk of Complications and/or Morbidity of Patient Management:  Patient was discharged risks and benefits of hospitalization were considered. Shared medical decision making was utilized in creating the patients health plan today. Is this patient to be included in the SEP-1 core measure due to severe sepsis or septic shock?  No

## 2023-07-27 NOTE — ED TRIAGE NOTES
Pt ambulatory to triage with [place] x 4 days. Reports upper abdominal pain with hx hiatal hernia. Recent admission for cyclical vomiting.  Denies fevers, diarrhea

## 2023-07-28 ENCOUNTER — HOSPITAL ENCOUNTER (INPATIENT)
Age: 48
LOS: 1 days | Discharge: HOME | DRG: 391 | End: 2023-07-30
Attending: STUDENT IN AN ORGANIZED HEALTH CARE EDUCATION/TRAINING PROGRAM | Admitting: FAMILY MEDICINE

## 2023-07-28 DIAGNOSIS — R11.2 REFRACTORY NAUSEA AND VOMITING: ICD-10-CM

## 2023-07-28 DIAGNOSIS — R11.2 NAUSEA AND VOMITING, UNSPECIFIED VOMITING TYPE: Primary | ICD-10-CM

## 2023-07-28 DIAGNOSIS — E87.6 HYPOKALEMIA: ICD-10-CM

## 2023-07-28 DIAGNOSIS — K27.9 PUD (PEPTIC ULCER DISEASE): ICD-10-CM

## 2023-07-28 PROCEDURE — 99285 EMERGENCY DEPT VISIT HI MDM: CPT

## 2023-07-28 PROCEDURE — 96374 THER/PROPH/DIAG INJ IV PUSH: CPT

## 2023-07-28 RX ORDER — 0.9 % SODIUM CHLORIDE 0.9 %
1000 INTRAVENOUS SOLUTION INTRAVENOUS
Status: COMPLETED | OUTPATIENT
Start: 2023-07-28 | End: 2023-07-29

## 2023-07-28 RX ORDER — METOCLOPRAMIDE HYDROCHLORIDE 5 MG/ML
10 INJECTION INTRAMUSCULAR; INTRAVENOUS ONCE
Status: COMPLETED | OUTPATIENT
Start: 2023-07-28 | End: 2023-07-29

## 2023-07-28 ASSESSMENT — PAIN SCALES - GENERAL: PAINLEVEL_OUTOF10: 7

## 2023-07-28 ASSESSMENT — PAIN - FUNCTIONAL ASSESSMENT: PAIN_FUNCTIONAL_ASSESSMENT: 0-10

## 2023-07-28 ASSESSMENT — PAIN DESCRIPTION - LOCATION: LOCATION: FLANK

## 2023-07-28 ASSESSMENT — PAIN DESCRIPTION - DESCRIPTORS: DESCRIPTORS: DULL

## 2023-07-28 ASSESSMENT — PAIN DESCRIPTION - ORIENTATION: ORIENTATION: LEFT

## 2023-07-29 ENCOUNTER — APPOINTMENT (OUTPATIENT)
Dept: CT IMAGING | Age: 48
DRG: 391 | End: 2023-07-29

## 2023-07-29 ENCOUNTER — APPOINTMENT (OUTPATIENT)
Dept: GENERAL RADIOLOGY | Age: 48
DRG: 391 | End: 2023-07-29

## 2023-07-29 PROBLEM — R11.2 CANNABINOID HYPEREMESIS SYNDROME: Status: RESOLVED | Noted: 2020-08-02 | Resolved: 2023-07-29

## 2023-07-29 PROBLEM — R65.10 SIRS (SYSTEMIC INFLAMMATORY RESPONSE SYNDROME) (HCC): Status: RESOLVED | Noted: 2021-06-10 | Resolved: 2023-07-29

## 2023-07-29 PROBLEM — E83.52 HYPERCALCEMIA: Status: RESOLVED | Noted: 2022-07-23 | Resolved: 2023-07-29

## 2023-07-29 PROBLEM — R31.9 HEMATURIA: Status: RESOLVED | Noted: 2022-07-23 | Resolved: 2023-07-29

## 2023-07-29 PROBLEM — I10 UNCONTROLLED HYPERTENSION: Status: RESOLVED | Noted: 2023-06-28 | Resolved: 2023-07-29

## 2023-07-29 PROBLEM — F12.90 CANNABINOID HYPEREMESIS SYNDROME: Status: RESOLVED | Noted: 2020-08-02 | Resolved: 2023-07-29

## 2023-07-29 PROBLEM — D72.829 LEUKOCYTOSIS: Status: RESOLVED | Noted: 2022-03-27 | Resolved: 2023-07-29

## 2023-07-29 PROBLEM — R11.2 NAUSEA AND VOMITING: Status: RESOLVED | Noted: 2022-03-28 | Resolved: 2023-07-29

## 2023-07-29 PROBLEM — R11.2 INTRACTABLE NAUSEA AND VOMITING: Status: RESOLVED | Noted: 2020-08-01 | Resolved: 2023-07-29

## 2023-07-29 PROBLEM — R11.2 REFRACTORY NAUSEA AND VOMITING: Status: ACTIVE | Noted: 2023-07-29

## 2023-07-29 PROBLEM — E87.8 LOW BICARBONATE: Status: RESOLVED | Noted: 2023-06-28 | Resolved: 2023-07-29

## 2023-07-29 PROBLEM — K22.10 EROSIVE ESOPHAGITIS: Status: ACTIVE | Noted: 2018-03-11

## 2023-07-29 PROBLEM — E87.20 NORMAL ANION GAP METABOLIC ACIDOSIS: Status: RESOLVED | Noted: 2022-03-27 | Resolved: 2023-07-29

## 2023-07-29 PROBLEM — E87.1 HYPONATREMIA: Status: RESOLVED | Noted: 2022-12-03 | Resolved: 2023-07-29

## 2023-07-29 PROBLEM — N17.9 AKI (ACUTE KIDNEY INJURY) (HCC): Status: RESOLVED | Noted: 2022-07-23 | Resolved: 2023-07-29

## 2023-07-29 PROBLEM — R73.9 HYPERGLYCEMIA: Status: RESOLVED | Noted: 2022-07-23 | Resolved: 2023-07-29

## 2023-07-29 PROBLEM — N19 ACUTE PRERENAL AZOTEMIA: Status: RESOLVED | Noted: 2022-03-27 | Resolved: 2023-07-29

## 2023-07-29 PROBLEM — R74.8 ELEVATED CPK: Status: RESOLVED | Noted: 2022-07-23 | Resolved: 2023-07-29

## 2023-07-29 PROBLEM — R74.8 ELEVATED LIVER ENZYMES: Status: RESOLVED | Noted: 2022-07-23 | Resolved: 2023-07-29

## 2023-07-29 PROBLEM — K92.0 HEMATEMESIS: Status: ACTIVE | Noted: 2018-06-26

## 2023-07-29 LAB
ALBUMIN SERPL-MCNC: 3.8 G/DL (ref 3.5–5)
ALBUMIN SERPL-MCNC: 4.5 G/DL (ref 3.5–5)
ALBUMIN/GLOB SERPL: 1.1 (ref 0.4–1.6)
ALBUMIN/GLOB SERPL: 1.2 (ref 0.4–1.6)
ALP SERPL-CCNC: 105 U/L (ref 50–136)
ALP SERPL-CCNC: 120 U/L (ref 50–136)
ALT SERPL-CCNC: 16 U/L (ref 12–65)
ALT SERPL-CCNC: 18 U/L (ref 12–65)
AMPHET UR QL SCN: NEGATIVE
ANION GAP SERPL CALC-SCNC: 11 MMOL/L (ref 2–11)
ANION GAP SERPL CALC-SCNC: 7 MMOL/L (ref 2–11)
APPEARANCE UR: CLEAR
AST SERPL-CCNC: 10 U/L (ref 15–37)
AST SERPL-CCNC: 16 U/L (ref 15–37)
BACTERIA URNS QL MICRO: NEGATIVE /HPF
BASOPHILS # BLD: 0 K/UL (ref 0–0.2)
BASOPHILS NFR BLD: 0 % (ref 0–2)
BENZODIAZ UR QL: NEGATIVE
BILIRUB SERPL-MCNC: 0.7 MG/DL (ref 0.2–1.1)
BILIRUB SERPL-MCNC: 1 MG/DL (ref 0.2–1.1)
BILIRUB UR QL: NEGATIVE
BUN SERPL-MCNC: 15 MG/DL (ref 6–23)
BUN SERPL-MCNC: 19 MG/DL (ref 6–23)
CALCIUM SERPL-MCNC: 10.1 MG/DL (ref 8.3–10.4)
CALCIUM SERPL-MCNC: 8.8 MG/DL (ref 8.3–10.4)
CANNABINOIDS UR QL SCN: POSITIVE
CASTS URNS QL MICRO: ABNORMAL /LPF
CHLORIDE SERPL-SCNC: 107 MMOL/L (ref 101–110)
CHLORIDE SERPL-SCNC: 109 MMOL/L (ref 101–110)
CO2 SERPL-SCNC: 19 MMOL/L (ref 21–32)
CO2 SERPL-SCNC: 22 MMOL/L (ref 21–32)
COCAINE UR QL SCN: NEGATIVE
COLOR UR: ABNORMAL
CREAT SERPL-MCNC: 1 MG/DL (ref 0.8–1.5)
CREAT SERPL-MCNC: 1.1 MG/DL (ref 0.8–1.5)
DIFFERENTIAL METHOD BLD: ABNORMAL
EKG ATRIAL RATE: 109 BPM
EKG DIAGNOSIS: NORMAL
EKG P AXIS: 70 DEGREES
EKG P-R INTERVAL: 160 MS
EKG Q-T INTERVAL: 350 MS
EKG QRS DURATION: 74 MS
EKG QTC CALCULATION (BAZETT): 470 MS
EKG R AXIS: 65 DEGREES
EKG T AXIS: 42 DEGREES
EKG VENTRICULAR RATE: 108 BPM
EOSINOPHIL # BLD: 0 K/UL (ref 0–0.8)
EOSINOPHIL NFR BLD: 0 % (ref 0.5–7.8)
EPI CELLS #/AREA URNS HPF: ABNORMAL /HPF
ERYTHROCYTE [DISTWIDTH] IN BLOOD BY AUTOMATED COUNT: 13.2 % (ref 11.9–14.6)
GLOBULIN SER CALC-MCNC: 3.5 G/DL (ref 2.8–4.5)
GLOBULIN SER CALC-MCNC: 3.8 G/DL (ref 2.8–4.5)
GLUCOSE SERPL-MCNC: 110 MG/DL (ref 65–100)
GLUCOSE SERPL-MCNC: 110 MG/DL (ref 65–100)
GLUCOSE UR STRIP.AUTO-MCNC: NEGATIVE MG/DL
HCT VFR BLD AUTO: 38.2 % (ref 41.1–50.3)
HCT VFR BLD AUTO: 38.6 % (ref 41.1–50.3)
HCT VFR BLD AUTO: 40.1 % (ref 41.1–50.3)
HCT VFR BLD AUTO: 43.5 % (ref 41.1–50.3)
HGB BLD-MCNC: 14.1 G/DL (ref 13.6–17.2)
HGB BLD-MCNC: 14.3 G/DL (ref 13.6–17.2)
HGB BLD-MCNC: 14.6 G/DL (ref 13.6–17.2)
HGB BLD-MCNC: 16.3 G/DL (ref 13.6–17.2)
HGB UR QL STRIP: ABNORMAL
IMM GRANULOCYTES # BLD AUTO: 0 K/UL (ref 0–0.5)
IMM GRANULOCYTES NFR BLD AUTO: 0 % (ref 0–5)
IRON SERPL-MCNC: 70 UG/DL (ref 35–150)
KETONES UR QL STRIP.AUTO: 40 MG/DL
LEUKOCYTE ESTERASE UR QL STRIP.AUTO: NEGATIVE
LIPASE SERPL-CCNC: 69 U/L (ref 73–393)
LYMPHOCYTES # BLD: 2.2 K/UL (ref 0.5–4.6)
LYMPHOCYTES NFR BLD: 30 % (ref 13–44)
MAGNESIUM SERPL-MCNC: 2.8 MG/DL (ref 1.8–2.4)
MCH RBC QN AUTO: 29.5 PG (ref 26.1–32.9)
MCHC RBC AUTO-ENTMCNC: 37.5 G/DL (ref 31.4–35)
MCV RBC AUTO: 78.7 FL (ref 82–102)
MONOCYTES # BLD: 0.8 K/UL (ref 0.1–1.3)
MONOCYTES NFR BLD: 11 % (ref 4–12)
MUCOUS THREADS URNS QL MICRO: 0 /LPF
NEUTS SEG # BLD: 4.3 K/UL (ref 1.7–8.2)
NEUTS SEG NFR BLD: 58 % (ref 43–78)
NITRITE UR QL STRIP.AUTO: NEGATIVE
NRBC # BLD: 0 K/UL (ref 0–0.2)
OPIATES UR QL: NEGATIVE
PH UR STRIP: 7.5 (ref 5–9)
PLATELET # BLD AUTO: 401 K/UL (ref 150–450)
PMV BLD AUTO: 8.9 FL (ref 9.4–12.3)
POTASSIUM SERPL-SCNC: 2.7 MMOL/L (ref 3.5–5.1)
POTASSIUM SERPL-SCNC: 3.5 MMOL/L (ref 3.5–5.1)
PROT SERPL-MCNC: 7.3 G/DL (ref 6.3–8.2)
PROT SERPL-MCNC: 8.3 G/DL (ref 6.3–8.2)
PROT UR STRIP-MCNC: NEGATIVE MG/DL
RBC # BLD AUTO: 5.53 M/UL (ref 4.23–5.6)
RBC #/AREA URNS HPF: ABNORMAL /HPF
SODIUM SERPL-SCNC: 137 MMOL/L (ref 133–143)
SODIUM SERPL-SCNC: 138 MMOL/L (ref 133–143)
SP GR UR REFRACTOMETRY: >1.035 (ref 1–1.02)
TRANSFERRIN SERPL-MCNC: 179 MG/DL (ref 202–364)
URINE CULTURE IF INDICATED: ABNORMAL
UROBILINOGEN UR QL STRIP.AUTO: 1 EU/DL (ref 0.2–1)
WBC # BLD AUTO: 7.4 K/UL (ref 4.3–11.1)
WBC URNS QL MICRO: ABNORMAL /HPF

## 2023-07-29 PROCEDURE — 2580000003 HC RX 258: Performed by: FAMILY MEDICINE

## 2023-07-29 PROCEDURE — 1100000000 HC RM PRIVATE

## 2023-07-29 PROCEDURE — 74177 CT ABD & PELVIS W/CONTRAST: CPT

## 2023-07-29 PROCEDURE — 96361 HYDRATE IV INFUSION ADD-ON: CPT

## 2023-07-29 PROCEDURE — 83735 ASSAY OF MAGNESIUM: CPT

## 2023-07-29 PROCEDURE — 96374 THER/PROPH/DIAG INJ IV PUSH: CPT

## 2023-07-29 PROCEDURE — 6360000002 HC RX W HCPCS: Performed by: HOSPITALIST

## 2023-07-29 PROCEDURE — 85014 HEMATOCRIT: CPT

## 2023-07-29 PROCEDURE — 6360000002 HC RX W HCPCS: Performed by: STUDENT IN AN ORGANIZED HEALTH CARE EDUCATION/TRAINING PROGRAM

## 2023-07-29 PROCEDURE — 81001 URINALYSIS AUTO W/SCOPE: CPT

## 2023-07-29 PROCEDURE — C9113 INJ PANTOPRAZOLE SODIUM, VIA: HCPCS | Performed by: FAMILY MEDICINE

## 2023-07-29 PROCEDURE — C9113 INJ PANTOPRAZOLE SODIUM, VIA: HCPCS | Performed by: STUDENT IN AN ORGANIZED HEALTH CARE EDUCATION/TRAINING PROGRAM

## 2023-07-29 PROCEDURE — 80307 DRUG TEST PRSMV CHEM ANLYZR: CPT

## 2023-07-29 PROCEDURE — 6370000000 HC RX 637 (ALT 250 FOR IP): Performed by: STUDENT IN AN ORGANIZED HEALTH CARE EDUCATION/TRAINING PROGRAM

## 2023-07-29 PROCEDURE — 2580000003 HC RX 258: Performed by: STUDENT IN AN ORGANIZED HEALTH CARE EDUCATION/TRAINING PROGRAM

## 2023-07-29 PROCEDURE — 2580000003 HC RX 258: Performed by: HOSPITALIST

## 2023-07-29 PROCEDURE — 93010 ELECTROCARDIOGRAM REPORT: CPT | Performed by: INTERNAL MEDICINE

## 2023-07-29 PROCEDURE — 6360000002 HC RX W HCPCS: Performed by: FAMILY MEDICINE

## 2023-07-29 PROCEDURE — 85025 COMPLETE CBC W/AUTO DIFF WBC: CPT

## 2023-07-29 PROCEDURE — 84466 ASSAY OF TRANSFERRIN: CPT

## 2023-07-29 PROCEDURE — 83690 ASSAY OF LIPASE: CPT

## 2023-07-29 PROCEDURE — 74022 RADEX COMPL AQT ABD SERIES: CPT

## 2023-07-29 PROCEDURE — 85018 HEMOGLOBIN: CPT

## 2023-07-29 PROCEDURE — 93005 ELECTROCARDIOGRAM TRACING: CPT | Performed by: STUDENT IN AN ORGANIZED HEALTH CARE EDUCATION/TRAINING PROGRAM

## 2023-07-29 PROCEDURE — 36415 COLL VENOUS BLD VENIPUNCTURE: CPT

## 2023-07-29 PROCEDURE — 80053 COMPREHEN METABOLIC PANEL: CPT

## 2023-07-29 PROCEDURE — 83540 ASSAY OF IRON: CPT

## 2023-07-29 PROCEDURE — 6370000000 HC RX 637 (ALT 250 FOR IP): Performed by: HOSPITALIST

## 2023-07-29 PROCEDURE — 6360000004 HC RX CONTRAST MEDICATION: Performed by: FAMILY MEDICINE

## 2023-07-29 PROCEDURE — A4216 STERILE WATER/SALINE, 10 ML: HCPCS | Performed by: STUDENT IN AN ORGANIZED HEALTH CARE EDUCATION/TRAINING PROGRAM

## 2023-07-29 PROCEDURE — 96375 TX/PRO/DX INJ NEW DRUG ADDON: CPT

## 2023-07-29 RX ORDER — ONDANSETRON 2 MG/ML
4 INJECTION INTRAMUSCULAR; INTRAVENOUS EVERY 6 HOURS PRN
Status: DISCONTINUED | OUTPATIENT
Start: 2023-07-29 | End: 2023-07-30 | Stop reason: HOSPADM

## 2023-07-29 RX ORDER — ONDANSETRON 4 MG/1
4 TABLET, ORALLY DISINTEGRATING ORAL EVERY 8 HOURS PRN
Status: DISCONTINUED | OUTPATIENT
Start: 2023-07-29 | End: 2023-07-30 | Stop reason: HOSPADM

## 2023-07-29 RX ORDER — POTASSIUM CHLORIDE 7.45 MG/ML
10 INJECTION INTRAVENOUS ONCE
Status: COMPLETED | OUTPATIENT
Start: 2023-07-29 | End: 2023-07-29

## 2023-07-29 RX ORDER — ACETAMINOPHEN 325 MG/1
650 TABLET ORAL EVERY 6 HOURS PRN
Status: DISCONTINUED | OUTPATIENT
Start: 2023-07-29 | End: 2023-07-30 | Stop reason: HOSPADM

## 2023-07-29 RX ORDER — METOCLOPRAMIDE HYDROCHLORIDE 5 MG/ML
5 INJECTION INTRAMUSCULAR; INTRAVENOUS EVERY 6 HOURS
Status: DISCONTINUED | OUTPATIENT
Start: 2023-07-29 | End: 2023-07-30 | Stop reason: HOSPADM

## 2023-07-29 RX ORDER — SUCRALFATE 1 G/1
1 TABLET ORAL
Status: DISCONTINUED | OUTPATIENT
Start: 2023-07-29 | End: 2023-07-30 | Stop reason: HOSPADM

## 2023-07-29 RX ORDER — SODIUM CHLORIDE 0.9 % (FLUSH) 0.9 %
5-40 SYRINGE (ML) INJECTION EVERY 12 HOURS SCHEDULED
Status: DISCONTINUED | OUTPATIENT
Start: 2023-07-29 | End: 2023-07-30 | Stop reason: HOSPADM

## 2023-07-29 RX ORDER — ACETAMINOPHEN 650 MG/1
650 SUPPOSITORY RECTAL EVERY 6 HOURS PRN
Status: DISCONTINUED | OUTPATIENT
Start: 2023-07-29 | End: 2023-07-30 | Stop reason: HOSPADM

## 2023-07-29 RX ORDER — 0.9 % SODIUM CHLORIDE 0.9 %
100 INTRAVENOUS SOLUTION INTRAVENOUS
Status: DISCONTINUED | OUTPATIENT
Start: 2023-07-29 | End: 2023-07-30 | Stop reason: HOSPADM

## 2023-07-29 RX ORDER — SODIUM CHLORIDE 9 MG/ML
INJECTION, SOLUTION INTRAVENOUS CONTINUOUS
Status: DISCONTINUED | OUTPATIENT
Start: 2023-07-29 | End: 2023-07-30 | Stop reason: HOSPADM

## 2023-07-29 RX ORDER — SODIUM CHLORIDE 0.9 % (FLUSH) 0.9 %
10 SYRINGE (ML) INJECTION
Status: DISCONTINUED | OUTPATIENT
Start: 2023-07-29 | End: 2023-07-30 | Stop reason: HOSPADM

## 2023-07-29 RX ORDER — MAGNESIUM SULFATE IN WATER 40 MG/ML
2000 INJECTION, SOLUTION INTRAVENOUS ONCE
Status: COMPLETED | OUTPATIENT
Start: 2023-07-29 | End: 2023-07-29

## 2023-07-29 RX ORDER — SODIUM CHLORIDE 0.9 % (FLUSH) 0.9 %
5-40 SYRINGE (ML) INJECTION PRN
Status: DISCONTINUED | OUTPATIENT
Start: 2023-07-29 | End: 2023-07-30 | Stop reason: HOSPADM

## 2023-07-29 RX ORDER — POLYETHYLENE GLYCOL 3350 17 G/17G
17 POWDER, FOR SOLUTION ORAL 2 TIMES DAILY
Status: DISCONTINUED | OUTPATIENT
Start: 2023-07-29 | End: 2023-07-30 | Stop reason: HOSPADM

## 2023-07-29 RX ORDER — POTASSIUM CHLORIDE 20 MEQ/1
40 TABLET, EXTENDED RELEASE ORAL
Status: COMPLETED | OUTPATIENT
Start: 2023-07-29 | End: 2023-07-29

## 2023-07-29 RX ORDER — SODIUM CHLORIDE 9 MG/ML
INJECTION, SOLUTION INTRAVENOUS PRN
Status: DISCONTINUED | OUTPATIENT
Start: 2023-07-29 | End: 2023-07-30 | Stop reason: HOSPADM

## 2023-07-29 RX ORDER — HALOPERIDOL 5 MG/ML
3 INJECTION INTRAMUSCULAR
Status: COMPLETED | OUTPATIENT
Start: 2023-07-29 | End: 2023-07-29

## 2023-07-29 RX ORDER — SODIUM CHLORIDE 9 MG/ML
INJECTION, SOLUTION INTRAVENOUS CONTINUOUS
Status: DISCONTINUED | OUTPATIENT
Start: 2023-07-29 | End: 2023-07-29 | Stop reason: SDUPTHER

## 2023-07-29 RX ADMIN — SODIUM CHLORIDE: 9 INJECTION, SOLUTION INTRAVENOUS at 18:27

## 2023-07-29 RX ADMIN — METOCLOPRAMIDE 10 MG: 5 INJECTION, SOLUTION INTRAMUSCULAR; INTRAVENOUS at 00:12

## 2023-07-29 RX ADMIN — SODIUM CHLORIDE 80 MG: 9 INJECTION INTRAMUSCULAR; INTRAVENOUS; SUBCUTANEOUS at 02:09

## 2023-07-29 RX ADMIN — MAGNESIUM SULFATE HEPTAHYDRATE 2000 MG: 40 INJECTION, SOLUTION INTRAVENOUS at 02:14

## 2023-07-29 RX ADMIN — SODIUM CHLORIDE, PRESERVATIVE FREE 40 MG: 5 INJECTION INTRAVENOUS at 14:30

## 2023-07-29 RX ADMIN — SODIUM CHLORIDE, PRESERVATIVE FREE 10 ML: 5 INJECTION INTRAVENOUS at 08:30

## 2023-07-29 RX ADMIN — SODIUM CHLORIDE: 9 INJECTION, SOLUTION INTRAVENOUS at 02:14

## 2023-07-29 RX ADMIN — SODIUM CHLORIDE, PRESERVATIVE FREE 10 ML: 5 INJECTION INTRAVENOUS at 21:44

## 2023-07-29 RX ADMIN — METOCLOPRAMIDE HYDROCHLORIDE 5 MG: 5 INJECTION INTRAMUSCULAR; INTRAVENOUS at 14:30

## 2023-07-29 RX ADMIN — SODIUM CHLORIDE: 9 INJECTION, SOLUTION INTRAVENOUS at 08:34

## 2023-07-29 RX ADMIN — SUCRALFATE 1 G: 1 TABLET ORAL at 21:43

## 2023-07-29 RX ADMIN — METOCLOPRAMIDE HYDROCHLORIDE 5 MG: 5 INJECTION INTRAMUSCULAR; INTRAVENOUS at 11:55

## 2023-07-29 RX ADMIN — SUCRALFATE 1 G: 1 TABLET ORAL at 16:31

## 2023-07-29 RX ADMIN — SUCRALFATE 1 G: 1 TABLET ORAL at 11:55

## 2023-07-29 RX ADMIN — POTASSIUM CHLORIDE 40 MEQ: 1500 TABLET, EXTENDED RELEASE ORAL at 03:32

## 2023-07-29 RX ADMIN — DIATRIZOATE MEGLUMINE AND DIATRIZOATE SODIUM 15 ML: 660; 100 LIQUID ORAL; RECTAL at 01:55

## 2023-07-29 RX ADMIN — POTASSIUM CHLORIDE 10 MEQ: 7.46 INJECTION, SOLUTION INTRAVENOUS at 02:14

## 2023-07-29 RX ADMIN — HALOPERIDOL LACTATE 3 MG: 5 INJECTION, SOLUTION INTRAMUSCULAR at 02:14

## 2023-07-29 RX ADMIN — POLYETHYLENE GLYCOL 3350 17 G: 17 POWDER, FOR SOLUTION ORAL at 08:30

## 2023-07-29 RX ADMIN — METOCLOPRAMIDE HYDROCHLORIDE 5 MG: 5 INJECTION INTRAMUSCULAR; INTRAVENOUS at 21:43

## 2023-07-29 RX ADMIN — IOPAMIDOL 100 ML: 755 INJECTION, SOLUTION INTRAVENOUS at 02:27

## 2023-07-29 RX ADMIN — ONDANSETRON 4 MG: 2 INJECTION INTRAMUSCULAR; INTRAVENOUS at 19:20

## 2023-07-29 RX ADMIN — POTASSIUM CHLORIDE 40 MEQ: 1500 TABLET, EXTENDED RELEASE ORAL at 02:20

## 2023-07-29 RX ADMIN — SODIUM CHLORIDE 1000 ML: 9 INJECTION, SOLUTION INTRAVENOUS at 00:17

## 2023-07-29 ASSESSMENT — PAIN SCALES - GENERAL: PAINLEVEL_OUTOF10: 0

## 2023-07-29 NOTE — ED TRIAGE NOTES
Patient presents ambulatory to triage in moderate distress, spiting up and hiccuping during triage. Pt here for vomiting. Was seen here two days ago for the same. Pt wife sts he has been unable to keep anything down since Monday, and that today he had an instance of vomiting bright red blood, followed by an episode of coffee-ground emesis. Pt has significant GI hx including gastric ulcers and gastroparesis.

## 2023-07-29 NOTE — PROGRESS NOTES
Patient has arrived to the floor via transport on room air. The patient has normal saline running at 125 ml an hour. Physical assessment and dual skin assessment have been completed and recorded in the chart. The patient is comfortably resting in bed, wife at bedside. Respirations are present and even, no acute needs noted at this time, patient care plan ongoing.

## 2023-07-29 NOTE — ED NOTES
Gene Sawyer MD responded with a secure message stating to continue to CT without PO contrast. CT staff notified at this time.       Wendie Dallas RN  07/29/23 [id number] LESTER Patel RN  07/29/23 5986

## 2023-07-29 NOTE — H&P
[facility] Initial History and Physical Note    Patient: Jami Ortega Date: 7/29/2023  male, 57 y.o. Admit Date: 7/28/2023  Attending: Kirti Modi MD     ASSESSMENT AND PLAN:     Principal Problem:    Refractory nausea and vomiting  Plan: Likely 2/2 cannabis hyperemsis syndrome. Not improved with IV antiemetics in ER. Admit to med/surg. IVF, IV compazine/zofran. IV pain control. Active Problems:    Cannabis hyperemesis syndrome concurrent with and due to cannabis abuse ([address])  Plan: Counseling. Treatment per above    Hematemesis  Plan: NPO. IVF, IV protonix. Consult GI re: possible EGD. Hypokalemia  Plan: Replace IV, follow BMP    Tobacco use  Plan: Stable. Tetrahydrocannabinol (THC) use disorder, moderate, dependence ([address])  Plan: Per above    Erosive esophagitis  Plan: Per above    Anxiety  Plan: Stable. Continue home meds. Hypertension  Plan: Stable. Continue home meds. DVT Prophylaxis: SCDs, hold anticoagulant given GI bleed      Code Status: FULL CODE      Disposition: Admit to med/surg for evaluation and treatment as per above.       Anticipated discharge: 2-3 days     CHIEF COMPLAINT:  nausea, vomiting    HISTORY OF PRESENT ILLNESS:      Patient Active Problem List   Diagnosis    Nondiabetic gastroparesis    Tobacco abuse    H/O hiatal hernia    Tetrahydrocannabinol (THC) use disorder, moderate, dependence (HCC)    PUD (peptic ulcer disease)    Cannabis hyperemesis syndrome concurrent with and due to cannabis abuse (HCC)    Cyclical vomiting    Mild protein-calorie malnutrition (HCC)    Erosive esophagitis    Hypokalemia    Rhabdomyolysis    Polysubstance abuse (HCC)    Esophageal reflux    Anxiety    Sickle cell trait (HCC)    Hematemesis    Lactic acidosis    Sinus tachycardia    GI bleed    Hypertension    Tobacco use    Hiatal hernia    High serum lactic acid    Refractory nausea and vomiting       Dmitri Alcantara is a 57 y.o. male, with a history of  has a past

## 2023-07-29 NOTE — ED NOTES
TRANSFER - OUT REPORT:    Verbal report given to Jeannine Camarillo RN on Dmitri Trevino  being transferred to [phone] for routine progression of patient care       Report consisted of patient's Situation, Background, Assessment and   Recommendations(SBAR). Information from the following report(s) ED SBAR was reviewed with the receiving nurse. [place] Assessment:    Presents to emergency department  because of falls (Syncope, seizure, or loss of consciousness): No  Age > 70: No  Altered Mental Status, Intoxication with alcohol or substance confusion (Disorientation, impaired judgment, poor safety awaremess, or inability to follow instructions): No  Impaired Mobility: Ambulates or transfers with assistive devices or assistance; Unable to ambulate or transer.: No             Lines:   Peripheral IV 07/29/23 Left;Posterior Hand (Active)   Site Assessment Clean, dry & intact 07/29/23 0009   Line Status Blood return noted 07/29/23 0009   Phlebitis Assessment No symptoms 07/29/23 0009   Infiltration Assessment 0 07/29/23 0009   Alcohol Cap Used No 07/29/23 0009   Dressing Status New dressing applied 07/29/23 0009   Dressing Type Transparent 07/29/23 0009   Dressing Intervention New 07/29/23 0009        Opportunity for questions and clarification was provided.       Patient transported with:  Elier Grayson RN  07/29/23 1292

## 2023-07-29 NOTE — ED NOTES
Patient stated he vomited up his gastrografin after it was administered by CT staff. Oskar Olsen MD notified via secure message. Waiting for response at this time.       Bill Hernandez RN  07/29/23 5128

## 2023-07-29 NOTE — PROGRESS NOTES
Patient is a 43-year-old -American male with history of marijuana abuse, hyperemesis cannabis syndrome, admitted on 7/29 with refractory nausea vomiting going on for past few days. Patient was admitted for similar problem a month ago. Patient stated that since last discharge, he has only smoked marijuana once. Plan to continue IV Protonix with as needed antiemetics and IV fluids. Patient blood work is unremarkable. Patient is currently hemodynamically stable. We will repeat blood work on 7/30/2023. Patient not billed for this visit.

## 2023-07-29 NOTE — ED PROVIDER NOTES
administration in time range)   magnesium sulfate 2000 mg in 50 mL IVPB premix (has no administration in time range)   potassium chloride (KLOR-CON M) extended release tablet 40 mEq (has no administration in time range)   pantoprazole (PROTONIX) 80 mg in sodium chloride (PF) 0.9 % 20 mL injection (has no administration in time range)   haloperidol lactate (HALDOL) injection 3 mg (has no administration in time range)   metoclopramide (REGLAN) injection 10 mg (10 mg IntraVENous Given 7/29/23 0012)   sodium chloride 0.9 % bolus 1,000 mL (1,000 mLs IntraVENous New Bag 7/29/23 0017)       New Prescriptions    No medications on file        Past Medical History:   Diagnosis Date    LORNA (acute kidney injury) ([address]) 8/12/2014    LORNA (acute kidney injury) ([address]) 7/23/2022    Clostridium difficile colitis 3/20/2011    Gastrointestinal disorder SINCE 1999    Gastroparesis 2005    emptying study normal -2006    GERD (gastroesophageal reflux disease)     PUD (peptic ulcer disease) ALL DX IN 2008    ESOPHAGITIS, + H PYLORI    Uncontrolled hypertension 6/26/2018        Past Surgical History:   Procedure Laterality Date    COLONOSCOPY  4/08    ESOPHAGITIS, COLONIC ULCER X1    UPPER GASTROINTESTINAL ENDOSCOPY  4/08    H. HERNIA, ULCER X2, H PYLORI,    UPPER GASTROINTESTINAL ENDOSCOPY  2011    h pylori -neg    WISDOM TOOTH EXTRACTION  2/10/11        Social History     Socioeconomic History    Marital status:    Tobacco Use    Smoking status: Every Day     Packs/day: 0.25     Types: Cigarettes    Smokeless tobacco: Never   Substance and Sexual Activity    Alcohol use: No    Drug use: Yes     Types: Marijuana Jenny Larve)   Social History Narrative    3/17/11:  PATIENT LIVES WITH GIRLFRIEND, SHIVANI (CELL: [phone]), HER 3YEAR OLD DAUGHTER AND THEIR 3YEAR OLD SON. SHIVANI IS CURRENTLY 13 WEEKS PREGNANT.    PATIENT'S JOB AT [employer] WAS DOWNSIZED ABOUT A YEAR AGO, AND HE HAS         Previous Medications

## 2023-07-29 NOTE — PROGRESS NOTES
If we can be of any service to you during your stay please let us know      Nilam Calvin     [phone]

## 2023-07-29 NOTE — PROGRESS NOTES
TRANSFER - IN REPORT:    Verbal report received from Collins Brasher on [facility]  being received from ER for routine progression of patient care      Report consisted of patient's Situation, Background, Assessment and   Recommendations(SBAR). Information from the following report(s) Nurse Handoff Report, ED Encounter Summary, ED SBAR, [facility], and Recent Results was reviewed with the receiving nurse. Opportunity for questions and clarification was provided. Assessment completed upon patient's arrival to unit and care assumed.

## 2023-07-30 VITALS
TEMPERATURE: 98.2 F | OXYGEN SATURATION: 95 % | WEIGHT: 140 LBS | DIASTOLIC BLOOD PRESSURE: 94 MMHG | BODY MASS INDEX: 26.43 KG/M2 | HEART RATE: 78 BPM | HEIGHT: 61 IN | RESPIRATION RATE: 18 BRPM | SYSTOLIC BLOOD PRESSURE: 117 MMHG

## 2023-07-30 LAB
ALBUMIN SERPL-MCNC: 3.8 G/DL (ref 3.5–5)
ALBUMIN/GLOB SERPL: 1.2 (ref 0.4–1.6)
ALP SERPL-CCNC: 99 U/L (ref 50–136)
ALT SERPL-CCNC: 17 U/L (ref 12–65)
ANION GAP SERPL CALC-SCNC: 10 MMOL/L (ref 2–11)
ANION GAP SERPL CALC-SCNC: 7 MMOL/L (ref 2–11)
APTT PPP: 26 SEC (ref 24.5–34.2)
AST SERPL-CCNC: 14 U/L (ref 15–37)
BILIRUB SERPL-MCNC: 0.9 MG/DL (ref 0.2–1.1)
BUN SERPL-MCNC: 11 MG/DL (ref 6–23)
BUN SERPL-MCNC: 9 MG/DL (ref 6–23)
CALCIUM SERPL-MCNC: 8.4 MG/DL (ref 8.3–10.4)
CALCIUM SERPL-MCNC: 8.9 MG/DL (ref 8.3–10.4)
CHLORIDE SERPL-SCNC: 109 MMOL/L (ref 101–110)
CHLORIDE SERPL-SCNC: 110 MMOL/L (ref 101–110)
CO2 SERPL-SCNC: 19 MMOL/L (ref 21–32)
CO2 SERPL-SCNC: 22 MMOL/L (ref 21–32)
CREAT SERPL-MCNC: 0.9 MG/DL (ref 0.8–1.5)
CREAT SERPL-MCNC: 1.2 MG/DL (ref 0.8–1.5)
GLOBULIN SER CALC-MCNC: 3.1 G/DL (ref 2.8–4.5)
GLUCOSE SERPL-MCNC: 176 MG/DL (ref 65–100)
GLUCOSE SERPL-MCNC: 93 MG/DL (ref 65–100)
HCT VFR BLD AUTO: 39.6 % (ref 41.1–50.3)
HGB BLD-MCNC: 15 G/DL (ref 13.6–17.2)
INR PPP: 1
MAGNESIUM SERPL-MCNC: 2.1 MG/DL (ref 1.8–2.4)
POTASSIUM SERPL-SCNC: 3 MMOL/L (ref 3.5–5.1)
POTASSIUM SERPL-SCNC: 3.3 MMOL/L (ref 3.5–5.1)
PROT SERPL-MCNC: 6.9 G/DL (ref 6.3–8.2)
PROTHROMBIN TIME: 13.7 SEC (ref 12.6–14.3)
SODIUM SERPL-SCNC: 138 MMOL/L (ref 133–143)
SODIUM SERPL-SCNC: 139 MMOL/L (ref 133–143)

## 2023-07-30 PROCEDURE — 80053 COMPREHEN METABOLIC PANEL: CPT

## 2023-07-30 PROCEDURE — 85018 HEMOGLOBIN: CPT

## 2023-07-30 PROCEDURE — 85610 PROTHROMBIN TIME: CPT

## 2023-07-30 PROCEDURE — 36415 COLL VENOUS BLD VENIPUNCTURE: CPT

## 2023-07-30 PROCEDURE — 85730 THROMBOPLASTIN TIME PARTIAL: CPT

## 2023-07-30 PROCEDURE — 2580000003 HC RX 258: Performed by: FAMILY MEDICINE

## 2023-07-30 PROCEDURE — 6360000002 HC RX W HCPCS: Performed by: HOSPITALIST

## 2023-07-30 PROCEDURE — 83735 ASSAY OF MAGNESIUM: CPT

## 2023-07-30 PROCEDURE — 6370000000 HC RX 637 (ALT 250 FOR IP): Performed by: HOSPITALIST

## 2023-07-30 PROCEDURE — 85014 HEMATOCRIT: CPT

## 2023-07-30 PROCEDURE — C9113 INJ PANTOPRAZOLE SODIUM, VIA: HCPCS | Performed by: FAMILY MEDICINE

## 2023-07-30 PROCEDURE — A4216 STERILE WATER/SALINE, 10 ML: HCPCS | Performed by: FAMILY MEDICINE

## 2023-07-30 PROCEDURE — 2580000003 HC RX 258: Performed by: HOSPITALIST

## 2023-07-30 PROCEDURE — 6360000002 HC RX W HCPCS: Performed by: FAMILY MEDICINE

## 2023-07-30 RX ORDER — SUCRALFATE 1 G/1
1 TABLET ORAL
Qty: 120 TABLET | Refills: 3 | Status: SHIPPED | OUTPATIENT
Start: 2023-07-30

## 2023-07-30 RX ORDER — TRAMADOL HYDROCHLORIDE 50 MG/1
50 TABLET ORAL EVERY 4 HOURS PRN
Qty: 30 TABLET | Refills: 0 | Status: SHIPPED | OUTPATIENT
Start: 2023-07-30 | End: 2023-08-04

## 2023-07-30 RX ORDER — HYDRALAZINE HYDROCHLORIDE 20 MG/ML
10 INJECTION INTRAMUSCULAR; INTRAVENOUS ONCE
Status: COMPLETED | OUTPATIENT
Start: 2023-07-30 | End: 2023-07-30

## 2023-07-30 RX ORDER — POTASSIUM CHLORIDE 20 MEQ/1
40 TABLET, EXTENDED RELEASE ORAL 2 TIMES DAILY WITH MEALS
Status: COMPLETED | OUTPATIENT
Start: 2023-07-30 | End: 2023-07-30

## 2023-07-30 RX ORDER — POTASSIUM CHLORIDE 7.45 MG/ML
10 INJECTION INTRAVENOUS
Status: COMPLETED | OUTPATIENT
Start: 2023-07-30 | End: 2023-07-30

## 2023-07-30 RX ORDER — METOCLOPRAMIDE 10 MG/1
10 TABLET ORAL
Qty: 9 TABLET | Refills: 0 | Status: SHIPPED | OUTPATIENT
Start: 2023-07-30 | End: 2023-08-02

## 2023-07-30 RX ORDER — PROCHLORPERAZINE EDISYLATE 5 MG/ML
5 INJECTION INTRAMUSCULAR; INTRAVENOUS ONCE
Status: COMPLETED | OUTPATIENT
Start: 2023-07-30 | End: 2023-07-30

## 2023-07-30 RX ORDER — PANTOPRAZOLE SODIUM 40 MG/1
40 TABLET, DELAYED RELEASE ORAL 2 TIMES DAILY
Qty: 180 TABLET | Refills: 0 | Status: SHIPPED | OUTPATIENT
Start: 2023-07-30 | End: 2023-10-28

## 2023-07-30 RX ADMIN — SODIUM CHLORIDE: 9 INJECTION, SOLUTION INTRAVENOUS at 05:09

## 2023-07-30 RX ADMIN — ONDANSETRON 4 MG: 2 INJECTION INTRAMUSCULAR; INTRAVENOUS at 14:50

## 2023-07-30 RX ADMIN — POTASSIUM CHLORIDE 10 MEQ: 7.46 INJECTION, SOLUTION INTRAVENOUS at 10:13

## 2023-07-30 RX ADMIN — SUCRALFATE 1 G: 1 TABLET ORAL at 10:13

## 2023-07-30 RX ADMIN — POTASSIUM CHLORIDE 10 MEQ: 7.46 INJECTION, SOLUTION INTRAVENOUS at 09:33

## 2023-07-30 RX ADMIN — POTASSIUM CHLORIDE 40 MEQ: 20 TABLET, EXTENDED RELEASE ORAL at 15:51

## 2023-07-30 RX ADMIN — ONDANSETRON 4 MG: 2 INJECTION INTRAMUSCULAR; INTRAVENOUS at 04:15

## 2023-07-30 RX ADMIN — METOCLOPRAMIDE HYDROCHLORIDE 5 MG: 5 INJECTION INTRAMUSCULAR; INTRAVENOUS at 03:40

## 2023-07-30 RX ADMIN — SUCRALFATE 1 G: 1 TABLET ORAL at 06:22

## 2023-07-30 RX ADMIN — POTASSIUM CHLORIDE 40 MEQ: 20 TABLET, EXTENDED RELEASE ORAL at 09:37

## 2023-07-30 RX ADMIN — POLYETHYLENE GLYCOL 3350 17 G: 17 POWDER, FOR SOLUTION ORAL at 09:37

## 2023-07-30 RX ADMIN — PROCHLORPERAZINE EDISYLATE 5 MG: 5 INJECTION INTRAMUSCULAR; INTRAVENOUS at 09:38

## 2023-07-30 RX ADMIN — HYDRALAZINE HYDROCHLORIDE 10 MG: 20 INJECTION INTRAMUSCULAR; INTRAVENOUS at 05:05

## 2023-07-30 RX ADMIN — SUCRALFATE 1 G: 1 TABLET ORAL at 15:51

## 2023-07-30 RX ADMIN — METOCLOPRAMIDE HYDROCHLORIDE 5 MG: 5 INJECTION INTRAMUSCULAR; INTRAVENOUS at 09:35

## 2023-07-30 RX ADMIN — SODIUM CHLORIDE, PRESERVATIVE FREE 40 MG: 5 INJECTION INTRAVENOUS at 02:15

## 2023-07-30 RX ADMIN — SODIUM CHLORIDE, PRESERVATIVE FREE 10 ML: 5 INJECTION INTRAVENOUS at 09:39

## 2023-07-30 RX ADMIN — MAGNESIUM HYDROXIDE 30 ML: 2400 SUSPENSION ORAL at 15:51

## 2023-07-30 RX ADMIN — POTASSIUM CHLORIDE 10 MEQ: 7.46 INJECTION, SOLUTION INTRAVENOUS at 12:39

## 2023-07-30 RX ADMIN — SODIUM CHLORIDE, PRESERVATIVE FREE 40 MG: 5 INJECTION INTRAVENOUS at 14:50

## 2023-07-30 NOTE — PROGRESS NOTES
[facility] [address] [address]  Phone: [phone]             July 30, 2023    Patient: Mariya Hidalgo   YOB: 1975   Date of Visit: 7/28/2023       To Whom It May Concern:    Odalys Headley was seen and treated in our facility  beginning 7/28/2023 until 07/30/2023      Sincerely,       Shy Jones RN         Signature:__________________________________

## 2023-07-30 NOTE — PROGRESS NOTES
Patient produced 1100 mL of brown emesis. Patient given 4 mg Zofran per prn orders. Patient stated his nausea improved.

## 2023-07-30 NOTE — DISCHARGE SUMMARY
Moist mucous membranes  Neck:  No restricted ROM. Trachea midline  CV:   RRR. No m/r/g. No JVD  Lungs:   CTAB. No wheezing, rhonchi, or rales. Respirations even, unlabored  Abdomen:   Soft, nontender, nondistended. Extremities: Warm and dry. No cyanosis or clubbing. No edema. Skin:     No rashes. Normal coloration  Neuro:  CN II-XII grossly intact. Psych:  Normal mood and affect. Signed:  Stevie Menard MD    Part of this note may have been written by using a voice dictation software. The note has been proof read but may still contain some grammatical/other typographical errors.

## 2023-07-30 NOTE — PROGRESS NOTES
Patient complained of worsening nausea, 4 mg of Zofran given to the patient per prn orders. The patient noticed  a small improvement of symptoms. The patient noted small improvement of symptoms. The patient's blood pressure was recorded at 162/111(128). This RN notified Dr. Sophia Layton, he ordered a one time dose of 10 mg of hydralazine. The patient's blood [pressure came down to 701/23(888). The patient is now resting in his bed respirations present and equal no acute needs noted. Care plan ongoing. Preparing to give report to oncoming nurse.

## 2023-12-01 ENCOUNTER — HOSPITAL ENCOUNTER (INPATIENT)
Age: 48
LOS: 1 days | Discharge: HOME | DRG: 897 | End: 2023-12-02
Attending: EMERGENCY MEDICINE | Admitting: FAMILY MEDICINE

## 2023-12-01 DIAGNOSIS — N28.9 ACUTE RENAL INSUFFICIENCY: ICD-10-CM

## 2023-12-01 DIAGNOSIS — R11.2 NAUSEA AND VOMITING, UNSPECIFIED VOMITING TYPE: Primary | ICD-10-CM

## 2023-12-01 DIAGNOSIS — F12.90 CANNABIS USE DISORDER: ICD-10-CM

## 2023-12-01 LAB
ALBUMIN SERPL-MCNC: 5.4 G/DL (ref 3.5–5)
ALBUMIN/GLOB SERPL: 1.1 (ref 0.4–1.6)
ALP SERPL-CCNC: 136 U/L (ref 50–136)
ALT SERPL-CCNC: 31 U/L (ref 12–65)
AMPHET UR QL SCN: NEGATIVE
ANION GAP SERPL CALC-SCNC: 15 MMOL/L (ref 2–11)
ANION GAP SERPL CALC-SCNC: 6 MMOL/L (ref 2–11)
AST SERPL-CCNC: 40 U/L (ref 15–37)
BASOPHILS # BLD: 0 K/UL (ref 0–0.2)
BASOPHILS NFR BLD: 0 % (ref 0–2)
BENZODIAZ UR QL: NEGATIVE
BILIRUB SERPL-MCNC: 1.3 MG/DL (ref 0.2–1.1)
BUN SERPL-MCNC: 36 MG/DL (ref 6–23)
BUN SERPL-MCNC: 45 MG/DL (ref 6–23)
CALCIUM SERPL-MCNC: 11.5 MG/DL (ref 8.3–10.4)
CALCIUM SERPL-MCNC: 9.7 MG/DL (ref 8.3–10.4)
CANNABINOIDS UR QL SCN: POSITIVE
CHLORIDE SERPL-SCNC: 103 MMOL/L (ref 101–110)
CHLORIDE SERPL-SCNC: 108 MMOL/L (ref 101–110)
CO2 SERPL-SCNC: 17 MMOL/L (ref 21–32)
CO2 SERPL-SCNC: 23 MMOL/L (ref 21–32)
COCAINE UR QL SCN: NEGATIVE
CREAT SERPL-MCNC: 1.6 MG/DL (ref 0.8–1.5)
CREAT SERPL-MCNC: 2.9 MG/DL (ref 0.8–1.5)
DIFFERENTIAL METHOD BLD: ABNORMAL
EOSINOPHIL # BLD: 0 K/UL (ref 0–0.8)
EOSINOPHIL NFR BLD: 0 % (ref 0.5–7.8)
ERYTHROCYTE [DISTWIDTH] IN BLOOD BY AUTOMATED COUNT: 13.2 % (ref 11.9–14.6)
GLOBULIN SER CALC-MCNC: 4.7 G/DL (ref 2.8–4.5)
GLUCOSE SERPL-MCNC: 119 MG/DL (ref 65–100)
GLUCOSE SERPL-MCNC: 133 MG/DL (ref 65–100)
HCT VFR BLD AUTO: 47.9 % (ref 41.1–50.3)
HGB BLD-MCNC: 17.9 G/DL (ref 13.6–17.2)
IMM GRANULOCYTES # BLD AUTO: 0.1 K/UL (ref 0–0.5)
IMM GRANULOCYTES NFR BLD AUTO: 1 % (ref 0–5)
LIPASE SERPL-CCNC: 157 U/L (ref 73–393)
LYMPHOCYTES # BLD: 2.5 K/UL (ref 0.5–4.6)
LYMPHOCYTES NFR BLD: 17 % (ref 13–44)
MCH RBC QN AUTO: 29.1 PG (ref 26.1–32.9)
MCHC RBC AUTO-ENTMCNC: 37.4 G/DL (ref 31.4–35)
MCV RBC AUTO: 77.9 FL (ref 82–102)
MONOCYTES # BLD: 1.6 K/UL (ref 0.1–1.3)
MONOCYTES NFR BLD: 11 % (ref 4–12)
NEUTS SEG # BLD: 10.5 K/UL (ref 1.7–8.2)
NEUTS SEG NFR BLD: 71 % (ref 43–78)
NRBC # BLD: 0 K/UL (ref 0–0.2)
OPIATES UR QL: NEGATIVE
PLATELET # BLD AUTO: 439 K/UL (ref 150–450)
PMV BLD AUTO: 9.4 FL (ref 9.4–12.3)
POTASSIUM SERPL-SCNC: 3.4 MMOL/L (ref 3.5–5.1)
POTASSIUM SERPL-SCNC: 3.5 MMOL/L (ref 3.5–5.1)
PROT SERPL-MCNC: 10.1 G/DL (ref 6.3–8.2)
RBC # BLD AUTO: 6.15 M/UL (ref 4.23–5.6)
SODIUM SERPL-SCNC: 135 MMOL/L (ref 133–143)
SODIUM SERPL-SCNC: 137 MMOL/L (ref 133–143)
WBC # BLD AUTO: 14.7 K/UL (ref 4.3–11.1)

## 2023-12-01 PROCEDURE — A4216 STERILE WATER/SALINE, 10 ML: HCPCS | Performed by: EMERGENCY MEDICINE

## 2023-12-01 PROCEDURE — 36415 COLL VENOUS BLD VENIPUNCTURE: CPT

## 2023-12-01 PROCEDURE — C9113 INJ PANTOPRAZOLE SODIUM, VIA: HCPCS | Performed by: EMERGENCY MEDICINE

## 2023-12-01 PROCEDURE — 96374 THER/PROPH/DIAG INJ IV PUSH: CPT

## 2023-12-01 PROCEDURE — 6360000002 HC RX W HCPCS: Performed by: EMERGENCY MEDICINE

## 2023-12-01 PROCEDURE — 6360000002 HC RX W HCPCS: Performed by: FAMILY MEDICINE

## 2023-12-01 PROCEDURE — 2580000003 HC RX 258: Performed by: EMERGENCY MEDICINE

## 2023-12-01 PROCEDURE — 99285 EMERGENCY DEPT VISIT HI MDM: CPT

## 2023-12-01 PROCEDURE — 85025 COMPLETE CBC W/AUTO DIFF WBC: CPT

## 2023-12-01 PROCEDURE — 96375 TX/PRO/DX INJ NEW DRUG ADDON: CPT

## 2023-12-01 PROCEDURE — 2500000003 HC RX 250 WO HCPCS: Performed by: FAMILY MEDICINE

## 2023-12-01 PROCEDURE — 96361 HYDRATE IV INFUSION ADD-ON: CPT

## 2023-12-01 PROCEDURE — 2580000003 HC RX 258: Performed by: FAMILY MEDICINE

## 2023-12-01 PROCEDURE — 80053 COMPREHEN METABOLIC PANEL: CPT

## 2023-12-01 PROCEDURE — 1100000000 HC RM PRIVATE

## 2023-12-01 PROCEDURE — 83690 ASSAY OF LIPASE: CPT

## 2023-12-01 PROCEDURE — 80307 DRUG TEST PRSMV CHEM ANLYZR: CPT

## 2023-12-01 RX ORDER — SODIUM CHLORIDE, SODIUM LACTATE, POTASSIUM CHLORIDE, AND CALCIUM CHLORIDE .6; .31; .03; .02 G/100ML; G/100ML; G/100ML; G/100ML
1000 INJECTION, SOLUTION INTRAVENOUS ONCE
Status: COMPLETED | OUTPATIENT
Start: 2023-12-01 | End: 2023-12-01

## 2023-12-01 RX ORDER — HALOPERIDOL 5 MG/ML
5 INJECTION INTRAMUSCULAR EVERY 6 HOURS PRN
Status: DISCONTINUED | OUTPATIENT
Start: 2023-12-01 | End: 2023-12-01

## 2023-12-01 RX ORDER — POLYETHYLENE GLYCOL 3350 17 G/17G
17 POWDER, FOR SOLUTION ORAL DAILY PRN
Status: DISCONTINUED | OUTPATIENT
Start: 2023-12-01 | End: 2023-12-02 | Stop reason: HOSPADM

## 2023-12-01 RX ORDER — ONDANSETRON 4 MG/1
4 TABLET, ORALLY DISINTEGRATING ORAL EVERY 8 HOURS PRN
Status: DISCONTINUED | OUTPATIENT
Start: 2023-12-01 | End: 2023-12-02 | Stop reason: HOSPADM

## 2023-12-01 RX ORDER — SODIUM CHLORIDE 0.9 % (FLUSH) 0.9 %
5-40 SYRINGE (ML) INJECTION EVERY 12 HOURS SCHEDULED
Status: DISCONTINUED | OUTPATIENT
Start: 2023-12-01 | End: 2023-12-02 | Stop reason: HOSPADM

## 2023-12-01 RX ORDER — SODIUM CHLORIDE 9 MG/ML
INJECTION, SOLUTION INTRAVENOUS PRN
Status: DISCONTINUED | OUTPATIENT
Start: 2023-12-01 | End: 2023-12-02 | Stop reason: HOSPADM

## 2023-12-01 RX ORDER — ONDANSETRON 2 MG/ML
4 INJECTION INTRAMUSCULAR; INTRAVENOUS
Status: COMPLETED | OUTPATIENT
Start: 2023-12-01 | End: 2023-12-01

## 2023-12-01 RX ORDER — FAMOTIDINE 20 MG/1
10 TABLET, FILM COATED ORAL DAILY PRN
Status: DISCONTINUED | OUTPATIENT
Start: 2023-12-01 | End: 2023-12-02 | Stop reason: HOSPADM

## 2023-12-01 RX ORDER — MAGNESIUM HYDROXIDE/ALUMINUM HYDROXICE/SIMETHICONE 120; 1200; 1200 MG/30ML; MG/30ML; MG/30ML
30 SUSPENSION ORAL EVERY 6 HOURS PRN
Status: DISCONTINUED | OUTPATIENT
Start: 2023-12-01 | End: 2023-12-02 | Stop reason: HOSPADM

## 2023-12-01 RX ORDER — SODIUM CHLORIDE 0.9 % (FLUSH) 0.9 %
5-40 SYRINGE (ML) INJECTION PRN
Status: DISCONTINUED | OUTPATIENT
Start: 2023-12-01 | End: 2023-12-02 | Stop reason: HOSPADM

## 2023-12-01 RX ORDER — BISACODYL 10 MG
10 SUPPOSITORY, RECTAL RECTAL DAILY PRN
Status: DISCONTINUED | OUTPATIENT
Start: 2023-12-01 | End: 2023-12-02 | Stop reason: HOSPADM

## 2023-12-01 RX ORDER — ONDANSETRON 2 MG/ML
4 INJECTION INTRAMUSCULAR; INTRAVENOUS EVERY 6 HOURS PRN
Status: DISCONTINUED | OUTPATIENT
Start: 2023-12-01 | End: 2023-12-02 | Stop reason: HOSPADM

## 2023-12-01 RX ORDER — ACETAMINOPHEN 650 MG/1
650 SUPPOSITORY RECTAL EVERY 6 HOURS PRN
Status: DISCONTINUED | OUTPATIENT
Start: 2023-12-01 | End: 2023-12-02 | Stop reason: HOSPADM

## 2023-12-01 RX ORDER — ACETAMINOPHEN 325 MG/1
650 TABLET ORAL EVERY 6 HOURS PRN
Status: DISCONTINUED | OUTPATIENT
Start: 2023-12-01 | End: 2023-12-02 | Stop reason: HOSPADM

## 2023-12-01 RX ORDER — 0.9 % SODIUM CHLORIDE 0.9 %
1000 INTRAVENOUS SOLUTION INTRAVENOUS ONCE
Status: COMPLETED | OUTPATIENT
Start: 2023-12-01 | End: 2023-12-01

## 2023-12-01 RX ADMIN — SODIUM CHLORIDE 1000 ML: 9 INJECTION, SOLUTION INTRAVENOUS at 13:03

## 2023-12-01 RX ADMIN — SODIUM CHLORIDE, PRESERVATIVE FREE 10 ML: 5 INJECTION INTRAVENOUS at 20:11

## 2023-12-01 RX ADMIN — PANTOPRAZOLE SODIUM 40 MG: 40 INJECTION, POWDER, FOR SOLUTION INTRAVENOUS at 13:05

## 2023-12-01 RX ADMIN — SODIUM CHLORIDE, POTASSIUM CHLORIDE, SODIUM LACTATE AND CALCIUM CHLORIDE 1000 ML: 600; 310; 30; 20 INJECTION, SOLUTION INTRAVENOUS at 16:30

## 2023-12-01 RX ADMIN — ONDANSETRON 4 MG: 2 INJECTION INTRAMUSCULAR; INTRAVENOUS at 20:26

## 2023-12-01 RX ADMIN — ONDANSETRON 4 MG: 2 INJECTION INTRAMUSCULAR; INTRAVENOUS at 13:04

## 2023-12-01 RX ADMIN — HALOPERIDOL LACTATE 5 MG: 5 INJECTION, SOLUTION INTRAMUSCULAR at 13:04

## 2023-12-01 RX ADMIN — SODIUM BICARBONATE: 84 INJECTION, SOLUTION INTRAVENOUS at 21:26

## 2023-12-01 ASSESSMENT — PAIN - FUNCTIONAL ASSESSMENT: PAIN_FUNCTIONAL_ASSESSMENT: NONE - DENIES PAIN

## 2023-12-01 NOTE — H&P
sodium chloride flush 0.9 % injection 5-40 mL    0.9 % sodium chloride infusion    OR Linked Order Group     ondansetron (ZOFRAN-ODT) disintegrating tablet 4 mg     ondansetron (ZOFRAN) injection 4 mg    polyethylene glycol (GLYCOLAX) packet 17 g    bisacodyl (DULCOLAX) suppository 10 mg    famotidine (PEPCID) tablet 10 mg    aluminum & magnesium hydroxide-simethicone (MAALOX) 200-200-20 MG/5ML suspension 30 mL    OR Linked Order Group     acetaminophen (TYLENOL) tablet 650 mg     acetaminophen (TYLENOL) suppository 650 mg    pantoprazole (PROTONIX) 40 mg in sodium chloride (PF) 0.9 % 10 mL injection       I have personally reviewed labs and tests:  Recent Labs:  Recent Results (from the past 24 hour(s))   CBC with Auto Differential    Collection Time: 12/01/23 12:26 PM   Result Value Ref Range    WBC 14.7 (H) 4.3 - 11.1 K/uL    RBC 6.15 (H) 4.23 - 5.6 M/uL    Hemoglobin 17.9 (H) 13.6 - 17.2 g/dL    Hematocrit 47.9 41.1 - 50.3 %    MCV 77.9 (L) 82 - 102 FL    MCH 29.1 26.1 - 32.9 PG    MCHC 37.4 (H) 31.4 - 35.0 g/dL    RDW 13.2 11.9 - 14.6 %    Platelets 804 758 - 691 K/uL    MPV 9.4 9.4 - 12.3 FL    nRBC 0.00 0.0 - 0.2 K/uL    Differential Type AUTOMATED      Neutrophils % 71 43 - 78 %    Lymphocytes % 17 13 - 44 %    Monocytes % 11 4.0 - 12.0 %    Eosinophils % 0 (L) 0.5 - 7.8 %    Basophils % 0 0.0 - 2.0 %    Immature Granulocytes 1 0.0 - 5.0 %    Neutrophils Absolute 10.5 (H) 1.7 - 8.2 K/UL    Lymphocytes Absolute 2.5 0.5 - 4.6 K/UL    Monocytes Absolute 1.6 (H) 0.1 - 1.3 K/UL    Eosinophils Absolute 0.0 0.0 - 0.8 K/UL    Basophils Absolute 0.0 0.0 - 0.2 K/UL    Absolute Immature Granulocyte 0.1 0.0 - 0.5 K/UL   CMP    Collection Time: 12/01/23 12:26 PM   Result Value Ref Range    Sodium 135 133 - 143 mmol/L    Potassium 3.5 3.5 - 5.1 mmol/L    Chloride 103 101 - 110 mmol/L    CO2 17 (L) 21 - 32 mmol/L    Anion Gap 15 (H) 2 - 11 mmol/L    Glucose 133 (H) 65 - 100 mg/dL    BUN 45 (H) 6 - 23 MG/DL    Creatinine 2.90 (H) 0.8 - 1.5 MG/DL    Est, Glom Filt Rate 26 (L) >60 ml/min/1.73m2    Calcium 11.5 (H) 8.3 - 10.4 MG/DL    Total Bilirubin 1.3 (H) 0.2 - 1.1 MG/DL    ALT 31 12 - 65 U/L    AST 40 (H) 15 - 37 U/L    Alk Phosphatase 136 50 - 136 U/L    Total Protein 10.1 (H) 6.3 - 8.2 g/dL    Albumin 5.4 (H) 3.5 - 5.0 g/dL    Globulin 4.7 (H) 2.8 - 4.5 g/dL    Albumin/Globulin Ratio 1.1 0.4 - 1.6     Lipase    Collection Time: 12/01/23 12:26 PM   Result Value Ref Range    Lipase 157 73 - 393 U/L       I have personally reviewed imaging studies:  No results found. Signed:  Sabina Gordon DO    Part of this note may have been written by using a voice dictation software. The note has been proof read but may still contain some grammatical/other typographical errors.

## 2023-12-01 NOTE — PROGRESS NOTES
TRANSFER - IN REPORT:    Verbal report received from Kandis Palomo RN on [facility]  being received from ED  for routine progression of patient care      Report consisted of patient's Situation, Background, Assessment and   Recommendations(SBAR). Information from the following report(s) Nurse Handoff Report was reviewed with the receiving nurse. Opportunity for questions and clarification was provided. Assessment completed upon patient's arrival to unit and care assumed.

## 2023-12-01 NOTE — ED NOTES
TRANSFER - OUT REPORT:    Verbal report given to [facility] [facility] on [facility]  being transferred to [phone] for routine progression of patient care       Report consisted of patient's Situation, Background, Assessment and   Recommendations(SBAR). Information from the following report(s) ED SBAR was reviewed with the receiving nurse. Olney Fall Assessment:    Presents to emergency department  because of falls (Syncope, seizure, or loss of consciousness): No  Age > 70: No  Altered Mental Status, Intoxication with alcohol or substance confusion (Disorientation, impaired judgment, poor safety awaremess, or inability to follow instructions): No  Impaired Mobility: Ambulates or transfers with assistive devices or assistance; Unable to ambulate or transer.: No  Nursing Judgement: No          Lines:   Peripheral IV 12/01/23 Posterior;Right Hand (Active)       Peripheral IV 12/01/23 Left;Posterior Hand (Active)   Site Assessment Clean, dry & intact 12/01/23 1818   Line Status Blood return noted 12/01/23 1818   Phlebitis Assessment No symptoms 12/01/23 1818   Infiltration Assessment 0 12/01/23 1818        Opportunity for questions and clarification was provided.       Patient transported with:  Bi Hawkins, [address]  12/01/23 1824

## 2023-12-01 NOTE — ED PROVIDER NOTES
Emergency Department Provider Note       PCP: Federico Macario MD   Age: 57 y.o. Sex: male     DISPOSITION       No diagnosis found. Medical Decision Making     Complexity of Problems Addressed:  1 or more acute illnesses that pose a threat to life or bodily function. Data Reviewed and Analyzed:   I independently ordered and reviewed each unique test.  I reviewed external records: Recent hospitalizations or care similar to this              I interpreted the generalized lab work is reviewed patient found to have acute renal insufficiency and other abnormalities related to his recurring vomiting. Discussion of management or test interpretation. Patient with once again recurring vomiting may be related to ongoing cannabis use. I have discussed this and the importance of cessation with patient. May need further workup by GI for other causes as well. Haldol seems to work best for his symptoms historically and improves it on this day. The patient was admitted and I have discussed patient management with the admitting provider. Risk of Complications and/or Morbidity of Patient Management:  Needs to be admitted for hydration evaluation of response to              History      Patient comes in by personal vehicle and accompanied by spouse and history is combination of both of their history. Patient comes in with recurring nausea and vomiting that began on Tuesday. He has had 1 or 2 episodes of coffee-ground emesis. He has had no melena. Noted lower GI symptoms. Does have a history of gastroparesis and cannabis hyperemesis. Has continued to smoke marijuana. Has an emesis bag with him full of watery liquid in the base. Does not give history of pancreatitis. The history is provided by the patient and the spouse. Review of Systems   Constitutional:  Negative for chills and fever. Gastrointestinal:  Positive for abdominal pain, nausea and vomiting.  Negative for anal bleeding, blood in tablet by mouth 4 times daily (before meals and nightly)        No results found for any visits on 12/01/23. No orders to display                     Voice dictation software was used during the making of this note. This software is not perfect and grammatical and other typographical errors may be present. This note has not been completely proofread for errors.

## 2023-12-01 NOTE — ED TRIAGE NOTES
Per patient - nausea and vomiting since Tuesday. Patient has many abdominal problems and anything can triggers it. Vomiting brown color since Wednesday. Children sick at home with flu, but patient test negative at home with flu and covid.  Denies pain, but cramp is present   Patient's wife reports of heart rate being high when he is vomiting and nauseous is normal.

## 2023-12-02 VITALS
WEIGHT: 140 LBS | BODY MASS INDEX: 22.5 KG/M2 | HEART RATE: 85 BPM | SYSTOLIC BLOOD PRESSURE: 114 MMHG | HEIGHT: 66 IN | RESPIRATION RATE: 18 BRPM | DIASTOLIC BLOOD PRESSURE: 89 MMHG | OXYGEN SATURATION: 96 % | TEMPERATURE: 98.4 F

## 2023-12-02 LAB
ALBUMIN SERPL-MCNC: 4.1 G/DL (ref 3.5–5)
ALBUMIN/GLOB SERPL: 1.1 (ref 0.4–1.6)
ALP SERPL-CCNC: 108 U/L (ref 50–136)
ALT SERPL-CCNC: 28 U/L (ref 12–65)
ANION GAP SERPL CALC-SCNC: 8 MMOL/L (ref 2–11)
AST SERPL-CCNC: 35 U/L (ref 15–37)
BASOPHILS # BLD: 0 K/UL (ref 0–0.2)
BASOPHILS NFR BLD: 0 % (ref 0–2)
BILIRUB DIRECT SERPL-MCNC: 0.2 MG/DL
BILIRUB SERPL-MCNC: 0.7 MG/DL (ref 0.2–1.1)
BUN SERPL-MCNC: 23 MG/DL (ref 6–23)
CALCIUM SERPL-MCNC: 9.4 MG/DL (ref 8.3–10.4)
CHLORIDE SERPL-SCNC: 102 MMOL/L (ref 101–110)
CO2 SERPL-SCNC: 27 MMOL/L (ref 21–32)
CREAT SERPL-MCNC: 1.2 MG/DL (ref 0.8–1.5)
DIFFERENTIAL METHOD BLD: ABNORMAL
EOSINOPHIL # BLD: 0 K/UL (ref 0–0.8)
EOSINOPHIL NFR BLD: 0 % (ref 0.5–7.8)
ERYTHROCYTE [DISTWIDTH] IN BLOOD BY AUTOMATED COUNT: 13.2 % (ref 11.9–14.6)
GLOBULIN SER CALC-MCNC: 3.6 G/DL (ref 2.8–4.5)
GLUCOSE SERPL-MCNC: 127 MG/DL (ref 65–100)
HCT VFR BLD AUTO: 40.5 % (ref 41.1–50.3)
HGB BLD-MCNC: 15 G/DL (ref 13.6–17.2)
IMM GRANULOCYTES # BLD AUTO: 0 K/UL (ref 0–0.5)
IMM GRANULOCYTES NFR BLD AUTO: 0 % (ref 0–5)
LYMPHOCYTES # BLD: 1.8 K/UL (ref 0.5–4.6)
LYMPHOCYTES NFR BLD: 16 % (ref 13–44)
MAGNESIUM SERPL-MCNC: 2.4 MG/DL (ref 1.8–2.4)
MCH RBC QN AUTO: 29.4 PG (ref 26.1–32.9)
MCHC RBC AUTO-ENTMCNC: 37 G/DL (ref 31.4–35)
MCV RBC AUTO: 79.4 FL (ref 82–102)
MONOCYTES # BLD: 1.3 K/UL (ref 0.1–1.3)
MONOCYTES NFR BLD: 12 % (ref 4–12)
NEUTS SEG # BLD: 8 K/UL (ref 1.7–8.2)
NEUTS SEG NFR BLD: 72 % (ref 43–78)
NRBC # BLD: 0 K/UL (ref 0–0.2)
PLATELET # BLD AUTO: 346 K/UL (ref 150–450)
PMV BLD AUTO: 9.1 FL (ref 9.4–12.3)
POTASSIUM SERPL-SCNC: 3.5 MMOL/L (ref 3.5–5.1)
PROT SERPL-MCNC: 7.7 G/DL (ref 6.3–8.2)
RBC # BLD AUTO: 5.1 M/UL (ref 4.23–5.6)
SODIUM SERPL-SCNC: 137 MMOL/L (ref 133–143)
WBC # BLD AUTO: 11.1 K/UL (ref 4.3–11.1)

## 2023-12-02 PROCEDURE — 6360000002 HC RX W HCPCS: Performed by: FAMILY MEDICINE

## 2023-12-02 PROCEDURE — 83735 ASSAY OF MAGNESIUM: CPT

## 2023-12-02 PROCEDURE — 36415 COLL VENOUS BLD VENIPUNCTURE: CPT

## 2023-12-02 PROCEDURE — C9113 INJ PANTOPRAZOLE SODIUM, VIA: HCPCS | Performed by: FAMILY MEDICINE

## 2023-12-02 PROCEDURE — 80076 HEPATIC FUNCTION PANEL: CPT

## 2023-12-02 PROCEDURE — 2580000003 HC RX 258: Performed by: FAMILY MEDICINE

## 2023-12-02 PROCEDURE — 2500000003 HC RX 250 WO HCPCS: Performed by: FAMILY MEDICINE

## 2023-12-02 PROCEDURE — A4216 STERILE WATER/SALINE, 10 ML: HCPCS | Performed by: FAMILY MEDICINE

## 2023-12-02 PROCEDURE — 85025 COMPLETE CBC W/AUTO DIFF WBC: CPT

## 2023-12-02 PROCEDURE — 80048 BASIC METABOLIC PNL TOTAL CA: CPT

## 2023-12-02 RX ADMIN — SODIUM CHLORIDE 40 MG: 9 INJECTION INTRAMUSCULAR; INTRAVENOUS; SUBCUTANEOUS at 08:36

## 2023-12-02 RX ADMIN — SODIUM BICARBONATE: 84 INJECTION, SOLUTION INTRAVENOUS at 08:36

## 2023-12-02 RX ADMIN — SODIUM CHLORIDE, PRESERVATIVE FREE 10 ML: 5 INJECTION INTRAVENOUS at 08:38

## 2023-12-02 NOTE — CARE COORDINATION
Pt chart reviewed for discharge planning. CM met with pt at bedside, verified demographic information/ health insurance. Pt lives with spouse in two level home, states independent with ADLs, ambulates with no DME, and drives . PCP was confirmed, last seen in the office six months ago. Pt reports no outside services in the home at this time. Pt is for discharge home today with family and no needs/supportive care orders recieved for CM at this time. 12/01/23 2005   Service Assessment   Patient Orientation Alert and Oriented   Cognition Alert   History Provided By Patient   Support Systems Spouse/Significant Other   Patient's Healthcare Decision Maker is: Legal Next of Kin   PCP Verified by CM No   Prior Functional Level Independent in ADLs/IADLs   Current Functional Level Independent in ADLs/IADLs   Can patient return to prior living arrangement Yes   Ability to make needs known: Good   Family able to assist with home care needs: Yes   Would you like for me to discuss the discharge plan with any other family members/significant others, and if so, who? Yes  (Spouse)   Financial Resources None   Community Resources None   Social/Functional History   Lives With Spouse   Type of [place] Yes   Occupation Self employed   Discharge Planning   Type of [facility] Prior To Admission None   Potential Assistance Needed N/A   DME Ordered? No   Potential Assistance Purchasing Medications No   Type of Home Care Services None   Patient expects to be discharged to: House   One/Two Story Residence Two story   Services At/After Discharge   Transition of Care Consult (CM Consult) Discharge [facility] None   [employer] Information Provided?  No   Mode of Transport at Discharge Other (see comment)  (Spouse)   Confirm Follow Up Transport Family   Condition of Participation: Discharge Planning   The Plan for Transition of Care is related to the following treatment goals: Pt will return home at discharge. The Patient and/Or Patient Representative agree with the Discharge Plan? Yes   Freedom of Choice list was provided with basic dialogue that supports the patient's individualized plan of care/goals, treatment preferences, and shares the quality data associated with the providers?   Yes

## 2023-12-07 ASSESSMENT — ENCOUNTER SYMPTOMS
DIARRHEA: 0
BLOOD IN STOOL: 0
NAUSEA: 1
ABDOMINAL PAIN: 1
VOMITING: 1
CONSTIPATION: 0
ANAL BLEEDING: 0

## 2024-02-02 ENCOUNTER — HOSPITAL ENCOUNTER (INPATIENT)
Age: 49
LOS: 2 days | Discharge: HOME | DRG: 872 | End: 2024-02-04
Attending: EMERGENCY MEDICINE | Admitting: INTERNAL MEDICINE

## 2024-02-02 ENCOUNTER — APPOINTMENT (OUTPATIENT)
Dept: CT IMAGING | Age: 49
DRG: 872 | End: 2024-02-02

## 2024-02-02 ENCOUNTER — ANESTHESIA EVENT (OUTPATIENT)
Dept: ENDOSCOPY | Age: 49
End: 2024-02-02

## 2024-02-02 DIAGNOSIS — N17.9 AKI (ACUTE KIDNEY INJURY) (HCC): ICD-10-CM

## 2024-02-02 DIAGNOSIS — E87.6 HYPOKALEMIA: ICD-10-CM

## 2024-02-02 DIAGNOSIS — R11.15 CYCLICAL VOMITING: Primary | ICD-10-CM

## 2024-02-02 PROBLEM — R11.10 VOMITING: Status: ACTIVE | Noted: 2024-02-02

## 2024-02-02 PROBLEM — A41.9 SEPSIS (HCC): Status: ACTIVE | Noted: 2024-02-02

## 2024-02-02 PROBLEM — R11.10 INTRACTABLE VOMITING: Status: ACTIVE | Noted: 2024-02-02

## 2024-02-02 LAB
ALBUMIN SERPL-MCNC: 5 G/DL (ref 3.5–5)
ALBUMIN/GLOB SERPL: 1.1 (ref 0.4–1.6)
ALP SERPL-CCNC: 136 U/L (ref 50–136)
ALT SERPL-CCNC: 33 U/L (ref 12–65)
AMPHET UR QL SCN: NEGATIVE
ANION GAP SERPL CALC-SCNC: 14 MMOL/L (ref 2–11)
APPEARANCE UR: CLEAR
AST SERPL-CCNC: 40 U/L (ref 15–37)
BACTERIA URNS QL MICRO: NEGATIVE /HPF
BARBITURATES UR QL SCN: NEGATIVE
BASE DEFICIT BLDV-SCNC: 0.8 MMOL/L
BASOPHILS # BLD: 0 K/UL (ref 0–0.2)
BASOPHILS NFR BLD: 0 % (ref 0–2)
BENZODIAZ UR QL: NEGATIVE
BILIRUB SERPL-MCNC: 1.4 MG/DL (ref 0.2–1.1)
BILIRUB UR QL: NEGATIVE
BUN SERPL-MCNC: 50 MG/DL (ref 6–23)
CALCIUM SERPL-MCNC: 10.4 MG/DL (ref 8.3–10.4)
CANNABINOIDS UR QL SCN: POSITIVE
CASTS URNS QL MICRO: ABNORMAL /LPF
CHLORIDE SERPL-SCNC: 102 MMOL/L (ref 103–113)
CK SERPL-CCNC: 1534 U/L (ref 21–215)
CO2 SERPL-SCNC: 19 MMOL/L (ref 21–32)
COCAINE UR QL SCN: NEGATIVE
COLOR UR: ABNORMAL
CREAT SERPL-MCNC: 1.9 MG/DL (ref 0.8–1.5)
DIFFERENTIAL METHOD BLD: ABNORMAL
EKG ATRIAL RATE: 115 BPM
EKG DIAGNOSIS: NORMAL
EKG P AXIS: 70 DEGREES
EKG P-R INTERVAL: 150 MS
EKG Q-T INTERVAL: 336 MS
EKG QRS DURATION: 73 MS
EKG QTC CALCULATION (BAZETT): 463 MS
EKG R AXIS: 68 DEGREES
EKG T AXIS: 46 DEGREES
EKG VENTRICULAR RATE: 114 BPM
EOSINOPHIL # BLD: 0 K/UL (ref 0–0.8)
EOSINOPHIL NFR BLD: 0 % (ref 0.5–7.8)
EPI CELLS #/AREA URNS HPF: ABNORMAL /HPF
ERYTHROCYTE [DISTWIDTH] IN BLOOD BY AUTOMATED COUNT: 13.2 % (ref 11.9–14.6)
GAS FLOW.O2 O2 DELIVERY SYS: ABNORMAL
GLOBULIN SER CALC-MCNC: 4.5 G/DL (ref 2.8–4.5)
GLUCOSE SERPL-MCNC: 130 MG/DL (ref 65–100)
GLUCOSE UR STRIP.AUTO-MCNC: NEGATIVE MG/DL
HCO3 BLDV-SCNC: 23.4 MMOL/L (ref 23–28)
HCT VFR BLD AUTO: 48.1 % (ref 41.1–50.3)
HGB BLD-MCNC: 18.3 G/DL (ref 13.6–17.2)
HGB UR QL STRIP: ABNORMAL
IMM GRANULOCYTES # BLD AUTO: 0.1 K/UL (ref 0–0.5)
IMM GRANULOCYTES NFR BLD AUTO: 1 % (ref 0–5)
KETONES UR QL STRIP.AUTO: 40 MG/DL
LACTATE SERPL-SCNC: 0.9 MMOL/L (ref 0.4–2)
LEUKOCYTE ESTERASE UR QL STRIP.AUTO: NEGATIVE
LIPASE SERPL-CCNC: 100 U/L (ref 73–393)
LYMPHOCYTES # BLD: 2.2 K/UL (ref 0.5–4.6)
LYMPHOCYTES NFR BLD: 17 % (ref 13–44)
MAGNESIUM SERPL-MCNC: 2.6 MG/DL (ref 1.8–2.4)
MCH RBC QN AUTO: 30 PG (ref 26.1–32.9)
MCHC RBC AUTO-ENTMCNC: 38 G/DL (ref 31.4–35)
MCV RBC AUTO: 78.7 FL (ref 82–102)
METHADONE UR QL: NEGATIVE
MONOCYTES # BLD: 1.4 K/UL (ref 0.1–1.3)
MONOCYTES NFR BLD: 10 % (ref 4–12)
NEUTS SEG # BLD: 9.4 K/UL (ref 1.7–8.2)
NEUTS SEG NFR BLD: 71 % (ref 43–78)
NITRITE UR QL STRIP.AUTO: NEGATIVE
NRBC # BLD: 0 K/UL (ref 0–0.2)
OPIATES UR QL: NEGATIVE
PCO2 BLDV: 36.6 MMHG (ref 41–51)
PCP UR QL: NEGATIVE
PH BLDV: 7.41 (ref 7.32–7.42)
PH UR STRIP: 5.5 (ref 5–9)
PLATELET # BLD AUTO: 384 K/UL (ref 150–450)
PMV BLD AUTO: 9 FL (ref 9.4–12.3)
PO2 BLDV: 37 MMHG
POTASSIUM SERPL-SCNC: 3.2 MMOL/L (ref 3.5–5.1)
PROT SERPL-MCNC: 9.5 G/DL (ref 6.3–8.2)
PROT UR STRIP-MCNC: ABNORMAL MG/DL
RBC # BLD AUTO: 6.11 M/UL (ref 4.23–5.6)
RBC #/AREA URNS HPF: ABNORMAL /HPF
SAO2 % BLDV: 71 % (ref 65–88)
SERVICE CMNT-IMP: ABNORMAL
SERVICE CMNT-IMP: ABNORMAL
SODIUM SERPL-SCNC: 135 MMOL/L (ref 136–146)
SP GR UR REFRACTOMETRY: 1.03 (ref 1–1.02)
SPECIMEN TYPE: ABNORMAL
UROBILINOGEN UR QL STRIP.AUTO: 0.2 EU/DL (ref 0.2–1)
WBC # BLD AUTO: 13.1 K/UL (ref 4.3–11.1)
WBC URNS QL MICRO: ABNORMAL /HPF

## 2024-02-02 PROCEDURE — C9113 INJ PANTOPRAZOLE SODIUM, VIA: HCPCS | Performed by: INTERNAL MEDICINE

## 2024-02-02 PROCEDURE — 6360000002 HC RX W HCPCS: Performed by: EMERGENCY MEDICINE

## 2024-02-02 PROCEDURE — A4216 STERILE WATER/SALINE, 10 ML: HCPCS | Performed by: INTERNAL MEDICINE

## 2024-02-02 PROCEDURE — 6360000002 HC RX W HCPCS: Performed by: INTERNAL MEDICINE

## 2024-02-02 PROCEDURE — 96374 THER/PROPH/DIAG INJ IV PUSH: CPT

## 2024-02-02 PROCEDURE — 6370000000 HC RX 637 (ALT 250 FOR IP): Performed by: EMERGENCY MEDICINE

## 2024-02-02 PROCEDURE — 83690 ASSAY OF LIPASE: CPT

## 2024-02-02 PROCEDURE — 87040 BLOOD CULTURE FOR BACTERIA: CPT

## 2024-02-02 PROCEDURE — 2580000003 HC RX 258: Performed by: INTERNAL MEDICINE

## 2024-02-02 PROCEDURE — 82550 ASSAY OF CK (CPK): CPT

## 2024-02-02 PROCEDURE — 1100000003 HC PRIVATE W/ TELEMETRY

## 2024-02-02 PROCEDURE — 1100000000 HC RM PRIVATE

## 2024-02-02 PROCEDURE — 80053 COMPREHEN METABOLIC PANEL: CPT

## 2024-02-02 PROCEDURE — 36415 COLL VENOUS BLD VENIPUNCTURE: CPT

## 2024-02-02 PROCEDURE — 2580000003 HC RX 258: Performed by: EMERGENCY MEDICINE

## 2024-02-02 PROCEDURE — 93010 ELECTROCARDIOGRAM REPORT: CPT | Performed by: INTERNAL MEDICINE

## 2024-02-02 PROCEDURE — 74176 CT ABD & PELVIS W/O CONTRAST: CPT

## 2024-02-02 PROCEDURE — 76937 US GUIDE VASCULAR ACCESS: CPT

## 2024-02-02 PROCEDURE — 93005 ELECTROCARDIOGRAM TRACING: CPT | Performed by: EMERGENCY MEDICINE

## 2024-02-02 PROCEDURE — 83735 ASSAY OF MAGNESIUM: CPT

## 2024-02-02 PROCEDURE — 83605 ASSAY OF LACTIC ACID: CPT

## 2024-02-02 PROCEDURE — 80307 DRUG TEST PRSMV CHEM ANLYZR: CPT

## 2024-02-02 PROCEDURE — 82803 BLOOD GASES ANY COMBINATION: CPT

## 2024-02-02 PROCEDURE — 99253 IP/OBS CNSLTJ NEW/EST LOW 45: CPT

## 2024-02-02 PROCEDURE — 81001 URINALYSIS AUTO W/SCOPE: CPT

## 2024-02-02 PROCEDURE — 85025 COMPLETE CBC W/AUTO DIFF WBC: CPT

## 2024-02-02 PROCEDURE — 99285 EMERGENCY DEPT VISIT HI MDM: CPT

## 2024-02-02 RX ORDER — SODIUM CHLORIDE 0.9 % (FLUSH) 0.9 %
5-40 SYRINGE (ML) INJECTION EVERY 12 HOURS SCHEDULED
Status: DISCONTINUED | OUTPATIENT
Start: 2024-02-02 | End: 2024-02-04 | Stop reason: HOSPADM

## 2024-02-02 RX ORDER — 0.9 % SODIUM CHLORIDE 0.9 %
1000 INTRAVENOUS SOLUTION INTRAVENOUS ONCE
Status: COMPLETED | OUTPATIENT
Start: 2024-02-02 | End: 2024-02-02

## 2024-02-02 RX ORDER — ONDANSETRON 4 MG/1
4 TABLET, ORALLY DISINTEGRATING ORAL EVERY 8 HOURS PRN
Status: DISCONTINUED | OUTPATIENT
Start: 2024-02-02 | End: 2024-02-04 | Stop reason: HOSPADM

## 2024-02-02 RX ORDER — HALOPERIDOL 5 MG/ML
5 INJECTION INTRAMUSCULAR
Status: COMPLETED | OUTPATIENT
Start: 2024-02-02 | End: 2024-02-02

## 2024-02-02 RX ORDER — ONDANSETRON 2 MG/ML
4 INJECTION INTRAMUSCULAR; INTRAVENOUS
Status: DISPENSED | OUTPATIENT
Start: 2024-02-02 | End: 2024-02-02

## 2024-02-02 RX ORDER — ACETAMINOPHEN 325 MG/1
650 TABLET ORAL EVERY 6 HOURS PRN
Status: DISCONTINUED | OUTPATIENT
Start: 2024-02-02 | End: 2024-02-04 | Stop reason: HOSPADM

## 2024-02-02 RX ORDER — POTASSIUM CHLORIDE 7.45 MG/ML
10 INJECTION INTRAVENOUS PRN
Status: DISCONTINUED | OUTPATIENT
Start: 2024-02-02 | End: 2024-02-04 | Stop reason: HOSPADM

## 2024-02-02 RX ORDER — POLYETHYLENE GLYCOL 3350 17 G/17G
17 POWDER, FOR SOLUTION ORAL DAILY PRN
Status: DISCONTINUED | OUTPATIENT
Start: 2024-02-02 | End: 2024-02-04 | Stop reason: HOSPADM

## 2024-02-02 RX ORDER — SODIUM CHLORIDE 9 MG/ML
INJECTION, SOLUTION INTRAVENOUS PRN
Status: DISCONTINUED | OUTPATIENT
Start: 2024-02-02 | End: 2024-02-04 | Stop reason: HOSPADM

## 2024-02-02 RX ORDER — SODIUM CHLORIDE 0.9 % (FLUSH) 0.9 %
5-40 SYRINGE (ML) INJECTION PRN
Status: DISCONTINUED | OUTPATIENT
Start: 2024-02-02 | End: 2024-02-04 | Stop reason: HOSPADM

## 2024-02-02 RX ORDER — SODIUM CHLORIDE 9 MG/ML
INJECTION, SOLUTION INTRAVENOUS CONTINUOUS
Status: DISCONTINUED | OUTPATIENT
Start: 2024-02-02 | End: 2024-02-04 | Stop reason: HOSPADM

## 2024-02-02 RX ORDER — POTASSIUM CHLORIDE 7.45 MG/ML
10 INJECTION INTRAVENOUS ONCE
Status: COMPLETED | OUTPATIENT
Start: 2024-02-02 | End: 2024-02-02

## 2024-02-02 RX ORDER — ENOXAPARIN SODIUM 100 MG/ML
40 INJECTION SUBCUTANEOUS DAILY
Status: DISCONTINUED | OUTPATIENT
Start: 2024-02-02 | End: 2024-02-04 | Stop reason: HOSPADM

## 2024-02-02 RX ORDER — ONDANSETRON 2 MG/ML
4 INJECTION INTRAMUSCULAR; INTRAVENOUS EVERY 6 HOURS PRN
Status: DISCONTINUED | OUTPATIENT
Start: 2024-02-02 | End: 2024-02-04 | Stop reason: HOSPADM

## 2024-02-02 RX ORDER — METRONIDAZOLE 500 MG/100ML
500 INJECTION, SOLUTION INTRAVENOUS EVERY 8 HOURS
Status: DISCONTINUED | OUTPATIENT
Start: 2024-02-02 | End: 2024-02-03

## 2024-02-02 RX ORDER — POTASSIUM CHLORIDE 20 MEQ/1
40 TABLET, EXTENDED RELEASE ORAL PRN
Status: DISCONTINUED | OUTPATIENT
Start: 2024-02-02 | End: 2024-02-04 | Stop reason: HOSPADM

## 2024-02-02 RX ORDER — ACETAMINOPHEN 650 MG/1
650 SUPPOSITORY RECTAL EVERY 6 HOURS PRN
Status: DISCONTINUED | OUTPATIENT
Start: 2024-02-02 | End: 2024-02-04 | Stop reason: HOSPADM

## 2024-02-02 RX ORDER — MAGNESIUM SULFATE IN WATER 40 MG/ML
2000 INJECTION, SOLUTION INTRAVENOUS PRN
Status: DISCONTINUED | OUTPATIENT
Start: 2024-02-02 | End: 2024-02-04 | Stop reason: HOSPADM

## 2024-02-02 RX ADMIN — METRONIDAZOLE 500 MG: 500 INJECTION, SOLUTION INTRAVENOUS at 15:21

## 2024-02-02 RX ADMIN — POTASSIUM BICARBONATE 40 MEQ: 782 TABLET, EFFERVESCENT ORAL at 09:52

## 2024-02-02 RX ADMIN — HALOPERIDOL LACTATE 5 MG: 5 INJECTION, SOLUTION INTRAMUSCULAR at 07:17

## 2024-02-02 RX ADMIN — SODIUM CHLORIDE: 9 INJECTION, SOLUTION INTRAVENOUS at 22:46

## 2024-02-02 RX ADMIN — SODIUM CHLORIDE, PRESERVATIVE FREE 10 ML: 5 INJECTION INTRAVENOUS at 20:21

## 2024-02-02 RX ADMIN — METRONIDAZOLE 500 MG: 500 INJECTION, SOLUTION INTRAVENOUS at 22:51

## 2024-02-02 RX ADMIN — SODIUM CHLORIDE 1000 ML: 9 INJECTION, SOLUTION INTRAVENOUS at 09:52

## 2024-02-02 RX ADMIN — SODIUM CHLORIDE, PRESERVATIVE FREE 10 ML: 5 INJECTION INTRAVENOUS at 13:55

## 2024-02-02 RX ADMIN — PANTOPRAZOLE SODIUM 40 MG: 40 INJECTION, POWDER, FOR SOLUTION INTRAVENOUS at 22:42

## 2024-02-02 RX ADMIN — POTASSIUM CHLORIDE 10 MEQ: 7.46 INJECTION, SOLUTION INTRAVENOUS at 14:33

## 2024-02-02 RX ADMIN — ENOXAPARIN SODIUM 40 MG: 100 INJECTION SUBCUTANEOUS at 13:55

## 2024-02-02 RX ADMIN — PANTOPRAZOLE SODIUM 40 MG: 40 INJECTION, POWDER, FOR SOLUTION INTRAVENOUS at 12:23

## 2024-02-02 RX ADMIN — SODIUM CHLORIDE: 9 INJECTION, SOLUTION INTRAVENOUS at 13:50

## 2024-02-02 RX ADMIN — WATER 2000 MG: 1 INJECTION INTRAMUSCULAR; INTRAVENOUS; SUBCUTANEOUS at 15:16

## 2024-02-02 RX ADMIN — SODIUM CHLORIDE 1000 ML: 9 INJECTION, SOLUTION INTRAVENOUS at 07:02

## 2024-02-02 ASSESSMENT — LIFESTYLE VARIABLES
HOW MANY STANDARD DRINKS CONTAINING ALCOHOL DO YOU HAVE ON A TYPICAL DAY: PATIENT DOES NOT DRINK
HOW OFTEN DO YOU HAVE A DRINK CONTAINING ALCOHOL: NEVER

## 2024-02-02 ASSESSMENT — PAIN - FUNCTIONAL ASSESSMENT: PAIN_FUNCTIONAL_ASSESSMENT: NONE - DENIES PAIN

## 2024-02-02 NOTE — ACP (ADVANCE CARE PLANNING)
Advance Care Planning   Healthcare Decision Maker:    Primary Decision Maker: Sharmaine Hatfield - [facility] - [phone]    Click here to complete Healthcare Decision Makers including selection of the Healthcare Decision Maker Relationship (ie \"Primary\").  Today we documented Decision Maker(s) consistent with Legal Next of Kin hierarchy.

## 2024-02-02 NOTE — ED NOTES
TRANSFER - OUT REPORT:    Verbal report given to RN on Dmitri Hatfield  being transferred to [facility] for routine progression of patient care       Report consisted of patient's Situation, Background, Assessment and   Recommendations(SBAR).     Information from the following report(s) ED SBAR was reviewed with the receiving nurse.    Zion Fall Assessment:    Presents to emergency department  because of falls (Syncope, seizure, or loss of consciousness): No  Age > 70: No  Altered Mental Status, Intoxication with alcohol or substance confusion (Disorientation, impaired judgment, poor safety awaremess, or inability to follow instructions): No  Impaired Mobility: Ambulates or transfers with assistive devices or assistance; Unable to ambulate or transer.: No             Lines:   Peripheral IV 02/02/24 Right;Upper Arm (Active)        Opportunity for questions and clarification was provided.      Patient transported with:  Francis Eisenberg RN  02/02/24 3597

## 2024-02-02 NOTE — ED PROVIDER NOTES
Emergency Department Provider Note       PCP: Papo Dubon MD   Age: 48 y.o.   Sex: male     DISPOSITION Decision To Admit 02/02/2024 09:36:28 AM       ICD-10-CM    1. Cyclical vomiting  R11.15       2. LORNA (acute kidney injury) (HCC)  N17.9       3. Hypokalemia  E87.6           Medical Decision Making     Complexity of Problems Addressed:  1 or more chronic illnesses with a severe exacerbation or progression.  1 or more acute illnesses that pose a threat to life or bodily function.     Data Reviewed and Analyzed:   I independently ordered and reviewed each unique test.  I reviewed external records: previous lab results from outside ED.       I independently ordered and interpreted the ED EKG in the absence of a Cardiologist.    Rate: 114  EKG Interpretation: Sinus tachycardia.  ST Segments: Nonspecific ST segments - NO STEMI          Discussion of management or test interpretation.  48-year-old male with history of cannabinoid hyperemesis syndrome, cyclical vomiting, gastroparesis presents with complaint of persistent nausea, vomiting, dry heaving since Monday.   Tachycardic on arrival.  Afebrile.  Basic labs were obtained.  Patient with LORNA.  BUN/creatinine 50/1.90.  Most recent for comparison from 12/2/2023 noted to be 23/1.20.  Glucose 130.  Patient with CO2 of 19, anion gap of 14.  Potassium 3.2.  Magnesium within normal limits at 2.6.  CBC with leukocytosis.  WBC 13.1.  Patient with history of leukocytosis in the past with similar episodes.  Likely stress reaction versus demargination.  No acute concern for sepsis at this time.  Abdomen soft.  Patient given Haldol 5 mg IV as well as IV fluid hydration.  Potassium also given.  Patient with continued dry heaving.  Will consult hospitalist for admission.  Do not feel that patient is septic at this time.  No patient with history of recurrent cyclical vomiting, cannabinol hyperemesis.  Leukocytosis likely due to underlying demargination/stress reaction.

## 2024-02-02 NOTE — CONSULTS
Patient seen by MEME Poole    
   LORNA (acute kidney injury) ([facility]) 8/12/2014    LORNA (acute kidney injury) ([facility]) 7/23/2022    Clostridium difficile colitis 3/20/2011    Gastrointestinal disorder SINCE 1999    Gastroparesis 2005    emptying study normal -2006    GERD (gastroesophageal reflux disease)     PUD (peptic ulcer disease) ALL DX IN 2008    ESOPHAGITIS, + H PYLORI    Uncontrolled hypertension 6/26/2018        Past Surgical History     Past Surgical History:   Procedure Laterality Date    COLONOSCOPY  4/08    ESOPHAGITIS, COLONIC ULCER X1    UPPER GASTROINTESTINAL ENDOSCOPY  4/08    H. HERNIA, ULCER X2, H PYLORI,    UPPER GASTROINTESTINAL ENDOSCOPY  2011    h pylori -neg    WISDOM TOOTH EXTRACTION  2/10/11        Medications     Prior to Admission medications    Medication Sig Start Date End Date Taking? Authorizing Provider   pantoprazole (PROTONIX) 40 MG tablet Take 1 tablet by mouth in the morning and at bedtime 7/30/23 12/1/23  Emmy Choudhury MD        ondansetron (ZOFRAN) injection 4 mg, NOW  sodium chloride flush 0.9 % injection 5-40 mL, 2 times per day  sodium chloride flush 0.9 % injection 5-40 mL, PRN  0.9 % sodium chloride infusion, PRN  potassium chloride (KLOR-CON M) extended release tablet 40 mEq, PRN   Or  potassium bicarb-citric acid (EFFER-K) effervescent tablet 40 mEq, PRN   Or  potassium chloride 10 mEq/100 mL IVPB (Peripheral Line), PRN  magnesium sulfate 2000 mg in 50 mL IVPB premix, PRN  enoxaparin (LOVENOX) injection 40 mg, Daily  ondansetron (ZOFRAN-ODT) disintegrating tablet 4 mg, Q8H PRN   Or  ondansetron (ZOFRAN) injection 4 mg, Q6H PRN  polyethylene glycol (GLYCOLAX) packet 17 g, Daily PRN  acetaminophen (TYLENOL) tablet 650 mg, Q6H PRN   Or  acetaminophen (TYLENOL) suppository 650 mg, Q6H PRN  pantoprazole (PROTONIX) 40 mg in sodium chloride (PF) 0.9 % 10 mL injection, Q12H  0.9 % sodium chloride infusion, Continuous  metroNIDAZOLE (FLAGYL) 500 mg in 0.9% NaCl 100 mL IVPB premix, Q8H  [START ON 2/3/2024]

## 2024-02-02 NOTE — H&P
found.      Signed:  Mega Goodson MD    Part of this note may have been written by using a voice dictation software.  The note has been proof read but may still contain some grammatical/other typographical errors.

## 2024-02-02 NOTE — CARE COORDINATION
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 Per spouse she has applied for medicaid and filed the FA application but has not heard back from them.  Demographics, insurance and PCP confirmed.    CM staff will remain available to assist as needed with dc needs,    Email sent to [employer] to have them check on Medicaid application.       02/02/24 1017   Service Assessment   Patient Orientation Alert and Oriented   Cognition Alert   History Provided By Patient   Primary Caregiver Self   Accompanied By/Relationship spouse   Support Systems Spouse/Significant Other   Patient's Healthcare Decision Maker is: Legal Next of Kin   PCP Verified by CM Yes  (Dr Papo Dubon last visit approx 1 year)   Last Visit to PCP Within last year   Prior Functional Level Independent in ADLs/IADLs   Current Functional Level Independent in ADLs/IADLs   Can patient return to prior living arrangement Yes   Ability to make needs known: Good   Family able to assist with home care needs: Yes   Would you like for me to discuss the discharge plan with any other family members/significant others, and if so, who? Yes   Financial Resources None   Community Resources None   CM/SW Referral Other (see comment)  (na)   Social/Functional History   Lives With Spouse   Type of Home House   Home Layout Two level   Home Access Stairs to enter with rails   Entrance Stairs - Number of Steps 4   Bathroom Shower/Tub Tub/Shower unit   Bathroom Toilet Standard   Bathroom Accessibility Accessible   Home Equipment None   ADL Assistance Independent   Ambulation Assistance Independent   Transfer Assistance Independent   Active  Yes   Occupation Self employed   Discharge Planning   Type of Residence House   Living Arrangements Spouse/Significant Other   Potential Assistance Purchasing Medications Yes   Type of Home Care Services None   Patient expects to be

## 2024-02-03 ENCOUNTER — ANESTHESIA (OUTPATIENT)
Dept: ENDOSCOPY | Age: 49
End: 2024-02-03

## 2024-02-03 LAB
ANION GAP SERPL CALC-SCNC: 7 MMOL/L (ref 2–11)
BUN SERPL-MCNC: 29 MG/DL (ref 6–23)
CALCIUM SERPL-MCNC: 8.4 MG/DL (ref 8.3–10.4)
CHLORIDE SERPL-SCNC: 113 MMOL/L (ref 103–113)
CO2 SERPL-SCNC: 22 MMOL/L (ref 21–32)
CREAT SERPL-MCNC: 1 MG/DL (ref 0.8–1.5)
ERYTHROCYTE [DISTWIDTH] IN BLOOD BY AUTOMATED COUNT: 12.8 % (ref 11.9–14.6)
GLUCOSE SERPL-MCNC: 84 MG/DL (ref 65–100)
HCT VFR BLD AUTO: 36.6 % (ref 41.1–50.3)
HGB BLD-MCNC: 13.3 G/DL (ref 13.6–17.2)
MAGNESIUM SERPL-MCNC: 2.5 MG/DL (ref 1.8–2.4)
MCH RBC QN AUTO: 29.3 PG (ref 26.1–32.9)
MCHC RBC AUTO-ENTMCNC: 36.3 G/DL (ref 31.4–35)
MCV RBC AUTO: 80.6 FL (ref 82–102)
NRBC # BLD: 0 K/UL (ref 0–0.2)
PLATELET # BLD AUTO: 256 K/UL (ref 150–450)
PMV BLD AUTO: 9 FL (ref 9.4–12.3)
POTASSIUM SERPL-SCNC: 3.5 MMOL/L (ref 3.5–5.1)
RBC # BLD AUTO: 4.54 M/UL (ref 4.23–5.6)
SODIUM SERPL-SCNC: 142 MMOL/L (ref 136–146)
WBC # BLD AUTO: 7.9 K/UL (ref 4.3–11.1)

## 2024-02-03 PROCEDURE — A4216 STERILE WATER/SALINE, 10 ML: HCPCS | Performed by: INTERNAL MEDICINE

## 2024-02-03 PROCEDURE — 2580000003 HC RX 258: Performed by: NURSE ANESTHETIST, CERTIFIED REGISTERED

## 2024-02-03 PROCEDURE — 2580000003 HC RX 258: Performed by: INTERNAL MEDICINE

## 2024-02-03 PROCEDURE — 80048 BASIC METABOLIC PNL TOTAL CA: CPT

## 2024-02-03 PROCEDURE — C9113 INJ PANTOPRAZOLE SODIUM, VIA: HCPCS | Performed by: INTERNAL MEDICINE

## 2024-02-03 PROCEDURE — 3609017100 HC EGD: Performed by: STUDENT IN AN ORGANIZED HEALTH CARE EDUCATION/TRAINING PROGRAM

## 2024-02-03 PROCEDURE — 6360000002 HC RX W HCPCS: Performed by: NURSE ANESTHETIST, CERTIFIED REGISTERED

## 2024-02-03 PROCEDURE — 2500000003 HC RX 250 WO HCPCS: Performed by: NURSE ANESTHETIST, CERTIFIED REGISTERED

## 2024-02-03 PROCEDURE — 1100000003 HC PRIVATE W/ TELEMETRY

## 2024-02-03 PROCEDURE — 36415 COLL VENOUS BLD VENIPUNCTURE: CPT

## 2024-02-03 PROCEDURE — 0DJ08ZZ INSPECTION OF UPPER INTESTINAL TRACT, VIA NATURAL OR ARTIFICIAL OPENING ENDOSCOPIC: ICD-10-PCS | Performed by: STUDENT IN AN ORGANIZED HEALTH CARE EDUCATION/TRAINING PROGRAM

## 2024-02-03 PROCEDURE — 2709999900 HC NON-CHARGEABLE SUPPLY: Performed by: STUDENT IN AN ORGANIZED HEALTH CARE EDUCATION/TRAINING PROGRAM

## 2024-02-03 PROCEDURE — 85027 COMPLETE CBC AUTOMATED: CPT

## 2024-02-03 PROCEDURE — 43235 EGD DIAGNOSTIC BRUSH WASH: CPT | Performed by: STUDENT IN AN ORGANIZED HEALTH CARE EDUCATION/TRAINING PROGRAM

## 2024-02-03 PROCEDURE — 94760 N-INVAS EAR/PLS OXIMETRY 1: CPT

## 2024-02-03 PROCEDURE — 2580000003 HC RX 258: Performed by: ANESTHESIOLOGY

## 2024-02-03 PROCEDURE — 2700000000 HC OXYGEN THERAPY PER DAY

## 2024-02-03 PROCEDURE — 83735 ASSAY OF MAGNESIUM: CPT

## 2024-02-03 PROCEDURE — 7100000000 HC PACU RECOVERY - FIRST 15 MIN: Performed by: STUDENT IN AN ORGANIZED HEALTH CARE EDUCATION/TRAINING PROGRAM

## 2024-02-03 PROCEDURE — 3700000001 HC ADD 15 MINUTES (ANESTHESIA): Performed by: STUDENT IN AN ORGANIZED HEALTH CARE EDUCATION/TRAINING PROGRAM

## 2024-02-03 PROCEDURE — 6360000002 HC RX W HCPCS: Performed by: INTERNAL MEDICINE

## 2024-02-03 PROCEDURE — 3700000000 HC ANESTHESIA ATTENDED CARE: Performed by: STUDENT IN AN ORGANIZED HEALTH CARE EDUCATION/TRAINING PROGRAM

## 2024-02-03 RX ORDER — SODIUM CHLORIDE 0.9 % (FLUSH) 0.9 %
5-40 SYRINGE (ML) INJECTION PRN
Status: DISCONTINUED | OUTPATIENT
Start: 2024-02-03 | End: 2024-02-03 | Stop reason: HOSPADM

## 2024-02-03 RX ORDER — SODIUM CHLORIDE 9 MG/ML
INJECTION, SOLUTION INTRAVENOUS PRN
Status: DISCONTINUED | OUTPATIENT
Start: 2024-02-03 | End: 2024-02-03 | Stop reason: HOSPADM

## 2024-02-03 RX ORDER — LIDOCAINE HYDROCHLORIDE 10 MG/ML
1 INJECTION, SOLUTION INFILTRATION; PERINEURAL
Status: DISCONTINUED | OUTPATIENT
Start: 2024-02-03 | End: 2024-02-03 | Stop reason: HOSPADM

## 2024-02-03 RX ORDER — PROPOFOL 10 MG/ML
INJECTION, EMULSION INTRAVENOUS PRN
Status: DISCONTINUED | OUTPATIENT
Start: 2024-02-03 | End: 2024-02-03 | Stop reason: SDUPTHER

## 2024-02-03 RX ORDER — SODIUM CHLORIDE 0.9 % (FLUSH) 0.9 %
5-40 SYRINGE (ML) INJECTION EVERY 12 HOURS SCHEDULED
Status: DISCONTINUED | OUTPATIENT
Start: 2024-02-03 | End: 2024-02-03 | Stop reason: HOSPADM

## 2024-02-03 RX ORDER — ONDANSETRON 2 MG/ML
4 INJECTION INTRAMUSCULAR; INTRAVENOUS
Status: DISCONTINUED | OUTPATIENT
Start: 2024-02-03 | End: 2024-02-03 | Stop reason: HOSPADM

## 2024-02-03 RX ORDER — SODIUM CHLORIDE, SODIUM LACTATE, POTASSIUM CHLORIDE, CALCIUM CHLORIDE 600; 310; 30; 20 MG/100ML; MG/100ML; MG/100ML; MG/100ML
INJECTION, SOLUTION INTRAVENOUS CONTINUOUS PRN
Status: DISCONTINUED | OUTPATIENT
Start: 2024-02-03 | End: 2024-02-03 | Stop reason: SDUPTHER

## 2024-02-03 RX ORDER — LIDOCAINE HYDROCHLORIDE 20 MG/ML
INJECTION, SOLUTION EPIDURAL; INFILTRATION; INTRACAUDAL; PERINEURAL PRN
Status: DISCONTINUED | OUTPATIENT
Start: 2024-02-03 | End: 2024-02-03 | Stop reason: SDUPTHER

## 2024-02-03 RX ORDER — SODIUM CHLORIDE, SODIUM LACTATE, POTASSIUM CHLORIDE, CALCIUM CHLORIDE 600; 310; 30; 20 MG/100ML; MG/100ML; MG/100ML; MG/100ML
INJECTION, SOLUTION INTRAVENOUS CONTINUOUS
Status: DISCONTINUED | OUTPATIENT
Start: 2024-02-03 | End: 2024-02-03 | Stop reason: HOSPADM

## 2024-02-03 RX ADMIN — SODIUM CHLORIDE, SODIUM LACTATE, POTASSIUM CHLORIDE, AND CALCIUM CHLORIDE: 600; 310; 30; 20 INJECTION, SOLUTION INTRAVENOUS at 08:00

## 2024-02-03 RX ADMIN — SODIUM CHLORIDE, PRESERVATIVE FREE 10 ML: 5 INJECTION INTRAVENOUS at 09:16

## 2024-02-03 RX ADMIN — SODIUM CHLORIDE, PRESERVATIVE FREE 10 ML: 5 INJECTION INTRAVENOUS at 19:50

## 2024-02-03 RX ADMIN — PROPOFOL 150 MG: 10 INJECTION, EMULSION INTRAVENOUS at 08:12

## 2024-02-03 RX ADMIN — SODIUM CHLORIDE: 9 INJECTION, SOLUTION INTRAVENOUS at 11:30

## 2024-02-03 RX ADMIN — PANTOPRAZOLE SODIUM 40 MG: 40 INJECTION, POWDER, FOR SOLUTION INTRAVENOUS at 23:48

## 2024-02-03 RX ADMIN — SODIUM CHLORIDE: 9 INJECTION, SOLUTION INTRAVENOUS at 21:53

## 2024-02-03 RX ADMIN — PROPOFOL 180 MCG/KG/MIN: 10 INJECTION, EMULSION INTRAVENOUS at 08:13

## 2024-02-03 RX ADMIN — PANTOPRAZOLE SODIUM 40 MG: 40 INJECTION, POWDER, FOR SOLUTION INTRAVENOUS at 11:26

## 2024-02-03 RX ADMIN — SODIUM CHLORIDE, POTASSIUM CHLORIDE, SODIUM LACTATE AND CALCIUM CHLORIDE: 600; 310; 30; 20 INJECTION, SOLUTION INTRAVENOUS at 07:24

## 2024-02-03 RX ADMIN — METRONIDAZOLE 500 MG: 500 INJECTION, SOLUTION INTRAVENOUS at 05:45

## 2024-02-03 RX ADMIN — LIDOCAINE HYDROCHLORIDE 100 MG: 20 INJECTION, SOLUTION EPIDURAL; INFILTRATION; INTRACAUDAL; PERINEURAL at 08:09

## 2024-02-03 ASSESSMENT — PAIN - FUNCTIONAL ASSESSMENT: PAIN_FUNCTIONAL_ASSESSMENT: 0-10

## 2024-02-03 ASSESSMENT — LIFESTYLE VARIABLES: SMOKING_STATUS: 1

## 2024-02-03 ASSESSMENT — PAIN SCALES - GENERAL: PAINLEVEL_OUTOF10: 0

## 2024-02-03 NOTE — ANESTHESIA PRE PROCEDURE
Department of Anesthesiology  Preprocedure Note       Name:  Dmitri Hatfield   Age:  48 y.o.  :  1975                                          MRN:  [id number]         Date:  2/3/2024      Surgeon: Surgeon(s):  Farrah Israel MD    Procedure: Procedure(s):  EGD ESOPHAGOGASTRODUODENOSCOPY    Medications prior to admission:   Prior to Admission medications    Medication Sig Start Date End Date Taking? Authorizing Provider   pantoprazole (PROTONIX) 40 MG tablet Take 1 tablet by mouth in the morning and at bedtime 23  Emmy Choudhury MD       Current medications:    Current Facility-Administered Medications   Medication Dose Route Frequency Provider Last Rate Last Admin   • lidocaine 1 % injection 1 mL  1 mL IntraDERmal Once PRN JOESPH Brower MD       • lactated ringers IV soln infusion   IntraVENous Continuous JOESPH Brower MD       • sodium chloride flush 0.9 % injection 5-40 mL  5-40 mL IntraVENous 2 times per day JOESPH Brower MD       • sodium chloride flush 0.9 % injection 5-40 mL  5-40 mL IntraVENous PRN JOESPH Brower MD       • 0.9 % sodium chloride infusion   IntraVENous PRN JOESPH Brower MD       • sodium chloride flush 0.9 % injection 5-40 mL  5-40 mL IntraVENous 2 times per day Mega Bazan MD   10 mL at 24   • sodium chloride flush 0.9 % injection 5-40 mL  5-40 mL IntraVENous PRN Mega Bazan MD       • 0.9 % sodium chloride infusion   IntraVENous PRN Mega Bazan MD       • potassium chloride (KLOR-CON M) extended release tablet 40 mEq  40 mEq Oral PRN Mega Bazan MD        Or   • potassium bicarb-citric acid (EFFER-K) effervescent tablet 40 mEq  40 mEq Oral PRN Mega Bazan MD        Or   • potassium chloride 10 mEq/100 mL IVPB (Peripheral Line)  10 mEq IntraVENous PRN Mega Bazan MD       • magnesium sulfate 2000 mg in 50 mL IVPB premix  2,000 mg IntraVENous PRN Mega Bazan MD

## 2024-02-03 NOTE — PROGRESS NOTES
It is with great judith we pray for your family today:        \"Because you dared to believe,    Your priyanka has healed you.    Go with peace in your heart,    And be free from your suffering!\"    ARNAUD 5            Lord,    I believe that if you would touch my body I shall be healed.    Give me the priyanka to come boldly into your presence.    Mandie Jarrell   [phone]  
Pacu notified of procedure starting.   
TRANSFER - IN REPORT:    Verbal report received from Jenny ECHOLS on Dmitri Hatfield  being received from 517 for ordered procedure      Report consisted of patient's Situation, Background, Assessment and   Recommendations(SBAR).     Information from the following report(s) Nurse Handoff Report was reviewed with the receiving nurse.    Opportunity for questions and clarification was provided.      Assessment completed upon patient's arrival to unit and care assumed.    
TRANSFER - IN REPORT:    Verbal report received from ONESIMO Blanco on Dmitri Hatfield  being received from ED for routine progression of patient care      Report consisted of patient's Situation, Background, Assessment and   Recommendations(SBAR).     Information from the following report(s) ED Encounter Summary, ED SBAR, Adult Overview, Intake/Output, MAR, and Recent Results was reviewed with the receiving nurse.    Opportunity for questions and clarification was provided.      Assessment completed upon patient's arrival to unit and care assumed.     
TRANSFER - OUT REPORT:    Verbal report given to Jessie on Dmitri Hatfield  being transferred to GI Lab for ordered procedure       Report consisted of patient's Situation, Background, Assessment and   Recommendations(SBAR).     Information from the following report(s) Nurse Handoff Report, MAR, Recent Results, Cardiac Rhythm NSR, Alarm Parameters, Pre Procedure Checklist, Procedure Verification, and Neuro Assessment was reviewed with the receiving nurse.           Lines:   Peripheral IV 02/02/24 Right;Upper Arm (Active)   Site Assessment Clean, dry & intact 02/03/24 0726   Line Status Blood return noted;Flushed;Infusing 02/03/24 0726   Line Care Connections checked and tightened 02/03/24 0230   Phlebitis Assessment No symptoms 02/03/24 0726   Infiltration Assessment 0 02/03/24 0726   Alcohol Cap Used Yes 02/03/24 0230   Dressing Status Clean, dry & intact 02/03/24 0726   Dressing Type Transparent 02/03/24 0726       Peripheral IV 02/02/24 Right Forearm (Active)   Site Assessment Clean, dry & intact 02/03/24 0726   Line Status No blood return 02/03/24 0726   Line Care Connections checked and tightened 02/03/24 0230   Phlebitis Assessment No symptoms 02/03/24 0726   Infiltration Assessment 0 02/03/24 0726   Alcohol Cap Used Yes 02/03/24 0230   Dressing Status Clean, dry & intact 02/03/24 0726   Dressing Type Transparent 02/03/24 0726        Opportunity for questions and clarification was provided.             
US Guided PIV access-    Ultrasound was used to find the vein which was compressible and without any ultrasound features of an artery or nerve bundle. Skin was cleaned and disinfected prior to IV puncture.  Under real-time ultrasound guidance peripheral access was obtained in the right forearm using 22 G 1.75\" Peripheral IV catheter after 1 attempt(s). Blood return was present and IV flushed without difficulty with no clinical signs of infiltration. IV dressing applied and no immediate complications noted. Patient tolerated the procedure well.      
Ref Range    Sodium 142 136 - 146 mmol/L    Potassium 3.5 3.5 - 5.1 mmol/L    Chloride 113 103 - 113 mmol/L    CO2 22 21 - 32 mmol/L    Anion Gap 7 2 - 11 mmol/L    Glucose 84 65 - 100 mg/dL    BUN 29 (H) 6 - 23 MG/DL    Creatinine 1.00 0.8 - 1.5 MG/DL    Est, Glom Filt Rate >60 >60 ml/min/1.73m2    Calcium 8.4 8.3 - 10.4 MG/DL   Magnesium    Collection Time: 02/03/24  5:39 AM   Result Value Ref Range    Magnesium 2.5 (H) 1.8 - 2.4 mg/dL       Current Meds:  Current Facility-Administered Medications   Medication Dose Route Frequency    sodium chloride flush 0.9 % injection 5-40 mL  5-40 mL IntraVENous 2 times per day    sodium chloride flush 0.9 % injection 5-40 mL  5-40 mL IntraVENous PRN    0.9 % sodium chloride infusion   IntraVENous PRN    potassium chloride (KLOR-CON M) extended release tablet 40 mEq  40 mEq Oral PRN    Or    potassium bicarb-citric acid (EFFER-K) effervescent tablet 40 mEq  40 mEq Oral PRN    Or    potassium chloride 10 mEq/100 mL IVPB (Peripheral Line)  10 mEq IntraVENous PRN    magnesium sulfate 2000 mg in 50 mL IVPB premix  2,000 mg IntraVENous PRN    enoxaparin (LOVENOX) injection 40 mg  40 mg SubCUTAneous Daily    ondansetron (ZOFRAN-ODT) disintegrating tablet 4 mg  4 mg Oral Q8H PRN    Or    ondansetron (ZOFRAN) injection 4 mg  4 mg IntraVENous Q6H PRN    polyethylene glycol (GLYCOLAX) packet 17 g  17 g Oral Daily PRN    acetaminophen (TYLENOL) tablet 650 mg  650 mg Oral Q6H PRN    Or    acetaminophen (TYLENOL) suppository 650 mg  650 mg Rectal Q6H PRN    pantoprazole (PROTONIX) 40 mg in sodium chloride (PF) 0.9 % 10 mL injection  40 mg IntraVENous Q12H    0.9 % sodium chloride infusion   IntraVENous Continuous    metroNIDAZOLE (FLAGYL) 500 mg in 0.9% NaCl 100 mL IVPB premix  500 mg IntraVENous Q8H    ceFEPIme (MAXIPIME) 2,000 mg in sodium chloride 0.9 % 100 mL IVPB (mini-bag)  2,000 mg IntraVENous Q24H       Signed:  Choncharoe Amphan, MD    Part of this note may have been written by

## 2024-02-03 NOTE — PLAN OF CARE
Problem: Safety - Adult  Goal: Free from fall injury  2/2/2024 1930 by Jenny Acosta, RN  Outcome: Progressing  2/2/2024 1613 by Gisela Garner, RN  Outcome: Progressing

## 2024-02-03 NOTE — ANESTHESIA POSTPROCEDURE EVALUATION
Department of Anesthesiology  Postprocedure Note    Patient: Dmitri Hatfield  MRN: [id number]  YOB: 1975  Date of evaluation: 2/3/2024    Procedure Summary     Date: 02/03/24 Room / Location: [facility] ENDO FLOURO 1 / [facility] ENDOSCOPY    Anesthesia Start: 0800 Anesthesia Stop: 0822    Procedure: EGD ESOPHAGOGASTRODUODENOSCOPY (Upper GI Region) Diagnosis:       Vomiting      (Vomiting [R11.10])    Surgeons: Farrah Israel MD Responsible Provider: JOESPH Brower MD    Anesthesia Type: TIVA ASA Status: 2          Anesthesia Type: No value filed.    Jarad Phase I: Jarad Score: 8    Jarad Phase II:      Anesthesia Post Evaluation    Patient location during evaluation: PACU  Patient participation: complete - patient participated  Level of consciousness: awake and alert  Airway patency: patent  Nausea & Vomiting: no nausea and no vomiting  Cardiovascular status: hemodynamically stable  Respiratory status: acceptable  Hydration status: euvolemic  Comments: Blood pressure (!) 97/55, pulse 75, temperature 98 °F (36.7 °C), temperature source Temporal, resp. rate 14, height 1.676 m (5' 6\"), weight 64.3 kg (141 lb 11.2 oz), SpO2 97 %.    No apparent anesthetic complications.  Pt stable for discharge from PACU  Multimodal analgesia pain management approach  Pain management: adequate        No notable events documented.

## 2024-02-03 NOTE — OP NOTE
Endoscopy Note          Operative Report    Patient: Dmitri Hatfield MRN: [id number]      YOB: 1975  Age: 48 y.o.  Sex: male            Indications:  Nausea/vomiting    Preoperative Evaluation: The patient was evaluated prior to the procedure in the GI lab admission area, the patient ASA was recorded .  Consent was obtained from the patient with the risk of perforation bleeding and aspiration.    Anesthesia: KRIS-per anesthesia  Complications: None  EBL: Unremarkable  Procedure: The patient was sedated in the left lateral decubitus position. Scope was advance from the mouth to the third portion of the duodenum. The scope was withdrawn to the stomach and a refroflexed view was performed.     Findings:  LA grade B esophagitis.  Hiatal hernia.  Normal stomach and duodenum.     Postoperative Diagnosis:  Esophagitis/Hiatal hernia.     Recommendations:   Recommend PO PPI BIDAC for 8 weeks followed by PO PPI Daily.  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  GI will sign off.  Ok to discharge when tolerating PO intake.     Signed By:  Farrah Israel MD     February 3, 2024

## 2024-02-04 VITALS
BODY MASS INDEX: 22.77 KG/M2 | SYSTOLIC BLOOD PRESSURE: 109 MMHG | WEIGHT: 141.7 LBS | HEIGHT: 66 IN | DIASTOLIC BLOOD PRESSURE: 85 MMHG | TEMPERATURE: 98 F | HEART RATE: 64 BPM | RESPIRATION RATE: 16 BRPM | OXYGEN SATURATION: 95 %

## 2024-02-04 PROCEDURE — 2580000003 HC RX 258: Performed by: INTERNAL MEDICINE

## 2024-02-04 RX ADMIN — SODIUM CHLORIDE: 9 INJECTION, SOLUTION INTRAVENOUS at 07:08

## 2024-02-04 RX ADMIN — SODIUM CHLORIDE, PRESERVATIVE FREE 10 ML: 5 INJECTION INTRAVENOUS at 07:39

## 2024-02-04 NOTE — PLAN OF CARE
Problem: Safety - Adult  Goal: Free from fall injury  2/4/2024 0926 by Joanne Kraus, RN  Outcome: Adequate for Discharge  2/4/2024 0854 by Joanne Kraus, RN  Outcome: Progressing  Flowsheets (Taken 2/3/2024 2000 by Ashish Maldonado, RN)  Free From Fall Injury: Instruct family/caregiver on patient safety     Problem: Pain  Goal: Verbalizes/displays adequate comfort level or baseline comfort level  Outcome: Adequate for Discharge  Flowsheets (Taken 2/4/2024 0729)  Verbalizes/displays adequate comfort level or baseline comfort level: Encourage patient to monitor pain and request assistance

## 2024-02-04 NOTE — PLAN OF CARE
Problem: Safety - Adult  Goal: Free from fall injury  Outcome: Progressing  Flowsheets (Taken 2/3/2024 2000 by Ashish Maldonado RN)  Free From Fall Injury: Instruct family/caregiver on patient safety      I have reviewed and confirmed nurses' notes...

## 2024-04-06 ENCOUNTER — HOSPITAL ENCOUNTER (INPATIENT)
Age: 49
LOS: 1 days | Discharge: HOME | DRG: 391 | End: 2024-04-09
Attending: EMERGENCY MEDICINE | Admitting: STUDENT IN AN ORGANIZED HEALTH CARE EDUCATION/TRAINING PROGRAM
Payer: COMMERCIAL

## 2024-04-06 DIAGNOSIS — F12.188 CANNABIS HYPEREMESIS SYNDROME CONCURRENT WITH AND DUE TO CANNABIS ABUSE (HCC): Primary | ICD-10-CM

## 2024-04-06 DIAGNOSIS — R00.0 SINUS TACHYCARDIA: ICD-10-CM

## 2024-04-06 DIAGNOSIS — R00.0 TACHYCARDIA: ICD-10-CM

## 2024-04-06 DIAGNOSIS — K92.0 HEMATEMESIS WITH NAUSEA: ICD-10-CM

## 2024-04-06 LAB
ABO + RH BLD: NORMAL
ALBUMIN SERPL-MCNC: 4.7 G/DL (ref 3.5–5)
ALBUMIN/GLOB SERPL: 1.3 (ref 0.4–1.6)
ALP SERPL-CCNC: 121 U/L (ref 50–136)
ALT SERPL-CCNC: 24 U/L (ref 12–65)
AMPHET UR QL SCN: NEGATIVE
ANION GAP SERPL CALC-SCNC: 11 MMOL/L (ref 2–11)
AST SERPL-CCNC: 15 U/L (ref 15–37)
BARBITURATES UR QL SCN: NEGATIVE
BENZODIAZ UR QL: NEGATIVE
BILIRUB SERPL-MCNC: 1 MG/DL (ref 0.2–1.1)
BLOOD GROUP ANTIBODIES SERPL: NORMAL
BUN SERPL-MCNC: 27 MG/DL (ref 6–23)
CALCIUM SERPL-MCNC: 9.4 MG/DL (ref 8.3–10.4)
CANNABINOIDS UR QL SCN: POSITIVE
CHLORIDE SERPL-SCNC: 113 MMOL/L (ref 103–113)
CO2 SERPL-SCNC: 15 MMOL/L (ref 21–32)
COCAINE UR QL SCN: NEGATIVE
CREAT SERPL-MCNC: 1.2 MG/DL (ref 0.8–1.5)
EKG ATRIAL RATE: 123 BPM
EKG DIAGNOSIS: NORMAL
EKG P AXIS: 62 DEGREES
EKG P-R INTERVAL: 147 MS
EKG Q-T INTERVAL: 320 MS
EKG QRS DURATION: 76 MS
EKG QTC CALCULATION (BAZETT): 456 MS
EKG R AXIS: 48 DEGREES
EKG T AXIS: 34 DEGREES
EKG VENTRICULAR RATE: 122 BPM
ERYTHROCYTE [DISTWIDTH] IN BLOOD BY AUTOMATED COUNT: 13 % (ref 11.9–14.6)
ETHANOL SERPL-MCNC: <3 MG/DL (ref 0–0.08)
GLOBULIN SER CALC-MCNC: 3.7 G/DL (ref 2.8–4.5)
GLUCOSE SERPL-MCNC: 141 MG/DL (ref 65–100)
HCT VFR BLD AUTO: 40.2 % (ref 41.1–50.3)
HCT VFR BLD AUTO: 43.6 % (ref 41.1–50.3)
HGB BLD-MCNC: 14.8 G/DL (ref 13.6–17.2)
HGB BLD-MCNC: 16.2 G/DL (ref 13.6–17.2)
LIPASE SERPL-CCNC: 92 U/L (ref 73–393)
MCH RBC QN AUTO: 29.3 PG (ref 26.1–32.9)
MCHC RBC AUTO-ENTMCNC: 37.2 G/DL (ref 31.4–35)
MCV RBC AUTO: 78.8 FL (ref 82–102)
METHADONE UR QL: NEGATIVE
NRBC # BLD: 0 K/UL (ref 0–0.2)
OPIATES UR QL: NEGATIVE
PCP UR QL: NEGATIVE
PLATELET # BLD AUTO: 361 K/UL (ref 150–450)
PMV BLD AUTO: 8.6 FL (ref 9.4–12.3)
POTASSIUM SERPL-SCNC: 3.3 MMOL/L (ref 3.5–5.1)
PROT SERPL-MCNC: 8.4 G/DL (ref 6.3–8.2)
RBC # BLD AUTO: 5.53 M/UL (ref 4.23–5.6)
SODIUM SERPL-SCNC: 139 MMOL/L (ref 136–146)
SPECIMEN EXP DATE BLD: NORMAL
TROPONIN I SERPL HS-MCNC: 5.7 PG/ML (ref 0–14)
TROPONIN I SERPL HS-MCNC: 5.9 PG/ML (ref 0–14)
WBC # BLD AUTO: 10.2 K/UL (ref 4.3–11.1)

## 2024-04-06 PROCEDURE — 2500000003 HC RX 250 WO HCPCS: Performed by: HOSPITALIST

## 2024-04-06 PROCEDURE — 96361 HYDRATE IV INFUSION ADD-ON: CPT

## 2024-04-06 PROCEDURE — 86901 BLOOD TYPING SEROLOGIC RH(D): CPT

## 2024-04-06 PROCEDURE — 84484 ASSAY OF TROPONIN QUANT: CPT

## 2024-04-06 PROCEDURE — 96365 THER/PROPH/DIAG IV INF INIT: CPT

## 2024-04-06 PROCEDURE — 6370000000 HC RX 637 (ALT 250 FOR IP): Performed by: EMERGENCY MEDICINE

## 2024-04-06 PROCEDURE — 85018 HEMOGLOBIN: CPT

## 2024-04-06 PROCEDURE — G0378 HOSPITAL OBSERVATION PER HR: HCPCS

## 2024-04-06 PROCEDURE — 6360000002 HC RX W HCPCS: Performed by: INTERNAL MEDICINE

## 2024-04-06 PROCEDURE — 82077 ASSAY SPEC XCP UR&BREATH IA: CPT

## 2024-04-06 PROCEDURE — 80307 DRUG TEST PRSMV CHEM ANLYZR: CPT

## 2024-04-06 PROCEDURE — 99285 EMERGENCY DEPT VISIT HI MDM: CPT

## 2024-04-06 PROCEDURE — 93005 ELECTROCARDIOGRAM TRACING: CPT | Performed by: EMERGENCY MEDICINE

## 2024-04-06 PROCEDURE — 83690 ASSAY OF LIPASE: CPT

## 2024-04-06 PROCEDURE — 86850 RBC ANTIBODY SCREEN: CPT

## 2024-04-06 PROCEDURE — 93010 ELECTROCARDIOGRAM REPORT: CPT | Performed by: INTERNAL MEDICINE

## 2024-04-06 PROCEDURE — 86900 BLOOD TYPING SEROLOGIC ABO: CPT

## 2024-04-06 PROCEDURE — 6370000000 HC RX 637 (ALT 250 FOR IP): Performed by: INTERNAL MEDICINE

## 2024-04-06 PROCEDURE — 80053 COMPREHEN METABOLIC PANEL: CPT

## 2024-04-06 PROCEDURE — 96375 TX/PRO/DX INJ NEW DRUG ADDON: CPT

## 2024-04-06 PROCEDURE — 2580000003 HC RX 258: Performed by: EMERGENCY MEDICINE

## 2024-04-06 PROCEDURE — 6360000002 HC RX W HCPCS: Performed by: EMERGENCY MEDICINE

## 2024-04-06 PROCEDURE — 2580000003 HC RX 258: Performed by: INTERNAL MEDICINE

## 2024-04-06 PROCEDURE — 85014 HEMATOCRIT: CPT

## 2024-04-06 PROCEDURE — 85027 COMPLETE CBC AUTOMATED: CPT

## 2024-04-06 RX ORDER — POLYETHYLENE GLYCOL 3350 17 G/17G
17 POWDER, FOR SOLUTION ORAL DAILY PRN
Status: DISCONTINUED | OUTPATIENT
Start: 2024-04-06 | End: 2024-04-09 | Stop reason: HOSPADM

## 2024-04-06 RX ORDER — SODIUM CHLORIDE 9 MG/ML
INJECTION, SOLUTION INTRAVENOUS PRN
Status: DISCONTINUED | OUTPATIENT
Start: 2024-04-06 | End: 2024-04-09 | Stop reason: HOSPADM

## 2024-04-06 RX ORDER — ENOXAPARIN SODIUM 100 MG/ML
40 INJECTION SUBCUTANEOUS DAILY
Status: DISCONTINUED | OUTPATIENT
Start: 2024-04-07 | End: 2024-04-09 | Stop reason: HOSPADM

## 2024-04-06 RX ORDER — MAGNESIUM SULFATE IN WATER 40 MG/ML
2000 INJECTION, SOLUTION INTRAVENOUS PRN
Status: DISCONTINUED | OUTPATIENT
Start: 2024-04-06 | End: 2024-04-09 | Stop reason: HOSPADM

## 2024-04-06 RX ORDER — SODIUM CHLORIDE 0.9 % (FLUSH) 0.9 %
5-40 SYRINGE (ML) INJECTION EVERY 12 HOURS SCHEDULED
Status: DISCONTINUED | OUTPATIENT
Start: 2024-04-06 | End: 2024-04-09 | Stop reason: HOSPADM

## 2024-04-06 RX ORDER — POTASSIUM CHLORIDE 20 MEQ/1
40 TABLET, EXTENDED RELEASE ORAL PRN
Status: DISCONTINUED | OUTPATIENT
Start: 2024-04-06 | End: 2024-04-09 | Stop reason: HOSPADM

## 2024-04-06 RX ORDER — ONDANSETRON 2 MG/ML
4 INJECTION INTRAMUSCULAR; INTRAVENOUS EVERY 6 HOURS PRN
Status: DISCONTINUED | OUTPATIENT
Start: 2024-04-06 | End: 2024-04-09 | Stop reason: HOSPADM

## 2024-04-06 RX ORDER — SODIUM CHLORIDE, SODIUM LACTATE, POTASSIUM CHLORIDE, AND CALCIUM CHLORIDE .6; .31; .03; .02 G/100ML; G/100ML; G/100ML; G/100ML
1000 INJECTION, SOLUTION INTRAVENOUS
Status: COMPLETED | OUTPATIENT
Start: 2024-04-06 | End: 2024-04-06

## 2024-04-06 RX ORDER — METOCLOPRAMIDE HYDROCHLORIDE 5 MG/ML
10 INJECTION INTRAMUSCULAR; INTRAVENOUS EVERY 6 HOURS PRN
Status: DISCONTINUED | OUTPATIENT
Start: 2024-04-06 | End: 2024-04-06

## 2024-04-06 RX ORDER — SODIUM CHLORIDE, SODIUM LACTATE, POTASSIUM CHLORIDE, CALCIUM CHLORIDE 600; 310; 30; 20 MG/100ML; MG/100ML; MG/100ML; MG/100ML
INJECTION, SOLUTION INTRAVENOUS CONTINUOUS
Status: ACTIVE | OUTPATIENT
Start: 2024-04-06 | End: 2024-04-08

## 2024-04-06 RX ORDER — HYDRALAZINE HYDROCHLORIDE 20 MG/ML
10 INJECTION INTRAMUSCULAR; INTRAVENOUS EVERY 6 HOURS PRN
Status: DISCONTINUED | OUTPATIENT
Start: 2024-04-06 | End: 2024-04-08

## 2024-04-06 RX ORDER — METOPROLOL TARTRATE 1 MG/ML
5 INJECTION, SOLUTION INTRAVENOUS EVERY 6 HOURS PRN
Status: DISCONTINUED | OUTPATIENT
Start: 2024-04-06 | End: 2024-04-09 | Stop reason: HOSPADM

## 2024-04-06 RX ORDER — SODIUM CHLORIDE, SODIUM LACTATE, POTASSIUM CHLORIDE, AND CALCIUM CHLORIDE .6; .31; .03; .02 G/100ML; G/100ML; G/100ML; G/100ML
500 INJECTION, SOLUTION INTRAVENOUS
Status: DISCONTINUED | OUTPATIENT
Start: 2024-04-06 | End: 2024-04-06

## 2024-04-06 RX ORDER — ONDANSETRON 4 MG/1
4 TABLET, ORALLY DISINTEGRATING ORAL EVERY 8 HOURS PRN
Status: DISCONTINUED | OUTPATIENT
Start: 2024-04-06 | End: 2024-04-09 | Stop reason: HOSPADM

## 2024-04-06 RX ORDER — POTASSIUM CHLORIDE 7.45 MG/ML
10 INJECTION INTRAVENOUS PRN
Status: DISCONTINUED | OUTPATIENT
Start: 2024-04-06 | End: 2024-04-09 | Stop reason: HOSPADM

## 2024-04-06 RX ORDER — POTASSIUM CHLORIDE 20 MEQ/1
40 TABLET, EXTENDED RELEASE ORAL
Status: COMPLETED | OUTPATIENT
Start: 2024-04-06 | End: 2024-04-06

## 2024-04-06 RX ORDER — ACETAMINOPHEN 325 MG/1
650 TABLET ORAL EVERY 6 HOURS PRN
Status: DISCONTINUED | OUTPATIENT
Start: 2024-04-06 | End: 2024-04-09 | Stop reason: HOSPADM

## 2024-04-06 RX ORDER — TRAZODONE HYDROCHLORIDE 50 MG/1
100 TABLET ORAL NIGHTLY
Status: DISCONTINUED | OUTPATIENT
Start: 2024-04-06 | End: 2024-04-09 | Stop reason: HOSPADM

## 2024-04-06 RX ORDER — ACETAMINOPHEN 650 MG/1
650 SUPPOSITORY RECTAL EVERY 6 HOURS PRN
Status: DISCONTINUED | OUTPATIENT
Start: 2024-04-06 | End: 2024-04-09 | Stop reason: HOSPADM

## 2024-04-06 RX ORDER — MAGNESIUM SULFATE IN WATER 40 MG/ML
2000 INJECTION, SOLUTION INTRAVENOUS ONCE
Status: COMPLETED | OUTPATIENT
Start: 2024-04-06 | End: 2024-04-06

## 2024-04-06 RX ORDER — SODIUM CHLORIDE 0.9 % (FLUSH) 0.9 %
5-40 SYRINGE (ML) INJECTION PRN
Status: DISCONTINUED | OUTPATIENT
Start: 2024-04-06 | End: 2024-04-09 | Stop reason: HOSPADM

## 2024-04-06 RX ORDER — HALOPERIDOL 5 MG/ML
5 INJECTION INTRAMUSCULAR
Status: COMPLETED | OUTPATIENT
Start: 2024-04-06 | End: 2024-04-06

## 2024-04-06 RX ORDER — METOCLOPRAMIDE 10 MG/1
10 TABLET ORAL 4 TIMES DAILY
Qty: 12 TABLET | Refills: 0 | Status: SHIPPED | OUTPATIENT
Start: 2024-04-06 | End: 2024-04-09

## 2024-04-06 RX ORDER — DIPHENHYDRAMINE HYDROCHLORIDE 50 MG/ML
25 INJECTION INTRAMUSCULAR; INTRAVENOUS
Status: COMPLETED | OUTPATIENT
Start: 2024-04-06 | End: 2024-04-06

## 2024-04-06 RX ADMIN — SODIUM CHLORIDE, POTASSIUM CHLORIDE, SODIUM LACTATE AND CALCIUM CHLORIDE 1000 ML: 600; 310; 30; 20 INJECTION, SOLUTION INTRAVENOUS at 08:45

## 2024-04-06 RX ADMIN — METOPROLOL TARTRATE 5 MG: 5 INJECTION INTRAVENOUS at 19:41

## 2024-04-06 RX ADMIN — SODIUM CHLORIDE, POTASSIUM CHLORIDE, SODIUM LACTATE AND CALCIUM CHLORIDE 1000 ML: 600; 310; 30; 20 INJECTION, SOLUTION INTRAVENOUS at 11:32

## 2024-04-06 RX ADMIN — MAGNESIUM SULFATE HEPTAHYDRATE 2000 MG: 40 INJECTION, SOLUTION INTRAVENOUS at 09:54

## 2024-04-06 RX ADMIN — HYDRALAZINE HYDROCHLORIDE 10 MG: 20 INJECTION INTRAMUSCULAR; INTRAVENOUS at 18:26

## 2024-04-06 RX ADMIN — SODIUM CHLORIDE, PRESERVATIVE FREE 10 ML: 5 INJECTION INTRAVENOUS at 21:22

## 2024-04-06 RX ADMIN — POTASSIUM CHLORIDE 40 MEQ: 1500 TABLET, EXTENDED RELEASE ORAL at 09:51

## 2024-04-06 RX ADMIN — DIPHENHYDRAMINE HYDROCHLORIDE 25 MG: 50 INJECTION, SOLUTION INTRAMUSCULAR; INTRAVENOUS at 08:40

## 2024-04-06 RX ADMIN — TRAZODONE HYDROCHLORIDE 100 MG: 50 TABLET ORAL at 21:21

## 2024-04-06 RX ADMIN — SODIUM CHLORIDE, POTASSIUM CHLORIDE, SODIUM LACTATE AND CALCIUM CHLORIDE: 600; 310; 30; 20 INJECTION, SOLUTION INTRAVENOUS at 16:53

## 2024-04-06 RX ADMIN — HALOPERIDOL LACTATE 5 MG: 5 INJECTION, SOLUTION INTRAMUSCULAR at 08:40

## 2024-04-06 RX ADMIN — ONDANSETRON 4 MG: 2 INJECTION INTRAMUSCULAR; INTRAVENOUS at 19:46

## 2024-04-06 NOTE — DISCHARGE INSTRUCTIONS
You must stop using marijuana.  It will continue to make you sick and cause cyclical vomiting.  Continue Protonix.  Reglan as prescribed for nausea and vomiting and gastroparesis.  Follow-up with your primary care doctor and your GI doctor in the next 1 to 2 weeks, especially if not improving.  Return if any new, worsening or concerning symptoms

## 2024-04-06 NOTE — H&P
Diagnosis       Sinus tachycardia  Consider right atrial enlargement  Consider left ventricular hypertrophy     TYPE AND SCREEN    Collection Time: 04/06/24  8:50 AM   Result Value Ref Range    Crossmatch expiration date 04/09/2024,2358     ABO/Rh O POSITIVE     Antibody Screen NEG    Troponin    Collection Time: 04/06/24  8:50 AM   Result Value Ref Range    Troponin, High Sensitivity 5.9 0 - 14 pg/mL   Ethanol    Collection Time: 04/06/24 10:04 AM   Result Value Ref Range    Ethanol Lvl <3 MG/DL   Hemoglobin and Hematocrit    Collection Time: 04/06/24 12:25 PM   Result Value Ref Range    Hemoglobin 14.8 13.6 - 17.2 g/dL    Hematocrit 40.2 (L) 41.1 - 50.3 %       No results for input(s): \"COVID19\" in the last 72 hours.    No results found.      Signed:  Tushar Viera MD    Part of this note may have been written by using a voice dictation software.  The note has been proof read but may still contain some grammatical/other typographical errors.

## 2024-04-06 NOTE — ED PROVIDER NOTES
input(s): \"COVID19\" in the last 72 hours.    Voice dictation software was used during the making of this note.  This software is not perfect and grammatical and other typographical errors may be present.  This note has not been completely proofread for errors.     Adrien Yanez MD  04/06/24 1385

## 2024-04-06 NOTE — ED NOTES
Pt arrives via [facility] coming from home c/o vomiting for 3 days. Blood/cofffee ground emesis  4mg zofran and 700ml NS en route.        Zak Marie RN  04/06/24 1293

## 2024-04-07 ENCOUNTER — APPOINTMENT (OUTPATIENT)
Dept: GENERAL RADIOLOGY | Age: 49
DRG: 391 | End: 2024-04-07
Payer: COMMERCIAL

## 2024-04-07 LAB
ALBUMIN SERPL-MCNC: 3.8 G/DL (ref 3.5–5)
ALBUMIN/GLOB SERPL: 1.1 (ref 0.4–1.6)
ALP SERPL-CCNC: 107 U/L (ref 50–136)
ALT SERPL-CCNC: 18 U/L (ref 12–65)
ANION GAP SERPL CALC-SCNC: 6 MMOL/L (ref 2–11)
AST SERPL-CCNC: 19 U/L (ref 15–37)
BASOPHILS # BLD: 0 K/UL (ref 0–0.2)
BASOPHILS NFR BLD: 0 % (ref 0–2)
BILIRUB DIRECT SERPL-MCNC: 0.2 MG/DL
BILIRUB SERPL-MCNC: 0.7 MG/DL (ref 0.2–1.1)
BUN SERPL-MCNC: 19 MG/DL (ref 6–23)
CALCIUM SERPL-MCNC: 9.1 MG/DL (ref 8.3–10.4)
CHLORIDE SERPL-SCNC: 106 MMOL/L (ref 103–113)
CO2 SERPL-SCNC: 23 MMOL/L (ref 21–32)
CREAT SERPL-MCNC: 1 MG/DL (ref 0.8–1.5)
DIFFERENTIAL METHOD BLD: ABNORMAL
EOSINOPHIL # BLD: 0 K/UL (ref 0–0.8)
EOSINOPHIL NFR BLD: 0 % (ref 0.5–7.8)
ERYTHROCYTE [DISTWIDTH] IN BLOOD BY AUTOMATED COUNT: 13.2 % (ref 11.9–14.6)
GLOBULIN SER CALC-MCNC: 3.4 G/DL (ref 2.8–4.5)
GLUCOSE SERPL-MCNC: 114 MG/DL (ref 65–100)
HCT VFR BLD AUTO: 40.4 % (ref 41.1–50.3)
HGB BLD-MCNC: 14.4 G/DL (ref 13.6–17.2)
HGB BLD-MCNC: 14.6 G/DL (ref 13.6–17.2)
HGB BLD-MCNC: 14.7 G/DL (ref 13.6–17.2)
IMM GRANULOCYTES # BLD AUTO: 0 K/UL (ref 0–0.5)
IMM GRANULOCYTES NFR BLD AUTO: 0 % (ref 0–5)
LYMPHOCYTES # BLD: 2 K/UL (ref 0.5–4.6)
LYMPHOCYTES NFR BLD: 16 % (ref 13–44)
MAGNESIUM SERPL-MCNC: 2 MG/DL (ref 1.8–2.4)
MCH RBC QN AUTO: 29.3 PG (ref 26.1–32.9)
MCHC RBC AUTO-ENTMCNC: 36.4 G/DL (ref 31.4–35)
MCV RBC AUTO: 80.6 FL (ref 82–102)
MONOCYTES # BLD: 1.5 K/UL (ref 0.1–1.3)
MONOCYTES NFR BLD: 12 % (ref 4–12)
NEUTS SEG # BLD: 9.2 K/UL (ref 1.7–8.2)
NEUTS SEG NFR BLD: 72 % (ref 43–78)
NRBC # BLD: 0 K/UL (ref 0–0.2)
NT PRO BNP: 491 PG/ML (ref 5–125)
PHOSPHATE SERPL-MCNC: 2.7 MG/DL (ref 2.5–4.5)
PLATELET # BLD AUTO: 332 K/UL (ref 150–450)
PMV BLD AUTO: 8.9 FL (ref 9.4–12.3)
POTASSIUM SERPL-SCNC: 3.6 MMOL/L (ref 3.5–5.1)
PROT SERPL-MCNC: 7.2 G/DL (ref 6.3–8.2)
RBC # BLD AUTO: 5.01 M/UL (ref 4.23–5.6)
SODIUM SERPL-SCNC: 135 MMOL/L (ref 136–146)
WBC # BLD AUTO: 12.7 K/UL (ref 4.3–11.1)

## 2024-04-07 PROCEDURE — 6360000002 HC RX W HCPCS: Performed by: STUDENT IN AN ORGANIZED HEALTH CARE EDUCATION/TRAINING PROGRAM

## 2024-04-07 PROCEDURE — 2500000003 HC RX 250 WO HCPCS: Performed by: HOSPITALIST

## 2024-04-07 PROCEDURE — 6370000000 HC RX 637 (ALT 250 FOR IP): Performed by: STUDENT IN AN ORGANIZED HEALTH CARE EDUCATION/TRAINING PROGRAM

## 2024-04-07 PROCEDURE — 84100 ASSAY OF PHOSPHORUS: CPT

## 2024-04-07 PROCEDURE — A4216 STERILE WATER/SALINE, 10 ML: HCPCS | Performed by: STUDENT IN AN ORGANIZED HEALTH CARE EDUCATION/TRAINING PROGRAM

## 2024-04-07 PROCEDURE — 71045 X-RAY EXAM CHEST 1 VIEW: CPT

## 2024-04-07 PROCEDURE — 2580000003 HC RX 258: Performed by: INTERNAL MEDICINE

## 2024-04-07 PROCEDURE — 83735 ASSAY OF MAGNESIUM: CPT

## 2024-04-07 PROCEDURE — 85018 HEMOGLOBIN: CPT

## 2024-04-07 PROCEDURE — 36415 COLL VENOUS BLD VENIPUNCTURE: CPT

## 2024-04-07 PROCEDURE — 2580000003 HC RX 258: Performed by: STUDENT IN AN ORGANIZED HEALTH CARE EDUCATION/TRAINING PROGRAM

## 2024-04-07 PROCEDURE — 83880 ASSAY OF NATRIURETIC PEPTIDE: CPT

## 2024-04-07 PROCEDURE — G0378 HOSPITAL OBSERVATION PER HR: HCPCS

## 2024-04-07 PROCEDURE — 85025 COMPLETE CBC W/AUTO DIFF WBC: CPT

## 2024-04-07 PROCEDURE — 6360000002 HC RX W HCPCS: Performed by: INTERNAL MEDICINE

## 2024-04-07 PROCEDURE — 80048 BASIC METABOLIC PNL TOTAL CA: CPT

## 2024-04-07 PROCEDURE — 6370000000 HC RX 637 (ALT 250 FOR IP): Performed by: INTERNAL MEDICINE

## 2024-04-07 PROCEDURE — 96361 HYDRATE IV INFUSION ADD-ON: CPT

## 2024-04-07 PROCEDURE — 96376 TX/PRO/DX INJ SAME DRUG ADON: CPT

## 2024-04-07 PROCEDURE — 80076 HEPATIC FUNCTION PANEL: CPT

## 2024-04-07 PROCEDURE — C9113 INJ PANTOPRAZOLE SODIUM, VIA: HCPCS | Performed by: STUDENT IN AN ORGANIZED HEALTH CARE EDUCATION/TRAINING PROGRAM

## 2024-04-07 RX ORDER — SUCRALFATE 1 G/1
1 TABLET ORAL EVERY 8 HOURS SCHEDULED
Status: DISCONTINUED | OUTPATIENT
Start: 2024-04-07 | End: 2024-04-09 | Stop reason: HOSPADM

## 2024-04-07 RX ADMIN — METOPROLOL TARTRATE 5 MG: 5 INJECTION INTRAVENOUS at 01:50

## 2024-04-07 RX ADMIN — METOPROLOL TARTRATE 25 MG: 25 TABLET, FILM COATED ORAL at 21:02

## 2024-04-07 RX ADMIN — ONDANSETRON 4 MG: 2 INJECTION INTRAMUSCULAR; INTRAVENOUS at 17:56

## 2024-04-07 RX ADMIN — SODIUM CHLORIDE, PRESERVATIVE FREE 10 ML: 5 INJECTION INTRAVENOUS at 21:02

## 2024-04-07 RX ADMIN — ONDANSETRON 4 MG: 2 INJECTION INTRAMUSCULAR; INTRAVENOUS at 10:34

## 2024-04-07 RX ADMIN — TRAZODONE HYDROCHLORIDE 100 MG: 50 TABLET ORAL at 21:01

## 2024-04-07 RX ADMIN — SODIUM CHLORIDE, POTASSIUM CHLORIDE, SODIUM LACTATE AND CALCIUM CHLORIDE: 600; 310; 30; 20 INJECTION, SOLUTION INTRAVENOUS at 01:27

## 2024-04-07 RX ADMIN — SUCRALFATE 1 G: 1 TABLET ORAL at 21:01

## 2024-04-07 RX ADMIN — METOPROLOL TARTRATE 5 MG: 5 INJECTION INTRAVENOUS at 07:49

## 2024-04-07 RX ADMIN — SODIUM CHLORIDE, POTASSIUM CHLORIDE, SODIUM LACTATE AND CALCIUM CHLORIDE: 600; 310; 30; 20 INJECTION, SOLUTION INTRAVENOUS at 09:52

## 2024-04-07 RX ADMIN — ONDANSETRON 4 MG: 2 INJECTION INTRAMUSCULAR; INTRAVENOUS at 03:01

## 2024-04-07 RX ADMIN — SODIUM CHLORIDE, POTASSIUM CHLORIDE, SODIUM LACTATE AND CALCIUM CHLORIDE: 600; 310; 30; 20 INJECTION, SOLUTION INTRAVENOUS at 21:06

## 2024-04-07 RX ADMIN — SODIUM CHLORIDE, PRESERVATIVE FREE 10 ML: 5 INJECTION INTRAVENOUS at 07:52

## 2024-04-07 RX ADMIN — HYDRALAZINE HYDROCHLORIDE 10 MG: 20 INJECTION INTRAMUSCULAR; INTRAVENOUS at 01:28

## 2024-04-07 RX ADMIN — METOPROLOL TARTRATE 25 MG: 25 TABLET, FILM COATED ORAL at 09:54

## 2024-04-07 RX ADMIN — SUCRALFATE 1 G: 1 TABLET ORAL at 13:37

## 2024-04-07 RX ADMIN — PANTOPRAZOLE SODIUM 40 MG: 40 INJECTION, POWDER, FOR SOLUTION INTRAVENOUS at 22:01

## 2024-04-07 RX ADMIN — PANTOPRAZOLE SODIUM 40 MG: 40 INJECTION, POWDER, FOR SOLUTION INTRAVENOUS at 10:40

## 2024-04-07 NOTE — PLAN OF CARE
Problem: Discharge Planning  Goal: Discharge to home or other facility with appropriate resources  4/6/2024 2304 by Sharron Rogers, RN  Outcome: Progressing  4/6/2024 2003 by Aye Booker, RN  Outcome: Progressing

## 2024-04-07 NOTE — CONSULTS
GI Note    GI service consulted regarding patients complaints of melena, admitted for issues with nausea and vomiting. Longstanding THC use and recent EGD in 2/24 showed hiatal hernia and LA Grade B esophagitis.     THC still present on urine drug screen.     At this time Hgb at baseline, no signs for any overt GI bleeding, and would hold off on any repeat EGD unless patient manifests significant, overt bleeding while inpatient and/or if develops significant anemia. Continue with supportive management at this time.   Monitor H&H. Recommend social work consultation to help with marijuana cessation if this is becoming a problem.     Suspect recent symptoms maybe related to esophagitis in the setting of vomiting, possible MW tear, vs emetogenic related gastropathy.     Recommendations-   Low residue diet, advance as tolerated  Protonix 40mg PO BID   Carafare 1gm PO QID x 2 weeks  Strict anti-reflux measures   AVOID ALL THC USE- this should be emphasized further to the patient (suspect this was discussed with patient following Dr Luna EGD on 2/3/24)    IV Zofran ATC until symptoms jacinta, then PO Zofran ODT 4mg q 8 hrs as needed and Phenergan suppositories prn at home as an abortive agent for any issues.     IF the patient continues to have any issues in the furture with ongoing nausea, vomiting, or concerns for bleeding despite cessation of THC for an extended period of time, then we can workup patient for gastroparesis, cylical vomiting, etc. At this time patients symptoms are consistent with cannabis hyperemesis.     No further recommendations at this time.    Thank you for involving the GI service in the care of your patient. Please dont hesitate to call me directly on my cell below with any questions, updates, etc.      -Michael Chaudhary MD  [phone]   Gastroenterology/Hepatology

## 2024-04-08 ENCOUNTER — APPOINTMENT (OUTPATIENT)
Dept: NON INVASIVE DIAGNOSTICS | Age: 49
DRG: 391 | End: 2024-04-08
Attending: STUDENT IN AN ORGANIZED HEALTH CARE EDUCATION/TRAINING PROGRAM
Payer: COMMERCIAL

## 2024-04-08 LAB
ANION GAP SERPL CALC-SCNC: 6 MMOL/L (ref 2–11)
BASOPHILS # BLD: 0.1 K/UL (ref 0–0.2)
BASOPHILS NFR BLD: 1 % (ref 0–2)
BUN SERPL-MCNC: 23 MG/DL (ref 6–23)
CALCIUM SERPL-MCNC: 9.2 MG/DL (ref 8.3–10.4)
CHLORIDE SERPL-SCNC: 107 MMOL/L (ref 103–113)
CO2 SERPL-SCNC: 25 MMOL/L (ref 21–32)
CREAT SERPL-MCNC: 1.1 MG/DL (ref 0.8–1.5)
DIFFERENTIAL METHOD BLD: ABNORMAL
ECHO AO ASC DIAM: 3.1 CM
ECHO AO ASCENDING AORTA INDEX: 1.89 CM/M2
ECHO AO ROOT DIAM: 3.1 CM
ECHO AO ROOT INDEX: 1.89 CM/M2
ECHO AV AREA PEAK VELOCITY: 2.5 CM2
ECHO AV AREA VTI: 2.6 CM2
ECHO AV AREA/BSA PEAK VELOCITY: 1.5 CM2/M2
ECHO AV AREA/BSA VTI: 1.6 CM2/M2
ECHO AV MEAN GRADIENT: 7 MMHG
ECHO AV MEAN VELOCITY: 1.2 M/S
ECHO AV PEAK GRADIENT: 12 MMHG
ECHO AV PEAK VELOCITY: 1.7 M/S
ECHO AV VELOCITY RATIO: 0.88
ECHO AV VTI: 35.1 CM
ECHO BSA: 1.67 M2
ECHO IVC PROX: 1.6 CM
ECHO LA AREA 2C: 13.9 CM2
ECHO LA AREA 4C: 11.4 CM2
ECHO LA DIAMETER INDEX: 2.07 CM/M2
ECHO LA DIAMETER: 3.4 CM
ECHO LA MAJOR AXIS: 4.5 CM
ECHO LA MINOR AXIS: 4.5 CM
ECHO LA TO AORTIC ROOT RATIO: 1.1
ECHO LA VOL BP: 28 ML (ref 18–58)
ECHO LA VOL MOD A2C: 35 ML (ref 18–58)
ECHO LA VOL MOD A4C: 22 ML (ref 18–58)
ECHO LA VOL/BSA BIPLANE: 17 ML/M2 (ref 16–34)
ECHO LA VOLUME INDEX MOD A2C: 21 ML/M2 (ref 16–34)
ECHO LA VOLUME INDEX MOD A4C: 13 ML/M2 (ref 16–34)
ECHO LV E' LATERAL VELOCITY: 11 CM/S
ECHO LV E' SEPTAL VELOCITY: 7 CM/S
ECHO LV EDV A2C: 63 ML
ECHO LV EDV A4C: 66 ML
ECHO LV EDV INDEX A4C: 40 ML/M2
ECHO LV EDV NDEX A2C: 38 ML/M2
ECHO LV EJECTION FRACTION A2C: 62 %
ECHO LV EJECTION FRACTION A4C: 60 %
ECHO LV EJECTION FRACTION BIPLANE: 61 % (ref 55–100)
ECHO LV ESV A2C: 24 ML
ECHO LV ESV A4C: 26 ML
ECHO LV ESV INDEX A2C: 15 ML/M2
ECHO LV ESV INDEX A4C: 16 ML/M2
ECHO LV FRACTIONAL SHORTENING: 41 % (ref 28–44)
ECHO LV INTERNAL DIMENSION DIASTOLE INDEX: 2.5 CM/M2
ECHO LV INTERNAL DIMENSION DIASTOLIC: 4.1 CM (ref 4.2–5.9)
ECHO LV INTERNAL DIMENSION SYSTOLIC INDEX: 1.46 CM/M2
ECHO LV INTERNAL DIMENSION SYSTOLIC: 2.4 CM
ECHO LV IVSD: 1.1 CM (ref 0.6–1)
ECHO LV MASS 2D: 151.3 G (ref 88–224)
ECHO LV MASS INDEX 2D: 92.3 G/M2 (ref 49–115)
ECHO LV POSTERIOR WALL DIASTOLIC: 1.1 CM (ref 0.6–1)
ECHO LV RELATIVE WALL THICKNESS RATIO: 0.54
ECHO LVOT AREA: 2.8 CM2
ECHO LVOT AV VTI INDEX: 0.93
ECHO LVOT DIAM: 1.9 CM
ECHO LVOT MEAN GRADIENT: 7 MMHG
ECHO LVOT PEAK GRADIENT: 9 MMHG
ECHO LVOT PEAK VELOCITY: 1.5 M/S
ECHO LVOT STROKE VOLUME INDEX: 56.3 ML/M2
ECHO LVOT SV: 92.4 ML
ECHO LVOT VTI: 32.6 CM
ECHO MV A VELOCITY: 0.78 M/S
ECHO MV E DECELERATION TIME (DT): 264 MS
ECHO MV E VELOCITY: 0.65 M/S
ECHO MV E/A RATIO: 0.83
ECHO MV E/E' LATERAL: 5.91
ECHO MV E/E' RATIO (AVERAGED): 7.6
ECHO PV ACCELERATION TIME (AT): 106 MS
ECHO PV MAX VELOCITY: 1.3 M/S
ECHO PV PEAK GRADIENT: 7 MMHG
ECHO RV BASAL DIMENSION: 3.4 CM
ECHO RV FREE WALL PEAK S': 18 CM/S
ECHO RV INTERNAL DIMENSION: 2.8 CM
ECHO RV TAPSE: 2 CM (ref 1.7–?)
EOSINOPHIL # BLD: 0 K/UL (ref 0–0.8)
EOSINOPHIL NFR BLD: 0 % (ref 0.5–7.8)
ERYTHROCYTE [DISTWIDTH] IN BLOOD BY AUTOMATED COUNT: 13.1 % (ref 11.9–14.6)
GLUCOSE SERPL-MCNC: 103 MG/DL (ref 65–100)
HCT VFR BLD AUTO: 38.7 % (ref 41.1–50.3)
HGB BLD-MCNC: 13.6 G/DL (ref 13.6–17.2)
HGB BLD-MCNC: 14.3 G/DL (ref 13.6–17.2)
IMM GRANULOCYTES # BLD AUTO: 0 K/UL (ref 0–0.5)
IMM GRANULOCYTES NFR BLD AUTO: 0 % (ref 0–5)
LYMPHOCYTES # BLD: 4.2 K/UL (ref 0.5–4.6)
LYMPHOCYTES NFR BLD: 47 % (ref 13–44)
MAGNESIUM SERPL-MCNC: 2.1 MG/DL (ref 1.8–2.4)
MCH RBC QN AUTO: 29.5 PG (ref 26.1–32.9)
MCHC RBC AUTO-ENTMCNC: 37 G/DL (ref 31.4–35)
MCV RBC AUTO: 79.8 FL (ref 82–102)
MONOCYTES # BLD: 1.1 K/UL (ref 0.1–1.3)
MONOCYTES NFR BLD: 13 % (ref 4–12)
NEUTS SEG # BLD: 3.5 K/UL (ref 1.7–8.2)
NEUTS SEG NFR BLD: 39 % (ref 43–78)
NRBC # BLD: 0 K/UL (ref 0–0.2)
PHOSPHATE SERPL-MCNC: 2 MG/DL (ref 2.5–4.5)
PLATELET # BLD AUTO: 325 K/UL (ref 150–450)
PMV BLD AUTO: 9 FL (ref 9.4–12.3)
POTASSIUM SERPL-SCNC: 3.1 MMOL/L (ref 3.5–5.1)
RBC # BLD AUTO: 4.85 M/UL (ref 4.23–5.6)
SODIUM SERPL-SCNC: 138 MMOL/L (ref 136–146)
WBC # BLD AUTO: 8.9 K/UL (ref 4.3–11.1)

## 2024-04-08 PROCEDURE — 6370000000 HC RX 637 (ALT 250 FOR IP): Performed by: INTERNAL MEDICINE

## 2024-04-08 PROCEDURE — 83735 ASSAY OF MAGNESIUM: CPT

## 2024-04-08 PROCEDURE — 2580000003 HC RX 258: Performed by: STUDENT IN AN ORGANIZED HEALTH CARE EDUCATION/TRAINING PROGRAM

## 2024-04-08 PROCEDURE — A4216 STERILE WATER/SALINE, 10 ML: HCPCS | Performed by: STUDENT IN AN ORGANIZED HEALTH CARE EDUCATION/TRAINING PROGRAM

## 2024-04-08 PROCEDURE — 2500000003 HC RX 250 WO HCPCS: Performed by: HOSPITALIST

## 2024-04-08 PROCEDURE — 84100 ASSAY OF PHOSPHORUS: CPT

## 2024-04-08 PROCEDURE — 6370000000 HC RX 637 (ALT 250 FOR IP): Performed by: STUDENT IN AN ORGANIZED HEALTH CARE EDUCATION/TRAINING PROGRAM

## 2024-04-08 PROCEDURE — 93306 TTE W/DOPPLER COMPLETE: CPT | Performed by: INTERNAL MEDICINE

## 2024-04-08 PROCEDURE — 96376 TX/PRO/DX INJ SAME DRUG ADON: CPT

## 2024-04-08 PROCEDURE — 85025 COMPLETE CBC W/AUTO DIFF WBC: CPT

## 2024-04-08 PROCEDURE — G0378 HOSPITAL OBSERVATION PER HR: HCPCS

## 2024-04-08 PROCEDURE — 2580000003 HC RX 258: Performed by: INTERNAL MEDICINE

## 2024-04-08 PROCEDURE — 6360000002 HC RX W HCPCS: Performed by: STUDENT IN AN ORGANIZED HEALTH CARE EDUCATION/TRAINING PROGRAM

## 2024-04-08 PROCEDURE — 36415 COLL VENOUS BLD VENIPUNCTURE: CPT

## 2024-04-08 PROCEDURE — 96361 HYDRATE IV INFUSION ADD-ON: CPT

## 2024-04-08 PROCEDURE — 80048 BASIC METABOLIC PNL TOTAL CA: CPT

## 2024-04-08 PROCEDURE — 96375 TX/PRO/DX INJ NEW DRUG ADDON: CPT

## 2024-04-08 PROCEDURE — 85018 HEMOGLOBIN: CPT

## 2024-04-08 PROCEDURE — 6360000002 HC RX W HCPCS: Performed by: INTERNAL MEDICINE

## 2024-04-08 PROCEDURE — 93306 TTE W/DOPPLER COMPLETE: CPT

## 2024-04-08 PROCEDURE — 6360000002 HC RX W HCPCS: Performed by: HOSPITALIST

## 2024-04-08 PROCEDURE — 1100000000 HC RM PRIVATE

## 2024-04-08 PROCEDURE — C9113 INJ PANTOPRAZOLE SODIUM, VIA: HCPCS | Performed by: STUDENT IN AN ORGANIZED HEALTH CARE EDUCATION/TRAINING PROGRAM

## 2024-04-08 RX ORDER — PROCHLORPERAZINE EDISYLATE 5 MG/ML
10 INJECTION INTRAMUSCULAR; INTRAVENOUS ONCE
Status: COMPLETED | OUTPATIENT
Start: 2024-04-08 | End: 2024-04-08

## 2024-04-08 RX ORDER — POTASSIUM CHLORIDE 20 MEQ/1
40 TABLET, EXTENDED RELEASE ORAL ONCE
Status: DISCONTINUED | OUTPATIENT
Start: 2024-04-08 | End: 2024-04-09 | Stop reason: HOSPADM

## 2024-04-08 RX ORDER — SODIUM CHLORIDE, SODIUM LACTATE, POTASSIUM CHLORIDE, CALCIUM CHLORIDE 600; 310; 30; 20 MG/100ML; MG/100ML; MG/100ML; MG/100ML
INJECTION, SOLUTION INTRAVENOUS CONTINUOUS
Status: DISCONTINUED | OUTPATIENT
Start: 2024-04-08 | End: 2024-04-09 | Stop reason: HOSPADM

## 2024-04-08 RX ORDER — METOCLOPRAMIDE HYDROCHLORIDE 5 MG/ML
5 INJECTION INTRAMUSCULAR; INTRAVENOUS EVERY 6 HOURS PRN
Status: DISCONTINUED | OUTPATIENT
Start: 2024-04-08 | End: 2024-04-09 | Stop reason: HOSPADM

## 2024-04-08 RX ORDER — HALOPERIDOL 5 MG/ML
1 INJECTION INTRAMUSCULAR EVERY 6 HOURS PRN
Status: DISCONTINUED | OUTPATIENT
Start: 2024-04-08 | End: 2024-04-09 | Stop reason: HOSPADM

## 2024-04-08 RX ADMIN — PROCHLORPERAZINE EDISYLATE 10 MG: 5 INJECTION INTRAMUSCULAR; INTRAVENOUS at 03:41

## 2024-04-08 RX ADMIN — SODIUM CHLORIDE, PRESERVATIVE FREE 10 ML: 5 INJECTION INTRAVENOUS at 09:03

## 2024-04-08 RX ADMIN — ONDANSETRON 4 MG: 2 INJECTION INTRAMUSCULAR; INTRAVENOUS at 01:45

## 2024-04-08 RX ADMIN — ONDANSETRON 4 MG: 2 INJECTION INTRAMUSCULAR; INTRAVENOUS at 17:12

## 2024-04-08 RX ADMIN — POTASSIUM CHLORIDE 40 MEQ: 1500 TABLET, EXTENDED RELEASE ORAL at 05:49

## 2024-04-08 RX ADMIN — SODIUM CHLORIDE, POTASSIUM CHLORIDE, SODIUM LACTATE AND CALCIUM CHLORIDE: 600; 310; 30; 20 INJECTION, SOLUTION INTRAVENOUS at 09:06

## 2024-04-08 RX ADMIN — POTASSIUM & SODIUM PHOSPHATES POWDER PACK 280-160-250 MG 250 MG: 280-160-250 PACK at 17:11

## 2024-04-08 RX ADMIN — ONDANSETRON 4 MG: 2 INJECTION INTRAMUSCULAR; INTRAVENOUS at 11:24

## 2024-04-08 RX ADMIN — SUCRALFATE 1 G: 1 TABLET ORAL at 20:42

## 2024-04-08 RX ADMIN — TRAZODONE HYDROCHLORIDE 100 MG: 50 TABLET ORAL at 22:34

## 2024-04-08 RX ADMIN — PANTOPRAZOLE SODIUM 40 MG: 40 INJECTION, POWDER, FOR SOLUTION INTRAVENOUS at 11:14

## 2024-04-08 RX ADMIN — PANTOPRAZOLE SODIUM 40 MG: 40 INJECTION, POWDER, FOR SOLUTION INTRAVENOUS at 22:33

## 2024-04-08 RX ADMIN — METOPROLOL TARTRATE 25 MG: 25 TABLET, FILM COATED ORAL at 11:01

## 2024-04-08 RX ADMIN — METOCLOPRAMIDE 5 MG: 5 INJECTION, SOLUTION INTRAMUSCULAR; INTRAVENOUS at 15:28

## 2024-04-08 RX ADMIN — SUCRALFATE 1 G: 1 TABLET ORAL at 05:49

## 2024-04-08 RX ADMIN — METOPROLOL TARTRATE 25 MG: 25 TABLET, FILM COATED ORAL at 20:43

## 2024-04-08 RX ADMIN — POTASSIUM & SODIUM PHOSPHATES POWDER PACK 280-160-250 MG 250 MG: 280-160-250 PACK at 11:01

## 2024-04-08 RX ADMIN — SODIUM CHLORIDE, POTASSIUM CHLORIDE, SODIUM LACTATE AND CALCIUM CHLORIDE: 600; 310; 30; 20 INJECTION, SOLUTION INTRAVENOUS at 03:47

## 2024-04-08 NOTE — CONSULTS
We were reconsulted on 4/8 for concern of coffee ground emesis. I spoke with patient and, though he continues to have nausea and vomiting, both patient and family deny any hematemesis or coffee ground emesis. No abdominal pain. He is on zofran and compazine; they decline phenergan or Reglan as it makes his nausea worse. His Hgb is stable at 13.6 from 14. Discussed case with MD and do not recommend EGD unless his Hgb drops with overt bleeding. Please continue with the recommendations as documented below from consult on 4/7.     Recommendations-   Low residue diet, advance as tolerated  Protonix 40mg PO BID   Carafare 1gm PO QID x 2 weeks  Strict anti-reflux measures   AVOID ALL THC USE- this should be emphasized further to the patient (suspect this was discussed with patient following Dr Luna EGD on 2/3/24)     IV Zofran ATC until symptoms jacinta, then PO Zofran ODT 4mg q 8 hrs as needed and Phenergan suppositories prn at home as an abortive agent for any issues.      IF the patient continues to have any issues in the furture with ongoing nausea, vomiting, or concerns for bleeding despite cessation of THC for an extended period of time, then we can workup patient for gastroparesis, cylical vomiting, etc. At this time patients symptoms are consistent with cannabis hyperemesis.      No further recommendations at this time. GI will sign off, please reconsult as needed.

## 2024-04-08 NOTE — CARE COORDINATION
CM spoke to patient at bedside on this day. Patient confirmed demographic information. Patient reports that he lives with his spouse, in a 2 story house, and has 5 steps to enter home (with handrails). Patient reports that he is independent with ADLs, uses no DMEs, and drives. Patient reports that he has no home care services. Patient confirmed PCP information and last PCP visit was 6 months ago. Patient is agreeable to return home when medically stable for discharge. Patient stated that his spouse will transport him home at the appropriate time.     No CM needs voiced or noted. CM will continue to follow patient for any discharge planning needs.        04/08/24 1050   Service Assessment   Patient Orientation Alert and Oriented   Cognition Alert   History Provided By Patient;Spouse   Primary Caregiver Self   Accompanied By/Relationship patient's spouse at bedside at time of assessment   Support Systems Spouse/Significant Other   Patient's Healthcare Decision Maker is: Legal Next of Kin   PCP Verified by CM Yes  (confirmed PCP is Dr. Papo Dubon)   Last Visit to PCP Within last 3 months  (last PCP visit was 6 months ago)   Prior Functional Level Independent in ADLs/IADLs   Current Functional Level Other (see comment)  (TBD by clinicals)   Can patient return to prior living arrangement Yes   Ability to make needs known: Good   Family able to assist with home care needs: Yes   Would you like for me to discuss the discharge plan with any other family members/significant others, and if so, who? Yes   Financial Resources Other (Comment)  (Prem Devries)   Community Resources None   Social/Functional History   Lives With Spouse   Type of Home House   Home Layout Two level   Home Access Stairs to enter with rails   Entrance Stairs - Number of Steps 2   Entrance Stairs - Rails Both   Bathroom Shower/Tub Tub/Shower unit   Bathroom Toilet Standard   Bathroom Equipment None   Bathroom Accessibility Accessible   Home

## 2024-04-09 VITALS
HEART RATE: 53 BPM | HEIGHT: 61 IN | BODY MASS INDEX: 27 KG/M2 | SYSTOLIC BLOOD PRESSURE: 112 MMHG | RESPIRATION RATE: 18 BRPM | TEMPERATURE: 98.1 F | DIASTOLIC BLOOD PRESSURE: 96 MMHG | WEIGHT: 143 LBS | OXYGEN SATURATION: 97 %

## 2024-04-09 LAB
ANION GAP SERPL CALC-SCNC: 8 MMOL/L (ref 2–11)
BASOPHILS # BLD: 0 K/UL (ref 0–0.2)
BASOPHILS NFR BLD: 0 % (ref 0–2)
BUN SERPL-MCNC: 18 MG/DL (ref 6–23)
CALCIUM SERPL-MCNC: 8.7 MG/DL (ref 8.3–10.4)
CHLORIDE SERPL-SCNC: 107 MMOL/L (ref 103–113)
CO2 SERPL-SCNC: 25 MMOL/L (ref 21–32)
CREAT SERPL-MCNC: 1 MG/DL (ref 0.8–1.5)
DIFFERENTIAL METHOD BLD: ABNORMAL
EOSINOPHIL # BLD: 0.1 K/UL (ref 0–0.8)
EOSINOPHIL NFR BLD: 2 % (ref 0.5–7.8)
ERYTHROCYTE [DISTWIDTH] IN BLOOD BY AUTOMATED COUNT: 12.8 % (ref 11.9–14.6)
GLUCOSE SERPL-MCNC: 93 MG/DL (ref 65–100)
HCT VFR BLD AUTO: 33.9 % (ref 41.1–50.3)
HGB BLD-MCNC: 12.4 G/DL (ref 13.6–17.2)
IMM GRANULOCYTES # BLD AUTO: 0 K/UL (ref 0–0.5)
IMM GRANULOCYTES NFR BLD AUTO: 0 % (ref 0–5)
LYMPHOCYTES # BLD: 2.5 K/UL (ref 0.5–4.6)
LYMPHOCYTES NFR BLD: 37 % (ref 13–44)
MAGNESIUM SERPL-MCNC: 1.9 MG/DL (ref 1.8–2.4)
MCH RBC QN AUTO: 29.5 PG (ref 26.1–32.9)
MCHC RBC AUTO-ENTMCNC: 36.6 G/DL (ref 31.4–35)
MCV RBC AUTO: 80.5 FL (ref 82–102)
MONOCYTES # BLD: 0.8 K/UL (ref 0.1–1.3)
MONOCYTES NFR BLD: 13 % (ref 4–12)
NEUTS SEG # BLD: 3.2 K/UL (ref 1.7–8.2)
NEUTS SEG NFR BLD: 48 % (ref 43–78)
NRBC # BLD: 0 K/UL (ref 0–0.2)
PHOSPHATE SERPL-MCNC: 2.9 MG/DL (ref 2.5–4.5)
PLATELET # BLD AUTO: 258 K/UL (ref 150–450)
PMV BLD AUTO: 9.1 FL (ref 9.4–12.3)
POTASSIUM SERPL-SCNC: 3.1 MMOL/L (ref 3.5–5.1)
RBC # BLD AUTO: 4.21 M/UL (ref 4.23–5.6)
SODIUM SERPL-SCNC: 140 MMOL/L (ref 136–146)
WBC # BLD AUTO: 6.7 K/UL (ref 4.3–11.1)

## 2024-04-09 PROCEDURE — 36415 COLL VENOUS BLD VENIPUNCTURE: CPT

## 2024-04-09 PROCEDURE — 2580000003 HC RX 258: Performed by: INTERNAL MEDICINE

## 2024-04-09 PROCEDURE — 6370000000 HC RX 637 (ALT 250 FOR IP): Performed by: STUDENT IN AN ORGANIZED HEALTH CARE EDUCATION/TRAINING PROGRAM

## 2024-04-09 PROCEDURE — 84100 ASSAY OF PHOSPHORUS: CPT

## 2024-04-09 PROCEDURE — 6370000000 HC RX 637 (ALT 250 FOR IP): Performed by: INTERNAL MEDICINE

## 2024-04-09 PROCEDURE — 85025 COMPLETE CBC W/AUTO DIFF WBC: CPT

## 2024-04-09 PROCEDURE — 80048 BASIC METABOLIC PNL TOTAL CA: CPT

## 2024-04-09 PROCEDURE — 83735 ASSAY OF MAGNESIUM: CPT

## 2024-04-09 PROCEDURE — 2580000003 HC RX 258: Performed by: STUDENT IN AN ORGANIZED HEALTH CARE EDUCATION/TRAINING PROGRAM

## 2024-04-09 PROCEDURE — 6360000002 HC RX W HCPCS: Performed by: STUDENT IN AN ORGANIZED HEALTH CARE EDUCATION/TRAINING PROGRAM

## 2024-04-09 PROCEDURE — C9113 INJ PANTOPRAZOLE SODIUM, VIA: HCPCS | Performed by: STUDENT IN AN ORGANIZED HEALTH CARE EDUCATION/TRAINING PROGRAM

## 2024-04-09 PROCEDURE — A4216 STERILE WATER/SALINE, 10 ML: HCPCS | Performed by: STUDENT IN AN ORGANIZED HEALTH CARE EDUCATION/TRAINING PROGRAM

## 2024-04-09 RX ORDER — POTASSIUM CHLORIDE 20 MEQ/1
40 TABLET, EXTENDED RELEASE ORAL 2 TIMES DAILY
Status: DISCONTINUED | OUTPATIENT
Start: 2024-04-09 | End: 2024-04-09 | Stop reason: HOSPADM

## 2024-04-09 RX ORDER — PANTOPRAZOLE SODIUM 40 MG/1
40 TABLET, DELAYED RELEASE ORAL 2 TIMES DAILY
Qty: 60 TABLET | Refills: 0 | Status: SHIPPED | OUTPATIENT
Start: 2024-04-09 | End: 2024-05-09

## 2024-04-09 RX ORDER — SUCRALFATE 1 G/1
1 TABLET ORAL EVERY 8 HOURS SCHEDULED
Qty: 21 TABLET | Refills: 0 | Status: SHIPPED | OUTPATIENT
Start: 2024-04-09 | End: 2024-04-16

## 2024-04-09 RX ORDER — LANOLIN ALCOHOL/MO/W.PET/CERES
400 CREAM (GRAM) TOPICAL DAILY
Status: DISCONTINUED | OUTPATIENT
Start: 2024-04-09 | End: 2024-04-09 | Stop reason: HOSPADM

## 2024-04-09 RX ORDER — POTASSIUM CHLORIDE 750 MG/1
10 TABLET, EXTENDED RELEASE ORAL DAILY
Qty: 7 TABLET | Refills: 0 | Status: SHIPPED | OUTPATIENT
Start: 2024-04-09 | End: 2024-04-16

## 2024-04-09 RX ADMIN — SODIUM CHLORIDE, PRESERVATIVE FREE 10 ML: 5 INJECTION INTRAVENOUS at 11:31

## 2024-04-09 RX ADMIN — PANTOPRAZOLE SODIUM 40 MG: 40 INJECTION, POWDER, FOR SOLUTION INTRAVENOUS at 11:29

## 2024-04-09 RX ADMIN — SUCRALFATE 1 G: 1 TABLET ORAL at 05:47

## 2024-04-09 RX ADMIN — MAGNESIUM GLUCONATE 500 MG ORAL TABLET 400 MG: 500 TABLET ORAL at 10:14

## 2024-04-09 RX ADMIN — METOPROLOL TARTRATE 25 MG: 25 TABLET, FILM COATED ORAL at 08:00

## 2024-04-09 RX ADMIN — POTASSIUM CHLORIDE 40 MEQ: 1500 TABLET, EXTENDED RELEASE ORAL at 08:00

## 2024-04-09 NOTE — PLAN OF CARE
Problem: Discharge Planning  Goal: Discharge to home or other facility with appropriate resources  4/9/2024 1332 by Ana Eason RN  Outcome: Completed  4/9/2024 1231 by Dionne Ibrahim RN  Outcome: Progressing     Problem: ABCDS Injury Assessment  Goal: Absence of physical injury  4/9/2024 1332 by Ana Eason RN  Outcome: Completed  4/9/2024 1231 by Dionne Ibrahim RN  Outcome: Progressing  Flowsheets (Taken 4/9/2024 0800)  Absence of Physical Injury: Implement safety measures based on patient assessment     Problem: Safety - Adult  Goal: Free from fall injury  4/9/2024 1332 by Ana Eason RN  Outcome: Completed  4/9/2024 1231 by Dionne Ibrahim RN  Outcome: Progressing  Flowsheets (Taken 4/9/2024 0800)  Free From Fall Injury: Instruct family/caregiver on patient safety

## 2024-04-09 NOTE — PROGRESS NOTES
Physician Progress Note      PATIENT:               VINCE CLINE  CSN #:                  [id number]  :                       1975  ADMIT DATE:       2024 7:33 AM  DISCH DATE:  RESPONDING  PROVIDER #:        Sydney Starkey MD          QUERY TEXT:    Pt admitted with vomiting.  Noted documentation of Ainsley-Ye tear.  If   possible, please document in progress notes and discharge summary:    The medical record reflects the following:  Risk Factors: Cannabis hyperemesis syndrome concurrent with and due to   cannabis abuse  Clinical Indicators: ED note- Very recent EGD showed esophagitis only.  Likely   had small Ainsley-Ye tear.  Treatment: Protonix 40 mg IV twice daily, Carafate, H&H q 8, GI Consult  Options provided:  -- Ainsley-Ye tear confirmed present on admission  -- Ainsley-Ye tear ruled out  -- Other - I will add my own diagnosis  -- Disagree - Not applicable / Not valid  -- Disagree - Clinically unable to determine / Unknown  -- Refer to Clinical Documentation Reviewer    PROVIDER RESPONSE TEXT:    The diagnosis of Ainsley-Ye tear was confirmed as present on admission.    Query created by: Mela Dangelo on 2024 11:20 AM      Electronically signed by:  Syndey Starkey MD 2024 3:38 PM          
  [facility]  [address] [place] [address]  Phone: [phone]             April 9, 2024    Patient: Dmitri Hatfield   YOB: 1975   Date of Visit: 4/6/2024       To Whom It May Concern:    Dmitri Hatfield was seen and treated in our facility  beginning 4/6/2024 until 4/9/2024 . He may return to work on 4/11/2024 .      Sincerely,           Signature:__________________________________       
(1855) Patient vomited 300 cc yellow emesis. In shower to help with nausea per Hospitalist, Dr. Starkey.    (1258) Reglan 5 mg given slow IVP per prn orders for n/v.    (5002) Zofran 4 mg given slow IVP per prn orders for n/v.  
/109 . Dr. Morales notified & new orders received.  
4 Eyes Skin Assessment     NAME:  Dmitri Hatfield  YOB: 1975  MEDICAL RECORD NUMBER:  [id number]    The patient is being assessed for  Admission    I agree that at least one RN has performed a thorough Head to Toe Skin Assessment on the patient. ALL assessment sites listed below have been assessed.      Areas assessed by both nurses:    Head, Face, Ears, Shoulders, Back, Chest, Arms, Elbows, Hands, Sacrum. Buttock, Coccyx, Ischium, and Legs. Feet and Heels        Does the Patient have a Wound? No noted wound(s)       Feliz Prevention initiated by RN: Yes  Wound Care Orders initiated by RN: No    Pressure Injury (Stage 3,4, Unstageable, DTI, NWPT, and Complex wounds) if present, place Wound referral order by RN under : No    New Ostomies, if present place, Ostomy referral order under : No     Nurse 1 eSignature: Electronically signed by Aye Booker RN on 4/6/24 at 7:46 PM EDT    **SHARE this note so that the co-signing nurse can place an eSignature**    Nurse 2 eSignature: Electronically signed by Luis Enrique Sorto RN on 4/7/24 at 7:10 PM EDT   
END OF SHIFT NOTE:    INTAKE/OUTPUT  04/06 0701 - 04/07 0700  In: -   Out: 1325 [Urine:925]  Voiding: Yes  Catheter: No  Drain:              Flatus: Patient does have flatus present.    Stool: 0 occurrences.    Characteristics:           Stool Assessment  Last BM (including prior to admit): 04/04/24 (Per spouse)    Emesis: 1 occurrences.    Characteristics:        VITAL SIGNS  Patient Vitals for the past 12 hrs:   Temp Pulse Resp BP SpO2   04/07/24 0259 98.8 °F (37.1 °C) (!) 137 18 (!) 155/99 95 %   04/07/24 0128 -- (!) 114 -- (!) 165/117 --   04/06/24 2354 98.8 °F (37.1 °C) (!) 117 17 (!) 146/111 93 %   04/06/24 2117 -- (!) 113 -- (!) 139/107 --   04/06/24 2115 98.7 °F (37.1 °C) (!) 119 20 (!) 142/100 --   04/06/24 2030 -- (!) 108 -- (!) 139/113 --   04/06/24 1940 -- (!) 163 -- (!) 165/123 --       Pain Assessment             Ambulating  Yes    Shift report given to oncoming nurse at the bedside.    Sharron Rogers RN  
END OF SHIFT NOTE:    INTAKE/OUTPUT  04/07 0701 - 04/08 0700  In: 4815.3 [I.V.:3764.4]  Out: 3105 [Urine:1705]  Voiding: Yes  Catheter: No  Drain:              Flatus: Patient does have flatus present.    Stool: 0 occurrences.    Characteristics:           Stool Assessment  Last BM (including prior to admit): 04/04/24    Emesis: 2 occurrences.    Characteristics:   Emesis Appearance: Bilious, Tan, Yellow    VITAL SIGNS  Patient Vitals for the past 12 hrs:   Temp Pulse Resp BP SpO2   04/08/24 0252 98.6 °F (37 °C) 80 19 109/79 95 %   04/07/24 2238 98.4 °F (36.9 °C) 80 19 99/73 92 %   04/07/24 2102 -- -- -- 132/85 --   04/07/24 1937 99.1 °F (37.3 °C) (!) 101 20 132/85 93 %       Pain Assessment        Patient's Stated Pain Goal: 0 - No pain    Ambulating  Yes    Shift report given to oncoming nurse at the bedside.    Sharron Rogers RN  
END OF SHIFT NOTE:    INTAKE/OUTPUT  04/07 0701 - 04/08 0700  In: 4815.3 [I.V.:3764.4]  Out: 3105 [Urine:1705]  Voiding: Yes  Catheter: No  Drain:      Flatus: Patient does have flatus present.    Stool: 0 occurrences.    Characteristics:           Stool Assessment  Last BM (including prior to admit): 04/04/24    Emesis: 4 occurrences.    Characteristics:   Emesis Appearance: Yellow    VITAL SIGNS  Patient Vitals for the past 12 hrs:   Temp Pulse Resp BP SpO2   04/08/24 1919 99.1 °F (37.3 °C) 85 20 (!) 123/98 96 %   04/08/24 1445 98.7 °F (37.1 °C) 83 18 125/77 --   04/08/24 1101 98.7 °F (37.1 °C) 96 19 127/84 95 %   04/08/24 0815 98.2 °F (36.8 °C) 81 19 (!) 120/91 --       Pain Assessment        Patient's Stated Pain Goal: 0 - No pain    Ambulating  Yes    Shift report given to oncoming nurse at the bedside.    Nalini Damon, RN     
END OF SHIFT NOTE:    INTAKE/OUTPUT  04/08 0701 - 04/09 0700  In: 2346.1 [P.O.:360; I.V.:1986.1]  Out: 1525 [Urine:175]  Voiding: Yes  Catheter: No  Drain:              Flatus: Patient does have flatus present.    Stool: 0 occurrences.    Characteristics:           Stool Assessment  Last BM (including prior to admit): 04/04/24 (per monitor)    Emesis: 0 occurrences.    Characteristics:   Emesis Appearance: Yellow    VITAL SIGNS  Patient Vitals for the past 12 hrs:   Temp Pulse Resp BP SpO2   04/09/24 0228 97.9 °F (36.6 °C) 81 20 106/81 94 %   04/08/24 2241 98.2 °F (36.8 °C) 73 19 123/85 95 %   04/08/24 1919 99.1 °F (37.3 °C) 85 20 (!) 123/98 96 %       Pain Assessment        Patient's Stated Pain Goal: 0 - No pain    Ambulating  Yes    Shift report given to oncoming nurse at the bedside.    Shereen Goldstein RN     
NF=622/106, pulse= 134  Lopressor 5 mg given IVP per PRN order.  
Patient ate a couple bites of green beans for dinner. Then vomited 500 cc emesis. Zofran 4 mg given slow IVP per prn order.  
Patient vomited 300 cc after clear liquid breakfast. No c/o nausea at present.  
Patient vomited 400 cc dark brown/blood tinged emesis. Zofran 4 mg given slow IVP per PRN order. Notified Dr. Starkey with Hospitalist.  
Patient vomited 700 cc light brown emesis. Zofran 4 mg given slow IVP per prn orders. Notified Dr. Starkey via [employer].  
Pt's D/C instructions completed. Verbalized understanding of all instructions including diet, activity, s/sx to alert MD, medications, and f/u appointment. Family at BS.    
K/UL    Monocytes Absolute 1.5 (H) 0.1 - 1.3 K/UL    Eosinophils Absolute 0.0 0.0 - 0.8 K/UL    Basophils Absolute 0.0 0.0 - 0.2 K/UL    Immature Granulocytes Absolute 0.0 0.0 - 0.5 K/UL   Brain Natriuretic Peptide    Collection Time: 04/07/24  5:55 AM   Result Value Ref Range    NT Pro- (H) 5 - 125 PG/ML   Hemoglobin    Collection Time: 04/07/24 11:35 AM   Result Value Ref Range    Hemoglobin 14.6 13.6 - 17.2 g/dL       No results for input(s): \"COVID19\" in the last 72 hours.    Current Meds:  Current Facility-Administered Medications   Medication Dose Route Frequency    metoprolol tartrate (LOPRESSOR) tablet 25 mg  25 mg Oral BID    pantoprazole (PROTONIX) 40 mg in sodium chloride (PF) 0.9 % 10 mL injection  40 mg IntraVENous Q12H    sucralfate (CARAFATE) tablet 1 g  1 g Oral 3 times per day    LORazepam (ATIVAN) 1 mg in sodium chloride (PF) 0.9 % 10 mL injection  1 mg IntraVENous Once    sodium chloride flush 0.9 % injection 5-40 mL  5-40 mL IntraVENous 2 times per day    sodium chloride flush 0.9 % injection 5-40 mL  5-40 mL IntraVENous PRN    0.9 % sodium chloride infusion   IntraVENous PRN    potassium chloride (KLOR-CON M) extended release tablet 40 mEq  40 mEq Oral PRN    Or    potassium bicarb-citric acid (EFFER-K) effervescent tablet 40 mEq  40 mEq Oral PRN    Or    potassium chloride 10 mEq/100 mL IVPB (Peripheral Line)  10 mEq IntraVENous PRN    magnesium sulfate 2000 mg in 50 mL IVPB premix  2,000 mg IntraVENous PRN    [Held by provider] enoxaparin (LOVENOX) injection 40 mg  40 mg SubCUTAneous Daily    ondansetron (ZOFRAN-ODT) disintegrating tablet 4 mg  4 mg Oral Q8H PRN    Or    ondansetron (ZOFRAN) injection 4 mg  4 mg IntraVENous Q6H PRN    polyethylene glycol (GLYCOLAX) packet 17 g  17 g Oral Daily PRN    acetaminophen (TYLENOL) tablet 650 mg  650 mg Oral Q6H PRN    Or    acetaminophen (TYLENOL) suppository 650 mg  650 mg Rectal Q6H PRN    lactated ringers IV soln infusion   IntraVENous 
metoprolol (LOPRESSOR) injection 5 mg  5 mg IntraVENous Q6H PRN       Signed:  Sydney Starkey MD    Part of this note may have been written by using a voice dictation software.  The note has been proof read but may still contain some grammatical/other typographical errors.

## 2024-04-09 NOTE — PLAN OF CARE
Problem: Discharge Planning  Goal: Discharge to home or other facility with appropriate resources  Outcome: Progressing     Problem: ABCDS Injury Assessment  Goal: Absence of physical injury  Outcome: Progressing  Flowsheets (Taken 4/9/2024 0800)  Absence of Physical Injury: Implement safety measures based on patient assessment     Problem: Safety - Adult  Goal: Free from fall injury  Outcome: Progressing  Flowsheets (Taken 4/9/2024 0800)  Free From Fall Injury: Instruct family/caregiver on patient safety

## 2024-04-09 NOTE — DISCHARGE SUMMARY
Hospitalist Discharge Summary   Admit Date:  2024  7:33 AM   DC Note date: 2024  Name:  Dmitri Hatfield   Age:  49 y.o.  Sex:  male  :  1975   MRN:  [id number]   Room:    PCP:  Papo Dubon MD    Presenting Complaint: Emesis     Initial Admission Diagnosis: Sinus tachycardia [R00.0]  Hematemesis with nausea [K92.0]  Cannabis hyperemesis syndrome concurrent with and due to cannabis abuse (HCC) [F12.188]  GI bleed [K92.2]     Problem List for this Hospitalization (present on admission):    Principal Problem:    Cannabis hyperemesis syndrome concurrent with and due to cannabis abuse (HCC)  Active Problems:    Tetrahydrocannabinol (THC) use disorder, moderate, dependence (HCC)    Cyclical vomiting    Erosive esophagitis    Hematemesis    Sinus tachycardia    GI bleed    Intractable vomiting  Resolved Problems:    * No resolved hospital problems. *      Hospital Course:  Dmitri Hatfield is a 49 y.o. male with past medical history of gastritis, longstanding cannabis induced hyperemesis disorder who presented with nausea and vomiting.  Only thing that would help his symptoms was to take a hot shower.  Given that he was in the shower for 4 hours as per wife, she decided to bring him to him to the ER for further workup.   In the ER, patient had hemoglobin that was stable at baseline.  He was given 2 L of fluids but is still found to be tachycardic.  Due to persistent tachycardia, ER felt like they could not discharge him home.  Hospitalist was consulted for admission. EKG shows no concerning signs/symptoms of ischemia.  Troponin negative x 2. Echo with normal ejection fraction. He was treated with Lopressor and his tachycardia resolved.He had coffee-ground emesis.  UDS was positive for THC. He was treated with IV Protonix and Carafate. GI was consulted and no plans for EGD during this time and  follow-up as outpatient.  His nausea improved with Reglan and was given IV fluids.  He

## 2024-06-27 NOTE — H&P
Addended by: EULALIO CESPEDES on: 6/27/2024 02:59 PM     Modules accepted: Orders     Hospitalist H&P/Consult Note     Admit Date:  3/11/2018  1:15 PM   Name:  Wood Denny   Age:  41 y.o.  :  1975   MRN:  [id number]   PCP:  Abiodun Wang MD  Treatment Team: Attending Provider: Tete Perdomo MD    HPI:   42 y/o AA male with long-standing hx recurrent cyclic vomiting syndrome presents to the ED with non-specific abdominal pain and intractable nausea and vomiting. This is his second ED visit within the past 2 days. He has been given IV fluids, several different anti-emetics, benadryl and haldol with no relief. A KUB yesterday was unremarkable. Has a low CO2 and elevated anion gap likely related to vomiting. No hematemesis. Has had no fever or chills. Lipase and LFTs are normal. Previous episodes of his cyclic vomiting had been attributed to his use of cannabis but he stated he has not used in several weeks. Will admit as observation for further supportive care. He has seen GI in the past.    10 systems reviewed and negative except as noted in HPI. Past Medical History:   Diagnosis Date    BETTY (acute kidney injury) ([facility] [address] 2014    Clostridium difficile colitis 3/20/2011    Gastroparesis 2005    emptying study normal -    GERD (gastroesophageal reflux disease)     HIATAL HERNIA SINCE     PUD (peptic ulcer disease) ALL DX IN     ESOPHAGITIS, + H PYLORI      Past Surgical History:   Procedure Laterality Date    COLONOSCOPY      ESOPHAGITIS, COLONIC ULCER X1    EGD      H. HERNIA, ULCER X2, H PYLORI,    EGD      h pylori -neg    HX WISDOM TEETH EXTRACTION  2/10/11      Prior to Admission Medications   Prescriptions Last Dose Informant Patient Reported? Taking?   ondansetron (ZOFRAN ODT) 4 mg disintegrating tablet   No No   Sig: Take 1 Tab by mouth every eight (8) hours as needed for Nausea. ondansetron (ZOFRAN ODT) 8 mg disintegrating tablet   No No   Sig: Take 0.5 Tabs by mouth every eight (8) hours as needed.    pantoprazole (PROTONIX) 40 mg tablet   No No   Sig: Take 1 Tab by mouth daily. prochlorperazine (COMPAZINE) 10 mg tablet   No No   Sig: Take 1 Tab by mouth every six (6) hours as needed for Other (as needed for nausea/vomiting/migraine). prochlorperazine (PROCHLORPERAZINE) 25 mg suppository   No No   Sig: Insert 1 Suppository into rectum every eight (8) hours as needed for Nausea. tamsulosin (FLOMAX) 0.4 mg capsule   No No   Sig: Take 1 Cap by mouth daily for 15 days. Facility-Administered Medications: None     Allergies   Allergen Reactions    Amoxicillin Nausea Only    Aspirin Other (comments)     ulcers    Hydrocodone Rash    Ibuprofen Other (comments)     Hx of ulcers    Pcn [Penicillins] Rash    Phenergan [Promethazine] Nausea and Vomiting and Other (comments)      Social History   Substance Use Topics    Smoking status: Former Smoker     Packs/day: 0.25     Years: 2.00     Types: Cigarettes    Smokeless tobacco: Never Used    Alcohol use No      Family History   Problem Relation Age of Onset    Other Mother      L+W    Diabetes Maternal Grandmother     Sickle Cell Anemia Father     Heart Disease Paternal Grandfather     Other Sister      ALL L+W    Other Son      L+W      Immunization History   Administered Date(s) Administered    TDAP Vaccine 04/21/2012       Objective:   Patient Vitals for the past 24 hrs:   Temp Pulse Resp BP SpO2   03/11/18 2302 98.2 °F (36.8 °C) 96 18 (!) 160/97 96 %   03/11/18 2033 98.3 °F (36.8 °C) 100 18 168/90 95 %   03/11/18 2009 - - 16 164/90 -   03/11/18 1312 98.9 °F (37.2 °C) (!) 109 18 145/89 97 %     Oxygen Therapy  O2 Sat (%): 96 % (03/11/18 2302)  O2 Device: Room air (03/11/18 1312)  No intake or output data in the 24 hours ending 03/11/18 2307    Physical Exam:  General:    Well nourished. Alert. Active emesis   Eyes:   Normal sclera. Extraocular movements intact. ENT:  Normocephalic, atraumatic. Moist mucous membranes  CV:   tachycardic. No murmur, rub, or gallop. Lungs:  CTAB. No wheezing, rhonchi, or rales. Abdomen: Non-specific diffuse tenderness without rebound or guarding   Extremities: Warm and dry. No cyanosis or edema. Neurologic: CN II-XII grossly intact. Sensation intact. Skin:     No rashes or jaundice. No wounds. Psych:  Normal mood and affect. I reviewed the labs, imaging, EKGs, telemetry, and other studies done this admission. Data Review:   Recent Results (from the past 24 hour(s))   CBC WITH AUTOMATED DIFF    Collection Time: 03/11/18  1:15 PM   Result Value Ref Range    WBC 6.8 4.3 - 11.1 K/uL    RBC 5.00 4.23 - 5.67 M/uL    HGB 15.5 13.6 - 17.2 g/dL    HCT 39.3 (L) 41.1 - 50.3 %    MCV 78.6 (L) 79.6 - 97.8 FL    MCH 30.0 26.1 - 32.9 PG    MCHC 38.2 (H) 31.4 - 35.0 g/dL    RDW 13.4 11.9 - 14.6 %    PLATELET 267 629 - 839 K/uL    MPV 8.5 (L) 10.8 - 14.1 FL    DF AUTOMATED      NEUTROPHILS 74 43 - 78 %    LYMPHOCYTES 16 13 - 44 %    MONOCYTES 10 4.0 - 12.0 %    EOSINOPHILS 0 (L) 0.5 - 7.8 %    BASOPHILS 0 0.0 - 2.0 %    IMMATURE GRANULOCYTES 0 0.0 - 5.0 %    ABS. NEUTROPHILS 5.0 1.7 - 8.2 K/UL    ABS. LYMPHOCYTES 1.1 0.5 - 4.6 K/UL    ABS. MONOCYTES 0.7 0.1 - 1.3 K/UL    ABS. EOSINOPHILS 0.0 0.0 - 0.8 K/UL    ABS. BASOPHILS 0.0 0.0 - 0.2 K/UL    ABS. IMM. GRANS. 0.0 0.0 - 0.5 K/UL   METABOLIC PANEL, COMPREHENSIVE    Collection Time: 03/11/18  1:15 PM   Result Value Ref Range    Sodium 143 136 - 145 mmol/L    Potassium 3.9 3.5 - 5.1 mmol/L    Chloride 106 98 - 107 mmol/L    CO2 20 (L) 21 - 32 mmol/L    Anion gap 17 (H) 7 - 16 mmol/L    Glucose 104 (H) 65 - 100 mg/dL    BUN 19 6 - 23 MG/DL    Creatinine 0.95 0.8 - 1.5 MG/DL    GFR est AA >60 >60 ml/min/1.73m2    GFR est non-AA >60 >60 ml/min/1.73m2    Calcium 9.6 8.3 - 10.4 MG/DL    Bilirubin, total 0.6 0.2 - 1.1 MG/DL    ALT (SGPT) 25 12 - 65 U/L    AST (SGOT) 24 15 - 37 U/L    Alk.  phosphatase 99 50 - 136 U/L    Protein, total 8.1 6.3 - 8.2 g/dL    Albumin 4.3 3.5 - 5.0 g/dL    Globulin 3.8 (H) 2.3 - 3.5 g/dL    A-G Ratio 1.1 (L) 1.2 - 3.5     LIPASE    Collection Time: 03/11/18  1:15 PM   Result Value Ref Range    Lipase 113 73 - 393 U/L   URINE MICROSCOPIC    Collection Time: 03/11/18  2:36 PM   Result Value Ref Range    WBC 3-5 0 /hpf    RBC 0-3 0 /hpf    Epithelial cells 0-3 0 /hpf    Bacteria 0 0 /hpf    Casts 10-20 0 /lpf       Imaging Studies:  CXR Results  (Last 48 hours)               03/10/18 1936  XR ABD ACUTE W 1 V CHEST Final result    Impression:  IMPRESSION: No acute abdominal findings. Narrative:  ABDOMINAL SERIES       HISTORY: Pain and vomiting. FINDINGS: There is no subdiaphragmatic free intraperitoneal air. Gas is   scattered throughout the bowel. There are no abnormal radiopaque calculi. CT Results  (Last 48 hours)    None          Assessment and Plan:     Hospital Problems as of 3/11/2018  Date Reviewed: 7/11/2016          Codes Class Noted - Resolved POA    * (Principal)Cyclical vomiting, intractable ICD-10-CM: G43. A1  ICD-9-CM: 536.2  3/11/2018 - Present Yes        Epigastric pain ICD-10-CM: R10.13  ICD-9-CM: 789.06  3/5/2013 - Present Yes        Anxiety (Chronic) ICD-10-CM: F41.9  ICD-9-CM: 300.00  4/30/2014 - Present Yes        Esophageal reflux (Chronic) ICD-10-CM: K21.9  ICD-9-CM: 530.81  4/30/2014 - Present Yes              PLAN:  · Admit as observation to medical bed  · Provide supportive care with IV fluids, antiemetics, benadryl and ativan  · Condition does not warrant the use of IV narcotics at this time  · IV pepcid for possible gastritis/esophagitis  · If symptoms persist or develops hematemesis consult GI  · Monitor BMP, Mg, Phos, CBC  · Check UDS as prior cyclic vomiting episodes had bee attributed to his use of cannabis although he states has not used in several weeks  · SCDs for DVT prophylaxis      Estimated LOS:  Less than 24 hours    Signed:  Leydi Seaman MD

## 2024-10-09 ENCOUNTER — HOSPITAL ENCOUNTER (INPATIENT)
Age: 49
LOS: 1 days | Discharge: HOME | DRG: 896 | End: 2024-10-13
Attending: EMERGENCY MEDICINE | Admitting: HOSPITALIST
Payer: COMMERCIAL

## 2024-10-09 DIAGNOSIS — E87.6 HYPOKALEMIA: ICD-10-CM

## 2024-10-09 DIAGNOSIS — R11.15 CYCLIC VOMITING SYNDROME: Primary | ICD-10-CM

## 2024-10-09 PROBLEM — F12.288 CANNABIS HYPEREMESIS SYNDROME CONCURRENT WITH AND DUE TO CANNABIS DEPENDENCE (HCC): Status: ACTIVE | Noted: 2024-10-09

## 2024-10-09 LAB
ALBUMIN SERPL-MCNC: 4.7 G/DL (ref 3.5–5)
ALBUMIN/GLOB SERPL: 1.3 (ref 1–1.9)
ALP SERPL-CCNC: 130 U/L (ref 40–129)
ALT SERPL-CCNC: 18 U/L (ref 8–55)
ANION GAP SERPL CALC-SCNC: 22 MMOL/L (ref 9–18)
AST SERPL-CCNC: 34 U/L (ref 15–37)
BASOPHILS # BLD: 0.1 K/UL (ref 0–0.2)
BASOPHILS NFR BLD: 1 % (ref 0–2)
BILIRUB SERPL-MCNC: 0.5 MG/DL (ref 0–1.2)
BUN SERPL-MCNC: 16 MG/DL (ref 6–23)
CALCIUM SERPL-MCNC: 10.1 MG/DL (ref 8.8–10.2)
CHLORIDE SERPL-SCNC: 101 MMOL/L (ref 98–107)
CO2 SERPL-SCNC: 17 MMOL/L (ref 20–28)
CREAT SERPL-MCNC: 1.11 MG/DL (ref 0.8–1.3)
DIFFERENTIAL METHOD BLD: ABNORMAL
EKG ATRIAL RATE: 72 BPM
EKG DIAGNOSIS: NORMAL
EKG P AXIS: 20 DEGREES
EKG P-R INTERVAL: 160 MS
EKG Q-T INTERVAL: 404 MS
EKG QRS DURATION: 76 MS
EKG QTC CALCULATION (BAZETT): 442 MS
EKG R AXIS: 66 DEGREES
EKG T AXIS: 36 DEGREES
EKG VENTRICULAR RATE: 72 BPM
EOSINOPHIL # BLD: 0 K/UL (ref 0–0.8)
EOSINOPHIL NFR BLD: 0 % (ref 0.5–7.8)
ERYTHROCYTE [DISTWIDTH] IN BLOOD BY AUTOMATED COUNT: 13.1 % (ref 11.9–14.6)
GLOBULIN SER CALC-MCNC: 3.7 G/DL (ref 2.3–3.5)
GLUCOSE SERPL-MCNC: 159 MG/DL (ref 70–99)
HCT VFR BLD AUTO: 42.5 % (ref 41.1–50.3)
HGB BLD-MCNC: 15.8 G/DL (ref 13.6–17.2)
IMM GRANULOCYTES # BLD AUTO: 0 K/UL (ref 0–0.5)
IMM GRANULOCYTES NFR BLD AUTO: 0 % (ref 0–5)
LIPASE SERPL-CCNC: 18 U/L (ref 13–60)
LYMPHOCYTES # BLD: 1.6 K/UL (ref 0.5–4.6)
LYMPHOCYTES NFR BLD: 18 % (ref 13–44)
MCH RBC QN AUTO: 29 PG (ref 26.1–32.9)
MCHC RBC AUTO-ENTMCNC: 37.2 G/DL (ref 31.4–35)
MCV RBC AUTO: 78 FL (ref 82–102)
MONOCYTES # BLD: 0.7 K/UL (ref 0.1–1.3)
MONOCYTES NFR BLD: 8 % (ref 4–12)
NEUTS SEG # BLD: 6.6 K/UL (ref 1.7–8.2)
NEUTS SEG NFR BLD: 73 % (ref 43–78)
NRBC # BLD: 0 K/UL (ref 0–0.2)
PLATELET # BLD AUTO: 384 K/UL (ref 150–450)
PMV BLD AUTO: 9.3 FL (ref 9.4–12.3)
POTASSIUM SERPL-SCNC: 3.3 MMOL/L (ref 3.5–5.1)
PROT SERPL-MCNC: 8.5 G/DL (ref 6.3–8.2)
RBC # BLD AUTO: 5.45 M/UL (ref 4.23–5.6)
SARS-COV-2 RDRP RESP QL NAA+PROBE: NOT DETECTED
SODIUM SERPL-SCNC: 140 MMOL/L (ref 136–145)
SOURCE: NORMAL
TROPONIN T SERPL HS-MCNC: 7 NG/L (ref 0–22)
TROPONIN T SERPL HS-MCNC: <6 NG/L (ref 0–22)
WBC # BLD AUTO: 9 K/UL (ref 4.3–11.1)

## 2024-10-09 PROCEDURE — 99285 EMERGENCY DEPT VISIT HI MDM: CPT

## 2024-10-09 PROCEDURE — 2580000003 HC RX 258: Performed by: HOSPITALIST

## 2024-10-09 PROCEDURE — 96374 THER/PROPH/DIAG INJ IV PUSH: CPT

## 2024-10-09 PROCEDURE — 6360000002 HC RX W HCPCS: Performed by: EMERGENCY MEDICINE

## 2024-10-09 PROCEDURE — 80053 COMPREHEN METABOLIC PANEL: CPT

## 2024-10-09 PROCEDURE — 93010 ELECTROCARDIOGRAM REPORT: CPT | Performed by: INTERNAL MEDICINE

## 2024-10-09 PROCEDURE — 85025 COMPLETE CBC W/AUTO DIFF WBC: CPT

## 2024-10-09 PROCEDURE — 96372 THER/PROPH/DIAG INJ SC/IM: CPT

## 2024-10-09 PROCEDURE — 2580000003 HC RX 258: Performed by: EMERGENCY MEDICINE

## 2024-10-09 PROCEDURE — 83690 ASSAY OF LIPASE: CPT

## 2024-10-09 PROCEDURE — 93005 ELECTROCARDIOGRAM TRACING: CPT | Performed by: EMERGENCY MEDICINE

## 2024-10-09 PROCEDURE — 2500000003 HC RX 250 WO HCPCS: Performed by: EMERGENCY MEDICINE

## 2024-10-09 PROCEDURE — 87635 SARS-COV-2 COVID-19 AMP PRB: CPT

## 2024-10-09 PROCEDURE — 84484 ASSAY OF TROPONIN QUANT: CPT

## 2024-10-09 PROCEDURE — 6360000002 HC RX W HCPCS: Performed by: HOSPITALIST

## 2024-10-09 PROCEDURE — 36415 COLL VENOUS BLD VENIPUNCTURE: CPT

## 2024-10-09 PROCEDURE — G0378 HOSPITAL OBSERVATION PER HR: HCPCS

## 2024-10-09 PROCEDURE — 96375 TX/PRO/DX INJ NEW DRUG ADDON: CPT

## 2024-10-09 RX ORDER — LANOLIN ALCOHOL/MO/W.PET/CERES
6 CREAM (GRAM) TOPICAL NIGHTLY
Status: DISCONTINUED | OUTPATIENT
Start: 2024-10-09 | End: 2024-10-13 | Stop reason: HOSPADM

## 2024-10-09 RX ORDER — MAGNESIUM SULFATE IN WATER 40 MG/ML
2000 INJECTION, SOLUTION INTRAVENOUS PRN
Status: DISCONTINUED | OUTPATIENT
Start: 2024-10-09 | End: 2024-10-13 | Stop reason: HOSPADM

## 2024-10-09 RX ORDER — POTASSIUM CHLORIDE 1500 MG/1
20 TABLET, EXTENDED RELEASE ORAL DAILY
Qty: 5 TABLET | Refills: 0 | Status: SHIPPED | OUTPATIENT
Start: 2024-10-09 | End: 2024-10-13 | Stop reason: HOSPADM

## 2024-10-09 RX ORDER — 0.9 % SODIUM CHLORIDE 0.9 %
1000 INTRAVENOUS SOLUTION INTRAVENOUS
Status: COMPLETED | OUTPATIENT
Start: 2024-10-09 | End: 2024-10-09

## 2024-10-09 RX ORDER — SODIUM CHLORIDE 0.9 % (FLUSH) 0.9 %
5-40 SYRINGE (ML) INJECTION EVERY 12 HOURS SCHEDULED
Status: DISCONTINUED | OUTPATIENT
Start: 2024-10-09 | End: 2024-10-13 | Stop reason: HOSPADM

## 2024-10-09 RX ORDER — ONDANSETRON 8 MG/1
8 TABLET, ORALLY DISINTEGRATING ORAL 3 TIMES DAILY PRN
Qty: 9 TABLET | Refills: 0 | Status: SHIPPED | OUTPATIENT
Start: 2024-10-09 | End: 2024-10-13 | Stop reason: HOSPADM

## 2024-10-09 RX ORDER — ONDANSETRON 4 MG/1
4 TABLET, ORALLY DISINTEGRATING ORAL EVERY 8 HOURS PRN
Status: DISCONTINUED | OUTPATIENT
Start: 2024-10-09 | End: 2024-10-13 | Stop reason: HOSPADM

## 2024-10-09 RX ORDER — SODIUM CHLORIDE 9 MG/ML
INJECTION, SOLUTION INTRAVENOUS CONTINUOUS
Status: ACTIVE | OUTPATIENT
Start: 2024-10-09 | End: 2024-10-10

## 2024-10-09 RX ORDER — SODIUM CHLORIDE 0.9 % (FLUSH) 0.9 %
5-40 SYRINGE (ML) INJECTION PRN
Status: DISCONTINUED | OUTPATIENT
Start: 2024-10-09 | End: 2024-10-13 | Stop reason: HOSPADM

## 2024-10-09 RX ORDER — POLYETHYLENE GLYCOL 3350 17 G/17G
17 POWDER, FOR SOLUTION ORAL DAILY PRN
Status: DISCONTINUED | OUTPATIENT
Start: 2024-10-09 | End: 2024-10-13 | Stop reason: HOSPADM

## 2024-10-09 RX ORDER — HALOPERIDOL 5 MG/ML
4 INJECTION INTRAMUSCULAR ONCE
Status: COMPLETED | OUTPATIENT
Start: 2024-10-09 | End: 2024-10-09

## 2024-10-09 RX ORDER — DIPHENHYDRAMINE HYDROCHLORIDE 50 MG/ML
25 INJECTION INTRAMUSCULAR; INTRAVENOUS ONCE
Status: COMPLETED | OUTPATIENT
Start: 2024-10-09 | End: 2024-10-09

## 2024-10-09 RX ORDER — PROCHLORPERAZINE EDISYLATE 5 MG/ML
10 INJECTION INTRAMUSCULAR; INTRAVENOUS
Status: COMPLETED | OUTPATIENT
Start: 2024-10-09 | End: 2024-10-09

## 2024-10-09 RX ORDER — POTASSIUM CHLORIDE 7.45 MG/ML
10 INJECTION INTRAVENOUS PRN
Status: DISCONTINUED | OUTPATIENT
Start: 2024-10-09 | End: 2024-10-13 | Stop reason: HOSPADM

## 2024-10-09 RX ORDER — SODIUM CHLORIDE 9 MG/ML
INJECTION, SOLUTION INTRAVENOUS PRN
Status: DISCONTINUED | OUTPATIENT
Start: 2024-10-09 | End: 2024-10-10

## 2024-10-09 RX ORDER — POTASSIUM CHLORIDE 7.45 MG/ML
10 INJECTION INTRAVENOUS
Status: COMPLETED | OUTPATIENT
Start: 2024-10-09 | End: 2024-10-10

## 2024-10-09 RX ORDER — ACETAMINOPHEN 325 MG/1
650 TABLET ORAL EVERY 6 HOURS PRN
Status: DISCONTINUED | OUTPATIENT
Start: 2024-10-09 | End: 2024-10-13 | Stop reason: HOSPADM

## 2024-10-09 RX ORDER — POTASSIUM CHLORIDE 1500 MG/1
40 TABLET, EXTENDED RELEASE ORAL PRN
Status: DISCONTINUED | OUTPATIENT
Start: 2024-10-09 | End: 2024-10-13 | Stop reason: HOSPADM

## 2024-10-09 RX ORDER — METOCLOPRAMIDE 10 MG/1
10 TABLET ORAL EVERY 12 HOURS PRN
Qty: 6 TABLET | Refills: 0 | Status: SHIPPED | OUTPATIENT
Start: 2024-10-09 | End: 2024-10-13 | Stop reason: HOSPADM

## 2024-10-09 RX ORDER — ENOXAPARIN SODIUM 100 MG/ML
40 INJECTION SUBCUTANEOUS DAILY
Status: DISCONTINUED | OUTPATIENT
Start: 2024-10-10 | End: 2024-10-13 | Stop reason: HOSPADM

## 2024-10-09 RX ORDER — POTASSIUM CHLORIDE 1500 MG/1
40 TABLET, EXTENDED RELEASE ORAL ONCE
Status: DISCONTINUED | OUTPATIENT
Start: 2024-10-09 | End: 2024-10-13 | Stop reason: HOSPADM

## 2024-10-09 RX ORDER — ONDANSETRON 2 MG/ML
4 INJECTION INTRAMUSCULAR; INTRAVENOUS EVERY 6 HOURS PRN
Status: DISCONTINUED | OUTPATIENT
Start: 2024-10-09 | End: 2024-10-13 | Stop reason: HOSPADM

## 2024-10-09 RX ORDER — FAMOTIDINE 10 MG/ML
20 INJECTION, SOLUTION INTRAVENOUS
Status: COMPLETED | OUTPATIENT
Start: 2024-10-09 | End: 2024-10-09

## 2024-10-09 RX ORDER — ONDANSETRON 2 MG/ML
8 INJECTION INTRAMUSCULAR; INTRAVENOUS ONCE
Status: COMPLETED | OUTPATIENT
Start: 2024-10-09 | End: 2024-10-09

## 2024-10-09 RX ORDER — ACETAMINOPHEN 650 MG/1
650 SUPPOSITORY RECTAL EVERY 6 HOURS PRN
Status: DISCONTINUED | OUTPATIENT
Start: 2024-10-09 | End: 2024-10-13 | Stop reason: HOSPADM

## 2024-10-09 RX ADMIN — HALOPERIDOL LACTATE 4 MG: 5 INJECTION, SOLUTION INTRAMUSCULAR at 17:00

## 2024-10-09 RX ADMIN — DIPHENHYDRAMINE HYDROCHLORIDE 25 MG: 50 INJECTION INTRAMUSCULAR; INTRAVENOUS at 17:00

## 2024-10-09 RX ADMIN — SODIUM CHLORIDE: 9 INJECTION, SOLUTION INTRAVENOUS at 23:06

## 2024-10-09 RX ADMIN — SODIUM CHLORIDE 1000 ML: 9 INJECTION, SOLUTION INTRAVENOUS at 19:08

## 2024-10-09 RX ADMIN — SODIUM CHLORIDE, PRESERVATIVE FREE 10 ML: 5 INJECTION INTRAVENOUS at 23:29

## 2024-10-09 RX ADMIN — PANTOPRAZOLE SODIUM 40 MG: 40 INJECTION, POWDER, FOR SOLUTION INTRAVENOUS at 16:59

## 2024-10-09 RX ADMIN — POTASSIUM CHLORIDE 10 MEQ: 7.46 INJECTION, SOLUTION INTRAVENOUS at 23:41

## 2024-10-09 RX ADMIN — PROCHLORPERAZINE EDISYLATE 10 MG: 5 INJECTION INTRAMUSCULAR; INTRAVENOUS at 23:07

## 2024-10-09 RX ADMIN — FAMOTIDINE 20 MG: 10 INJECTION, SOLUTION INTRAVENOUS at 16:59

## 2024-10-09 RX ADMIN — ONDANSETRON 8 MG: 2 INJECTION INTRAMUSCULAR; INTRAVENOUS at 19:37

## 2024-10-09 ASSESSMENT — PAIN - FUNCTIONAL ASSESSMENT: PAIN_FUNCTIONAL_ASSESSMENT: 0-10

## 2024-10-09 ASSESSMENT — PAIN SCALES - GENERAL
PAINLEVEL_OUTOF10: 0
PAINLEVEL_OUTOF10: 8

## 2024-10-09 ASSESSMENT — PAIN DESCRIPTION - DESCRIPTORS: DESCRIPTORS: ACHING

## 2024-10-09 ASSESSMENT — PAIN DESCRIPTION - ORIENTATION: ORIENTATION: LEFT

## 2024-10-09 ASSESSMENT — PAIN DESCRIPTION - LOCATION: LOCATION: ABDOMEN

## 2024-10-09 NOTE — ED NOTES
PT passed swallow screen but threw up 4min after drinking 3 oz of water.     Elyssa Webster, RN  10/09/24 4596

## 2024-10-09 NOTE — ED TRIAGE NOTES
Pt arriving in wheelchair to triage. Pt had 2 teeth removed yesterday and went to work today and has been vomiting x2 hours. Pt states he ate a sub for lunch and the vomiting started after. Denies fevers, sick contacts. pt has hx cyclical vomiting per wife. Pt diaphoretic and actively vomiting in triage

## 2024-10-10 ENCOUNTER — APPOINTMENT (OUTPATIENT)
Dept: GENERAL RADIOLOGY | Age: 49
DRG: 896 | End: 2024-10-10
Payer: COMMERCIAL

## 2024-10-10 ENCOUNTER — APPOINTMENT (OUTPATIENT)
Dept: CT IMAGING | Age: 49
DRG: 896 | End: 2024-10-10
Payer: COMMERCIAL

## 2024-10-10 PROBLEM — E87.6 HYPOKALEMIA: Status: RESOLVED | Noted: 2022-03-27 | Resolved: 2024-10-10

## 2024-10-10 PROBLEM — R11.15 CYCLICAL VOMITING: Status: RESOLVED | Noted: 2021-06-10 | Resolved: 2024-10-10

## 2024-10-10 LAB
ANION GAP SERPL CALC-SCNC: 16 MMOL/L (ref 9–18)
BASOPHILS # BLD: 0 K/UL (ref 0–0.2)
BASOPHILS NFR BLD: 0 % (ref 0–2)
BUN SERPL-MCNC: 14 MG/DL (ref 6–23)
CALCIUM SERPL-MCNC: 9.7 MG/DL (ref 8.8–10.2)
CHLORIDE SERPL-SCNC: 105 MMOL/L (ref 98–107)
CO2 SERPL-SCNC: 18 MMOL/L (ref 20–28)
CREAT SERPL-MCNC: 0.84 MG/DL (ref 0.8–1.3)
DIFFERENTIAL METHOD BLD: ABNORMAL
EKG ATRIAL RATE: 118 BPM
EKG DIAGNOSIS: NORMAL
EKG P AXIS: 70 DEGREES
EKG P-R INTERVAL: 172 MS
EKG Q-T INTERVAL: 314 MS
EKG QRS DURATION: 76 MS
EKG QTC CALCULATION (BAZETT): 440 MS
EKG R AXIS: 58 DEGREES
EKG T AXIS: 52 DEGREES
EKG VENTRICULAR RATE: 118 BPM
EOSINOPHIL # BLD: 0 K/UL (ref 0–0.8)
EOSINOPHIL NFR BLD: 0 % (ref 0.5–7.8)
ERYTHROCYTE [DISTWIDTH] IN BLOOD BY AUTOMATED COUNT: 13.2 % (ref 11.9–14.6)
GLUCOSE SERPL-MCNC: 141 MG/DL (ref 70–99)
HCT VFR BLD AUTO: 41.3 % (ref 41.1–50.3)
HGB BLD-MCNC: 15.2 G/DL (ref 13.6–17.2)
IMM GRANULOCYTES # BLD AUTO: 0 K/UL (ref 0–0.5)
IMM GRANULOCYTES NFR BLD AUTO: 0 % (ref 0–5)
LYMPHOCYTES # BLD: 0.9 K/UL (ref 0.5–4.6)
LYMPHOCYTES NFR BLD: 11 % (ref 13–44)
MCH RBC QN AUTO: 29 PG (ref 26.1–32.9)
MCHC RBC AUTO-ENTMCNC: 36.8 G/DL (ref 31.4–35)
MCV RBC AUTO: 78.8 FL (ref 82–102)
MONOCYTES # BLD: 0.5 K/UL (ref 0.1–1.3)
MONOCYTES NFR BLD: 6 % (ref 4–12)
NEUTS SEG # BLD: 7.3 K/UL (ref 1.7–8.2)
NEUTS SEG NFR BLD: 83 % (ref 43–78)
NRBC # BLD: 0 K/UL (ref 0–0.2)
PLATELET # BLD AUTO: 331 K/UL (ref 150–450)
PMV BLD AUTO: 8.5 FL (ref 9.4–12.3)
POTASSIUM SERPL-SCNC: 3.8 MMOL/L (ref 3.5–5.1)
RBC # BLD AUTO: 5.24 M/UL (ref 4.23–5.6)
SODIUM SERPL-SCNC: 139 MMOL/L (ref 136–145)
WBC # BLD AUTO: 8.7 K/UL (ref 4.3–11.1)

## 2024-10-10 PROCEDURE — 74176 CT ABD & PELVIS W/O CONTRAST: CPT

## 2024-10-10 PROCEDURE — 80048 BASIC METABOLIC PNL TOTAL CA: CPT

## 2024-10-10 PROCEDURE — 6360000002 HC RX W HCPCS: Performed by: PHYSICIAN ASSISTANT

## 2024-10-10 PROCEDURE — 6360000002 HC RX W HCPCS: Performed by: INTERNAL MEDICINE

## 2024-10-10 PROCEDURE — 93010 ELECTROCARDIOGRAM REPORT: CPT | Performed by: INTERNAL MEDICINE

## 2024-10-10 PROCEDURE — 74018 RADEX ABDOMEN 1 VIEW: CPT

## 2024-10-10 PROCEDURE — G0378 HOSPITAL OBSERVATION PER HR: HCPCS

## 2024-10-10 PROCEDURE — 36415 COLL VENOUS BLD VENIPUNCTURE: CPT

## 2024-10-10 PROCEDURE — 2580000003 HC RX 258: Performed by: HOSPITALIST

## 2024-10-10 PROCEDURE — 6360000002 HC RX W HCPCS: Performed by: HOSPITALIST

## 2024-10-10 PROCEDURE — 85025 COMPLETE CBC W/AUTO DIFF WBC: CPT

## 2024-10-10 PROCEDURE — 6360000002 HC RX W HCPCS

## 2024-10-10 PROCEDURE — 2580000003 HC RX 258: Performed by: PHYSICIAN ASSISTANT

## 2024-10-10 PROCEDURE — 6370000000 HC RX 637 (ALT 250 FOR IP): Performed by: PHYSICIAN ASSISTANT

## 2024-10-10 PROCEDURE — 93005 ELECTROCARDIOGRAM TRACING: CPT | Performed by: PHYSICIAN ASSISTANT

## 2024-10-10 RX ORDER — HYDRALAZINE HYDROCHLORIDE 20 MG/ML
10 INJECTION INTRAMUSCULAR; INTRAVENOUS EVERY 6 HOURS PRN
Status: DISCONTINUED | OUTPATIENT
Start: 2024-10-10 | End: 2024-10-13 | Stop reason: HOSPADM

## 2024-10-10 RX ORDER — HYDRALAZINE HYDROCHLORIDE 20 MG/ML
10 INJECTION INTRAMUSCULAR; INTRAVENOUS ONCE
Status: COMPLETED | OUTPATIENT
Start: 2024-10-10 | End: 2024-10-10

## 2024-10-10 RX ORDER — HALOPERIDOL 5 MG/ML
1 INJECTION INTRAMUSCULAR EVERY 4 HOURS PRN
Status: DISCONTINUED | OUTPATIENT
Start: 2024-10-10 | End: 2024-10-13 | Stop reason: HOSPADM

## 2024-10-10 RX ORDER — LISINOPRIL 5 MG/1
5 TABLET ORAL DAILY
Status: DISCONTINUED | OUTPATIENT
Start: 2024-10-10 | End: 2024-10-11

## 2024-10-10 RX ORDER — METOCLOPRAMIDE HYDROCHLORIDE 5 MG/ML
10 INJECTION INTRAMUSCULAR; INTRAVENOUS ONCE
Status: COMPLETED | OUTPATIENT
Start: 2024-10-10 | End: 2024-10-10

## 2024-10-10 RX ORDER — METOPROLOL SUCCINATE 25 MG/1
12.5 TABLET, EXTENDED RELEASE ORAL DAILY
Status: DISCONTINUED | OUTPATIENT
Start: 2024-10-10 | End: 2024-10-13 | Stop reason: HOSPADM

## 2024-10-10 RX ORDER — 0.9 % SODIUM CHLORIDE 0.9 %
500 INTRAVENOUS SOLUTION INTRAVENOUS ONCE
Status: COMPLETED | OUTPATIENT
Start: 2024-10-10 | End: 2024-10-10

## 2024-10-10 RX ADMIN — POTASSIUM CHLORIDE 10 MEQ: 7.46 INJECTION, SOLUTION INTRAVENOUS at 00:36

## 2024-10-10 RX ADMIN — HYDRALAZINE HYDROCHLORIDE 10 MG: 20 INJECTION INTRAMUSCULAR; INTRAVENOUS at 15:52

## 2024-10-10 RX ADMIN — HALOPERIDOL LACTATE 1 MG: 5 INJECTION, SOLUTION INTRAMUSCULAR at 17:29

## 2024-10-10 RX ADMIN — SODIUM CHLORIDE, PRESERVATIVE FREE 10 ML: 5 INJECTION INTRAVENOUS at 08:13

## 2024-10-10 RX ADMIN — SODIUM CHLORIDE, PRESERVATIVE FREE 10 ML: 5 INJECTION INTRAVENOUS at 22:24

## 2024-10-10 RX ADMIN — METOPROLOL SUCCINATE 12.5 MG: 25 TABLET, FILM COATED, EXTENDED RELEASE ORAL at 13:54

## 2024-10-10 RX ADMIN — HYDRALAZINE HYDROCHLORIDE 10 MG: 20 INJECTION INTRAMUSCULAR; INTRAVENOUS at 03:38

## 2024-10-10 RX ADMIN — SODIUM CHLORIDE 500 ML: 9 INJECTION, SOLUTION INTRAVENOUS at 16:26

## 2024-10-10 RX ADMIN — LISINOPRIL 5 MG: 5 TABLET ORAL at 17:10

## 2024-10-10 RX ADMIN — SODIUM CHLORIDE: 9 INJECTION, SOLUTION INTRAVENOUS at 07:30

## 2024-10-10 RX ADMIN — POTASSIUM CHLORIDE 10 MEQ: 7.46 INJECTION, SOLUTION INTRAVENOUS at 01:35

## 2024-10-10 RX ADMIN — HYDRALAZINE HYDROCHLORIDE 10 MG: 20 INJECTION, SOLUTION INTRAMUSCULAR; INTRAVENOUS at 08:13

## 2024-10-10 RX ADMIN — ONDANSETRON 4 MG: 2 INJECTION INTRAMUSCULAR; INTRAVENOUS at 20:25

## 2024-10-10 RX ADMIN — ONDANSETRON 4 MG: 2 INJECTION INTRAMUSCULAR; INTRAVENOUS at 13:17

## 2024-10-10 RX ADMIN — PANTOPRAZOLE SODIUM 40 MG: 40 INJECTION, POWDER, FOR SOLUTION INTRAVENOUS at 08:12

## 2024-10-10 RX ADMIN — METOCLOPRAMIDE 10 MG: 5 INJECTION, SOLUTION INTRAMUSCULAR; INTRAVENOUS at 00:50

## 2024-10-10 RX ADMIN — HALOPERIDOL LACTATE 1 MG: 5 INJECTION, SOLUTION INTRAMUSCULAR at 22:23

## 2024-10-10 NOTE — PROGRESS NOTES
Notified by RN that patient was continuing to have emesis with the last episode being projectile.  Upon assessment, SO at bedside. Patient resting comfortably. Currently denies pain. Abdomen distended and hypoactive bowel sounds. CT abdomen/pelvis ordered

## 2024-10-10 NOTE — CARE COORDINATION
48 y/o M admitted with Cannabis hyperemesis syndrome.  -Pt lives with his spouse and children in a 2 story home  -independent with ADLs  -has insurance and is established with a PCP  -current , family will pick him up at discharge  -no DME in the home  -denies using outside services.    ANNALISE: Pt expects to be discharged home and follow up with his PCP. He has not anticipated needs and is in agreement with this discharge plan.    ASSESSMENT NOTE    Attending Physician: Tl Dietz MD  Admit Problem: Hypokalemia [E87.6]  Cyclic vomiting syndrome [R11.15]  Cannabis hyperemesis syndrome concurrent with and due to cannabis dependence (HCC) [F12.288]  Date/Time of Admission: 10/9/2024  3:58 PM  Problem List:  Patient Active Problem List   Diagnosis    Nondiabetic gastroparesis    Tobacco abuse    H/O hiatal hernia    Tetrahydrocannabinol (THC) use disorder, moderate, dependence (HCC)    PUD (peptic ulcer disease)    Cannabis hyperemesis syndrome concurrent with and due to cannabis abuse (HCC)    Cyclical vomiting    Mild protein-calorie malnutrition (HCC)    Erosive esophagitis    Hypokalemia    Rhabdomyolysis    Polysubstance abuse (HCC)    Esophageal reflux    Anxiety    Sickle cell trait (HCC)    Hematemesis    Lactic acidosis    Sinus tachycardia    GI bleed    Hypertension    LORNA (acute kidney injury) (HCC)    Tobacco use    Hiatal hernia    High serum lactic acid    Refractory nausea and vomiting    Intractable vomiting    Sepsis (HCC)    Vomiting    Cannabis hyperemesis syndrome concurrent with and due to cannabis dependence (HCC)        10/10/24 1131   Service Assessment   Patient Orientation Alert and Oriented   Cognition Alert   History Provided By Patient   Primary Caregiver Self   Support Systems Spouse/Significant Other;Children;Family Members   Patient's Healthcare Decision Maker is: Legal Next of Kin   PCP Verified by CM Yes   Last Visit to PCP Within last 3 months   Prior Functional Level Independent

## 2024-10-10 NOTE — ED NOTES
TRANSFER - OUT REPORT:    Verbal report given to Charley ECHOLS on Dmitri Hatfield  being transferred to [facility] for routine progression of patient care       Report consisted of patient's Situation, Background, Assessment and   Recommendations(SBAR).     Information from the following report(s) Nurse Handoff Report was reviewed with the receiving nurse.    [place] Fall Assessment:    Presents to emergency department  because of falls (Syncope, seizure, or loss of consciousness): No  Age > 70: No  Altered Mental Status, Intoxication with alcohol or substance confusion (Disorientation, impaired judgment, poor safety awaremess, or inability to follow instructions): No  Impaired Mobility: Ambulates or transfers with assistive devices or assistance; Unable to ambulate or transer.: No  Nursing Judgement: No          Lines:   Peripheral IV 10/09/24 Proximal;Right Forearm (Active)        Opportunity for questions and clarification was provided.      Patient transported with:  Registered Nurse          Elyssa Webster RN  10/09/24 8310

## 2024-10-10 NOTE — ACP (ADVANCE CARE PLANNING)
Advance Care Planning Note   Admit Date:  10/9/2024  3:58 PM   Name:  Dmitri Hatfield   Age:  49 y.o.  Sex:  male  :  1975   MRN:  [id number]   Room:  [place] [address]    Dmitri Hatfield has capacity to make his own decisions:   Yes    If pt unable to make decisions, POA/surrogate decision maker:  Spouse Ms. Sharmaine Hatfield [phone].    Other people present:   Spouse Ms. Schmid    Patient / surrogate decision-maker directed code status:  Full Code    Other ACP topics discussed, if applicable:   None    Patient or surrogate consented to discussion of the current conditions, workup, management plans, prognosis, and the risk for further deterioration.    Time spent: 17 minutes in direct discussion.      Signed:  Yajaira Ricketts MD

## 2024-10-10 NOTE — ED PROVIDER NOTES
49-year-old gentleman with cyclical vomiting, still vomiting after multiple antiemetics in the ER.  Please see full history and physical by the Dr. Brandon Lucas who attended to the patient primarily.    In short patient was slated for discharge if he was able to maintain p.o. intake without vomiting.  After all medicines to include Haldol he continues to vomit.  Disposition will be changed to admit and consult hospitalist.  IV Compazine has been added this yet the third antiemetic.     Stan Matson MD  10/09/24 2045    
Please see oncoming providers notes for ER visit update and final disposition of hospitalization    Orders/Meds:    Orders Placed This Encounter   Procedures    CBC with Auto Differential    Comprehensive Metabolic Panel    Lipase    Troponin    Troponin    EKG 12 Lead      ED Meds Given:  Medications   haloperidol lactate (HALDOL) injection 4 mg (has no administration in time range)   pantoprazole (PROTONIX) 40 mg in sodium chloride (PF) 0.9 % 10 mL injection (has no administration in time range)   famotidine (PEPCID) injection 20 mg (has no administration in time range)   diphenhydrAMINE (BENADRYL) injection 25 mg (has no administration in time range)     New Prescriptions    No medications on file         ___________________  HPI: Dmitri Hatfield is a 49 y.o. male with past medical history significant for cyclic vomiting syndrome/cannabis hyperemesis syndrome presenting for evaluation of vomiting symptoms.  Patient with roughly 2 hours of vomiting after eating a sandwich earlier today.  Unsure if this is related to sandwich consumption versus cyclic vomiting/cannabinoid hyperemesis.  Patient actively retching in the lobby and in triage, mildly diaphoretic.  No chest pain symptoms.  No other recent symptoms.  Patient did undergo tooth extraction within the past several days though patient does not necessarily feel this is related.  No fever/chills, no abnormal headache symptoms.  Patient/family denies any other evaluation for today's acute complaint. Patient/family denies any other aggravating or alleviating factors. Patient/family denies any other symptoms.    ROS:   All review of systems negative except as noted above in the history of the present illness.    Past Medical/ Family/ Social History:     Medical history:   Past Medical History:   Diagnosis Date    LORNA (acute kidney injury) ([facility]) 8/12/2014    LORNA (acute kidney injury) ([facility]) 7/23/2022    Clostridium difficile colitis 3/20/2011

## 2024-10-10 NOTE — PROGRESS NOTES
Hospitalist Progress Note   Admit Date:  10/9/2024  3:58 PM   Name:  Dmitri Hatfield   Age:  49 y.o.  Sex:  male  :  1975   MRN:  [id number]   Room:  [facility]/    Presenting/Chief Complaint: No chief complaint on file.     Reason(s) for Admission: Hypokalemia [E87.6]  Cyclic vomiting syndrome [R11.15]  Cannabis hyperemesis syndrome concurrent with and due to cannabis dependence (HCC) [F12.288]     Hospital Course:   Dmitri Hatfield is a 49 y.o. male with medical history of PUD, yao-obrien tear, cannabis hyperemesis syndrome who presented with complaints of nausea and nonbloody/nonbilious vomiting for 3 days in the setting of marijuana use 2x weekly. Upon presentation patient was tachycardic with /100 and sating 95% on room air. BMP was BMP remarkable for potassium of 3.3, bicarbonate 17 with anion gap of 22.  Blood glucose 159. ALT 18, AST 34.  Lipase 18. CBC fairly unremarkable.  Hemoglobin is 15.8. He was given antiemetics and haldol however with continued vomiting and was admitted for hyperemesis syndrome/cyclical vomiting.    Subjective & 24hr Events:   Seen at bedside and does not engage much in conversation. Just states that overall he doesn't feel well. No vomiting but has not tried to eat. Unsure of last bowel movement. Is urinating. Has required 2 doses of hydralazine for HTN. Denies CP, abdominal pain, n/v/d, constipation, pain with urination.    Assessment & Plan:     Principal Problem:    Cannabis hyperemesis syndrome concurrent with and due to cannabis dependence (HCC)    PUD (peptic ulcer disease)  - check KUB given hypoactive bowel sounds today  - continue IVF and encouraged PO intake    Sinus tachycardia, seems to be chronic (present on EKGs in April & Feb of this year and July of last year) vs dehydration although he does not appear dry on exam and has also been hypertensive  - monitor on telemetry  - start metoprolol XL 12.5mg daily and continue PRN hydralazine

## 2024-10-10 NOTE — PROGRESS NOTES
TRANSFER - IN REPORT:    Verbal report received from ONESIMO Bergeron on Dmitri Hatfield  being received from ED  for routine progression of patient care      Report consisted of patient's Situation, Background, Assessment and   Recommendations(SBAR).     Information from the following report(s) Nurse Handoff Report, Intake/Output, MAR, and Recent Results was reviewed with the receiving nurse.    Opportunity for questions and clarification was provided.      Assessment to be completed upon patient's arrival to unit and care assumed.

## 2024-10-10 NOTE — H&P
Encounter   Medications    haloperidol lactate (HALDOL) injection 4 mg    pantoprazole (PROTONIX) 40 mg in sodium chloride (PF) 0.9 % 10 mL injection    famotidine (PEPCID) injection 20 mg    diphenhydrAMINE (BENADRYL) injection 25 mg    potassium chloride (KLOR-CON M) extended release tablet 40 mEq    sodium chloride 0.9 % bolus 1,000 mL    ondansetron (ZOFRAN) injection 8 mg    ondansetron (ZOFRAN-ODT) 8 MG TBDP disintegrating tablet     Sig: Take 1 tablet by mouth 3 times daily as needed for Nausea or Vomiting     Dispense:  9 tablet     Refill:  0    metoclopramide (REGLAN) 10 MG tablet     Sig: Take 1 tablet by mouth every 12 hours as needed (Nausea/vomiting)     Dispense:  6 tablet     Refill:  0    potassium chloride (KLOR-CON M) 20 MEQ extended release tablet     Sig: Take 1 tablet by mouth daily for 5 days Take with food or snack.     Dispense:  5 tablet     Refill:  0    prochlorperazine (COMPAZINE) injection 10 mg    sodium chloride flush 0.9 % injection 5-40 mL    sodium chloride flush 0.9 % injection 5-40 mL    0.9 % sodium chloride infusion    OR Linked Order Group     potassium chloride (KLOR-CON M) extended release tablet 40 mEq     potassium bicarb-citric acid (EFFER-K) effervescent tablet 40 mEq     potassium chloride 10 mEq/100 mL IVPB (Peripheral Line)    magnesium sulfate 2000 mg in 50 mL IVPB premix    enoxaparin (LOVENOX) injection 40 mg     Order Specific Question:   Indication of Use     Answer:   Prophylaxis-DVT/PE    OR Linked Order Group     ondansetron (ZOFRAN-ODT) disintegrating tablet 4 mg     ondansetron (ZOFRAN) injection 4 mg    polyethylene glycol (GLYCOLAX) packet 17 g    OR Linked Order Group     acetaminophen (TYLENOL) tablet 650 mg     acetaminophen (TYLENOL) suppository 650 mg    0.9 % sodium chloride infusion    melatonin tablet 6 mg    DISCONTD: pantoprazole (PROTONIX) 40 mg in sodium chloride (PF) 0.9 % 10 mL injection    potassium chloride 10 mEq/100 mL IVPB (Peripheral

## 2024-10-10 NOTE — SIGNIFICANT EVENT
Continues to be hypertensive and require PRN hydralazine. I will start him on lisinopril 5mg and continue PRN hydralazine.    EKG reviewed showing sinus tachycardia, no significant ST changes, patient without CP or headache.    KUB reviewed without evidence of gas. He has hypoactive BS however has flatus and with 500cc emesis. Abdomen is not distended and is soft. Will hold off on CT scan at this time. Low threshold if he were to worsen.    Will give 500cc bolus and continue to monitor.    Vaishnavi Sy PA-C

## 2024-10-10 NOTE — PROGRESS NOTES
Patient had emesis x 3 last time was projectile.  Haldol given.  BP have been high x 2 dose of hydralazine given. Added lisinopril and toprol XL.  Bolus 500cc. Last /81 and patient resting in bed.  Encouraged patient to take a hot shower.  Tests today included: EKG, KUB, and CT abd

## 2024-10-10 NOTE — PLAN OF CARE
Problem: Pain  Goal: Verbalizes/displays adequate comfort level or baseline comfort level  10/10/2024 0939 by Joanne Kraus RN  Outcome: Progressing  10/10/2024 0002 by Charley Cates RN  Outcome: Progressing     Problem: Safety - Adult  Goal: Free from fall injury  10/10/2024 0939 by Joanne Kraus, RN  Outcome: Progressing  10/10/2024 0002 by Charley Cates RN  Outcome: Progressing

## 2024-10-11 ENCOUNTER — ANESTHESIA (OUTPATIENT)
Dept: ENDOSCOPY | Age: 49
End: 2024-10-11
Payer: COMMERCIAL

## 2024-10-11 ENCOUNTER — ANESTHESIA EVENT (OUTPATIENT)
Dept: ENDOSCOPY | Age: 49
End: 2024-10-11
Payer: COMMERCIAL

## 2024-10-11 PROBLEM — R11.15 CYCLIC VOMITING SYNDROME: Status: ACTIVE | Noted: 2024-02-02

## 2024-10-11 LAB
ANION GAP SERPL CALC-SCNC: 14 MMOL/L (ref 9–18)
BUN SERPL-MCNC: 15 MG/DL (ref 6–23)
CALCIUM SERPL-MCNC: 9.4 MG/DL (ref 8.8–10.2)
CHLORIDE SERPL-SCNC: 104 MMOL/L (ref 98–107)
CO2 SERPL-SCNC: 22 MMOL/L (ref 20–28)
CREAT SERPL-MCNC: 0.94 MG/DL (ref 0.8–1.3)
GLUCOSE SERPL-MCNC: 126 MG/DL (ref 70–99)
POTASSIUM SERPL-SCNC: 3.6 MMOL/L (ref 3.5–5.1)
SODIUM SERPL-SCNC: 140 MMOL/L (ref 136–145)

## 2024-10-11 PROCEDURE — G0378 HOSPITAL OBSERVATION PER HR: HCPCS

## 2024-10-11 PROCEDURE — 36415 COLL VENOUS BLD VENIPUNCTURE: CPT

## 2024-10-11 PROCEDURE — 3700000000 HC ANESTHESIA ATTENDED CARE: Performed by: INTERNAL MEDICINE

## 2024-10-11 PROCEDURE — 2580000003 HC RX 258: Performed by: HOSPITALIST

## 2024-10-11 PROCEDURE — 99254 IP/OBS CNSLTJ NEW/EST MOD 60: CPT | Performed by: INTERNAL MEDICINE

## 2024-10-11 PROCEDURE — 80048 BASIC METABOLIC PNL TOTAL CA: CPT

## 2024-10-11 PROCEDURE — 43235 EGD DIAGNOSTIC BRUSH WASH: CPT | Performed by: INTERNAL MEDICINE

## 2024-10-11 PROCEDURE — 6360000002 HC RX W HCPCS: Performed by: ANESTHESIOLOGY

## 2024-10-11 PROCEDURE — 7100000001 HC PACU RECOVERY - ADDTL 15 MIN: Performed by: INTERNAL MEDICINE

## 2024-10-11 PROCEDURE — 2580000003 HC RX 258: Performed by: NURSE ANESTHETIST, CERTIFIED REGISTERED

## 2024-10-11 PROCEDURE — 6360000002 HC RX W HCPCS: Performed by: PHYSICIAN ASSISTANT

## 2024-10-11 PROCEDURE — 2709999900 HC NON-CHARGEABLE SUPPLY: Performed by: INTERNAL MEDICINE

## 2024-10-11 PROCEDURE — 2580000003 HC RX 258: Performed by: PHYSICIAN ASSISTANT

## 2024-10-11 PROCEDURE — 2580000003 HC RX 258: Performed by: ANESTHESIOLOGY

## 2024-10-11 PROCEDURE — 6360000002 HC RX W HCPCS: Performed by: HOSPITALIST

## 2024-10-11 PROCEDURE — 3609017100 HC EGD: Performed by: INTERNAL MEDICINE

## 2024-10-11 PROCEDURE — 6360000002 HC RX W HCPCS: Performed by: NURSE ANESTHETIST, CERTIFIED REGISTERED

## 2024-10-11 PROCEDURE — 0DJ08ZZ INSPECTION OF UPPER INTESTINAL TRACT, VIA NATURAL OR ARTIFICIAL OPENING ENDOSCOPIC: ICD-10-PCS | Performed by: INTERNAL MEDICINE

## 2024-10-11 PROCEDURE — 6360000002 HC RX W HCPCS

## 2024-10-11 PROCEDURE — 2580000003 HC RX 258

## 2024-10-11 PROCEDURE — 2500000003 HC RX 250 WO HCPCS: Performed by: NURSE ANESTHETIST, CERTIFIED REGISTERED

## 2024-10-11 PROCEDURE — 7100000000 HC PACU RECOVERY - FIRST 15 MIN: Performed by: INTERNAL MEDICINE

## 2024-10-11 RX ORDER — LIDOCAINE HYDROCHLORIDE 20 MG/ML
INJECTION, SOLUTION EPIDURAL; INFILTRATION; INTRACAUDAL; PERINEURAL
Status: DISCONTINUED | OUTPATIENT
Start: 2024-10-11 | End: 2024-10-11 | Stop reason: SDUPTHER

## 2024-10-11 RX ORDER — DEXTROSE MONOHYDRATE AND SODIUM CHLORIDE 5; .45 G/100ML; G/100ML
INJECTION, SOLUTION INTRAVENOUS CONTINUOUS
Status: DISCONTINUED | OUTPATIENT
Start: 2024-10-11 | End: 2024-10-12

## 2024-10-11 RX ORDER — SODIUM CHLORIDE 9 MG/ML
INJECTION, SOLUTION INTRAVENOUS PRN
Status: DISCONTINUED | OUTPATIENT
Start: 2024-10-11 | End: 2024-10-13 | Stop reason: HOSPADM

## 2024-10-11 RX ORDER — SODIUM CHLORIDE, SODIUM LACTATE, POTASSIUM CHLORIDE, CALCIUM CHLORIDE 600; 310; 30; 20 MG/100ML; MG/100ML; MG/100ML; MG/100ML
INJECTION, SOLUTION INTRAVENOUS CONTINUOUS
Status: DISCONTINUED | OUTPATIENT
Start: 2024-10-11 | End: 2024-10-11

## 2024-10-11 RX ORDER — DEXTROSE MONOHYDRATE 100 MG/ML
INJECTION, SOLUTION INTRAVENOUS CONTINUOUS PRN
Status: DISCONTINUED | OUTPATIENT
Start: 2024-10-11 | End: 2024-10-13 | Stop reason: HOSPADM

## 2024-10-11 RX ORDER — SODIUM CHLORIDE 0.9 % (FLUSH) 0.9 %
5-40 SYRINGE (ML) INJECTION PRN
Status: DISCONTINUED | OUTPATIENT
Start: 2024-10-11 | End: 2024-10-11 | Stop reason: HOSPADM

## 2024-10-11 RX ORDER — SODIUM CHLORIDE 0.9 % (FLUSH) 0.9 %
5-40 SYRINGE (ML) INJECTION EVERY 12 HOURS SCHEDULED
Status: DISCONTINUED | OUTPATIENT
Start: 2024-10-11 | End: 2024-10-11 | Stop reason: HOSPADM

## 2024-10-11 RX ORDER — SUCRALFATE 1 G/1
1 TABLET ORAL
Status: DISCONTINUED | OUTPATIENT
Start: 2024-10-11 | End: 2024-10-13 | Stop reason: HOSPADM

## 2024-10-11 RX ORDER — NALOXONE HYDROCHLORIDE 0.4 MG/ML
INJECTION, SOLUTION INTRAMUSCULAR; INTRAVENOUS; SUBCUTANEOUS PRN
Status: DISCONTINUED | OUTPATIENT
Start: 2024-10-11 | End: 2024-10-13 | Stop reason: HOSPADM

## 2024-10-11 RX ORDER — SODIUM CHLORIDE 0.9 % (FLUSH) 0.9 %
SYRINGE (ML) INJECTION
Status: DISCONTINUED | OUTPATIENT
Start: 2024-10-11 | End: 2024-10-11 | Stop reason: SDUPTHER

## 2024-10-11 RX ORDER — PROPOFOL 10 MG/ML
INJECTION, EMULSION INTRAVENOUS
Status: DISCONTINUED | OUTPATIENT
Start: 2024-10-11 | End: 2024-10-11 | Stop reason: SDUPTHER

## 2024-10-11 RX ORDER — ONDANSETRON 2 MG/ML
4 INJECTION INTRAMUSCULAR; INTRAVENOUS EVERY 6 HOURS PRN
Status: DISCONTINUED | OUTPATIENT
Start: 2024-10-11 | End: 2024-10-13 | Stop reason: HOSPADM

## 2024-10-11 RX ORDER — LIDOCAINE HYDROCHLORIDE 10 MG/ML
1 INJECTION, SOLUTION INFILTRATION; PERINEURAL
Status: DISCONTINUED | OUTPATIENT
Start: 2024-10-11 | End: 2024-10-11 | Stop reason: HOSPADM

## 2024-10-11 RX ORDER — METRONIDAZOLE 500 MG/100ML
500 INJECTION, SOLUTION INTRAVENOUS EVERY 8 HOURS
Status: DISCONTINUED | OUTPATIENT
Start: 2024-10-11 | End: 2024-10-13

## 2024-10-11 RX ORDER — PHENYLEPHRINE HYDROCHLORIDE 10 MG/ML
INJECTION INTRAVENOUS
Status: DISCONTINUED | OUTPATIENT
Start: 2024-10-11 | End: 2024-10-11 | Stop reason: SDUPTHER

## 2024-10-11 RX ORDER — PROCHLORPERAZINE EDISYLATE 5 MG/ML
5 INJECTION INTRAMUSCULAR; INTRAVENOUS EVERY 6 HOURS PRN
Status: DISCONTINUED | OUTPATIENT
Start: 2024-10-11 | End: 2024-10-13 | Stop reason: HOSPADM

## 2024-10-11 RX ORDER — IBUPROFEN 600 MG/1
1 TABLET ORAL PRN
Status: DISCONTINUED | OUTPATIENT
Start: 2024-10-11 | End: 2024-10-13 | Stop reason: HOSPADM

## 2024-10-11 RX ORDER — SODIUM CHLORIDE, SODIUM LACTATE, POTASSIUM CHLORIDE, CALCIUM CHLORIDE 600; 310; 30; 20 MG/100ML; MG/100ML; MG/100ML; MG/100ML
INJECTION, SOLUTION INTRAVENOUS CONTINUOUS
Status: DISCONTINUED | OUTPATIENT
Start: 2024-10-11 | End: 2024-10-11 | Stop reason: HOSPADM

## 2024-10-11 RX ORDER — SODIUM CHLORIDE 0.9 % (FLUSH) 0.9 %
5-40 SYRINGE (ML) INJECTION PRN
Status: DISCONTINUED | OUTPATIENT
Start: 2024-10-11 | End: 2024-10-13 | Stop reason: HOSPADM

## 2024-10-11 RX ORDER — LISINOPRIL 5 MG/1
10 TABLET ORAL DAILY
Status: DISCONTINUED | OUTPATIENT
Start: 2024-10-12 | End: 2024-10-12

## 2024-10-11 RX ORDER — CIPROFLOXACIN 2 MG/ML
400 INJECTION, SOLUTION INTRAVENOUS EVERY 12 HOURS
Status: DISCONTINUED | OUTPATIENT
Start: 2024-10-11 | End: 2024-10-13

## 2024-10-11 RX ORDER — SODIUM CHLORIDE 0.9 % (FLUSH) 0.9 %
5-40 SYRINGE (ML) INJECTION EVERY 12 HOURS SCHEDULED
Status: DISCONTINUED | OUTPATIENT
Start: 2024-10-11 | End: 2024-10-13 | Stop reason: HOSPADM

## 2024-10-11 RX ORDER — SODIUM CHLORIDE 9 MG/ML
INJECTION, SOLUTION INTRAVENOUS PRN
Status: DISCONTINUED | OUTPATIENT
Start: 2024-10-11 | End: 2024-10-11 | Stop reason: HOSPADM

## 2024-10-11 RX ORDER — ONDANSETRON 2 MG/ML
8 INJECTION INTRAMUSCULAR; INTRAVENOUS EVERY 6 HOURS
Status: COMPLETED | OUTPATIENT
Start: 2024-10-11 | End: 2024-10-13

## 2024-10-11 RX ADMIN — PHENYLEPHRINE HYDROCHLORIDE 200 MCG: 10 INJECTION INTRAVENOUS at 12:54

## 2024-10-11 RX ADMIN — METRONIDAZOLE 500 MG: 500 INJECTION, SOLUTION INTRAVENOUS at 17:12

## 2024-10-11 RX ADMIN — ONDANSETRON 8 MG: 2 INJECTION INTRAMUSCULAR; INTRAVENOUS at 14:41

## 2024-10-11 RX ADMIN — PROCHLORPERAZINE EDISYLATE 5 MG: 5 INJECTION INTRAMUSCULAR; INTRAVENOUS at 13:26

## 2024-10-11 RX ADMIN — ONDANSETRON 8 MG: 2 INJECTION INTRAMUSCULAR; INTRAVENOUS at 20:56

## 2024-10-11 RX ADMIN — SODIUM CHLORIDE, PRESERVATIVE FREE 200 ML: 5 INJECTION INTRAVENOUS at 12:54

## 2024-10-11 RX ADMIN — PANTOPRAZOLE SODIUM 40 MG: 40 INJECTION, POWDER, FOR SOLUTION INTRAVENOUS at 08:08

## 2024-10-11 RX ADMIN — PANTOPRAZOLE SODIUM 40 MG: 40 INJECTION, POWDER, FOR SOLUTION INTRAVENOUS at 20:56

## 2024-10-11 RX ADMIN — HALOPERIDOL LACTATE 1 MG: 5 INJECTION, SOLUTION INTRAMUSCULAR at 03:27

## 2024-10-11 RX ADMIN — METRONIDAZOLE 500 MG: 500 INJECTION, SOLUTION INTRAVENOUS at 10:27

## 2024-10-11 RX ADMIN — SODIUM CHLORIDE, PRESERVATIVE FREE 10 ML: 5 INJECTION INTRAVENOUS at 20:57

## 2024-10-11 RX ADMIN — CIPROFLOXACIN 400 MG: 2 INJECTION, SOLUTION INTRAVENOUS at 10:29

## 2024-10-11 RX ADMIN — LIDOCAINE HYDROCHLORIDE 100 MG: 20 INJECTION, SOLUTION EPIDURAL; INFILTRATION; INTRACAUDAL; PERINEURAL at 12:49

## 2024-10-11 RX ADMIN — PHENYLEPHRINE HYDROCHLORIDE 200 MCG: 10 INJECTION INTRAVENOUS at 12:56

## 2024-10-11 RX ADMIN — ONDANSETRON 4 MG: 2 INJECTION INTRAMUSCULAR; INTRAVENOUS at 05:04

## 2024-10-11 RX ADMIN — PROPOFOL 100 MG: 10 INJECTION, EMULSION INTRAVENOUS at 12:49

## 2024-10-11 RX ADMIN — DEXTROSE AND SODIUM CHLORIDE: 5; 450 INJECTION, SOLUTION INTRAVENOUS at 11:30

## 2024-10-11 RX ADMIN — SODIUM CHLORIDE, PRESERVATIVE FREE 10 ML: 5 INJECTION INTRAVENOUS at 08:09

## 2024-10-11 RX ADMIN — HALOPERIDOL LACTATE 1 MG: 5 INJECTION, SOLUTION INTRAMUSCULAR at 08:09

## 2024-10-11 RX ADMIN — CIPROFLOXACIN 400 MG: 2 INJECTION, SOLUTION INTRAVENOUS at 21:05

## 2024-10-11 RX ADMIN — ONDANSETRON 8 MG: 2 INJECTION INTRAMUSCULAR; INTRAVENOUS at 10:21

## 2024-10-11 RX ADMIN — PROPOFOL 50 MG: 10 INJECTION, EMULSION INTRAVENOUS at 12:52

## 2024-10-11 ASSESSMENT — LIFESTYLE VARIABLES: SMOKING_STATUS: 1

## 2024-10-11 ASSESSMENT — PAIN - FUNCTIONAL ASSESSMENT
PAIN_FUNCTIONAL_ASSESSMENT: 0-10
PAIN_FUNCTIONAL_ASSESSMENT: NONE - DENIES PAIN
PAIN_FUNCTIONAL_ASSESSMENT: NONE - DENIES PAIN

## 2024-10-11 NOTE — PERIOP NOTE
TRANSFER - OUT REPORT:    Verbal report given to ONESIMO Haywood on Dmitri Hatfield  being transferred to 5th floor for routine progression of patient care       Report consisted of patient’s Situation, Background, Assessment and   Recommendations(SBAR).     Information from the following report(s) Nurse Handoff Report, Adult Overview, and Surgery Report was reviewed with the receiving nurse.    Lines:   Peripheral IV 10/11/24 Distal;Left Forearm (Active)   Site Assessment Clean, dry & intact 10/11/24 1246   Line Status Flushed 10/11/24 1246   Line Care Connections checked and tightened 10/11/24 1246   Phlebitis Assessment No symptoms 10/11/24 1246   Infiltration Assessment 0 10/11/24 1246   Alcohol Cap Used No 10/11/24 1246   Dressing Status New dressing applied 10/11/24 1246   Dressing Type Transparent 10/11/24 1246   Dressing Intervention New 10/11/24 1246        Opportunity for questions and clarification was provided.      Patient transported with:  [employer]L [employer]    VTE prophylaxis orders have been written for Dmitri Hatfield.

## 2024-10-11 NOTE — PLAN OF CARE
EGD complete. See full procedure note in notes and procedure tab.  Significant for LA grade B esophagitis. No active bleeding seen on exam.     - PPI BID   - Continue supportive care, antiemetics as needed   - Counseled patient on side effects of ongoing cannabis use. Recommend stopping all cannabis use for at least 3 months to see affect      GI team will sign off at this time, please call with any questions or concerns.

## 2024-10-11 NOTE — PROGRESS NOTES
Hospitalist Progress Note   Admit Date:  10/9/2024  3:58 PM   Name:  Dmitri Hatfield   Age:  49 y.o.  Sex:  male  :  1975   MRN:  [id number]   Room:      Presenting/Chief Complaint: No chief complaint on file.     Reason(s) for Admission: Hypokalemia [E87.6]  Cyclic vomiting syndrome [R11.15]  Cannabis hyperemesis syndrome concurrent with and due to cannabis dependence (HCC) [F12.288]     Hospital Course:   Dmitri Hatfield is a 49 y.o. male with medical history of PUD, yao-obrien tear, cannabis hyperemesis syndrome who presented with complaints of nausea and nonbloody/nonbilious vomiting for 3 days in the setting of marijuana use 2x weekly. Upon presentation patient was tachycardic with /100 and sating 95% on room air. BMP was BMP remarkable for potassium of 3.3, bicarbonate 17 with anion gap of 22.  Blood glucose 159. ALT 18, AST 34.  Lipase 18. CBC fairly unremarkable.  Hemoglobin is 15.8. He was given antiemetics and haldol however with continued vomiting and was admitted for hyperemesis syndrome/cyclical vomiting.    Subjective & 24hr Events:   \"I just keep vomiting dark stuff.\"  49y.o. AA male sitting up in bed    Admits to ongoing coffee-grind emesis and nausea with sips of water.  Denies CP, dyspnea, cough, BRBPR, abd pains.    Assessment & Plan:     Principal Problem:    Cannabis hyperemesis syndrome concurrent with and due to cannabis dependence (HCC)  - counseled cessation  - scheduled zofran next 24hrs  - KUB without acute findings    Active Problems:    N/V, remote yao obrien tear    Coffee-grind emesis  - h/h ~stable  - appreciate GI assistance; s/p EGD today with findings of LA grade B esophagitis no bleeding  - recommending STOPPING MARIJUANA ABUSE and PPI along with scheduled antiemetics  - carafate added qid  - will keep NPO for now; trial CLD in AM      Colitis  - as noted on CT A/P  - pt denies abd pains  - cont empiric cipro / flagyl x 10 days

## 2024-10-11 NOTE — ANESTHESIA PRE PROCEDURE
Department of Anesthesiology  Preprocedure Note       Name:  Dmitri Hatfield   Age:  49 y.o.  :  1975                                          MRN:  [id number]         Date:  10/11/2024      Surgeon: Surgeon(s):  Glory Jara MD    Procedure: Procedure(s):  ESOPHAGOGASTRODUODENOSCOPY    Medications prior to admission:   Prior to Admission medications    Medication Sig Start Date End Date Taking? Authorizing Provider   ondansetron (ZOFRAN-ODT) 8 MG TBDP disintegrating tablet Take 1 tablet by mouth 3 times daily as needed for Nausea or Vomiting 10/9/24 10/12/24 Yes Brandon Lucas MD   metoclopramide (REGLAN) 10 MG tablet Take 1 tablet by mouth every 12 hours as needed (Nausea/vomiting) 10/9/24 10/12/24 Yes Brandon Lucas MD   potassium chloride (KLOR-CON M) 20 MEQ extended release tablet Take 1 tablet by mouth daily for 5 days Take with food or snack. 10/9/24 10/14/24 Yes Brandon Lucas MD   pantoprazole (PROTONIX) 40 MG tablet Take 1 tablet by mouth in the morning and at bedtime  Patient taking differently: Take 1 tablet by mouth daily 4/9/24 10/9/24 Yes Sydney Starkey MD       Current medications:    Current Facility-Administered Medications   Medication Dose Route Frequency Provider Last Rate Last Admin    ciprofloxacin (CIPRO) IVPB 400 mg  400 mg IntraVENous Q12H Louie May MEME BYNUM   Stopped at 10/11/24 1129    metroNIDAZOLE (FLAGYL) 500 mg in 0.9% NaCl 100 mL IVPB premix  500 mg IntraVENous Q8H Louie May MEME BYNUM   Stopped at 10/11/24 1130    ondansetron (ZOFRAN) injection 8 mg  8 mg IntraVENous Q6H Louie May MEME BYNUM   8 mg at 10/11/24 1021    dextrose 5 % and 0.45 % sodium chloride infusion   IntraVENous Continuous Tawny Palma PA 75 mL/hr at 10/11/24 1130 New Bag at 10/11/24 1130    pantoprazole (PROTONIX) 40 mg in sodium chloride (PF) 0.9 % 10 mL injection  40 mg IntraVENous Q12H Johanne Vega, APRN - CNP        lidocaine 1 % injection 1 mL  1 mL IntraDERmal Once PRN Joseph Jaquez MD

## 2024-10-11 NOTE — PROGRESS NOTES
TRANSFER - OUT REPORT:    Verbal report given to RN Sonia on Dmitri Hatfield  being transferred to GI lab for ordered procedure EGD.      Report consisted of patient's Situation, Background, Assessment and   Recommendations(SBAR).     Information from the following report(s) Nurse Handoff Report, Intake/Output, MAR, Recent Results, and Cardiac Rhythm SR 60  was reviewed with the receiving nurse.           Lines:   Peripheral IV 10/09/24 Proximal;Right Forearm (Active)   Site Assessment Clean, dry & intact 10/11/24 0808   Line Status Flushed;Capped 10/11/24 0808   Line Care Cap changed;Ports disinfected 10/11/24 0808   Phlebitis Assessment No symptoms 10/11/24 0808   Infiltration Assessment 0 10/11/24 0808   Alcohol Cap Used Yes 10/11/24 0808   Dressing Status Clean, dry & intact 10/11/24 0808   Dressing Type Transparent 10/11/24 0808   Dressing Intervention Other (Comment) 10/11/24 2772        Opportunity for questions and clarification was provided.

## 2024-10-11 NOTE — PLAN OF CARE
Problem: Pain  Goal: Verbalizes/displays adequate comfort level or baseline comfort level  10/11/2024 1050 by Mellisa Cuellar RN  Outcome: Progressing  10/10/2024 2315 by Monse Sotelo RN  Outcome: Progressing     Problem: Safety - Adult  Goal: Free from fall injury  10/11/2024 1050 by Mellisa Cuellar RN  Outcome: Progressing  10/10/2024 2315 by Monse Sotelo RN  Outcome: Progressing

## 2024-10-11 NOTE — CONSULTS
See consult note    Johanne Vega, APRN - CNP    
    Past Surgical History:   Procedure Laterality Date    COLONOSCOPY  4/08    ESOPHAGITIS, COLONIC ULCER X1    UPPER GASTROINTESTINAL ENDOSCOPY  4/08    H. HERNIA, ULCER X2, H PYLORI,    UPPER GASTROINTESTINAL ENDOSCOPY  2011    h pylori -neg    UPPER GASTROINTESTINAL ENDOSCOPY N/A 2/3/2024    EGD ESOPHAGOGASTRODUODENOSCOPY performed by Farrah Israel MD at [facility] ENDOSCOPY    WISDOM TOOTH EXTRACTION  2/10/11        Medications     Prior to Admission medications    Medication Sig Start Date End Date Taking? Authorizing Provider   ondansetron (ZOFRAN-ODT) 8 MG TBDP disintegrating tablet Take 1 tablet by mouth 3 times daily as needed for Nausea or Vomiting 10/9/24 10/12/24 Yes Brandon Lucas MD   metoclopramide (REGLAN) 10 MG tablet Take 1 tablet by mouth every 12 hours as needed (Nausea/vomiting) 10/9/24 10/12/24 Yes Brandon Lucas MD   potassium chloride (KLOR-CON M) 20 MEQ extended release tablet Take 1 tablet by mouth daily for 5 days Take with food or snack. 10/9/24 10/14/24 Yes Brandon Lucas MD   pantoprazole (PROTONIX) 40 MG tablet Take 1 tablet by mouth in the morning and at bedtime  Patient taking differently: Take 1 tablet by mouth daily 4/9/24 10/9/24 Yes Sydney Starkey MD        ciprofloxacin (CIPRO) IVPB 400 mg, Q12H  metroNIDAZOLE (FLAGYL) 500 mg in 0.9% NaCl 100 mL IVPB premix, Q8H  ondansetron (ZOFRAN) injection 8 mg, Q6H  dextrose 5 % and 0.45 % sodium chloride infusion, Continuous  pantoprazole (PROTONIX) 40 mg in sodium chloride (PF) 0.9 % 10 mL injection, Q12H  metoprolol succinate (TOPROL XL) extended release tablet 12.5 mg, Daily  hydrALAZINE (APRESOLINE) injection 10 mg, Q6H PRN  lisinopril (PRINIVIL;ZESTRIL) tablet 5 mg, Daily  haloperidol lactate (HALDOL) injection 1 mg, Q4H PRN  potassium chloride (KLOR-CON M) extended release tablet 40 mEq, Once  sodium chloride flush 0.9 % injection 5-40 mL, 2 times per day  sodium chloride flush 0.9 % injection 5-40 mL, PRN  potassium chloride

## 2024-10-11 NOTE — PROGRESS NOTES
0720: Received pt from ONES[facility] Ingram in stable condition. Pt in bed resting quietly. Resp even & unlabored on room air; no acute signs of distress noted. Bed low & locked; call light in reach; no needs voiced.     0809: Haldol 1 mg IV given for active retching & vomiting. Informed PA about vomit looking like coffee grounds & pt & wife's frustration that pt still does not feel any better even with meds. Also asked if pt needs to be on IVF. Waiting for response.     1032: Informed pt that the GI MD would be by to see him today. Also told him he was NPO & removed ice & water. Pt very flat but nodded that he understood. Does not appear to be nauseous at this time. In bed resting quietly.      1259: Informed GI lab ([facility]) that pt's wife Sharmaine was in the waiting area downstairs to be updated by MD & see her  when appropriate.

## 2024-10-12 PROBLEM — F12.90 CANNABINOID HYPEREMESIS SYNDROME: Status: ACTIVE | Noted: 2024-10-12

## 2024-10-12 PROBLEM — R11.2 CANNABINOID HYPEREMESIS SYNDROME: Status: ACTIVE | Noted: 2024-10-12

## 2024-10-12 LAB
ANION GAP SERPL CALC-SCNC: 11 MMOL/L (ref 9–18)
BUN SERPL-MCNC: 20 MG/DL (ref 6–23)
CALCIUM SERPL-MCNC: 9 MG/DL (ref 8.8–10.2)
CHLORIDE SERPL-SCNC: 104 MMOL/L (ref 98–107)
CO2 SERPL-SCNC: 25 MMOL/L (ref 20–28)
CREAT SERPL-MCNC: 1.12 MG/DL (ref 0.8–1.3)
ERYTHROCYTE [DISTWIDTH] IN BLOOD BY AUTOMATED COUNT: 13.4 % (ref 11.9–14.6)
GLUCOSE SERPL-MCNC: 105 MG/DL (ref 70–99)
HCT VFR BLD AUTO: 39.1 % (ref 41.1–50.3)
HGB BLD-MCNC: 14.3 G/DL (ref 13.6–17.2)
MAGNESIUM SERPL-MCNC: 2.3 MG/DL (ref 1.8–2.4)
MCH RBC QN AUTO: 29.2 PG (ref 26.1–32.9)
MCHC RBC AUTO-ENTMCNC: 36.6 G/DL (ref 31.4–35)
MCV RBC AUTO: 80 FL (ref 82–102)
NRBC # BLD: 0 K/UL (ref 0–0.2)
PLATELET # BLD AUTO: 305 K/UL (ref 150–450)
PMV BLD AUTO: 8.7 FL (ref 9.4–12.3)
POTASSIUM SERPL-SCNC: 3.5 MMOL/L (ref 3.5–5.1)
RBC # BLD AUTO: 4.89 M/UL (ref 4.23–5.6)
SODIUM SERPL-SCNC: 140 MMOL/L (ref 136–145)
WBC # BLD AUTO: 8.4 K/UL (ref 4.3–11.1)

## 2024-10-12 PROCEDURE — 2580000003 HC RX 258: Performed by: ANESTHESIOLOGY

## 2024-10-12 PROCEDURE — 1100000000 HC RM PRIVATE

## 2024-10-12 PROCEDURE — 6360000002 HC RX W HCPCS: Performed by: PHYSICIAN ASSISTANT

## 2024-10-12 PROCEDURE — 2580000003 HC RX 258: Performed by: HOSPITALIST

## 2024-10-12 PROCEDURE — 2580000003 HC RX 258: Performed by: PHYSICIAN ASSISTANT

## 2024-10-12 PROCEDURE — 36415 COLL VENOUS BLD VENIPUNCTURE: CPT

## 2024-10-12 PROCEDURE — 80048 BASIC METABOLIC PNL TOTAL CA: CPT

## 2024-10-12 PROCEDURE — G0378 HOSPITAL OBSERVATION PER HR: HCPCS

## 2024-10-12 PROCEDURE — 6360000002 HC RX W HCPCS

## 2024-10-12 PROCEDURE — 2580000003 HC RX 258

## 2024-10-12 PROCEDURE — 85027 COMPLETE CBC AUTOMATED: CPT

## 2024-10-12 PROCEDURE — 83735 ASSAY OF MAGNESIUM: CPT

## 2024-10-12 PROCEDURE — 6370000000 HC RX 637 (ALT 250 FOR IP): Performed by: INTERNAL MEDICINE

## 2024-10-12 RX ADMIN — DEXTROSE AND SODIUM CHLORIDE: 5; 450 INJECTION, SOLUTION INTRAVENOUS at 05:25

## 2024-10-12 RX ADMIN — ONDANSETRON 8 MG: 2 INJECTION INTRAMUSCULAR; INTRAVENOUS at 08:40

## 2024-10-12 RX ADMIN — SODIUM CHLORIDE, PRESERVATIVE FREE 10 ML: 5 INJECTION INTRAVENOUS at 20:49

## 2024-10-12 RX ADMIN — ONDANSETRON 8 MG: 2 INJECTION INTRAMUSCULAR; INTRAVENOUS at 15:13

## 2024-10-12 RX ADMIN — ONDANSETRON 8 MG: 2 INJECTION INTRAMUSCULAR; INTRAVENOUS at 03:28

## 2024-10-12 RX ADMIN — ONDANSETRON 8 MG: 2 INJECTION INTRAMUSCULAR; INTRAVENOUS at 20:48

## 2024-10-12 RX ADMIN — SUCRALFATE 1 G: 1 TABLET ORAL at 11:19

## 2024-10-12 RX ADMIN — METRONIDAZOLE 500 MG: 500 INJECTION, SOLUTION INTRAVENOUS at 16:56

## 2024-10-12 RX ADMIN — CIPROFLOXACIN 400 MG: 2 INJECTION, SOLUTION INTRAVENOUS at 08:47

## 2024-10-12 RX ADMIN — METRONIDAZOLE 500 MG: 500 INJECTION, SOLUTION INTRAVENOUS at 08:39

## 2024-10-12 RX ADMIN — PANTOPRAZOLE SODIUM 40 MG: 40 INJECTION, POWDER, FOR SOLUTION INTRAVENOUS at 20:48

## 2024-10-12 RX ADMIN — PANTOPRAZOLE SODIUM 40 MG: 40 INJECTION, POWDER, FOR SOLUTION INTRAVENOUS at 08:40

## 2024-10-12 RX ADMIN — CIPROFLOXACIN 400 MG: 2 INJECTION, SOLUTION INTRAVENOUS at 20:50

## 2024-10-12 RX ADMIN — METRONIDAZOLE 500 MG: 500 INJECTION, SOLUTION INTRAVENOUS at 00:53

## 2024-10-12 RX ADMIN — SUCRALFATE 1 G: 1 TABLET ORAL at 16:55

## 2024-10-12 RX ADMIN — SUCRALFATE 1 G: 1 TABLET ORAL at 20:48

## 2024-10-12 NOTE — PLAN OF CARE
Problem: Pain  Goal: Verbalizes/displays adequate comfort level or baseline comfort level  10/12/2024 0732 by Mellisa Cuellar RN  Outcome: Progressing  10/11/2024 2124 by Monse Sotelo RN  Outcome: Progressing     Problem: Safety - Adult  Goal: Free from fall injury  10/12/2024 0732 by Mellisa Cuellar RN  Outcome: Progressing  10/11/2024 2124 by Monse Sotelo RN  Outcome: Progressing

## 2024-10-12 NOTE — PROGRESS NOTES
Hospitalist Progress Note   Admit Date:  10/9/2024  3:58 PM   Name:  Dmitri Hatfield   Age:  49 y.o.  Sex:  male  :  1975   MRN:  [id number]   Room:  [facility]/    Presenting/Chief Complaint: No chief complaint on file.     Reason(s) for Admission: Hypokalemia [E87.6]  Cyclic vomiting syndrome [R11.15]  Cannabis hyperemesis syndrome concurrent with and due to cannabis dependence (HCC) [F12.288]  Cannabinoid hyperemesis syndrome [R11.2, F12.90]     Hospital Course:   Dmitri Hatfield is a 49 y.o. male with medical history of PUD, yao-obrien tear, cannabis hyperemesis syndrome who presented with complaints of nausea and nonbloody/nonbilious vomiting for 3 days in the setting of marijuana use 2x weekly. Upon presentation patient was tachycardic with /100 and sating 95% on room air. BMP was BMP remarkable for potassium of 3.3, bicarbonate 17 with anion gap of 22.  Blood glucose 159. ALT 18, AST 34.  Lipase 18. CBC fairly unremarkable.  Hemoglobin is 15.8. He was given antiemetics and haldol however with continued vomiting and was admitted for hyperemesis syndrome/cyclical vomiting.    GI consulted for persistent N/V with coffee-grind emesis.  S/p EGD 10/11/2024 by Dr. Jara; pt found LA grade B esophagitis no bleeding.  H/h remained stable; started on carafate and diet advanced to CLD.    Subjective & 24hr Events:   \"I feel so much better today.  I know I gotta quit smoking pot.\"  49y.o. AA male sitting up in bed, wife at bedside    Admits to resolved emesis and significantly improved nausea.  Denies CP, dyspnea, cough, BRBPR, abd pains.    Assessment & Plan:     Principal Problem:    Cannabis hyperemesis syndrome concurrent with and due to cannabis dependence (HCC)  - counseled cessation again today  - KUB without acute findings  - clinically improved this AM; trial CLD    Active Problems:    N/V, remote yao obrien tear    Coffee-grind emesis    Severe esophagitis  - h/h remains

## 2024-10-12 NOTE — PROGRESS NOTES
0704: Received pt from ONES[facility] Ingram in stable condition. Pt in bed resting quietly. Resp even & unlabored on room air; no acute signs of distress noted. Bed low & locked; call light in reach; no needs voiced.

## 2024-10-13 VITALS
DIASTOLIC BLOOD PRESSURE: 73 MMHG | HEIGHT: 61 IN | SYSTOLIC BLOOD PRESSURE: 106 MMHG | WEIGHT: 147.6 LBS | HEART RATE: 53 BPM | TEMPERATURE: 98.2 F | OXYGEN SATURATION: 94 % | RESPIRATION RATE: 18 BRPM | BODY MASS INDEX: 27.87 KG/M2

## 2024-10-13 PROCEDURE — 2580000003 HC RX 258: Performed by: ANESTHESIOLOGY

## 2024-10-13 PROCEDURE — 2580000003 HC RX 258

## 2024-10-13 PROCEDURE — 6360000002 HC RX W HCPCS: Performed by: PHYSICIAN ASSISTANT

## 2024-10-13 PROCEDURE — 6370000000 HC RX 637 (ALT 250 FOR IP): Performed by: INTERNAL MEDICINE

## 2024-10-13 PROCEDURE — 6360000002 HC RX W HCPCS

## 2024-10-13 RX ORDER — PANTOPRAZOLE SODIUM 40 MG/1
40 TABLET, DELAYED RELEASE ORAL
Qty: 60 TABLET | Refills: 0 | Status: SHIPPED | OUTPATIENT
Start: 2024-10-13

## 2024-10-13 RX ORDER — SUCRALFATE 1 G/1
1 TABLET ORAL
Qty: 120 TABLET | Refills: 0 | Status: SHIPPED | OUTPATIENT
Start: 2024-10-13

## 2024-10-13 RX ORDER — METRONIDAZOLE 500 MG/1
500 TABLET ORAL EVERY 8 HOURS SCHEDULED
Qty: 21 TABLET | Refills: 0 | Status: SHIPPED | OUTPATIENT
Start: 2024-10-13 | End: 2024-10-20

## 2024-10-13 RX ORDER — CIPROFLOXACIN 500 MG/1
500 TABLET, FILM COATED ORAL EVERY 12 HOURS SCHEDULED
Qty: 14 TABLET | Refills: 0 | Status: SHIPPED | OUTPATIENT
Start: 2024-10-13 | End: 2024-10-20

## 2024-10-13 RX ORDER — CIPROFLOXACIN 500 MG/1
500 TABLET, FILM COATED ORAL EVERY 12 HOURS SCHEDULED
Status: DISCONTINUED | OUTPATIENT
Start: 2024-10-13 | End: 2024-10-13 | Stop reason: HOSPADM

## 2024-10-13 RX ORDER — METOPROLOL SUCCINATE 25 MG/1
12.5 TABLET, EXTENDED RELEASE ORAL DAILY
Qty: 30 TABLET | Refills: 0 | Status: SHIPPED | OUTPATIENT
Start: 2024-10-14

## 2024-10-13 RX ORDER — PANTOPRAZOLE SODIUM 40 MG/1
40 TABLET, DELAYED RELEASE ORAL
Status: DISCONTINUED | OUTPATIENT
Start: 2024-10-13 | End: 2024-10-13 | Stop reason: HOSPADM

## 2024-10-13 RX ORDER — METRONIDAZOLE 500 MG/1
500 TABLET ORAL EVERY 8 HOURS SCHEDULED
Status: DISCONTINUED | OUTPATIENT
Start: 2024-10-13 | End: 2024-10-13 | Stop reason: HOSPADM

## 2024-10-13 RX ADMIN — METRONIDAZOLE 500 MG: 500 INJECTION, SOLUTION INTRAVENOUS at 07:49

## 2024-10-13 RX ADMIN — SODIUM CHLORIDE, PRESERVATIVE FREE 10 ML: 5 INJECTION INTRAVENOUS at 07:47

## 2024-10-13 RX ADMIN — METRONIDAZOLE 500 MG: 500 INJECTION, SOLUTION INTRAVENOUS at 00:56

## 2024-10-13 RX ADMIN — SUCRALFATE 1 G: 1 TABLET ORAL at 07:44

## 2024-10-13 RX ADMIN — ONDANSETRON 8 MG: 2 INJECTION INTRAMUSCULAR; INTRAVENOUS at 03:19

## 2024-10-13 RX ADMIN — PANTOPRAZOLE SODIUM 40 MG: 40 INJECTION, POWDER, FOR SOLUTION INTRAVENOUS at 07:43

## 2024-10-13 RX ADMIN — CIPROFLOXACIN 400 MG: 2 INJECTION, SOLUTION INTRAVENOUS at 07:45

## 2024-10-13 ASSESSMENT — PAIN SCALES - GENERAL: PAINLEVEL_OUTOF10: 0

## 2024-10-13 NOTE — PLAN OF CARE
Problem: Pain  Goal: Verbalizes/displays adequate comfort level or baseline comfort level  10/13/2024 1006 by Joanne Kraus RN  Outcome: Adequate for Discharge  10/13/2024 1006 by Joanne Kraus RN  Outcome: Progressing  10/13/2024 1005 by Joanne Kraus RN  Outcome: Progressing  Flowsheets (Taken 10/13/2024 0745)  Verbalizes/displays adequate comfort level or baseline comfort level: Encourage patient to monitor pain and request assistance  10/12/2024 2151 by Monse Sotelo, RN  Outcome: Progressing  Flowsheets (Taken 10/12/2024 1949)  Verbalizes/displays adequate comfort level or baseline comfort level:   Encourage patient to monitor pain and request assistance   Assess pain using appropriate pain scale     Problem: Safety - Adult  Goal: Free from fall injury  10/13/2024 1006 by Joanne Kraus RN  Outcome: Adequate for Discharge  10/13/2024 1006 by Joanne Kraus RN  Outcome: Progressing  10/13/2024 1005 by Joanne Kraus RN  Outcome: Progressing  10/12/2024 2151 by Monse Sotelo RN  Outcome: Progressing

## 2024-10-13 NOTE — PLAN OF CARE
Problem: Pain  Goal: Verbalizes/displays adequate comfort level or baseline comfort level  10/13/2024 1005 by Joanne Kraus RN  Outcome: Progressing  Flowsheets (Taken 10/13/2024 0745)  Verbalizes/displays adequate comfort level or baseline comfort level: Encourage patient to monitor pain and request assistance  10/12/2024 2151 by Monse Sotelo RN  Outcome: Progressing  Flowsheets (Taken 10/12/2024 1949)  Verbalizes/displays adequate comfort level or baseline comfort level:   Encourage patient to monitor pain and request assistance   Assess pain using appropriate pain scale     Problem: Safety - Adult  Goal: Free from fall injury  10/13/2024 1005 by Joanne Kraus RN  Outcome: Progressing  10/12/2024 2151 by Monse Sotelo RN  Outcome: Progressing

## 2024-10-13 NOTE — CARE COORDINATION
Pt is for discharge home today with family and no needs/supportive care orders recieved for MSW at this time.     10/13/24 2588   Service Assessment   Patient's Healthcare Decision Maker is: Legal Next of Kin   Social/Functional History   Lives With Spouse   Type of Home House   Home Layout Two level   Home Access Stairs to enter with rails   Entrance Stairs - Number of Steps 2   Entrance Stairs - Rails Both   Bathroom Shower/Tub Tub/Shower unit   Home Equipment None   Receives Help From Family   ADL Assistance Independent   Homemaking Assistance Independent   Ambulation Assistance Independent   Transfer Assistance Independent   Active  Yes   Mode of Transportation SUV   Occupation Self employed   Services At/After Discharge   Transition of Care Consult (CM Consult) Discharge Planning   Services At/After Discharge None    Resource Information Provided? No   Mode of Transport at Discharge Other (see comment)  (family)   Confirm Follow Up Transport Self   Condition of Participation: Discharge Planning   The Plan for Transition of Care is related to the following treatment goals: To obtain tx and return home with no needs anticipated   The Patient and/Or Patient Representative agree with the Discharge Plan? Yes

## 2024-10-13 NOTE — DISCHARGE SUMMARY
10/12/24  0535    140   K 3.6 3.5    104   CO2 22 25   ANIONGAP 14 11   BUN 15 20   CREATININE 0.94 1.12   LABGLOM >90 82   CALCIUM 9.4 9.0   GLUCOSE 126* 105*   MG  --  2.3      CBC Recent Labs     10/12/24  0535   WBC 8.4   RBC 4.89   HGB 14.3   HCT 39.1*   MCV 80.0*   MCH 29.2   MCHC 36.6*   RDW 13.4      MPV 8.7*   NRBC 0.00      LFT No results for input(s): \"BILITOT\", \"BILIDIR\", \"ALKPHOS\", \"AST\", \"ALT\", \"LABALBU\", \"GLOB\" in the last 72 hours.    Invalid input(s): \"PROT\"   Cardiac  Lab Results   Component Value Date/Time    TROPHS 5.9 04/06/2024 08:50 AM    TROPHS 5.7 04/06/2024 07:45 AM    TROPHS 4.1 06/28/2023 12:19 PM      Coags Lab Results   Component Value Date/Time    PROTIME 13.7 07/30/2023 03:24 AM    PROTIME 14.7 07/16/2019 08:54 PM    INR 1.0 07/30/2023 03:24 AM    INR 1.2 07/16/2019 08:54 PM    APTT 26.0 07/30/2023 03:24 AM      A1c Lab Results   Component Value Date/Time    LABA1C 5.4 06/29/2023 07:05 AM    LABA1C 5.4 07/25/2022 05:23 AM     06/29/2023 07:05 AM     07/25/2022 05:23 AM      Lipids No results found for: \"CHOL\", \"HDL\", \"CHOLHDLRATIO\", \"TRIG\"   Thyroid  No results found for: \"TSHELE\", \"PIX3MAQ\"     Most Recent UA Lab Results   Component Value Date/Time    COLORU YELLOW/STRAW 02/02/2024 06:44 PM    APPEARANCE CLEAR 02/02/2024 06:44 PM    PROTEINU TRACE 02/02/2024 06:44 PM    GLUCOSEU Negative 02/02/2024 06:44 PM    KETUA 40 02/02/2024 06:44 PM    BILIRUBINUR Negative 02/02/2024 06:44 PM    BILIRUBINUR Negative 03/27/2022 12:05 PM    BLOODU TRACE 02/02/2024 06:44 PM    UROBILINOGEN 0.2 02/02/2024 06:44 PM    NITRU Negative 02/02/2024 06:44 PM    LEUKOCYTESUR Negative 02/02/2024 06:44 PM    WBCUA 0-4 02/02/2024 06:44 PM    RBCUA 0-5 02/02/2024 06:44 PM    BACTERIA Negative 02/02/2024 06:44 PM    LABCAST 0-2 02/02/2024 06:44 PM    MUCUS 0 07/29/2023 03:28 AM        Microbiology:  Results       Procedure Component Value Units Date/Time    COVID-19, Rapid

## 2024-10-18 NOTE — ED NOTES
Attempt to contact pt with case management to inquire about follow up care post discharge. Unable to reach pt and unable to leave VM.      Charley Perez, RN  10/18/24 6499

## (undated) DEVICE — KENDALL RADIOLUCENT FOAM MONITORING ELECTRODE RECTANGULAR SHAPE: Brand: KENDALL

## (undated) DEVICE — SINGLE PORT MANIFOLD: Brand: NEPTUNE 2

## (undated) DEVICE — CONNECTOR TBNG OD5-7MM O2 END DISP

## (undated) DEVICE — BLOCK BITE AD 60FR W/ VELC STRP ADDRESSES MOST PT AND

## (undated) DEVICE — FORCEPS BX L240CM JAW DIA2.8MM L CAP W/ NDL MIC MESH TOOTH

## (undated) DEVICE — CANNULA NSL ORAL AD FOR CAPNOFLEX CO2 O2 AIRLFE

## (undated) DEVICE — AIRLIFE™ OXYGEN TUBING 7 FEET (2.1 M) CRUSH RESISTANT OXYGEN TUBING, VINYL TIPPED: Brand: AIRLIFE™

## (undated) DEVICE — GAUZE,SPONGE,4"X4",12PLY,WOVEN,NS,LF: Brand: MEDLINE

## (undated) DEVICE — MOUTHPIECE ENDOSCP L CTRL OPN AND SIDE PORTS DISP

## (undated) DEVICE — GARMENT,MEDLINE,DVT,INT,CALF,MED, GEN2: Brand: MEDLINE

## (undated) DEVICE — CONTAINER PREFIL FRMLN 40ML --

## (undated) DEVICE — ENDOSCOPIC KIT 1.1+ OP4 CA DE 2 GWN AAMI LEVEL 3